# Patient Record
Sex: MALE | Race: BLACK OR AFRICAN AMERICAN | NOT HISPANIC OR LATINO
[De-identification: names, ages, dates, MRNs, and addresses within clinical notes are randomized per-mention and may not be internally consistent; named-entity substitution may affect disease eponyms.]

---

## 2017-01-10 ENCOUNTER — APPOINTMENT (OUTPATIENT)
Dept: WOUND CARE | Facility: CLINIC | Age: 62
End: 2017-01-10

## 2017-01-10 ENCOUNTER — OUTPATIENT (OUTPATIENT)
Dept: OUTPATIENT SERVICES | Facility: HOSPITAL | Age: 62
LOS: 1 days | End: 2017-01-10
Payer: COMMERCIAL

## 2017-01-10 VITALS
RESPIRATION RATE: 16 BRPM | DIASTOLIC BLOOD PRESSURE: 68 MMHG | SYSTOLIC BLOOD PRESSURE: 147 MMHG | OXYGEN SATURATION: 99 % | WEIGHT: 210 LBS | HEART RATE: 106 BPM | BODY MASS INDEX: 31.83 KG/M2 | HEIGHT: 68 IN | TEMPERATURE: 99.4 F

## 2017-01-10 DIAGNOSIS — Z00.00 ENCOUNTER FOR GENERAL ADULT MEDICAL EXAMINATION WITHOUT ABNORMAL FINDINGS: ICD-10-CM

## 2017-01-10 DIAGNOSIS — Z98.89 OTHER SPECIFIED POSTPROCEDURAL STATES: Chronic | ICD-10-CM

## 2017-01-10 DIAGNOSIS — I73.9 PERIPHERAL VASCULAR DISEASE, UNSPECIFIED: Chronic | ICD-10-CM

## 2017-01-10 PROCEDURE — G0463: CPT

## 2017-01-17 DIAGNOSIS — M79.671 PAIN IN RIGHT FOOT: ICD-10-CM

## 2017-01-17 DIAGNOSIS — B35.1 TINEA UNGUIUM: ICD-10-CM

## 2017-01-17 DIAGNOSIS — E11.621 TYPE 2 DIABETES MELLITUS WITH FOOT ULCER: ICD-10-CM

## 2017-01-17 DIAGNOSIS — M79.672 PAIN IN LEFT FOOT: ICD-10-CM

## 2017-04-11 ENCOUNTER — OUTPATIENT (OUTPATIENT)
Dept: OUTPATIENT SERVICES | Facility: HOSPITAL | Age: 62
LOS: 1 days | End: 2017-04-11
Payer: COMMERCIAL

## 2017-04-11 ENCOUNTER — APPOINTMENT (OUTPATIENT)
Dept: WOUND CARE | Facility: CLINIC | Age: 62
End: 2017-04-11

## 2017-04-11 VITALS
DIASTOLIC BLOOD PRESSURE: 64 MMHG | HEART RATE: 85 BPM | SYSTOLIC BLOOD PRESSURE: 112 MMHG | BODY MASS INDEX: 31.83 KG/M2 | HEIGHT: 68 IN | WEIGHT: 210 LBS

## 2017-04-11 DIAGNOSIS — Z98.89 OTHER SPECIFIED POSTPROCEDURAL STATES: Chronic | ICD-10-CM

## 2017-04-11 DIAGNOSIS — I73.9 PERIPHERAL VASCULAR DISEASE, UNSPECIFIED: Chronic | ICD-10-CM

## 2017-04-11 DIAGNOSIS — Z00.00 ENCOUNTER FOR GENERAL ADULT MEDICAL EXAMINATION WITHOUT ABNORMAL FINDINGS: ICD-10-CM

## 2017-04-11 PROCEDURE — G0463: CPT

## 2017-04-12 DIAGNOSIS — E11.621 TYPE 2 DIABETES MELLITUS WITH FOOT ULCER: ICD-10-CM

## 2017-04-12 DIAGNOSIS — E11.610 TYPE 2 DIABETES MELLITUS WITH DIABETIC NEUROPATHIC ARTHROPATHY: ICD-10-CM

## 2017-05-30 ENCOUNTER — OUTPATIENT (OUTPATIENT)
Dept: OUTPATIENT SERVICES | Facility: HOSPITAL | Age: 62
LOS: 1 days | End: 2017-05-30
Payer: COMMERCIAL

## 2017-05-30 ENCOUNTER — APPOINTMENT (OUTPATIENT)
Dept: WOUND CARE | Facility: CLINIC | Age: 62
End: 2017-05-30

## 2017-05-30 VITALS
HEART RATE: 80 BPM | SYSTOLIC BLOOD PRESSURE: 105 MMHG | TEMPERATURE: 98.6 F | BODY MASS INDEX: 33.34 KG/M2 | OXYGEN SATURATION: 99 % | RESPIRATION RATE: 18 BRPM | HEIGHT: 68 IN | DIASTOLIC BLOOD PRESSURE: 70 MMHG | WEIGHT: 220 LBS

## 2017-05-30 DIAGNOSIS — Z98.89 OTHER SPECIFIED POSTPROCEDURAL STATES: Chronic | ICD-10-CM

## 2017-05-30 DIAGNOSIS — I73.9 PERIPHERAL VASCULAR DISEASE, UNSPECIFIED: Chronic | ICD-10-CM

## 2017-05-30 DIAGNOSIS — Z00.00 ENCOUNTER FOR GENERAL ADULT MEDICAL EXAMINATION WITHOUT ABNORMAL FINDINGS: ICD-10-CM

## 2017-05-30 PROCEDURE — G0463: CPT

## 2017-06-05 DIAGNOSIS — L97.411 NON-PRESSURE CHRONIC ULCER OF RIGHT HEEL AND MIDFOOT LIMITED TO BREAKDOWN OF SKIN: ICD-10-CM

## 2017-06-05 DIAGNOSIS — E11.621 TYPE 2 DIABETES MELLITUS WITH FOOT ULCER: ICD-10-CM

## 2017-10-03 ENCOUNTER — OUTPATIENT (OUTPATIENT)
Dept: OUTPATIENT SERVICES | Facility: HOSPITAL | Age: 62
LOS: 1 days | End: 2017-10-03
Payer: SELF-PAY

## 2017-10-03 ENCOUNTER — APPOINTMENT (OUTPATIENT)
Dept: WOUND CARE | Facility: CLINIC | Age: 62
End: 2017-10-03

## 2017-10-03 VITALS
RESPIRATION RATE: 18 BRPM | HEART RATE: 87 BPM | WEIGHT: 220 LBS | BODY MASS INDEX: 33.34 KG/M2 | DIASTOLIC BLOOD PRESSURE: 68 MMHG | TEMPERATURE: 98.7 F | SYSTOLIC BLOOD PRESSURE: 104 MMHG | HEIGHT: 68 IN | OXYGEN SATURATION: 99 %

## 2017-10-03 DIAGNOSIS — Z98.89 OTHER SPECIFIED POSTPROCEDURAL STATES: Chronic | ICD-10-CM

## 2017-10-03 DIAGNOSIS — I73.9 PERIPHERAL VASCULAR DISEASE, UNSPECIFIED: Chronic | ICD-10-CM

## 2017-10-03 DIAGNOSIS — L84 CORNS AND CALLOSITIES: ICD-10-CM

## 2017-10-03 DIAGNOSIS — Z00.00 ENCOUNTER FOR GENERAL ADULT MEDICAL EXAMINATION WITHOUT ABNORMAL FINDINGS: ICD-10-CM

## 2017-10-03 DIAGNOSIS — B35.1 TINEA UNGUIUM: ICD-10-CM

## 2017-10-03 PROCEDURE — 11055 PARING/CUTG B9 HYPRKER LES 1: CPT

## 2017-11-27 ENCOUNTER — APPOINTMENT (OUTPATIENT)
Dept: SURGERY | Facility: CLINIC | Age: 62
End: 2017-11-27
Payer: COMMERCIAL

## 2017-11-27 VITALS
HEIGHT: 68 IN | HEART RATE: 85 BPM | BODY MASS INDEX: 33.34 KG/M2 | DIASTOLIC BLOOD PRESSURE: 83 MMHG | SYSTOLIC BLOOD PRESSURE: 136 MMHG | WEIGHT: 220 LBS | TEMPERATURE: 98 F

## 2017-11-27 PROCEDURE — 99202 OFFICE O/P NEW SF 15 MIN: CPT

## 2018-01-09 ENCOUNTER — APPOINTMENT (OUTPATIENT)
Dept: WOUND CARE | Facility: CLINIC | Age: 63
End: 2018-01-09

## 2018-01-24 ENCOUNTER — OUTPATIENT (OUTPATIENT)
Dept: OUTPATIENT SERVICES | Facility: HOSPITAL | Age: 63
LOS: 1 days | End: 2018-01-24
Payer: COMMERCIAL

## 2018-01-24 ENCOUNTER — APPOINTMENT (OUTPATIENT)
Dept: WOUND CARE | Facility: CLINIC | Age: 63
End: 2018-01-24

## 2018-01-24 DIAGNOSIS — I73.9 PERIPHERAL VASCULAR DISEASE, UNSPECIFIED: Chronic | ICD-10-CM

## 2018-01-24 DIAGNOSIS — Z98.89 OTHER SPECIFIED POSTPROCEDURAL STATES: Chronic | ICD-10-CM

## 2018-01-24 DIAGNOSIS — Z00.00 ENCOUNTER FOR GENERAL ADULT MEDICAL EXAMINATION WITHOUT ABNORMAL FINDINGS: ICD-10-CM

## 2018-01-24 PROCEDURE — G0463: CPT

## 2018-01-24 RX ORDER — AMMONIUM LACTATE 12 %
12 LOTION (GRAM) TOPICAL TWICE DAILY
Qty: 1 | Refills: 5 | Status: ACTIVE | COMMUNITY
Start: 2018-01-24 | End: 1900-01-01

## 2018-01-25 DIAGNOSIS — B35.1 TINEA UNGUIUM: ICD-10-CM

## 2018-01-25 DIAGNOSIS — M79.671 PAIN IN RIGHT FOOT: ICD-10-CM

## 2018-01-25 DIAGNOSIS — E11.620 TYPE 2 DIABETES MELLITUS WITH DIABETIC DERMATITIS: ICD-10-CM

## 2018-02-28 ENCOUNTER — OUTPATIENT (OUTPATIENT)
Dept: OUTPATIENT SERVICES | Facility: HOSPITAL | Age: 63
LOS: 1 days | End: 2018-02-28
Payer: SELF-PAY

## 2018-02-28 ENCOUNTER — APPOINTMENT (OUTPATIENT)
Dept: WOUND CARE | Facility: CLINIC | Age: 63
End: 2018-02-28

## 2018-02-28 VITALS
DIASTOLIC BLOOD PRESSURE: 82 MMHG | HEART RATE: 81 BPM | WEIGHT: 230 LBS | OXYGEN SATURATION: 99 % | SYSTOLIC BLOOD PRESSURE: 156 MMHG | BODY MASS INDEX: 34.86 KG/M2 | TEMPERATURE: 98.3 F | HEIGHT: 68 IN | RESPIRATION RATE: 18 BRPM

## 2018-02-28 DIAGNOSIS — Z98.89 OTHER SPECIFIED POSTPROCEDURAL STATES: Chronic | ICD-10-CM

## 2018-02-28 DIAGNOSIS — Z00.00 ENCOUNTER FOR GENERAL ADULT MEDICAL EXAMINATION WITHOUT ABNORMAL FINDINGS: ICD-10-CM

## 2018-02-28 DIAGNOSIS — I73.9 PERIPHERAL VASCULAR DISEASE, UNSPECIFIED: Chronic | ICD-10-CM

## 2018-02-28 PROCEDURE — 11042 DBRDMT SUBQ TIS 1ST 20SQCM/<: CPT

## 2018-02-28 PROCEDURE — G0463: CPT

## 2018-03-01 DIAGNOSIS — E11.621 TYPE 2 DIABETES MELLITUS WITH FOOT ULCER: ICD-10-CM

## 2018-03-01 DIAGNOSIS — L97.412 NON-PRESSURE CHRONIC ULCER OF RIGHT HEEL AND MIDFOOT WITH FAT LAYER EXPOSED: ICD-10-CM

## 2018-03-13 ENCOUNTER — OUTPATIENT (OUTPATIENT)
Dept: OUTPATIENT SERVICES | Facility: HOSPITAL | Age: 63
LOS: 1 days | End: 2018-03-13
Payer: COMMERCIAL

## 2018-03-13 ENCOUNTER — APPOINTMENT (OUTPATIENT)
Dept: WOUND CARE | Facility: CLINIC | Age: 63
End: 2018-03-13

## 2018-03-13 VITALS
WEIGHT: 255.74 LBS | TEMPERATURE: 99.2 F | HEIGHT: 68 IN | HEART RATE: 93 BPM | RESPIRATION RATE: 18 BRPM | SYSTOLIC BLOOD PRESSURE: 132 MMHG | DIASTOLIC BLOOD PRESSURE: 75 MMHG | BODY MASS INDEX: 38.76 KG/M2 | OXYGEN SATURATION: 99 %

## 2018-03-13 DIAGNOSIS — I73.9 PERIPHERAL VASCULAR DISEASE, UNSPECIFIED: Chronic | ICD-10-CM

## 2018-03-13 DIAGNOSIS — Z00.00 ENCOUNTER FOR GENERAL ADULT MEDICAL EXAMINATION WITHOUT ABNORMAL FINDINGS: ICD-10-CM

## 2018-03-13 DIAGNOSIS — Z98.89 OTHER SPECIFIED POSTPROCEDURAL STATES: Chronic | ICD-10-CM

## 2018-03-13 PROCEDURE — G0463: CPT

## 2018-03-14 DIAGNOSIS — E11.610 TYPE 2 DIABETES MELLITUS WITH DIABETIC NEUROPATHIC ARTHROPATHY: ICD-10-CM

## 2018-03-14 DIAGNOSIS — M14.672 CHARCOT'S JOINT, LEFT ANKLE AND FOOT: ICD-10-CM

## 2018-03-14 DIAGNOSIS — M14.671 CHARCOT'S JOINT, RIGHT ANKLE AND FOOT: ICD-10-CM

## 2018-04-09 ENCOUNTER — OUTPATIENT (OUTPATIENT)
Dept: OUTPATIENT SERVICES | Facility: HOSPITAL | Age: 63
LOS: 1 days | End: 2018-04-09
Payer: COMMERCIAL

## 2018-04-09 ENCOUNTER — APPOINTMENT (OUTPATIENT)
Dept: CT IMAGING | Facility: IMAGING CENTER | Age: 63
End: 2018-04-09
Payer: COMMERCIAL

## 2018-04-09 DIAGNOSIS — N49.2 INFLAMMATORY DISORDERS OF SCROTUM: ICD-10-CM

## 2018-04-09 DIAGNOSIS — Z98.89 OTHER SPECIFIED POSTPROCEDURAL STATES: Chronic | ICD-10-CM

## 2018-04-09 DIAGNOSIS — I73.9 PERIPHERAL VASCULAR DISEASE, UNSPECIFIED: Chronic | ICD-10-CM

## 2018-04-09 PROCEDURE — 74176 CT ABD & PELVIS W/O CONTRAST: CPT | Mod: 26

## 2018-04-09 PROCEDURE — 74176 CT ABD & PELVIS W/O CONTRAST: CPT

## 2018-05-10 ENCOUNTER — APPOINTMENT (OUTPATIENT)
Dept: CT IMAGING | Facility: IMAGING CENTER | Age: 63
End: 2018-05-10
Payer: COMMERCIAL

## 2018-05-10 ENCOUNTER — OUTPATIENT (OUTPATIENT)
Dept: OUTPATIENT SERVICES | Facility: HOSPITAL | Age: 63
LOS: 1 days | End: 2018-05-10
Payer: COMMERCIAL

## 2018-05-10 DIAGNOSIS — Z98.89 OTHER SPECIFIED POSTPROCEDURAL STATES: Chronic | ICD-10-CM

## 2018-05-10 DIAGNOSIS — Z00.8 ENCOUNTER FOR OTHER GENERAL EXAMINATION: ICD-10-CM

## 2018-05-10 DIAGNOSIS — I73.9 PERIPHERAL VASCULAR DISEASE, UNSPECIFIED: Chronic | ICD-10-CM

## 2018-05-10 PROCEDURE — 72192 CT PELVIS W/O DYE: CPT

## 2018-05-10 PROCEDURE — 72192 CT PELVIS W/O DYE: CPT | Mod: 26

## 2018-05-11 ENCOUNTER — OUTPATIENT (OUTPATIENT)
Dept: OUTPATIENT SERVICES | Facility: HOSPITAL | Age: 63
LOS: 1 days | End: 2018-05-11
Payer: COMMERCIAL

## 2018-05-11 VITALS
DIASTOLIC BLOOD PRESSURE: 90 MMHG | HEIGHT: 68 IN | OXYGEN SATURATION: 96 % | SYSTOLIC BLOOD PRESSURE: 132 MMHG | WEIGHT: 238.1 LBS | HEART RATE: 82 BPM | RESPIRATION RATE: 16 BRPM | TEMPERATURE: 99 F

## 2018-05-11 DIAGNOSIS — Z98.89 OTHER SPECIFIED POSTPROCEDURAL STATES: Chronic | ICD-10-CM

## 2018-05-11 DIAGNOSIS — I73.9 PERIPHERAL VASCULAR DISEASE, UNSPECIFIED: Chronic | ICD-10-CM

## 2018-05-11 DIAGNOSIS — Z01.818 ENCOUNTER FOR OTHER PREPROCEDURAL EXAMINATION: ICD-10-CM

## 2018-05-11 DIAGNOSIS — N49.2 INFLAMMATORY DISORDERS OF SCROTUM: ICD-10-CM

## 2018-05-11 DIAGNOSIS — G47.33 OBSTRUCTIVE SLEEP APNEA (ADULT) (PEDIATRIC): ICD-10-CM

## 2018-05-11 DIAGNOSIS — N18.6 END STAGE RENAL DISEASE: ICD-10-CM

## 2018-05-11 DIAGNOSIS — E11.29 TYPE 2 DIABETES MELLITUS WITH OTHER DIABETIC KIDNEY COMPLICATION: ICD-10-CM

## 2018-05-11 PROCEDURE — G0463: CPT

## 2018-05-11 RX ORDER — INSULIN ASPART 100 [IU]/ML
20 INJECTION, SUSPENSION SUBCUTANEOUS
Qty: 0 | Refills: 0 | COMMUNITY

## 2018-05-11 NOTE — H&P PST ADULT - PMH
Charcot foot due to diabetes mellitus    Diabetes    Diabetic retinopathy associated with type 2 diabetes mellitus    DM (diabetes mellitus)    ESRD (end stage renal disease) on dialysis    HTN (hypertension)    Hyperlipidemia    Hypertension    Kidney problem Abscess of scrotum    Charcot foot due to diabetes mellitus  uses CROW boots bilaterally  Diabetic retinopathy associated with type 2 diabetes mellitus    DM (diabetes mellitus)  type  ESRD (end stage renal disease) on dialysis  t-th-sat via right AV graft  Hyperlipidemia Abscess of scrotum    Charcot foot due to diabetes mellitus  uses CROW boots bilaterally  Diabetic retinopathy associated with type 2 diabetes mellitus    DM (diabetes mellitus)  type 2  ESRD (end stage renal disease) on dialysis  t-th-sat via right AV graft, uses midodrine the days of Dialysis for hypotension  Hyperlipidemia

## 2018-05-11 NOTE — H&P PST ADULT - PROBLEM SELECTOR PLAN 1
planned for Incision and drainage of right scrotal abscess.   will obtain recent labs from Dialysis center: called to send to [facility]  Preprocedure instructions discussed planned for Incision and drainage of right scrotal abscess.   will obtain recent labs from Dialysis center: called to send to [facility]  Preprocedure instructions discussed  will obtain recent cardio note/echo

## 2018-05-11 NOTE — H&P PST ADULT - PROBLEM SELECTOR PROBLEM 3
Type 2 diabetes mellitus with other diabetic kidney complication, with long-term current use of insulin

## 2018-05-11 NOTE — H&P PST ADULT - PROBLEM SELECTOR PLAN 3
HGA1c done at HD center   FS the day of procedure  continue full dose Lantus at night   Hold Novolog the day of procedure

## 2018-05-11 NOTE — H&P PST ADULT - ENDOCRINE COMMENTS
BS 130s in AM BS 130s in AM, last A1c 9 or less as per patient ( will obtain labs 5/9 from HD center)

## 2018-05-11 NOTE — H&P PST ADULT - NSANTHOSAYNRD_GEN_A_CORE
No. TEOFILO screening performed.  STOP BANG Legend: 0-2 = LOW Risk; 3-4 = INTERMEDIATE Risk; 5-8 = HIGH Risk

## 2018-05-11 NOTE — H&P PST ADULT - PSH
Charcot's syndrome  Surgeries in feet and ankles   left ankle 2011 and right ankle 11/2014  S/P foot surgery  right foot - 11/2014

## 2018-05-11 NOTE — H&P PST ADULT - OTHER CARE PROVIDERS
Dr. Hudson Cardio  last visit 5/10/18, Dr. Arden Tsang Nephro , Dialysis center [facility] , Dr. Friedman endo , Dr. Navarro ortho

## 2018-05-11 NOTE — H&P PST ADULT - NEGATIVE GENERAL GENITOURINARY SYMPTOMS
no hematuria/no flank pain L/no flank pain R/no bladder infections/no dysuria/no nocturia no bladder infections/no dysuria/no hematuria/no flank pain L/no flank pain R

## 2018-05-11 NOTE — H&P PST ADULT - EXTREMITIES COMMENTS
Right UE with right AV graft + thrill, + bruit   LE unable to assess patient has CROW boots on Bilaterally

## 2018-05-14 ENCOUNTER — TRANSCRIPTION ENCOUNTER (OUTPATIENT)
Age: 63
End: 2018-05-14

## 2018-05-15 ENCOUNTER — INPATIENT (INPATIENT)
Facility: HOSPITAL | Age: 63
LOS: 0 days | Discharge: HOME HEALTH | DRG: 727 | End: 2018-05-16
Attending: UROLOGY | Admitting: UROLOGY
Payer: MEDICARE

## 2018-05-15 VITALS
OXYGEN SATURATION: 96 % | HEART RATE: 91 BPM | WEIGHT: 238.1 LBS | SYSTOLIC BLOOD PRESSURE: 143 MMHG | HEIGHT: 68 IN | TEMPERATURE: 98 F | RESPIRATION RATE: 16 BRPM | DIASTOLIC BLOOD PRESSURE: 78 MMHG

## 2018-05-15 DIAGNOSIS — N49.2 INFLAMMATORY DISORDERS OF SCROTUM: ICD-10-CM

## 2018-05-15 DIAGNOSIS — Z98.89 OTHER SPECIFIED POSTPROCEDURAL STATES: Chronic | ICD-10-CM

## 2018-05-15 DIAGNOSIS — I73.9 PERIPHERAL VASCULAR DISEASE, UNSPECIFIED: Chronic | ICD-10-CM

## 2018-05-15 LAB
GLUCOSE BLDC GLUCOMTR-MCNC: 174 MG/DL — HIGH (ref 70–99)
GLUCOSE BLDC GLUCOMTR-MCNC: 320 MG/DL — HIGH (ref 70–99)
POTASSIUM SERPL-MCNC: 4.3 MMOL/L — SIGNIFICANT CHANGE UP (ref 3.5–5.3)
POTASSIUM SERPL-SCNC: 4.3 MMOL/L — SIGNIFICANT CHANGE UP (ref 3.5–5.3)

## 2018-05-15 RX ORDER — CEFAZOLIN SODIUM 1 G
2000 VIAL (EA) INJECTION ONCE
Qty: 0 | Refills: 0 | Status: DISCONTINUED | OUTPATIENT
Start: 2018-05-15 | End: 2018-05-15

## 2018-05-15 RX ORDER — LIDOCAINE HCL 20 MG/ML
0.2 VIAL (ML) INJECTION ONCE
Qty: 0 | Refills: 0 | Status: DISCONTINUED | OUTPATIENT
Start: 2018-05-15 | End: 2018-05-15

## 2018-05-15 RX ORDER — ONDANSETRON 8 MG/1
4 TABLET, FILM COATED ORAL ONCE
Qty: 0 | Refills: 0 | Status: DISCONTINUED | OUTPATIENT
Start: 2018-05-15 | End: 2018-05-16

## 2018-05-15 RX ORDER — GLUCAGON INJECTION, SOLUTION 0.5 MG/.1ML
1 INJECTION, SOLUTION SUBCUTANEOUS ONCE
Qty: 0 | Refills: 0 | Status: DISCONTINUED | OUTPATIENT
Start: 2018-05-15 | End: 2018-05-16

## 2018-05-15 RX ORDER — OXYCODONE AND ACETAMINOPHEN 5; 325 MG/1; MG/1
1 TABLET ORAL EVERY 4 HOURS
Qty: 0 | Refills: 0 | Status: DISCONTINUED | OUTPATIENT
Start: 2018-05-15 | End: 2018-05-16

## 2018-05-15 RX ORDER — INSULIN LISPRO 100/ML
VIAL (ML) SUBCUTANEOUS
Qty: 0 | Refills: 0 | Status: DISCONTINUED | OUTPATIENT
Start: 2018-05-15 | End: 2018-05-16

## 2018-05-15 RX ORDER — SODIUM CHLORIDE 9 MG/ML
3 INJECTION INTRAMUSCULAR; INTRAVENOUS; SUBCUTANEOUS EVERY 8 HOURS
Qty: 0 | Refills: 0 | Status: DISCONTINUED | OUTPATIENT
Start: 2018-05-15 | End: 2018-05-15

## 2018-05-15 RX ORDER — SODIUM CHLORIDE 9 MG/ML
1000 INJECTION INTRAMUSCULAR; INTRAVENOUS; SUBCUTANEOUS
Qty: 0 | Refills: 0 | Status: DISCONTINUED | OUTPATIENT
Start: 2018-05-15 | End: 2018-05-15

## 2018-05-15 RX ORDER — OXYCODONE AND ACETAMINOPHEN 5; 325 MG/1; MG/1
2 TABLET ORAL EVERY 6 HOURS
Qty: 0 | Refills: 0 | Status: DISCONTINUED | OUTPATIENT
Start: 2018-05-15 | End: 2018-05-16

## 2018-05-15 RX ORDER — DEXTROSE 50 % IN WATER 50 %
25 SYRINGE (ML) INTRAVENOUS ONCE
Qty: 0 | Refills: 0 | Status: DISCONTINUED | OUTPATIENT
Start: 2018-05-15 | End: 2018-05-16

## 2018-05-15 RX ORDER — HEPARIN SODIUM 5000 [USP'U]/ML
5000 INJECTION INTRAVENOUS; SUBCUTANEOUS EVERY 8 HOURS
Qty: 0 | Refills: 0 | Status: DISCONTINUED | OUTPATIENT
Start: 2018-05-15 | End: 2018-05-16

## 2018-05-15 RX ORDER — CEFTRIAXONE 500 MG/1
1 INJECTION, POWDER, FOR SOLUTION INTRAMUSCULAR; INTRAVENOUS EVERY 24 HOURS
Qty: 0 | Refills: 0 | Status: DISCONTINUED | OUTPATIENT
Start: 2018-05-15 | End: 2018-05-16

## 2018-05-15 RX ORDER — SODIUM CHLORIDE 9 MG/ML
1000 INJECTION, SOLUTION INTRAVENOUS
Qty: 0 | Refills: 0 | Status: DISCONTINUED | OUTPATIENT
Start: 2018-05-15 | End: 2018-05-16

## 2018-05-15 RX ORDER — SENNA PLUS 8.6 MG/1
2 TABLET ORAL AT BEDTIME
Qty: 0 | Refills: 0 | Status: DISCONTINUED | OUTPATIENT
Start: 2018-05-15 | End: 2018-05-16

## 2018-05-15 RX ORDER — DOCUSATE SODIUM 100 MG
100 CAPSULE ORAL THREE TIMES A DAY
Qty: 0 | Refills: 0 | Status: DISCONTINUED | OUTPATIENT
Start: 2018-05-15 | End: 2018-05-16

## 2018-05-15 RX ORDER — DEXTROSE 50 % IN WATER 50 %
12.5 SYRINGE (ML) INTRAVENOUS ONCE
Qty: 0 | Refills: 0 | Status: DISCONTINUED | OUTPATIENT
Start: 2018-05-15 | End: 2018-05-16

## 2018-05-15 RX ORDER — INSULIN LISPRO 100/ML
20 VIAL (ML) SUBCUTANEOUS
Qty: 0 | Refills: 0 | Status: DISCONTINUED | OUTPATIENT
Start: 2018-05-15 | End: 2018-05-16

## 2018-05-15 RX ORDER — MORPHINE SULFATE 50 MG/1
4 CAPSULE, EXTENDED RELEASE ORAL EVERY 4 HOURS
Qty: 0 | Refills: 0 | Status: DISCONTINUED | OUTPATIENT
Start: 2018-05-15 | End: 2018-05-15

## 2018-05-15 RX ORDER — INSULIN GLARGINE 100 [IU]/ML
45 INJECTION, SOLUTION SUBCUTANEOUS AT BEDTIME
Qty: 0 | Refills: 0 | Status: DISCONTINUED | OUTPATIENT
Start: 2018-05-15 | End: 2018-05-16

## 2018-05-15 RX ORDER — DEXTROSE 50 % IN WATER 50 %
15 SYRINGE (ML) INTRAVENOUS ONCE
Qty: 0 | Refills: 0 | Status: DISCONTINUED | OUTPATIENT
Start: 2018-05-15 | End: 2018-05-16

## 2018-05-15 RX ORDER — CEFTRIAXONE 500 MG/1
1 INJECTION, POWDER, FOR SOLUTION INTRAMUSCULAR; INTRAVENOUS EVERY 24 HOURS
Qty: 0 | Refills: 0 | Status: DISCONTINUED | OUTPATIENT
Start: 2018-05-15 | End: 2018-05-15

## 2018-05-15 RX ORDER — SOD,AMMONIUM,POTASSIUM LACTATE
1 CREAM (GRAM) TOPICAL
Qty: 0 | Refills: 0 | Status: DISCONTINUED | OUTPATIENT
Start: 2018-05-15 | End: 2018-05-16

## 2018-05-15 RX ORDER — ATORVASTATIN CALCIUM 80 MG/1
20 TABLET, FILM COATED ORAL AT BEDTIME
Qty: 0 | Refills: 0 | Status: DISCONTINUED | OUTPATIENT
Start: 2018-05-15 | End: 2018-05-16

## 2018-05-15 RX ORDER — ONDANSETRON 8 MG/1
4 TABLET, FILM COATED ORAL EVERY 6 HOURS
Qty: 0 | Refills: 0 | Status: DISCONTINUED | OUTPATIENT
Start: 2018-05-15 | End: 2018-05-16

## 2018-05-15 RX ORDER — HYDROMORPHONE HYDROCHLORIDE 2 MG/ML
0.2 INJECTION INTRAMUSCULAR; INTRAVENOUS; SUBCUTANEOUS
Qty: 0 | Refills: 0 | Status: DISCONTINUED | OUTPATIENT
Start: 2018-05-15 | End: 2018-05-16

## 2018-05-15 RX ADMIN — HYDROMORPHONE HYDROCHLORIDE 0.2 MILLIGRAM(S): 2 INJECTION INTRAMUSCULAR; INTRAVENOUS; SUBCUTANEOUS at 16:15

## 2018-05-15 RX ADMIN — HEPARIN SODIUM 5000 UNIT(S): 5000 INJECTION INTRAVENOUS; SUBCUTANEOUS at 23:04

## 2018-05-15 RX ADMIN — Medication 100 MILLIGRAM(S): at 23:03

## 2018-05-15 RX ADMIN — INSULIN GLARGINE 45 UNIT(S): 100 INJECTION, SOLUTION SUBCUTANEOUS at 23:03

## 2018-05-15 RX ADMIN — HYDROMORPHONE HYDROCHLORIDE 0.2 MILLIGRAM(S): 2 INJECTION INTRAMUSCULAR; INTRAVENOUS; SUBCUTANEOUS at 15:40

## 2018-05-15 RX ADMIN — ATORVASTATIN CALCIUM 20 MILLIGRAM(S): 80 TABLET, FILM COATED ORAL at 23:03

## 2018-05-15 RX ADMIN — SENNA PLUS 2 TABLET(S): 8.6 TABLET ORAL at 23:02

## 2018-05-15 RX ADMIN — HYDROMORPHONE HYDROCHLORIDE 0.2 MILLIGRAM(S): 2 INJECTION INTRAMUSCULAR; INTRAVENOUS; SUBCUTANEOUS at 15:55

## 2018-05-15 RX ADMIN — HYDROMORPHONE HYDROCHLORIDE 0.2 MILLIGRAM(S): 2 INJECTION INTRAMUSCULAR; INTRAVENOUS; SUBCUTANEOUS at 15:58

## 2018-05-15 RX ADMIN — Medication 4: at 23:03

## 2018-05-15 NOTE — BRIEF OPERATIVE NOTE - PROCEDURE
<<-----Click on this checkbox to enter Procedure Incision and drainage of scrotum and tunica vaginalis  05/15/2018    Active  PKHANDGE

## 2018-05-15 NOTE — PROGRESS NOTE ADULT - SUBJECTIVE AND OBJECTIVE BOX
Post op Check    Pt seen and examined without complaints. Pain is controlled. Denies SOB/CP/N/V.     Vital Signs Last 24 Hrs  T(C): 36.2 (15 May 2018 17:00), Max: 36.9 (15 May 2018 12:47)  T(F): 97.2 (15 May 2018 17:00), Max: 98.4 (15 May 2018 12:47)  HR: 90 (15 May 2018 17:00) (90 - 103)  BP: 151/70 (15 May 2018 17:00) (131/61 - 178/77)  BP(mean): 101 (15 May 2018 17:00) (98 - 110)  RR: 15 (15 May 2018 17:00) (15 - 18)  SpO2: 95% (15 May 2018 17:00) (95% - 100%)    I&O's Summary  lehman- 15cc    Physical Exam  Gen: NAD, A&Ox3  Pulm: No respiratory distress, no subcostal retractions  CV: RRR, no JVD  Abd: Soft, NT, ND  : +lehman draining clear urine , minimal, right scrotal incision with iodoform packing and penrose x2, scrotal support in place         05-15    x   |  x   |  x   ----------------------------<  x   4.3   |  x   |  x           A/P: 62y Male s/p right scrotal I&D  DVT prophylaxis/OOB  Incentive spirometry  Strict I&O's  renal following- HD tomorrow afternoon  Analgesia and antiemetics as needed  regular Diet  AM labs  daily dressing changes

## 2018-05-16 ENCOUNTER — TRANSCRIPTION ENCOUNTER (OUTPATIENT)
Age: 63
End: 2018-05-16

## 2018-05-16 VITALS
HEART RATE: 84 BPM | DIASTOLIC BLOOD PRESSURE: 75 MMHG | OXYGEN SATURATION: 100 % | SYSTOLIC BLOOD PRESSURE: 148 MMHG | RESPIRATION RATE: 14 BRPM | TEMPERATURE: 98 F

## 2018-05-16 DIAGNOSIS — N25.81 SECONDARY HYPERPARATHYROIDISM OF RENAL ORIGIN: ICD-10-CM

## 2018-05-16 DIAGNOSIS — D63.1 ANEMIA IN CHRONIC KIDNEY DISEASE: ICD-10-CM

## 2018-05-16 DIAGNOSIS — I12.0 HYPERTENSIVE CHRONIC KIDNEY DISEASE WITH STAGE 5 CHRONIC KIDNEY DISEASE OR END STAGE RENAL DISEASE: ICD-10-CM

## 2018-05-16 DIAGNOSIS — N18.6 END STAGE RENAL DISEASE: ICD-10-CM

## 2018-05-16 LAB
ANION GAP SERPL CALC-SCNC: 18 MMOL/L — HIGH (ref 5–17)
BUN SERPL-MCNC: 48 MG/DL — HIGH (ref 7–23)
CALCIUM SERPL-MCNC: 9.1 MG/DL — SIGNIFICANT CHANGE UP (ref 8.4–10.5)
CHLORIDE SERPL-SCNC: 95 MMOL/L — LOW (ref 96–108)
CO2 SERPL-SCNC: 24 MMOL/L — SIGNIFICANT CHANGE UP (ref 22–31)
CREAT SERPL-MCNC: 7.65 MG/DL — HIGH (ref 0.5–1.3)
GLUCOSE BLDC GLUCOMTR-MCNC: 102 MG/DL — HIGH (ref 70–99)
GLUCOSE BLDC GLUCOMTR-MCNC: 306 MG/DL — HIGH (ref 70–99)
GLUCOSE BLDC GLUCOMTR-MCNC: 60 MG/DL — LOW (ref 70–99)
GLUCOSE BLDC GLUCOMTR-MCNC: 85 MG/DL — SIGNIFICANT CHANGE UP (ref 70–99)
GLUCOSE SERPL-MCNC: 183 MG/DL — HIGH (ref 70–99)
HBA1C BLD-MCNC: 10.5 % — HIGH (ref 4–5.6)
HBV SURFACE AG SER-ACNC: SIGNIFICANT CHANGE UP
HCT VFR BLD CALC: 32 % — LOW (ref 39–50)
HGB BLD-MCNC: 10.5 G/DL — LOW (ref 13–17)
MCHC RBC-ENTMCNC: 31.1 PG — SIGNIFICANT CHANGE UP (ref 27–34)
MCHC RBC-ENTMCNC: 32.9 GM/DL — SIGNIFICANT CHANGE UP (ref 32–36)
MCV RBC AUTO: 94.6 FL — SIGNIFICANT CHANGE UP (ref 80–100)
PHOSPHATE SERPL-MCNC: 5.6 MG/DL — HIGH (ref 2.5–4.5)
PLATELET # BLD AUTO: 278 K/UL — SIGNIFICANT CHANGE UP (ref 150–400)
POTASSIUM SERPL-MCNC: 4.2 MMOL/L — SIGNIFICANT CHANGE UP (ref 3.5–5.3)
POTASSIUM SERPL-SCNC: 4.2 MMOL/L — SIGNIFICANT CHANGE UP (ref 3.5–5.3)
RBC # BLD: 3.38 M/UL — LOW (ref 4.2–5.8)
RBC # FLD: 13 % — SIGNIFICANT CHANGE UP (ref 10.3–14.5)
SODIUM SERPL-SCNC: 137 MMOL/L — SIGNIFICANT CHANGE UP (ref 135–145)
WBC # BLD: 11.5 K/UL — HIGH (ref 3.8–10.5)
WBC # FLD AUTO: 11.5 K/UL — HIGH (ref 3.8–10.5)

## 2018-05-16 PROCEDURE — 84100 ASSAY OF PHOSPHORUS: CPT

## 2018-05-16 PROCEDURE — 87186 SC STD MICRODIL/AGAR DIL: CPT

## 2018-05-16 PROCEDURE — 85027 COMPLETE CBC AUTOMATED: CPT

## 2018-05-16 PROCEDURE — 84132 ASSAY OF SERUM POTASSIUM: CPT

## 2018-05-16 PROCEDURE — 83036 HEMOGLOBIN GLYCOSYLATED A1C: CPT

## 2018-05-16 PROCEDURE — 87070 CULTURE OTHR SPECIMN AEROBIC: CPT

## 2018-05-16 PROCEDURE — 99261: CPT

## 2018-05-16 PROCEDURE — 87340 HEPATITIS B SURFACE AG IA: CPT

## 2018-05-16 PROCEDURE — 80048 BASIC METABOLIC PNL TOTAL CA: CPT

## 2018-05-16 PROCEDURE — 82962 GLUCOSE BLOOD TEST: CPT

## 2018-05-16 RX ORDER — CEFPODOXIME PROXETIL 100 MG
5 TABLET ORAL
Qty: 0 | Refills: 0 | COMMUNITY

## 2018-05-16 RX ORDER — DOCUSATE SODIUM 100 MG
1 CAPSULE ORAL
Qty: 0 | Refills: 0 | COMMUNITY
Start: 2018-05-16

## 2018-05-16 RX ORDER — SENNA PLUS 8.6 MG/1
2 TABLET ORAL
Qty: 0 | Refills: 0 | COMMUNITY
Start: 2018-05-16

## 2018-05-16 RX ORDER — AZTREONAM 2 G
1 VIAL (EA) INJECTION
Qty: 14 | Refills: 0 | OUTPATIENT
Start: 2018-05-16 | End: 2018-05-22

## 2018-05-16 RX ADMIN — HYDROMORPHONE HYDROCHLORIDE 0.2 MILLIGRAM(S): 2 INJECTION INTRAMUSCULAR; INTRAVENOUS; SUBCUTANEOUS at 07:35

## 2018-05-16 RX ADMIN — CEFTRIAXONE 100 GRAM(S): 500 INJECTION, POWDER, FOR SOLUTION INTRAMUSCULAR; INTRAVENOUS at 17:18

## 2018-05-16 RX ADMIN — Medication 4: at 10:09

## 2018-05-16 RX ADMIN — HEPARIN SODIUM 5000 UNIT(S): 5000 INJECTION INTRAVENOUS; SUBCUTANEOUS at 05:49

## 2018-05-16 RX ADMIN — Medication 1 APPLICATION(S): at 05:51

## 2018-05-16 RX ADMIN — HYDROMORPHONE HYDROCHLORIDE 0.2 MILLIGRAM(S): 2 INJECTION INTRAMUSCULAR; INTRAVENOUS; SUBCUTANEOUS at 07:02

## 2018-05-16 RX ADMIN — Medication 20 UNIT(S): at 10:09

## 2018-05-16 RX ADMIN — Medication 100 MILLIGRAM(S): at 05:48

## 2018-05-16 RX ADMIN — Medication 1 APPLICATION(S): at 17:18

## 2018-05-16 NOTE — DISCHARGE NOTE ADULT - PLAN OF CARE
you had your abscess drained Call the office if you experience fever, chills, uncontrolled pain, the inability to tolerate liquids, or the urine does not flow   to promote wound healing do not take a bath, continue to walk frequently, return to daily living activities slowly, no heavy lifting greater than 10lbs for 4-6 weeks, follow up Dr Judge in one week   packing should be changed daily by the visiting nurse maintain adequate blood sugar continue home medication continue dialysis maintain adequate cholesterol

## 2018-05-16 NOTE — PROGRESS NOTE ADULT - ASSESSMENT
63yo male s/p right scrotal I&D, POD#1  - daily packing changes  - f/u wound culture  - cont abx  - tov  - pvr  - HD this afternoon, then resume normal schedule tomorrow (tue-thu-sat)  - f/u renal

## 2018-05-16 NOTE — DISCHARGE NOTE ADULT - CARE PLAN
Principal Discharge DX:	Abscess of scrotum  Goal:	you had your abscess drained  Assessment and plan of treatment:	Call the office if you experience fever, chills, uncontrolled pain, the inability to tolerate liquids, or the urine does not flow   to promote wound healing do not take a bath, continue to walk frequently, return to daily living activities slowly, no heavy lifting greater than 10lbs for 4-6 weeks, follow up Dr Judge in one week   packing should be changed daily by the visiting nurse  Secondary Diagnosis:	Type 2 diabetes mellitus with chronic kidney disease, with long-term current use of insulin, unspecified CKD stage  Goal:	maintain adequate blood sugar  Assessment and plan of treatment:	continue home medication  Secondary Diagnosis:	ESRD (end stage renal disease) on dialysis  Assessment and plan of treatment:	continue dialysis  Secondary Diagnosis:	Pure hypercholesterolemia  Goal:	maintain adequate cholesterol  Assessment and plan of treatment:	continue home medication

## 2018-05-16 NOTE — CONSULT NOTE ADULT - PROBLEM SELECTOR RECOMMENDATION 4
No need for hectorol today. Will resume hectorol 5 mcg with HD as outpatient. Monitor serum calcium and phosphorus.    No renal objection to discharge post-HD today.

## 2018-05-16 NOTE — DISCHARGE NOTE ADULT - HOME CARE AGENCY
[facility] - [phone] to start visit the day after discharge, for RN miguel, Wound care and post-op follow-up- PLS MAKE SURE ARE SENT HOME WITH WOUND PACKING SUPPLIES

## 2018-05-16 NOTE — DISCHARGE NOTE ADULT - CONDITIONS AT DISCHARGE
Pt is a&o x4. Pt denies any pain. Pt. verbalized understanding of discharge instructions. Pt is ambulatory, voiding, tolerating diet, and VSS. Pt. verbalized understanding of medications prescribed and to follow up with MD in time ordered. IV lock removed and site WDL. R AV Fistula remains audible & palpable. Safety maintained. Supplies for incision care sent home with pt. Pt being transported to [facility] via wheel chair by transport team.

## 2018-05-16 NOTE — CONSULT NOTE ADULT - ASSESSMENT
62y Male with history of ESRD on HD presents with scrotal abscess s/p I & D. Nephrology consulted for ESRD status.

## 2018-05-16 NOTE — CONSULT NOTE ADULT - SUBJECTIVE AND OBJECTIVE BOX
[facility] NEPHROLOGY- CONSULTATION NOTE    62y Male with history of below presents with scrotal abscess s/p I & D. Nephrology consulted for ESRD status.    Patient well known to nephrology as follows with Dr. Tsang as outpatient and gets HD TTS at [facility]. Last HD on 5/15 as outpatient however terminated after 90 minutes per patient request (patient normally receives 240 minutes of HD).    REVIEW OF SYSTEMS:  Gen: no changes in weight  HEENT: no rhinorrhea  Neck: no sore throat  Cards: no chest pain  Resp: no dyspnea  GI: no nausea or vomiting or diarrhea  : no dysuria or hematuria  Vascular: no LE edema  Derm: no rashes  Neuro: no numbness/tingling    No Known Allergies      Home Medications Reviewed  Hospital Medications:   MEDICATIONS  (STANDING):  ammonium lactate 12% Lotion 1 Application(s) Topical two times a day  atorvastatin 20 milliGRAM(s) Oral at bedtime  cefTRIAXone   IVPB 1 Gram(s) IV Intermittent every 24 hours  dextrose 5%. 1000 milliLiter(s) (50 mL/Hr) IV Continuous <Continuous>  dextrose 50% Injectable 12.5 Gram(s) IV Push once  dextrose 50% Injectable 25 Gram(s) IV Push once  dextrose 50% Injectable 25 Gram(s) IV Push once  docusate sodium 100 milliGRAM(s) Oral three times a day  heparin  Injectable 5000 Unit(s) SubCutaneous every 8 hours  insulin glargine Injectable (LANTUS) 45 Unit(s) SubCutaneous at bedtime  insulin lispro (HumaLOG) corrective regimen sliding scale   SubCutaneous Before meals and at bedtime  insulin lispro Injectable (HumaLOG) 20 Unit(s) SubCutaneous before breakfast  insulin lispro Injectable (HumaLOG) 20 Unit(s) SubCutaneous before dinner  senna 2 Tablet(s) Oral at bedtime      PAST MEDICAL & SURGICAL HISTORY:  Abscess of scrotum  Hyperlipidemia  Diabetic retinopathy associated with type 2 diabetes mellitus  Charcot foot due to diabetes mellitus: uses CROW boots bilaterally  ESRD (end stage renal disease) on dialysis: t-th-sat via right AV graft, uses midodrine the days of Dialysis for hypotension  DM (diabetes mellitus): type 2  Charcot's syndrome: Surgeries in feet and ankles   left ankle 2011 and right ankle 11/2014  S/P foot surgery: right foot - 11/2014      FAMILY HISTORY:  No pertinent family history in first degree relatives  Family history of diabetes mellitus      SOCIAL HISTORY:  Denies toxic substance use     VITALS:  T(F): 98 (05-16-18 @ 09:00), Max: 98.4 (05-15-18 @ 12:47)  HR: 78 (05-16-18 @ 06:15)  BP: 131/72 (05-16-18 @ 06:15)  RR: 17 (05-16-18 @ 09:00)  SpO2: 96% (05-16-18 @ 09:00)  Wt(kg): --    05-15 @ 07:01  -  05-16 @ 07:00  --------------------------------------------------------  IN: 240 mL / OUT: 190 mL / NET: 50 mL    05-16 @ 07:01  -  05-16 @ 11:21  --------------------------------------------------------  IN: 280 mL / OUT: 0 mL / NET: 280 mL      Height (cm): 172.72 (05-15 @ 12:47)  Weight (kg): 108 (05-15 @ 12:47)  BMI (kg/m2): 36.2 (05-15 @ 12:47)  BSA (m2): 2.2 (05-15 @ 12:47)    PHYSICAL EXAM:  Gen: NAD, calm  HEENT: MMM  Neck: no JVD  Cards: RRR, +S1/S2, no M/G/R  Resp: CTA B/L  GI: soft, NT/ND, NABS  : no CVA tenderness  Vascular: no LE edema B/L, RUE AVG + bruit/thrill  Derm: no rashes  Neuro: non-focal    LABS: N/A

## 2018-05-16 NOTE — DISCHARGE NOTE ADULT - MEDICATION SUMMARY - MEDICATIONS TO TAKE
I will START or STAY ON the medications listed below when I get home from the hospital:    oxyCODONE-acetaminophen 5 mg-325 mg oral tablet  -- 1 tab(s) by mouth every 4 hours, As needed, Mild Pain MDD:6  -- Indication: For Pain medication     NovoLOG 100 units/mL subcutaneous solution  -- 20 unit(s) subcutaneous 2 times a day (with meals)  -- Indication: For home medication     Lantus Solostar Pen 100 units/mL subcutaneous solution  -- 45 unit(s) subcutaneous once a day (at bedtime)  -- Indication: For home medication     atorvastatin 20 mg oral tablet  -- 1 tab(s) by mouth once a day (at bedtime)  -- Indication: For home medication     ammonium lactate topical lotion  -- Apply on skin to affected area 2 times a day to feet  -- Indication: For home medication     senna oral tablet  -- 2 tab(s) by mouth once a day (at bedtime)  -- Indication: For Stool softener    docusate sodium 100 mg oral capsule  -- 1 cap(s) by mouth 3 times a day  -- Indication: For Stool softener    midodrine 2.5 mg oral tablet  -- orally 2 times a day, As Needed on HD days  -- Indication: For home medication     ketorolac 0.4% ophthalmic solution  -- to each affected eye 2 times a day  -- Indication: For home medication     Fosrenol 1000 mg oral tablet, chewable  -- orally once a day (in the evening)  -- Indication: For home medication     Bactrim  mg-160 mg oral tablet  -- 1 tab(s) by mouth 2 times a day   -- Avoid prolonged or excessive exposure to direct and/or artificial sunlight while taking this medication.  Finish all this medication unless otherwise directed by prescriber.  Medication should be taken with plenty of water.    -- Indication: For Antibiotic

## 2018-05-16 NOTE — CONSULT NOTE ADULT - PROBLEM SELECTOR RECOMMENDATION 9
Patient only with 90 minutes of 240 minutes of HD on 5/15. Plan for short HD (2 hours) this afternoon prior to discharge. Patient can then resume outpatient dialysis on 5/17. Monitor electrolytes.

## 2018-05-16 NOTE — DISCHARGE NOTE ADULT - MEDICATION SUMMARY - MEDICATIONS TO STOP TAKING
I will STOP taking the medications listed below when I get home from the hospital:    cefpodoxime 100 mg/5 mL oral liquid  -- 5 milliliter(s) by mouth every 12 hours for scotal infection

## 2018-05-16 NOTE — CONSULT NOTE ADULT - SUBJECTIVE AND OBJECTIVE BOX
63 yo M POD #1 s/p right scrotal abscess I&D.  Wound currently packed with iodoform packing, penrose drain.  Plastics asked to evaluate for wound recs.    PMH/PSH: DM, ESRD on HD, BL charcot feet s/p surgery, R scrotal I&D  NKDA  Meds: reviewed  Soc: nonsmoker  Fam: noncontrib    ROS: as per HPI and ow neg    PE:  NAD  Alert, aware  MMM  PERRL  Right scrotum with I&D sites packed with iodoform gauze and penrose drains  No malodor, purulence  Wounds appear clean  no Palpable fluid collections  Serosanguinous drainage on ABD pad    A/p: 63 yo M POD #1 s/p I&D R scrotum.    Recommend continuing with daily iodoform dressing changes, cover with ABD  Continue medical management, abx per primary  No indication for VAC  Can follow up as outpatient PRN (patient given office information)    Radha Reyes MD  Plastic Surgery

## 2018-05-16 NOTE — CONSULT NOTE ADULT - ATTENDING COMMENTS
Patient seen and examined, agree with the above assessment and plan by JAMIE Maldonado.  Pt known to out practice admitted for management of scrotal abcess  CV status is stable  Resume prior medications  DC planning per surgery
[facility] NEPHROLOGY  Arden Tsang M.D.  Freeman Holland D.O.  Janny Chirinos M.D.  Ambar Hale, MSN, ANP-C    Telephone: [phone]  Facsimile: [phone]    [address] [place] [address]

## 2018-05-16 NOTE — DISCHARGE NOTE ADULT - HOSPITAL COURSE
Pt is a 61 yo male with a pmh of esrd on hd t,th, sat, dm2, hld, charcot foot, and developed scrotal abscess which required incision and drainage in the operating room. please see the operative report for further details. postop he tolerated packing change without iv medications. he was dialyzed in house prior to discharge. vns was set up for packing changes

## 2018-05-16 NOTE — CONSULT NOTE ADULT - SUBJECTIVE AND OBJECTIVE BOX
CARDIOLOGY CONSULT - Dr. Hudson     CHIEF COMPLAINT: scrotal abscess, post op mgt     HPI: 62 year old male with PMHx below s/p scrotal abscess I&D. Pt. well known to our practice, f/u post op for cardiac management. Pt. cardiac history includes HTN, DM, ESRD on HD, HLD. Recent echo 12/17/17 revealing Mild diastolic dysfunction, EF 65%, minimal MR. Pt. feels well, denies cp/sob/smith, orthopnea, palpitations, PND, lower extremity edema, syncope or cough.     PAST MEDICAL & SURGICAL HISTORY:  Abscess of scrotum  Hyperlipidemia  Diabetic retinopathy associated with type 2 diabetes mellitus  Charcot foot due to diabetes mellitus: uses CROW boots bilaterally  ESRD (end stage renal disease) on dialysis: t-th-sat via right AV graft, uses midodrine the days of Dialysis for hypotension  DM (diabetes mellitus): type 2  Charcot's syndrome: Surgeries in feet and ankles   left ankle 2011 and right ankle 11/2014  S/P foot surgery: right foot - 11/2014          PREVIOUS DIAGNOSTIC TESTING:    [ ] Echocardiogram:   [ ]  Catheterization:  [ ] Stress Test:  	    MEDICATIONS:  MEDICATIONS  (STANDING):  ammonium lactate 12% Lotion 1 Application(s) Topical two times a day  atorvastatin 20 milliGRAM(s) Oral at bedtime  cefTRIAXone   IVPB 1 Gram(s) IV Intermittent every 24 hours  dextrose 5%. 1000 milliLiter(s) (50 mL/Hr) IV Continuous <Continuous>  dextrose 50% Injectable 12.5 Gram(s) IV Push once  dextrose 50% Injectable 25 Gram(s) IV Push once  dextrose 50% Injectable 25 Gram(s) IV Push once  docusate sodium 100 milliGRAM(s) Oral three times a day  heparin  Injectable 5000 Unit(s) SubCutaneous every 8 hours  insulin glargine Injectable (LANTUS) 45 Unit(s) SubCutaneous at bedtime  insulin lispro (HumaLOG) corrective regimen sliding scale   SubCutaneous Before meals and at bedtime  insulin lispro Injectable (HumaLOG) 20 Unit(s) SubCutaneous before breakfast  insulin lispro Injectable (HumaLOG) 20 Unit(s) SubCutaneous before dinner  senna 2 Tablet(s) Oral at bedtime      FAMILY HISTORY:  No pertinent family history in first degree relatives  Family history of diabetes mellitus      SOCIAL HISTORY:    [x ] Non-smoker  [ ] Smoker  [ ] Alcohol    Allergies    No Known Allergies    Intolerances    	    REVIEW OF SYSTEMS:  CONSTITUTIONAL: No fever, weight loss, or fatigue  EYES: No eye pain, visual disturbances, or discharge  ENMT:  No difficulty hearing, tinnitus, vertigo; No sinus or throat pain  NECK: No pain or stiffness  RESPIRATORY: No cough, wheezing, chills or hemoptysis; No Shortness of Breath  CARDIOVASCULAR: No chest pain, palpitations, passing out, dizziness, or leg swelling  GASTROINTESTINAL: No abdominal or epigastric pain. No nausea, vomiting, or hematemesis; No diarrhea or constipation. No melena or hematochezia.  GENITOURINARY: No dysuria, frequency, hematuria, or incontinence  NEUROLOGICAL: No headaches, memory loss, loss of strength, numbness, or tremors  SKIN: No itching, burning, rashes, or lesions   	    [x] All others negative	  [ ] Unable to obtain    PHYSICAL EXAM:  T(C): 36.7 (05-16-18 @ 09:00), Max: 36.9 (05-15-18 @ 12:47)  HR: 78 (05-16-18 @ 06:15) (78 - 103)  BP: 131/72 (05-16-18 @ 06:15) (129/72 - 178/77)  RR: 17 (05-16-18 @ 09:00) (15 - 18)  SpO2: 96% (05-16-18 @ 09:00) (95% - 100%)  Wt(kg): --  I&O's Summary    15 May 2018 07:01  -  16 May 2018 07:00  --------------------------------------------------------  IN: 240 mL / OUT: 190 mL / NET: 50 mL    16 May 2018 07:01  -  16 May 2018 11:12  --------------------------------------------------------  IN: 280 mL / OUT: 0 mL / NET: 280 mL        Appearance: Normal	  Psychiatry: A & O x 3, Mood & affect appropriate  HEENT:   Normal oral mucosa, PERRL, EOMI	  Lymphatic: No lymphadenopathy  Cardiovascular: Normal S1 S2,RRR, No JVD, +sys murmur   Respiratory: Lungs clear to auscultation	  Gastrointestinal:  Soft, Non-tender, + BS	  Skin: No rashes, No ecchymoses, No cyanosis	  Neurologic: Non-focal  Extremities: Normal range of motion, No clubbing, cyanosis or edema, b/l boots on      TELEMETRY: 	    ECG:  	  RADIOLOGY:  OTHER: 	  	  LABS:	 	    CARDIAC MARKERS:              05-15    x   |  x   |  x   ----------------------------<  x   4.3   |  x   |  x           proBNP:   Lipid Profile:   HgA1c:   TSH:     Suzysa DAVIDE Maldonado FNP-BC

## 2018-05-16 NOTE — DISCHARGE NOTE ADULT - ADDITIONAL INSTRUCTIONS
Your wound measures:  #1: middle of scrotum - 2cm length and 1cm deep  #2 right scrotum upper most incision - 8cm length and 2cm deep  #3 right scrotum middle incision - 4 cm long and 1cm deep  #4 right scrotum bottom incision - not packed - 1cm long and 1cm deep   #1, #2, #3 wounds are to be packed daily with iodoform packing

## 2018-05-16 NOTE — DISCHARGE NOTE ADULT - PATIENT PORTAL LINK FT
You can access the [facility] Portal, offered by [facility], by registering with the following website: [email or web address]

## 2018-05-16 NOTE — DISCHARGE NOTE ADULT - SECONDARY DIAGNOSIS.
Type 2 diabetes mellitus with chronic kidney disease, with long-term current use of insulin, unspecified CKD stage ESRD (end stage renal disease) on dialysis Pure hypercholesterolemia

## 2018-05-16 NOTE — CONSULT NOTE ADULT - ASSESSMENT
63yo male with PMHx  HTN, DM, ESRD on HD, HLD. s/p right scrotal I&D.     1. Scrotal wound  s/p I&D  abx per primary team  urology F/U  Dsg changes     2. HTN  bp controlled off medications  monitor    3. HLD  continue statin     4. ESRD on dialysis  renal f/u   dialysis as scheduled T- TH-Sat     dvt ppx   pt. stable for discharge from cardiac perspective, routine f/u in our office as scheduled. 61yo male with PMHx  HTN, DM, ESRD on HD, HLD. s/p right scrotal I&D.     1. Scrotal wound  s/p I&D  abx per primary team  urology F/U  Dsg changes     2. HTN  bp controlled off medications  monitor  resume outpatient medications at discharge     3. HLD  continue statin     4. ESRD on dialysis  renal f/u   dialysis as scheduled T- TH-Sat       dvt ppx   discharge per primary team , routine f/u in our office as scheduled.

## 2018-05-16 NOTE — PROGRESS NOTE ADULT - SUBJECTIVE AND OBJECTIVE BOX
UROLOGY PA PROGRESS NOTE:     Subjective:  no acute events overnight     Objective:  Vital signs  T(F): , Max: 98.4 (05-15-18 @ 12:47)  HR: 78 (05-16-18 @ 06:15)  BP: 131/72 (05-16-18 @ 06:15)  SpO2: 96% (05-16-18 @ 06:15)  Wt(kg): --    Output     lehman 170cc      Physical Exam:  Gen: NAD  Abd: soft, NT/ND  : +lehman draining clear. right scrotal wound with 4 incisions, penrose x2 looped through lower 3 incisions to keep open, clean, iodoform packing changes, scrotal support.     Labs:      05-15    x   |  x   |  x   ----------------------------<  x   4.3   |  x   |  x             wound cx: pending

## 2018-05-18 LAB
-  AMIKACIN: SIGNIFICANT CHANGE UP
-  AMOXICILLIN/CLAVULANIC ACID: SIGNIFICANT CHANGE UP
-  AMPICILLIN/SULBACTAM: SIGNIFICANT CHANGE UP
-  AMPICILLIN: SIGNIFICANT CHANGE UP
-  AMPICILLIN: SIGNIFICANT CHANGE UP
-  AZTREONAM: SIGNIFICANT CHANGE UP
-  CEFAZOLIN: SIGNIFICANT CHANGE UP
-  CEFEPIME: SIGNIFICANT CHANGE UP
-  CEFOXITIN: SIGNIFICANT CHANGE UP
-  CEFTRIAXONE: SIGNIFICANT CHANGE UP
-  CIPROFLOXACIN: SIGNIFICANT CHANGE UP
-  CIPROFLOXACIN: SIGNIFICANT CHANGE UP
-  ERTAPENEM: SIGNIFICANT CHANGE UP
-  ERYTHROMYCIN: SIGNIFICANT CHANGE UP
-  GENTAMICIN: SIGNIFICANT CHANGE UP
-  IMIPENEM: SIGNIFICANT CHANGE UP
-  LEVOFLOXACIN: SIGNIFICANT CHANGE UP
-  MEROPENEM: SIGNIFICANT CHANGE UP
-  PIPERACILLIN/TAZOBACTAM: SIGNIFICANT CHANGE UP
-  TETRACYCLINE: SIGNIFICANT CHANGE UP
-  TOBRAMYCIN: SIGNIFICANT CHANGE UP
-  TRIMETHOPRIM/SULFAMETHOXAZOLE: SIGNIFICANT CHANGE UP
-  VANCOMYCIN: SIGNIFICANT CHANGE UP
METHOD TYPE: SIGNIFICANT CHANGE UP
METHOD TYPE: SIGNIFICANT CHANGE UP

## 2018-05-20 LAB
CULTURE RESULTS: SIGNIFICANT CHANGE UP
ORGANISM # SPEC MICROSCOPIC CNT: SIGNIFICANT CHANGE UP
SPECIMEN SOURCE: SIGNIFICANT CHANGE UP

## 2018-06-12 ENCOUNTER — APPOINTMENT (OUTPATIENT)
Dept: WOUND CARE | Facility: CLINIC | Age: 63
End: 2018-06-12

## 2018-06-12 ENCOUNTER — OUTPATIENT (OUTPATIENT)
Dept: OUTPATIENT SERVICES | Facility: HOSPITAL | Age: 63
LOS: 1 days | End: 2018-06-12
Payer: SELF-PAY

## 2018-06-12 VITALS
SYSTOLIC BLOOD PRESSURE: 120 MMHG | DIASTOLIC BLOOD PRESSURE: 79 MMHG | RESPIRATION RATE: 18 BRPM | BODY MASS INDEX: 38.65 KG/M2 | OXYGEN SATURATION: 99 % | TEMPERATURE: 98.3 F | HEIGHT: 68 IN | WEIGHT: 255 LBS | HEART RATE: 80 BPM

## 2018-06-12 DIAGNOSIS — I73.9 PERIPHERAL VASCULAR DISEASE, UNSPECIFIED: Chronic | ICD-10-CM

## 2018-06-12 DIAGNOSIS — Z98.89 OTHER SPECIFIED POSTPROCEDURAL STATES: Chronic | ICD-10-CM

## 2018-06-12 DIAGNOSIS — Z00.00 ENCOUNTER FOR GENERAL ADULT MEDICAL EXAMINATION WITHOUT ABNORMAL FINDINGS: ICD-10-CM

## 2018-06-12 PROCEDURE — G0463: CPT

## 2018-06-12 PROCEDURE — 11055 PARING/CUTG B9 HYPRKER LES 1: CPT

## 2018-06-21 DIAGNOSIS — L84 CORNS AND CALLOSITIES: ICD-10-CM

## 2018-06-21 DIAGNOSIS — B35.1 TINEA UNGUIUM: ICD-10-CM

## 2018-06-21 DIAGNOSIS — E11.610 TYPE 2 DIABETES MELLITUS WITH DIABETIC NEUROPATHIC ARTHROPATHY: ICD-10-CM

## 2018-06-21 DIAGNOSIS — E11.9 TYPE 2 DIABETES MELLITUS WITHOUT COMPLICATIONS: ICD-10-CM

## 2018-07-02 ENCOUNTER — APPOINTMENT (OUTPATIENT)
Dept: SURGERY | Facility: CLINIC | Age: 63
End: 2018-07-02
Payer: COMMERCIAL

## 2018-07-02 VITALS
DIASTOLIC BLOOD PRESSURE: 83 MMHG | HEIGHT: 68 IN | SYSTOLIC BLOOD PRESSURE: 146 MMHG | HEART RATE: 79 BPM | BODY MASS INDEX: 38.65 KG/M2 | TEMPERATURE: 98 F | WEIGHT: 255 LBS

## 2018-07-02 DIAGNOSIS — L73.2 HIDRADENITIS SUPPURATIVA: ICD-10-CM

## 2018-07-02 PROCEDURE — 99242 OFF/OP CONSLTJ NEW/EST SF 20: CPT

## 2018-07-23 PROBLEM — N49.2 INFLAMMATORY DISORDERS OF SCROTUM: Chronic | Status: ACTIVE | Noted: 2018-05-11

## 2018-07-25 ENCOUNTER — OUTPATIENT (OUTPATIENT)
Dept: OUTPATIENT SERVICES | Facility: HOSPITAL | Age: 63
LOS: 1 days | End: 2018-07-25
Payer: COMMERCIAL

## 2018-07-25 VITALS
DIASTOLIC BLOOD PRESSURE: 74 MMHG | HEART RATE: 76 BPM | TEMPERATURE: 98 F | WEIGHT: 240.3 LBS | RESPIRATION RATE: 14 BRPM | SYSTOLIC BLOOD PRESSURE: 122 MMHG | HEIGHT: 66 IN

## 2018-07-25 DIAGNOSIS — Z98.89 OTHER SPECIFIED POSTPROCEDURAL STATES: Chronic | ICD-10-CM

## 2018-07-25 DIAGNOSIS — E11.9 TYPE 2 DIABETES MELLITUS WITHOUT COMPLICATIONS: ICD-10-CM

## 2018-07-25 DIAGNOSIS — I73.9 PERIPHERAL VASCULAR DISEASE, UNSPECIFIED: Chronic | ICD-10-CM

## 2018-07-25 DIAGNOSIS — Z98.890 OTHER SPECIFIED POSTPROCEDURAL STATES: Chronic | ICD-10-CM

## 2018-07-25 DIAGNOSIS — L73.2 HIDRADENITIS SUPPURATIVA: ICD-10-CM

## 2018-07-25 LAB
BUN SERPL-MCNC: 33 MG/DL — HIGH (ref 7–23)
CALCIUM SERPL-MCNC: 8.5 MG/DL — SIGNIFICANT CHANGE UP (ref 8.4–10.5)
CHLORIDE SERPL-SCNC: 93 MMOL/L — LOW (ref 98–107)
CO2 SERPL-SCNC: 25 MMOL/L — SIGNIFICANT CHANGE UP (ref 22–31)
CREAT SERPL-MCNC: 6.51 MG/DL — HIGH (ref 0.5–1.3)
GLUCOSE SERPL-MCNC: 219 MG/DL — HIGH (ref 70–99)
HBA1C BLD-MCNC: 7.3 % — HIGH (ref 4–5.6)
HCT VFR BLD CALC: 34.9 % — LOW (ref 39–50)
HGB BLD-MCNC: 11.6 G/DL — LOW (ref 13–17)
MCHC RBC-ENTMCNC: 31.2 PG — SIGNIFICANT CHANGE UP (ref 27–34)
MCHC RBC-ENTMCNC: 33.2 % — SIGNIFICANT CHANGE UP (ref 32–36)
MCV RBC AUTO: 93.8 FL — SIGNIFICANT CHANGE UP (ref 80–100)
NRBC # FLD: 0 — SIGNIFICANT CHANGE UP
PLATELET # BLD AUTO: 228 K/UL — SIGNIFICANT CHANGE UP (ref 150–400)
PMV BLD: 10.8 FL — SIGNIFICANT CHANGE UP (ref 7–13)
POTASSIUM SERPL-MCNC: 4.3 MMOL/L — SIGNIFICANT CHANGE UP (ref 3.5–5.3)
POTASSIUM SERPL-SCNC: 4.3 MMOL/L — SIGNIFICANT CHANGE UP (ref 3.5–5.3)
RBC # BLD: 3.72 M/UL — LOW (ref 4.2–5.8)
RBC # FLD: 14.1 % — SIGNIFICANT CHANGE UP (ref 10.3–14.5)
SODIUM SERPL-SCNC: 135 MMOL/L — SIGNIFICANT CHANGE UP (ref 135–145)
WBC # BLD: 8.35 K/UL — SIGNIFICANT CHANGE UP (ref 3.8–10.5)
WBC # FLD AUTO: 8.35 K/UL — SIGNIFICANT CHANGE UP (ref 3.8–10.5)

## 2018-07-25 PROCEDURE — 93010 ELECTROCARDIOGRAM REPORT: CPT

## 2018-07-25 RX ORDER — INSULIN ASPART 100 [IU]/ML
20 INJECTION, SOLUTION SUBCUTANEOUS
Qty: 0 | Refills: 0 | COMMUNITY

## 2018-07-25 RX ORDER — ATORVASTATIN CALCIUM 80 MG/1
1 TABLET, FILM COATED ORAL
Qty: 0 | Refills: 0 | COMMUNITY

## 2018-07-25 RX ORDER — LANTHANUM CARBONATE 750 MG/1
0 TABLET, CHEWABLE ORAL
Qty: 0 | Refills: 0 | COMMUNITY

## 2018-07-25 RX ORDER — SOD,AMMONIUM,POTASSIUM LACTATE
1 CREAM (GRAM) TOPICAL
Qty: 0 | Refills: 0 | COMMUNITY

## 2018-07-25 RX ORDER — MIDODRINE HYDROCHLORIDE 2.5 MG/1
0 TABLET ORAL
Qty: 0 | Refills: 0 | COMMUNITY

## 2018-07-25 RX ORDER — ENOXAPARIN SODIUM 100 MG/ML
45 INJECTION SUBCUTANEOUS
Qty: 0 | Refills: 0 | COMMUNITY

## 2018-07-25 RX ORDER — KETOROLAC TROMETHAMINE 0.5 %
0 DROPS OPHTHALMIC (EYE)
Qty: 0 | Refills: 0 | COMMUNITY

## 2018-07-25 NOTE — H&P PST ADULT - OTHER CARE PROVIDERS
Dr. Hudson Cardio  last visit 5/10/18, Dr. Arden Tsang Nephro , Dialysis center [facility] , Dr. Friedman endo , Dr. Navarro ortho Dr. Hudson Cardio , Dr. Arden Tsang Nephro , Dialysis center [facility] [phone], Dr. Friedman endo , Dr. Ramon roman

## 2018-07-25 NOTE — H&P PST ADULT - NEGATIVE GENERAL GENITOURINARY SYMPTOMS
no hematuria/no flank pain R/no bladder infections/no flank pain L/no dysuria no hematuria/no dysuria/no urinary hesitancy/no flank pain R/no bladder infections/no flank pain L/normal urinary frequency

## 2018-07-25 NOTE — H&P PST ADULT - HISTORY OF PRESENT ILLNESS
62 year old male with PMH of ESRD on HD T-TH-Sat (Right AV graft), Dm2, Charcot's foot uses CROW bilaterally with complaints of scrotal abscess on antibiotics planned for Incision and drainage of right scrotal abscess. 61y/o male scheduled for excision of left inguinal hidradenitis on 8/3/2018.  Pt states, "hidradenitis in left groin for the past 6 months,  ESRD on HD T-TH-Sat (Right AV graft), Dm2, Charcot's foot uses CROW bilaterally. 63y/o male scheduled for excision of left inguinal hidradenitis on 8/3/2018.  Pt states, "hidradenitis in left groin for the past 6 months,  ESRD on HD T-TH-Sat (Right AV graft), Dm2, Charcot's foot uses patella tendon off loading boots bilaterally.

## 2018-07-25 NOTE — H&P PST ADULT - ENDOCRINE COMMENTS
BS 130s in AM, last A1c 9 or less as per patient ( will obtain labs 5/9 from HD center) dm type 2 bs 90- 130

## 2018-07-25 NOTE — H&P PST ADULT - PMH
Abscess of scrotum    Charcot foot due to diabetes mellitus  uses CROW boots bilaterally  Diabetic retinopathy associated with type 2 diabetes mellitus    DM (diabetes mellitus)  type 2  ESRD (end stage renal disease) on dialysis  t-th-sat via right AV graft, uses midodrine the days of Dialysis for hypotension  Hyperlipidemia Abscess of scrotum    Charcot foot due to diabetes mellitus    Diabetic retinopathy associated with type 2 diabetes mellitus    DM (diabetes mellitus)  type 2  ESRD (end stage renal disease) on dialysis  t-th-sat via right AV graft, uses midodrine the days of Dialysis for hypotension  Hidradenitis    Hyperlipidemia

## 2018-07-25 NOTE — H&P PST ADULT - GASTROINTESTINAL DETAILS
no distention/no masses palpable/soft/bowel sounds normal/nontender no guarding/no rigidity/bowel sounds normal/soft/no rebound tenderness/no organomegaly/nontender/no distention/no masses palpable

## 2018-07-25 NOTE — H&P PST ADULT - NEGATIVE BREAST SYMPTOMS
no nipple discharge R/no breast lump L/no nipple discharge L/no breast lump R/no breast tenderness R/no breast tenderness L

## 2018-07-25 NOTE — H&P PST ADULT - RS GEN PE MLT RESP DETAILS PC
no rhonchi/breath sounds equal/good air movement/clear to auscultation bilaterally/no rales/no chest wall tenderness/no intercostal retractions/respirations non-labored/no wheezes

## 2018-07-25 NOTE — H&P PST ADULT - MUSCULOSKELETAL COMMENTS
hx of Charcot foot use CROW boots bilaterally, uses scooter. hx of Charcot foot use patella tendon off loading boots bilaterally, uses scooter. using electric wheelchair due to charoct's and bilateral boots

## 2018-07-25 NOTE — H&P PST ADULT - EXTREMITIES COMMENTS
Right UE with right AV graft + thrill, + bruit   LE unable to assess patient has CROW boots on Bilaterally Right UE with right AV graft + thrill, + bruit

## 2018-07-25 NOTE — H&P PST ADULT - PROBLEM SELECTOR PLAN 2
dm type 2 instructed to take lantus 20 units night prior to surgery, no insulin on the day of surgery.

## 2018-07-25 NOTE — H&P PST ADULT - NEGATIVE CARDIOVASCULAR SYMPTOMS
no paroxysmal nocturnal dyspnea/no palpitations/no dyspnea on exertion/no orthopnea/no claudication/no chest pain/no peripheral edema

## 2018-07-25 NOTE — H&P PST ADULT - PSH
Charcot's syndrome  Surgeries in feet and ankles   left ankle 2011 and right ankle 11/2014  S/P foot surgery  right foot - 11/2014 Charcot's syndrome  Surgeries in feet and ankles   left ankle 2011 and right ankle 11/2014  History of incision and drainage  scrotal 5/2018  S/P foot surgery  right foot - 11/2014

## 2018-07-25 NOTE — H&P PST ADULT - NEGATIVE GASTROINTESTINAL SYMPTOMS
no vomiting/no nausea/no change in bowel habits/no abdominal pain/no hematochezia/no melena no nausea/no abdominal pain/no vomiting/no diarrhea/no constipation

## 2018-07-25 NOTE — H&P PST ADULT - PROBLEM SELECTOR PLAN 1
Pt scheduled for excision of left inguinal hidradenitis on 8/3/2018.  labs done results pending.  ekg done.  Preop teaching done, pt able to verbalize understanding.

## 2018-07-27 PROBLEM — L73.2 HIDRADENITIS SUPPURATIVA: Chronic | Status: ACTIVE | Noted: 2018-07-25

## 2018-08-02 ENCOUNTER — TRANSCRIPTION ENCOUNTER (OUTPATIENT)
Age: 63
End: 2018-08-02

## 2018-08-02 RX ORDER — SODIUM CHLORIDE 9 MG/ML
1000 INJECTION INTRAMUSCULAR; INTRAVENOUS; SUBCUTANEOUS
Qty: 0 | Refills: 0 | Status: DISCONTINUED | OUTPATIENT
Start: 2018-08-03 | End: 2018-08-18

## 2018-08-02 NOTE — ASU PATIENT PROFILE, ADULT - PSH
Charcot's syndrome  Surgeries in feet and ankles   left ankle 2011 and right ankle 11/2014  History of incision and drainage  scrotal 5/2018  S/P foot surgery  right foot - 11/2014

## 2018-08-02 NOTE — ASU PATIENT PROFILE, ADULT - PMH
Abscess of scrotum    Charcot foot due to diabetes mellitus    Diabetic retinopathy associated with type 2 diabetes mellitus    DM (diabetes mellitus)  type 2  ESRD (end stage renal disease) on dialysis  t-th-sat via right AV graft, uses midodrine the days of Dialysis for hypotension  Hidradenitis    Hyperlipidemia

## 2018-08-03 ENCOUNTER — APPOINTMENT (OUTPATIENT)
Dept: SURGERY | Facility: HOSPITAL | Age: 63
End: 2018-08-03

## 2018-08-03 ENCOUNTER — RESULT REVIEW (OUTPATIENT)
Age: 63
End: 2018-08-03

## 2018-08-03 ENCOUNTER — OUTPATIENT (OUTPATIENT)
Dept: OUTPATIENT SERVICES | Facility: HOSPITAL | Age: 63
LOS: 1 days | Discharge: HOME | End: 2018-08-03
Payer: COMMERCIAL

## 2018-08-03 ENCOUNTER — APPOINTMENT (OUTPATIENT)
Dept: SURGERY | Facility: AMBULATORY SURGERY CENTER | Age: 63
End: 2018-08-03

## 2018-08-03 VITALS
HEART RATE: 94 BPM | RESPIRATION RATE: 18 BRPM | SYSTOLIC BLOOD PRESSURE: 124 MMHG | OXYGEN SATURATION: 100 % | DIASTOLIC BLOOD PRESSURE: 81 MMHG

## 2018-08-03 VITALS
SYSTOLIC BLOOD PRESSURE: 131 MMHG | DIASTOLIC BLOOD PRESSURE: 76 MMHG | HEART RATE: 82 BPM | WEIGHT: 220.02 LBS | RESPIRATION RATE: 16 BRPM | TEMPERATURE: 98 F | HEIGHT: 67 IN | OXYGEN SATURATION: 91 %

## 2018-08-03 DIAGNOSIS — L73.2 HIDRADENITIS SUPPURATIVA: ICD-10-CM

## 2018-08-03 DIAGNOSIS — Z98.890 OTHER SPECIFIED POSTPROCEDURAL STATES: Chronic | ICD-10-CM

## 2018-08-03 DIAGNOSIS — I73.9 PERIPHERAL VASCULAR DISEASE, UNSPECIFIED: Chronic | ICD-10-CM

## 2018-08-03 DIAGNOSIS — N18.6 END STAGE RENAL DISEASE: ICD-10-CM

## 2018-08-03 DIAGNOSIS — E83.39 OTHER DISORDERS OF PHOSPHORUS METABOLISM: ICD-10-CM

## 2018-08-03 DIAGNOSIS — Z98.89 OTHER SPECIFIED POSTPROCEDURAL STATES: Chronic | ICD-10-CM

## 2018-08-03 DIAGNOSIS — D63.1 ANEMIA IN CHRONIC KIDNEY DISEASE: ICD-10-CM

## 2018-08-03 DIAGNOSIS — I95.0 IDIOPATHIC HYPOTENSION: ICD-10-CM

## 2018-08-03 LAB
GLUCOSE BLDV-MCNC: 152 — HIGH (ref 70–99)
POTASSIUM BLDV-SCNC: SIGNIFICANT CHANGE UP MMOL/L (ref 3.4–4.5)

## 2018-08-03 PROCEDURE — 11451 EXC SKN HDRDNT AX COMPLEX: CPT | Mod: GC

## 2018-08-03 PROCEDURE — 88305 TISSUE EXAM BY PATHOLOGIST: CPT | Mod: 26

## 2018-08-03 PROCEDURE — 11462 EXC SKN HDRDNT ING SMPL/NTRM: CPT | Mod: LT

## 2018-08-03 RX ORDER — OXYCODONE HYDROCHLORIDE 5 MG/1
10 TABLET ORAL ONCE
Qty: 0 | Refills: 0 | Status: DISCONTINUED | OUTPATIENT
Start: 2018-08-03 | End: 2018-08-18

## 2018-08-03 RX ORDER — SODIUM CHLORIDE 9 MG/ML
1000 INJECTION, SOLUTION INTRAVENOUS
Qty: 0 | Refills: 0 | Status: DISCONTINUED | OUTPATIENT
Start: 2018-08-03 | End: 2018-08-03

## 2018-08-03 RX ORDER — SODIUM CHLORIDE 9 MG/ML
250 INJECTION INTRAMUSCULAR; INTRAVENOUS; SUBCUTANEOUS ONCE
Qty: 0 | Refills: 0 | Status: COMPLETED | OUTPATIENT
Start: 2018-08-03 | End: 2018-08-03

## 2018-08-03 RX ADMIN — SODIUM CHLORIDE 500 MILLILITER(S): 9 INJECTION INTRAMUSCULAR; INTRAVENOUS; SUBCUTANEOUS at 12:00

## 2018-08-03 NOTE — CONSULT NOTE ADULT - SUBJECTIVE AND OBJECTIVE BOX
[facility] NEPHROLOGY- CONSULTATION NOTE    62y Male with history of below presents with L groin hidradenitis. Nephrology consulted for ESRD status.    Patient well known to nephrology as follows with Dr. Chirinos as an outpatient for HD TTS at [facility] on [address] [facility]. Last HD on 8/2 as an outpatient.    Patient with h/o chronic hypotension for which he takes midodrine pre-HD on HD days only. Post-op, patient noted to be hypotensive for which he briefly required pressor support.    REVIEW OF SYSTEMS:  Gen: no changes in weight  HEENT: no rhinorrhea  Neck: no sore throat  Cards: no chest pain  Resp: no dyspnea  GI: no nausea or vomiting or diarrhea  : no dysuria or hematuria  Vascular: no LE edema  Derm: no rashes  Neuro: no numbness/tingling    No Known Allergies      Home Medications Reviewed  Hospital Medications:   MEDICATIONS  (STANDING):  oxyCODONE    IR 10 milliGRAM(s) Oral once  sodium chloride 0.9%. 1000 milliLiter(s) (30 mL/Hr) IV Continuous <Continuous>      PAST MEDICAL & SURGICAL HISTORY:  Hidradenitis  Abscess of scrotum  Hyperlipidemia  Diabetic retinopathy associated with type 2 diabetes mellitus  Charcot foot due to diabetes mellitus  ESRD (end stage renal disease) on dialysis: t-th-sat via right AV graft, uses midodrine the days of Dialysis for hypotension  DM (diabetes mellitus): type 2  History of incision and drainage: scrotal 5/2018  Charcot's syndrome: Surgeries in feet and ankles   left ankle 2011 and right ankle 11/2014  S/P foot surgery: right foot - 11/2014      FAMILY HISTORY:  No pertinent family history in first degree relatives  Family history of diabetes mellitus      SOCIAL HISTORY:  Denies toxic substance use     VITALS:  T(F): 98.1 (08-03-18 @ 10:55), Max: 98.3 (08-03-18 @ 09:15)  HR: 102 (08-03-18 @ 12:45)  BP: 116/67 (08-03-18 @ 12:45)  RR: 20 (08-03-18 @ 12:45)  SpO2: 97% (08-03-18 @ 12:45)  Wt(kg): --    08-03 @ 07:01  -  08-03 @ 12:54  --------------------------------------------------------  IN: 280 mL / OUT: 0 mL / NET: 280 mL      Height (cm): 170.18 (08-03 @ 09:15)  Weight (kg): 99.8 (08-03 @ 09:15)  BMI (kg/m2): 34.5 (08-03 @ 09:15)  BSA (m2): 2.11 (08-03 @ 09:15)    PHYSICAL EXAM:  Gen: NAD, calm  HEENT: MMM  Neck: no JVD  Cards: RRR, +S1/S2, no M/G/R  Resp: CTA B/L  GI: soft, NT/ND, NABS  : no CVA tenderness  Vascular: no LE edema B/L, RUE AVG without bruit/thrill  Derm: no rashes  Neuro: non-focal    LABS: N/A      RADIOLOGY & ADDITIONAL STUDIES: N/A

## 2018-08-03 NOTE — CONSULT NOTE ADULT - ATTENDING COMMENTS
[facility] NEPHROLOGY  Arden Tsang M.D.  Freeman Holland D.O.  Janny Chirinos M.D.  Ambar Hale, MSN, ANP-C    Telephone: [phone]  Facsimile: [phone]    [address] [place] [address]

## 2018-08-03 NOTE — CONSULT NOTE ADULT - PROBLEM SELECTOR RECOMMENDATION 9
Last HD on 8/2 as an outpatient. Patient now with clotted RUE AVG which likely occurred during post-op hypotension. Discussed with [facility]. Patient to go from PACU directly to [facility] upon discharge for declotting of AVG later today. Patient then will resume maintenance HD on 8/4 as an outpatient. Monitor electrolytes.

## 2018-08-03 NOTE — CONSULT NOTE ADULT - ASSESSMENT
62y Male with history of ESRD on HD presents with L groin hidradenitis. Nephrology consulted for ESRD status.

## 2018-08-03 NOTE — BRIEF OPERATIVE NOTE - OPERATION/FINDINGS
Excision of L groin hidradenitis, 4 sutures placed to close wound, packing in place. Covered with 4x4 gauze and tegaderm.

## 2018-08-03 NOTE — ASU DISCHARGE PLAN (ADULT/PEDIATRIC). - MEDICATION SUMMARY - MEDICATIONS TO TAKE
I will START or STAY ON the medications listed below when I get home from the hospital:  None I will START or STAY ON the medications listed below when I get home from the hospital:    Lantus Solostar Pen 100 units/mL subcutaneous solution  -- Indication: For DM    NovoLOG 100 units/mL injectable solution  -- Indication: For DM    midodrine 2.5 mg oral tablet  -- Indication: For Home med    ketorolac ophthalmic  -- Indication: For Home med    Fosrenol 1000 mg oral tablet, chewable  -- 1 tab(s) by mouth 3 times a day  -- Indication: For Home med

## 2018-08-03 NOTE — BRIEF OPERATIVE NOTE - PROCEDURE
<<-----Click on this checkbox to enter Procedure Excision of hidradenitis of left groin  08/03/2018    Active  [id number]

## 2018-08-03 NOTE — ASU DISCHARGE PLAN (ADULT/PEDIATRIC). - NOTIFY
Fever greater than 101/Bleeding that does not stop red hot irritated skin or pussy drainage from incision/Numbness, color, or temperature change to extremity/Pain not relieved by Medications/Fever greater than 101/Bleeding that does not stop/Unable to Urinate/Inability to Tolerate Liquids or Foods/Swelling that continues/Persistent Nausea and Vomiting

## 2018-08-03 NOTE — ASU DISCHARGE PLAN (ADULT/PEDIATRIC). - DRESSING FT
Keep dressing intact and dry until follow-up in 1-2 wks Can change outer dressing as needed. Cover with 4x4 gauze and either paper tape or tegaderm.

## 2018-08-03 NOTE — ASU DISCHARGE PLAN (ADULT/PEDIATRIC). - BATHING
Avoid getting dressing wet while showering. Pat dry area/shower only shower only/Pat dry area around wound

## 2018-08-03 NOTE — ASU DISCHARGE PLAN (ADULT/PEDIATRIC). - NURSING INSTRUCTIONS
No creams, lotions, powders  or ointments to incision site. DO not scrub incision while showering. Keep groin/incision dry. Replace gauze dressing as needed.

## 2018-08-09 LAB — SURGICAL PATHOLOGY STUDY: SIGNIFICANT CHANGE UP

## 2018-08-15 ENCOUNTER — APPOINTMENT (OUTPATIENT)
Dept: SURGERY | Facility: CLINIC | Age: 63
End: 2018-08-15
Payer: COMMERCIAL

## 2018-08-15 VITALS
DIASTOLIC BLOOD PRESSURE: 68 MMHG | HEIGHT: 68 IN | WEIGHT: 255 LBS | BODY MASS INDEX: 38.65 KG/M2 | TEMPERATURE: 98.5 F | HEART RATE: 73 BPM | SYSTOLIC BLOOD PRESSURE: 146 MMHG

## 2018-08-15 PROCEDURE — 99024 POSTOP FOLLOW-UP VISIT: CPT

## 2018-09-12 ENCOUNTER — APPOINTMENT (OUTPATIENT)
Dept: SURGERY | Facility: CLINIC | Age: 63
End: 2018-09-12
Payer: COMMERCIAL

## 2018-09-12 VITALS
HEART RATE: 80 BPM | SYSTOLIC BLOOD PRESSURE: 111 MMHG | TEMPERATURE: 98 F | HEIGHT: 68 IN | BODY MASS INDEX: 38.65 KG/M2 | WEIGHT: 255 LBS | DIASTOLIC BLOOD PRESSURE: 72 MMHG

## 2018-09-12 PROCEDURE — 99024 POSTOP FOLLOW-UP VISIT: CPT

## 2018-09-25 ENCOUNTER — OUTPATIENT (OUTPATIENT)
Dept: OUTPATIENT SERVICES | Facility: HOSPITAL | Age: 63
LOS: 1 days | End: 2018-09-25
Payer: COMMERCIAL

## 2018-09-25 ENCOUNTER — APPOINTMENT (OUTPATIENT)
Dept: WOUND CARE | Facility: CLINIC | Age: 63
End: 2018-09-25

## 2018-09-25 VITALS
WEIGHT: 230 LBS | RESPIRATION RATE: 18 BRPM | SYSTOLIC BLOOD PRESSURE: 116 MMHG | TEMPERATURE: 98.7 F | BODY MASS INDEX: 34.86 KG/M2 | OXYGEN SATURATION: 99 % | HEART RATE: 81 BPM | HEIGHT: 68 IN | DIASTOLIC BLOOD PRESSURE: 79 MMHG

## 2018-09-25 DIAGNOSIS — Z98.890 OTHER SPECIFIED POSTPROCEDURAL STATES: Chronic | ICD-10-CM

## 2018-09-25 DIAGNOSIS — Z00.00 ENCOUNTER FOR GENERAL ADULT MEDICAL EXAMINATION WITHOUT ABNORMAL FINDINGS: ICD-10-CM

## 2018-09-25 DIAGNOSIS — Z98.89 OTHER SPECIFIED POSTPROCEDURAL STATES: Chronic | ICD-10-CM

## 2018-09-25 DIAGNOSIS — I73.9 PERIPHERAL VASCULAR DISEASE, UNSPECIFIED: Chronic | ICD-10-CM

## 2018-09-25 PROCEDURE — G0463: CPT

## 2018-09-26 DIAGNOSIS — B35.1 TINEA UNGUIUM: ICD-10-CM

## 2018-09-26 DIAGNOSIS — E11.610 TYPE 2 DIABETES MELLITUS WITH DIABETIC NEUROPATHIC ARTHROPATHY: ICD-10-CM

## 2018-11-13 NOTE — H&P PST ADULT - HISTORY OF PRESENT ILLNESS
How Severe Is Your Skin Lesion?: mild Has Your Skin Lesion Been Treated?: not been treated Is This A New Presentation, Or A Follow-Up?: Skin Lesion 62 year old male with PMH of ESRD on HD T-TH-Sat on Right AV graft, Dm2, Charcot's foot uses CROW bilaterally with complaints of scrotal abscess on antibiotics planned for Incision and drainage of right scrotal abscess. 62 year old male with PMH of ESRD on HD T-TH-Sat (Right AV graft), Dm2, Charcot's foot uses CROW bilaterally with complaints of scrotal abscess on antibiotics planned for Incision and drainage of right scrotal abscess.

## 2018-12-04 ENCOUNTER — APPOINTMENT (OUTPATIENT)
Dept: WOUND CARE | Facility: CLINIC | Age: 63
End: 2018-12-04

## 2018-12-04 ENCOUNTER — OUTPATIENT (OUTPATIENT)
Dept: OUTPATIENT SERVICES | Facility: HOSPITAL | Age: 63
LOS: 1 days | End: 2018-12-04
Payer: SELF-PAY

## 2018-12-04 VITALS
BODY MASS INDEX: 34.86 KG/M2 | HEART RATE: 79 BPM | HEIGHT: 68 IN | DIASTOLIC BLOOD PRESSURE: 74 MMHG | TEMPERATURE: 98.5 F | WEIGHT: 230 LBS | SYSTOLIC BLOOD PRESSURE: 114 MMHG

## 2018-12-04 DIAGNOSIS — Z98.89 OTHER SPECIFIED POSTPROCEDURAL STATES: Chronic | ICD-10-CM

## 2018-12-04 DIAGNOSIS — Z98.890 OTHER SPECIFIED POSTPROCEDURAL STATES: Chronic | ICD-10-CM

## 2018-12-04 DIAGNOSIS — Z00.00 ENCOUNTER FOR GENERAL ADULT MEDICAL EXAMINATION WITHOUT ABNORMAL FINDINGS: ICD-10-CM

## 2018-12-04 DIAGNOSIS — I73.9 PERIPHERAL VASCULAR DISEASE, UNSPECIFIED: Chronic | ICD-10-CM

## 2018-12-04 DIAGNOSIS — L85.3 XEROSIS CUTIS: ICD-10-CM

## 2018-12-04 PROCEDURE — G0463: CPT

## 2018-12-04 RX ORDER — FLUOCINONIDE 1 MG/G
0.1 CREAM TOPICAL DAILY
Qty: 1 | Refills: 2 | Status: ACTIVE | COMMUNITY
Start: 2018-12-04 | End: 1900-01-01

## 2018-12-05 DIAGNOSIS — E11.9 TYPE 2 DIABETES MELLITUS WITHOUT COMPLICATIONS: ICD-10-CM

## 2018-12-05 DIAGNOSIS — L85.3 XEROSIS CUTIS: ICD-10-CM

## 2019-03-12 ENCOUNTER — OUTPATIENT (OUTPATIENT)
Dept: OUTPATIENT SERVICES | Facility: HOSPITAL | Age: 64
LOS: 1 days | End: 2019-03-12
Payer: MEDICARE

## 2019-03-12 ENCOUNTER — APPOINTMENT (OUTPATIENT)
Dept: WOUND CARE | Facility: CLINIC | Age: 64
End: 2019-03-12

## 2019-03-12 VITALS
RESPIRATION RATE: 18 BRPM | BODY MASS INDEX: 34.86 KG/M2 | WEIGHT: 230 LBS | HEART RATE: 82 BPM | DIASTOLIC BLOOD PRESSURE: 82 MMHG | OXYGEN SATURATION: 100 % | HEIGHT: 68 IN | TEMPERATURE: 98.6 F | SYSTOLIC BLOOD PRESSURE: 138 MMHG

## 2019-03-12 DIAGNOSIS — Z98.89 OTHER SPECIFIED POSTPROCEDURAL STATES: Chronic | ICD-10-CM

## 2019-03-12 DIAGNOSIS — Z00.00 ENCOUNTER FOR GENERAL ADULT MEDICAL EXAMINATION WITHOUT ABNORMAL FINDINGS: ICD-10-CM

## 2019-03-12 DIAGNOSIS — Z98.890 OTHER SPECIFIED POSTPROCEDURAL STATES: Chronic | ICD-10-CM

## 2019-03-12 DIAGNOSIS — I73.9 PERIPHERAL VASCULAR DISEASE, UNSPECIFIED: Chronic | ICD-10-CM

## 2019-03-12 PROCEDURE — 11056 PARNG/CUTG B9 HYPRKR LES 2-4: CPT

## 2019-03-12 PROCEDURE — G0463: CPT

## 2019-03-12 RX ORDER — FLUOCINONIDE 0.05 MG/G
0.05 OINTMENT TOPICAL DAILY
Qty: 1 | Refills: 3 | Status: ACTIVE | COMMUNITY
Start: 2019-03-12 | End: 1900-01-01

## 2019-03-13 NOTE — HISTORY OF PRESENT ILLNESS
[FreeTextEntry1] : CC:62 M with DM Type II returns to clinic for evaluation of bilateral charcot foot deformity. Pt denies any constitutional symptoms, such as F/V/N/C/SOB.. Pt denies any current burning, numbness, or tingling. Pt states his HgA1c is ~7.2 taken a few months ago. Patient states his FBS was 140 mg/dl yesterday \par pt states feet become very dry.

## 2019-03-13 NOTE — ASSESSMENT
[FreeTextEntry1] : GAYE: \par Derm: elongated mycotic, dry, brittle, thickened toenails x 10; no open lesions, no edema, no erythema, no clinical signs of infection, diffuse dry skin, xerosis b/l Hyperkeratotic lesion medial R foot \par Hyperkeratotic tissue noted medial 1st MPJ right foot\par Vasc: pedal pulses palpable, CFT < 3 secs to all digits\par MSK: Charcot foot B/L\par \par \par \par \par A: \par Diabetic male with bilateral Charcot foot, superficial foot ulceration, xerosis b/l\par \par P:\par Pt evaluated and chart reviewed.\par Applied CROW walker b/l\par Discussed the importance of monitoring feet and diet due to diabetes.\par lidex applied to both feet\par Rx Lidex to be applied every other day\par aseptic debridement of elongated mycotic toenails x 10\par Aseptic debridement of hyperkeratotic lesion on R foot \par Patient RTC 3 months for diabetic foot check and nail debridement\par \par \par \par

## 2019-03-15 DIAGNOSIS — B35.1 TINEA UNGUIUM: ICD-10-CM

## 2019-03-15 DIAGNOSIS — E11.620 TYPE 2 DIABETES MELLITUS WITH DIABETIC DERMATITIS: ICD-10-CM

## 2021-04-12 ENCOUNTER — APPOINTMENT (OUTPATIENT)
Dept: SURGERY | Facility: CLINIC | Age: 66
End: 2021-04-12
Payer: MEDICARE

## 2021-04-12 VITALS
DIASTOLIC BLOOD PRESSURE: 96 MMHG | WEIGHT: 113.05 LBS | TEMPERATURE: 97.2 F | SYSTOLIC BLOOD PRESSURE: 185 MMHG | BODY MASS INDEX: 17.13 KG/M2 | HEIGHT: 68 IN | HEART RATE: 73 BPM

## 2021-04-12 PROCEDURE — 10160 PNXR ASPIR ABSC HMTMA BULLA: CPT

## 2021-04-12 PROCEDURE — 99212 OFFICE O/P EST SF 10 MIN: CPT | Mod: 25

## 2021-11-22 ENCOUNTER — EMERGENCY (EMERGENCY)
Facility: HOSPITAL | Age: 66
LOS: 1 days | Discharge: HOME | End: 2021-11-22
Attending: STUDENT IN AN ORGANIZED HEALTH CARE EDUCATION/TRAINING PROGRAM
Payer: MEDICARE

## 2021-11-22 VITALS
WEIGHT: 220.02 LBS | SYSTOLIC BLOOD PRESSURE: 173 MMHG | RESPIRATION RATE: 16 BRPM | HEART RATE: 90 BPM | TEMPERATURE: 98 F | DIASTOLIC BLOOD PRESSURE: 86 MMHG | OXYGEN SATURATION: 94 % | HEIGHT: 67 IN

## 2021-11-22 VITALS
SYSTOLIC BLOOD PRESSURE: 177 MMHG | DIASTOLIC BLOOD PRESSURE: 96 MMHG | TEMPERATURE: 98 F | HEART RATE: 76 BPM | OXYGEN SATURATION: 99 %

## 2021-11-22 DIAGNOSIS — Z98.89 OTHER SPECIFIED POSTPROCEDURAL STATES: Chronic | ICD-10-CM

## 2021-11-22 DIAGNOSIS — Z98.890 OTHER SPECIFIED POSTPROCEDURAL STATES: Chronic | ICD-10-CM

## 2021-11-22 DIAGNOSIS — I73.9 PERIPHERAL VASCULAR DISEASE, UNSPECIFIED: Chronic | ICD-10-CM

## 2021-11-22 LAB
ALBUMIN SERPL ELPH-MCNC: 4.1 G/DL — SIGNIFICANT CHANGE UP (ref 3.3–5)
ALP SERPL-CCNC: 82 U/L — SIGNIFICANT CHANGE UP (ref 40–120)
ALT FLD-CCNC: 18 U/L — SIGNIFICANT CHANGE UP (ref 10–45)
ANION GAP SERPL CALC-SCNC: 14 MMOL/L — SIGNIFICANT CHANGE UP (ref 5–17)
AST SERPL-CCNC: 43 U/L — HIGH (ref 10–40)
BASOPHILS # BLD AUTO: 0.02 K/UL — SIGNIFICANT CHANGE UP (ref 0–0.2)
BASOPHILS NFR BLD AUTO: 0.2 % — SIGNIFICANT CHANGE UP (ref 0–2)
BILIRUB SERPL-MCNC: 0.5 MG/DL — SIGNIFICANT CHANGE UP (ref 0.2–1.2)
BUN SERPL-MCNC: 33 MG/DL — HIGH (ref 7–23)
CALCIUM SERPL-MCNC: 9.4 MG/DL — SIGNIFICANT CHANGE UP (ref 8.4–10.5)
CHLORIDE SERPL-SCNC: 98 MMOL/L — SIGNIFICANT CHANGE UP (ref 96–108)
CO2 SERPL-SCNC: 20 MMOL/L — LOW (ref 22–31)
CREAT SERPL-MCNC: 5.37 MG/DL — HIGH (ref 0.5–1.3)
EOSINOPHIL # BLD AUTO: 0.24 K/UL — SIGNIFICANT CHANGE UP (ref 0–0.5)
EOSINOPHIL NFR BLD AUTO: 2.4 % — SIGNIFICANT CHANGE UP (ref 0–6)
GLUCOSE SERPL-MCNC: 89 MG/DL — SIGNIFICANT CHANGE UP (ref 70–99)
HCT VFR BLD CALC: 36.1 % — LOW (ref 39–50)
HGB BLD-MCNC: 11.5 G/DL — LOW (ref 13–17)
IMM GRANULOCYTES NFR BLD AUTO: 1.2 % — SIGNIFICANT CHANGE UP (ref 0–1.5)
LYMPHOCYTES # BLD AUTO: 0.86 K/UL — LOW (ref 1–3.3)
LYMPHOCYTES # BLD AUTO: 8.5 % — LOW (ref 13–44)
MAGNESIUM SERPL-MCNC: 2.3 MG/DL — SIGNIFICANT CHANGE UP (ref 1.6–2.6)
MCHC RBC-ENTMCNC: 30.7 PG — SIGNIFICANT CHANGE UP (ref 27–34)
MCHC RBC-ENTMCNC: 31.9 GM/DL — LOW (ref 32–36)
MCV RBC AUTO: 96.3 FL — SIGNIFICANT CHANGE UP (ref 80–100)
MONOCYTES # BLD AUTO: 0.71 K/UL — SIGNIFICANT CHANGE UP (ref 0–0.9)
MONOCYTES NFR BLD AUTO: 7 % — SIGNIFICANT CHANGE UP (ref 2–14)
NEUTROPHILS # BLD AUTO: 8.13 K/UL — HIGH (ref 1.8–7.4)
NEUTROPHILS NFR BLD AUTO: 80.7 % — HIGH (ref 43–77)
NRBC # BLD: 0 /100 WBCS — SIGNIFICANT CHANGE UP (ref 0–0)
PLATELET # BLD AUTO: 166 K/UL — SIGNIFICANT CHANGE UP (ref 150–400)
POTASSIUM SERPL-MCNC: 6.5 MMOL/L — CRITICAL HIGH (ref 3.5–5.3)
POTASSIUM SERPL-SCNC: 6.5 MMOL/L — CRITICAL HIGH (ref 3.5–5.3)
PROT SERPL-MCNC: 8.6 G/DL — HIGH (ref 6–8.3)
RBC # BLD: 3.75 M/UL — LOW (ref 4.2–5.8)
RBC # FLD: 14.6 % — HIGH (ref 10.3–14.5)
SARS-COV-2 RNA SPEC QL NAA+PROBE: SIGNIFICANT CHANGE UP
SODIUM SERPL-SCNC: 132 MMOL/L — LOW (ref 135–145)
TROPONIN T, HIGH SENSITIVITY RESULT: 104 NG/L — HIGH (ref 0–51)
TROPONIN T, HIGH SENSITIVITY RESULT: 107 NG/L — HIGH (ref 0–51)
TROPONIN T, HIGH SENSITIVITY RESULT: 92 NG/L — HIGH (ref 0–51)
WBC # BLD: 10.08 K/UL — SIGNIFICANT CHANGE UP (ref 3.8–10.5)
WBC # FLD AUTO: 10.08 K/UL — SIGNIFICANT CHANGE UP (ref 3.8–10.5)

## 2021-11-22 PROCEDURE — 71045 X-RAY EXAM CHEST 1 VIEW: CPT

## 2021-11-22 PROCEDURE — U0005: CPT

## 2021-11-22 PROCEDURE — 85025 COMPLETE CBC W/AUTO DIFF WBC: CPT

## 2021-11-22 PROCEDURE — 83735 ASSAY OF MAGNESIUM: CPT

## 2021-11-22 PROCEDURE — 93005 ELECTROCARDIOGRAM TRACING: CPT

## 2021-11-22 PROCEDURE — 80053 COMPREHEN METABOLIC PANEL: CPT

## 2021-11-22 PROCEDURE — 71045 X-RAY EXAM CHEST 1 VIEW: CPT | Mod: 26

## 2021-11-22 PROCEDURE — 84100 ASSAY OF PHOSPHORUS: CPT

## 2021-11-22 PROCEDURE — 93010 ELECTROCARDIOGRAM REPORT: CPT

## 2021-11-22 PROCEDURE — 99284 EMERGENCY DEPT VISIT MOD MDM: CPT | Mod: 25

## 2021-11-22 PROCEDURE — 70450 CT HEAD/BRAIN W/O DYE: CPT | Mod: MA

## 2021-11-22 PROCEDURE — 70450 CT HEAD/BRAIN W/O DYE: CPT | Mod: 26,MA

## 2021-11-22 PROCEDURE — 83880 ASSAY OF NATRIURETIC PEPTIDE: CPT

## 2021-11-22 PROCEDURE — 36415 COLL VENOUS BLD VENIPUNCTURE: CPT

## 2021-11-22 PROCEDURE — 99285 EMERGENCY DEPT VISIT HI MDM: CPT

## 2021-11-22 PROCEDURE — 80048 BASIC METABOLIC PNL TOTAL CA: CPT

## 2021-11-22 PROCEDURE — 84484 ASSAY OF TROPONIN QUANT: CPT

## 2021-11-22 PROCEDURE — U0003: CPT

## 2021-11-22 PROCEDURE — 82962 GLUCOSE BLOOD TEST: CPT

## 2021-11-22 NOTE — ED PROVIDER NOTE - NSICDXPASTMEDICALHX_GEN_ALL_CORE_FT
PAST MEDICAL HISTORY:  Abscess of scrotum     Charcot foot due to diabetes mellitus     Diabetic retinopathy associated with type 2 diabetes mellitus     DM (diabetes mellitus) type 2    ESRD (end stage renal disease) on dialysis t-th-sat via right AV graft, uses midodrine the days of Dialysis for hypotension    Hidradenitis     Hyperlipidemia

## 2021-11-22 NOTE — CONSULT NOTE ADULT - TIME BILLING
agree with above NP note.  patient is a 65y Male well known to practice with pmhx of HTN, DM2, ESRD with dialysis on M,W,F using R AV graft, Charcot feet complaining of falling asleep during dialysis. Pt goes to [facility], nephrologist is Dr. Arden Tsang. Finished about 3 hours of dialysis session today, when staff noticed that pt looked altered. They woke pt up and he seemed confused, disoriented, and altered    #AMS post HD  -r/o TIA/CVa  -back to baseline  -CT Head ok  -neuro eval noted    #HTN  -sbp stable   -if admitted - resume home dose norvasc  -recent office echo w nl lv sys fx    #ESRD  -HD per renal    dispo per ED

## 2021-11-22 NOTE — ED PROVIDER NOTE - NSFOLLOWUPINSTRUCTIONS_ED_ALL_ED_FT
You came to the ER today because you had an episode of confusion that was witnessed at dialysis  You were seen by a neurologist in the emergency department. Please follow up for an MRI.     We did an EKG and didn’t see any large abnormality that necessitated that you stay in the hospital for further evaluation however, it is very important that you follow up with your primary care doctor within the next 5 days.     Additionally, if you pass out or feel like you will pass out, if you develop chest pain, abdominal pain, nausea, vomiting, dark stools, bloody stools, shortness of breath, palpitations please make sure to seek IMMEDIATE MEDICAL ATTENTION as this could be a sign of something more serious, like a heart attack, valve disease, intraabdominal bleeding, gastro intestinal bleeding, or stroke. You came to the ER today because you had an episode of confusion that was witnessed at dialysis  You were seen by a neurologist in the emergency department. Please follow up for an MRI.     We did an EKG and didn’t see any large abnormality that necessitated that you stay in the hospital for further evaluation however, it is very important that you follow up with your primary care doctor within the next 5 days.     Additionally, if you pass out or feel like you will pass out, if you develop chest pain, abdominal pain, nausea, vomiting, dark stools, bloody stools, shortness of breath, palpitations please make sure to seek IMMEDIATE MEDICAL ATTENTION as this could be a sign of something more serious, like a heart attack, valve disease, intraabdominal bleeding, gastro intestinal bleeding, or stroke.    Please followup with the neurology team. You can pursue an outpatient MRI with Dr. Avi Pérez ([phone]). No further inpatient workup is required right now from a neurovascular perspective.

## 2021-11-22 NOTE — ED PROVIDER NOTE - CLINICAL SUMMARY MEDICAL DECISION MAKING FREE TEXT BOX
Neal De La O MD (PGY-2): The patient is a 65y Male with pmhx of HTN, DM2, ESRD with dialysis on M,W,F using R AV graft, Charcot feet complaining of falling asleep during dialysis. Concern for possible TIA during dialysis session. Pt has no neurological deficits at this time. Will consult neuro for recs. ekg, cxr, ct head, trop, bnp, labs. VSS. Pt appears well clinically. Dispo tbd.

## 2021-11-22 NOTE — ED ADULT NURSE NOTE - OBJECTIVE STATEMENT
65y.o male A&Ox3, PMH HTN, ESRD, diabetes, charcot legs bilaterally, dialysis pt x5 years goes to dialysis MWF, pt presents to ED after falling asleep at dialysis. Pt states he went to dialysis at 0500 today and fell asleep during treating, pt states the staff woke him up and described him as disoriented. Pt denies syncope, pt states he felt more tired than usual this morning, pt denies headache, fever, chills, N/V/D. safety and comfort provided 65y.o male A&Ox3, PMH HTN, ESRD, diabetes, charcot legs bilaterally, dialysis pt x5 years goes to dialysis MWF, pt presents to ED after falling asleep at dialysis. Pt states he went to dialysis at 0500 today and fell asleep during session. pt states the staff woke him up and described him as disoriented. Pt denies syncope, pt states he felt more tired than usual this morning, Mentating well at this time. Gross neuro intact. pt denies headache, fever, chills, N/V/D. safety and comfort provided.

## 2021-11-22 NOTE — CONSULT NOTE ADULT - SUBJECTIVE AND OBJECTIVE BOX
CARDIOLOGY CONSULT - Dr. Hudson         HPI:    The patient is a 65y Male well known to practice with pmhx of HTN, DM2, ESRD with dialysis on M,W,F using R AV graft, Charcot feet complaining of falling asleep during dialysis. Pt goes to [facility], nephrologist is Dr. Arden Tsang. Finished about 3 hours of dialysis session today, when staff noticed that pt looked altered. They woke pt up and he seemed confused, disoriented, and altered. Per staff, his symptoms lasted for several minutes before he returned his normal baseline. Pt doesn't recall the incident too well, he thinks he was a bit sleepy and confused when he was woken up. Pt transferred to [facility] ED for further eval. Pt denies any cp, sob, palpitations, abd pain, leg swelling. Allergic to PCN.      PAST MEDICAL & SURGICAL HISTORY:  DM (diabetes mellitus)  type 2    ESRD (end stage renal disease) on dialysis  t-th-sat via right AV graft, uses midodrine the days of Dialysis for hypotension    Charcot foot due to diabetes mellitus    Diabetic retinopathy associated with type 2 diabetes mellitus    Hyperlipidemia    Abscess of scrotum    Hidradenitis    S/P foot surgery  right foot - 11/2014    Charcot&#x27;s syndrome  Surgeries in feet and ankles   left ankle 2011 and right ankle 11/2014    History of incision and drainage  scrotal 5/2018            PREVIOUS DIAGNOSTIC TESTING:    [ ] Echocardiogram:  [ ]  Catheterization:  [ ] Stress Test:  	    MEDICATIONS:  Home Medications:  Fosrenol 1000 mg oral tablet, chewable: 1 tab(s) orally 3 times a day (03 Aug 2018 13:30)  ketorolac ophthalmic:  (03 Aug 2018 13:32)  Lantus Solostar Pen 100 units/mL subcutaneous solution:  (03 Aug 2018 13:31)  midodrine 2.5 mg oral tablet:  (03 Aug 2018 13:33)  NovoLOG 100 units/mL injectable solution:  (03 Aug 2018 13:31)      MEDICATIONS  (STANDING):      FAMILY HISTORY:  Family history of diabetes mellitus    No pertinent family history in first degree relatives        SOCIAL HISTORY:    x[ ] Non-smoker  [ ] Smoker  [ ] Alcohol    Allergies    penicillin (Unknown)    Intolerances    	    REVIEW OF SYSTEMS:  CONSTITUTIONAL: No fever, weight loss, or fatigue  EYES: No eye pain, visual disturbances, or discharge  ENMT:  No difficulty hearing, tinnitus, vertigo; No sinus or throat pain  NECK: No pain or stiffness  RESPIRATORY: No cough, wheezing, chills or hemoptysis; No Shortness of Breath  CARDIOVASCULAR: No chest pain, palpitations, passing out, dizziness, or leg swelling  GASTROINTESTINAL: No abdominal or epigastric pain. No nausea, vomiting, or hematemesis; No diarrhea or constipation. No melena or hematochezia.  GENITOURINARY: No dysuria, frequency, hematuria, or incontinence  NEUROLOGICAL: No headaches, memory loss, loss of strength, numbness, or tremors  SKIN: No itching, burning, rashes, or lesions   	    [x ] All others negative	see hpi   [ ] Unable to obtain    PHYSICAL EXAM:  T(C): 36.6 (11-22-21 @ 10:54), Max: 36.7 (11-22-21 @ 09:16)  HR: 80 (11-22-21 @ 14:57) (80 - 90)  BP: 172/90 (11-22-21 @ 14:57) (167/91 - 173/86)  RR: 19 (11-22-21 @ 14:57) (16 - 19)  SpO2: 95% (11-22-21 @ 14:57) (94% - 99%)  Wt(kg): --  I&O's Summary      Appearance: Normal	  Psychiatry: A & O x 3, Mood & affect appropriate  HEENT:   Normal oral mucosa, PERRL, EOMI	  Lymphatic: No lymphadenopathy  Cardiovascular: Normal S1 S2,RRR, No JVD, No murmurs  Respiratory: Lungs clear to auscultation	  Gastrointestinal:  Soft, Non-tender, + BS	  Skin: No rashes, No ecchymoses, No cyanosis	  Neurologic: Non-focal  Extremities: Normal range of motion, No clubbing, cyanosis or edema  Vascular: Peripheral pulses palpable 2+ bilaterally    TELEMETRY: 	    ECG:  	not in chart/surnise   RADIOLOGY:  < from: CT Head No Cont (11.22.21 @ 13:52) >    HEAD CT:    VENTRICLES AND SULCI: Ventricles and sulci are unremarkable for patient age.  INTRA-AXIAL: No intracranial mass, acute hemorrhage, or midline shift is present. microvascular disease.  EXTRA-AXIAL: No extra-axial fluid collection is present.  INTRACRANIAL HEMORRHAGE: None.    VISUALIZED SINUSES: No air-fluid levels are identified.  VISUALIZED MASTOIDS:  Clear.  CALVARIUM:  Intact.  MISCELLANEOUS:  Motion and misregistration artifact.    IMPRESSION:    HEAD CT: Mild volume loss, no acute hemorrhage or midline shift.  There is some motion and misregistration artifact.    --- End of Report ---    < end of copied text >    OTHER: 	  	  LABS:	 	    CARDIAC MARKERS:  Troponin T, High Sensitivity Result: 107 ng/L (11-22 @ 13:06)  Troponin T, High Sensitivity Result: 92 ng/L (11-22 @ 10:55)                                  11.5   10.08 )-----------( 166      ( 22 Nov 2021 10:55 )             36.1     11-22    135  |  96  |  34<H>  ----------------------------<  84  4.9   |  23  |  5.75<H>    Ca    9.6      22 Nov 2021 13:06  Phos  4.0     11-22  Mg     2.3     11-22    TPro  8.6<H>  /  Alb  4.1  /  TBili  0.5  /  DBili  x   /  AST  43<H>  /  ALT  18  /  AlkPhos  82  11-22      proBNP: Serum Pro-Brain Natriuretic Peptide: 45541 pg/mL (11-22 @ 10:55)    Lipid Profile:   HgA1c:   TSH:

## 2021-11-22 NOTE — ED PROVIDER NOTE - PROGRESS NOTE DETAILS
phone call to [facility] dialysis, who states that pt was not acting like his usual self, spoke w/ priscilla nurse who states that the patient midway through dialysis pt wasn't making sense, he was slow to respond to questioning, he also was confused regarding where he was yesterday, he initially stated that he was in [place], while to the NP he stated he was in [place], pt had glucose was 117, gave him a protein bar, while at dialysis, additionally, pt w/ improvement in symptoms, while at dialysis, when medics arrived pt at baseline Neal De La O MD (PGY-2): Consulted neurology to see pt for possible TIA. They said they will come to ED to see pt. phone call to wife Almaz, who states that he was not having the clarity that he usually has, reports that she is ok w/ him going home, pt w known aneurysm of the LUE will be done w/ graft; spoke w/ patient who states he feels well, awaiting repeat troponin given hx of syncope to be dc home at this time Neal De La O MD (PGY-2): Repeat troponin back, not concerning for ACS. Neurology evaluated pt, don't feel pt needs urgent inpatient workup at this time. From their end, he can followup with neuro as outpaitent. Pt was re-evaluated at bedside, VSS, feeling better overall. Results were discussed with patient as well as return precautions and follow up plan with PCP and/or specialist. Time was taken to answer any questions that the patient had before providing them with discharge paperwork.

## 2021-11-22 NOTE — ED PROVIDER NOTE - DATE/TIME 3
[facility] NEPHROLOGY- PROGRESS NOTE    86y Male with history of HTN presents with UTI. Nephrology consulted for elevated Scr.    REVIEW OF SYSTEMS:  Gen: no changes in weight  Cards: no chest pain  Resp: no dyspnea  GI: no nausea or vomiting or diarrhea  Vascular: no LE edema    No Known Allergies      Hospital Medications: Medications reviewed      VITALS:  T(F): 97.4 (05-18-18 @ 09:05), Max: 97.5 (05-17-18 @ 18:45)  HR: 60 (05-18-18 @ 09:05)  BP: 135/82 (05-18-18 @ 09:05)  RR: 18 (05-18-18 @ 09:05)  SpO2: 99% (05-18-18 @ 09:05)  Wt(kg): --    05-17 @ 07:01  -  05-18 @ 07:00  --------------------------------------------------------  IN: 1050 mL / OUT: 0 mL / NET: 1050 mL    05-18 @ 07:01  -  05-18 @ 14:19  --------------------------------------------------------  IN: 360 mL / OUT: 0 mL / NET: 360 mL      PHYSICAL EXAM:    Gen: NAD, calm  Cards: RRR, +S1/S2, no M/G/R  Resp: CTA B/L  GI: soft, NT/ND, NABS  : hansen removed  Vascular: no LE edema B/L      LABS:  05-18    141  |  107  |  24<H>  ----------------------------<  77  4.3   |  22  |  1.37<H>    Ca    9.6      18 May 2018 07:49      Creatinine Trend: 1.37 <--, 1.32 <--, 1.28 <--, 1.36 <--, 1.34 <--, 1.38 <--, 1.50 <--                        10.2   5.26  )-----------( 164      ( 18 May 2018 09:58 )             31.1 22-Nov-2021 15:54

## 2021-11-22 NOTE — CONSULT NOTE ADULT - ASSESSMENT
a/p  The patient is a 65y Male well known to practice with pmhx of HTN, DM2, ESRD with dialysis on M,W,F using R AV graft, Charcot feet complaining of falling asleep during dialysis. Pt goes to [facility], nephrologist is Dr. Arden Tsang. Finished about 3 hours of dialysis session today, when staff noticed that pt looked altered. They woke pt up and he seemed confused, disoriented, and altered    #AMS  -? TIA  -MS back to baseline   -CTH neg  -neuro eval    #HTN  -sbp stable   -if admitted - resume home dose norvasc  -recent office echo w nl lv sys fx    #ESRD  -HD per renal    dispo per ED

## 2021-11-22 NOTE — ED PROVIDER NOTE - NSICDXPASTSURGICALHX_GEN_ALL_CORE_FT
PAST SURGICAL HISTORY:  Charcot's syndrome Surgeries in feet and ankles   left ankle 2011 and right ankle 11/2014    History of incision and drainage scrotal 5/2018    S/P foot surgery right foot - 11/2014

## 2021-11-22 NOTE — ED PROVIDER NOTE - CARE PROVIDER_API CALL
Garo Pérez (DO)  Neurology; Vascular Neurology  [address] [facility] [address], [address]  [place] [address]  Phone: [phone]  Fax: [phone]  Follow Up Time: 1-3 Days

## 2021-11-22 NOTE — ED ADULT NURSE NOTE - MODE OF DISCHARGE
Copy of low fat diet sent to patient's home address.   
Please send pt a copy of low fat diet  
Wheelchair

## 2021-11-22 NOTE — ED PROVIDER NOTE - OBJECTIVE STATEMENT
The patient is a 65y Male with pmhx of HTN, DM2, ESRD with dialysis on M,W,F using R AV graft, Charcot feet complaining of falling asleep during dialysis. Pt goes to [facility], nephrologist is Dr. Arden Tsang. Finished about 3 hours of dialysis session today, when staff noticed that pt looked altered. They woke pt up and he seemed confused, disoriented, and altered. Per staff, his symptoms lasted for several minutes before he returned his normal baseline. Pt doesn't recall the incident too well, he thinks he was a bit sleepy and confused when he was woken up. Pt transferred to [facility] ED for further eval. Pt denies any cp, sob, palpitations, abd pain, leg swelling. Allergic to PCN.

## 2021-11-22 NOTE — ED PROVIDER NOTE - ATTENDING CONTRIBUTION TO CARE
65 M w/ hx of ESRD, DM anemia, presents to the Er w/ "falling asleep at dialysis" pt states that he went to dialysis today and was told he fell asleep and reports that he was told to come to the hospital because he wasn't alert enough.   Pt states currently he feels well, and has   Phone call to [facility] dialysis. 65 M w/ hx of ESRD MWF, DM, anemia, charcot foot bialterally, presents to the Er w/ "falling asleep at dialysis" pt states that he went to dialysis today and was told he fell asleep and reports that he was told to come to the hospital because he wasn't alert enough.   Pt states currently he feels well, and has no complaints.   Phone call to [facility].  On exam, pt is awake and alert in no distress,   Const: appearing stated age, no acute distress  Head: atraumatic, normocephalic  Eyes: no conjunctival injection and no scleral icterus  ENMT: Atraumatic external nose and ears, Moist mucus membranes  Neck: Symmetric, trachea midline, no c spine tenderness  BACK: no bruising, no midline t/l spine tenderness  CVS: +S1/S2,   RESP: Unlabored respiratory effort, Clear to auscultation bilaterally  GI: Nontender/Nondistended, soft abdomen  MSK: Extremities w/o deformity or ttp, R forearm w/ graft,   Neuro: GCS=15, motor in all 4 extremities equal, Sensation intact in all 4 extremities,   Psych: Awake, Alert, & Orientedx3;  Appropriate mood and affect, cooperative   Plan for labs, imaging and reassessment, concern for possible TIA vs sleep disturbance. will eval for metabolic, infectious intracranial etiology   ABCD2 score of 4

## 2021-11-22 NOTE — CONSULT NOTE ADULT - ASSESSMENT
Assessment: 66yo man with PMH HTN, DM2, ESRD (dialysis M/W/F using R AV graft), b/l Charcot feet, transferred to ED from dialysis center after pt fell asleep and appeared altered on awakening by staff. Pt now back to baseline mental status. CTH without evidence of infarct.     Impression: Episode unlikely neurovascular in origin. Suspect due to poor sleep in the setting of lower threshold for metabolic derangements causing transient neurologic dysfunction.    Recommendations:  [] Can pursue outpatient MRI  [] Rest of care per primary team.   Assessment: 64yo man with PMH HTN, DM2, ESRD (dialysis M/W/F using R AV graft), b/l Charcot feet, transferred to ED from dialysis center after pt fell asleep and appeared altered on awakening by staff. Pt now back to baseline mental status. CTH without evidence of infarct.     Impression: Episode unlikely neurovascular in origin. Suspect due to poor sleep in the setting of lower threshold for metabolic derangements causing transient neurologic dysfunction.    Recommendations:  [] Can pursue outpatient MRI with Dr. Avi Pérez ([phone])  [] Rest of care per primary team.    Case to be discussed w/ stroke attending Dr. Avi Pérez and already discussed w/ stroke fellow Dr. Bruno Sun   Assessment: 66yo man with PMH HTN, DM2, ESRD (dialysis M/W/F using R AV graft), b/l Charcot feet, transferred to ED from dialysis center after pt fell asleep and appeared altered on awakening by staff. Pt now back to baseline mental status. CTH without evidence of infarct.     Impression: Episode unlikely neurovascular in origin. Suspect due to poor sleep in the setting of lower threshold for metabolic derangements causing transient neurologic dysfunction.    Recommendations:  [] Can pursue outpatient MRI with Dr. Avi Pérez ([phone])  [] Rest of care per primary team.  [] No further inpatient workup from a neurovascular perspective    Case to be discussed w/ stroke attending Dr. Avi Pérez and already discussed w/ stroke fellow Dr. Bruno Sun

## 2021-11-22 NOTE — ED PROVIDER NOTE - CHIEF COMPLAINT
The patient is a 65y Male complaining of  The patient is a 65y Male complaining of falling asleep during dialysis.

## 2021-11-22 NOTE — CONSULT NOTE ADULT - SUBJECTIVE AND OBJECTIVE BOX
NEUROLOGY CONSULT  65y Male with pmhx of HTN, DM2, ESRD (dialysis M/W/F using R AV graft), b/l Charcot feet, transferred to ED from dialysis center after pt fell asleep and appeared altered on awakening by staff. complaining of falling asleep during dialysis.    On exam, pt reports that he came home from a conference in [place] late last night, and subsequently only got 4-5 hours of sleep due to early dialysis appointment. Pt felt tired upon awakening this morning though otherwise felt like his normal self. Pt dozed off during dialysis this AM, which he states is normal for him, as dialysis is long, so pt usually tries to take a nap. Pt remembers being awakened by staff and feeling groggy, but he is not sure why specifically staff were so concerned about him. Pt reports feeling normal by the time ambulance came to transport him to ED. States he called his wife and informed her of what happened. Denies prior incidents of temporary confusion or disorientation (aside from awakening after surgery). Pt endorses chronic visual changes due to DM2 (pt sees retinal specialist) but denies acute changes at any point today. Denies changes in strength or sensation. Denies changes in speech.      ROS: As above.  PMH & PSH:  PAST MEDICAL & SURGICAL HISTORY:  DM (diabetes mellitus) type 2  ESRD (end stage renal disease) on dialysis  Charcot foot due to diabetes mellitus  Diabetic retinopathy associated with type 2 diabetes mellitus  Hyperlipidemia  Abscess of scrotum  Hidradenitis  S/P foot surgery  right foot - 11/2014  Surgeries in feet and ankles   left ankle 2011 and right ankle 11/2014  History of incision and drainage  scrotal 5/2018      Outpatient Provider: Han Hudson  Outpatient Provider: Arden Tsang      [place] Medications:  Fosrenol 1000 mg oral tablet, chewable: 1 tab(s) orally 3 times a day (03 Aug 2018 13:30)  ketorolac ophthalmic:  (03 Aug 2018 13:32)  Lantus Solostar Pen 100 units/mL subcutaneous solution:  (03 Aug 2018 13:31)  midodrine 2.5 mg oral tablet:  (03 Aug 2018 13:33)  NovoLOG 100 units/mL injectable solution:  (03 Aug 2018 13:31)    MEDICATIONS  (STANDING):    MEDICATIONS  (PRN):    Allergies    penicillin (Unknown)    Intolerances      Social History:     FAMILY HISTORY:  Family history of diabetes mellitus    No pertinent family history in first degree relatives        OBJECTIVE  Vital Signs Last 24 Hrs  T(C): 36.6 (22 Nov 2021 10:54), Max: 36.7 (22 Nov 2021 09:16)  T(F): 97.9 (22 Nov 2021 10:54), Max: 98.1 (22 Nov 2021 09:16)  HR: 85 (22 Nov 2021 10:54) (85 - 90)  BP: 167/91 (22 Nov 2021 10:54) (167/91 - 173/86)  BP(mean): --  RR: 18 (22 Nov 2021 10:54) (16 - 18)  SpO2: 99% (22 Nov 2021 10:54) (94% - 99%)  Orthostatic VS    Height (cm): 170.2 (11-22)  Weight (kg): 99.8 (11-22)  BMI (kg/m2): 34.5 (11-22)    PHYSICAL EXAM: (pending)  Mental Status: AxO to person, place, situation, time.   Language: Fluent w/ preserved naming, repetition & comprehension.    CNs: PERRL (R = 1mm, L = 1mm).  EOMI, no nystagmus. B/L V1-3 intact to light touch.  No facial asymmetry b/l.  Hearing grossly normal to conversation.  No dysarthria.  Palate midline & symmetric elevation.  Tongue midline, normal movements.  Cortical: Visual fields intact.  No extinction on double simultaneous touch, no signs of sensory or visual neglect.   Motor: Normal muscle bulk & tone.   5/5 strength B/L UE & LE.  Sensation: Symmetric B/L preserved sensation to pin prick & two point discrimination throughout all points tested.    Reflexes: 2/4 throughout; unable to assess plantar reflex  Coordination: No dysmetria on B/L FTN.  B/L rapid alternating movements intact.   Gait: deferred      Labs:                        11.5   10.08 )-----------( 166      ( 22 Nov 2021 10:55 )             36.1     11-22    135  |  96  |  34<H>  ----------------------------<  84  4.9   |  23  |  5.75<H>    Ca    9.6      22 Nov 2021 13:06  Phos  4.0     11-22  Mg     2.3     11-22    TPro  8.6<H>  /  Alb  4.1  /  TBili  0.5  /  DBili  x   /  AST  43<H>  /  ALT  18  /  AlkPhos  82  11-22      Imaging:  < from: CT Head No Cont (11.22.21 @ 13:52) >  IMPRESSION:    HEAD CT: Mild volume loss, no acute hemorrhage or midline shift.  There is some motion and misregistration artifact.    < end of copied text >     NEUROLOGY CONSULT  65y Male with pmhx of HTN, DM2, ESRD (dialysis M/W/F using R AV graft), b/l Charcot feet, transferred to ED from dialysis center after pt fell asleep and appeared altered on awakening by staff. complaining of falling asleep during dialysis.    On exam, pt reports that he came home from a conference in [place] late last night, and subsequently only got 4-5 hours of sleep due to early dialysis appointment. Pt felt tired upon awakening this morning though otherwise felt like his normal self. Pt dozed off during dialysis this AM, which he states is normal for him, as dialysis is long, so pt usually tries to take a nap. Pt remembers being awakened by staff and feeling groggy, but he is not sure why specifically staff were so concerned about him. Pt reports feeling normal by the time ambulance came to transport him to ED. States he called his wife and informed her of what happened. Denies prior incidents of temporary confusion or disorientation (aside from awakening after surgery). Pt endorses chronic visual changes due to DM2 (pt sees retinal specialist) but denies acute changes at any point today. Denies changes in strength or sensation. Denies changes in speech.      ROS: As above.  PMH & PSH:  PAST MEDICAL & SURGICAL HISTORY:  DM (diabetes mellitus) type 2  ESRD (end stage renal disease) on dialysis  Charcot foot due to diabetes mellitus  Diabetic retinopathy associated with type 2 diabetes mellitus  Hyperlipidemia  Abscess of scrotum  Hidradenitis  S/P foot surgery  right foot - 11/2014  Surgeries in feet and ankles   left ankle 2011 and right ankle 11/2014  History of incision and drainage  scrotal 5/2018      Outpatient Provider: Han Hudson  Outpatient Provider: Arden Tsang      [place] Medications:  Fosrenol 1000 mg oral tablet, chewable: 1 tab(s) orally 3 times a day (03 Aug 2018 13:30)  ketorolac ophthalmic:  (03 Aug 2018 13:32)  Lantus Solostar Pen 100 units/mL subcutaneous solution:  (03 Aug 2018 13:31)  midodrine 2.5 mg oral tablet:  (03 Aug 2018 13:33)  NovoLOG 100 units/mL injectable solution:  (03 Aug 2018 13:31)    MEDICATIONS  (STANDING):    MEDICATIONS  (PRN):    Allergies    penicillin (Unknown)    Intolerances      Social History:     FAMILY HISTORY:  Family history of diabetes mellitus    No pertinent family history in first degree relatives        OBJECTIVE  Vital Signs Last 24 Hrs  T(C): 36.6 (22 Nov 2021 10:54), Max: 36.7 (22 Nov 2021 09:16)  T(F): 97.9 (22 Nov 2021 10:54), Max: 98.1 (22 Nov 2021 09:16)  HR: 85 (22 Nov 2021 10:54) (85 - 90)  BP: 167/91 (22 Nov 2021 10:54) (167/91 - 173/86)  BP(mean): --  RR: 18 (22 Nov 2021 10:54) (16 - 18)  SpO2: 99% (22 Nov 2021 10:54) (94% - 99%)  Orthostatic VS    Height (cm): 170.2 (11-22)  Weight (kg): 99.8 (11-22)  BMI (kg/m2): 34.5 (11-22)    PHYSICAL EXAM:   Mental Status: AxO to person, place, situation, time.   Language: Fluent w/ preserved naming, repetition & comprehension.    CNs: PERRL (R = 1mm, L = 1mm).  EOMI, no nystagmus. B/L V1-3 intact to light touch.  No facial asymmetry b/l.  Hearing grossly normal to conversation.  No dysarthria.  Palate midline & symmetric elevation.  Tongue midline, normal movements.  Cortical: Visual fields intact.  No extinction on double simultaneous touch, no signs of sensory or visual neglect.   Motor: Normal muscle bulk & tone.   5/5 strength B/L UE & LE.  Sensation: Symmetric B/L preserved sensation to pin prick & two point discrimination throughout all points tested.    Reflexes: 2+ throughout; unable to assess plantar reflex  Coordination: No dysmetria on B/L FTN.  B/L rapid alternating movements intact.   Gait: deferred      Labs:                        11.5   10.08 )-----------( 166      ( 22 Nov 2021 10:55 )             36.1     11-22    135  |  96  |  34<H>  ----------------------------<  84  4.9   |  23  |  5.75<H>    Ca    9.6      22 Nov 2021 13:06  Phos  4.0     11-22  Mg     2.3     11-22    TPro  8.6<H>  /  Alb  4.1  /  TBili  0.5  /  DBili  x   /  AST  43<H>  /  ALT  18  /  AlkPhos  82  11-22      Imaging:  < from: CT Head No Cont (11.22.21 @ 13:52) >  IMPRESSION:    HEAD CT: Mild volume loss, no acute hemorrhage or midline shift.  There is some motion and misregistration artifact.    < end of copied text >     NEUROLOGY CONSULT  66yo man with PMH HTN, DM2, ESRD (dialysis M/W/F using R AV graft), b/l Charcot feet, transferred to ED from dialysis center after pt fell asleep and appeared altered on awakening by staff.    On exam, pt reports that he came home from a conference in [place] late last night, and subsequently only got 4-5 hours of sleep due to early dialysis appointment. Pt felt tired upon awakening this morning though otherwise felt like his normal self. Pt dozed off during dialysis this AM, which he states is normal for him, as dialysis is long, so pt usually tries to take a nap. Pt remembers being awakened by staff and feeling groggy, but he is not sure why specifically staff were so concerned about him. Pt reports feeling normal by the time ambulance came to transport him to ED. States he called his wife and informed her of what happened. Denies prior incidents of temporary confusion or disorientation (aside from awakening after surgery). Pt endorses chronic visual changes due to DM2 (pt sees retinal specialist) but denies acute changes at any point today. Denies changes in strength or sensation. Denies changes in speech.      ROS: As above.  PMH & PSH:  PAST MEDICAL & SURGICAL HISTORY:  DM (diabetes mellitus) type 2  ESRD (end stage renal disease) on dialysis  Charcot foot due to diabetes mellitus  Diabetic retinopathy associated with type 2 diabetes mellitus  Hyperlipidemia  Abscess of scrotum  Hidradenitis  S/P foot surgery  right foot - 11/2014  Surgeries in feet and ankles   left ankle 2011 and right ankle 11/2014  History of incision and drainage  scrotal 5/2018      Outpatient Provider: Han Hudson  Outpatient Provider: Arden Tsang      [place] Medications:  Fosrenol 1000 mg oral tablet, chewable: 1 tab(s) orally 3 times a day (03 Aug 2018 13:30)  ketorolac ophthalmic:  (03 Aug 2018 13:32)  Lantus Solostar Pen 100 units/mL subcutaneous solution:  (03 Aug 2018 13:31)  midodrine 2.5 mg oral tablet:  (03 Aug 2018 13:33)  NovoLOG 100 units/mL injectable solution:  (03 Aug 2018 13:31)    MEDICATIONS  (STANDING):    MEDICATIONS  (PRN):    Allergies    penicillin (Unknown)    Intolerances      Social History:     FAMILY HISTORY:  Family history of diabetes mellitus    No pertinent family history in first degree relatives        OBJECTIVE  Vital Signs Last 24 Hrs  T(C): 36.6 (22 Nov 2021 10:54), Max: 36.7 (22 Nov 2021 09:16)  T(F): 97.9 (22 Nov 2021 10:54), Max: 98.1 (22 Nov 2021 09:16)  HR: 85 (22 Nov 2021 10:54) (85 - 90)  BP: 167/91 (22 Nov 2021 10:54) (167/91 - 173/86)  BP(mean): --  RR: 18 (22 Nov 2021 10:54) (16 - 18)  SpO2: 99% (22 Nov 2021 10:54) (94% - 99%)  Orthostatic VS    Height (cm): 170.2 (11-22)  Weight (kg): 99.8 (11-22)  BMI (kg/m2): 34.5 (11-22)    PHYSICAL EXAM:   Mental Status: AxO to person, place, situation, time.   Language: Fluent w/ preserved naming, repetition & comprehension.    CNs: PERRL (R = 1mm, L = 1mm).  EOMI, no nystagmus. B/L V1-3 intact to light touch.  No facial asymmetry b/l.  Hearing grossly normal to conversation.  No dysarthria.  Palate midline & symmetric elevation.  Tongue midline, normal movements.  Cortical: Visual fields intact.  No extinction on double simultaneous touch, no signs of sensory or visual neglect.   Motor: Normal muscle bulk & tone.   5/5 strength B/L UE & LE.  Sensation: Symmetric B/L preserved sensation to pin prick & two point discrimination throughout all points tested.    Reflexes: 2+ throughout; unable to assess plantar reflex  Coordination: No dysmetria on B/L FTN.  B/L rapid alternating movements intact.   Gait: deferred      Labs:                        11.5   10.08 )-----------( 166      ( 22 Nov 2021 10:55 )             36.1     11-22    135  |  96  |  34<H>  ----------------------------<  84  4.9   |  23  |  5.75<H>    Ca    9.6      22 Nov 2021 13:06  Phos  4.0     11-22  Mg     2.3     11-22    TPro  8.6<H>  /  Alb  4.1  /  TBili  0.5  /  DBili  x   /  AST  43<H>  /  ALT  18  /  AlkPhos  82  11-22      Imaging:  < from: CT Head No Cont (11.22.21 @ 13:52) >  IMPRESSION:    HEAD CT: Mild volume loss, no acute hemorrhage or midline shift.  There is some motion and misregistration artifact.    < end of copied text >

## 2021-11-22 NOTE — ED PROVIDER NOTE - PHYSICAL EXAMINATION
Gen: NAD. A&Ox4. Non-toxic appearing.  HEENT: Normocephalic and atraumatic. PERRL, EOMI, no nasal discharge, mucous membranes moist, no scleral icterus.  CV: Regular rate and rhythm, +S1/S2, no M/R/G. 1+ lower extremity pitting edema extending from feet to mid-shins bilaterally. Radial and DP pulses present and symmetrical. Capillary refill less than 2 seconds.  Resp: Normal effort and rate. CTAB, no rales, rhonchi, or wheezes.  GI: Abdomen soft, non-distended, non tender to palpation. No masses appreciated. Bowel sounds present.  MSK: No CVAT bilaterally. Charcot feet bilaterally with scaly, dry skin that is actively peeling. No bleeding or purulent drainage. Pt is wearing Charcot boots.  Neuro: Following commands, speaking in full sentences, moving extremities spontaneously. CN II-XII intact. Strength and sensation symmetric and intact throughout. Reflexes 2+ throughout. Cerebellar testing normal.  Psych: Appropriate mood, cooperative

## 2021-11-22 NOTE — ED PROVIDER NOTE - PATIENT PORTAL LINK FT
You can access the FollowMyHealth Patient Portal offered by [facility] by registering at the following website: [email or web address]. By joining [facility]’s FollowMyHealth portal, you will also be able to view your health information using other applications (apps) compatible with our system.

## 2021-11-22 NOTE — ED PROVIDER NOTE - NS ED ROS FT
GENERAL: No fever or chills  EYES: No change in vision  HEENT: No trouble swallowing or speaking  CARDIAC: No chest pain  PULMONARY: No cough or SOB  GI: No abdominal pain, no nausea or no vomiting, no diarrhea or constipation  : No changes in urination  SKIN: No rashes  NEURO: +altered mental status  MSK: No joint pain  Otherwise as HPI or negative.

## 2021-11-26 ENCOUNTER — OUTPATIENT (OUTPATIENT)
Dept: OUTPATIENT SERVICES | Facility: HOSPITAL | Age: 66
LOS: 1 days | End: 2021-11-26
Payer: MEDICARE

## 2021-11-26 ENCOUNTER — RESULT REVIEW (OUTPATIENT)
Age: 66
End: 2021-11-26

## 2021-11-26 VITALS
HEART RATE: 77 BPM | TEMPERATURE: 97 F | DIASTOLIC BLOOD PRESSURE: 68 MMHG | RESPIRATION RATE: 16 BRPM | OXYGEN SATURATION: 98 % | WEIGHT: 220.46 LBS | HEIGHT: 68 IN | SYSTOLIC BLOOD PRESSURE: 134 MMHG

## 2021-11-26 DIAGNOSIS — Z99.2 DEPENDENCE ON RENAL DIALYSIS: Chronic | ICD-10-CM

## 2021-11-26 DIAGNOSIS — Z98.89 OTHER SPECIFIED POSTPROCEDURAL STATES: Chronic | ICD-10-CM

## 2021-11-26 DIAGNOSIS — I73.9 PERIPHERAL VASCULAR DISEASE, UNSPECIFIED: Chronic | ICD-10-CM

## 2021-11-26 DIAGNOSIS — G47.33 OBSTRUCTIVE SLEEP APNEA (ADULT) (PEDIATRIC): ICD-10-CM

## 2021-11-26 DIAGNOSIS — N18.6 END STAGE RENAL DISEASE: ICD-10-CM

## 2021-11-26 DIAGNOSIS — Z01.818 ENCOUNTER FOR OTHER PREPROCEDURAL EXAMINATION: ICD-10-CM

## 2021-11-26 DIAGNOSIS — E11.9 TYPE 2 DIABETES MELLITUS WITHOUT COMPLICATIONS: ICD-10-CM

## 2021-11-26 DIAGNOSIS — I10 ESSENTIAL (PRIMARY) HYPERTENSION: ICD-10-CM

## 2021-11-26 DIAGNOSIS — Z29.9 ENCOUNTER FOR PROPHYLACTIC MEASURES, UNSPECIFIED: ICD-10-CM

## 2021-11-26 DIAGNOSIS — Z98.890 OTHER SPECIFIED POSTPROCEDURAL STATES: Chronic | ICD-10-CM

## 2021-11-26 LAB
HCT VFR BLD CALC: 33.8 % — LOW (ref 39–50)
HGB BLD-MCNC: 11 G/DL — LOW (ref 13–17)
MCHC RBC-ENTMCNC: 30.8 PG — SIGNIFICANT CHANGE UP (ref 27–34)
MCHC RBC-ENTMCNC: 32.5 GM/DL — SIGNIFICANT CHANGE UP (ref 32–36)
MCV RBC AUTO: 94.7 FL — SIGNIFICANT CHANGE UP (ref 80–100)
NRBC # BLD: 0 /100 WBCS — SIGNIFICANT CHANGE UP (ref 0–0)
PLATELET # BLD AUTO: 180 K/UL — SIGNIFICANT CHANGE UP (ref 150–400)
RBC # BLD: 3.57 M/UL — LOW (ref 4.2–5.8)
RBC # FLD: 14.5 % — SIGNIFICANT CHANGE UP (ref 10.3–14.5)
WBC # BLD: 9.66 K/UL — SIGNIFICANT CHANGE UP (ref 3.8–10.5)
WBC # FLD AUTO: 9.66 K/UL — SIGNIFICANT CHANGE UP (ref 3.8–10.5)

## 2021-11-26 PROCEDURE — 36415 COLL VENOUS BLD VENIPUNCTURE: CPT

## 2021-11-26 PROCEDURE — 71046 X-RAY EXAM CHEST 2 VIEWS: CPT

## 2021-11-26 PROCEDURE — 71046 X-RAY EXAM CHEST 2 VIEWS: CPT | Mod: 26

## 2021-11-26 PROCEDURE — 87640 STAPH A DNA AMP PROBE: CPT

## 2021-11-26 PROCEDURE — 93005 ELECTROCARDIOGRAM TRACING: CPT

## 2021-11-26 PROCEDURE — G0463: CPT

## 2021-11-26 PROCEDURE — 85027 COMPLETE CBC AUTOMATED: CPT

## 2021-11-26 PROCEDURE — 87641 MR-STAPH DNA AMP PROBE: CPT

## 2021-11-26 RX ORDER — KETOROLAC TROMETHAMINE 0.5 %
0 DROPS OPHTHALMIC (EYE)
Qty: 0 | Refills: 0 | DISCHARGE

## 2021-11-26 RX ORDER — MIDODRINE HYDROCHLORIDE 2.5 MG/1
0 TABLET ORAL
Qty: 0 | Refills: 0 | DISCHARGE

## 2021-11-26 RX ORDER — LANTHANUM CARBONATE 750 MG/1
1 TABLET, CHEWABLE ORAL
Qty: 0 | Refills: 0 | DISCHARGE

## 2021-11-26 RX ORDER — ENOXAPARIN SODIUM 100 MG/ML
0 INJECTION SUBCUTANEOUS
Qty: 0 | Refills: 0 | DISCHARGE

## 2021-11-26 RX ORDER — INSULIN ASPART 100 [IU]/ML
0 INJECTION, SOLUTION SUBCUTANEOUS
Qty: 0 | Refills: 0 | DISCHARGE

## 2021-11-26 NOTE — H&P PST ADULT - HISTORY OF PRESENT ILLNESS
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 Pt is schedule for right forearm revision of av graft on 11/30/2021. Pt attends [facility] for treatment. Pt had echo 1/22/2019 normal stress 3/14/2016 normal perfusion EF 53% and received cardiac clearance from Dr Hudson. Pt instructed on chlorhexidine wash am of surgery. Pt presented in motorized wheelchair with 2 large bilateral boots.

## 2021-11-26 NOTE — H&P PST ADULT - NS NEC GEN PE MLT EXAM PC
Discharge Planner OT   Patient plan for discharge: Home with spouse verses rehab  Current status: Pt completed stand pivot transfer from bedside chair to bed with Petra of one. She tolerated sitting in chair for approximately 25 minutes. Completed sit to supine with assist of one and a second person on standby. Educated regarding spine precautions  Barriers to return to prior living situation: pain, BLE sensation impairments, reduced activity tolerance, spine precaution adherence  Recommendations for discharge: Home with spouse. Recommend assist of one with mobility, dressing, toileting, bathing, grooming. Recommend home OT  Rationale for recommendations: Pt functioning below baseline and will benefit from continued skilled OT to maximize safety and independence. Recommend home OT services as pt requires assistance and considerable and taxing effort to leave the home.          Entered by: Sue Tarango 02/07/2020 4:36 PM          No bruits; no thyromegaly or nodules

## 2021-11-26 NOTE — H&P PST ADULT - NSICDXPASTSURGICALHX_GEN_ALL_CORE_FT
PAST SURGICAL HISTORY:  Charcot's syndrome Surgeries in feet and ankles   left ankle 2011 and right ankle 11/2014    Hemodialysis access, AV graft     History of incision and drainage scrotal 5/2018    S/P foot surgery right foot - 11/2014

## 2021-11-26 NOTE — H&P PST ADULT - NSICDXPASTMEDICALHX_GEN_ALL_CORE_FT
PAST MEDICAL HISTORY:  Abscess of scrotum     Charcot foot due to diabetes mellitus     Diabetic retinopathy associated with type 2 diabetes mellitus     DM (diabetes mellitus) type 2    End stage renal disease     ESRD (end stage renal disease) on dialysis t-th-sat via right AV graft, uses midodrine the days of Dialysis for hypotension    Hidradenitis     Hyperlipidemia

## 2021-11-26 NOTE — H&P PST ADULT - ASSESSMENT
65 yr old Black male with history of HTN, diabetes ( Lantus and Novolog) ESRD on hemodialysis had right forearm av graft placed 5 yrs ago. Pt states that the graft is falling and left Perma catheter placed for dialysis (M W F) done at [facility] [facility]> Pt received cardiac clearance from Dr. Hudson. Pt is schedule for right forearm revision of av graft on 11/30/2021.

## 2021-11-29 ENCOUNTER — TRANSCRIPTION ENCOUNTER (OUTPATIENT)
Age: 66
End: 2021-11-29

## 2021-11-29 LAB
MRSA PCR RESULT.: SIGNIFICANT CHANGE UP
S AUREUS DNA NOSE QL NAA+PROBE: SIGNIFICANT CHANGE UP

## 2021-11-30 ENCOUNTER — OUTPATIENT (OUTPATIENT)
Dept: OUTPATIENT SERVICES | Facility: HOSPITAL | Age: 66
LOS: 1 days | End: 2021-11-30
Payer: MEDICARE

## 2021-11-30 VITALS
SYSTOLIC BLOOD PRESSURE: 134 MMHG | OXYGEN SATURATION: 98 % | WEIGHT: 220.46 LBS | TEMPERATURE: 97 F | HEART RATE: 77 BPM | HEIGHT: 68 IN | RESPIRATION RATE: 16 BRPM | DIASTOLIC BLOOD PRESSURE: 68 MMHG

## 2021-11-30 VITALS
OXYGEN SATURATION: 95 % | DIASTOLIC BLOOD PRESSURE: 71 MMHG | RESPIRATION RATE: 19 BRPM | HEART RATE: 72 BPM | SYSTOLIC BLOOD PRESSURE: 152 MMHG | TEMPERATURE: 98 F

## 2021-11-30 DIAGNOSIS — Z99.2 DEPENDENCE ON RENAL DIALYSIS: Chronic | ICD-10-CM

## 2021-11-30 DIAGNOSIS — Z98.89 OTHER SPECIFIED POSTPROCEDURAL STATES: Chronic | ICD-10-CM

## 2021-11-30 DIAGNOSIS — I73.9 PERIPHERAL VASCULAR DISEASE, UNSPECIFIED: Chronic | ICD-10-CM

## 2021-11-30 DIAGNOSIS — N18.6 END STAGE RENAL DISEASE: ICD-10-CM

## 2021-11-30 DIAGNOSIS — Z98.890 OTHER SPECIFIED POSTPROCEDURAL STATES: Chronic | ICD-10-CM

## 2021-11-30 LAB
ANION GAP SERPL CALC-SCNC: 6 MMOL/L — SIGNIFICANT CHANGE UP (ref 5–17)
BUN SERPL-MCNC: 47 MG/DL — HIGH (ref 7–18)
CALCIUM SERPL-MCNC: 8.8 MG/DL — SIGNIFICANT CHANGE UP (ref 8.4–10.5)
CHLORIDE SERPL-SCNC: 101 MMOL/L — SIGNIFICANT CHANGE UP (ref 96–108)
CO2 SERPL-SCNC: 26 MMOL/L — SIGNIFICANT CHANGE UP (ref 22–31)
CREAT SERPL-MCNC: 7.17 MG/DL — HIGH (ref 0.5–1.3)
GLUCOSE BLDC GLUCOMTR-MCNC: 173 MG/DL — HIGH (ref 70–99)
GLUCOSE BLDC GLUCOMTR-MCNC: 175 MG/DL — HIGH (ref 70–99)
GLUCOSE SERPL-MCNC: 186 MG/DL — HIGH (ref 70–99)
POTASSIUM SERPL-MCNC: 5.6 MMOL/L — HIGH (ref 3.5–5.3)
POTASSIUM SERPL-SCNC: 5.6 MMOL/L — HIGH (ref 3.5–5.3)
SODIUM SERPL-SCNC: 133 MMOL/L — LOW (ref 135–145)

## 2021-11-30 PROCEDURE — C1768: CPT

## 2021-11-30 PROCEDURE — 36415 COLL VENOUS BLD VENIPUNCTURE: CPT

## 2021-11-30 PROCEDURE — 36832 AV FISTULA REVISION OPEN: CPT | Mod: RT

## 2021-11-30 PROCEDURE — 82962 GLUCOSE BLOOD TEST: CPT

## 2021-11-30 PROCEDURE — 80048 BASIC METABOLIC PNL TOTAL CA: CPT

## 2021-11-30 RX ORDER — TRAMADOL HYDROCHLORIDE 50 MG/1
1 TABLET ORAL
Qty: 16 | Refills: 0
Start: 2021-11-30 | End: 2021-12-03

## 2021-11-30 RX ORDER — INSULIN ASPART 100 [IU]/ML
10 INJECTION, SOLUTION SUBCUTANEOUS
Qty: 0 | Refills: 0 | DISCHARGE

## 2021-11-30 RX ORDER — INSULIN GLARGINE 100 [IU]/ML
30 INJECTION, SOLUTION SUBCUTANEOUS
Qty: 0 | Refills: 0 | DISCHARGE

## 2021-11-30 RX ORDER — LATANOPROST 0.05 MG/ML
1 SOLUTION/ DROPS OPHTHALMIC; TOPICAL
Qty: 0 | Refills: 0 | DISCHARGE

## 2021-11-30 RX ORDER — CHLORHEXIDINE GLUCONATE 213 G/1000ML
1 SOLUTION TOPICAL ONCE
Refills: 0 | Status: COMPLETED | OUTPATIENT
Start: 2021-11-30 | End: 2021-11-30

## 2021-11-30 RX ORDER — FENTANYL CITRATE 50 UG/ML
25 INJECTION INTRAVENOUS
Refills: 0 | Status: DISCONTINUED | OUTPATIENT
Start: 2021-11-30 | End: 2021-11-30

## 2021-11-30 RX ORDER — KETOROLAC TROMETHAMINE 0.5 %
0 DROPS OPHTHALMIC (EYE)
Qty: 0 | Refills: 0 | DISCHARGE

## 2021-11-30 RX ORDER — AMLODIPINE BESYLATE 2.5 MG/1
1 TABLET ORAL
Qty: 0 | Refills: 0 | DISCHARGE

## 2021-11-30 RX ORDER — SODIUM CHLORIDE 9 MG/ML
3 INJECTION INTRAMUSCULAR; INTRAVENOUS; SUBCUTANEOUS EVERY 8 HOURS
Refills: 0 | Status: DISCONTINUED | OUTPATIENT
Start: 2021-11-30 | End: 2021-11-30

## 2021-11-30 RX ORDER — ONDANSETRON 8 MG/1
4 TABLET, FILM COATED ORAL ONCE
Refills: 0 | Status: DISCONTINUED | OUTPATIENT
Start: 2021-11-30 | End: 2021-11-30

## 2021-11-30 RX ADMIN — SODIUM CHLORIDE 3 MILLILITER(S): 9 INJECTION INTRAMUSCULAR; INTRAVENOUS; SUBCUTANEOUS at 07:35

## 2021-11-30 RX ADMIN — CHLORHEXIDINE GLUCONATE 1 APPLICATION(S): 213 SOLUTION TOPICAL at 07:36

## 2021-11-30 NOTE — ASU DISCHARGE PLAN (ADULT/PEDIATRIC) - NS MD DC FALL RISK RISK
For information on Fall & Injury Prevention, visit: [email or web address] OR  [email or web address] OR  https://www.cdc.gov/steadi/patient.html

## 2021-11-30 NOTE — ASU DISCHARGE PLAN (ADULT/PEDIATRIC) - ASU DC SPECIAL INSTRUCTIONSFT
For pain take over the counter Tylenol, if not controlled take tramadol as prescribed. You may remove surgical dressing 48 hours after surgery. When showering do not scrub the surgical site but allow soap and water to rinse down the area. May resume your normal diet. Remove arm sling once function and sensation return to right upper extremity.

## 2021-12-25 NOTE — ED ADULT NURSE NOTE - BREATH SOUNDS, MLM
Clear Scribe Attestation (For Scribes USE Only)... Attending Attestation (For Attendings USE Only).../Scribe Attestation (For Scribes USE Only)...

## 2022-01-13 NOTE — H&P PST ADULT - VISION (WITH CORRECTIVE LENSES IF THE PATIENT USUALLY WEARS THEM):
warm reading glasses/Normal vision: sees adequately in most situations; can see medication labels, newsprint

## 2022-08-31 NOTE — H&P PST ADULT - CONSTITUTIONAL DETAILS
abnormal fetal heart rate tracing/nuchal cord/precipitous delivery
well-groomed/no distress/well-developed/well-nourished

## 2022-09-26 NOTE — ASU PREOP CHECKLIST - TEMPERATURE IN CELSIUS (DEGREES C)
36.3 Crescentic Advancement Flap Text: The defect edges were debeveled with a #15 scalpel blade.  Given the location of the defect and the proximity to free margins a crescentic advancement flap was deemed most appropriate.  Using a sterile surgical marker, the appropriate advancement flap was drawn incorporating the defect and placing the expected incisions within the relaxed skin tension lines where possible.    The area thus outlined was incised deep to adipose tissue with a #15 scalpel blade.  The skin margins were undermined to an appropriate distance in all directions utilizing iris scissors.

## 2023-03-01 PROBLEM — N18.6 END STAGE RENAL DISEASE: Chronic | Status: ACTIVE | Noted: 2021-11-26

## 2023-03-06 ENCOUNTER — APPOINTMENT (OUTPATIENT)
Dept: ORTHOPEDIC SURGERY | Facility: CLINIC | Age: 68
End: 2023-03-06
Payer: MEDICARE

## 2023-03-06 DIAGNOSIS — E11.610 TYPE 2 DIABETES MELLITUS WITH DIABETIC NEUROPATHIC ARTHROPATHY: ICD-10-CM

## 2023-03-06 DIAGNOSIS — M47.816 SPONDYLOSIS W/OUT MYELOPATHY OR RADICULOPATHY, LUMBAR REGION: ICD-10-CM

## 2023-03-06 PROCEDURE — 72100 X-RAY EXAM L-S SPINE 2/3 VWS: CPT

## 2023-03-06 PROCEDURE — 72170 X-RAY EXAM OF PELVIS: CPT

## 2023-03-06 PROCEDURE — 73564 X-RAY EXAM KNEE 4 OR MORE: CPT | Mod: RT

## 2023-03-06 PROCEDURE — 99204 OFFICE O/P NEW MOD 45 MIN: CPT

## 2023-04-13 ENCOUNTER — APPOINTMENT (OUTPATIENT)
Dept: ORTHOPEDIC SURGERY | Facility: CLINIC | Age: 68
End: 2023-04-13
Payer: MEDICARE

## 2023-04-13 DIAGNOSIS — M17.0 BILATERAL PRIMARY OSTEOARTHRITIS OF KNEE: ICD-10-CM

## 2023-04-13 DIAGNOSIS — M16.0 BILATERAL PRIMARY OSTEOARTHRITIS OF HIP: ICD-10-CM

## 2023-04-13 PROCEDURE — 99213 OFFICE O/P EST LOW 20 MIN: CPT

## 2024-12-16 ENCOUNTER — INPATIENT (INPATIENT)
Facility: HOSPITAL | Age: 69
LOS: 22 days | Discharge: NURSING, REHAB OR LONG-TERM CARE | DRG: 951 | End: 2025-01-08
Attending: STUDENT IN AN ORGANIZED HEALTH CARE EDUCATION/TRAINING PROGRAM | Admitting: STUDENT IN AN ORGANIZED HEALTH CARE EDUCATION/TRAINING PROGRAM
Payer: MEDICARE

## 2024-12-16 VITALS
HEART RATE: 80 BPM | SYSTOLIC BLOOD PRESSURE: 122 MMHG | DIASTOLIC BLOOD PRESSURE: 66 MMHG | TEMPERATURE: 100 F | RESPIRATION RATE: 16 BRPM | OXYGEN SATURATION: 91 % | WEIGHT: 187.39 LBS

## 2024-12-16 DIAGNOSIS — I87.2 VENOUS INSUFFICIENCY (CHRONIC) (PERIPHERAL): ICD-10-CM

## 2024-12-16 DIAGNOSIS — Z99.2 DEPENDENCE ON RENAL DIALYSIS: Chronic | ICD-10-CM

## 2024-12-16 DIAGNOSIS — Z29.9 ENCOUNTER FOR PROPHYLACTIC MEASURES, UNSPECIFIED: ICD-10-CM

## 2024-12-16 DIAGNOSIS — E78.5 HYPERLIPIDEMIA, UNSPECIFIED: ICD-10-CM

## 2024-12-16 DIAGNOSIS — I73.9 PERIPHERAL VASCULAR DISEASE, UNSPECIFIED: Chronic | ICD-10-CM

## 2024-12-16 DIAGNOSIS — Z99.2 DEPENDENCE ON RENAL DIALYSIS: ICD-10-CM

## 2024-12-16 DIAGNOSIS — Z98.890 OTHER SPECIFIED POSTPROCEDURAL STATES: Chronic | ICD-10-CM

## 2024-12-16 DIAGNOSIS — N18.6 END STAGE RENAL DISEASE: ICD-10-CM

## 2024-12-16 DIAGNOSIS — Z98.89 OTHER SPECIFIED POSTPROCEDURAL STATES: Chronic | ICD-10-CM

## 2024-12-16 DIAGNOSIS — I10 ESSENTIAL (PRIMARY) HYPERTENSION: ICD-10-CM

## 2024-12-16 DIAGNOSIS — E87.5 HYPERKALEMIA: ICD-10-CM

## 2024-12-16 DIAGNOSIS — E11.9 TYPE 2 DIABETES MELLITUS WITHOUT COMPLICATIONS: ICD-10-CM

## 2024-12-16 DIAGNOSIS — E11.621 TYPE 2 DIABETES MELLITUS WITH FOOT ULCER: ICD-10-CM

## 2024-12-16 DIAGNOSIS — R53.1 WEAKNESS: ICD-10-CM

## 2024-12-16 LAB
ALBUMIN SERPL ELPH-MCNC: 3.2 G/DL — LOW (ref 3.5–5)
ALP SERPL-CCNC: 110 U/L — SIGNIFICANT CHANGE UP (ref 40–120)
ALT FLD-CCNC: 29 U/L DA — SIGNIFICANT CHANGE UP (ref 10–60)
ANION GAP SERPL CALC-SCNC: 13 MMOL/L — SIGNIFICANT CHANGE UP (ref 5–17)
APTT BLD: 35.5 SEC — SIGNIFICANT CHANGE UP (ref 24.5–35.6)
AST SERPL-CCNC: 41 U/L — HIGH (ref 10–40)
BASE EXCESS BLDV CALC-SCNC: -4.4 MMOL/L — SIGNIFICANT CHANGE UP
BASOPHILS # BLD AUTO: 0.03 K/UL — SIGNIFICANT CHANGE UP (ref 0–0.2)
BASOPHILS NFR BLD AUTO: 0.3 % — SIGNIFICANT CHANGE UP (ref 0–2)
BILIRUB SERPL-MCNC: 0.6 MG/DL — SIGNIFICANT CHANGE UP (ref 0.2–1.2)
BUN SERPL-MCNC: 73 MG/DL — HIGH (ref 7–18)
CA-I SERPL-SCNC: SIGNIFICANT CHANGE UP MMOL/L (ref 1.15–1.33)
CALCIUM SERPL-MCNC: 9 MG/DL — SIGNIFICANT CHANGE UP (ref 8.4–10.5)
CHLORIDE SERPL-SCNC: 101 MMOL/L — SIGNIFICANT CHANGE UP (ref 96–108)
CO2 SERPL-SCNC: 21 MMOL/L — LOW (ref 22–31)
CREAT SERPL-MCNC: 9 MG/DL — HIGH (ref 0.5–1.3)
CRP SERPL-MCNC: 139 MG/L — HIGH (ref 0–5)
EGFR: 6 ML/MIN/1.73M2 — LOW
EOSINOPHIL # BLD AUTO: 0.01 K/UL — SIGNIFICANT CHANGE UP (ref 0–0.5)
EOSINOPHIL NFR BLD AUTO: 0.1 % — SIGNIFICANT CHANGE UP (ref 0–6)
ERYTHROCYTE [SEDIMENTATION RATE] IN BLOOD: 97 MM/HR — HIGH (ref 0–20)
FLUAV AG NPH QL: SIGNIFICANT CHANGE UP
FLUBV AG NPH QL: SIGNIFICANT CHANGE UP
GAS PNL BLDV: 131 MMOL/L — LOW (ref 136–145)
GAS PNL BLDV: SIGNIFICANT CHANGE UP
GLUCOSE BLDC GLUCOMTR-MCNC: 113 MG/DL — HIGH (ref 70–99)
GLUCOSE SERPL-MCNC: 185 MG/DL — HIGH (ref 70–99)
HCO3 BLDV-SCNC: 21 MMOL/L — LOW (ref 22–29)
HCT VFR BLD CALC: 33.4 % — LOW (ref 39–50)
HGB BLD-MCNC: 11.3 G/DL — LOW (ref 13–17)
IMM GRANULOCYTES NFR BLD AUTO: 0.6 % — SIGNIFICANT CHANGE UP (ref 0–0.9)
INR BLD: 1.19 RATIO — HIGH (ref 0.85–1.16)
LACTATE BLDV-MCNC: 1.4 MMOL/L — SIGNIFICANT CHANGE UP (ref 0.5–2)
LACTATE SERPL-SCNC: 1.2 MMOL/L — SIGNIFICANT CHANGE UP (ref 0.7–2)
LYMPHOCYTES # BLD AUTO: 0.35 K/UL — LOW (ref 1–3.3)
LYMPHOCYTES # BLD AUTO: 3.9 % — LOW (ref 13–44)
MCHC RBC-ENTMCNC: 32.7 PG — SIGNIFICANT CHANGE UP (ref 27–34)
MCHC RBC-ENTMCNC: 33.8 G/DL — SIGNIFICANT CHANGE UP (ref 32–36)
MCV RBC AUTO: 96.5 FL — SIGNIFICANT CHANGE UP (ref 80–100)
MONOCYTES # BLD AUTO: 0.4 K/UL — SIGNIFICANT CHANGE UP (ref 0–0.9)
MONOCYTES NFR BLD AUTO: 4.4 % — SIGNIFICANT CHANGE UP (ref 2–14)
NEUTROPHILS # BLD AUTO: 8.19 K/UL — HIGH (ref 1.8–7.4)
NEUTROPHILS NFR BLD AUTO: 90.7 % — HIGH (ref 43–77)
NRBC # BLD: 0 /100 WBCS — SIGNIFICANT CHANGE UP (ref 0–0)
PCO2 BLDV: 39 MMHG — LOW (ref 42–55)
PH BLDV: 7.34 — SIGNIFICANT CHANGE UP (ref 7.32–7.43)
PHOSPHATE SERPL-MCNC: 6.4 MG/DL — HIGH (ref 2.5–4.5)
PLATELET # BLD AUTO: 202 K/UL — SIGNIFICANT CHANGE UP (ref 150–400)
PO2 BLDV: 35 MMHG — SIGNIFICANT CHANGE UP
POTASSIUM BLDV-SCNC: 6.5 MMOL/L — CRITICAL HIGH (ref 3.5–5.1)
POTASSIUM SERPL-MCNC: 6.2 MMOL/L — CRITICAL HIGH (ref 3.5–5.3)
POTASSIUM SERPL-SCNC: 6.2 MMOL/L — CRITICAL HIGH (ref 3.5–5.3)
PROT SERPL-MCNC: 9.1 G/DL — HIGH (ref 6–8.3)
PROTHROM AB SERPL-ACNC: 13.9 SEC — HIGH (ref 9.9–13.4)
RBC # BLD: 3.46 M/UL — LOW (ref 4.2–5.8)
RBC # FLD: 14.1 % — SIGNIFICANT CHANGE UP (ref 10.3–14.5)
RSV RNA NPH QL NAA+NON-PROBE: SIGNIFICANT CHANGE UP
SAO2 % BLDV: 57.4 % — SIGNIFICANT CHANGE UP
SARS-COV-2 RNA SPEC QL NAA+PROBE: SIGNIFICANT CHANGE UP
SODIUM SERPL-SCNC: 135 MMOL/L — SIGNIFICANT CHANGE UP (ref 135–145)
WBC # BLD: 9.22 K/UL — SIGNIFICANT CHANGE UP (ref 3.8–10.5)
WBC # FLD AUTO: 9.22 K/UL — SIGNIFICANT CHANGE UP (ref 3.8–10.5)

## 2024-12-16 PROCEDURE — 99285 EMERGENCY DEPT VISIT HI MDM: CPT

## 2024-12-16 PROCEDURE — 93923 UPR/LXTR ART STDY 3+ LVLS: CPT | Mod: 26

## 2024-12-16 PROCEDURE — 93010 ELECTROCARDIOGRAM REPORT: CPT

## 2024-12-16 PROCEDURE — 99223 1ST HOSP IP/OBS HIGH 75: CPT | Mod: GC

## 2024-12-16 PROCEDURE — 71045 X-RAY EXAM CHEST 1 VIEW: CPT | Mod: 26

## 2024-12-16 PROCEDURE — 73620 X-RAY EXAM OF FOOT: CPT | Mod: 26,RT

## 2024-12-16 RX ORDER — INSULIN LISPRO 100/ML
VIAL (ML) SUBCUTANEOUS AT BEDTIME
Refills: 0 | Status: DISCONTINUED | OUTPATIENT
Start: 2024-12-16 | End: 2025-01-08

## 2024-12-16 RX ORDER — VANCOMYCIN HYDROCHLORIDE 5 G/100ML
1250 INJECTION, POWDER, LYOPHILIZED, FOR SOLUTION INTRAVENOUS EVERY 24 HOURS
Refills: 0 | Status: DISCONTINUED | OUTPATIENT
Start: 2024-12-17 | End: 2024-12-19

## 2024-12-16 RX ORDER — INSULIN LISPRO 100/ML
VIAL (ML) SUBCUTANEOUS
Refills: 0 | Status: DISCONTINUED | OUTPATIENT
Start: 2024-12-16 | End: 2025-01-08

## 2024-12-16 RX ORDER — ONDANSETRON 4 MG/1
4 TABLET ORAL EVERY 8 HOURS
Refills: 0 | Status: DISCONTINUED | OUTPATIENT
Start: 2024-12-16 | End: 2025-01-08

## 2024-12-16 RX ORDER — ACETAMINOPHEN 80 MG/.8ML
650 SOLUTION/ DROPS ORAL EVERY 6 HOURS
Refills: 0 | Status: DISCONTINUED | OUTPATIENT
Start: 2024-12-16 | End: 2025-01-08

## 2024-12-16 RX ORDER — CALCIUM GLUCONATE 94 MG/ML
1 INJECTION, SOLUTION INTRAVENOUS ONCE
Refills: 0 | Status: COMPLETED | OUTPATIENT
Start: 2024-12-16 | End: 2024-12-16

## 2024-12-16 RX ORDER — SEVELAMER CARBONATE 800 MG/1
1600 TABLET, FILM COATED ORAL
Refills: 0 | Status: DISCONTINUED | OUTPATIENT
Start: 2024-12-16 | End: 2025-01-07

## 2024-12-16 RX ORDER — VANCOMYCIN HYDROCHLORIDE 5 G/100ML
1250 INJECTION, POWDER, LYOPHILIZED, FOR SOLUTION INTRAVENOUS ONCE
Refills: 0 | Status: DISCONTINUED | OUTPATIENT
Start: 2024-12-16 | End: 2024-12-19

## 2024-12-16 RX ORDER — SODIUM ZIRCONIUM CYCLOSILICATE 10 G/10G
10 POWDER, FOR SUSPENSION ORAL ONCE
Refills: 0 | Status: DISCONTINUED | OUTPATIENT
Start: 2024-12-16 | End: 2024-12-16

## 2024-12-16 RX ORDER — VANCOMYCIN HYDROCHLORIDE 5 G/100ML
1300 INJECTION, POWDER, LYOPHILIZED, FOR SOLUTION INTRAVENOUS ONCE
Refills: 0 | Status: DISCONTINUED | OUTPATIENT
Start: 2024-12-16 | End: 2024-12-16

## 2024-12-16 RX ORDER — CALCITRIOL 0.5 UG/1
1 CAPSULE, LIQUID FILLED ORAL
Refills: 0 | Status: DISCONTINUED | OUTPATIENT
Start: 2024-12-16 | End: 2025-01-08

## 2024-12-16 NOTE — CONSULT NOTE ADULT - ASSESSMENT
A: Charcot arthropathy B/L  Diabetic ulceration R midfoot       P:  Patient evaluated and Chart reviewed  Patient febrile, leukocytosis absent   Discussed diagnosis and treatment with patient  Xrays evaluated -   Ordered ANEESH/PVR  Requesting MRI R foot to evaluate for OM and Charcot   Culture pending  Continue antibiotics per ID/IM  Wound was flushed with normal saline  Right foot was dressed with Betadine/DSD  Continue use of CROW walkers B/L when walking   Educated patient on importance of managing fasting blood glucose levels, daily foot examinations, and proper shoe gear.  Podiatry to follow while in house  Discussed with Attending     A: Charcot arthropathy B/L  Diabetic ulceration R midfoot       P:  Patient evaluated and Chart reviewed  Patient febrile, leukocytosis absent   Discussed diagnosis and treatment with patient  Xrays evaluated -   Ordered ANEESH/PVR   Requesting MRI R foot to evaluate for OM and Charcot   Culture pending  Continue antibiotics per ID/IM  Wound was flushed with normal saline  Right foot was dressed with Betadine/DSD  Continue use of CROW walkers B/L when walking   Educated patient on importance of managing fasting blood glucose levels, daily foot examinations, and proper shoe gear.  Podiatry to follow while in house  Discussed with Attending Dr. Sim   A: Charcot arthropathy B/L  Diabetic ulceration R midfoot       P:  Patient evaluated and Chart reviewed  Patient febrile, leukocytosis absent   Discussed diagnosis and treatment with patient  Xrays evaluated -     Requesting MRI R foot to evaluate for OM and Charcot   Ordered ANEESH/PVR, ESR/CRP, wound culture pending   Continue antibiotics per ID/IM  Wound was flushed with normal saline  Right foot was dressed with Betadine/DSD  Continue use of CROW walkers B/L when walking   Educated patient on importance of managing fasting blood glucose levels, daily foot examinations, and proper shoe gear.  Podiatry to follow while in house  Discussed with Attending Dr. Sim   A: Charcot arthropathy B/L  Diabetic ulceration R midfoot       P:  Patient evaluated and Chart reviewed  Patient febrile, leukocytosis absent   Discussed diagnosis and treatment with patient  Xray R foot - pending  Requesting MRI R foot to evaluate for OM and Charcot   Ordered ANEESH/PVR, ESR/CRP, wound culture pending   Continue antibiotics per ID/IM  Wound was flushed with normal saline  Right foot was dressed with Betadine/DSD  Continue use of CROW walkers B/L when walking   Educated patient on importance of managing fasting blood glucose levels, daily foot examinations, and proper shoe gear.  Podiatry to follow while in house  Discussed with Attending Dr. Sim

## 2024-12-16 NOTE — ED PROVIDER NOTE - OBJECTIVE STATEMENT
68 male presents to the ED for episode of generalized weakness that occurred immediately prior to arrival.  Patient reports he was getting ready for dialysis and began to suddenly feel weak and decided to come to the ER.  Reports chronic foot wound on right foot from bilateral Charcot feet.  At the bedside reports that he thinks he was just tired.  Caregiver states that he did not appear himself but now is at baseline. 68 male presents to the ED for episode of generalized weakness that occurred immediately prior to arrival.  Patient reports he was getting ready for dialysis and began to suddenly feel weak and decided to come to the ER.  Reports chronic foot wound on right foot from bilateral Charcot feet.  At the bedside reports that he thinks he was just tired.  Caregiver states that he did not appear himself but now is at baseline.    General: Elderly, frail appearing  HEENT: Loss of orbital fat. Thinning of eyebrows. Dry mucous membranes. Poor dentition.  Heart: RRR. Systolic murmur.   Lungs: Diminished lungs sounds, CTA b/l  Chest/Back: Non-tender chest wall. No CVAT. Kyphosis.  Abdomen: Soft, non-tender, non-distended.  Neuro: No focal deficits. Bilateral Presbycusis.  Extremities: Decreased ROM in hips/knee/shoulders. Pulses intact x 4.  Skin: Thin. Dry. Normal color. No rashes. Capillary refill intact.  Psych: Calm, cooperative.  Bilateral verrucae on feet. 3cm x 3cm foul smelling ulcer on right mid foot.   Diffuse venous stasis skin changes in lower extremities.

## 2024-12-16 NOTE — PATIENT PROFILE ADULT - VISION (WITH CORRECTIVE LENSES IF THE PATIENT USUALLY WEARS THEM):
Rt eye pressure/Partially impaired: cannot see medication labels or newsprint, but can see obstacles in path, and the surrounding layout; can count fingers at arm's length

## 2024-12-16 NOTE — ED PROVIDER NOTE - PROGRESS NOTE DETAILS
Discussed case with Nephrologist who agreed to dialyze patient. Discussed case with wife who stated patient has been difficult to deal with at home due to agitation.  Reports while in the hospital he slapped on melatonin but was not prescribed any to go home with wife is requesting medication for dementia.  Explained to the wife that this is not something done in the ER and if patient is placed into a nursing home and may be a safer option.  Overall she reports increasing difficulty taking care of him but states she is not prepared to make the decision to place him into a nursing home and would like to speak with other family members currently awaiting a return phone call.

## 2024-12-16 NOTE — H&P ADULT - PROBLEM SELECTOR PLAN 1
Present to the ED for generalized weakness, chills and fever  PE: Ulcer noted on the right foot  v/s 122/ 86, 80, 100.2F, 91% RA   Labs no leukocytosis, K 6.2 (hemolyzed), BUN/CR 73/9, lactate 1.2  COVID/FLU/ RSV: Neg   CXR clear  - f/u Blood cultures

## 2024-12-16 NOTE — CONSULT NOTE ADULT - SUBJECTIVE AND OBJECTIVE BOX
Podiatry HPI: Podiatry consulted for 68-year-old male who presents to the ED with his wife with chief complaint of generalized weakness. Patient states he woke up very tired this morning and could not stand up to get dressed for his dialysis appointment. Per wife, patient was folded over when she called an ambulance. Patient reports history of Charcot deformity B/L deformity for 10 years, stating he had a Charcot procedure done on the right foot in 2014. Patient states he wears Charcot boots at all times B/L. Per wife, the patient has not had an ulceration in many years until he developed this right foot ulceration 1 month ago. The wife states that she thinks the shape of her 's right foot is changing and it is affecteing the way he walks. States they regularly follow up in outpatient podiatry clinic with Dr. Sim. Patient complaining of fever and chills but denies n/v.     PMH:Diabetes    Kidney problem    Hypertension    HTN (hypertension)    DM (diabetes mellitus)    HTN (hypertension)    DM (diabetes mellitus)    ESRD (end stage renal disease) on dialysis    Charcot foot due to diabetes mellitus    ESRD on hemodialysis    Charcot foot due to diabetes mellitus    Diabetic retinopathy associated with type 2 diabetes mellitus    Hyperlipidemia    Abscess of scrotum    Hidradenitis    End stage renal disease      Allergies: penicillin (Unknown)    Medications: calcium gluconate IVPB 1 Gram(s) IV Intermittent Once  cefepime   IVPB 1000 milliGRAM(s) IV Intermittent once  sodium zirconium cyclosilicate 10 Gram(s) Oral Once  vancomycin  IVPB 1250 milliGRAM(s) IV Intermittent Once    FH:Family history of diabetes mellitus    No pertinent family history in first degree relatives      PSX: No significant past surgical history    S/P foot surgery    Charcot's syndrome    History of incision and drainage    Hemodialysis access, AV graft      SH: calcium gluconate IVPB 1 Gram(s) IV Intermittent Once  cefepime   IVPB 1000 milliGRAM(s) IV Intermittent once  sodium zirconium cyclosilicate 10 Gram(s) Oral Once  vancomycin  IVPB 1250 milliGRAM(s) IV Intermittent Once      Vital Signs Last 24 Hrs  T(C): 37.8 (16 Dec 2024 08:24), Max: 37.9 (16 Dec 2024 07:46)  T(F): 100 (16 Dec 2024 08:24), Max: 100.2 (16 Dec 2024 07:46)  HR: 80 (16 Dec 2024 07:46) (80 - 80)  BP: 122/66 (16 Dec 2024 07:46) (122/66 - 122/66)  BP(mean): --  RR: 16 (16 Dec 2024 07:46) (16 - 16)  SpO2: 91% (16 Dec 2024 07:46) (91% - 91%)    Parameters below as of 16 Dec 2024 07:46  Patient On (Oxygen Delivery Method): room air        LABS                        11.3   9.22  )-----------( 202      ( 16 Dec 2024 11:50 )             33.4               12-16    x   |  x   |  x   ----------------------------<  x   x    |  x   |  9.00[H]      TPro  9.1[H]  /  Alb  x   /  TBili  0.6  /  DBili  x   /  AST  41[H]  /  ALT  29  /  AlkPhos  110  12-16      ROS  REVIEW OF SYSTEMS:    CONSTITUTIONAL: No fever, weight loss, or fatigue  EYES: No eye pain, visual disturbances, or discharge  ENMT:  No difficulty hearing, tinnitus, vertigo; No sinus or throat pain  NECK: No pain or stiffness  BREASTS: No pain, masses, or nipple discharge  RESPIRATORY: No cough, wheezing, chills or hemoptysis; No shortness of breath  CARDIOVASCULAR: No chest pain, palpitations, dizziness, or leg swelling  GASTROINTESTINAL: No abdominal or epigastric pain. No nausea, vomiting, or hematemesis; No diarrhea or constipation. No melena or hematochezia.  GENITOURINARY: No dysuria, frequency, hematuria, or incontinence  NEUROLOGICAL: No headaches, memory loss, loss of strength, numbness, or tremors  SKIN: No itching, burning, rashes, or lesions   LYMPH NODES: No enlarged glands  ENDOCRINE: No heat or cold intolerance; No hair loss  MUSCULOSKELETAL: No joint pain or swelling; No muscle, back, or extremity pain  PSYCHIATRIC: No depression, anxiety, mood swings, or difficulty sleeping  HEME/LYMPH: No easy bruising, or bleeding gums  ALLERY AND IMMUNOLOGIC: No hives or eczema      Physical Exam:  Vasc: DP and PT pulses non-palpable b/l. TG warm to warm with no increase in temperature to right foot around wound.  Derm: Ulceration to right foot overlying talonavicular bulge, measuring 1.9 x 1.8 x 0.5 with fibrotic wound base and hyperkeratotic wound edges, +epipoble, -probe to bone, +tunneling by 0.5 mm circumferentially. No drainage noted.  No Fluctuance/crepitus/streaking noted. Malodor noted.  Neuro: Gross sensation diminshed   Msk: Deferred     IMAGING:  Xray pending      Podiatry HPI: Podiatry consulted for 68-year-old male who presents to the ED with his wife with chief complaint of generalized weakness. PMHx of DM, HTN, ESRD on dialysis, Charcot, and HLD. Patient states he woke up very tired this morning and could not stand up to get dressed for his dialysis appointment. Per wife, patient was folded over when she called an ambulance. Patient reports history of Charcot deformity B/L deformity for 10 year and states he had a Charcot procedure for right foot in 2014. Patient states he wears Charcot boots at all times B/L. Per wife, the patient has not had an ulceration in many years until he developed this right foot ulceration 1 month ago. The wife states that she thinks the shape of her 's right foot is changing and it is affecting the way he walks. States they regularly follow up in outpatient podiatry clinic with Dr. Sim. Patient complaining of fever and chills but denies n/v.     PMH:Diabetes    Kidney problem    Hypertension    HTN (hypertension)    DM (diabetes mellitus)    HTN (hypertension)    DM (diabetes mellitus)    ESRD (end stage renal disease) on dialysis    Charcot foot due to diabetes mellitus    ESRD on hemodialysis    Charcot foot due to diabetes mellitus    Diabetic retinopathy associated with type 2 diabetes mellitus    Hyperlipidemia    Abscess of scrotum    Hidradenitis    End stage renal disease      Allergies: penicillin (Unknown)    Medications: calcium gluconate IVPB 1 Gram(s) IV Intermittent Once  cefepime   IVPB 1000 milliGRAM(s) IV Intermittent once  sodium zirconium cyclosilicate 10 Gram(s) Oral Once  vancomycin  IVPB 1250 milliGRAM(s) IV Intermittent Once    FH:Family history of diabetes mellitus    No pertinent family history in first degree relatives      PSX: No significant past surgical history    S/P foot surgery    Charcot's syndrome    History of incision and drainage    Hemodialysis access, AV graft      SH: calcium gluconate IVPB 1 Gram(s) IV Intermittent Once  cefepime   IVPB 1000 milliGRAM(s) IV Intermittent once  sodium zirconium cyclosilicate 10 Gram(s) Oral Once  vancomycin  IVPB 1250 milliGRAM(s) IV Intermittent Once      Vital Signs Last 24 Hrs  T(C): 37.8 (16 Dec 2024 08:24), Max: 37.9 (16 Dec 2024 07:46)  T(F): 100 (16 Dec 2024 08:24), Max: 100.2 (16 Dec 2024 07:46)  HR: 80 (16 Dec 2024 07:46) (80 - 80)  BP: 122/66 (16 Dec 2024 07:46) (122/66 - 122/66)  BP(mean): --  RR: 16 (16 Dec 2024 07:46) (16 - 16)  SpO2: 91% (16 Dec 2024 07:46) (91% - 91%)    Parameters below as of 16 Dec 2024 07:46  Patient On (Oxygen Delivery Method): room air        LABS                        11.3   9.22  )-----------( 202      ( 16 Dec 2024 11:50 )             33.4               12-16    x   |  x   |  x   ----------------------------<  x   x    |  x   |  9.00[H]      TPro  9.1[H]  /  Alb  x   /  TBili  0.6  /  DBili  x   /  AST  41[H]  /  ALT  29  /  AlkPhos  110  12-16      ROS  REVIEW OF SYSTEMS:    CONSTITUTIONAL: No fever, weight loss, or fatigue  EYES: No eye pain, visual disturbances, or discharge  ENMT:  No difficulty hearing, tinnitus, vertigo; No sinus or throat pain  NECK: No pain or stiffness  BREASTS: No pain, masses, or nipple discharge  RESPIRATORY: No cough, wheezing, chills or hemoptysis; No shortness of breath  CARDIOVASCULAR: No chest pain, palpitations, dizziness, or leg swelling  GASTROINTESTINAL: No abdominal or epigastric pain. No nausea, vomiting, or hematemesis; No diarrhea or constipation. No melena or hematochezia.  GENITOURINARY: No dysuria, frequency, hematuria, or incontinence  NEUROLOGICAL: No headaches, memory loss, loss of strength, numbness, or tremors  SKIN: No itching, burning, rashes, or lesions   LYMPH NODES: No enlarged glands  ENDOCRINE: No heat or cold intolerance; No hair loss  MUSCULOSKELETAL: No joint pain or swelling; No muscle, back, or extremity pain  PSYCHIATRIC: No depression, anxiety, mood swings, or difficulty sleeping  HEME/LYMPH: No easy bruising, or bleeding gums  ALLERY AND IMMUNOLOGIC: No hives or eczema      Physical Exam:  Vasc: DP and PT pulses non-palpable b/l. TG warm to warm with no increase in temperature to right foot around wound.  Derm: Ulceration to right foot overlying talonavicular bulge, measuring 1.9 x 1.8 x 0.5 with fibrotic wound base and hyperkeratotic wound edges, +epipoble, -probe to bone, +tunneling by 0.5 mm circumferentially. No drainage noted.  No Fluctuance/crepitus/streaking noted. Malodor noted.  Neuro: Gross sensation diminshed   Msk: Deferred     IMAGING:  Xray pending      Podiatry HPI: Podiatry consulted for 68-year-old male who presents to the ED with his wife with chief complaint of generalized weakness. PMHx of DM, HTN, ESRD on dialysis, Charcot, and HLD. Patient states he woke up very tired this morning and could not stand up to get dressed for his dialysis appointment. Per wife, patient was folded over when she called an ambulance. Patient reports history of Charcot deformity B/L deformity for 10 year and states he had a Charcot procedure for right foot in 2014. Patient states he wears Charcot boots at all times B/L. Per wife, the patient has not had an ulceration in many years until he developed this right foot ulceration 1 month ago. The wife states that she thinks the shape of her 's right foot is changing and it is affecting the way he walks. States they regularly follow up in outpatient podiatry clinic with Dr. Sim. Patient complaining of fever and chills but denies n/v.     Medications acetaminophen     Tablet .. 650 milliGRAM(s) Oral every 6 hours PRN  calcitriol   Capsule 1 MICROGram(s) Oral <User Schedule>  cefepime   IVPB 1000 milliGRAM(s) IV Intermittent once  ondansetron Injectable 4 milliGRAM(s) IV Push every 8 hours PRN  sevelamer carbonate 1600 milliGRAM(s) Oral three times a day with meals  sodium zirconium cyclosilicate 10 Gram(s) Oral Once  vancomycin  IVPB 1250 milliGRAM(s) IV Intermittent Once    FHFamily history of diabetes mellitus    No pertinent family history in first degree relatives    ,   PMHDiabetes    Kidney problem    Hypertension    HTN (hypertension)    DM (diabetes mellitus)    HTN (hypertension)    DM (diabetes mellitus)    ESRD (end stage renal disease) on dialysis    Charcot foot due to diabetes mellitus    ESRD on hemodialysis    Charcot foot due to diabetes mellitus    Diabetic retinopathy associated with type 2 diabetes mellitus    Hyperlipidemia    Abscess of scrotum    Hidradenitis    End stage renal disease       PSHNo significant past surgical history    S/P foot surgery    Charcot's syndrome    History of incision and drainage    Hemodialysis access, AV graft        Labs                          11.3   9.22  )-----------( 202      ( 16 Dec 2024 11:50 )             33.4      12-16    135  |  101  |  73[H]  ----------------------------<  185[H]  6.2[HH]   |  21[L]  |  9.00[H]    Ca    9.0      16 Dec 2024 11:50  Phos  6.4     12-16    TPro  9.1[H]  /  Alb  3.2[L]  /  TBili  0.6  /  DBili  x   /  AST  41[H]  /  ALT  29  /  AlkPhos  110  12-16     Vital Signs Last 24 Hrs  T(C): 37.8 (16 Dec 2024 08:24), Max: 37.9 (16 Dec 2024 07:46)  T(F): 100 (16 Dec 2024 08:24), Max: 100.2 (16 Dec 2024 07:46)  HR: 80 (16 Dec 2024 07:46) (80 - 80)  BP: 122/66 (16 Dec 2024 07:46) (122/66 - 122/66)  BP(mean): --  RR: 16 (16 Dec 2024 07:46) (16 - 16)  SpO2: 91% (16 Dec 2024 07:46) (91% - 91%)    Parameters below as of 16 Dec 2024 07:46  Patient On (Oxygen Delivery Method): room air      Sedimentation Rate, Erythrocyte: 97 mm/Hr (12-16-24 @ 11:59)         C-Reactive Protein: 139.0 mg/L (12-16-24 @ 11:50)   WBC Count: 9.22 K/uL (12-16-24 @ 11:50)    Physical Exam:  Vasc: DP and PT pulses non-palpable b/l. TG warm to warm with no increase in temperature to right foot around wound.  Derm: Ulceration to right foot overlying talonavicular bulge, measuring 1.9 x 1.8 x 0.5 with fibrotic wound base and hyperkeratotic wound edges, +epipoble, -probe to bone, +tunneling by 0.5 mm circumferentially. No drainage noted.  No Fluctuance/crepitus/streaking noted. Malodor noted.  Neuro: Gross sensation diminshed   Msk: Deferred     IMAGING:    < from: US Physiol Extremity Lower 3+ Level, BI (12.16.24 @ 14:12) >    ACC: [id number] EXAM:  US PHYSIOL LWR EXT 3+ LEV BI   ORDERED BY: REYES GILBERT     PROCEDURE DATE:  12/16/2024          INTERPRETATION:  Clinical indication: Right foot nonhealing wound    COMPARISON: 5/4/2016    FINDINGS AND  IMPRESSION: There are normal amplitude biphasic pulse volume recordings   bilaterally. There are bilateral calcified, noncompressible vessels.   Therefore, ABIs could not be calculated.    --- End of Report ---    < end of copied text >      X-ray R Foot: Pending

## 2024-12-16 NOTE — H&P ADULT - PROBLEM SELECTOR PLAN 2
PE:  Ulcer noted on the right foot   s/p Cefepime and Vancomycin  Podiatry following  US BLE: There are normal amplitude biphasic pulse volume recordings bilaterally. There are bilateral calcified, noncompressible vessels. Therefore, ABIs could not be calculated.  - c/w Cefepime and Vancomycin  - f/u MRI RT foot

## 2024-12-16 NOTE — ED ADULT NURSE REASSESSMENT NOTE - NS ED NURSE REASSESS COMMENT FT1
Dr. Hathaway notified that ultrasound guided IV stopped working; pt was unable to receive antibiotics and calcium gluconate; report given to Frances for dialysis.

## 2024-12-16 NOTE — CONSULT NOTE ADULT - SUBJECTIVE AND OBJECTIVE BOX
[facility] NEPHROLOGY- CONSULTATION NOTE    Patient is a 67yo Male with ESRD on HD via Rt AVG, HTN, 2HPT, Anemia, LE venous stasis with Rt foot wound presents with weakness. Pt a/w Rt foot ulcer and hyperkalemia. Nephrology consulted for ESRD status.     Pt with ESRD on HD MWF at [facility]; Last HD 12/13.   History taken from pt's wife Almaz at bedside. Pt was getting ready to go to HD but was extremely weak and having difficulty using his walker due to LE weakness. As per pt's wife, he couldn't ambulate with his walker and she told him to lower himself down since she can't help him. She also noticed he was slow in his speech and thinking and not his usual self. She called EMS to r/o infection.   He has a Rt foot wound; unclear if friction from his Rt foot boot since November. He follows with Podiatrist Dr. Terry and has f/u appt tomorrow.     Patient denies any fevers, chills, SOB, chest pain, n/v/ or abd pain. +occasional diarrhea +rt foot ucler.     PAST MEDICAL & SURGICAL HISTORY:  DM (diabetes mellitus)  type 2      ESRD (end stage renal disease) on dialysis  t-th-sat via right AV graft, uses midodrine the days of Dialysis for hypotension      Charcot foot due to diabetes mellitus      Diabetic retinopathy associated with type 2 diabetes mellitus      Hyperlipidemia      Abscess of scrotum      Hidradenitis      End stage renal disease      S/P foot surgery  right foot - 11/2014      Charcot's syndrome  Surgeries in feet and ankles   left ankle 2011 and right ankle 11/2014      History of incision and drainage  scrotal 5/2018      Hemodialysis access, AV graft        penicillin (Unknown)    Home Medications Reviewed  Hospital Medications:   MEDICATIONS  (STANDING):  calcium gluconate IVPB 1 Gram(s) IV Intermittent Once  cefepime   IVPB 1000 milliGRAM(s) IV Intermittent once  sodium zirconium cyclosilicate 10 Gram(s) Oral Once  vancomycin  IVPB 1250 milliGRAM(s) IV Intermittent Once    SOCIAL HISTORY:  Denies any toxic habits  FAMILY HISTORY:  Family history of diabetes mellitus        REVIEW OF SYSTEMS:  Gen: no fevers or chills  HEENT: no rhinorrhea  Neck: no sore throat  Cards: no chest pain  Resp: no dyspnea  GI: no nausea or vomiting +diarrhea  : no dysuria or hematuria  Vascular: no LE edema  Derm: +rt foot wound  Neuro: no numbness/tingling +LE weakness  All other review of systems is negative unless indicated above.    VITALS:  T(F): 100 (12-16-24 @ 08:24), Max: 100.2 (12-16-24 @ 07:46)  HR: 80 (12-16-24 @ 07:46)  BP: 122/66 (12-16-24 @ 07:46)  RR: 16 (12-16-24 @ 07:46)  SpO2: 91% (12-16-24 @ 07:46)  Wt(kg): --      Weight (kg): 85 (12-16 @ 07:46)    PHYSICAL EXAM:  Gen: NAD, calm, Obese  HEENT: anicteric  Neck: no JVD  Cards: RRR, +S1/S2, no M/G/R  Resp: CTA ant   GI: soft, NT/ND, NABS  : no CVA tenderness  Extremities: no LE edema B/L  Derm: b/l dry skin, Rt foot plantar wound       LABS:  12-16    135  |  101  |  73[H]  ----------------------------<  185[H]  6.2[HH]   |  21[L]  |  9.00[H]    Ca    9.0      16 Dec 2024 11:50    TPro  9.1[H]  /  Alb  3.2[L]  /  TBili  0.6  /  DBili      /  AST  41[H]  /  ALT  29  /  AlkPhos  110  12-16    Creatinine Trend: 9.00 <--                        11.3   9.22  )-----------( 202      ( 16 Dec 2024 11:50 )             33.4     Urine Studies:  Urinalysis Basic - ( 16 Dec 2024 11:50 )    Color:  / Appearance:  / SG:  / pH:   Gluc: 185 mg/dL / Ketone:   / Bili:  / Urobili:    Blood:  / Protein:  / Nitrite:    Leuk Esterase:  / RBC:  / WBC    Sq Epi:  / Non Sq Epi:  / Bacteria:         RADIOLOGY & ADDITIONAL STUDIES:        < from: US Physiol Extremity Lower 3+ Level, BI (12.16.24 @ 14:12) >    ACC: [id number] EXAM:  US PHYSIOL LWR EXT 3+ LEV BI   ORDERED BY: REYES GILBERT     PROCEDURE DATE:  12/16/2024          INTERPRETATION:  Clinical indication: Right foot nonhealing wound    COMPARISON: 5/4/2016    FINDINGS AND  IMPRESSION: There are normal amplitude biphasic pulse volume recordings   bilaterally. There are bilateral calcified, noncompressible vessels.   Therefore, ABIs could not be calculated.    --- End of Report ---        < end of copied text >    < from: Xray Chest 1 View-PORTABLE IMMEDIATE (12.16.24 @ 10:30) >    ACC: [id number] EXAM:  XR CHEST PORTABLE IMMED 1V   ORDERED BY:  MAX LAZARUS     PROCEDURE DATE:  12/16/2024          INTERPRETATION:  CLINICAL HISTORY: 68 years old Male with Sepsis.    TECHNIQUE: Single frontal view of the chest    COMPARISON: Chest radiograph 11/26/2021.    FINDINGS:    Lines/Tubes: Right subclavian graft interval removal of left-sided   dual-lumen catheter..    Lungs/Pleura: No focal consolidations.  No pneumothorax. No pleural   effusions.  No pulmonary vascular congestion.    Heart/Mediastinum: Normal cardiac silhouette, unchanged.    Osseous Structures: No acute findings.    IMPRESSION:  Clear lungs.    --- End of Report ---    < end of copied text >

## 2024-12-16 NOTE — H&P ADULT - NSHPPHYSICALEXAM_GEN_ALL_CORE
T(F): 100 (16 Dec 2024 08:24), Max: 100.2 (16 Dec 2024 07:46)  HR: 80 (16 Dec 2024 07:46)  BP: 122/66 (16 Dec 2024 07:46)  RR: 16 (16 Dec 2024 07:46)  SpO2: 91% (16 Dec 2024 07:46) (91% - 91%)  temp max in last 48H T(F): , Max: 100.2 (12-16-24 @ 07:46)        GENERAL: NAD, lying in bed comfortably   HEAD:  Atraumatic, Normocephalic  EYES: clear conjunctiva and  sclera   ENT: Moist mucous membranes  NECK: Supple  LUNG: Clear to auscultation bilaterally. No rales, wheezing,   HEART: Regular rate and rhythm; No murmurs,  CHEST WALL: non tenderness    ABDOMEN: Bowel sounds present; Soft, Nontender, Nondistended,   EXTREMITIES: + deformity,  2+ Peripheral Pulses, . No clubbing, cyanosis, or edema  NERVOUS SYSTEM:  AAOX3, speech clear. No deficits   SKIN: Ulceration to right foot overlying talonavicular bulge,  dry scaly skin noted in bilateral lower extremity

## 2024-12-16 NOTE — H&P ADULT - ASSESSMENT
67 yo M with PMH of  DM, HTN, ESRD on dialysis ( M, W, F), Last Dialysis 12/13/24,   B/L Charcot deformity and HLD, present to the ED for generalized weakness, chills, subjective fever. Admitted to medicine for Fever  69 yo M with PMH of  DM, HTN, ESRD on dialysis ( M, W, F), Last Dialysis 12/13/24,   B/L Charcot deformity and HLD, present to the ED for generalized weakness, chills, subjective fever. Admitted to medicine for generalized weakness, hyperkalemia, right foot ulcer

## 2024-12-16 NOTE — CONSULT NOTE ADULT - ASSESSMENT
Patient is a 69yo Male with ESRD on HD via Rt AVG, HTN, 2HPT, Anemia, LE venous stasis with Rt foot wound presents with weakness. Pt a/w Rt foot ulcer and hyperkalemia. Nephrology consulted for ESRD status.     1) ESRD: Last HD on 12/13 @ outpt dialysis facility. Plan for next maintenance HD today with low potassium bath. Check HepBsAg. Monitor electrolytes.  2) Hyperkalemia: will use low K bath in HD. Recc low potassium diet. Pt ordered for Lokelma 10g PO x1.  Monitor serum potassium  3) HTN with ESRD: BP well controlled. Pt on Coreg 25mg PO bid, Amlodipine 10mg PO qd as an outpt. c/w low sodium diet. Monitor BP.  4) Anemia of renal disease: Hb acceptable. No need for LISSA at this time. Monitor Hb.  5) 2HPT: Serum calcium acceptable. Check Phosphorus and PTHi in am. c/w calcitriol 1mcg PO MWF. Pt on Lanthanum 2000mg PO tid with meals which is unavailable at [facility]. Will give Sevelamer 1600mg PO tid with meals and recc low phos diet. Monitor serum calcium and phosphorus.    [facility] NEPHROLOGY  Arden Tsang M.D.  Freeman Holland D.O.  Janny Chirinos M.D.  MD Ambar Mccarty, MSN, ANP-C    Telephone: [phone]  Facsimile: [phone]    [address] [place] [address], [address]  [place] [address]

## 2024-12-16 NOTE — ED PROVIDER NOTE - CLINICAL SUMMARY MEDICAL DECISION MAKING FREE TEXT BOX
68 male with an episode of generalized weakness.  Patient noted to have severe venous stasis skin changes and foul-smelling ulcer on foot.  Patient reported that episode of weakness occurred prior to going to dialysis.  Patient while in ED comfortable attributes his presentation to being tired.  Examination of foot concerning for possible source of infection and the generalized weakness.  Podiatry consulted and recommended MRI.  Patient admitted to the medical service for hemodialysis and further podiatric evaluation.

## 2024-12-16 NOTE — ED ADULT NURSE NOTE - OBJECTIVE STATEMENT
axox3 ,nad , with wife at bed side , pt with special boots on - as per wife R FOOT OPEN WOUND - x 3  weeks  unable to access

## 2024-12-16 NOTE — H&P ADULT - NSHPREVIEWOFSYSTEMS_GEN_ALL_CORE
REVIEW OF SYSTEMS:  CONSTITUTIONAL: + fevers, chills, weakness   EYES: No conjunctiva redness, No eye discharge  ENT: No nasal congestion, No sore throat, No ear pain,   NECK: No pain or stiffness  RESPIRATORY: No cough, SOB,  wheezing, hemoptysis  CARDIOVASCULAR: No chest pain or  palpitations  GASTROINTESTINAL: No abdominal pain. No nausea, vomiting, diarrhea or constipation.  GENITOURINARY: No dysuria, frequency or hematuria  NEUROLOGICAL: No headache,  SKIN: No itching, rashes,   All other review of systems is negative unless indicated above.

## 2024-12-16 NOTE — H&P ADULT - HISTORY OF PRESENT ILLNESS
67 yo M with PMH of  DM, HTN, ESRD on dialysis ( M, W, F), Last Dialysis 12/13/24,   B/L Charcot deformity and HLD, present to the ED for generalized weakness, chills, subjective fever. Patient reports waking up this morning feeling very tired and unable to stand up to get dressed for his dialysis appointment. Collateral information obtained from wifer at bedside. As per wife, patient didnt appear to be himself this morning so she decided to call EMS. Patient reports since 2014 after having charcot procedure in bilateral foot, he has been wearing a charcot boot to walk. Patient follows up with podiatry Dr. Sim in  outpatient podiatry clinic. Patient denies any chest pain, abdominal pain, nausea, vomiting, headache lightheadedness, vision change, cough SOb

## 2024-12-16 NOTE — H&P ADULT - PROBLEM SELECTOR PLAN 4
ESRD on dialysis ( M, W, F),   Last Dialysis 12/13/24  Nephro Dr. Chirinos  - HD today 12/16/24  - c/w sevelamer

## 2024-12-16 NOTE — H&P ADULT - ATTENDING COMMENTS
67 yo M with PMHx of ESRD on HD MWF, HTN, DM, bilateral Charcot's joint, presenting with generalized weakness for 1 day. Wife at bedside, providing history. He was in normal state of health until 1 day prior to presentation, when he started feeling week and difficulty getting changed. This morning wife found him unable to stand up from ground, so called EMS and was brought in. At baseline, he has chronic foot deformity because of Charcot's and wears boots, but is able to drive on his own. His last A1c was in 6s without medications, and antihypertensives are only home meds.  At the time of evaluation, patient states he feels well, denies fever, chills, nausea, SOB, cough, sputum, abdominal pain, foot pain.    On exam, patient is AOx3, NAD, cardiopulmonary exams with grade 2 systolic murmur, abdomen soft, NT/ND, extremities with edema seen distal to ankles, R foot ulcer covered with dressing.    Labs reviewed, CBC with hgb 11.3, coags unremarkable, BMP with UN 73, Cr 9.00, K 6.2, LFT unremarkable. Lactate normal. VBG with pH 7.34, CO2 39, HCO3 21.    CXR reviewed, no pathologies noted.    in the ED, vanc/cefepime, calcium gluconate and Lokelma were given.    67 yo M with PMHx of ESRD on HD, HTN, DM, bilateral Charcot's joint, presenting with generalized weakness. Found to have hyperkalemia and R foot ulcer, admitted for further workup.    # Generalized weakness  # Hyperkalemia  # R foot ulcer  # ESRD on HD  # HTN  # DM  # Bilateral Charcot's joint    - continue abx with vanc and cefepime, f/u wound cx, consult ID  - podiatry recs appreciated, to obtain ANEESH/PVR and MRI R foot  - consult nephrology for inpatient HD  - monitor BMP  - telemetry for the hyperkalemia  - FS/SSI  - send A1c, formal med rec  - DVT ppx: heparin SQ 69 yo M with PMHx of ESRD on HD MWF, HTN, DM, bilateral Charcot's joint, presenting with generalized weakness for 1 day. Wife at bedside, providing history. He was in normal state of health until 1 day prior to presentation, when he started feeling week and difficulty getting changed. This morning wife found him unable to stand up from ground, so called EMS and was brought in. At baseline, he has chronic foot deformity because of Charcot's and wears boots, but is able to drive on his own. His last A1c was in 6s without medications, and antihypertensives are only home meds.  At the time of evaluation, patient states he feels well, denies fever, chills, nausea, SOB, cough, sputum, abdominal pain, foot pain.    On exam, patient is AOx3, NAD, cardiopulmonary exams with grade 2 systolic murmur, abdomen soft, NT/ND, extremities with edema seen distal to ankles, R foot ulcer covered with dressing.    Labs reviewed, CBC with hgb 11.3, coags unremarkable, BMP with UN 73, Cr 9.00, K 6.2, LFT unremarkable. Lactate normal. VBG with pH 7.34, CO2 39, HCO3 21.    CXR reviewed, no pathologies noted.    in the ED, vanc/cefepime, calcium gluconate and Lokelma were given.    69 yo M with PMHx of ESRD on HD, HTN, DM, bilateral Charcot's joint, presenting with generalized weakness. Found to have hyperkalemia and R foot ulcer, admitted for further workup.    # Generalized weakness  # Hyperkalemia  # R foot ulcer  # ESRD on HD  # HTN  # DM  # Bilateral Charcot's joint    - continue abx with vanc and cefepime, f/u wound cx, consult ID  - podiatry recs appreciated, to obtain ANEESH/PVR and MRI R foot  - consult nephrology for inpatient HD  - monitor BMP  - telemetry for the hyperkalemia  - send A1c, FS/SSI  - formal med rec  - DVT ppx: heparin SQ 69 yo M with PMHx of ESRD on HD MWF, HTN, DM, bilateral Charcot's joint, presenting with generalized weakness for 1 day. Wife at bedside, providing history. He was in normal state of health until 1 day prior to presentation, when he started feeling week and difficulty getting changed. This morning wife found him unable to stand up from ground, so called EMS and was brought in. At baseline, he has chronic foot deformity because of Charcot's and wears boots, but is able to drive on his own. His last A1c was in 6s without medications, and antihypertensives are only home meds.  At the time of evaluation, patient states he feels well, denies fever, chills, nausea, SOB, cough, sputum, abdominal pain, foot pain.    On exam, patient is AOx3, NAD, cardiopulmonary exams with grade 2 systolic murmur, abdomen soft, NT/ND, extremities with edema seen distal to ankles, R foot ulcer covered with dressing.    Labs reviewed, CBC with hgb 11.3, coags unremarkable, BMP with UN 73, Cr 9.00, K 6.2, LFT unremarkable. Lactate normal. VBG with pH 7.34, CO2 39, HCO3 21.    CXR reviewed, no pathologies noted.    in the ED, vanc/cefepime, calcium gluconate and Lokelma were given.     69 yo M with PMHx of ESRD on HD, HTN, DM, bilateral Charcot's joint, presenting with generalized weakness. Found to have hyperkalemia and R foot ulcer, admitted for further workup.    # Generalized weakness  # Hyperkalemia  # R foot ulcer  # ESRD on HD  # HTN  # DM  # Bilateral Charcot's joint    - continue abx with vanc and cefepime, f/u wound cx, consult ID  - podiatry recs appreciated, to obtain ANEESH/PVR and MRI R foot  - consult nephrology for inpatient HD  - monitor BMP  - telemetry for the hyperkalemia  - send A1c, FS/SSI  - formal med rec  - DVT ppx: heparin SQ

## 2024-12-17 LAB
-  COAGULASE NEGATIVE STAPHYLOCOCCUS: SIGNIFICANT CHANGE UP
A1C WITH ESTIMATED AVERAGE GLUCOSE RESULT: 7.4 % — HIGH (ref 4–5.6)
CALCIUM SERPL-MCNC: 7.8 MG/DL — LOW (ref 8.4–10.5)
CRP SERPL-MCNC: 160 MG/L — HIGH (ref 0–5)
ERYTHROCYTE [SEDIMENTATION RATE] IN BLOOD: 105 MM/HR — HIGH (ref 0–20)
ESTIMATED AVERAGE GLUCOSE: 166 MG/DL — HIGH (ref 68–114)
GLUCOSE BLDC GLUCOMTR-MCNC: 130 MG/DL — HIGH (ref 70–99)
GLUCOSE BLDC GLUCOMTR-MCNC: 134 MG/DL — HIGH (ref 70–99)
GLUCOSE BLDC GLUCOMTR-MCNC: 139 MG/DL — HIGH (ref 70–99)
GLUCOSE BLDC GLUCOMTR-MCNC: 198 MG/DL — HIGH (ref 70–99)
GRAM STN FLD: ABNORMAL
HBV SURFACE AG SER-ACNC: REACTIVE
METHOD TYPE: SIGNIFICANT CHANGE UP
PTH-INTACT FLD-MCNC: 441 PG/ML — HIGH (ref 15–65)
SPECIMEN SOURCE: SIGNIFICANT CHANGE UP

## 2024-12-17 PROCEDURE — 73718 MRI LOWER EXTREMITY W/O DYE: CPT | Mod: 26,RT

## 2024-12-17 PROCEDURE — 99233 SBSQ HOSP IP/OBS HIGH 50: CPT | Mod: FS

## 2024-12-17 RX ORDER — LATANOPROST 50 UG/ML
1 SOLUTION OPHTHALMIC AT BEDTIME
Refills: 0 | Status: DISCONTINUED | OUTPATIENT
Start: 2024-12-17 | End: 2025-01-08

## 2024-12-17 RX ADMIN — VANCOMYCIN HYDROCHLORIDE 166.67 MILLIGRAM(S): 5 INJECTION, POWDER, LYOPHILIZED, FOR SOLUTION INTRAVENOUS at 00:07

## 2024-12-17 RX ADMIN — SEVELAMER CARBONATE 1600 MILLIGRAM(S): 800 TABLET, FILM COATED ORAL at 08:29

## 2024-12-17 RX ADMIN — SEVELAMER CARBONATE 1600 MILLIGRAM(S): 800 TABLET, FILM COATED ORAL at 11:54

## 2024-12-17 RX ADMIN — VANCOMYCIN HYDROCHLORIDE 166.67 MILLIGRAM(S): 5 INJECTION, POWDER, LYOPHILIZED, FOR SOLUTION INTRAVENOUS at 23:40

## 2024-12-17 RX ADMIN — SEVELAMER CARBONATE 1600 MILLIGRAM(S): 800 TABLET, FILM COATED ORAL at 17:41

## 2024-12-17 RX ADMIN — Medication 100 MILLIGRAM(S): at 03:49

## 2024-12-17 RX ADMIN — LATANOPROST 1 DROP(S): 50 SOLUTION OPHTHALMIC at 21:35

## 2024-12-17 NOTE — PROGRESS NOTE ADULT - ASSESSMENT
Patient is a 67yo Male with ESRD on HD via Rt AVG, HTN, 2HPT, Anemia, LE venous stasis with Rt foot wound presents with weakness. Pt a/w Rt foot ulcer and hyperkalemia. Nephrology consulted for ESRD status.     1) ESRD: Last HD 12/16 with intradialytic hypotension (karina BP 71/41) with net 500ml removed. Plan for next HD 12/18. Monitor electrolytes.  2) Hyperkalemia: s/p low K bath in HD and s/p Lokelma 10g PO x1 on 12/16. Check serum potassium today. c/w low potassium diet.    Monitor serum potassium  3) HTN with ESRD: BP low normal. Recc to hold both Coreg 25mg PO bid and Amlodipine 10mg PO qd (outpt meds). c/w low sodium diet. Monitor BP.  4) Anemia of renal disease: Hb acceptable. No need for LISSA at this time. Monitor Hb.  5) 2HPT: Serum calcium acceptable. Serum phos elevated with acceptable serum calcium. Check PTHi in am. c/w calcitriol 1mcg PO MWF. Pt on Lanthanum 2000mg PO tid with meals which is unavailable at [facility]. c/w Sevelamer 1600mg PO tid with meals and  low phos diet. Monitor serum calcium and phosphorus.    [facility] NEPHROLOGY  Arden Tsang M.D.  Freeman Holland D.O.  Janny Chirinos M.D.  MD Ambar Mccarty, MSN, ANP-C    Telephone: [phone]  Facsimile: [phone]    [facility] [address] [place] [address], [address]  [place] [address]   Patient is a 67yo Male with ESRD on HD via Rt AVG, HTN, 2HPT, Anemia, LE venous stasis with Rt foot wound presents with weakness. Pt a/w Rt foot ulcer and hyperkalemia. Nephrology consulted for ESRD status.     1) ESRD: Last HD 12/16 with intradialytic hypotension (karina BP 71/41) with net 500ml removed. Plan for next HD 12/18. Monitor electrolytes.  2) Hyperkalemia: s/p low K bath in HD and s/p Lokelma 10g PO x1 on 12/16. Check serum potassium today. c/w low potassium diet.    Monitor serum potassium  3) HTN with ESRD: BP low normal. Recc to hold both Coreg 25mg PO bid and Amlodipine 10mg PO qd (outpt meds). c/w low sodium diet. Monitor BP.  4) Anemia of renal disease: Hb acceptable. No need for LISSA at this time. Monitor Hb.  5) 2HPT: Serum calcium acceptable. Serum phos elevated with acceptable serum calcium. Check PTHi in am. c/w calcitriol 1mcg PO MWF. Pt on Lanthanum 2000mg PO tid with meals which is unavailable at [facility]. c/w Sevelamer 1600mg PO tid with meals and  low phos diet. Monitor serum calcium and phosphorus.    HepBsAg was neg on outpt labs on 12/11/24; please repeat HepBsAg as they were positive on 12/16.     [facility] NEPHROLOGY  Arden Tsang M.D.  Freeman Holland D.O.  Janny Chirinos M.D.  MD Ambar Mccarty, MSN, ANP-C    Telephone: [phone]  Facsimile: [phone] [place] [address], [address]  [place] [address]

## 2024-12-17 NOTE — PROGRESS NOTE ADULT - SUBJECTIVE AND OBJECTIVE BOX
HPI:  69 yo M with PMH of  DM, HTN, ESRD on dialysis ( M, W, F), Last Dialysis 12/13/24,   B/L Charcot deformity and HLD, present to the ED for generalized weakness, chills, subjective fever. Patient reports waking up this morning feeling very tired and unable to stand up to get dressed for his dialysis appointment. Collateral information obtained from wifer at bedside. As per wife, patient didnt appear to be himself this morning so she decided to call EMS. Patient reports since 2014 after having charcot procedure in bilateral foot, he has been wearing a charcot boot to walk. Patient follows up with podiatry Dr. Sim in  outpatient podiatry clinic. Patient denies any chest pain, abdominal pain, nausea, vomiting, headache lightheadedness, vision change, cough SOb   (16 Dec 2024 14:28)      OVERNIGHT EVENTS:    No new overnight events.  Seen and examined at bedside.     REVIEW OF SYSTEMS:      EYES: no acute visual disturbances  NECK: No pain or stiffness  RESPIRATORY: No cough; No shortness of breath  CARDIOVASCULAR: No chest pain, no palpitations  GASTROINTESTINAL: No pain. No nausea, vomiting or diarrhea   NEUROLOGICAL: No headache or numbness, no tremors  MUSCULOSKELETAL: No joint pain, no muscle pain  GENITOURINARY: no dysuria, no frequency, no hesitancy  PSYCHIATRY: no depression, no anxiety  ALL OTHER  ROS negative        Vital Signs Last 24 Hrs  T(C): 37.3 (17 Dec 2024 05:10), Max: 37.3 (16 Dec 2024 23:24)  T(F): 99.2 (17 Dec 2024 05:10), Max: 99.2 (16 Dec 2024 23:24)  HR: 75 (17 Dec 2024 05:10) (74 - 82)  BP: 101/51 (17 Dec 2024 05:10) (98/52 - 112/63)  BP(mean): --  RR: 18 (17 Dec 2024 05:10) (16 - 18)  SpO2: 94% (17 Dec 2024 05:10) (94% - 96%)    Parameters below as of 16 Dec 2024 21:01  Patient On (Oxygen Delivery Method): room air        ________________________________________________  PHYSICAL EXAM:    GENERAL: NAD  HEENT: Normocephalic; conjunctivae and sclerae clear;  NECK : supple, no JVD  CHEST/LUNG: Clear to auscultation; Nonlabored  HEART: S1 S2  regular  ABDOMEN: Soft, Nontender, Nondistended; Bowel sounds present  EXTREMITIES: no cyanosis; no LE edema; no calf tenderness  NERVOUS SYSTEM:  Alert; no new deficits  SKIN: warm and dry; No new rashes or lesions    _________________________________________________  CURRENT MEDICATIONS:    MEDICATIONS  (STANDING):  amLODIPine   Tablet 10 milliGRAM(s) Oral daily  calcitriol   Capsule 1 MICROGram(s) Oral <User Schedule>  cefepime   IVPB 1000 milliGRAM(s) IV Intermittent once  cefepime   IVPB 1000 milliGRAM(s) IV Intermittent every 24 hours  insulin lispro (ADMELOG) corrective regimen sliding scale   SubCutaneous three times a day before meals  insulin lispro (ADMELOG) corrective regimen sliding scale   SubCutaneous at bedtime  latanoprost 0.005% Ophthalmic Solution 1 Drop(s) Both EYES at bedtime  sevelamer carbonate 1600 milliGRAM(s) Oral three times a day with meals  vancomycin  IVPB 1250 milliGRAM(s) IV Intermittent Once  vancomycin  IVPB 1250 milliGRAM(s) IV Intermittent every 24 hours    MEDICATIONS  (PRN):  acetaminophen     Tablet .. 650 milliGRAM(s) Oral every 6 hours PRN Temp greater or equal to 38C (100.4F), Mild Pain (1 - 3)  ondansetron Injectable 4 milliGRAM(s) IV Push every 8 hours PRN Nausea and/or Vomiting      __________________________________________________  LABS:                          11.3   9.22  )-----------( 202      ( 16 Dec 2024 11:50 )             33.4     12-16    135  |  101  |  73[H]  ----------------------------<  185[H]  6.2[HH]   |  21[L]  |  9.00[H]    Ca    9.0      16 Dec 2024 11:50  Phos  6.4     12-16    TPro  9.1[H]  /  Alb  3.2[L]  /  TBili  0.6  /  DBili  x   /  AST  41[H]  /  ALT  29  /  AlkPhos  110  12-16    PT/INR - ( 16 Dec 2024 11:50 )   PT: 13.9 sec;   INR: 1.19 ratio         PTT - ( 16 Dec 2024 11:50 )  PTT:35.5 sec  Urinalysis Basic - ( 16 Dec 2024 11:50 )    Color: x / Appearance: x / SG: x / pH: x  Gluc: 185 mg/dL / Ketone: x  / Bili: x / Urobili: x   Blood: x / Protein: x / Nitrite: x   Leuk Esterase: x / RBC: x / WBC x   Sq Epi: x / Non Sq Epi: x / Bacteria: x      CAPILLARY BLOOD GLUCOSE      POCT Blood Glucose.: 139 mg/dL (17 Dec 2024 11:23)  POCT Blood Glucose.: 134 mg/dL (17 Dec 2024 08:10)  POCT Blood Glucose.: 113 mg/dL (16 Dec 2024 22:55)      __________________________________________________  RADIOLOGY & ADDITIONAL TESTS:    Imaging Personally Reviewed:  YES    < from: Xray Chest 1 View-PORTABLE IMMEDIATE (12.16.24 @ 10:30) >  IMPRESSION:  Clear lungs.    < end of copied text >    Consultant(s) Notes Reviewed:   YES     Plan of care was discussed with patient and /or primary care giver; all questions and concerns were addressed and care was aligned with patient's wishes.    Plan discussed with attending and consulting physicians.

## 2024-12-17 NOTE — PROGRESS NOTE ADULT - SUBJECTIVE AND OBJECTIVE BOX
[facility] NEPHROLOGY- PROGRESS NOTE    Patient is a 67yo Male with ESRD on HD via Rt AVG, HTN, 2HPT, Anemia, LE venous stasis with Rt foot wound presents with weakness. Pt a/w Rt foot ulcer and hyperkalemia. Nephrology consulted for ESRD status.       Hospital Medications: Medications reviewed.  REVIEW OF SYSTEMS: Lethargic/ not answering questions    VITALS:  T(F): 99.2 (12-17-24 @ 05:10), Max: 99.2 (12-16-24 @ 23:24)  HR: 75 (12-17-24 @ 05:10)  BP: 101/51 (12-17-24 @ 05:10)  RR: 18 (12-17-24 @ 05:10)  SpO2: 94% (12-17-24 @ 05:10)  Wt(kg): --    Weight (kg): 85 (12-16 @ 07:46)    12-16 @ 07:01  -  12-17 @ 07:00  --------------------------------------------------------  IN: 800 mL / OUT: 1300 mL / NET: -500 mL      PHYSICAL EXAM:  Gen: NAD, calm, Obese  HEENT: anicteric  Cards: RRR, +S1/S2, no M/G/R  Resp: CTA ant   GI: soft, NT/ND, NABS  Extremities: no LE edema B/L  Derm: b/l dry/ kertatotic skin, Rt foot plantar wound covered      LABS:  12-16    135  |  101  |  73[H]  ----------------------------<  185[H]  6.2[HH]   |  21[L]  |  9.00[H]    Ca    9.0      16 Dec 2024 11:50  Phos  6.4     12-16    TPro  9.1[H]  /  Alb  3.2[L]  /  TBili  0.6  /  DBili      /  AST  41[H]  /  ALT  29  /  AlkPhos  110  12-16    Creatinine Trend: 9.00 <--                        11.3   9.22  )-----------( 202      ( 16 Dec 2024 11:50 )             33.4     Urine Studies:  Urinalysis Basic - ( 16 Dec 2024 11:50 )    Color:  / Appearance:  / SG:  / pH:   Gluc: 185 mg/dL / Ketone:   / Bili:  / Urobili:    Blood:  / Protein:  / Nitrite:    Leuk Esterase:  / RBC:  / WBC    Sq Epi:  / Non Sq Epi:  / Bacteria:         RADIOLOGY & ADDITIONAL STUDIES:

## 2024-12-17 NOTE — PROGRESS NOTE ADULT - PROBLEM SELECTOR PLAN 4
Tolerated HD on 12/16  Next session 12/18  HD MWF in the community  Nando Chirinos, Nephrology, following   following   Recommendations appreciated

## 2024-12-17 NOTE — PROGRESS NOTE ADULT - ASSESSMENT
69 yo M with PMH of  DM, HTN, ESRD on dialysis ( M, W, F), Last Dialysis 12/13/24,   B/L Charcot deformity and HLD, present to the ED for generalized weakness, chills, subjective fever. Admitted to medicine for generalized weakness, hyperkalemia, right foot ulcer

## 2024-12-18 DIAGNOSIS — Z75.8 OTHER PROBLEMS RELATED TO MEDICAL FACILITIES AND OTHER HEALTH CARE: ICD-10-CM

## 2024-12-18 LAB
-  AMOXICILLIN/CLAVULANIC ACID: SIGNIFICANT CHANGE UP
-  AMPICILLIN/SULBACTAM: SIGNIFICANT CHANGE UP
-  AMPICILLIN: SIGNIFICANT CHANGE UP
-  AZTREONAM: SIGNIFICANT CHANGE UP
-  CEFAZOLIN: SIGNIFICANT CHANGE UP
-  CEFEPIME: SIGNIFICANT CHANGE UP
-  CEFOXITIN: SIGNIFICANT CHANGE UP
-  CEFTRIAXONE: SIGNIFICANT CHANGE UP
-  CIPROFLOXACIN: SIGNIFICANT CHANGE UP
-  ERTAPENEM: SIGNIFICANT CHANGE UP
-  LEVOFLOXACIN: SIGNIFICANT CHANGE UP
-  MEROPENEM: SIGNIFICANT CHANGE UP
-  PIPERACILLIN/TAZOBACTAM: SIGNIFICANT CHANGE UP
-  TRIMETHOPRIM/SULFAMETHOXAZOLE: SIGNIFICANT CHANGE UP
ANION GAP SERPL CALC-SCNC: 7 MMOL/L — SIGNIFICANT CHANGE UP (ref 5–17)
BUN SERPL-MCNC: 48 MG/DL — HIGH (ref 7–18)
CALCIUM SERPL-MCNC: 8.1 MG/DL — LOW (ref 8.4–10.5)
CHLORIDE SERPL-SCNC: 101 MMOL/L — SIGNIFICANT CHANGE UP (ref 96–108)
CO2 SERPL-SCNC: 28 MMOL/L — SIGNIFICANT CHANGE UP (ref 22–31)
CREAT SERPL-MCNC: 7.32 MG/DL — HIGH (ref 0.5–1.3)
CULTURE RESULTS: ABNORMAL
EGFR: 8 ML/MIN/1.73M2 — LOW
GLUCOSE BLDC GLUCOMTR-MCNC: 142 MG/DL — HIGH (ref 70–99)
GLUCOSE BLDC GLUCOMTR-MCNC: 144 MG/DL — HIGH (ref 70–99)
GLUCOSE BLDC GLUCOMTR-MCNC: 152 MG/DL — HIGH (ref 70–99)
GLUCOSE BLDC GLUCOMTR-MCNC: 153 MG/DL — HIGH (ref 70–99)
GLUCOSE SERPL-MCNC: 220 MG/DL — HIGH (ref 70–99)
HBV CORE AB SER-ACNC: SIGNIFICANT CHANGE UP
HBV CORE IGM SER-ACNC: SIGNIFICANT CHANGE UP
HBV DNA # SERPL NAA+PROBE: SIGNIFICANT CHANGE UP
HBV DNA SERPL NAA+PROBE-LOG#: SIGNIFICANT CHANGE UP LOGIU/ML
HBV SURFACE AB SER-ACNC: 9.3 MIU/ML — LOW
HBV SURFACE AG SER-ACNC: SIGNIFICANT CHANGE UP
HCT VFR BLD CALC: 28.8 % — LOW (ref 39–50)
HGB BLD-MCNC: 9.4 G/DL — LOW (ref 13–17)
MAGNESIUM SERPL-MCNC: 2.3 MG/DL — SIGNIFICANT CHANGE UP (ref 1.6–2.6)
MCHC RBC-ENTMCNC: 31.5 PG — SIGNIFICANT CHANGE UP (ref 27–34)
MCHC RBC-ENTMCNC: 32.6 G/DL — SIGNIFICANT CHANGE UP (ref 32–36)
MCV RBC AUTO: 96.6 FL — SIGNIFICANT CHANGE UP (ref 80–100)
METHOD TYPE: SIGNIFICANT CHANGE UP
NRBC # BLD: 0 /100 WBCS — SIGNIFICANT CHANGE UP (ref 0–0)
ORGANISM # SPEC MICROSCOPIC CNT: ABNORMAL
ORGANISM # SPEC MICROSCOPIC CNT: ABNORMAL
PHOSPHATE SERPL-MCNC: 4.9 MG/DL — HIGH (ref 2.5–4.5)
PLATELET # BLD AUTO: 166 K/UL — SIGNIFICANT CHANGE UP (ref 150–400)
POTASSIUM SERPL-MCNC: 4.3 MMOL/L — SIGNIFICANT CHANGE UP (ref 3.5–5.3)
POTASSIUM SERPL-SCNC: 4.3 MMOL/L — SIGNIFICANT CHANGE UP (ref 3.5–5.3)
RBC # BLD: 2.98 M/UL — LOW (ref 4.2–5.8)
RBC # FLD: 14.4 % — SIGNIFICANT CHANGE UP (ref 10.3–14.5)
SODIUM SERPL-SCNC: 136 MMOL/L — SIGNIFICANT CHANGE UP (ref 135–145)
SPECIMEN SOURCE: SIGNIFICANT CHANGE UP
WBC # BLD: 7.79 K/UL — SIGNIFICANT CHANGE UP (ref 3.8–10.5)
WBC # FLD AUTO: 7.79 K/UL — SIGNIFICANT CHANGE UP (ref 3.8–10.5)

## 2024-12-18 PROCEDURE — 73721 MRI JNT OF LWR EXTRE W/O DYE: CPT | Mod: 26,RT

## 2024-12-18 PROCEDURE — 99233 SBSQ HOSP IP/OBS HIGH 50: CPT | Mod: FS

## 2024-12-18 RX ORDER — EPOETIN ALFA 2000 [IU]/ML
6000 SOLUTION INTRAVENOUS; SUBCUTANEOUS
Refills: 0 | Status: DISCONTINUED | OUTPATIENT
Start: 2024-12-18 | End: 2025-01-08

## 2024-12-18 RX ORDER — HEPARIN SODIUM 1000 [USP'U]/ML
5000 INJECTION, SOLUTION INTRAVENOUS; SUBCUTANEOUS EVERY 8 HOURS
Refills: 0 | Status: DISCONTINUED | OUTPATIENT
Start: 2024-12-18 | End: 2024-12-31

## 2024-12-18 RX ADMIN — SEVELAMER CARBONATE 1600 MILLIGRAM(S): 800 TABLET, FILM COATED ORAL at 17:10

## 2024-12-18 RX ADMIN — Medication 10 MILLIGRAM(S): at 05:53

## 2024-12-18 RX ADMIN — Medication 100 MILLIGRAM(S): at 04:26

## 2024-12-18 RX ADMIN — Medication 1: at 17:08

## 2024-12-18 RX ADMIN — SEVELAMER CARBONATE 1600 MILLIGRAM(S): 800 TABLET, FILM COATED ORAL at 08:03

## 2024-12-18 RX ADMIN — LATANOPROST 1 DROP(S): 50 SOLUTION OPHTHALMIC at 22:18

## 2024-12-18 RX ADMIN — SEVELAMER CARBONATE 1600 MILLIGRAM(S): 800 TABLET, FILM COATED ORAL at 13:10

## 2024-12-18 RX ADMIN — CALCITRIOL 1 MICROGRAM(S): 0.5 CAPSULE, LIQUID FILLED ORAL at 08:04

## 2024-12-18 RX ADMIN — HEPARIN SODIUM 5000 UNIT(S): 1000 INJECTION, SOLUTION INTRAVENOUS; SUBCUTANEOUS at 22:19

## 2024-12-18 NOTE — DIETITIAN INITIAL EVALUATION ADULT - ADD RECOMMEND
1) Continue current diet as medically feasible.  2) Encourage PO intake and honor food preferences as able.  3) Monitor PO intake, labs, weights, BM's, and skin integrity.   4) Recommend nepro 1 x day

## 2024-12-18 NOTE — DIETITIAN INITIAL EVALUATION ADULT - PERTINENT MEDS FT
MEDICATIONS  (STANDING):  amLODIPine   Tablet 10 milliGRAM(s) Oral daily  calcitriol   Capsule 1 MICROGram(s) Oral <User Schedule>  cefepime   IVPB 1000 milliGRAM(s) IV Intermittent once  cefepime   IVPB 1000 milliGRAM(s) IV Intermittent every 24 hours  insulin lispro (ADMELOG) corrective regimen sliding scale   SubCutaneous three times a day before meals  insulin lispro (ADMELOG) corrective regimen sliding scale   SubCutaneous at bedtime  latanoprost 0.005% Ophthalmic Solution 1 Drop(s) Both EYES at bedtime  sevelamer carbonate 1600 milliGRAM(s) Oral three times a day with meals  vancomycin  IVPB 1250 milliGRAM(s) IV Intermittent Once  vancomycin  IVPB 1250 milliGRAM(s) IV Intermittent every 24 hours    MEDICATIONS  (PRN):  acetaminophen     Tablet .. 650 milliGRAM(s) Oral every 6 hours PRN Temp greater or equal to 38C (100.4F), Mild Pain (1 - 3)  ondansetron Injectable 4 milliGRAM(s) IV Push every 8 hours PRN Nausea and/or Vomiting

## 2024-12-18 NOTE — DIETITIAN INITIAL EVALUATION ADULT - NS FNS DIET ORDER
Diet, Regular:   Consistent Carbohydrate {No Snacks}  DASH/TLC {Sodium & Cholesterol Restricted}  1000mL Fluid Restriction ([id number])  For patients receiving Renal Replacement - No Protein Restr, No Conc K, No Conc Phos, Low Sodium (RENAL) (12-16-24 @ 14:28) [Active]

## 2024-12-18 NOTE — DIETITIAN INITIAL EVALUATION ADULT - ORAL INTAKE PTA/DIET HISTORY
Pt with ESRD on dialysis MWF. Attempted to speak to pt at bedside, pt was gone for a procedure on 9:00am, then again at 12:30pm. Will attain nutrition information through comprehensive chart review and through RN/PCA information. No new food allergies noted per chart. Pt with % PO intake per RN, unable to determine PO intake PTA. Per HIE: 220lbs - 2021. No recent episodes of nausea, vomiting, diarrhea, or constipation noted per chart. No noted chewing/swallowing difficulties. POCT 113 -198 between 12/16 -12/18. Hb1ac - 7.4% - 12/17

## 2024-12-18 NOTE — PROGRESS NOTE ADULT - PROBLEM SELECTOR PLAN 2
MRI noted above sans talus  F/u repeat MRI  Surgical culture grew Providencia stuartii  Sensitvity pending  Continue Cefepime and Vancomycin  Trough prior to next dose  Podiatry following

## 2024-12-18 NOTE — PROGRESS NOTE ADULT - SUBJECTIVE AND OBJECTIVE BOX
CC: weakness  S:  no complaints, denies CP, SOB  O:  Vital Signs Last 24 Hrs  T(C): 36.8 (12-18-24 @ 13:50), Max: 37.6 (12-18-24 @ 09:15)  T(F): 98.2 (12-18-24 @ 13:50), Max: 99.7 (12-18-24 @ 09:15)  HR: 68 (12-18-24 @ 13:50) (66 - 79)  BP: 128/60 (12-18-24 @ 13:50) (93/45 - 149/70)  RR: 17 (12-18-24 @ 13:50) (16 - 18)  SpO2: 93% (12-18-24 @ 13:50) (88% - 100%)    PE:  Gen: Oriented, alert, No acute distress Eyes: conjunctivae and lid normal, sclera clear with no icterus ENT: nose and throat exam normal CVS: S1, S2, RRR; no murmur, no rubs, no gallops Pulm: Good air exchange, Breath sounds equal bilaterally, no rales rhonchi,  no wheezes Chest: nontender, no chest deformity, chest movement symmetrical Gl: abdomen soft, nontender, nondistended, bowel sounds normoactive, no masses palpated Musk: no msk pain, BLE edematous, R foot with c/d/i dressing Skin: no skin lesions, skin turgor normal, warm and well perfused Neuro: Awake, alert,Psych: normal affect, insight

## 2024-12-18 NOTE — DIETITIAN INITIAL EVALUATION ADULT - PERTINENT LABORATORY DATA
12-18    136  |  101  |  48[H]  ----------------------------<  220[H]  4.3   |  28  |  7.32[H]    Ca    8.1[L]      18 Dec 2024 09:15  Phos  4.9     12-18  Mg     2.3     12-18    POCT Blood Glucose.: 142 mg/dL (12-18-24 @ 12:03)  A1C with Estimated Average Glucose Result: 7.4 % (12-17-24 @ 07:28)

## 2024-12-18 NOTE — PROGRESS NOTE ADULT - ASSESSMENT
Patient is a 69yo Male with ESRD on HD via Rt AVG, HTN, 2HPT, Anemia, LE venous stasis with Rt foot wound presents with weakness. Pt a/w Rt foot ulcer and hyperkalemia. Nephrology consulted for ESRD status.     1) ESRD: Pt seen on HD today with UF goal 1L. Plan for next HD 12/20. Monitor electrolytes.  2) Hyperkalemia: resolved. c/w low potassium diet.    Monitor serum potassium  3) HTN with ESRD: BP low normal. Recc to hold both Coreg 25mg PO bid and Amlodipine 10mg PO qd (outpt meds). c/w low sodium diet. Monitor BP.  4) Anemia of renal disease: Hb low; will start Epogen 6000 units IV tiw. Monitor Hb.  5) 2HPT: Corrected serum calcium & serum phos acceptable.  PTHi 441. c/w calcitriol 1mcg PO MWF. Pt on Lanthanum 2000mg PO tid with meals which is unavailable at [facility]. c/w Sevelamer 1600mg PO tid with meals and  low phos diet. Monitor serum calcium and phosphorus.    HepBsAg was neg on outpt labs on 12/11/24; repeat HepBsAg neg; likely positive HepBsAg on 12/16 is an error.     [facility] NEPHROLOGY  Arden Tsang M.D.  Freeman Holland D.O.  Janny Chirinos M.D.  MD Ambar Mccarty, MSN, ANP-C    Telephone: [phone]  Facsimile: [phone]    [address] [place] [address], [address]  [place] [address]

## 2024-12-18 NOTE — PROGRESS NOTE ADULT - SUBJECTIVE AND OBJECTIVE BOX
[facility] NEPHROLOGY- PROGRESS NOTE    Patient is a 69yo Male with ESRD on HD via Rt AVG, HTN, 2HPT, Anemia, LE venous stasis with Rt foot wound presents with weakness. Pt a/w Rt foot ulcer and hyperkalemia. Nephrology consulted for ESRD status.   MRI Rt foot: cellulitis with medial sesamoid OM; diff dx reactive osteitis  BCx +[facility] Medications: Medications reviewed.  REVIEW OF SYSTEMS: Pt denies any SOB, chest pain, n/v/d, or abd pain, +mild Rt foot pain      VITALS:  T(F): 99.2 (12-17-24 @ 05:10), Max: 99.2 (12-16-24 @ 23:24)  HR: 75 (12-17-24 @ 05:10)  BP: 101/51 (12-17-24 @ 05:10)  RR: 18 (12-17-24 @ 05:10)  SpO2: 94% (12-17-24 @ 05:10)  Wt(kg): --    Weight (kg): 85 (12-16 @ 07:46)    12-16 @ 07:01  -  12-17 @ 07:00  --------------------------------------------------------  IN: 800 mL / OUT: 1300 mL / NET: -500 mL      PHYSICAL EXAM:  Gen: NAD, calm, Obese; more awake  HEENT: anicteric  Cards: RRR, +S1/S2, no M/G/R  Resp: CTA ant   GI: soft, NT/ND, NABS  Extremities: no LE edema B/L  Derm: b/l dry skin, Rt foot plantar wound covered    LABS:  12-18    136  |  101  |  48[H]  ----------------------------<  220[H]  4.3   |  28  |  7.32[H]    Ca    8.1[L]      18 Dec 2024 09:15  Phos  4.9     12-18  Mg     2.3     12-18      Creatinine Trend: 7.32 <--, 9.00 <--                        9.4    7.79  )-----------( 166      ( 18 Dec 2024 09:15 )             28.8     Urine Studies:  Urinalysis Basic - ( 18 Dec 2024 09:15 )    Color:  / Appearance:  / SG:  / pH:   Gluc: 220 mg/dL / Ketone:   / Bili:  / Urobili:    Blood:  / Protein:  / Nitrite:    Leuk Esterase:  / RBC:  / WBC    Sq Epi:  / Non Sq Epi:  / Bacteria:         < from: MR Foot No Cont, Right (12.17.24 @ 15:03) >    ACC: [id number] EXAM:  MR FOOT RT   ORDERED BY: SALVADOR PAREKH     PROCEDURE DATE:  12/17/2024        < end of copied text >  < from: MR Foot No Cont, Right (12.17.24 @ 15:03) >  IMPRESSION: Degraded by motion artifact.    1.  Multiple skin wounds are marked soft tissue swelling concerning for   cellulitis in the proper clinical setting. No drainable collection.  2.  Suspect patchy signal abnormality within the medial sesamoid.   Correlate clinically for a skin wound in this region which would increase   the probability of osteomyelitis. Differential includes reactive osteitis.    --- End of Report ---      < end of copied text >

## 2024-12-18 NOTE — PROGRESS NOTE ADULT - PROBLEM SELECTOR PLAN 1
CXR noted above  COVID/FLU/ RSV: Neg   Blood cultures grew GPC in clusters  Speciation pending  PT consulted

## 2024-12-18 NOTE — PROGRESS NOTE ADULT - SUBJECTIVE AND OBJECTIVE BOX
HPI:  69 yo M with PMH of  DM, HTN, ESRD on dialysis ( M, W, F), Last Dialysis 12/13/24,   B/L Charcot deformity and HLD, present to the ED for generalized weakness, chills, subjective fever. Patient reports waking up this morning feeling very tired and unable to stand up to get dressed for his dialysis appointment. Collateral information obtained from wifer at bedside. As per wife, patient didnt appear to be himself this morning so she decided to call EMS. Patient reports since 2014 after having charcot procedure in bilateral foot, he has been wearing a charcot boot to walk. Patient follows up with podiatry Dr. Sim in  outpatient podiatry clinic. Patient denies any chest pain, abdominal pain, nausea, vomiting, headache lightheadedness, vision change, cough SOb   (16 Dec 2024 14:28)      OVERNIGHT EVENTS:    No new overnight events.  Seen and examined at bedside.    REVIEW OF SYSTEMS:      EYES: no acute visual disturbances  NECK: No pain or stiffness  RESPIRATORY: No cough; No shortness of breath  CARDIOVASCULAR: No chest pain, no palpitations  GASTROINTESTINAL: No pain. No nausea, vomiting or diarrhea   NEUROLOGICAL: No headache or numbness, no tremors  MUSCULOSKELETAL: No joint pain, no muscle pain  GENITOURINARY: no dysuria, no frequency, no hesitancy  PSYCHIATRY: no depression, no anxiety  ALL OTHER  ROS negative        Vital Signs Last 24 Hrs  T(C): 36.8 (18 Dec 2024 13:50), Max: 37.6 (18 Dec 2024 09:15)  T(F): 98.2 (18 Dec 2024 13:50), Max: 99.7 (18 Dec 2024 09:15)  HR: 68 (18 Dec 2024 13:50) (66 - 79)  BP: 128/60 (18 Dec 2024 13:50) (93/45 - 149/70)  BP(mean): --  RR: 17 (18 Dec 2024 13:50) (16 - 18)  SpO2: 93% (18 Dec 2024 13:50) (88% - 100%)    Parameters below as of 18 Dec 2024 13:50  Patient On (Oxygen Delivery Method): room air        ________________________________________________  PHYSICAL EXAM:    GENERAL: NAD  HEENT: Normocephalic; conjunctivae and sclerae clear;  NECK : supple, no JVD  CHEST/LUNG: Clear to auscultation; Nonlabored  HEART: S1 S2  regular  ABDOMEN: Soft, Nontender, Nondistended; Bowel sounds present  EXTREMITIES: no cyanosis; no LE edema; no calf tenderness  NERVOUS SYSTEM:  Alert; no new deficits  SKIN: warm and dry; No new rashes or lesions    _________________________________________________  CURRENT MEDICATIONS:    MEDICATIONS  (STANDING):  amLODIPine   Tablet 10 milliGRAM(s) Oral daily  calcitriol   Capsule 1 MICROGram(s) Oral <User Schedule>  cefepime   IVPB 1000 milliGRAM(s) IV Intermittent once  cefepime   IVPB 1000 milliGRAM(s) IV Intermittent every 24 hours  heparin   Injectable 5000 Unit(s) SubCutaneous every 8 hours  insulin lispro (ADMELOG) corrective regimen sliding scale   SubCutaneous three times a day before meals  insulin lispro (ADMELOG) corrective regimen sliding scale   SubCutaneous at bedtime  latanoprost 0.005% Ophthalmic Solution 1 Drop(s) Both EYES at bedtime  sevelamer carbonate 1600 milliGRAM(s) Oral three times a day with meals  vancomycin  IVPB 1250 milliGRAM(s) IV Intermittent Once  vancomycin  IVPB 1250 milliGRAM(s) IV Intermittent every 24 hours    MEDICATIONS  (PRN):  acetaminophen     Tablet .. 650 milliGRAM(s) Oral every 6 hours PRN Temp greater or equal to 38C (100.4F), Mild Pain (1 - 3)  ondansetron Injectable 4 milliGRAM(s) IV Push every 8 hours PRN Nausea and/or Vomiting      __________________________________________________  LABS:                          9.4    7.79  )-----------( 166      ( 18 Dec 2024 09:15 )             28.8     12-18    136  |  101  |  48[H]  ----------------------------<  220[H]  4.3   |  28  |  7.32[H]    Ca    8.1[L]      18 Dec 2024 09:15  Phos  4.9     12-18  Mg     2.3     12-18        Urinalysis Basic - ( 18 Dec 2024 09:15 )    Color: x / Appearance: x / SG: x / pH: x  Gluc: 220 mg/dL / Ketone: x  / Bili: x / Urobili: x   Blood: x / Protein: x / Nitrite: x   Leuk Esterase: x / RBC: x / WBC x   Sq Epi: x / Non Sq Epi: x / Bacteria: x      CAPILLARY BLOOD GLUCOSE      POCT Blood Glucose.: 142 mg/dL (18 Dec 2024 12:03)  POCT Blood Glucose.: 144 mg/dL (18 Dec 2024 07:35)  POCT Blood Glucose.: 198 mg/dL (17 Dec 2024 21:23)  POCT Blood Glucose.: 130 mg/dL (17 Dec 2024 16:24)      __________________________________________________  RADIOLOGY & ADDITIONAL TESTS:    Imaging Personally Reviewed:  YES    < from: MR Foot No Cont, Right (12.17.24 @ 15:03) >  IMPRESSION: Degraded by motion artifact.    1.  Multiple skin wounds are marked soft tissue swelling concerning for   cellulitis in the proper clinical setting. No drainable collection.  2.  Suspect patchy signal abnormality within the medial sesamoid.   Correlate clinically for a skin wound in this region which would increase   the probability of osteomyelitis. Differential includes reactive osteitis.    < end of copied text >    < from: Xray Chest 1 View-PORTABLE IMMEDIATE (12.16.24 @ 10:30) >    IMPRESSION:  Clear lungs.    < end of copied text >    Consultant(s) Notes Reviewed:   YES     Plan of care was discussed with patient and /or primary care giver; all questions and concerns were addressed and care was aligned with patient's wishes.    Plan discussed with attending and consulting physicians.

## 2024-12-18 NOTE — DIETITIAN INITIAL EVALUATION ADULT - ORAL NUTRITION SUPPLEMENTS
Recommend Nepro (provides 425 kcal, 19 gm protein per 8oz serving) 1 x day to aid in increased nutrient needs

## 2024-12-18 NOTE — PROGRESS NOTE ADULT - ASSESSMENT
Mr. Ramos is a 69 yo M with PMHx of ESRD on HD MWF, HTN, DM, bilateral Charcot's joint, presenting with generalized weakness for 1 day.    Found to have hyperkalemia and R foot ulcer, admitted for further workup.    # Generalized weakness  # Hyperkalemia  # R foot ulcer  # ESRD on HD  # HTN  # DM  # Bilateral Charcot's joint    - continue abx with vanc and cefepime, f/u wound cx, consult ID once MRI results are back  -ANEESH: There are normal amplitude biphasic pulse volume recordings   bilaterally. There are bilateral calcified, noncompressible vessels.   Therefore, ABIs could not be calculated.  - podiatry recs appreciated, to obtain MRI R ankle  - consult nephrology for inpatient HD  - monitor BMP  -HD per neph  - send A1c, FS/SSI  - formal med rec  - DVT ppx: heparin SQ.         Total Time Spent 50 minutes This includes chart review, patient assessment, discussion and collaboration with interdisciplinary team members, excluding ACP.

## 2024-12-19 DIAGNOSIS — L02.31 CUTANEOUS ABSCESS OF BUTTOCK: ICD-10-CM

## 2024-12-19 LAB
ANION GAP SERPL CALC-SCNC: 9 MMOL/L — SIGNIFICANT CHANGE UP (ref 5–17)
BUN SERPL-MCNC: 31 MG/DL — HIGH (ref 7–18)
CALCIUM SERPL-MCNC: 8.7 MG/DL — SIGNIFICANT CHANGE UP (ref 8.4–10.5)
CHLORIDE SERPL-SCNC: 100 MMOL/L — SIGNIFICANT CHANGE UP (ref 96–108)
CO2 SERPL-SCNC: 27 MMOL/L — SIGNIFICANT CHANGE UP (ref 22–31)
CREAT SERPL-MCNC: 5.52 MG/DL — HIGH (ref 0.5–1.3)
EGFR: 11 ML/MIN/1.73M2 — LOW
GLUCOSE BLDC GLUCOMTR-MCNC: 126 MG/DL — HIGH (ref 70–99)
GLUCOSE BLDC GLUCOMTR-MCNC: 127 MG/DL — HIGH (ref 70–99)
GLUCOSE BLDC GLUCOMTR-MCNC: 141 MG/DL — HIGH (ref 70–99)
GLUCOSE BLDC GLUCOMTR-MCNC: 180 MG/DL — HIGH (ref 70–99)
GLUCOSE SERPL-MCNC: 132 MG/DL — HIGH (ref 70–99)
HCT VFR BLD CALC: 31.3 % — LOW (ref 39–50)
HGB BLD-MCNC: 10.4 G/DL — LOW (ref 13–17)
MAGNESIUM SERPL-MCNC: 2.3 MG/DL — SIGNIFICANT CHANGE UP (ref 1.6–2.6)
MCHC RBC-ENTMCNC: 31.9 PG — SIGNIFICANT CHANGE UP (ref 27–34)
MCHC RBC-ENTMCNC: 33.2 G/DL — SIGNIFICANT CHANGE UP (ref 32–36)
MCV RBC AUTO: 96 FL — SIGNIFICANT CHANGE UP (ref 80–100)
NRBC # BLD: 0 /100 WBCS — SIGNIFICANT CHANGE UP (ref 0–0)
PHOSPHATE SERPL-MCNC: 3.6 MG/DL — SIGNIFICANT CHANGE UP (ref 2.5–4.5)
PLATELET # BLD AUTO: 175 K/UL — SIGNIFICANT CHANGE UP (ref 150–400)
POTASSIUM SERPL-MCNC: 4.1 MMOL/L — SIGNIFICANT CHANGE UP (ref 3.5–5.3)
POTASSIUM SERPL-SCNC: 4.1 MMOL/L — SIGNIFICANT CHANGE UP (ref 3.5–5.3)
RBC # BLD: 3.26 M/UL — LOW (ref 4.2–5.8)
RBC # FLD: 14.3 % — SIGNIFICANT CHANGE UP (ref 10.3–14.5)
SODIUM SERPL-SCNC: 136 MMOL/L — SIGNIFICANT CHANGE UP (ref 135–145)
VANCOMYCIN TROUGH SERPL-MCNC: 25.4 UG/ML — CRITICAL HIGH (ref 10–20)
WBC # BLD: 10.83 K/UL — HIGH (ref 3.8–10.5)
WBC # FLD AUTO: 10.83 K/UL — HIGH (ref 3.8–10.5)

## 2024-12-19 PROCEDURE — 99223 1ST HOSP IP/OBS HIGH 75: CPT

## 2024-12-19 PROCEDURE — 99233 SBSQ HOSP IP/OBS HIGH 50: CPT | Mod: FS

## 2024-12-19 RX ORDER — VANCOMYCIN HYDROCHLORIDE 5 G/100ML
750 INJECTION, POWDER, LYOPHILIZED, FOR SOLUTION INTRAVENOUS
Refills: 0 | Status: DISCONTINUED | OUTPATIENT
Start: 2024-12-19 | End: 2024-12-20

## 2024-12-19 RX ORDER — LIDOCAINE HYDROCHLORIDE,EPINEPHRINE BITARTRATE 20; .005 MG/ML; MG/ML
20 INJECTION, SOLUTION EPIDURAL; INFILTRATION; INTRACAUDAL; PERINEURAL ONCE
Refills: 0 | Status: COMPLETED | OUTPATIENT
Start: 2024-12-19 | End: 2024-12-19

## 2024-12-19 RX ADMIN — HEPARIN SODIUM 5000 UNIT(S): 1000 INJECTION, SOLUTION INTRAVENOUS; SUBCUTANEOUS at 16:58

## 2024-12-19 RX ADMIN — SEVELAMER CARBONATE 1600 MILLIGRAM(S): 800 TABLET, FILM COATED ORAL at 12:01

## 2024-12-19 RX ADMIN — HEPARIN SODIUM 5000 UNIT(S): 1000 INJECTION, SOLUTION INTRAVENOUS; SUBCUTANEOUS at 22:10

## 2024-12-19 RX ADMIN — LATANOPROST 1 DROP(S): 50 SOLUTION OPHTHALMIC at 22:11

## 2024-12-19 RX ADMIN — ACETAMINOPHEN 650 MILLIGRAM(S): 80 SOLUTION/ DROPS ORAL at 06:52

## 2024-12-19 RX ADMIN — HEPARIN SODIUM 5000 UNIT(S): 1000 INJECTION, SOLUTION INTRAVENOUS; SUBCUTANEOUS at 05:25

## 2024-12-19 RX ADMIN — LIDOCAINE HYDROCHLORIDE,EPINEPHRINE BITARTRATE 20 MILLILITER(S): 20; .005 INJECTION, SOLUTION EPIDURAL; INFILTRATION; INTRACAUDAL; PERINEURAL at 18:13

## 2024-12-19 RX ADMIN — SEVELAMER CARBONATE 1600 MILLIGRAM(S): 800 TABLET, FILM COATED ORAL at 07:44

## 2024-12-19 RX ADMIN — Medication 1: at 16:57

## 2024-12-19 RX ADMIN — SEVELAMER CARBONATE 1600 MILLIGRAM(S): 800 TABLET, FILM COATED ORAL at 16:58

## 2024-12-19 RX ADMIN — Medication 100 MILLIGRAM(S): at 02:49

## 2024-12-19 NOTE — PROGRESS NOTE ADULT - ASSESSMENT
67 yo M with PMH of  DM, HTN, ESRD on dialysis ( M, W, F), Last Dialysis 12/13/24,   B/L Charcot deformity and HLD, present to the ED for generalized weakness, chills, subjective fever. Admitted to medicine for generalized weakness, hyperkalemia, right foot ulcer   MR right Ankle demonstrates ulceration at the plantar surface medially with suspected exposed bone with underlying   osteomyelitis involving the talus and less so often navicular bone. Podiatry follows   Pt noted with episode of fever over 101  Started on Cefepime and vancomycin. Noted with open wound draining purulent discharge to left buttock. ID consulted for further recommendations   Blood culture growing Staphylococcus. Surgery consulted, plan for I&D at bedside.

## 2024-12-19 NOTE — CONSULT NOTE ADULT - SUBJECTIVE AND OBJECTIVE BOX
General Surgery Consult Note     HPI:  67 yo M with PMH of  DM, HTN, ESRD on dialysis ( M, W, F), Last Dialysis 12/13/24,   B/L Charcot deformity and HLD, present to the ED for generalized weakness, chills, subjective fever. Patient reports waking up this morning feeling very tired and unable to stand up to get dressed for his dialysis appointment. Collateral information obtained from wifer at bedside. As per wife, patient didnt appear to be himself this morning so she decided to call EMS. Patient reports since 2014 after having charcot procedure in bilateral foot, he has been wearing a charcot boot to walk. Patient follows up with podiatry Dr. Sim in  outpatient podiatry clinic. Patient denies any chest pain, abdominal pain, nausea, vomiting, headache lightheadedness, vision change, cough SOb   (16 Dec 2024 14:28)    General surgery consulted for left lateral buttock wound. Pt seen and examined at bedside. Pt is bedbound 2/2 Charcot foot, states a few weeks ago his wife noticed a wound on the lateral buttocks. He was seen outpatient by a wound care specialist in [place] who drained the collection. Pt states he gets twinges of pain from time to time but not very severe. Pertinent history of diabetes. No additional complaints.     PAST MEDICAL & SURGICAL HISTORY:  DM (diabetes mellitus)  type 2      ESRD (end stage renal disease) on dialysis  t-th-sat via right AV graft, uses midodrine the days of Dialysis for hypotension      Charcot foot due to diabetes mellitus      Diabetic retinopathy associated with type 2 diabetes mellitus      Hyperlipidemia      Abscess of scrotum      Hidradenitis      End stage renal disease      S/P foot surgery  right foot - 11/2014      Charcot's syndrome  Surgeries in feet and ankles   left ankle 2011 and right ankle 11/2014      History of incision and drainage  scrotal 5/2018      Hemodialysis access, AV graft          MEDICATIONS  (STANDING):  calcitriol   Capsule 1 MICROGram(s) Oral <User Schedule>  cefepime   IVPB 1000 milliGRAM(s) IV Intermittent once  cefepime   IVPB 1000 milliGRAM(s) IV Intermittent every 24 hours  epoetin rudolph-epbx (RETACRIT) Injectable 6000 Unit(s) IV Push <User Schedule>  heparin   Injectable 5000 Unit(s) SubCutaneous every 8 hours  insulin lispro (ADMELOG) corrective regimen sliding scale   SubCutaneous three times a day before meals  insulin lispro (ADMELOG) corrective regimen sliding scale   SubCutaneous at bedtime  latanoprost 0.005% Ophthalmic Solution 1 Drop(s) Both EYES at bedtime  lidocaine 1%/epinephrine 1:100,000 Inj 20 milliLiter(s) Local Injection once  sevelamer carbonate 1600 milliGRAM(s) Oral three times a day with meals  vancomycin  IVPB 1250 milliGRAM(s) IV Intermittent Once  vancomycin  IVPB 1250 milliGRAM(s) IV Intermittent every 24 hours    MEDICATIONS  (PRN):  acetaminophen     Tablet .. 650 milliGRAM(s) Oral every 6 hours PRN Temp greater or equal to 38C (100.4F), Mild Pain (1 - 3)  ondansetron Injectable 4 milliGRAM(s) IV Push every 8 hours PRN Nausea and/or Vomiting      Allergies    penicillin (Unknown)    Intolerances        SOCIAL HISTORY:    FAMILY HISTORY:  Family history of diabetes mellitus        Vital Signs Last 24 Hrs  T(C): 37 (19 Dec 2024 13:35), Max: 38.5 (19 Dec 2024 06:44)  T(F): 98.6 (19 Dec 2024 13:35), Max: 101.3 (19 Dec 2024 06:44)  HR: 67 (19 Dec 2024 13:35) (64 - 78)  BP: 106/51 (19 Dec 2024 13:35) (106/51 - 124/66)  BP(mean): --  RR: 17 (19 Dec 2024 13:35) (16 - 17)  SpO2: 93% (19 Dec 2024 13:35) (93% - 95%)    Parameters below as of 19 Dec 2024 13:35  Patient On (Oxygen Delivery Method): room air        I&O's Summary      T(C): 37 (12-19-24 @ 13:35), Max: 38.5 (12-19-24 @ 06:44)  HR: 67 (12-19-24 @ 13:35) (64 - 78)  BP: 106/51 (12-19-24 @ 13:35) (106/51 - 124/66)  RR: 17 (12-19-24 @ 13:35) (16 - 17)  SpO2: 93% (12-19-24 @ 13:35) (93% - 95%)    CONSTITUTIONAL: Well groomed, no apparent distress  RESP: No respiratory distress, no use of accessory muscles  EXTREMITIES: Left lateral buttock with 3cm draining collection, gross spillage of pus expelled with surrounding area of induration. No erythema noted. Wound edges without evidence of necrosis.     LABS:                        10.4   10.83 )-----------( 175      ( 19 Dec 2024 06:45 )             31.3     12-19    136  |  100  |  31[H]  ----------------------------<  132[H]  4.1   |  27  |  5.52[H]    Ca    8.7      19 Dec 2024 06:45  Phos  3.6     12-19  Mg     2.3     12-19        Urinalysis Basic - ( 19 Dec 2024 06:45 )    Color: x / Appearance: x / SG: x / pH: x  Gluc: 132 mg/dL / Ketone: x  / Bili: x / Urobili: x   Blood: x / Protein: x / Nitrite: x   Leuk Esterase: x / RBC: x / WBC x   Sq Epi: x / Non Sq Epi: x / Bacteria: x      CAPILLARY BLOOD GLUCOSE      POCT Blood Glucose.: 180 mg/dL (19 Dec 2024 16:14)  POCT Blood Glucose.: 127 mg/dL (19 Dec 2024 11:39)  POCT Blood Glucose.: 126 mg/dL (19 Dec 2024 07:40)  POCT Blood Glucose.: 152 mg/dL (18 Dec 2024 21:06)        Cultures:      RADIOLOGY & ADDITIONAL STUDIES:

## 2024-12-19 NOTE — PROGRESS NOTE ADULT - SUBJECTIVE AND OBJECTIVE BOX
[facility] NEPHROLOGY- PROGRESS NOTE    Patient is a 67yo Male with ESRD on HD via Rt AVG, HTN, 2HPT, Anemia, LE venous stasis with Rt foot wound presents with weakness. Pt a/w Rt foot ulcer and hyperkalemia. Nephrology consulted for ESRD status.   MRI Rt foot: + osteomyelitis involving the talus and less so of the navicular bone;    BCx +[facility] Medications: Medications reviewed.  REVIEW OF SYSTEMS: Pt denies any SOB, chest pain, n/v/d, or abd pain, +Rt foot pain-denies at this time      VITALS:  T(F): 99.4 (12-19-24 @ 12:30), Max: 101.3 (12-19-24 @ 06:44)  HR: 64 (12-19-24 @ 12:30)  BP: 116/65 (12-19-24 @ 12:30)  RR: 16 (12-19-24 @ 12:30)  SpO2: 93% (12-19-24 @ 12:30)  Wt(kg): --      PHYSICAL EXAM:  Gen: NAD, calm, Obese; more awake  HEENT: anicteric  Cards: RRR, +S1/S2, no M/G/R  Resp: CTA ant   GI: soft, NT/ND, NABS  Extremities: no LE edema B/L  Derm: b/l dry skin, Rt foot plantar wound covered    LABS:  12-19    136  |  100  |  31[H]  ----------------------------<  132[H]  4.1   |  27  |  5.52[H]    Ca    8.7      19 Dec 2024 06:45  Phos  3.6     12-19  Mg     2.3     12-19      Creatinine Trend: 5.52 <--, 7.32 <--, 9.00 <--                        10.4   10.83 )-----------( 175      ( 19 Dec 2024 06:45 )             31.3     Urine Studies:  Urinalysis Basic - ( 19 Dec 2024 06:45 )    Color:  / Appearance:  / SG:  / pH:   Gluc: 132 mg/dL / Ketone:   / Bili:  / Urobili:    Blood:  / Protein:  / Nitrite:    Leuk Esterase:  / RBC:  / WBC    Sq Epi:  / Non Sq Epi:  / Bacteria:         < from: MR Ankle No Cont, Right (12.18.24 @ 20:26) >    ACC: [id number] EXAM:  MR ANKLE RT   ORDERED BY: REYES GILBERT     PROCEDURE DATE:  12/18/2024        < end of copied text >    < from: MR Ankle No Cont, Right (12.18.24 @ 20:26) >  IMPRESSION: MRI of the right ankle demonstrates ulceration at the plantar   surface medially with suspected exposed bone with underlying   osteomyelitis involving the talus and less so ofthe navicular bone. No   drainable fluid collection.    End-stage arthritis with resultant planovalgus deformity.    --- End of Report ---      < end of copied text >

## 2024-12-19 NOTE — CONSULT NOTE ADULT - NS ATTEND AMEND GEN_ALL_CORE FT
DATE OF SERVICE: 12-19-24 @ 17:38    68M with PMHx as above, surgery called for L buttock abscess. had recent fall and now it is draining purulent material  labs/vitals revewied  + L buttock area of fluctance/induration    Bedside I/D  FU cx  Care per medicine

## 2024-12-19 NOTE — PROGRESS NOTE ADULT - PROBLEM SELECTOR PLAN 4
Hemodialysis today 12/19   [facility]  SW following

## 2024-12-19 NOTE — CONSULT NOTE ADULT - ASSESSMENT
67 yo M with PMH of  DM, HTN, ESRD on dialysis ( M, W, F) via RUE graft (last Dialysis 12/13/24),  B/L Charcot deformity and HLD, presented to the ED for generalized weakness, chills. Patient reports since 2014 after having charcot procedure in bilateral foot, he has been wearing a charcot boot to walk. Patient follows up with podiatry Dr. Sim in the outpatient podiatry clinic. At the time of my consult, pt states both his feet have looked the same for many yrs. and has been following with Podiatry for yrs. Pt further states that he had an "abscess" on the R heel drained about 1 mo. ago by Dr. Sim. BC's with coag negative Staph c/w a contaminant. MRI R foot 12/17: multiple skin wounds, soft tissue swelling, no drainable collection, patchy signal abnormality within the medial sesamoid suggestive of osteomyelitis vs reactive osteitis. Elevated temp and slight elevation of WBC noted today. MRI R ankle 12/17:  ulceration at the plantar surface medially with suspected exposed bone with underlying osteomyelitis involving the talus and less so of the navicular bone, no drainable fluid collection; end-stage arthritis with resultant planovalgus deformity. The presence of osteo below the ulcer in the area of the talus is likely chronic. The signal abnormality within the R medial sesamoid may represent osteomyelitis. Swab Cx results likely reflect colonization. The R heel ulcer has no surrounding erythema or warmth-- no evidence of SSTI.  ?? whether the R foot is salvageable (ABIs from 12/16 exam cd not be calculated). Of concern is vascular insufficiency, which wd have a negative impact on heeling following any surgical intervention.  W/U of chronic osteo wd include bone Bx/debridement OFF Abs for a period of time with Cx sent to determine directed Ab treatment. In this pt, the L buttock abscess is likely the cause of today's temp spike and rising WBC. Pt with L buttock abscess that is likely the cause of fever and rising WBC.    #Diabetic foot ulcer of R foot with osteo (see above)-- no evidence of acute SSTI  --d/c cefepime  --consider bone Bx OFF antibiotics   --vascular input    #L buttock abscess-- most common organisms are G+C. Vanco held today b/o elevated trough  --obtain vanco trough in AM to determine further dosing of vancomycin    #DM-- management as per medical team                 67 yo M with PMH of  DM, HTN, ESRD on dialysis ( M, W, F) via RUE graft (last Dialysis 12/13/24),  B/L Charcot deformity and HLD, presented to the ED for generalized weakness, chills. Patient reports since 2014 after having charcot procedure in bilateral foot, he has been wearing a charcot boot to walk. Patient follows up with podiatry Dr. Sim in the outpatient podiatry clinic. At the time of my consult, pt states both his feet have looked the same for many yrs. and has been following with Podiatry for yrs. Pt further states that he had an "abscess" on the R heel drained about 1 mo. ago by Dr. Sim. BC's with coag negative Staph c/w a contaminant. MRI R foot 12/17: multiple skin wounds, soft tissue swelling, no drainable collection, patchy signal abnormality within the medial sesamoid suggestive of osteomyelitis vs reactive osteitis. Elevated temp and slight elevation of WBC noted today. MRI R ankle 12/17:  ulceration at the plantar surface medially with suspected exposed bone with underlying osteomyelitis involving the talus and less so of the navicular bone, no drainable fluid collection; end-stage arthritis with resultant planovalgus deformity. The presence of osteo below the ulcer in the area of the talus is likely chronic. The signal abnormality within the R medial sesamoid may represent osteomyelitis. Swab Cx results likely reflect colonization. The R heel ulcer has no surrounding erythema or warmth-- no evidence of SSTI.  ?? whether the R foot is salvageable (ABIs from 12/16 exam cd not be calculated). Of concern is vascular insufficiency, which wd have a negative impact on heeling following any surgical intervention.  W/U of chronic osteo wd include bone Bx/debridement OFF Abs for a period of time with Cx sent to determine directed Ab treatment. In this pt, the L buttock abscess is likely the cause of today's temp spike and rising WBC. Pt with L buttock abscess that is likely the cause of fever and rising WBC.    #Diabetic foot ulcer of R foot with osteo (see above)-- no evidence of acute SSTI  --d/c cefepime  --consider bone Bx OFF antibiotics   --vascular input    #L buttock abscess-- most common organisms are G+C. Vanco held today b/o elevated trough  --obtain vanco trough in AM to determine further dosing of vancomycin  --send Cx of purulent drainage  --have surgery evaluate for I+D    #DM-- management as per medical team

## 2024-12-19 NOTE — PROGRESS NOTE ADULT - SUBJECTIVE AND OBJECTIVE BOX
Interval: Patient seen resting comfortably at bedside in no acute distress. No acute overnight events noted. Patient states his weakness is improving. He reports fever yesterday, but denies chills, nausea, or vomiting.     Podiatry HPI: Podiatry consulted for 68-year-old male who presents to the ED with his wife with chief complaint of generalized weakness. PMHx of DM, HTN, ESRD on dialysis, Charcot, and HLD. Patient states he woke up very tired this morning and could not stand up to get dressed for his dialysis appointment. Per wife, patient was folded over when she called an ambulance. Patient reports history of Charcot deformity B/L deformity for 10 year and states he had a Charcot procedure for right foot in 2014. Patient states he wears Charcot boots at all times B/L. Per wife, the patient has not had an ulceration in many years until he developed this right foot ulceration 1 month ago. The wife states that she thinks the shape of her 's right foot is changing and it is affecting the way he walks. States they regularly follow up in outpatient podiatry clinic with Dr. Sim. Patient complaining of fever and chills but denies n/v.     Medications acetaminophen     Tablet .. 650 milliGRAM(s) Oral every 6 hours PRN  calcitriol   Capsule 1 MICROGram(s) Oral <User Schedule>  cefepime   IVPB 1000 milliGRAM(s) IV Intermittent once  cefepime   IVPB 1000 milliGRAM(s) IV Intermittent every 24 hours  epoetin rudolph-epbx (RETACRIT) Injectable 6000 Unit(s) IV Push <User Schedule>  heparin   Injectable 5000 Unit(s) SubCutaneous every 8 hours  insulin lispro (ADMELOG) corrective regimen sliding scale   SubCutaneous three times a day before meals  insulin lispro (ADMELOG) corrective regimen sliding scale   SubCutaneous at bedtime  latanoprost 0.005% Ophthalmic Solution 1 Drop(s) Both EYES at bedtime  ondansetron Injectable 4 milliGRAM(s) IV Push every 8 hours PRN  sevelamer carbonate 1600 milliGRAM(s) Oral three times a day with meals  vancomycin  IVPB 1250 milliGRAM(s) IV Intermittent Once  vancomycin  IVPB 1250 milliGRAM(s) IV Intermittent every 24 hours    FHFamily history of diabetes mellitus    No pertinent family history in first degree relatives    ,   PMHDiabetes    Kidney problem    Hypertension    HTN (hypertension)    DM (diabetes mellitus)    HTN (hypertension)    DM (diabetes mellitus)    ESRD (end stage renal disease) on dialysis    Charcot foot due to diabetes mellitus    ESRD on hemodialysis    Charcot foot due to diabetes mellitus    Diabetic retinopathy associated with type 2 diabetes mellitus    Hyperlipidemia    Abscess of scrotum    Hidradenitis    End stage renal disease       PSHNo significant past surgical history    S/P foot surgery    Charcot's syndrome    History of incision and drainage    Hemodialysis access, AV graft        Labs                          10.4   10.83 )-----------( 175      ( 19 Dec 2024 06:45 )             31.3      12-19    136  |  100  |  31[H]  ----------------------------<  132[H]  4.1   |  27  |  5.52[H]    Ca    8.7      19 Dec 2024 06:45  Phos  3.6     12-19  Mg     2.3     12-19       Vital Signs Last 24 Hrs  T(C): 37 (19 Dec 2024 13:35), Max: 38.5 (19 Dec 2024 06:44)  T(F): 98.6 (19 Dec 2024 13:35), Max: 101.3 (19 Dec 2024 06:44)  HR: 67 (19 Dec 2024 13:35) (64 - 78)  BP: 106/51 (19 Dec 2024 13:35) (106/51 - 124/66)  BP(mean): --  RR: 17 (19 Dec 2024 13:35) (16 - 17)  SpO2: 93% (19 Dec 2024 13:35) (93% - 95%)    Parameters below as of 19 Dec 2024 13:35  Patient On (Oxygen Delivery Method): room air      Sedimentation Rate, Erythrocyte: 105 mm/Hr (12-17-24 @ 12:35)  Sedimentation Rate, Erythrocyte: 97 mm/Hr (12-16-24 @ 11:59)         C-Reactive Protein: 160.0 mg/L (12-17-24 @ 07:28)  C-Reactive Protein: 139.0 mg/L (12-16-24 @ 11:50)   WBC Count: 10.83 K/uL *H* (12-19-24 @ 06:45)      Physical Exam:  Vasc: DP and PT pulses non-palpable b/l. TG warm to warm with no increase in temperature to right foot around wound.  Derm: Ulceration to right foot overlying talonavicular joint, measuring 1.9 x 1.8 x 0.5 with fibrotic wound base and hyperkeratotic wound edges, +epibole, -probe to bone, +tunneling by 0.5 mm circumferentially. No drainage noted.  No Fluctuance/crepitus/streaking noted. Malodor noted.  Neuro: Gross sensation diminshed   Msk: Deferred     IMAGING:    < from: US Physiol Extremity Lower 3+ Level, BI (12.16.24 @ 14:12) >    ACC: [id number] EXAM:  US PHYSIOL LWR EXT 3+ LEV BI   ORDERED BY: REYES GILBERT     PROCEDURE DATE:  12/16/2024          INTERPRETATION:  Clinical indication: Right foot nonhealing wound    COMPARISON: 5/4/2016    FINDINGS AND  IMPRESSION: There are normal amplitude biphasic pulse volume recordings   bilaterally. There are bilateral calcified, noncompressible vessels.   Therefore, ABIs could not be calculated.    --- End of Report ---    < end of copied text >    < from: Xray Foot AP + Lateral, Right (12.16.24 @ 15:24) >    ACC: [id number] EXAM:  XR FOOT 2 VIEWS RT   ORDERED BY:  MAX LAZARUS     PROCEDURE DATE:  12/16/2024          INTERPRETATION:  Right foot    HISTORY: Osteomyelitis    COMPARISON: 6/4/2015     2 views of the right foot show no evidence of acute fracture. Advanced   arthritic changes of the hindfoot are unchanged. Vascular calcifications   are present.    IMPRESSION: No acute bony pathology.    Further information may be obtained from the patient's subsequent MRI of   the right foot.        Thank you for this referral.    --- End of Report ---    < end of copied text >  < from: MR Ankle No Cont, Right (12.18.24 @ 20:26) >    ACC: [id number] EXAM:  MR ANKLE RT   ORDERED BY: REYES GILBERT     PROCEDURE DATE:  12/18/2024          INTERPRETATION:  MRI of the right ankle    INDICATION: Concern for osteomyelitis    COMPARISON: MRI of the foot performed 12/17/2024 and CT of thefoot   performed 12/16/2024.    TECHNIQUE: Multiplanar multisequence MRI of the right ankle    FINDINGS:    There is presence of advanced Charcot arthropathy with severe resultant   valgus and pes planus deformities.    There is soft tissue ulceration at the plantar surface of the foot   medially adjacent to the talus with suspected exposed bone. There is   underlying high STIR weighted signal and low T1 weighted signal within   the majority of the medial talus with imperceptible cortex. There ishigh   T2 and intermediate T1-weighted signal predominantly involving the   navicular bone at the talonavicular joint (series 10, image 21). The   remaining osseous structures are without convincing evidence of   osteomyelitis.    There is no drainable fluid collection. There is prominent soft tissue   edema at the medial and plantar side of the ankle and lesser at the   lateral ankle. There is diffuse muscle atrophy.    The included proximal portions of the Achilles tendon, anterior extensor,   medial flexor, and peroneal tendons without high-grade tear.    IMPRESSION: MRI of the right ankle demonstrates ulceration at the plantar   surface medially with suspected exposed bone with underlying   osteomyelitis involving the talus and less so ofthe navicular bone. No   drainable fluid collection.    End-stage arthritis with resultant planovalgus deformity.    --- End of Report ---    < end of copied text >      Culture:  Culture - Wound Aerobic/Anaerobic (12.16.24 @ 12:36)   - Amoxicillin/Clavulanic Acid: R >16/8  - Ampicillin: R >16 These ampicillin results predict results for amoxicillin  - Ampicillin/Sulbactam: I 16/8  - Aztreonam: S <=4  - Cefazolin: R >16  - Cefepime: S <=2  - Trimethoprim/Sulfamethoxazole: S <=0.5/9.5  - Ceftriaxone: S <=1  - Cefoxitin: S <=8  - Ciprofloxacin: R 2  - Ertapenem: S <=0.5  - Levofloxacin: R 4  - Meropenem: S <=1  - Piperacillin/Tazobactam: S <=8  Specimen Source: .Surgical Swab  Culture Results:   Moderate Providencia stuartii   Moderate Corynebacterium striatum group "Susceptibilities not performed"   Few Trueperella bernardiae "Susceptibilities not performed"  Organism Identification: Providencia stuartii  Organism: Providencia stuartii  Method Type: GWEN

## 2024-12-19 NOTE — PROGRESS NOTE ADULT - ASSESSMENT
A: Charcot arthropathy B/L  Diabetic ulceration R midfoot     P:  Patient evaluated and Chart reviewed  MRI of R ankle shows OM of talus and partially navicular  Discussed diagnosis and treatment options with the patient, including bone biopsy or partial talectomy to address bony prominence causing wound  Plan for talectomy next week to address Charcot deformity   Appreciate ANEESH/PVR - non compressible vessels B/L, normal PVR waveforms B/L   Prelim wound culture grew Providencia stuartii   Continue antibiotics per ID/IM  Continue use of CROW walkers B/L when walking   Educated patient on importance of managing fasting blood glucose levels, daily foot examinations, and proper shoe gear.  Podiatry to follow while in house  Discussed with Attending Dr. Sim   A: Charcot arthropathy B/L  Diabetic ulceration R midfoot     P:  Patient evaluated and Chart reviewed  MRI of R ankle shows OM of talus and partially navicular  Discussed diagnosis and treatment options with the patient, including bone biopsy or partial talectomy to address bony prominence causing wound  Appreciate ANEESH/PVR - non compressible vessels B/L, normal PVR waveforms B/L   Prelim wound culture grew Providencia stuartii   Continue antibiotics per ID/IM  Continue use of CROW walkers B/L when walking   Educated patient on importance of managing fasting blood glucose levels, daily foot examinations, and proper shoe gear.  Podiatry to follow while in house  Discussed with Attending Dr. Sim   A: Charcot arthropathy B/L  Diabetic ulceration R midfoot     P:  Patient evaluated and Chart reviewed  MRI of R ankle shows chronic OM of talus and partially navicular  Discussed diagnosis and treatment options with the patient  Will treat conservatively at this time - Continue antibiotics per ID/IM   Appreciate ANEESH/PVR - non compressible vessels B/L, normal PVR waveforms B/L   Prelim wound culture grew Providencia stuartii   Continue use of CROW walkers B/L when walking   Educated patient on importance of managing fasting blood glucose levels, daily foot examinations, and proper shoe gear.  Podiatry to follow while in house  Discussed with Attending Dr. Sim

## 2024-12-19 NOTE — CONSULT NOTE ADULT - TIME BILLING
--chart review, including notes/labs/Cx/imaging  --bedside evaluation  --discussion with the patient and the medical team re osteomyelitis and buttock abscess Dx/management  --coordination of care re ID issues with Dr. Juarez

## 2024-12-19 NOTE — CHART NOTE - NSCHARTNOTEFT_GEN_A_CORE
EVENT: Vancomycin Level, Trough: 25.4: ug/mL (12.19.24 @ 00:09)    BRIEF HPI: 67 yo M with PMH of  DM, HTN, ESRD on dialysis ( M, W, F), Last Dialysis 12/13/24,   B/L Charcot deformity and HLD, present to the ED for generalized weakness, chills, subjective fever. Admitted to medicine for generalized weakness, hyperkalemia, right foot ulcer. Surgical culture grew Providencia stuartii sensitivity pending. On Cefepime and Vancomycin. Anticipate clearance for discharge when definitive ABX plan established.    Vital Signs Last 24 Hrs  T(C): 36.9 (18 Dec 2024 20:24), Max: 37.6 (18 Dec 2024 09:15)  T(F): 98.4 (18 Dec 2024 20:24), Max: 99.7 (18 Dec 2024 09:15)  HR: 66 (18 Dec 2024 20:24) (66 - 72)  BP: 124/66 (18 Dec 2024 20:24) (93/45 - 149/70)  BP(mean): --  RR: 16 (18 Dec 2024 20:24) (16 - 18)  SpO2: 95% (18 Dec 2024 20:24) (88% - 100%)    Parameters below as of 18 Dec 2024 20:24  Patient On (Oxygen Delivery Method): room air    PLAN  Vanco held 12/19     FOLLOW UP: Vanc trough prior to dose on 12/20.

## 2024-12-19 NOTE — PROGRESS NOTE ADULT - PROBLEM SELECTOR PLAN 1
MRI:  ulceration at the plantar surface medially with suspected exposed bone with underlying   Surgical culture grew Providencia stuartii  Continue Cefepime and Vancomycin  Podiatry following Initial (On Arrival)

## 2024-12-19 NOTE — CONSULT NOTE ADULT - ASSESSMENT
69M with left lateral buttock collection  wct 10.8, tmax 101.3      Plan;  -bedside I&D today  -pain control prn  -surgery to follow  -remainder of care per primary team  -D/w Dr. Linton

## 2024-12-19 NOTE — PROGRESS NOTE ADULT - SUBJECTIVE AND OBJECTIVE BOX
NP Note discussed with  primary attending    Patient is a 69y old  Male who presents with a chief complaint of generalized weakness (19 Dec 2024 16:29)      INTERVAL HPI/OVERNIGHT EVENTS: no new complaints    MEDICATIONS  (STANDING):  calcitriol   Capsule 1 MICROGram(s) Oral <User Schedule>  cefepime   IVPB 1000 milliGRAM(s) IV Intermittent once  cefepime   IVPB 1000 milliGRAM(s) IV Intermittent every 24 hours  epoetin rudolph-epbx (RETACRIT) Injectable 6000 Unit(s) IV Push <User Schedule>  heparin   Injectable 5000 Unit(s) SubCutaneous every 8 hours  insulin lispro (ADMELOG) corrective regimen sliding scale   SubCutaneous three times a day before meals  insulin lispro (ADMELOG) corrective regimen sliding scale   SubCutaneous at bedtime  latanoprost 0.005% Ophthalmic Solution 1 Drop(s) Both EYES at bedtime  lidocaine 1%/epinephrine 1:100,000 Inj 20 milliLiter(s) Local Injection once  sevelamer carbonate 1600 milliGRAM(s) Oral three times a day with meals  vancomycin  IVPB 1250 milliGRAM(s) IV Intermittent Once  vancomycin  IVPB 1250 milliGRAM(s) IV Intermittent every 24 hours    MEDICATIONS  (PRN):  acetaminophen     Tablet .. 650 milliGRAM(s) Oral every 6 hours PRN Temp greater or equal to 38C (100.4F), Mild Pain (1 - 3)  ondansetron Injectable 4 milliGRAM(s) IV Push every 8 hours PRN Nausea and/or Vomiting      __________________________________________________  REVIEW OF SYSTEMS:    CONSTITUTIONAL: No fever,   RESPIRATORY: No cough; No shortness of breath  CARDIOVASCULAR: No chest pain, no palpitations  GASTROINTESTINAL: No pain. No nausea or vomiting;  NEUROLOGICAL: No headache or numbness, no tremors  MUSCULOSKELETAL: LE pain   GENITOURINARY: no dysuria,        Vital Signs Last 24 Hrs  T(C): 37 (19 Dec 2024 13:35), Max: 38.5 (19 Dec 2024 06:44)  T(F): 98.6 (19 Dec 2024 13:35), Max: 101.3 (19 Dec 2024 06:44)  HR: 67 (19 Dec 2024 13:35) (64 - 78)  BP: 106/51 (19 Dec 2024 13:35) (106/51 - 124/66)  BP(mean): --  RR: 17 (19 Dec 2024 13:35) (16 - 17)  SpO2: 93% (19 Dec 2024 13:35) (93% - 95%)    Parameters below as of 19 Dec 2024 13:35  Patient On (Oxygen Delivery Method): room air        ________________________________________________  PHYSICAL EXAM:  GENERAL: NAD  CHEST/LUNG: Clear to ausculitation bilaterally  HEART: S1 S2  regular;   ABDOMEN: Soft, Nontender, Nondistended;   EXTREMITIES: charcot deformity andres   SKIN: warm and dry; no rash  left buttock wound draining purulent discharge   NERVOUS SYSTEM:  Awake and alert; Oriented  to place, person and time ; no new deficits    _________________________________________________  LABS:                        10.4   10.83 )-----------( 175      ( 19 Dec 2024 06:45 )             31.3     12-19    136  |  100  |  31[H]  ----------------------------<  132[H]  4.1   |  27  |  5.52[H]    Ca    8.7      19 Dec 2024 06:45  Phos  3.6     12-19  Mg     2.3     12-19        Urinalysis Basic - ( 19 Dec 2024 06:45 )    Color: x / Appearance: x / SG: x / pH: x  Gluc: 132 mg/dL / Ketone: x  / Bili: x / Urobili: x   Blood: x / Protein: x / Nitrite: x   Leuk Esterase: x / RBC: x / WBC x   Sq Epi: x / Non Sq Epi: x / Bacteria: x      CAPILLARY BLOOD GLUCOSE      POCT Blood Glucose.: 180 mg/dL (19 Dec 2024 16:14)  POCT Blood Glucose.: 127 mg/dL (19 Dec 2024 11:39)  POCT Blood Glucose.: 126 mg/dL (19 Dec 2024 07:40)  POCT Blood Glucose.: 152 mg/dL (18 Dec 2024 21:06)        RADIOLOGY & ADDITIONAL TESTS:    Imaging Personally Reviewed:  YES/NO    Consultant(s) Notes Reviewed:   YES/ No    Care Discussed with Consultants :     Plan of care was discussed with patient and /or primary care giver; all questions and concerns were addressed and care was aligned with patient's wishes.

## 2024-12-19 NOTE — PROGRESS NOTE ADULT - ASSESSMENT
Patient is a 67yo Male with ESRD on HD via Rt AVG, HTN, 2HPT, Anemia, LE venous stasis with Rt foot wound presents with weakness. Pt a/w Rt foot ulcer and hyperkalemia. Nephrology consulted for ESRD status.     1) ESRD: Last HD  12/18, with intradialytic hypotension, with net 1.1L removed. Plan for next maintenance HD on 12/20.  Monitor electrolytes  2) Hyperkalemia: resolved. c/w low potassium diet.    Monitor serum potassium  3) HTN with ESRD: BP low normal for which Amlodipine was reduced to 5mg PO daily. Continue to hold Coreg 25mg PO bid. If BP remains low; consider holding Amlodipine as well. c/w low sodium diet. Monitor BP.  4) Anemia of renal disease: Hb acceptable. c/w Epogen 6000 units IV tiw. Monitor Hb.  5) 2HPT: Corrected serum calcium & serum phos acceptable.  PTHi 441. c/w calcitriol 1mcg PO MWF. Pt on Lanthanum 2000mg PO tid with meals which is unavailable at [facility]. c/w Sevelamer 1600mg PO tid with meals and  low phos diet. Monitor serum calcium and phosphorus.    HepBsAg was neg on outpt labs on 12/11/24; repeat HepBsAg neg; likely positive HepBsAg on 12/16 is an error.     [facility] NEPHROLOGY  Ardne Tsang M.D.  Freeman Holland D.O.  Janny Chirinos M.D.  MD Ambar Mccarty, MSN, ANP-C    Telephone: [phone]  Facsimile: [phone]    [facility] [place] [address], [address]  [place] [id number]

## 2024-12-19 NOTE — CONSULT NOTE ADULT - SUBJECTIVE AND OBJECTIVE BOX
HPI:  67 yo M with PMH of  DM, HTN, ESRD on dialysis ( M, W, F), Last Dialysis 12/13/24,   B/L Charcot deformity and HLD, present to the ED for generalized weakness, chills, subjective fever. Patient reports waking up this morning feeling very tired and unable to stand up to get dressed for his dialysis appointment. Collateral information obtained from wifer at bedside. As per wife, patient didnt appear to be himself this morning so she decided to call EMS. Patient reports since 2014 after having charcot procedure in bilateral foot, he has been wearing a charcot boot to walk. Patient follows up with podiatry Dr. Sim in  outpatient podiatry clinic. Patient denies any chest pain, abdominal pain, nausea, vomiting, headache lightheadedness, vision change, cough SOb   (16 Dec 2024 14:28)      PAST MEDICAL & SURGICAL HISTORY:  DM (diabetes mellitus)  type 2      ESRD (end stage renal disease) on dialysis  t-th-sat via right AV graft, uses midodrine the days of Dialysis for hypotension      Charcot foot due to diabetes mellitus      Diabetic retinopathy associated with type 2 diabetes mellitus      Hyperlipidemia      Abscess of scrotum      Hidradenitis      End stage renal disease      S/P foot surgery  right foot - 11/2014      Charcot's syndrome  Surgeries in feet and ankles   left ankle 2011 and right ankle 11/2014      History of incision and drainage  scrotal 5/2018      Hemodialysis access, AV graft          MEDS:  acetaminophen     Tablet .. 650 milliGRAM(s) Oral every 6 hours PRN  calcitriol   Capsule 1 MICROGram(s) Oral <User Schedule>  cefepime   IVPB 1000 milliGRAM(s) IV Intermittent once  cefepime   IVPB 1000 milliGRAM(s) IV Intermittent every 24 hours (D4)  epoetin rudolph-epbx (RETACRIT) Injectable 6000 Unit(s) IV Push <User Schedule>  heparin   Injectable 5000 Unit(s) SubCutaneous every 8 hours  insulin lispro (ADMELOG) corrective regimen sliding scale   SubCutaneous three times a day before meals  insulin lispro (ADMELOG) corrective regimen sliding scale   SubCutaneous at bedtime  latanoprost 0.005% Ophthalmic Solution 1 Drop(s) Both EYES at bedtime  ondansetron Injectable 4 milliGRAM(s) IV Push every 8 hours PRN  sevelamer carbonate 1600 milliGRAM(s) Oral three times a day with meals  vancomycin  IVPB 1250 milliGRAM(s) IV Intermittent Once  vancomycin  IVPB 1250 milliGRAM(s) IV Intermittent every 24 hours (D4)      ALLERGIES  penicillin (Unknown)      SOCIAL HISTORY:    FAMILY HISTORY:  Family history of diabetes mellitus        ROS:    General:	    Skin:  	  HEENT:    Respiratory and Thorax:  	  Cardiovascular:	    Abdomen:	    Genitourinary:	    Musculoskeletal:	    Neurological:	    Psychiatric:	    Hematology/Lymphatics:	    Endocrine:	    PHYSICAL EXAM:    Vital Signs Last 24 Hrs  T(C): 37.4 (19 Dec 2024 12:30), Max: 38.5 (19 Dec 2024 06:44)  T(F): 99.4 (19 Dec 2024 12:30), Max: 101.3 (19 Dec 2024 06:44)  HR: 64 (19 Dec 2024 12:30) (64 - 78)  BP: 116/65 (19 Dec 2024 12:30) (106/56 - 128/60)  BP(mean): --  RR: 16 (19 Dec 2024 12:30) (16 - 17)  SpO2: 93% (19 Dec 2024 12:30) (93% - 95%)    Parameters below as of 19 Dec 2024 12:30  Patient On (Oxygen Delivery Method): room air          Gen:    HEENT:    Neck:    Lymph Nodes:    Breasts:    Back:    Chest/Thorax:    Cardiovascular:    Gastrointestinal:    Genitourinary:    Rectal:    Extremities:    Vascular:    Neurological:    Skin:    Psychiatric:    LABS/DIAGNOSTIC TESTS:                          10.4   10.83 )-----------( 175      ( 19 Dec 2024 06:45 )             31.3     WBC Count: 10.83 K/uL (12-19 @ 06:45)  WBC Count: 7.79 K/uL (12-18 @ 09:15)      12-19    136  |  100  |  31[H]  ----------------------------<  132[H]  4.1   |  27  |  5.52[H]    Ca    8.7      19 Dec 2024 06:45  Phos  3.6     12-19  Mg     2.3     12-19        CULTURES:     Culture - Blood (collected 12-17-24 @ 12:35)  Source: .Blood BLOOD  Preliminary Report (12-18-24 @ 19:01):    No growth at 24 hours    Culture - Blood (collected 12-16-24 @ 22:30)  Source: .Blood BLOOD  Gram Stain (12-17-24 @ 16:53):    Growth in aerobic bottle: Gram Positive Cocci in Clusters  Final Report (12-18-24 @ 13:20):    Growth in aerobic bottle: Staphylococcus hominis    Isolation of Coagulase negative Staphylococcus from single blood culture    sets may represent    contamination. Contact the Microbiology Department at [phone] if    susceptibility testing is needed.    clinically indicated.    Direct identification is available within approximately 3-5    hours either by Blood Panel Multiplexed PCR or Direct    MALDI-TOF. Details: [email or web address]  Organism: Blood Culture PCR (12-18-24 @ 13:20)  Organism: Blood Culture PCR (12-18-24 @ 13:20)      Method Type: PCR      -  Coagulase negative Staphylococcus: Detec    Culture - Wound Aerobic/Anaerobic (collected 12-16-24 @ 12:36)  Source: .Surgical Swab  Preliminary Report (12-18-24 @ 21:19):    Moderate Providencia stuartii    Moderate Corynebacterium striatum group "Susceptibilities not performed"    Few Trueperella bernardiae "Susceptibilities not performed"  Organism: Providencia stuartii (12-18-24 @ 16:20)  Organism: Providencia stuartii (12-18-24 @ 16:20)      Method Type: GWEN      -  Amoxicillin/Clavulanic Acid: R >16/8      -  Ampicillin: R >16 These ampicillin results predict results for amoxicillin      -  Ampicillin/Sulbactam: I 16/8      -  Aztreonam: S <=4      -  Cefazolin: R >16      -  Cefepime: S <=2      -  Cefoxitin: S <=8      -  Ceftriaxone: S <=1      -  Ciprofloxacin: R 2      -  Ertapenem: S <=0.5      -  Levofloxacin: R 4      -  Meropenem: S <=1      -  Piperacillin/Tazobactam: S <=8      -  Trimethoprim/Sulfamethoxazole: S <=0.5/9.5        RADIOLOGY  < from: MR Foot No Cont, Right (12.17.24 @ 15:03) >  ACC: [id number] EXAM:  MR FOOT RT   ORDERED BY: SALVADOR PAREKH     PROCEDURE DATE:  12/17/2024          INTERPRETATION:  MR FOOT RIGHT dated 12/17/2024 3:03 PM    INDICATION: Pain and swelling. Skin ulcer.    COMPARISON: Foot radiographs dated 12/16/2024    TECHNIQUE: Multi-sequential, multiplanar MRI imaging of the right midfoot   and forefoot was performed per standard protocol.    FINDINGS: Degraded by motion artifact.    BONE MARROW: Flat foot deformity is noted. There is subtle marrow edema   within the medial sesamoid of the great toe.  SYNOVIUM/ JOINT FLUID: No joint effusion  TENDONS: The tendons are intact. No large tenosynovitis.  MUSCLES: Marked atrophy and edema within the intrinsic musculature the   foot.  NEUROVASCULAR STRUCTURES: Preserved  SUBCUTANEOUS SOFT TISSUES: Skin wounds are seen along the plantar surface   of the forefoot. No drainable collection is seen. There is moderate   thickening of the skin noted.    IMPRESSION: Degraded by motion artifact.    1.  Multiple skin wounds are marked soft tissue swelling concerning for   cellulitis in the proper clinical setting. No drainable collection.  2.  Suspect patchy signal abnormality within the medial sesamoid.   Correlate clinically for a skin wound in this region which would increase   the probability of osteomyelitis. Differential includes reactive osteitis.    ____________________________________________________________________    < from: MR Ankle No Cont, Right (12.18.24 @ 20:26) >  ACC: [id number] EXAM:  MR ANKLE RT   ORDERED BY: REYES GILBERT     PROCEDURE DATE:  12/18/2024          INTERPRETATION:  MRI of the right ankle    INDICATION: Concern for osteomyelitis    COMPARISON: MRI of the foot performed 12/17/2024 and CT of thefoot   performed 12/16/2024.    TECHNIQUE: Multiplanar multisequence MRI of the right ankle    FINDINGS:    There is presence of advanced Charcot arthropathy with severe resultant   valgus and pes planus deformities.    There is soft tissue ulceration at the plantar surface of the foot   medially adjacent to the talus with suspected exposed bone. There is   underlying high STIR weighted signal and low T1 weighted signal within   the majority of the medial talus with imperceptible cortex. There ishigh   T2 and intermediate T1-weighted signal predominantly involving the   navicular bone at the talonavicular joint (series 10, image 21). The   remaining osseous structures are without convincing evidence of   osteomyelitis.    There is no drainable fluid collection. There is prominent soft tissue   edema at the medial and plantar side of the ankle and lesser at the   lateral ankle. There is diffuse muscle atrophy.    The included proximal portions of the Achilles tendon, anterior extensor,   medial flexor, and peroneal tendons without high-grade tear.    IMPRESSION: MRI of the right ankle demonstrates ulceration at the plantar   surface medially with suspected exposed bone with underlying   osteomyelitis involving the talus and less so ofthe navicular bone. No   drainable fluid collection.    End-stage arthritis with resultant planovalgus deformity.    --- End of Report ---                       HPI:  69 yo M with PMH of  DM, HTN, ESRD on dialysis ( M, W, F) via RUE graft (last Dialysis 12/13/24),   B/L Charcot deformity and HLD, presented to the ED for generalized weakness, chills, subjective fever. Patient reports waking up on the morning of admission feeling very tired and unable to stand up to get dressed for his dialysis appointment. Collateral information obtained from wife at bedside. As per wife, patient didn't appear to be himself this morning so she decided to call EMS. Patient reports since 2014 after having charcot procedure in bilateral foot, he has been wearing a charcot boot to walk. Patient follows up with podiatry Dr. Sim in the outpatient podiatry clinic. Patient denies any chest pain, abdominal pain, nausea, vomiting, headache lightheadedness, vision change, cough SOB.    ID asked to evaluate pt for management of osteomyelitis. At the time of my consult, pt states both his feet have looked the same for many yrs. Pt has been following with Podiatry for yrs. Pt further states that he had an "abscess" on the R heel drained about 1 mo. ago by Dr. Sim. Pt denies fever, chills, pain in foot, and redness of foot. Remainder of Hx as above. Upon further questioning, pt has had a L buttock abscess x several wks.       PAST MEDICAL & SURGICAL HISTORY:  DM (diabetes mellitus)  type 2      ESRD (end stage renal disease) on dialysis  t-th-sat via right AV graft, uses midodrine the days of Dialysis for hypotension      Charcot foot due to diabetes mellitus      Diabetic retinopathy associated with type 2 diabetes mellitus      Hyperlipidemia      Abscess of scrotum      Hidradenitis      End stage renal disease      S/P foot surgery  right foot - 11/2014      Charcot's syndrome  Surgeries in feet and ankles   left ankle 2011 and right ankle 11/2014      History of incision and drainage  scrotal 5/2018      Hemodialysis access, AV graft          MEDS:  acetaminophen     Tablet .. 650 milliGRAM(s) Oral every 6 hours PRN  calcitriol   Capsule 1 MICROGram(s) Oral <User Schedule>  cefepime   IVPB 1000 milliGRAM(s) IV Intermittent once  cefepime   IVPB 1000 milliGRAM(s) IV Intermittent every 24 hours (D4)  epoetin rudolph-epbx (RETACRIT) Injectable 6000 Unit(s) IV Push <User Schedule>  heparin   Injectable 5000 Unit(s) SubCutaneous every 8 hours  insulin lispro (ADMELOG) corrective regimen sliding scale   SubCutaneous three times a day before meals  insulin lispro (ADMELOG) corrective regimen sliding scale   SubCutaneous at bedtime  latanoprost 0.005% Ophthalmic Solution 1 Drop(s) Both EYES at bedtime  ondansetron Injectable 4 milliGRAM(s) IV Push every 8 hours PRN  sevelamer carbonate 1600 milliGRAM(s) Oral three times a day with meals  vancomycin  IVPB 1250 milliGRAM(s) IV Intermittent Once (D4)  vancomycin  IVPB 1250 milliGRAM(s) IV Intermittent every 24 hours (D4)      ALLERGIES  penicillin (Unknown)      SOCIAL HISTORY: lives with wife; no cigs, no ETOH; retired     FAMILY HISTORY:  Family history of diabetes mellitus        ROS:    General:	no fever or chills (see HPI)    Skin: see HPI  	  HEENT: no complaints    Respiratory and Thorax: no SOB  	  Cardiovascular:	 no CP    GI: no N, V; occ loose stools    Genitourinary:	still urinates and no c/o dysuria, hematuria, urgency or frequency    Musculoskeletal:	 no complaints (see HPI)    Neurological:	no complaints    SKIN: see HPI        PHYSICAL EXAM:    Vital Signs Last 24 Hrs  T(C): 37.4 (19 Dec 2024 12:30), Max: 38.5 (19 Dec 2024 06:44)  T(F): 99.4 (19 Dec 2024 12:30), Max: 101.3 (19 Dec 2024 06:44)  HR: 64 (19 Dec 2024 12:30) (64 - 78)  BP: 116/65 (19 Dec 2024 12:30) (106/56 - 128/60)  BP(mean): --  RR: 16 (19 Dec 2024 12:30) (16 - 17)  SpO2: 93% (19 Dec 2024 12:30) (93% - 95%)    Parameters below as of 19 Dec 2024 12:30  Patient On (Oxygen Delivery Method): room air          Gen: alert, responsive and in NAD    HEENT: NC/AT; conj. clear; mouth--fair oral hygiene    Neck: supple    Lymph Nodes: no enlarged submand, cervical or supraclav LNs    Back: no CVAT    Chest/Thorax: clear to auscultation    Cardiovascular: S1S2 regular; III/VI LINDY heard throughout precordium     ABD: BS active; soft and non-tender to palpation; scar present RLQ    Genitourinary: no catheter    Extremities:  RUE: HD graft with no erythema, drainage or warmth  LLE: foot with areas of excoriation and dry skin  RLE:  ulceration overlying talonavicular bulge, measuring 1.9 x 1.8 x 0.5 with fibrotic wound base and hyperkeratotic wound edge; no surrounding erythema, no drainage, no fluctuance/crepitus/streaking noted. Malodor noted. Multiple areas of excoriation; multiple small, open areas on plantar surface;  non-tender to palpation; + foot swelling noted    Neurological: A+0x3    Skin: L buttock abscess about 2cm in diameter draining purulent material; + surrounding induration    Psychiatric: affect appropriate      LABS/DIAGNOSTIC TESTS:                          10.4   10.83 )-----------( 175      ( 19 Dec 2024 06:45 )             31.3     WBC Count: 10.83 K/uL (12-19 @ 06:45)  WBC Count: 7.79 K/uL (12-18 @ 09:15)      12-19    136  |  100  |  31[H]  ----------------------------<  132[H]  4.1   |  27  |  5.52[H]    Ca    8.7      19 Dec 2024 06:45  Phos  3.6     12-19  Mg     2.3     12-19    Sedimentation Rate, Erythrocyte (12.17.24 @ 12:35)   Sedimentation Rate, Erythrocyte: 105 mm/Hr    Vancomycin Level, Trough (12.19.24 @ 00:09)   Vancomycin Level, Trough: 25.4    CULTURES:     Culture - Blood (collected 12-17-24 @ 12:35)  Source: .Blood BLOOD  Preliminary Report (12-18-24 @ 19:01):    No growth at 24 hours    Culture - Blood (collected 12-16-24 @ 22:30)  Source: .Blood BLOOD  Gram Stain (12-17-24 @ 16:53):    Growth in aerobic bottle: Gram Positive Cocci in Clusters  Final Report (12-18-24 @ 13:20):    Growth in aerobic bottle: Staphylococcus hominis    Isolation of Coagulase negative Staphylococcus from single blood culture    sets may represent    contamination. Contact the Microbiology Department at [phone] if    susceptibility testing is needed.    clinically indicated.    Direct identification is available within approximately 3-5    hours either by Blood Panel Multiplexed PCR or Direct    MALDI-TOF. Details: [email or web address]  Organism: Blood Culture PCR (12-18-24 @ 13:20)  Organism: Blood Culture PCR (12-18-24 @ 13:20)      Method Type: PCR      -  Coagulase negative Staphylococcus: Detec    Culture - Wound Aerobic/Anaerobic (collected 12-16-24 @ 12:36)  Source: .Surgical Swab  Preliminary Report (12-18-24 @ 21:19):    Moderate Providencia stuartii    Moderate Corynebacterium striatum group "Susceptibilities not performed"    Few Trueperella bernardiae "Susceptibilities not performed"  Organism: Providencia stuartii (12-18-24 @ 16:20)  Organism: Providencia stuartii (12-18-24 @ 16:20)      Method Type: GWEN      -  Amoxicillin/Clavulanic Acid: R >16/8      -  Ampicillin: R >16 These ampicillin results predict results for amoxicillin      -  Ampicillin/Sulbactam: I 16/8      -  Aztreonam: S <=4      -  Cefazolin: R >16      -  Cefepime: S <=2      -  Cefoxitin: S <=8      -  Ceftriaxone: S <=1      -  Ciprofloxacin: R 2      -  Ertapenem: S <=0.5      -  Levofloxacin: R 4      -  Meropenem: S <=1      -  Piperacillin/Tazobactam: S <=8      -  Trimethoprim/Sulfamethoxazole: S <=0.5/9.5        RADIOLOGY  < from: MR Foot No Cont, Right (12.17.24 @ 15:03) >  ACC: [id number] EXAM:  MR FOOT RT   ORDERED BY: SALVADOR PAREKH     PROCEDURE DATE:  12/17/2024          INTERPRETATION:  MR FOOT RIGHT dated 12/17/2024 3:03 PM    INDICATION: Pain and swelling. Skin ulcer.    COMPARISON: Foot radiographs dated 12/16/2024    TECHNIQUE: Multi-sequential, multiplanar MRI imaging of the right midfoot   and forefoot was performed per standard protocol.    FINDINGS: Degraded by motion artifact.    BONE MARROW: Flat foot deformity is noted. There is subtle marrow edema   within the medial sesamoid of the great toe.  SYNOVIUM/ JOINT FLUID: No joint effusion  TENDONS: The tendons are intact. No large tenosynovitis.  MUSCLES: Marked atrophy and edema within the intrinsic musculature the   foot.  NEUROVASCULAR STRUCTURES: Preserved  SUBCUTANEOUS SOFT TISSUES: Skin wounds are seen along the plantar surface   of the forefoot. No drainable collection is seen. There is moderate   thickening of the skin noted.    IMPRESSION: Degraded by motion artifact.    1.  Multiple skin wounds are marked soft tissue swelling concerning for   cellulitis in the proper clinical setting. No drainable collection.  2.  Suspect patchy signal abnormality within the medial sesamoid.   Correlate clinically for a skin wound in this region which would increase   the probability of osteomyelitis. Differential includes reactive osteitis.    ____________________________________________________________________    < from: MR Ankle No Cont, Right (12.18.24 @ 20:26) >  ACC: [id number] EXAM:  MR ANKLE RT   ORDERED BY: REYES GILBERT     PROCEDURE DATE:  12/18/2024          INTERPRETATION:  MRI of the right ankle    INDICATION: Concern for osteomyelitis    COMPARISON: MRI of the foot performed 12/17/2024 and CT of thefoot   performed 12/16/2024.    TECHNIQUE: Multiplanar multisequence MRI of the right ankle    FINDINGS:    There is presence of advanced Charcot arthropathy with severe resultant   valgus and pes planus deformities.    There is soft tissue ulceration at the plantar surface of the foot   medially adjacent to the talus with suspected exposed bone. There is   underlying high STIR weighted signal and low T1 weighted signal within   the majority of the medial talus with imperceptible cortex. There ishigh   T2 and intermediate T1-weighted signal predominantly involving the   navicular bone at the talonavicular joint (series 10, image 21). The   remaining osseous structures are without convincing evidence of   osteomyelitis.    There is no drainable fluid collection. There is prominent soft tissue   edema at the medial and plantar side of the ankle and lesser at the   lateral ankle. There is diffuse muscle atrophy.    The included proximal portions of the Achilles tendon, anterior extensor,   medial flexor, and peroneal tendons without high-grade tear.    IMPRESSION: MRI of the right ankle demonstrates ulceration at the plantar   surface medially with suspected exposed bone with underlying   osteomyelitis involving the talus and less so ofthe navicular bone. No   drainable fluid collection.    End-stage arthritis with resultant planovalgus deformity.  _______________________________________________________________________________

## 2024-12-19 NOTE — PROGRESS NOTE ADULT - ASSESSMENT
Mr. Ramos is a 69 yo M with PMHx of ESRD on HD MWF, HTN, DM, bilateral Charcot's joint, presenting with generalized weakness for 1 day.    Found to have hyperkalemia and R foot ulcer, admitted for further workup.    # Generalized weakness  # Hyperkalemia  # R ankle OM  # ESRD on HD  # HTN  # DM  # Bilateral Charcot's joint    - continue abx with vanc and cefepime, f/u wound cx, MRI of the right ankle demonstrates ulceration at the plantar surface medially with suspected exposed bone with underlying osteomyelitis involving the talus and less so of the navicular bone.  -ANEESH: There are normal amplitude biphasic pulse volume recordings   bilaterally. There are bilateral calcified, noncompressible vessels.   Therefore, ABIs could not be calculated.  - podiatry recs appreciated  -consult ID for OM  - consult nephrology for inpatient HD  - monitor BMP  -HD per neph  - A1c 7.4, FS/SSI  - formal med rec  - DVT ppx: heparin SQ.         Total Time Spent 50 minutes This includes chart review, patient assessment, discussion and collaboration with interdisciplinary team members, excluding ACP.

## 2024-12-19 NOTE — PROGRESS NOTE ADULT - PROBLEM SELECTOR PLAN 2
left buttock open wound, draining purulent discharge, likely the source of fever   surgery consuted  plan for I&D at bedside  follow up wound culture   continue vancomycin

## 2024-12-19 NOTE — PROGRESS NOTE ADULT - SUBJECTIVE AND OBJECTIVE BOX
CC: weakness  S:  denies  CP, SOB  O:  Vital Signs Last 24 Hrs  T(C): 37.3 (12-19-24 @ 10:21), Max: 38.5 (12-19-24 @ 06:44)  T(F): 99.2 (12-19-24 @ 10:21), Max: 101.3 (12-19-24 @ 06:44)  HR: 78 (12-19-24 @ 05:23) (66 - 78)  BP: 106/56 (12-19-24 @ 05:23) (106/56 - 134/61)  RR: 16 (12-19-24 @ 05:23) (16 - 17)  SpO2: 95% (12-19-24 @ 05:23) (93% - 98%)        PE:  Gen: Oriented, alert, No acute distress Eyes: conjunctivae and lid normal, sclera clear with no icterus ENT: nose and throat exam normal CVS: S1, S2, RRR; no murmur, no rubs, no gallops Pulm: Good air exchange, Breath sounds equal bilaterally, no rales rhonchi,  no wheezes Chest: nontender, no chest deformity, chest movement symmetrical Gl: abdomen soft, nontender, nondistended, bowel sounds normoactive, no masses palpated Musk: no msk pain, BLE edematous, R foot with c/d/i dressing Skin: no skin lesions, skin turgor normal, warm and well perfused Neuro: Awake, alert,Psych: normal affect, insight

## 2024-12-20 LAB
ANION GAP SERPL CALC-SCNC: 9 MMOL/L — SIGNIFICANT CHANGE UP (ref 5–17)
BUN SERPL-MCNC: 44 MG/DL — HIGH (ref 7–18)
CALCIUM SERPL-MCNC: 8.8 MG/DL — SIGNIFICANT CHANGE UP (ref 8.4–10.5)
CHLORIDE SERPL-SCNC: 100 MMOL/L — SIGNIFICANT CHANGE UP (ref 96–108)
CO2 SERPL-SCNC: 28 MMOL/L — SIGNIFICANT CHANGE UP (ref 22–31)
CREAT SERPL-MCNC: 7.27 MG/DL — HIGH (ref 0.5–1.3)
EGFR: 8 ML/MIN/1.73M2 — LOW
GLUCOSE BLDC GLUCOMTR-MCNC: 104 MG/DL — HIGH (ref 70–99)
GLUCOSE BLDC GLUCOMTR-MCNC: 119 MG/DL — HIGH (ref 70–99)
GLUCOSE BLDC GLUCOMTR-MCNC: 93 MG/DL — SIGNIFICANT CHANGE UP (ref 70–99)
GLUCOSE BLDC GLUCOMTR-MCNC: 99 MG/DL — SIGNIFICANT CHANGE UP (ref 70–99)
GLUCOSE SERPL-MCNC: 105 MG/DL — HIGH (ref 70–99)
HCT VFR BLD CALC: 29.1 % — LOW (ref 39–50)
HGB BLD-MCNC: 9.4 G/DL — LOW (ref 13–17)
MCHC RBC-ENTMCNC: 31.9 PG — SIGNIFICANT CHANGE UP (ref 27–34)
MCHC RBC-ENTMCNC: 32.3 G/DL — SIGNIFICANT CHANGE UP (ref 32–36)
MCV RBC AUTO: 98.6 FL — SIGNIFICANT CHANGE UP (ref 80–100)
NRBC # BLD: 0 /100 WBCS — SIGNIFICANT CHANGE UP (ref 0–0)
PLATELET # BLD AUTO: 179 K/UL — SIGNIFICANT CHANGE UP (ref 150–400)
POTASSIUM SERPL-MCNC: 4.4 MMOL/L — SIGNIFICANT CHANGE UP (ref 3.5–5.3)
POTASSIUM SERPL-SCNC: 4.4 MMOL/L — SIGNIFICANT CHANGE UP (ref 3.5–5.3)
RBC # BLD: 2.95 M/UL — LOW (ref 4.2–5.8)
RBC # FLD: 14.3 % — SIGNIFICANT CHANGE UP (ref 10.3–14.5)
SODIUM SERPL-SCNC: 137 MMOL/L — SIGNIFICANT CHANGE UP (ref 135–145)
VANCOMYCIN TROUGH SERPL-MCNC: 21.7 UG/ML — HIGH (ref 10–20)
WBC # BLD: 10.28 K/UL — SIGNIFICANT CHANGE UP (ref 3.8–10.5)
WBC # FLD AUTO: 10.28 K/UL — SIGNIFICANT CHANGE UP (ref 3.8–10.5)

## 2024-12-20 PROCEDURE — 99232 SBSQ HOSP IP/OBS MODERATE 35: CPT

## 2024-12-20 PROCEDURE — 99232 SBSQ HOSP IP/OBS MODERATE 35: CPT | Mod: FS

## 2024-12-20 RX ORDER — VANCOMYCIN HYDROCHLORIDE 5 G/100ML
750 INJECTION, POWDER, LYOPHILIZED, FOR SOLUTION INTRAVENOUS
Refills: 0 | Status: COMPLETED | OUTPATIENT
Start: 2024-12-20 | End: 2024-12-20

## 2024-12-20 RX ADMIN — SEVELAMER CARBONATE 1600 MILLIGRAM(S): 800 TABLET, FILM COATED ORAL at 13:34

## 2024-12-20 RX ADMIN — SEVELAMER CARBONATE 1600 MILLIGRAM(S): 800 TABLET, FILM COATED ORAL at 08:05

## 2024-12-20 RX ADMIN — Medication 100 MILLIGRAM(S): at 04:01

## 2024-12-20 RX ADMIN — LATANOPROST 1 DROP(S): 50 SOLUTION OPHTHALMIC at 21:41

## 2024-12-20 RX ADMIN — VANCOMYCIN HYDROCHLORIDE 250 MILLIGRAM(S): 5 INJECTION, POWDER, LYOPHILIZED, FOR SOLUTION INTRAVENOUS at 17:03

## 2024-12-20 RX ADMIN — HEPARIN SODIUM 5000 UNIT(S): 1000 INJECTION, SOLUTION INTRAVENOUS; SUBCUTANEOUS at 06:19

## 2024-12-20 RX ADMIN — CALCITRIOL 1 MICROGRAM(S): 0.5 CAPSULE, LIQUID FILLED ORAL at 08:05

## 2024-12-20 RX ADMIN — EPOETIN ALFA 6000 UNIT(S): 2000 SOLUTION INTRAVENOUS; SUBCUTANEOUS at 10:07

## 2024-12-20 RX ADMIN — HEPARIN SODIUM 5000 UNIT(S): 1000 INJECTION, SOLUTION INTRAVENOUS; SUBCUTANEOUS at 13:36

## 2024-12-20 RX ADMIN — HEPARIN SODIUM 5000 UNIT(S): 1000 INJECTION, SOLUTION INTRAVENOUS; SUBCUTANEOUS at 21:40

## 2024-12-20 RX ADMIN — SEVELAMER CARBONATE 1600 MILLIGRAM(S): 800 TABLET, FILM COATED ORAL at 17:02

## 2024-12-20 NOTE — PROGRESS NOTE ADULT - ASSESSMENT
69 yo M with PMH of  DM, HTN, ESRD on dialysis ( M, W, F) via RUE graft (last Dialysis 12/13/24),  B/L Charcot deformity and HLD, presented to the ED for generalized weakness, chills. Patient reports since 2014 after having charcot procedure in bilateral foot, he has been wearing a charcot boot to walk. Patient follows up with podiatry Dr. Sim in the outpatient podiatry clinic. At the time of my consult, pt states both his feet have looked the same for many yrs. and has been following with Podiatry for yrs. Pt further states that he had an "abscess" on the R heel drained about 1 mo. ago by Dr. Sim. BC's with coag negative Staph c/w a contaminant. MRI R foot 12/17: multiple skin wounds, soft tissue swelling, no drainable collection, patchy signal abnormality within the medial sesamoid suggestive of osteomyelitis vs reactive osteitis. Elevated temp and slight elevation of WBC noted today. MRI R ankle 12/17:  ulceration at the plantar surface medially with suspected exposed bone with underlying osteomyelitis involving the talus and less so of the navicular bone, no drainable fluid collection; end-stage arthritis with resultant planovalgus deformity. The presence of osteo below the ulcer in the area of the talus is likely chronic. The signal abnormality within the R medial sesamoid may represent osteomyelitis. The R heel ulcer had no surrounding erythema or warmth-- no evidence of SSTI. Swab Cx results likely reflect colonization, especially in view of yesterday's discussion with Dr. Sim.   ?? whether the R foot is salvageable (ABIs from 12/16 exam cd not be calculated). Of concern is vascular insufficiency, which wd have a negative impact on heeling following any surgical intervention.  W/U of chronic osteo wd include bone Bx/debridement OFF Abs for a period of time with Cx sent to determine directed Ab treatment. In this pt, the L buttock abscess is likely the cause of yesterday's temp spike and mildly elevated WBC. WBC appears to be decreasing and is now normal. No further febrile episodes to date. I+D of gluteal abscess done yesterday with Cx sent.  Pt to receive 1 more dose of vanco 750 mg today after HD, which will be sufficient to Rx the most likely cause of abscess (G+C). No further Abs recommended thereafter.     #Diabetic foot ulcer of R foot with osteo (see above)-- no evidence of acute SSTI  --d/c cefepime  --consider bone Bx OFF antibiotics   --vascular input    #L buttock abscess-- most common organisms are G+C.   --vanco 750mg IV x1    #DM-- management as per medical team    Chart reviewed, including additional notes/labs/Cx; pt re-evaluated at the bedside; discussed the DX/management of SSTI with the patient and medical team; provided coordination of care re ID issues with Dr. Juarez.     Please call again if needed.              67 yo M with PMH of  DM, HTN, ESRD on dialysis ( M, W, F) via RUE graft (last Dialysis 12/13/24),  B/L Charcot deformity and HLD, presented to the ED for generalized weakness, chills. Patient reports since 2014 after having charcot procedure in bilateral foot, he has been wearing a charcot boot to walk. Patient follows up with podiatry Dr. Sim in the outpatient podiatry clinic. At the time of my consult, pt states both his feet have looked the same for many yrs. and has been following with Podiatry for yrs. Pt further states that he had an "abscess" on the R heel drained about 1 mo. ago by Dr. Sim. BC's with coag negative Staph c/w a contaminant. MRI R foot 12/17: multiple skin wounds, soft tissue swelling, no drainable collection, patchy signal abnormality within the medial sesamoid suggestive of osteomyelitis vs reactive osteitis. Elevated temp and slight elevation of WBC noted today. MRI R ankle 12/17:  ulceration at the plantar surface medially with suspected exposed bone with underlying osteomyelitis involving the talus and less so of the navicular bone, no drainable fluid collection; end-stage arthritis with resultant planovalgus deformity. The presence of osteo below the ulcer in the area of the talus is likely chronic. The signal abnormality within the R medial sesamoid may represent osteomyelitis. The R heel ulcer had no surrounding erythema or warmth-- no evidence of SSTI. Swab Cx results likely reflect colonization, especially in view of yesterday's discussion with Dr. Sim.   ?? whether the R foot is salvageable (ABIs from 12/16 exam cd not be calculated). Of concern is vascular insufficiency, which wd have a negative impact on heeling following any surgical intervention.  W/U of chronic osteo wd include bone Bx/debridement OFF Abs for a period of time with Cx sent to determine directed Ab treatment. In this pt, the L buttock abscess is likely the cause of yesterday's temp spike and mildly elevated WBC. WBC appears to be decreasing and is now normal. No further febrile episodes to date. I+D of gluteal abscess done yesterday with Cx sent.  Pt to receive 1 more dose of vanco 750 mg today after HD, which will be sufficient to Rx the most likely cause of abscess (G+C). No further Abs recommended thereafter.     #Diabetic foot ulcer of R foot with osteo (see above)-- no evidence of acute SSTI  --d/c cefepime  --consider bone Bx OFF antibiotics   --vascular input    #L buttock abscess-- most common organisms are G+C.   --vanco 750mg IV x1    #DM-- management as per medical team      Chart reviewed, including additional notes/labs/Cx; pt re-evaluated at the bedside; discussed the DX/management of SSTI with the patient and medical team; provided coordination of care re ID issues with Dr. Shine.

## 2024-12-20 NOTE — PROGRESS NOTE ADULT - SUBJECTIVE AND OBJECTIVE BOX
NP Note discussed with  primary attending    Patient is a 69y old  Male who presents with a chief complaint of generalized weakness (20 Dec 2024 09:40)      INTERVAL HPI/OVERNIGHT EVENTS: no new complaints    MEDICATIONS  (STANDING):  amLODIPine   Tablet 5 milliGRAM(s) Oral daily  calcitriol   Capsule 1 MICROGram(s) Oral <User Schedule>  epoetin rudolph-epbx (RETACRIT) Injectable 6000 Unit(s) IV Push <User Schedule>  heparin   Injectable 5000 Unit(s) SubCutaneous every 8 hours  insulin lispro (ADMELOG) corrective regimen sliding scale   SubCutaneous three times a day before meals  insulin lispro (ADMELOG) corrective regimen sliding scale   SubCutaneous at bedtime  latanoprost 0.005% Ophthalmic Solution 1 Drop(s) Both EYES at bedtime  sevelamer carbonate 1600 milliGRAM(s) Oral three times a day with meals  vancomycin  IVPB 750 milliGRAM(s) IV Intermittent <User Schedule>    MEDICATIONS  (PRN):  acetaminophen     Tablet .. 650 milliGRAM(s) Oral every 6 hours PRN Temp greater or equal to 38C (100.4F), Mild Pain (1 - 3)  ondansetron Injectable 4 milliGRAM(s) IV Push every 8 hours PRN Nausea and/or Vomiting      __________________________________________________  REVIEW OF SYSTEMS:    CONSTITUTIONAL: No fever,   EYES: no acute visual disturbances  NECK: No pain or stiffness  RESPIRATORY: No cough; No shortness of breath  CARDIOVASCULAR: No chest pain, no palpitations  GASTROINTESTINAL: No pain. No nausea or vomiting; No diarrhea   NEUROLOGICAL: No headache or numbness, no tremors  MUSCULOSKELETAL: No joint pain, no muscle pain  GENITOURINARY: no dysuria, no frequency, no hesitancy  PSYCHIATRY: no depression , no anxiety  ALL OTHER  ROS negative        Vital Signs Last 24 Hrs  T(C): 36.3 (20 Dec 2024 09:00), Max: 37.1 (20 Dec 2024 05:33)  T(F): 97.4 (20 Dec 2024 09:00), Max: 98.8 (20 Dec 2024 05:33)  HR: 64 (20 Dec 2024 09:00) (61 - 68)  BP: 110/53 (20 Dec 2024 09:00) (102/61 - 110/53)  BP(mean): 72 (20 Dec 2024 05:33) (72 - 72)  RR: 17 (20 Dec 2024 09:00) (16 - 18)  SpO2: 93% (20 Dec 2024 09:00) (93% - 94%)    Parameters below as of 20 Dec 2024 09:00  Patient On (Oxygen Delivery Method): room air        ________________________________________________  PHYSICAL EXAM:  GENERAL: NAD  CHEST/LUNG: Clear to ausculitation bilaterally  HEART: S1 S2  regular; no murmurs, gallops or rubs  ABDOMEN: Soft, Nontender, Nondistended; Bowel sounds present  EXTREMITIES charcot deformities, right foot wound   SKIN: left buttock wound, +draining small amount of serosanguinous/purulent drainage   NERVOUS SYSTEM:  Awake and alert; Oriented  to place, person and time ; no new deficits    _________________________________________________  LABS:                        9.4    10.28 )-----------( 179      ( 20 Dec 2024 06:45 )             29.1     12-20    137  |  100  |  44[H]  ----------------------------<  105[H]  4.4   |  28  |  7.27[H]    Ca    8.8      20 Dec 2024 06:45  Phos  3.6     12-19  Mg     2.3     12-19        Urinalysis Basic - ( 20 Dec 2024 06:45 )    Color: x / Appearance: x / SG: x / pH: x  Gluc: 105 mg/dL / Ketone: x  / Bili: x / Urobili: x   Blood: x / Protein: x / Nitrite: x   Leuk Esterase: x / RBC: x / WBC x   Sq Epi: x / Non Sq Epi: x / Bacteria: x      CAPILLARY BLOOD GLUCOSE      POCT Blood Glucose.: 99 mg/dL (20 Dec 2024 07:42)  POCT Blood Glucose.: 141 mg/dL (19 Dec 2024 21:20)  POCT Blood Glucose.: 180 mg/dL (19 Dec 2024 16:14)        RADIOLOGY & ADDITIONAL TESTS:    Imaging Personally Reviewed:  YES/NO    Consultant(s) Notes Reviewed:   YES/ No    Care Discussed with Consultants :     Plan of care was discussed with patient and /or primary care giver; all questions and concerns were addressed and care was aligned with patient's wishes.     NP Note discussed with  primary attending    Patient is a 69y old  Male who presents with a chief complaint of generalized weakness (20 Dec 2024 09:40)      INTERVAL HPI/OVERNIGHT EVENTS: no new complaints    MEDICATIONS  (STANDING):  amLODIPine   Tablet 5 milliGRAM(s) Oral daily  calcitriol   Capsule 1 MICROGram(s) Oral <User Schedule>  epoetin rudolph-epbx (RETACRIT) Injectable 6000 Unit(s) IV Push <User Schedule>  heparin   Injectable 5000 Unit(s) SubCutaneous every 8 hours  insulin lispro (ADMELOG) corrective regimen sliding scale   SubCutaneous three times a day before meals  insulin lispro (ADMELOG) corrective regimen sliding scale   SubCutaneous at bedtime  latanoprost 0.005% Ophthalmic Solution 1 Drop(s) Both EYES at bedtime  sevelamer carbonate 1600 milliGRAM(s) Oral three times a day with meals  vancomycin  IVPB 750 milliGRAM(s) IV Intermittent <User Schedule>    MEDICATIONS  (PRN):  acetaminophen     Tablet .. 650 milliGRAM(s) Oral every 6 hours PRN Temp greater or equal to 38C (100.4F), Mild Pain (1 - 3)  ondansetron Injectable 4 milliGRAM(s) IV Push every 8 hours PRN Nausea and/or Vomiting      __________________________________________________  REVIEW OF SYSTEMS:    CONSTITUTIONAL: No fever,   RESPIRATORY: No cough; No shortness of breath  CARDIOVASCULAR: No chest pain, no palpitations  GASTROINTESTINAL: No pain. No nausea or vomiting; No diarrhea   NEUROLOGICAL: No headache or numbness, no tremors  MUSCULOSKELETAL: No joint pain, no muscle pain  GENITOURINARY: no dysuria, no frequency, no hesitancy        Vital Signs Last 24 Hrs  T(C): 36.3 (20 Dec 2024 09:00), Max: 37.1 (20 Dec 2024 05:33)  T(F): 97.4 (20 Dec 2024 09:00), Max: 98.8 (20 Dec 2024 05:33)  HR: 64 (20 Dec 2024 09:00) (61 - 68)  BP: 110/53 (20 Dec 2024 09:00) (102/61 - 110/53)  BP(mean): 72 (20 Dec 2024 05:33) (72 - 72)  RR: 17 (20 Dec 2024 09:00) (16 - 18)  SpO2: 93% (20 Dec 2024 09:00) (93% - 94%)    Parameters below as of 20 Dec 2024 09:00  Patient On (Oxygen Delivery Method): room air        ________________________________________________  PHYSICAL EXAM:  GENERAL: NAD  CHEST/LUNG: Clear to ausculitation bilaterally  HEART: S1 S2  regular; no murmurs, gallops or rubs  ABDOMEN: Soft, Nontender, Nondistended; Bowel sounds present  EXTREMITIES charcot deformities, right foot wound   SKIN: left buttock wound, +draining small amount of serosanguinous/purulent drainage   NERVOUS SYSTEM:  Awake and alert; Oriented  to place, person and time ; no new deficits    _________________________________________________  LABS:                        9.4    10.28 )-----------( 179      ( 20 Dec 2024 06:45 )             29.1     12-20    137  |  100  |  44[H]  ----------------------------<  105[H]  4.4   |  28  |  7.27[H]    Ca    8.8      20 Dec 2024 06:45  Phos  3.6     12-19  Mg     2.3     12-19        Urinalysis Basic - ( 20 Dec 2024 06:45 )    Color: x / Appearance: x / SG: x / pH: x  Gluc: 105 mg/dL / Ketone: x  / Bili: x / Urobili: x   Blood: x / Protein: x / Nitrite: x   Leuk Esterase: x / RBC: x / WBC x   Sq Epi: x / Non Sq Epi: x / Bacteria: x      CAPILLARY BLOOD GLUCOSE      POCT Blood Glucose.: 99 mg/dL (20 Dec 2024 07:42)  POCT Blood Glucose.: 141 mg/dL (19 Dec 2024 21:20)  POCT Blood Glucose.: 180 mg/dL (19 Dec 2024 16:14)        RADIOLOGY & ADDITIONAL TESTS:    Imaging Personally Reviewed:  YES/NO    Consultant(s) Notes Reviewed:   YES/ No    Care Discussed with Consultants :     Plan of care was discussed with patient and /or primary care giver; all questions and concerns were addressed and care was aligned with patient's wishes.

## 2024-12-20 NOTE — DISCHARGE NOTE PROVIDER - NSDCFUADDAPPT_GEN_ALL_CORE_FT
APPTS ARE READY TO BE MADE: [X] YES    Best Family or Patient Contact (if needed):    Additional Information about above appointments (if needed):    1: PCP (Jagruti Quintero)   2:   3:     Other comments or requests:   APPTS ARE READY TO BE MADE: [X] YES    Best Family or Patient Contact (if needed):    Additional Information about above appointments (if needed):    1: PCP (Jagruti Quintero)   2:   3:     Other comments or requests:  Patient is being discharged to [facility]. Patient/caregiver will arrange follow up appointments.

## 2024-12-20 NOTE — DISCHARGE NOTE PROVIDER - NSDCCPCAREPLAN_GEN_ALL_CORE_FT
PRINCIPAL DISCHARGE DIAGNOSIS  Diagnosis: Diabetic ulcer of right foot  Assessment and Plan of Treatment: MR right Ankle demonstrated ulceration at the plantar surface medially with suspected exposed bone with underlying osteomyelitis involving the talus and less so often navicular bone. You were evaluated by Podiatry team. You were treated with antibiotic  OM likely chronic, recommended  bone Biopsy/debridement off antibiotic for a period of time with Culture  to determine directed antibiotic treatment. Plase follow up with Dr Sim for further managment         SECONDARY DISCHARGE DIAGNOSES  Diagnosis: Gluteal abscess  Assessment and Plan of Treatment: You were evaluated by surgical team. Left buttock wound was drained at bedside. You were treated with antibiotic.  No further antibiotic treatment is recommended  Follow up with your doctor for reevaluation       Diagnosis: ESRD on dialysis  Assessment and Plan of Treatment: COntinue Hemodialysis as scheduled   Avoid taking (NSAIDs) - (ex: Ibuprofen, Advil, Celebrex, Naprosyn)  Avoid taking any nephrotoxic agents (can harm kidneys) - Intravenous contrast for diagnostic testing, combination cold medications.  Have all medications adjusted for your renal function by your Health Care Provider.  Blood pressure control is important.  Take all medication as prescribed.    Diagnosis: Diabetes  Assessment and Plan of Treatment: Continue your medications as prescribed by your doctor   Make sure you get your HgA1c checked every three months.  Check your blood glucose before meals and at bedtime.  It's important not to skip any meals.  Keep a log of your blood glucose results and always take it with you to your doctor appointments.  Keep a list of your current medications including injectables and over the counter medications and bring this medication list with you to all your doctor appointments.  If you have not seen your ophthalmologist this year call for appointment.  Check your feet daily for redness, sores, or openings. Do not self treat. If no improvement in two days call your primary care physician for an appointment.  Low blood sugar (hypoglycemia) is a blood sugar below 70mg/dl. Check your blood sugar if you feel signs/symptoms of hypoglycemia. If your blood sugar is below 70 take 15 grams of carbohydrates (ex 4 oz of apple juice, 3-4 glucose tablets, or 4-6 oz of regular soda) wait 15 minutes and repeat blood sugar to make sure it comes up above 70.  If your blood sugar is above 70 and you are due for a meal, have a meal.  If you are not due for a meal have a snack.  This snack helps keeps your blood sugar at a safe range.    Diagnosis: HLD (hyperlipidemia)  Assessment and Plan of Treatment: Continue medications and follow up with martell lala     PRINCIPAL DISCHARGE DIAGNOSIS  Diagnosis: Septic shock  Assessment and Plan of Treatment: You developed septic shock during your hospital stay, which is a very serious condition resulting from the presence of harmful micro-organisms in the blood or other tissues and the body's response to their presence, potentially leading to the malfunctioning of various organs, shock and death. You had sepstic shock secondary to bacteremia, gluteal abscess, scrotal abscess and Influenza A. You were transferred to the intensive care unit (ICU) for peripheral vasopressors, to maintain your blood pressure. You were weaned off of IV vasopressors to oral midodrine 5mg three times daily and downgraded to the medicine floor. You were seen by podiatry, surgery, urology and infectious disease doctor and their recommendations were followed. Multiple blood cultures show clearance of bactermia. You will be discharged on IV CEFEPIME 2G WITH DIALYSIS AND FLAGYL 500MG TWICE DAILY UNTIL 2/27.    While at rehab, please obtain weekly labs CBC w/diff, CMP, ESR and CRP to monitor your response to the antibotics. Please follow up with your primary care provider upon discharge for further management and recommendations.      SECONDARY DISCHARGE DIAGNOSES  Diagnosis: Scrotal abscess  Assessment and Plan of Treatment: You were found to have a scrotal abscess. CT of your pelvis showed a 2x1x0.8cm abscess in right scrotum. You had a tract, from your prior surgery, that allowed the pus to drain from the abscess. You were seen by a urologist and infectious disease doctor and their recommendations were followed. You were started on IV antibiotics (vancomycin, meropenem). Surveillance CT scan showed a smaller scrotal abscess (1.5cm). Urologist recommends daily wound care until you follow up with your outpatient urologist Dr. Judge for possible surgical intervention.  You will be discharged on IV CEFEPIME 2G WITH DIALYSIS AND FLAGYL 500MG TWICE DAILY UNTIL 2/27.    Wound care as follows: Squeeze out purulence from right scrotum until serosanguinous and wipe with 4x4 gauze, place fresh 4x4 gauze overlying the drainage tract  While at rehab, please obtain weekly labs CBC w/diff, CMP, ESR and CRP to monitor your response to the antibotics.    Diagnosis: Hypertension  Assessment and Plan of Treatment: You have history of hypertension. Your home amlodipine was held in the setting of septic shock. You continue to have low blood pressure in the setting of ongoing infection. PLEASE STOP TAKING AMLODIPINE upon discharge. CONTINUE TO TAKE MIDODRINE 5MG THREE TIMES DAILY to maintain appropriate blood pressure and taper the dose down per your primary care doctor's recommendation.    Diagnosis: Gluteal abscess  Assessment and Plan of Treatment: You were found to have an abscess on your left buttock. You were seen the surgery team and the abscess was drained. As per infectious disease recommendations, you were treated with IV antibiotics. No further antibiotic treatment is recommended for the buttock abscess. Your buttock no longer has any pus draining and is underoing healing.   Continue to wound care for the incision&drainage site as follows: clean with chlorohexidine, applay aquacel + foam dressing daily    Diagnosis: Diabetic ulcer of right foot  Assessment and Plan of Treatment: You have history of bilateral charcot's deformity complicated by diabetic ulcer on your right foot. MRI of your right ankle demonstrated ulceration at the plantar surface medially with suspected exposed bone with underlying osteomyelitis involving the talus and less so often navicular bone. You were evaluated by the podiatry team and infectious disease doctor. Osteomyelitis is likely chronic and it is recommended that you get a bone biopsy and debridement outpatient, after you have been off of antibotics for at least 3 weeks. Please follow up with Dr. Sim upon discharge for further managment and recommendations.  Please continue to use CROW walkers on both feet when walking to prevent worsening ulcerations.  Continue to care for your foot as follows: Betadine, 4x4 gauze, abd pad, laura, and light ACE wrapping to be applied to right foot and changed twice a week.    Diagnosis: ESRD on dialysis  Assessment and Plan of Treatment: You have history of end-stage renal diseae on hemodialysis Monday/Wednesday/Friday. You were continued on your dialysis schedule thoughout your hospital stay. You were seen by a nephrologist and their recommendations were followed.  Avoid taking (NSAIDs) - (ex: Ibuprofen, Advil, Celebrex, Naproxen) and other medications that could be toxic to your kidneys (such as intravenous contrast for diagnostic testing, combination cold medications.). Please continue to receive dialysis per your regular schedule and take  1. Sevelamer 800mg three times a day with meals   2. Calcitrol 1mcg on dialysis days   to prevent electrolyte abnormalities.    Diagnosis: Diabetes  Assessment and Plan of Treatment: You have history of insulin dependent diabetes and report using lantus and novolog. Your A1C, a three month average of your sugar, on this admission was 7.4. Given your acute illness, your sugar was managed with as needed insulin. You need to continue monitoring your blood sugar levels closely and maintain healthy lifestyle by eating healthy diabetic regimen and weight loss.  Please continue to take your insulin before meals and at bedtime per the insulin sliding scale:   1 Unit(s) if Glucose 151 - 200  2 Unit(s) if Glucose 201 - 250  3 Unit(s) if Glucose 251 - 300  4 Unit(s) if Glucose 301 - 350  5 Unit(s) if Glucose 351 - 400  6 Unit(s) if Glucose Greater Than 400  Please follow up with your primary care provider/Endocrinologist within a week of discharge.    Diagnosis: Influenza A  Assessment and Plan of Treatment: You were found to have Influenza A during the hospital stay. You were treated with renally-dosed Tamiflu. You no longer have any upper respiratory symptoms. Your chest xray did not show signs of pneumonia.    Diagnosis: HLD (hyperlipidemia)  Assessment and Plan of Treatment: Continue medications and follow up with davi lala     PRINCIPAL DISCHARGE DIAGNOSIS  Diagnosis: Septic shock  Assessment and Plan of Treatment: You developed septic shock during your hospital stay, which is a very serious condition resulting from the presence of harmful micro-organisms in the blood or other tissues and the body's response to their presence, potentially leading to the malfunctioning of various organs, shock and death. You had sepstic shock secondary to bacteremia, gluteal abscess, scrotal abscess and Influenza A. You were transferred to the intensive care unit (ICU) for peripheral vasopressors, to maintain your blood pressure. You were weaned off of IV vasopressors to oral midodrine 5mg three times daily and downgraded to the medicine floor. You were seen by podiatry, surgery, urology and infectious disease doctor and their recommendations were followed. Multiple blood cultures show clearance of bactermia. You will be discharged on IV CEFEPIME 2G WITH DIALYSIS AND FLAGYL 500MG TWICE DAILY UNTIL 2/27.  CONTINUE TO TAKE MIDODRINE 5MG THREE TIMES DAILY and wean off per your doctor.   While at rehab, please obtain weekly labs CBC w/diff, CMP, ESR and CRP to monitor your response to the antibotics. Please follow up with your primary care provider upon discharge for further management and recommendations.      SECONDARY DISCHARGE DIAGNOSES  Diagnosis: Scrotal abscess  Assessment and Plan of Treatment: You were found to have a scrotal abscess. CT of your pelvis showed a 2x1x0.8cm abscess in right scrotum. You had a tract, from your prior surgery, that allowed the pus to drain from the abscess. You were seen by a urologist and infectious disease doctor and their recommendations were followed. You were started on IV antibiotics (vancomycin, meropenem). Surveillance CT scan showed a smaller scrotal abscess (1.5cm). Urologist recommends daily wound care until you follow up with your outpatient urologist Dr. Judge for possible surgical intervention.  You will be discharged on IV CEFEPIME 2G WITH DIALYSIS AND FLAGYL 500MG TWICE DAILY UNTIL 2/27.    Wound care as follows: Squeeze out purulence from right scrotum until serosanguinous and wipe with 4x4 gauze, place fresh 4x4 gauze overlying the drainage tract  While at rehab, please obtain weekly labs CBC w/diff, CMP, ESR and CRP to monitor your response to the antibotics.    Diagnosis: Hypertension  Assessment and Plan of Treatment: You have history of hypertension. Your home amlodipine, hydralazine and carvedilol was held in the setting of septic shock. You continue to have low blood pressure in the setting of ongoing infection. PLEASE STOP TAKING AMLODIPINE, CARVEDILOL AND HYDRALAZINE upon discharge. CONTINUE TO TAKE MIDODRINE 5MG THREE TIMES DAILY to maintain appropriate blood pressure and taper the dose down per your primary care doctor's recommendation.    Diagnosis: Gluteal abscess  Assessment and Plan of Treatment: You were found to have an abscess on your left buttock. You were seen the surgery team and the abscess was drained. As per infectious disease recommendations, you were treated with IV antibiotics. No further antibiotic treatment is recommended for the buttock abscess. Your buttock no longer has any pus draining and is underoing healing.   Continue to wound care for the incision&drainage site as follows: clean with chlorohexidine, applay aquacel + foam dressing daily    Diagnosis: Diabetic ulcer of right foot  Assessment and Plan of Treatment: You have history of bilateral charcot's deformity complicated by diabetic ulcer on your right foot. MRI of your right ankle demonstrated ulceration at the plantar surface medially with suspected exposed bone with underlying osteomyelitis involving the talus and less so often navicular bone. You were evaluated by the podiatry team and infectious disease doctor. Osteomyelitis is likely chronic and it is recommended that you get a bone biopsy and debridement outpatient, after you have been off of antibotics for at least 3 weeks. Please follow up with Dr. Sim upon discharge for further managment and recommendations.  Please continue to use CROW walkers on both feet when walking to prevent worsening ulcerations.  Continue to care for your foot as follows: Betadine, 4x4 gauze, abd pad, laura, and light ACE wrapping to be applied to right foot and changed twice a week.    Diagnosis: ESRD on dialysis  Assessment and Plan of Treatment: You have history of end-stage renal diseae on hemodialysis Monday/Wednesday/Friday. You were continued on your dialysis schedule thoughout your hospital stay. You were seen by a nephrologist and their recommendations were followed.  Avoid taking (NSAIDs) - (ex: Ibuprofen, Advil, Celebrex, Naproxen) and other medications that could be toxic to your kidneys (such as intravenous contrast for diagnostic testing, combination cold medications.). Please continue to receive dialysis per your regular schedule and take  1. Sevelamer 800mg three times a day with meals   2. Calcitrol 1mcg on dialysis days   to prevent electrolyte abnormalities.    Diagnosis: Diabetes  Assessment and Plan of Treatment: You have history of insulin dependent diabetes and report using lantus and novolog. Your A1C, a three month average of your sugar, on this admission was 7.4. Given your acute illness, your sugar was managed with as needed insulin. You need to continue monitoring your blood sugar levels closely and maintain healthy lifestyle by eating healthy diabetic regimen and weight loss.  Please continue to take your insulin before meals and at bedtime per the insulin sliding scale:   1 Unit(s) if Glucose 151 - 200  2 Unit(s) if Glucose 201 - 250  3 Unit(s) if Glucose 251 - 300  4 Unit(s) if Glucose 301 - 350  5 Unit(s) if Glucose 351 - 400  6 Unit(s) if Glucose Greater Than 400  Please follow up with your primary care provider/Endocrinologist within a week of discharge.    Diagnosis: Influenza A  Assessment and Plan of Treatment: You were found to have Influenza A during the hospital stay. You were treated with renally-dosed Tamiflu. You no longer have any upper respiratory symptoms. Your chest xray did not show signs of pneumonia.    Diagnosis: HLD (hyperlipidemia)  Assessment and Plan of Treatment: Continue medications and follow up with [facility]      PRINCIPAL DISCHARGE DIAGNOSIS  Diagnosis: Septic shock  Assessment and Plan of Treatment: You developed septic shock during your hospital stay, which is a very serious condition resulting from the presence of harmful micro-organisms in the blood or other tissues and the body's response to their presence, potentially leading to the malfunctioning of various organs, shock and death. You had sepstic shock secondary to bacteremia, gluteal abscess, scrotal abscess and Influenza A. You were transferred to the intensive care unit (ICU) for peripheral vasopressors, to maintain your blood pressure. You were weaned off of IV vasopressors to oral midodrine 5mg three times daily and downgraded to the medicine floor. You were seen by podiatry, surgery, urology and infectious disease doctor and their recommendations were followed. Multiple blood cultures show clearance of bactermia. You will be discharged on IV CEFEPIME 2G WITH DIALYSIS (M/w/f) AND FLAGYL 500MG TWICE DAILY UNTIL 2/27.  CONTINUE TO TAKE MIDODRINE 5MG THREE TIMES DAILY and wean off per your doctor.   While at rehab, please obtain weekly labs CBC w/diff, CMP, ESR and CRP to monitor your response to the antibotics. Please follow up with your primary care provider upon discharge for further management and recommendations.      SECONDARY DISCHARGE DIAGNOSES  Diagnosis: Gluteal abscess  Assessment and Plan of Treatment: You were found to have an abscess on your left buttock. You were seen the surgery team and the abscess was drained. As per infectious disease recommendations, you were treated with IV antibiotics. No further antibiotic treatment is recommended for the buttock abscess. Your buttock no longer has any pus draining and is underoing healing.   Continue to wound care for the incision&drainage site as follows: clean with chlorohexidine, applay aquacel + foam dressing daily    Diagnosis: ESRD on dialysis  Assessment and Plan of Treatment: You have history of end-stage renal diseae on hemodialysis Monday/Wednesday/Friday. You were continued on your dialysis schedule thoughout your hospital stay. You were seen by a nephrologist and their recommendations were followed.  Avoid taking (NSAIDs) - (ex: Ibuprofen, Advil, Celebrex, Naproxen) and other medications that could be toxic to your kidneys (such as intravenous contrast for diagnostic testing, combination cold medications.). Please continue to receive dialysis per your regular schedule and take  1. Sevelamer 800mg three times a day with meals   2. Calcitrol 1mcg on dialysis days   to prevent electrolyte abnormalities.    Diagnosis: Diabetes  Assessment and Plan of Treatment: You have history of insulin dependent diabetes and report using lantus and novolog. Your A1C, a three month average of your sugar, on this admission was 7.4. Given your acute illness, your sugar was managed with as needed insulin. You need to continue monitoring your blood sugar levels closely and maintain healthy lifestyle by eating healthy diabetic regimen and weight loss.  Please continue to take your insulin before meals and at bedtime per the insulin sliding scale:   1 Unit(s) if Glucose 151 - 200  2 Unit(s) if Glucose 201 - 250  3 Unit(s) if Glucose 251 - 300  4 Unit(s) if Glucose 301 - 350  5 Unit(s) if Glucose 351 - 400  6 Unit(s) if Glucose Greater Than 400  Please follow up with your primary care provider/Endocrinologist within a week of discharge.    Diagnosis: HLD (hyperlipidemia)  Assessment and Plan of Treatment: Continue medications and follow up with [facility] doctor    Diagnosis: Diabetic ulcer of right foot  Assessment and Plan of Treatment: You have history of bilateral charcot's deformity complicated by diabetic ulcer on your right foot. MRI of your right ankle demonstrated ulceration at the plantar surface medially with suspected exposed bone with underlying osteomyelitis involving the talus and less so often navicular bone. You were evaluated by the podiatry team and infectious disease doctor. Osteomyelitis is likely chronic and it is recommended that you get a bone biopsy and debridement outpatient, after you have been off of antibotics for at least 3 weeks. Please follow up with Dr. Sim upon discharge for further managment and recommendations.  Please continue to use CROW walkers on both feet when walking to prevent worsening ulcerations.  Continue to care for your foot as follows: Betadine, 4x4 gauze, abd pad, laura, and light ACE wrapping to be applied to right foot and changed twice a week.    Diagnosis: Scrotal abscess  Assessment and Plan of Treatment: You were found to have a scrotal abscess. CT of your pelvis showed a 2x1x0.8cm abscess in right scrotum. You had a tract, from your prior surgery, that allowed the pus to drain from the abscess. You were seen by a urologist and infectious disease doctor and their recommendations were followed. You were started on IV antibiotics (vancomycin, meropenem). Surveillance CT scan showed a smaller scrotal abscess (1.5cm). Urologist recommends daily wound care until you follow up with your outpatient urologist Dr. Judge for possible surgical intervention.  You will be discharged on IV CEFEPIME 2G WITH DIALYSIS AND FLAGYL 500MG TWICE DAILY UNTIL 2/27.    Wound care as follows: Squeeze out purulence from right scrotum until serosanguinous and wipe with 4x4 gauze, place fresh 4x4 gauze overlying the drainage tract  While at rehab, please obtain weekly labs CBC w/diff, CMP, ESR and CRP to monitor your response to the antibotics.  Please call your urologist Dr Judge to make a follow up appointment with him for further management of the abscess.    Diagnosis: Hypertension  Assessment and Plan of Treatment: You have history of hypertension. Your home amlodipine, hydralazine and carvedilol was held in the setting of septic shock. You continue to have low blood pressure in the setting of ongoing infection. PLEASE STOP TAKING AMLODIPINE, CARVEDILOL AND HYDRALAZINE upon discharge. CONTINUE TO TAKE MIDODRINE 5MG THREE TIMES DAILY to maintain appropriate blood pressure and taper the dose down per your primary care doctor's recommendation.    Diagnosis: Influenza A  Assessment and Plan of Treatment: You were found to have Influenza A during the hospital stay. You were treated with renally-dosed Tamiflu. You no longer have any upper respiratory symptoms. Your chest xray did not show signs of pneumonia.

## 2024-12-20 NOTE — PROGRESS NOTE ADULT - SUBJECTIVE AND OBJECTIVE BOX
[facility] NEPHROLOGY- PROGRESS NOTE    Patient is a 67yo Male with ESRD on HD via Rt AVG, HTN, 2HPT, Anemia, LE venous stasis with Rt foot wound presents with weakness. Pt a/w Rt foot ulcer and hyperkalemia. Nephrology consulted for ESRD status.   MRI Rt foot: + osteomyelitis involving the talus and less so of the navicular bone;    BCx +[facility] Medications: Medications reviewed.  REVIEW OF SYSTEMS: Pt denies any SOB, chest pain, n/v/d, or abd pain, +Rt foot pain-denies at this time      VITALS:  T(F): 98.4 (12-20-24 @ 13:50), Max: 98.8 (12-20-24 @ 05:33)  HR: 63 (12-20-24 @ 13:50)  BP: 116/63 (12-20-24 @ 13:50)  RR: 18 (12-20-24 @ 13:50)  SpO2: 97% (12-20-24 @ 13:50)  Wt(kg): --    12-20 @ 07:01  -  12-20 @ 16:27  --------------------------------------------------------  IN: 600 mL / OUT: 1600 mL / NET: -1000 mL        PHYSICAL EXAM:  Gen: NAD, calm, Obese; more awake  HEENT: anicteric  Cards: RRR, +S1/S2, no M/G/R  Resp: CTA ant   GI: soft, NT/ND, NABS  Extremities: no LE edema B/L  Derm: b/l dry skin, Rt foot plantar wound covered    LABS:  12-20    137  |  100  |  44[H]  ----------------------------<  105[H]  4.4   |  28  |  7.27[H]    Ca    8.8      20 Dec 2024 06:45  Phos  3.6     12-19  Mg     2.3     12-19      Creatinine Trend: 7.27 <--, 5.52 <--, 7.32 <--, 9.00 <--                        9.4    10.28 )-----------( 179      ( 20 Dec 2024 06:45 )             29.1     Urine Studies:  Urinalysis Basic - ( 20 Dec 2024 06:45 )    Color:  / Appearance:  / SG:  / pH:   Gluc: 105 mg/dL / Ketone:   / Bili:  / Urobili:    Blood:  / Protein:  / Nitrite:    Leuk Esterase:  / RBC:  / WBC    Sq Epi:  / Non Sq Epi:  / Bacteria:           [facility] NEPHROLOGY- PROGRESS NOTE    Patient is a 67yo Male with ESRD on HD via Rt AVG, HTN, 2HPT, Anemia, LE venous stasis with Rt foot wound presents with weakness. Pt a/w Rt foot ulcer and hyperkalemia. Nephrology consulted for ESRD status.   MRI Rt foot: + osteomyelitis involving the talus and less so of the navicular bone;    BCx +Staph hominis  12/20: s/p I& D of buttock abscess    Hospital Medications: Medications reviewed.  REVIEW OF SYSTEMS: Pt denies any SOB, chest pain, n/v/d, or abd pain, +Rt foot pain-denies at this time      VITALS:  T(F): 98.4 (12-20-24 @ 13:50), Max: 98.8 (12-20-24 @ 05:33)  HR: 63 (12-20-24 @ 13:50)  BP: 116/63 (12-20-24 @ 13:50)  RR: 18 (12-20-24 @ 13:50)  SpO2: 97% (12-20-24 @ 13:50)  Wt(kg): --    12-20 @ 07:01  -  12-20 @ 16:27  --------------------------------------------------------  IN: 600 mL / OUT: 1600 mL / NET: -1000 mL        PHYSICAL EXAM:  Gen: NAD, calm, Obese; more awake  HEENT: anicteric  Cards: RRR, +S1/S2, no M/G/R  Resp: CTA ant   GI: soft, NT/ND, NABS  Extremities: no LE edema B/L  Derm: b/l dry skin, Rt foot plantar wound covered    LABS:  12-20    137  |  100  |  44[H]  ----------------------------<  105[H]  4.4   |  28  |  7.27[H]    Ca    8.8      20 Dec 2024 06:45  Phos  3.6     12-19  Mg     2.3     12-19      Creatinine Trend: 7.27 <--, 5.52 <--, 7.32 <--, 9.00 <--                        9.4    10.28 )-----------( 179      ( 20 Dec 2024 06:45 )             29.1     Urine Studies:  Urinalysis Basic - ( 20 Dec 2024 06:45 )    Color:  / Appearance:  / SG:  / pH:   Gluc: 105 mg/dL / Ketone:   / Bili:  / Urobili:    Blood:  / Protein:  / Nitrite:    Leuk Esterase:  / RBC:  / WBC    Sq Epi:  / Non Sq Epi:  / Bacteria:

## 2024-12-20 NOTE — DISCHARGE NOTE PROVIDER - HOSPITAL COURSE
69 yo M with PMH of  DM, HTN, ESRD on dialysis ( M, W, F), Last Dialysis 12/13/24,   B/L Charcot deformity and HLD, present to the ED for generalized weakness, chills, subjective fever. Admitted to medicine for generalized weakness, hyperkalemia, right foot ulcer   MR right Ankle demonstrates ulceration at the plantar surface medially with suspected exposed bone with underlying   osteomyelitis involving the talus and less so often navicular bone. Podiatry follows   OM likely chronic, recommended  bone Biopsy/debridement off antibiotic for a period of time with Culture  sent to determine directed antibiotic treatment  Hospital course complicated with fever   Started on Cefepime and vancomycin. Noted with open wound draining purulent discharge to left buttock. ID consulted for further recommendations   Blood culture growing Staphylococcus. Surgery consulted, sp  I&D at bedside. ID recommended one more dose of vancomycin 750 mg and no abx recommended thereafter.    Given patient's improved clinical status and current hemodynamic stability, decision was made to discharge.  Pt was recommended to follow up with podiatry dr Sim for bone biopsy and further management    68M PMH DM, HTN, HLD, ESRD on HD ( M, W, F), B/L Charcot deformity ( follows with Dr Sim). Presents with fatigue, chills, fever. Found to have CTAP showing 1.5 cm rim-enhancing collection R right scrotum abscess and Influenza+. Urology consulted, with recommendation for no surgical intervention. Initially admitted for sepsis 2/2 scrotal abscess, diabetic foot ulcer, gluteal abscess, influenza A. MR right Ankle demonstrates ulceration at the plantar surface medially with suspected exposed bone with underlying osteomyelitis involving the talus and less so often navicular bone. Podiatry consulted. OM likely chronic, recommended bone biopsy and debridement outpatient. Surgery consulted. s/p bedside I&D 12/19, culture growing Trueperella bernardiae. BCx (12/16) grew coagulase neg. Staph. ID recommended vancomycin, which he completed. Hospital course c/b multiple RRTs on the floors for encephalopathy iso worsening sepsis. Transferred to ICU for septic shock requiring pressors. Multiple BCx NGTD (12/17, 19, 29, 31). Patient completed a 7d course of meropenem (1/1-1/7). Patient weaned off of IV pressors and transitioned to midodrine. Patient downgraded to medicine. Tried weaning off midodrine from TID to BID but BP was soft. Patient started on cefepime and flagyl PO. Patient to complete cefepime and flagyl until 1/27.        Given patient's improved clinical status , decision was made to discharge.  Pt was recommended to follow up with podiatry dr Sim for bone biopsy and further management.    68M PMH DM, HTN, HLD, ESRD on HD ( M, W, F), B/L Charcot deformity ( follows with Dr Sim). Presents with fatigue, chills, fever. Found to have CTAP showing 1.5 cm rim-enhancing collection R right scrotum abscess and Influenza+. Urology consulted, with recommendation for no surgical intervention. Initially admitted for sepsis 2/2 scrotal abscess, diabetic foot ulcer, gluteal abscess, influenza A. MR right Ankle demonstrates ulceration at the plantar surface medially with suspected exposed bone with underlying osteomyelitis involving the talus and less so often navicular bone. Podiatry consulted. OM likely chronic, recommended bone biopsy and debridement outpatient. Surgery consulted. s/p bedside I&D 12/19, culture growing Trueperella bernardiae. BCx (12/16) grew coagulase neg. Staph. ID recommended vancomycin, which he completed. Hospital course c/b multiple RRTs on the floors for encephalopathy iso worsening sepsis. Transferred to ICU for septic shock requiring pressors. Multiple BCx NGTD (12/17, 19, 29, 31). Patient completed a 7d course of meropenem (1/1-1/7). Patient weaned off of IV pressors and transitioned to midodrine. Patient downgraded to medicine. Tried weaning off midodrine from TID to BID but BP was soft. Patient started on cefepime and flagyl PO. Patient to complete cefepime and flagyl until 1/27.        Given patient's improved clinical status , decision was made to discharge.  Pt was recommended to follow up with podiatry dr Sim for bone biopsy and further management.     Continue to care for your foot as follows: Betadine, 4x4 gauze, abd pad, laura, and light ACE wrapping to be applied to right foot and changed twice a week.  Wound care for right scrotum as follows: Squeeze out purulence from right scrotum until serosanguinous and wipe with 4x4 gauze, place fresh 4x4 gauze overlying the drainage tract  Continue to wound care for the incision&drainage site (Left buttock) as follows: clean with chlorohexidine, applay aquacel + foam dressing daily   68M PMH DM, HTN, HLD, ESRD on HD ( M, W, F), B/L Charcot deformity ( follows with Dr Sim). Presents with fatigue, chills, fever. Found to have CTAP showing 1.5 cm rim-enhancing collection R right scrotum abscess and Influenza+. Urology consulted, with recommendation for no surgical intervention. Initially admitted for sepsis 2/2 scrotal abscess, diabetic foot ulcer, gluteal abscess, influenza A. MR right Ankle demonstrates ulceration at the plantar surface medially with suspected exposed bone with underlying osteomyelitis involving the talus and less so often navicular bone. Podiatry consulted. OM likely chronic, recommended bone biopsy and debridement outpatient. Surgery consulted. s/p bedside I&D 12/19, culture growing Trueperella bernardiae. BCx (12/16) grew coagulase neg. Staph. ID recommended vancomycin, which he completed. Hospital course c/b multiple RRTs on the floors for encephalopathy iso worsening sepsis. Transferred to ICU for septic shock requiring pressors. Multiple BCx NGTD (12/17, 19, 29, 31). Patient completed a 7d course of meropenem (1/1-1/7). Patient weaned off of IV pressors and transitioned to midodrine. Patient downgraded to medicine. Tried weaning off midodrine from TID to BID but BP was soft. Patient started on cefepime and flagyl PO. Patient to complete cefepime and flagyl until 1/27.        Given patient's improved clinical status , decision was made to discharge.  Pt was recommended to follow up with podiatry dr Sim for bone biopsy and further management.     Continue to care for your foot as follows: Betadine, 4x4 gauze, abd pad, laura, and light ACE wrapping to be applied to right foot and changed twice a week.  Wound care for right scrotum as follows: Squeeze out purulence from right scrotum until serosanguinous and wipe with 4x4 gauze, place fresh 4x4 gauze overlying the drainage tract  Continue to wound care for the incision&drainage site (Left buttock) as follows: clean with chlorohexidine, applay aquacel + foam dressing daily  While at rehab, please obtain weekly labs CBC w/diff, CMP, ESR and CRP to monitor your response to the antibotics.    68M PMH DM, HTN, HLD, ESRD on HD ( M, W, F), B/L Charcot deformity ( follows with Dr Sim). Presents with fatigue, chills, fever. Found to have CTAP showing 1.5 cm rim-enhancing collection R right scrotum abscess and Influenza+. Urology consulted, with recommendation for no surgical intervention. Initially admitted for sepsis 2/2 scrotal abscess, diabetic foot ulcer, gluteal abscess, influenza A. MR right Ankle demonstrates ulceration at the plantar surface medially with suspected exposed bone with underlying osteomyelitis involving the talus and less so often navicular bone. Podiatry consulted. OM likely chronic, recommended bone biopsy and debridement outpatient. Surgery consulted. s/p bedside I&D 12/19, culture growing Trueperella bernardiae. BCx (12/16) grew coagulase neg Staph. ID recommended vancomycin, which he completed. Hospital course c/b multiple RRTs on the floors for encephalopathy iso worsening sepsis. Transferred to ICU for septic shock requiring pressors. Multiple BCx NGTD (12/17, 19, 29, 31). Patient completed a 7d course of meropenem (1/1-1/7). Patient weaned off of IV pressors and transitioned to midodrine po. Patient downgraded to medicine. Tried weaning off midodrine from TID to BID but BP was remained on lower end so was continued on midodrine. Patient started on cefepime and flagyl PO after discussion with ID Dr Chavez. Patient to complete cefepime and flagyl until 1/27.      Given patient's improved clinical status , decision was made to discharge to subacute rehab.  Pt was recommended to follow up with podiatry Dr Sim for bone biopsy and further management.     Continue to care for your foot as follows: Betadine, 4x4 gauze, abd pad, laura, and light ACE wrapping to be applied to right foot and changed twice a week.  Wound care for right scrotum as follows: Squeeze out purulence from right scrotum until serosanguinous and wipe with 4x4 gauze, place fresh 4x4 gauze overlying the drainage tract  Continue to wound care for the incision&drainage site (Left buttock) as follows: clean with chlorohexidine, apply aquacel + foam dressing daily.  While at rehab, please obtain weekly labs CBC w/diff, CMP, ESR and CRP to monitor your response to the antibiotics.   He is to follow up with his outpatient urologist for further surgical intervention as deemed fit by urologist, as the drainage tract is thought to be related to HS.

## 2024-12-20 NOTE — PROGRESS NOTE ADULT - PROBLEM SELECTOR PLAN 4
Hemodialysis 12/19   HD MWF in the community  [facility]  Dr. Chirinos, Nephrology, following  SW following

## 2024-12-20 NOTE — DISCHARGE NOTE PROVIDER - NSDCCONDITION_GEN_ALL_CORE
HPI   Patient is a 24-year-old female presenting with her mom by private car for depression and suicidal ideation.  Per my chart review, the patient has a known history of suicidal ideation, eating disorder, social anxiety, anxiety and depression, schizoaffective disorder.  She continues to use Lamictal, Vistaril, gabapentin, propranolol, Caplyta, fluoxetine, prazosin, and Klonopin.  She also has a history of Foreign-Danlos syndrome.  She does not smoke or vape.  She does not use marijuana.  No alcohol.  No illegal drugs.    Patient tells me that she is extremely depressed and is currently suicidal.  She believes she needs to be in the hospital.  She denies recent ingestions or attempts at harm to self.  She does not feel homicidal.  No fever or obvious infectious illness.  No trauma or injury.      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     Priority: Medium    Severe recurrent major depression without psychotic features (H) 03/02/2019     Priority: Medium    Abnormal hearing screen 10/09/2018     Priority: Medium    Suicidal ideation 02/02/2018     Priority: Medium    Generalized anxiety disorder 09/28/2017     Priority: Medium      Past Medical History:    Past Medical History:   Diagnosis Date    Foreign-Danlos syndrome     Postural orthostatic tachycardia syndrome      Past Surgical History:    Past Surgical History:   Procedure Laterality Date    CHOLECYSTECTOMY    "    Family History:    Family History   Problem Relation Age of Onset    Depression Mother     Allergies Mother         Seasonal    Depression Father     Anxiety Disorder Father     Anxiety Disorder Maternal Grandmother     Depression Maternal Grandmother     Depression Sister     Allergies Sister         Seasonal    Schizophrenia Maternal Uncle      Social History:  Marital Status:  Single [1]  Social History     Tobacco Use    Smoking status: Never    Smokeless tobacco: Never   Substance Use Topics    Alcohol use: No    Drug use: No      Medications:    albuterol (PROAIR HFA/PROVENTIL HFA/VENTOLIN HFA) 108 (90 Base) MCG/ACT inhaler  Ascorbic Acid (VITAMIN C) POWD  clindamycin-benzoyl peroxide (BENZACLIN) 1-5 % external gel  clonazePAM (KLONOPIN) 0.5 MG tablet  doxycycline hyclate (VIBRAMYCIN) 50 MG capsule  etonogestrel (NEXPLANON) 68 MG IMPL  fluocinonide (LIDEX) 0.05 % external solution  FLUoxetine (PROZAC) 40 MG capsule  gabapentin (NEURONTIN) 300 MG capsule  hydrOXYzine (VISTARIL) 25 MG capsule  lamoTRIgine (LAMICTAL) 100 MG tablet  metFORMIN (GLUCOPHAGE) 500 MG tablet  omeprazole (PRILOSEC) 40 MG DR capsule  prazosin (MINIPRESS) 5 MG capsule  propranolol (INDERAL) 10 MG tablet      Review of Systems   All other systems reviewed and are negative.      PE   BP: 117/78  Pulse: 93  Temp: 98.5  F (36.9  C)  Resp: 16  Height: 162.6 cm (5' 4\")  Weight: 52.9 kg (116 lb 8.2 oz)  SpO2: 98 %  Physical Exam  Vitals reviewed.   Constitutional:       Appearance: She is well-developed.      Comments: Flat affect.  Mom at her side, holding her hand.   HENT:      Head: Normocephalic and atraumatic.      Right Ear: External ear normal.      Left Ear: External ear normal.      Nose: Nose normal.      Mouth/Throat:      Mouth: Mucous membranes are moist.      Pharynx: Oropharynx is clear.   Eyes:      Extraocular Movements: Extraocular movements intact.      Conjunctiva/sclera: Conjunctivae normal.      Pupils: Pupils are equal, " round, and reactive to light.   Cardiovascular:      Rate and Rhythm: Normal rate and regular rhythm.   Pulmonary:      Effort: Pulmonary effort is normal.   Musculoskeletal:         General: Normal range of motion.      Cervical back: Normal range of motion.   Skin:     General: Skin is warm and dry.   Neurological:      Mental Status: She is alert and oriented to person, place, and time.   Psychiatric:         Behavior: Behavior normal.         ED COURSE and MDM   2222.  Patient has increased/worsened depression and is suicidal.  Mental health assessment requested.  Mental health workup pending.    Signed out.    Electronic medical chart reviewed, including medical problems, medications, medical allergies, social history.  Recent hospitalizations and surgical procedures reviewed.  Recent clinic visits and consultations reviewed.  Recent labs and test results reviewed.  Nursing notes reviewed.    The patient, their parent if applicable, and/or their medical decision maker(s) and I have reviewed all of the available historical information, applicable PMH, physical exam findings, and objective diagnostic data gathered during this ED visit.  We then discussed all work-up options and then together agreed upon the course taken during this visit.  The ultimate disposition and plan was a cooperative decision made between myself and the patient, their parent if applicable, and/or their legal decision maker(s).  The risks and benefits of all decisions made during this visit were discussed to the best of my abilities given the circumstances, and all parties are understanding of the pertinent ramifications of these decisions.      LABS  Labs Ordered and Resulted from Time of ED Arrival to Time of ED Departure   COMPREHENSIVE METABOLIC PANEL - Abnormal       Result Value    Sodium 141      Potassium 3.4      Carbon Dioxide (CO2) 25      Anion Gap 11      Urea Nitrogen 9.2      Creatinine 0.57      GFR Estimate >90      Calcium  8.7      Chloride 105      Glucose 109 (*)     Alkaline Phosphatase 86      AST 38      ALT 48      Protein Total 6.3 (*)     Albumin 3.9      Bilirubin Total <0.2     ACETAMINOPHEN LEVEL - Abnormal    Acetaminophen <5.0 (*)    CBC WITH PLATELETS AND DIFFERENTIAL - Abnormal    WBC Count 5.8      RBC Count 3.47 (*)     Hemoglobin 10.1 (*)     Hematocrit 31.3 (*)     MCV 90      MCH 29.1      MCHC 32.3      RDW 14.6      Platelet Count 265      % Neutrophils 66      % Lymphocytes 23      % Monocytes 10      % Eosinophils 0      % Basophils 0      % Immature Granulocytes 0      NRBCs per 100 WBC 0      Absolute Neutrophils 3.9      Absolute Lymphocytes 1.4      Absolute Monocytes 0.6      Absolute Eosinophils 0.0      Absolute Basophils 0.0      Absolute Immature Granulocytes 0.0      Absolute NRBCs 0.0     LIPASE - Normal    Lipase 22     URINE DRUG SCREEN PANEL - Normal    Amphetamines Urine Screen Negative      Barbituates Urine Screen Negative      Benzodiazepine Urine Screen Negative      Cannabinoids Urine Screen Negative      Cocaine Urine Screen Negative      Fentanyl Qual Urine Screen Negative      Opiates Urine Screen Negative      PCP Urine Screen Negative     ETHYL ALCOHOL LEVEL - Normal    Alcohol ethyl <0.01     SALICYLATE LEVEL - Normal    Salicylate <0.3     HCG QUALITATIVE PREGNANCY - Normal    hCG Serum Qualitative Negative     TSH WITH FREE T4 REFLEX - Normal    TSH 1.14     COVID-19 VIRUS (CORONAVIRUS) BY PCR - Normal    SARS CoV2 PCR Negative         IMAGING  Images reviewed by me.  Radiology report also reviewed.  No orders to display       Procedures    Medications - No data to display      IMPRESSION       ICD-10-CM    1. Depression with suicidal ideation  F32.A     R45.851                Medication List        ASK your doctor about these medications      FLUoxetine 40 MG capsule  Commonly known as: PROzac  Ask about: Which instructions should I use?                                  Vanessa  Lico ROBISON MD  04/09/24 0142     Stable

## 2024-12-20 NOTE — DISCHARGE NOTE PROVIDER - CARE PROVIDER_API CALL
Kendall Chand [facility]  Internal Medicine  [address], [address]  [place] [address]  Phone: [phone]  Fax: [phone]  Follow Up Time:     Papo Sim  Foot Surgery  [address] [place] [address]  Phone: [phone]  Fax: [phone]  Established Patient  Follow Up Time:     David Judge  Urology  [address] [place] [address], [address], [place] [address]  Phone: [phone]  Fax: [phone]  Established Patient  Follow Up Time:

## 2024-12-20 NOTE — PROGRESS NOTE ADULT - PROBLEM SELECTOR PLAN 2
left buttock open wound, draining purulent discharge, likely the source of fever   surgery consulted  plan for I&D at bedside  follow up wound culture   continue vancomycin

## 2024-12-20 NOTE — PROGRESS NOTE ADULT - SUBJECTIVE AND OBJECTIVE BOX
Subjective/Objective: Pt s/p bedside I+D yesterday.      MEDS  acetaminophen     Tablet .. 650 milliGRAM(s) Oral every 6 hours PRN  amLODIPine   Tablet 5 milliGRAM(s) Oral daily  calcitriol   Capsule 1 MICROGram(s) Oral <User Schedule>  cefepime   IVPB 1000 milliGRAM(s) IV Intermittent once  cefepime   IVPB 1000 milliGRAM(s) IV Intermittent every 24 hours  epoetin rudolph-epbx (RETACRIT) Injectable 6000 Unit(s) IV Push <User Schedule>  heparin   Injectable 5000 Unit(s) SubCutaneous every 8 hours  insulin lispro (ADMELOG) corrective regimen sliding scale   SubCutaneous three times a day before meals  insulin lispro (ADMELOG) corrective regimen sliding scale   SubCutaneous at bedtime  latanoprost 0.005% Ophthalmic Solution 1 Drop(s) Both EYES at bedtime  ondansetron Injectable 4 milliGRAM(s) IV Push every 8 hours PRN  sevelamer carbonate 1600 milliGRAM(s) Oral three times a day with meals  vancomycin  IVPB 750 milliGRAM(s) IV Intermittent <User Schedule> (started 12/16 and held yesterday)      PHYSICAL EXAM:    Vital Signs Last 24 Hrs  T(C): 37.1 (20 Dec 2024 05:33), Max: 37.4 (19 Dec 2024 12:30)  T(F): 98.8 (20 Dec 2024 05:33), Max: 99.4 (19 Dec 2024 12:30)  HR: 68 (20 Dec 2024 05:33) (61 - 68)  BP: 103/56 (20 Dec 2024 05:33) (102/61 - 116/65)  BP(mean): 72 (20 Dec 2024 05:33) (72 - 72)  RR: 18 (20 Dec 2024 05:33) (16 - 18)  SpO2: 93% (20 Dec 2024 05:33) (93% - 94%)    Parameters below as of 20 Dec 2024 05:33  Patient On (Oxygen Delivery Method): room air        GEN:    HEENT:    CHEST/Respiratory:    Cardiovascular:    Abdomen:    Genitourinary:    Extremities:     Neurological:    Skin:      LABS/DIAGNOSTIC TESTS                            9.4    10.28 )-----------( 179      ( 20 Dec 2024 06:45 )             29.1       WBC Count: 10.28 K/uL (12-20 @ 06:45)  WBC Count: 10.83 K/uL (12-19 @ 06:45)  WBC Count: 7.79 K/uL (12-18 @ 09:15)      12-20    137  |  100  |  44[H]  ----------------------------<  105[H]  4.4   |  28  |  7.27[H]    Ca    8.8      20 Dec 2024 06:45  Phos  3.6     12-19  Mg     2.3     12-19      CULTURES      Culture - Blood (collected 12-17-24 @ 12:35)  Source: .Blood BLOOD  Preliminary Report (12-19-24 @ 19:00):    No growth at 48 Hours    Culture - Blood (collected 12-16-24 @ 22:30)  Source: .Blood BLOOD  Gram Stain (12-17-24 @ 16:53):    Growth in aerobic bottle: Gram Positive Cocci in Clusters  Final Report (12-18-24 @ 13:20):    Growth in aerobic bottle: Staphylococcus hominis    Isolation of Coagulase negative Staphylococcus from single blood culture    sets may represent    contamination. Contact the Microbiology Department at [phone] if    susceptibility testing is needed.    clinically indicated.    Direct identification is available within approximately 3-5    hours either by Blood Panel Multiplexed PCR or Direct    MALDI-TOF. Details: [email or web address]  Organism: Blood Culture PCR (12-18-24 @ 13:20)  Organism: Blood Culture PCR (12-18-24 @ 13:20)      Method Type: PCR      -  Coagulase negative Staphylococcus: Detec    Culture - Wound Aerobic/Anaerobic (collected 12-16-24 @ 12:36)  Source: .Surgical Swab  Preliminary Report (12-18-24 @ 21:19):    Moderate Providencia stuartii    Moderate Corynebacterium striatum group "Susceptibilities not performed"    Few Trueperella bernardiae "Susceptibilities not performed"  Organism: Providencia stuartii (12-18-24 @ 16:20)  Organism: Providencia stuartii (12-18-24 @ 16:20)      Method Type: GWEN      -  Amoxicillin/Clavulanic Acid: R >16/8      -  Ampicillin: R >16 These ampicillin results predict results for amoxicillin      -  Ampicillin/Sulbactam: I 16/8      -  Aztreonam: S <=4      -  Cefazolin: R >16      -  Cefepime: S <=2      -  Cefoxitin: S <=8      -  Ceftriaxone: S <=1      -  Ciprofloxacin: R 2      -  Ertapenem: S <=0.5      -  Levofloxacin: R 4      -  Meropenem: S <=1      -  Piperacillin/Tazobactam: S <=8      -  Trimethoprim/Sulfamethoxazole: S <=0.5/9.5          RADIOLOGY               Subjective/Objective: Pt s/p bedside I+D yesterday.      MEDS  acetaminophen     Tablet .. 650 milliGRAM(s) Oral every 6 hours PRN  amLODIPine   Tablet 5 milliGRAM(s) Oral daily  calcitriol   Capsule 1 MICROGram(s) Oral <User Schedule>  cefepime   IVPB 1000 milliGRAM(s) IV Intermittent once  cefepime   IVPB 1000 milliGRAM(s) IV Intermittent every 24 hours  epoetin rudolph-epbx (RETACRIT) Injectable 6000 Unit(s) IV Push <User Schedule>  heparin   Injectable 5000 Unit(s) SubCutaneous every 8 hours  insulin lispro (ADMELOG) corrective regimen sliding scale   SubCutaneous three times a day before meals  insulin lispro (ADMELOG) corrective regimen sliding scale   SubCutaneous at bedtime  latanoprost 0.005% Ophthalmic Solution 1 Drop(s) Both EYES at bedtime  ondansetron Injectable 4 milliGRAM(s) IV Push every 8 hours PRN  sevelamer carbonate 1600 milliGRAM(s) Oral three times a day with meals  vancomycin  IVPB 750 milliGRAM(s) IV Intermittent <User Schedule> (started 12/16 and held yesterday)      PHYSICAL EXAM:    Vital Signs Last 24 Hrs  T(C): 37.1 (20 Dec 2024 05:33), Max: 37.4 (19 Dec 2024 12:30)  T(F): 98.8 (20 Dec 2024 05:33), Max: 99.4 (19 Dec 2024 12:30)  HR: 68 (20 Dec 2024 05:33) (61 - 68)  BP: 103/56 (20 Dec 2024 05:33) (102/61 - 116/65)  BP(mean): 72 (20 Dec 2024 05:33) (72 - 72)  RR: 18 (20 Dec 2024 05:33) (16 - 18)  SpO2: 93% (20 Dec 2024 05:33) (93% - 94%)    Parameters below as of 20 Dec 2024 05:33  Patient On (Oxygen Delivery Method): room air        GEN:    HEENT:    CHEST/Respiratory:    Cardiovascular:    Abdomen:    Genitourinary:    Extremities:     Neurological:    Skin:      LABS/DIAGNOSTIC TESTS                            9.4    10.28 )-----------( 179      ( 20 Dec 2024 06:45 )             29.1       WBC Count: 10.28 K/uL (12-20 @ 06:45)  WBC Count: 10.83 K/uL (12-19 @ 06:45)  WBC Count: 7.79 K/uL (12-18 @ 09:15)      12-20    137  |  100  |  44[H]  ----------------------------<  105[H]  4.4   |  28  |  7.27[H]    Ca    8.8      20 Dec 2024 06:45  Phos  3.6     12-19  Mg     2.3     12-19    Vancomycin Level, Trough (12.20.24 @ 06:45)   Vancomycin Level, Trough: 21.7      CULTURES      Culture - Blood (collected 12-17-24 @ 12:35)  Source: .Blood BLOOD  Preliminary Report (12-19-24 @ 19:00):    No growth at 48 Hours    Culture - Blood (collected 12-16-24 @ 22:30)  Source: .Blood BLOOD  Gram Stain (12-17-24 @ 16:53):    Growth in aerobic bottle: Gram Positive Cocci in Clusters  Final Report (12-18-24 @ 13:20):    Growth in aerobic bottle: Staphylococcus hominis    Isolation of Coagulase negative Staphylococcus from single blood culture    sets may represent    contamination. Contact the Microbiology Department at [phone] if    susceptibility testing is needed.    clinically indicated.    Direct identification is available within approximately 3-5    hours either by Blood Panel Multiplexed PCR or Direct    MALDI-TOF. Details: [email or web address]  Organism: Blood Culture PCR (12-18-24 @ 13:20)  Organism: Blood Culture PCR (12-18-24 @ 13:20)      Method Type: PCR      -  Coagulase negative Staphylococcus: Detec    Culture - Wound Aerobic/Anaerobic (collected 12-16-24 @ 12:36)  Source: .Surgical Swab  Preliminary Report (12-18-24 @ 21:19):    Moderate Providencia stuartii    Moderate Corynebacterium striatum group "Susceptibilities not performed"    Few Trueperella bernardiae "Susceptibilities not performed"  Organism: Providencia stuartii (12-18-24 @ 16:20)  Organism: Providencia stuartii (12-18-24 @ 16:20)      Method Type: GWEN      -  Amoxicillin/Clavulanic Acid: R >16/8      -  Ampicillin: R >16 These ampicillin results predict results for amoxicillin      -  Ampicillin/Sulbactam: I 16/8      -  Aztreonam: S <=4      -  Cefazolin: R >16      -  Cefepime: S <=2      -  Cefoxitin: S <=8      -  Ceftriaxone: S <=1      -  Ciprofloxacin: R 2      -  Ertapenem: S <=0.5      -  Levofloxacin: R 4      -  Meropenem: S <=1      -  Piperacillin/Tazobactam: S <=8      -  Trimethoprim/Sulfamethoxazole: S <=0.5/9.5          RADIOLOGY               Subjective/Objective: Pt s/p bedside I+D yesterday. Pt with no complaints at this time. Pt evaluated in dialysis. Spoke with Dr. Sim yesterday-- appearance of feet is chronic.       MEDS  acetaminophen     Tablet .. 650 milliGRAM(s) Oral every 6 hours PRN  amLODIPine   Tablet 5 milliGRAM(s) Oral daily  calcitriol   Capsule 1 MICROGram(s) Oral <User Schedule>  cefepime   IVPB 1000 milliGRAM(s) IV Intermittent once  cefepime   IVPB 1000 milliGRAM(s) IV Intermittent every 24 hours  epoetin rudolph-epbx (RETACRIT) Injectable 6000 Unit(s) IV Push <User Schedule>  heparin   Injectable 5000 Unit(s) SubCutaneous every 8 hours  insulin lispro (ADMELOG) corrective regimen sliding scale   SubCutaneous three times a day before meals  insulin lispro (ADMELOG) corrective regimen sliding scale   SubCutaneous at bedtime  latanoprost 0.005% Ophthalmic Solution 1 Drop(s) Both EYES at bedtime  ondansetron Injectable 4 milliGRAM(s) IV Push every 8 hours PRN  sevelamer carbonate 1600 milliGRAM(s) Oral three times a day with meals  vancomycin  IVPB 750 milliGRAM(s) IV Intermittent <User Schedule> (started 12/16 and held yesterday)      PHYSICAL EXAM:    Vital Signs Last 24 Hrs  T(C): 37.1 (20 Dec 2024 05:33), Max: 37.4 (19 Dec 2024 12:30)  T(F): 98.8 (20 Dec 2024 05:33), Max: 99.4 (19 Dec 2024 12:30)  HR: 68 (20 Dec 2024 05:33) (61 - 68)  BP: 103/56 (20 Dec 2024 05:33) (102/61 - 116/65)  BP(mean): 72 (20 Dec 2024 05:33) (72 - 72)  RR: 18 (20 Dec 2024 05:33) (16 - 18)  SpO2: 93% (20 Dec 2024 05:33) (93% - 94%)    Parameters below as of 20 Dec 2024 05:33  Patient On (Oxygen Delivery Method): room air      Gen: alert, responsive and in NAD    HEENT: NC/AT; conj. clear    Neck: supple    Chest/Thorax: clear to auscultation    Cardiovascular: regular; holosystolic murmur heard throughout precordium but loudest at apex      ABD: BS active; soft and non-tender to palpation; scar present RLQ    Genitourinary: no catheter    Extremities:  RUE: HD graft being used now for HD  LLE: foot with areas of excoriation and dry skin (unchanged from yesterday)  RLE:  bandage present but with multiple areas of excoriation and "verrucous-like" appearance on visible portions of the foot; + foot swelling noted (unchanged from yesterday)    Neurological: A+0x3    Skin: did not examine as pt in HD    Psychiatric: affect appropriate        LABS/DIAGNOSTIC TESTS                            9.4    10.28 )-----------( 179      ( 20 Dec 2024 06:45 )             29.1       WBC Count: 10.28 K/uL (12-20 @ 06:45)  WBC Count: 10.83 K/uL (12-19 @ 06:45)  WBC Count: 7.79 K/uL (12-18 @ 09:15)      12-20    137  |  100  |  44[H]  ----------------------------<  105[H]  4.4   |  28  |  7.27[H]    Ca    8.8      20 Dec 2024 06:45  Phos  3.6     12-19  Mg     2.3     12-19    Vancomycin Level, Trough (12.20.24 @ 06:45)   Vancomycin Level, Trough: 21.7      CULTURES      Culture - Blood (collected 12-17-24 @ 12:35)  Source: .Blood BLOOD  Preliminary Report (12-19-24 @ 19:00):    No growth at 48 Hours    Culture - Blood (collected 12-16-24 @ 22:30)  Source: .Blood BLOOD  Gram Stain (12-17-24 @ 16:53):    Growth in aerobic bottle: Gram Positive Cocci in Clusters  Final Report (12-18-24 @ 13:20):    Growth in aerobic bottle: Staphylococcus hominis    Isolation of Coagulase negative Staphylococcus from single blood culture    sets may represent    contamination. Contact the Microbiology Department at [phone] if    susceptibility testing is needed.    clinically indicated.    Direct identification is available within approximately 3-5    hours either by Blood Panel Multiplexed PCR or Direct    MALDI-TOF. Details: [email or web address]  Organism: Blood Culture PCR (12-18-24 @ 13:20)  Organism: Blood Culture PCR (12-18-24 @ 13:20)      Method Type: PCR      -  Coagulase negative Staphylococcus: Detec    Culture - Wound Aerobic/Anaerobic (collected 12-16-24 @ 12:36)  Source: .Surgical Swab  Preliminary Report (12-18-24 @ 21:19):    Moderate Providencia stuartii    Moderate Corynebacterium striatum group "Susceptibilities not performed"    Few Trueperella bernardiae "Susceptibilities not performed"  Organism: Providencia stuartii (12-18-24 @ 16:20)  Organism: Providencia stuartii (12-18-24 @ 16:20)      Method Type: GWEN      -  Amoxicillin/Clavulanic Acid: R >16/8      -  Ampicillin: R >16 These ampicillin results predict results for amoxicillin      -  Ampicillin/Sulbactam: I 16/8      -  Aztreonam: S <=4      -  Cefazolin: R >16      -  Cefepime: S <=2      -  Cefoxitin: S <=8      -  Ceftriaxone: S <=1      -  Ciprofloxacin: R 2      -  Ertapenem: S <=0.5      -  Levofloxacin: R 4      -  Meropenem: S <=1      -  Piperacillin/Tazobactam: S <=8      -  Trimethoprim/Sulfamethoxazole: S <=0.5/9.5          RADIOLOGY

## 2024-12-20 NOTE — DISCHARGE NOTE PROVIDER - CARE PROVIDERS DIRECT ADDRESSES
,DirectAddress_Unknown,[email or web address]

## 2024-12-20 NOTE — PROGRESS NOTE ADULT - SUBJECTIVE AND OBJECTIVE BOX
INTERVAL HPI/OVERNIGHT EVENTS: NAEO. AVSS. Patient seen and examined at bedside.  No acute complaints  Denies N/V, fevers, chills.     Vital Signs Last 24 Hrs  T(C): 37.1 (20 Dec 2024 05:33), Max: 37.4 (19 Dec 2024 12:30)  T(F): 98.8 (20 Dec 2024 05:33), Max: 99.4 (19 Dec 2024 12:30)  HR: 68 (20 Dec 2024 05:33) (61 - 68)  BP: 103/56 (20 Dec 2024 05:33) (102/61 - 116/65)  BP(mean): 72 (20 Dec 2024 05:33) (72 - 72)  RR: 18 (20 Dec 2024 05:33) (16 - 18)  SpO2: 93% (20 Dec 2024 05:33) (93% - 94%)    Parameters below as of 20 Dec 2024 05:33  Patient On (Oxygen Delivery Method): room air      I&O's Detail    cefepime   IVPB 1000 milliGRAM(s) IV Intermittent once  cefepime   IVPB 1000 milliGRAM(s) IV Intermittent every 24 hours  sevelamer carbonate 1600 milliGRAM(s) Oral three times a day with meals  vancomycin  IVPB 750 milliGRAM(s) IV Intermittent <User Schedule>      Physical Exam:  General: No acute distress  HEENT: NC/AT, trachea midline  Respiratory: Nonlabored breathing, equal chest rise b/l   Skin: L buttock incision site with SS drainage, packing removed and replaced with 4x4, gauze and petal. No surrounding erythema or induration   Abdomen: soft,  nondistended, nontender  Extremities: non edematous, no calf pain bilaterally  Lines/Drains/Tubes:     Drains/Tubes:       Labs:                        9.4    10.28 )-----------( 179      ( 20 Dec 2024 06:45 )             29.1     12-20    137  |  100  |  44[H]  ----------------------------<  105[H]  4.4   |  28  |  7.27[H]    Ca    8.8      20 Dec 2024 06:45  Phos  3.6     12-19  Mg     2.3     12-19          RADIOLOGY & ADDITIONAL STUDIES:

## 2024-12-20 NOTE — DISCHARGE NOTE PROVIDER - NSDCMRMEDTOKEN_GEN_ALL_CORE_FT
amLODIPine 10 mg oral tablet: 1 tab(s) orally once a day  latanoprost 0.005% ophthalmic solution: 1 drop(s) to each affected eye once a day (in the evening)   cefepime 2 g intravenous injection: 2 gram(s) intravenous 3 times a week 3 times a week WITH DIALYSIS until 1/27  insulin lispro 100 units/mL injectable solution: 1 unit(s) injectable 3 times a day (before meals) 1 Unit(s) if Glucose 151 - 200  2 Unit(s) if Glucose 201 - 250  3 Unit(s) if Glucose 251 - 300  4 Unit(s) if Glucose 301 - 350  5 Unit(s) if Glucose 351 - 400  6 Unit(s) if Glucose Greater Than 400  latanoprost 0.005% ophthalmic solution: 1 drop(s) to each affected eye once a day (at bedtime)  metroNIDAZOLE 500 mg oral tablet: 1 tab(s) orally every 12 hours Until 1/27  midodrine 5 mg oral tablet: 1 tab(s) orally every 8 hours  Rocaltrol 0.5 mcg oral capsule: 2 cap(s) orally 3 times a day On dialysis days  sevelamer carbonate 800 mg oral tablet: 1 tab(s) orally 3 times a day (with meals)  tamsulosin 0.4 mg oral capsule: 1 cap(s) orally once a day (at bedtime)

## 2024-12-20 NOTE — PROGRESS NOTE ADULT - ASSESSMENT
Patient is a 69yo Male with ESRD on HD via Rt AVG, HTN, 2HPT, Anemia, LE venous stasis with Rt foot wound presents with weakness. Pt a/w Rt foot ulcer and hyperkalemia. Nephrology consulted for ESRD status.     1) ESRD: Last HD earlier today, with mild intradialytic hypotension, with net 1.0L removed. Plan for next maintenance HD on 12/23.  Monitor electrolytes  2) Hyperkalemia: resolved. c/w low potassium diet.    Monitor serum potassium  3) HTN with ESRD: BP low normal for which Amlodipine was reduced to 5mg PO daily. Continue to hold Coreg 25mg PO bid. If BP remains low; consider holding Amlodipine as well. c/w low sodium diet. Monitor BP.  4) Anemia of renal disease: Hb low. c/w Epogen 6000 units IV tiw. Monitor Hb.  5) 2HPT: Corrected serum calcium & serum phos acceptable.  PTHi 441. c/w calcitriol 1mcg PO MWF. Pt on Lanthanum 2000mg PO tid with meals which is unavailable at [facility] of [facility]. c/w Sevelamer 1600mg PO tid with meals and  low phos diet. Monitor serum calcium and phosphorus.    HepBsAg was neg on outpt labs on 12/11/24; repeat HepBsAg neg; likely positive HepBsAg on 12/16 is an error.     [facility] NEPHROLOGY  Arden Tsang M.D.  Freeman Holland D.O.  Janny Chirinos M.D.  MD Ambar Mccarty, MSN, ANP-C    Telephone: [phone]  Facsimile: [phone]    [facility] [place] [address], [address]  [place] [address]

## 2024-12-20 NOTE — PROGRESS NOTE ADULT - ASSESSMENT
67 yo M with PMH of  DM, HTN, ESRD on dialysis ( M, W, F), Last Dialysis 12/13/24,   B/L Charcot deformity and HLD, present to the ED for generalized weakness, chills, subjective fever. Admitted to medicine for generalized weakness, hyperkalemia, right foot ulcer   MR right Ankle demonstrates ulceration at the plantar surface medially with suspected exposed bone with underlying   osteomyelitis involving the talus and less so often navicular bone. Podiatry follows   OM likely chronic  Pt noted with episode of fever over 101  Started on Cefepime and vancomycin. Noted with open wound draining purulent discharge to left buttock. ID consulted for further recommendations   Blood culture growing Staphylococcus. Surgery consulted, sp  I&D at bedside. ID recommended one more dose of vancomycin 750 mg and no abx recommended thereafter.       W/U of chronic osteo wd include bone Bx/debridement OFF Abs for a period of time with Cx sent to determine directed Ab treatment 67 yo M with PMH of  DM, HTN, ESRD on dialysis ( M, W, F), Last Dialysis 12/13/24,   B/L Charcot deformity and HLD, present to the ED for generalized weakness, chills, subjective fever. Admitted to medicine for generalized weakness, hyperkalemia, right foot ulcer   MR right Ankle demonstrates ulceration at the plantar surface medially with suspected exposed bone with underlying   osteomyelitis involving the talus and less so often navicular bone. Podiatry follows   OM likely chronic, recommended  bone Bx/debridement OFF Abs for a period of time with Culture  sent to determine directed Ab treatment    Pt noted with episode of fever over 101  Started on Cefepime and vancomycin. Noted with open wound draining purulent discharge to left buttock. ID consulted for further recommendations   Blood culture growing Staphylococcus. Surgery consulted, sp  I&D at bedside. ID recommended one more dose of vancomycin 750 mg and no abx recommended thereafter.

## 2024-12-20 NOTE — DISCHARGE NOTE PROVIDER - NSDCCAREPROVSEEN_GEN_ALL_CORE_FT
Nikita, Shayla Evans, Renetta Hager, Mara White, Spring Downey, Jayesh Holland, Freeman Galicia, Cullen Blue, Nyla Shukla, Mook Chirinos, Janny Lindquist, Janet Roach, Horace Milligan

## 2024-12-20 NOTE — PROGRESS NOTE ADULT - ASSESSMENT
69M s/p bedside I&D of buttock abscess 12/19   Leukocytosis downtrending     PLAN   - Daily dressing changes  - Continue ABX   - F/u wound cultures   - Remainder of care per primary team

## 2024-12-20 NOTE — DISCHARGE NOTE PROVIDER - PROVIDER TOKENS
PROVIDER:[TOKEN:[[id number]]],PROVIDER:[TOKEN:[[id number]],ESTABLISHEDPATIENT:[T]],PROVIDER:[TOKEN:[[id number]],ESTABLISHEDPATIENT:[T]]

## 2024-12-20 NOTE — DISCHARGE NOTE PROVIDER - ATTENDING DISCHARGE PHYSICAL EXAMINATION:
NAD, AOx3, calm and pleasant  NC/AT  RRR, +s1/s2  Lungs grossly clear, nl respiratory effort  Abd soft ntnd, nl bowel sounds  R sided scrotal with drainaged noted  B/l LE with xerotic skin and R ankle with clean dressing in place

## 2024-12-20 NOTE — PROGRESS NOTE ADULT - PROBLEM SELECTOR PLAN 1
MRI:  ulceration at the plantar surface medially with suspected exposed bone with underlying   Surgical culture grew Providencia stuartii  sp Vancomycin  Podiatry following MRI:  ulceration at the plantar surface medially with suspected exposed bone with underlying   Surgical culture grew Providencia stuartii  ID recs: bone Bx/debridement OFF Abs for a period of time with Culture  sent to determine directed Ab treatment  Podiatry following

## 2024-12-21 LAB
ANION GAP SERPL CALC-SCNC: 8 MMOL/L — SIGNIFICANT CHANGE UP (ref 5–17)
BUN SERPL-MCNC: 28 MG/DL — HIGH (ref 7–18)
CALCIUM SERPL-MCNC: 8.7 MG/DL — SIGNIFICANT CHANGE UP (ref 8.4–10.5)
CHLORIDE SERPL-SCNC: 99 MMOL/L — SIGNIFICANT CHANGE UP (ref 96–108)
CO2 SERPL-SCNC: 28 MMOL/L — SIGNIFICANT CHANGE UP (ref 22–31)
CREAT SERPL-MCNC: 5.77 MG/DL — HIGH (ref 0.5–1.3)
CULTURE RESULTS: ABNORMAL
EGFR: 10 ML/MIN/1.73M2 — LOW
GLUCOSE BLDC GLUCOMTR-MCNC: 119 MG/DL — HIGH (ref 70–99)
GLUCOSE BLDC GLUCOMTR-MCNC: 119 MG/DL — HIGH (ref 70–99)
GLUCOSE BLDC GLUCOMTR-MCNC: 152 MG/DL — HIGH (ref 70–99)
GLUCOSE BLDC GLUCOMTR-MCNC: 87 MG/DL — SIGNIFICANT CHANGE UP (ref 70–99)
GLUCOSE SERPL-MCNC: 117 MG/DL — HIGH (ref 70–99)
HCT VFR BLD CALC: 31.9 % — LOW (ref 39–50)
HGB BLD-MCNC: 10.6 G/DL — LOW (ref 13–17)
MCHC RBC-ENTMCNC: 32.4 PG — SIGNIFICANT CHANGE UP (ref 27–34)
MCHC RBC-ENTMCNC: 33.2 G/DL — SIGNIFICANT CHANGE UP (ref 32–36)
MCV RBC AUTO: 97.6 FL — SIGNIFICANT CHANGE UP (ref 80–100)
NRBC # BLD: 0 /100 WBCS — SIGNIFICANT CHANGE UP (ref 0–0)
ORGANISM # SPEC MICROSCOPIC CNT: ABNORMAL
ORGANISM # SPEC MICROSCOPIC CNT: ABNORMAL
PLATELET # BLD AUTO: 205 K/UL — SIGNIFICANT CHANGE UP (ref 150–400)
POTASSIUM SERPL-MCNC: 4.1 MMOL/L — SIGNIFICANT CHANGE UP (ref 3.5–5.3)
POTASSIUM SERPL-SCNC: 4.1 MMOL/L — SIGNIFICANT CHANGE UP (ref 3.5–5.3)
RBC # BLD: 3.27 M/UL — LOW (ref 4.2–5.8)
RBC # FLD: 14 % — SIGNIFICANT CHANGE UP (ref 10.3–14.5)
SODIUM SERPL-SCNC: 135 MMOL/L — SIGNIFICANT CHANGE UP (ref 135–145)
SPECIMEN SOURCE: SIGNIFICANT CHANGE UP
WBC # BLD: 12.73 K/UL — HIGH (ref 3.8–10.5)
WBC # FLD AUTO: 12.73 K/UL — HIGH (ref 3.8–10.5)

## 2024-12-21 PROCEDURE — 99232 SBSQ HOSP IP/OBS MODERATE 35: CPT | Mod: FS

## 2024-12-21 RX ADMIN — SEVELAMER CARBONATE 1600 MILLIGRAM(S): 800 TABLET, FILM COATED ORAL at 08:06

## 2024-12-21 RX ADMIN — Medication 5 MILLIGRAM(S): at 06:02

## 2024-12-21 RX ADMIN — HEPARIN SODIUM 5000 UNIT(S): 1000 INJECTION, SOLUTION INTRAVENOUS; SUBCUTANEOUS at 15:38

## 2024-12-21 RX ADMIN — LATANOPROST 1 DROP(S): 50 SOLUTION OPHTHALMIC at 21:34

## 2024-12-21 RX ADMIN — HEPARIN SODIUM 5000 UNIT(S): 1000 INJECTION, SOLUTION INTRAVENOUS; SUBCUTANEOUS at 06:02

## 2024-12-21 RX ADMIN — SEVELAMER CARBONATE 1600 MILLIGRAM(S): 800 TABLET, FILM COATED ORAL at 17:33

## 2024-12-21 RX ADMIN — SEVELAMER CARBONATE 1600 MILLIGRAM(S): 800 TABLET, FILM COATED ORAL at 12:08

## 2024-12-21 RX ADMIN — HEPARIN SODIUM 5000 UNIT(S): 1000 INJECTION, SOLUTION INTRAVENOUS; SUBCUTANEOUS at 21:34

## 2024-12-21 NOTE — PROGRESS NOTE ADULT - SUBJECTIVE AND OBJECTIVE BOX
INTERVAL HPI/OVERNIGHT EVENTS:  Pt resting comfortably. No acute complaints.       MEDICATIONS  (STANDING):  amLODIPine   Tablet 5 milliGRAM(s) Oral daily  calcitriol   Capsule 1 MICROGram(s) Oral <User Schedule>  epoetin rudolph-epbx (RETACRIT) Injectable 6000 Unit(s) IV Push <User Schedule>  heparin   Injectable 5000 Unit(s) SubCutaneous every 8 hours  insulin lispro (ADMELOG) corrective regimen sliding scale   SubCutaneous three times a day before meals  insulin lispro (ADMELOG) corrective regimen sliding scale   SubCutaneous at bedtime  latanoprost 0.005% Ophthalmic Solution 1 Drop(s) Both EYES at bedtime  sevelamer carbonate 1600 milliGRAM(s) Oral three times a day with meals    MEDICATIONS  (PRN):  acetaminophen     Tablet .. 650 milliGRAM(s) Oral every 6 hours PRN Temp greater or equal to 38C (100.4F), Mild Pain (1 - 3)  ondansetron Injectable 4 milliGRAM(s) IV Push every 8 hours PRN Nausea and/or Vomiting      Vital Signs Last 24 Hrs  T(C): 37.4 (21 Dec 2024 05:15), Max: 37.4 (21 Dec 2024 05:15)  T(F): 99.3 (21 Dec 2024 05:15), Max: 99.3 (21 Dec 2024 05:15)  HR: 61 (21 Dec 2024 05:15) (61 - 66)  BP: 122/56 (21 Dec 2024 05:15) (114/55 - 122/56)  BP(mean): --  RR: 17 (21 Dec 2024 05:15) (17 - 18)  SpO2: 96% (21 Dec 2024 05:15) (93% - 97%)    Parameters below as of 21 Dec 2024 05:15  Patient On (Oxygen Delivery Method): room air    Physical:  General: A&Ox3. NAD.  Abdomen: Soft nondistended, nontender  Skin: L buttock incision site with serosanguinous drainage, packing removed and replaced, with gauze and allevyn petal over. No surrounding erythema or induration.      I&O's Detail  20 Dec 2024 07:01  -  21 Dec 2024 07:00  --------------------------------------------------------  IN:    Other (mL): 600 mL  Total IN: 600 mL    OUT:    Other (mL): 1600 mL  Total OUT: 1600 mL  Total NET: -1000 mL    LABS:                      9.4    10.28 )-----------( 179      ( 20 Dec 2024 06:45 )             29.1             12-20    137  |  100  |  44[H]  ----------------------------<  105[H]  4.4   |  28  |  7.27[H]    Ca    8.8      20 Dec 2024 06:45

## 2024-12-21 NOTE — PROGRESS NOTE ADULT - SUBJECTIVE AND OBJECTIVE BOX
Full note to follow. [facility] NEPHROLOGY- PROGRESS NOTE    Patient is a 69yo Male with ESRD on HD via Rt AVG, HTN, 2HPT, Anemia, LE venous stasis with Rt foot wound presents with weakness. Pt a/w Rt foot ulcer and hyperkalemia. Nephrology consulted for ESRD status.   MRI Rt foot: + osteomyelitis involving the talus and less so of the navicular bone;    BCx +Staph hominis  12/20: s/p I& D of buttock abscess    Hospital Medications: Medications reviewed.  REVIEW OF SYSTEMS: Pt denies any SOB, chest pain, n/v/d, or abd pain, +Rt foot pain-denies at this time      VITALS:  T(F): 97.8 (12-21-24 @ 13:33), Max: 99.3 (12-21-24 @ 05:15)  HR: 71 (12-21-24 @ 13:33)  BP: 148/74 (12-21-24 @ 13:33)  RR: 18 (12-21-24 @ 13:33)  SpO2: 96% (12-21-24 @ 13:33)  Wt(kg): --    12-20 @ 07:01  -  12-21 @ 07:00  --------------------------------------------------------  IN: 600 mL / OUT: 1600 mL / NET: -1000 mL        PHYSICAL EXAM:  Gen: NAD, calm, Obese; more awake  HEENT: anicteric  Cards: RRR, +S1/S2, no M/G/R  Resp: CTA ant   GI: soft, NT/ND, NABS  Extremities: no LE edema B/L  Derm: b/l dry skin, Rt foot plantar wound covered    LABS:  12-21    135  |  99  |  28[H]  ----------------------------<  117[H]  4.1   |  28  |  5.77[H]    Ca    8.7      21 Dec 2024 10:04      Creatinine Trend: 5.77 <--, 7.27 <--, 5.52 <--, 7.32 <--, 9.00 <--                        10.6   12.73 )-----------( 205      ( 21 Dec 2024 10:04 )             31.9     Urine Studies:  Urinalysis Basic - ( 21 Dec 2024 10:04 )    Color:  / Appearance:  / SG:  / pH:   Gluc: 117 mg/dL / Ketone:   / Bili:  / Urobili:    Blood:  / Protein:  / Nitrite:    Leuk Esterase:  / RBC:  / WBC    Sq Epi:  / Non Sq Epi:  / Bacteria:

## 2024-12-21 NOTE — PROGRESS NOTE ADULT - SUBJECTIVE AND OBJECTIVE BOX
Podiatry Interval: Patient was seen bedside resting comfortably with his wife accompanying him. No acute overnight events noted. Patient denies any pain around the wound. Patient denies any malodor or drainage coming from his dressings. Patient denies any numbness, tingling, or burning. Patient denies calf tenderness bilaterally. He has been compliant with keeping his dressings clean, dry, and intact. He has also been remaining NWB to the E at all times. Patient denies any other pedal complaints and no constitutional symptoms such as F/C/N/V/SOB. AAOx3, NAD.    Podiatry HPI: Podiatry consulted for 68-year-old male who presents to the ED with his wife with chief complaint of generalized weakness. PMHx of DM, HTN, ESRD on dialysis, Charcot, and HLD. Patient states he woke up very tired this morning and could not stand up to get dressed for his dialysis appointment. Per wife, patient was folded over when she called an ambulance. Patient reports history of Charcot deformity B/L deformity for 10 year and states he had a Charcot procedure for right foot in 2014. Patient states he wears Charcot boots at all times B/L. Per wife, the patient has not had an ulceration in many years until he developed this right foot ulceration 1 month ago. The wife states that she thinks the shape of her 's right foot is changing and it is affecting the way he walks. States they regularly follow up in outpatient podiatry clinic with Dr. Sim. Patient complaining of fever and chills but denies n/v.       Medications:  acetaminophen     Tablet .. 650 milliGRAM(s) Oral every 6 hours PRN  amLODIPine   Tablet 5 milliGRAM(s) Oral daily  calcitriol   Capsule 1 MICROGram(s) Oral <User Schedule>  epoetin rudolph-epbx (RETACRIT) Injectable 6000 Unit(s) IV Push <User Schedule>  heparin   Injectable 5000 Unit(s) SubCutaneous every 8 hours  insulin lispro (ADMELOG) corrective regimen sliding scale   SubCutaneous three times a day before meals  insulin lispro (ADMELOG) corrective regimen sliding scale   SubCutaneous at bedtime  latanoprost 0.005% Ophthalmic Solution 1 Drop(s) Both EYES at bedtime  ondansetron Injectable 4 milliGRAM(s) IV Push every 8 hours PRN  sevelamer carbonate 1600 milliGRAM(s) Oral three times a day with meals    FHx:  Family history of diabetes mellitus    PMHx:   Diabetes  Kidney problem  Hypertension  HTN (hypertension)  DM (diabetes mellitus)  HTN (hypertension)  DM (diabetes mellitus)  ESRD (end stage renal disease) on dialysis  Charcot foot due to diabetes mellitus  ESRD on hemodialysis  Charcot foot due to diabetes mellitus  Diabetic retinopathy associated with type 2 diabetes mellitus  Hyperlipidemia  Abscess of scrotum  Hidradenitis  End stage renal disease     PSHx:  S/P foot surgery  Charcot's syndrome  History of incision and drainage  Hemodialysis access, AV graft      Labs                          10.6   12.73 )-----------( 205      ( 21 Dec 2024 10:04 )             31.9      12-21    135  |  99  |  28[H]  ----------------------------<  117[H]  4.1   |  28  |  5.77[H]    Ca    8.7      21 Dec 2024 10:04       Vital Signs Last 24 Hrs  T(C): 36.6 (21 Dec 2024 13:33), Max: 37.4 (21 Dec 2024 05:15)  T(F): 97.8 (21 Dec 2024 13:33), Max: 99.3 (21 Dec 2024 05:15)  HR: 71 (21 Dec 2024 13:33) (61 - 71)  BP: 148/74 (21 Dec 2024 13:33) (118/61 - 148/74)  BP(mean): --  RR: 18 (21 Dec 2024 13:33) (17 - 18)  SpO2: 96% (21 Dec 2024 13:33) (95% - 96%)    Parameters below as of 21 Dec 2024 13:33  Patient On (Oxygen Delivery Method): room air    Sedimentation Rate, Erythrocyte: 105 mm/Hr (12-17-24 @ 12:35)  Sedimentation Rate, Erythrocyte: 97 mm/Hr (12-16-24 @ 11:59)         C-Reactive Protein: 160.0 mg/L (12-17-24 @ 07:28)  C-Reactive Protein: 139.0 mg/L (12-16-24 @ 11:50)  WBC Count: 12.73 K/uL *H* (12-21-24 @ 10:04)      LE FOCUSED PHYSICAL EXAM:  Vasc: DP/PT non-palpable bilaterally. CFT <3 seconds x 10. Temp Gradient within normal limits bilaterally with no increase in temperature to the right foot ulcer. No edema, no erythema, no varicosities observed bilaterally.   Derm: Diffuse xerosis observed to bilateral LEs. Right medial foot diabetic pressure ulcer noted overlying the talonavicular joint, measuring 1.9 x 1.8 x 0.5cm with a fibrotic wound base and hyperkeratotic klaudia-wound. + epibole, + tunneling by 0.5mm circumferentially. Wound does not probe to bone. No more malodor. No other open lesions, no other ulcerations, no purulence, no drainage, no soft tissue crepitus, no fluctuance, no IDM, no streaking.   Neuro: Patient is awake, alert and oriented x3. Protective sensation is diminished bilaterally.   MSK: Muscle strength deferred. No pain upon palpation bilaterally. No pain upon ROM of pedal and ankle joints bilaterally. No crepitation upon ROM of joints. Arch height 2/5 on-WB. Negative calf tenderness bilaterally.       IMAGING:    ACC: [id number] EXAM:  US PHYSIOL LWR EXT 3+ LEV BI   ORDERED BY: REYES GILBERT   PROCEDURE DATE:  12/16/2024    INTERPRETATION:  Clinical indication: Right foot nonhealing wound  COMPARISON: 5/4/2016  FINDINGS AND IMPRESSION: There are normal amplitude biphasic pulse volume recordings   bilaterally. There are bilateral calcified, noncompressible vessels.   Therefore, ABIs could not be calculated.  --- End of Report ---    ACC: [id number] EXAM:  XR FOOT 2 VIEWS RT   ORDERED BY:  MAX LAZARUS   PROCEDURE DATE:  12/16/2024    INTERPRETATION:  Right foot  HISTORY: Osteomyelitis  COMPARISON: 6/4/2015  2 views of the right foot show no evidence of acute fracture. Advanced   arthritic changes of the hindfoot are unchanged. Vascular calcifications   are present.    IMPRESSION: No acute bony pathology.  Further information may be obtained from the patient's subsequent MRI of the right foot.  --- End of report ---     ACC: [id number] EXAM:  MR FOOT RT   ORDERED BY: SALVADOR PAREKH   PROCEDURE DATE:  12/17/2024    INTERPRETATION:  MR FOOT RIGHT dated 12/17/2024 3:03 PM  INDICATION: Pain and swelling. Skin ulcer.  COMPARISON: Foot radiographs dated 12/16/2024  TECHNIQUE: Multi-sequential, multiplanar MRI imaging of the right midfoot   and forefoot was performed per standard protocol.    FINDINGS: Degraded by motion artifact.  BONE MARROW: Flat foot deformity is noted. There is subtle marrow edema   within the medial sesamoid of the great toe.  SYNOVIUM/ JOINT FLUID: No joint effusion  TENDONS: The tendons are intact. No large tenosynovitis.  MUSCLES: Marked atrophy and edema within the intrinsic musculature the   foot.  NEUROVASCULAR STRUCTURES: Preserved  SUBCUTANEOUS SOFT TISSUES: Skin wounds are seen along the plantar surface   of the forefoot. No drainable collection is seen. There is moderate   thickening of the skin noted.    IMPRESSION: Degraded by motion artifact.  1.  Multiple skin wounds are marked soft tissue swelling concerning for   cellulitis in the proper clinical setting. No drainable collection.  2.  Suspect patchy signal abnormality within the medial sesamoid.   Correlate clinically for a skin wound in this region which would increase   the probability of osteomyelitis. Differential includes reactive osteitis.  --- End of Report ---    ACC: [id number] EXAM:  MR ANKLE RT   ORDERED BY: REYES GILBERT  PROCEDURE DATE:  12/18/2024    INTERPRETATION:  MRI of the right ankle  INDICATION: Concern for osteomyelitis  COMPARISON: MRI of the foot performed 12/17/2024 and CT of thefoot   performed 12/16/2024.  TECHNIQUE: Multiplanar multisequence MRI of the right ankle    FINDINGS:  There is presence of advanced Charcot arthropathy with severe resultant   valgus and pes planus deformities.  There is soft tissue ulceration at the plantar surface of the foot   medially adjacent to the talus with suspected exposed bone. There is   underlying high STIR weighted signal and low T1 weighted signal within   the majority of the medial talus with imperceptible cortex. There ishigh   T2 and intermediate T1-weighted signal predominantly involving the   navicular bone at the talonavicular joint (series 10, image 21). The   remaining osseous structures are without convincing evidence of   osteomyelitis.    There is no drainable fluid collection. There is prominent soft tissue   edema at the medial and plantar side of the ankle and lesser at the   lateral ankle. There is diffuse muscle atrophy.  The included proximal portions of the Achilles tendon, anterior extensor,   medial flexor, and peroneal tendons without high-grade tear.    IMPRESSION: MRI of the right ankle demonstrates ulceration at the plantar   surface medially with suspected exposed bone with underlying   osteomyelitis involving the talus and less so ofthe navicular bone. No   drainable fluid collection.  End-stage arthritis with resultant planovalgus deformity.  --- End of Report ---    CULTURES:   Specimen Source: .Surgical Swab  Culture Results:   Moderate Providencia stuartii   Moderate Corynebacterium striatum group "Susceptibilities not performed"   Few Trueperella bernardiae "Susceptibilities not performed"  Organism Identification: Providencia stuartii  Organism: Providencia stuartii

## 2024-12-21 NOTE — PROGRESS NOTE ADULT - SUBJECTIVE AND OBJECTIVE BOX
CC: weakness  S:  denies any pain  O:  Vital Signs Last 24 Hrs  T(C): 37.4 (12-21-24 @ 05:15), Max: 37.4 (12-21-24 @ 05:15)  T(F): 99.3 (12-21-24 @ 05:15), Max: 99.3 (12-21-24 @ 05:15)  HR: 61 (12-21-24 @ 05:15) (61 - 66)  BP: 122/56 (12-21-24 @ 05:15) (114/55 - 122/56)  BP(mean): --  RR: 17 (12-21-24 @ 05:15) (17 - 18)  SpO2: 96% (12-21-24 @ 05:15) (93% - 97%)        PE:  Gen: Oriented, alert, No acute distress Eyes: conjunctivae and lid normal, sclera clear with no icterus ENT: nose and throat exam normal CVS: S1, S2, RRR; no murmur, no rubs, no gallops Pulm: Good air exchange, Breath sounds equal bilaterally, no rales rhonchi,  no wheezes Chest: nontender, no chest deformity, chest movement symmetrical Gl: abdomen soft, nontender, nondistended, bowel sounds normoactive, no masses palpated Musk: no msk pain, BLE edematous, R foot with c/d/i dressing Skin: L buttock dressing c/d/i , no skin lesions, skin turgor normal, warm and well perfused Neuro: Awake, alert,Psych: normal affect, insight

## 2024-12-21 NOTE — PROGRESS NOTE ADULT - ASSESSMENT
Mr. Ramos is a 69 yo M with PMHx of ESRD on HD MWF, HTN, DM, bilateral Charcot's joint, presenting with generalized weakness for 1 day.    Found to have hyperkalemia and R foot ulcer, admitted for further workup.    # Generalized weakness  # Hyperkalemia  # R ankle OM  # ESRD on HD  # HTN  # DM  # Bilateral Charcot's joint  #Left buttock abscess    - completed cefepime, one last dose of vanc per ID given 12/20  -Surgery Dr. Linton cleared patient for discharge  -ANEESH: There are normal amplitude biphasic pulse volume recordings   bilaterally. There are bilateral calcified, noncompressible vessels.   Therefore, ABIs could not be calculated.  -f/u with vascular and podiatry outpatient  - podiatry recs appreciated  -consulted ID, follow up with ID outpatient  - consult nephrology, will get HD Sunday (holiday schedule), next HD after DC would be Tuesday  - monitor BMP  -HD per neph  - A1c 7.4, FS/SSI  - formal med rec  - DVT ppx: heparin SQ.   Mr. Ramos is a 69 yo M with PMHx of ESRD on HD MWF, HTN, DM, bilateral Charcot's joint, presenting with generalized weakness for 1 day.    Found to have hyperkalemia and R foot ulcer, admitted for further workup.    # Generalized weakness  # Hyperkalemia  # R foot, ankle ulcer, wound  # ESRD on HD  # HTN  # DM  # Bilateral Charcot's joint  #Left buttock abscess    - completed cefepime, one last dose of vanc per ID given 12/20  -per ID Dr. Brito, no evidence of acute SSTI.  -Surgery Dr. Linton cleared patient for discharge  -ANEESH: There are normal amplitude biphasic pulse volume recordings   bilaterally. There are bilateral calcified, noncompressible vessels.   Therefore, ABIs could not be calculated.  -f/u with vascular and podiatry outpatient  - podiatry recs appreciated  -consulted ID, follow up with ID outpatient  - consult nephrology, will get HD Sunday (holiday schedule), next HD after DC would be Tuesday  - monitor BMP  -HD per neph  - A1c 7.4, FS/SSI  - formal med rec  - DVT ppx: heparin SQ.

## 2024-12-21 NOTE — CHART NOTE - NSCHARTNOTEFT_GEN_A_CORE
Case discussed with pt's wife Almaz at  and updated her regarding pt's current medical condition including plan for dialysis tomorrow 12/22/2024.  Wife states that pt normally drives himself to his dialysis center but has been weak with ambulation.  PT consult is ordered.  Plan of care discussed and all questions answered.

## 2024-12-21 NOTE — PROGRESS NOTE ADULT - ASSESSMENT
Patient is a 67yo Male with ESRD on HD via Rt AVG, HTN, 2HPT, Anemia, LE venous stasis with Rt foot wound presents with weakness. Pt a/w Rt foot ulcer and hyperkalemia. Nephrology consulted for ESRD status.     1) ESRD: Last HD 12/20 with mild intradialytic hypotension, with net 1.0L removed. Plan for next maintenance HD on 12/23.  Monitor electrolytes  2) Hyperkalemia: resolved. c/w low potassium diet.    Monitor serum potassium  3) HTN with ESRD: BP low normal for which Amlodipine was reduced to 5mg PO daily. Continue to hold Coreg 25mg PO bid. If BP remains low; consider holding Amlodipine as well. c/w low sodium diet. Monitor BP.  4) Anemia of renal disease: Hb low. c/w Epogen 6000 units IV tiw. Monitor Hb.  5) 2HPT: Corrected serum calcium & serum phos acceptable.  PTHi 441. c/w calcitriol 1mcg PO MWF. Pt on Lanthanum 2000mg PO tid with meals which is unavailable at [facility]. c/w Sevelamer 1600mg PO tid with meals and  low phos diet. Monitor serum calcium and phosphorus.    HepBsAg was neg on outpt labs on 12/11/24; repeat HepBsAg neg; likely positive HepBsAg on 12/16 is an error.     [facility] NEPHROLOGY  Arden Tsang M.D.  Freeman Holland D.O.  Janny Chirinos M.D.  MD Ambar Mccarty, MSN, ANP-C    Telephone: [phone]  Facsimile: [phone]    [facility] [place] [address], [address]  [place] [address]   Patient is a 69yo Male with ESRD on HD via Rt AVG, HTN, 2HPT, Anemia, LE venous stasis with Rt foot wound presents with weakness. Pt a/w Rt foot ulcer and hyperkalemia. Nephrology consulted for ESRD status.     1) ESRD: Last HD 12/20 with mild intradialytic hypotension, with net 1.0L removed. Plan for next maintenance HD on 12/22.  Monitor electrolytes  2) Hyperkalemia: resolved. c/w low potassium diet.    Monitor serum potassium  3) HTN with ESRD: BP low normal for which Amlodipine was reduced to 5mg PO daily. Continue to hold Coreg 25mg PO bid. If BP remains low; consider holding Amlodipine as well. c/w low sodium diet. Monitor BP.  4) Anemia of renal disease: Hb low. c/w Epogen 6000 units IV tiw. Monitor Hb.  5) 2HPT: Corrected serum calcium & serum phos acceptable.  PTHi 441. c/w calcitriol 1mcg PO MWF. Pt on Lanthanum 2000mg PO tid with meals which is unavailable at [facility]. c/w Sevelamer 1600mg PO tid with meals and  low phos diet. Monitor serum calcium and phosphorus.    HepBsAg was neg on outpt labs on 12/11/24; repeat HepBsAg neg; likely positive HepBsAg on 12/16 is an error.     [facility] NEPHROLOGY  Arden Tsang M.D.  Freeman Holland D.O.  Janny Chirinos M.D.  MD Ambar Mccarty, MSN, ANP-C    Telephone: [phone]  Facsimile: [phone]    [facility] [place] [address], [address]  [place] [address]

## 2024-12-21 NOTE — PROGRESS NOTE ADULT - ASSESSMENT
69M s/p bedside I&D of buttock abscess 12/19   leukocytosis downtrending     - Daily dressing changes per wound care orders  - Continue ABX   - F/u wound cultures   - Remainder of care per primary team   - Discussed with Dr. Linton

## 2024-12-21 NOTE — PROGRESS NOTE ADULT - ASSESSMENT
A:   Charcot arthropathy B/L  Diabetic ulceration R midfoot with chronic OM    P:  Patient evaluated, chart reviewed and updated  Mild leukocytosis (12.73), vitals stable.   Xrays reviewed - Chronic charcot changes of the rearfoot with TN bulging. No gas  MRI of R ankle - chronic OM of talus and partially navicular  ANEESH/PVR - non compressible vessels B/L, normal PVR waveforms B/L   Discussed diagnosis and treatment options with the patient  After lengthy discussion, it was agreed upon that conservative treatment may be the best course of action at this time as the ulceration appears stable, and patient has a very poor vascular status.  ID recc bone biopsy for abx plan - To be further discussed with attending and patient in person during rounds tomorrow morning  Continue antibiotics per ID/IM   Wound culture - Providencia stuartii (prelim)  Betadine, DSD applied  Advised patient to continue use of CROW walkers B/L when walking to prevent worsening ulcerations  Instructed patient to keep dressings clean, dry, and intact at all times  Educated patient on importance of managing fasting blood glucose levels, daily foot examinations, and proper shoe gear.  Podiatry to follow while in house  Discussed with attending Dr. Sim

## 2024-12-22 LAB
ANION GAP SERPL CALC-SCNC: 11 MMOL/L — SIGNIFICANT CHANGE UP (ref 5–17)
BUN SERPL-MCNC: 44 MG/DL — HIGH (ref 7–18)
CALCIUM SERPL-MCNC: 8.9 MG/DL — SIGNIFICANT CHANGE UP (ref 8.4–10.5)
CHLORIDE SERPL-SCNC: 98 MMOL/L — SIGNIFICANT CHANGE UP (ref 96–108)
CO2 SERPL-SCNC: 27 MMOL/L — SIGNIFICANT CHANGE UP (ref 22–31)
CREAT SERPL-MCNC: 7.22 MG/DL — HIGH (ref 0.5–1.3)
CULTURE RESULTS: SIGNIFICANT CHANGE UP
EGFR: 8 ML/MIN/1.73M2 — LOW
GLUCOSE BLDC GLUCOMTR-MCNC: 111 MG/DL — HIGH (ref 70–99)
GLUCOSE BLDC GLUCOMTR-MCNC: 117 MG/DL — HIGH (ref 70–99)
GLUCOSE BLDC GLUCOMTR-MCNC: 94 MG/DL — SIGNIFICANT CHANGE UP (ref 70–99)
GLUCOSE BLDC GLUCOMTR-MCNC: 98 MG/DL — SIGNIFICANT CHANGE UP (ref 70–99)
GLUCOSE SERPL-MCNC: 121 MG/DL — HIGH (ref 70–99)
HCT VFR BLD CALC: 30.2 % — LOW (ref 39–50)
HGB BLD-MCNC: 10.2 G/DL — LOW (ref 13–17)
MAGNESIUM SERPL-MCNC: 2.3 MG/DL — SIGNIFICANT CHANGE UP (ref 1.6–2.6)
MCHC RBC-ENTMCNC: 32.7 PG — SIGNIFICANT CHANGE UP (ref 27–34)
MCHC RBC-ENTMCNC: 33.8 G/DL — SIGNIFICANT CHANGE UP (ref 32–36)
MCV RBC AUTO: 96.8 FL — SIGNIFICANT CHANGE UP (ref 80–100)
NRBC # BLD: 0 /100 WBCS — SIGNIFICANT CHANGE UP (ref 0–0)
PHOSPHATE SERPL-MCNC: 4.5 MG/DL — SIGNIFICANT CHANGE UP (ref 2.5–4.5)
PLATELET # BLD AUTO: 246 K/UL — SIGNIFICANT CHANGE UP (ref 150–400)
POTASSIUM SERPL-MCNC: 4.3 MMOL/L — SIGNIFICANT CHANGE UP (ref 3.5–5.3)
POTASSIUM SERPL-SCNC: 4.3 MMOL/L — SIGNIFICANT CHANGE UP (ref 3.5–5.3)
RBC # BLD: 3.12 M/UL — LOW (ref 4.2–5.8)
RBC # FLD: 13.8 % — SIGNIFICANT CHANGE UP (ref 10.3–14.5)
SODIUM SERPL-SCNC: 136 MMOL/L — SIGNIFICANT CHANGE UP (ref 135–145)
SPECIMEN SOURCE: SIGNIFICANT CHANGE UP
WBC # BLD: 15.84 K/UL — HIGH (ref 3.8–10.5)
WBC # FLD AUTO: 15.84 K/UL — HIGH (ref 3.8–10.5)

## 2024-12-22 PROCEDURE — 99232 SBSQ HOSP IP/OBS MODERATE 35: CPT

## 2024-12-22 PROCEDURE — 99222 1ST HOSP IP/OBS MODERATE 55: CPT

## 2024-12-22 PROCEDURE — 99233 SBSQ HOSP IP/OBS HIGH 50: CPT

## 2024-12-22 RX ORDER — VANCOMYCIN HYDROCHLORIDE 5 G/100ML
750 INJECTION, POWDER, LYOPHILIZED, FOR SOLUTION INTRAVENOUS ONCE
Refills: 0 | Status: COMPLETED | OUTPATIENT
Start: 2024-12-22 | End: 2024-12-22

## 2024-12-22 RX ADMIN — HEPARIN SODIUM 5000 UNIT(S): 1000 INJECTION, SOLUTION INTRAVENOUS; SUBCUTANEOUS at 06:15

## 2024-12-22 RX ADMIN — HEPARIN SODIUM 5000 UNIT(S): 1000 INJECTION, SOLUTION INTRAVENOUS; SUBCUTANEOUS at 13:57

## 2024-12-22 RX ADMIN — SEVELAMER CARBONATE 1600 MILLIGRAM(S): 800 TABLET, FILM COATED ORAL at 08:17

## 2024-12-22 RX ADMIN — Medication 5 MILLIGRAM(S): at 06:15

## 2024-12-22 RX ADMIN — VANCOMYCIN HYDROCHLORIDE 250 MILLIGRAM(S): 5 INJECTION, POWDER, LYOPHILIZED, FOR SOLUTION INTRAVENOUS at 15:10

## 2024-12-22 RX ADMIN — HEPARIN SODIUM 5000 UNIT(S): 1000 INJECTION, SOLUTION INTRAVENOUS; SUBCUTANEOUS at 21:54

## 2024-12-22 RX ADMIN — LATANOPROST 1 DROP(S): 50 SOLUTION OPHTHALMIC at 21:54

## 2024-12-22 RX ADMIN — EPOETIN ALFA 6000 UNIT(S): 2000 SOLUTION INTRAVENOUS; SUBCUTANEOUS at 11:15

## 2024-12-22 RX ADMIN — SEVELAMER CARBONATE 1600 MILLIGRAM(S): 800 TABLET, FILM COATED ORAL at 13:57

## 2024-12-22 NOTE — CHART NOTE - NSCHARTNOTEFT_GEN_A_CORE
Event:  Notified by RN pt had drainage from scrotum. Pt seen and assessed at beside. He denied any discomfort to scrotum and was not aware it was draining.         Objective: Vital signs last  24hrs  ICU Vital Signs Last 24 Hrs  T(C): 36.9 (22 Dec 2024 13:58), Max: 37 (21 Dec 2024 20:19)  T(F): 98.4 (22 Dec 2024 13:58), Max: 98.6 (21 Dec 2024 20:19)  HR: 66 (22 Dec 2024 13:58) (65 - 71)  BP: 133/63 (22 Dec 2024 13:58) (125/50 - 146/69)  BP(mean): 83 (22 Dec 2024 04:58) (83 - 83)  RR: 18 (22 Dec 2024 13:58) (17 - 18)  SpO2: 98% (22 Dec 2024 13:58) (95% - 98%)    O2 Parameters below as of 22 Dec 2024 13:58  Patient On (Oxygen Delivery Method): room air      Focus PE:  Constitutional: in bed resting comfortably w/o signs of distress  Neuro- alert and oriented x 3  CV-RRR, nml S1S2  Resp- BL-CTA w/o increased WOB  Integument: Scrotum with edema, erythema, and purulent drainage anterior scrotum    Labs:                         10.2   15.84 )-----------( 246      ( 22 Dec 2024 09:03 )             30.2       A/P:  Pt is a 69 yo M with PMH of  DM, HTN, ESRD on dialysis ( M, W, F), Last Dialysis 12/13/24,   B/L Charcot deformity and HLD, present to the ED for generalized weakness, chills, subjective fever. Admitted to medicine for generalized weakness, hyperkalemia, right foot ulcer MR right Ankle demonstrates ulceration at the plantar surface medially with suspected exposed bone with underlying osteomyelitis involving the talus and less so often navicular bone. Podiatry follows OM likely chronic, recommended  bone Bx/debridement OFF Abs for a period of time with Culture  sent to determine directed Ab treatment. Pt noted with episode of fever over 101, Started on Cefepime and vancomycin. Noted with open wound draining purulent discharge to left buttock. ID consulted for further recommendations. Blood culture growing Staphylococcus. Surgery consulted, S/p I&D at bedside 12/19. ID recommended one more dose of vancomycin 750 mg and no abx recommended thereafter.  Pt now with purulent drainage from scrotum.     Plan:    1. f/u wound culture              2. Urology consulted, F/u CT pelvis w/o contrast to assess for fluid collection             3. ID following, Vanco 750mg x1 dose given

## 2024-12-22 NOTE — CONSULT NOTE ADULT - ATTENDING COMMENTS
70 y/o male with hx of T2DM, hidradenitis suppurativa a/w generalized weakness now with purulent drainage from scrotum   s/p bedside I&D of buttock abscess 12/19   VSS, afebrile, leukocytosis elevated 15.8    Plan   - obtain CT pelvis w IV contrast to assess for fluid collection - time with HD session  - keep area clean and dry, may include sitz baths   - IV antibiotics, follow ID recommendations   - trend white count, monitor for fevers, etc.  - strict glycemic control   - HD as per neph   - remainder of care as per primary team

## 2024-12-22 NOTE — PROGRESS NOTE ADULT - ASSESSMENT
Scrotal drainage of pus with h/o hydradenitis suppurativa  Left buttock abscess s/p I&D  ESRD on HD  Hyperkalemia  R foot, ankle ulcer, wound  HTN  DM  Bilateral Charcot's joint    plan:   One dose of Vancomycin, followup CT pelvis as advised by urology   Appreciate consult from GORDY Brito  Surgery Dr. Linton cleared patient for discharge  ANEESH: There are normal amplitude biphasic pulse volume recordings bilaterally. There are bilateral calcified, noncompressible vessels. Therefore, ABIs could not be calculated.  F/u with vascular and podiatry outpatient  Local wound care as per surgery and podiatry  Nephrology following, discussed with Dr. Tsang, s/p HD today- Sunday , next available HD as outpatient after Monday will be on Thursday. Recommends a short HD tomorrow if patient is being dc  monitor BMP  A1c 7.4, FS/SSI, cont current insulin regimen  Formal med rec  DVT ppx: heparin SQ  Plan of care was discussed with patient; all questions and concerns were addressed and care was aligned with patient's wishes.

## 2024-12-22 NOTE — PROGRESS NOTE ADULT - SUBJECTIVE AND OBJECTIVE BOX
Podiatry Interval: Patient was seen bedside resting comfortably with his wife accompanying him. No acute overnight events noted. Patient denies any pain around the wound. Patient denies any malodor or drainage coming from his dressings. Patient denies any numbness, tingling, or burning. Patient denies calf tenderness bilaterally. He has been compliant with keeping his dressings clean, dry, and intact. He has also been remaining NWB to the E at all times. Patient denies any other pedal complaints and no constitutional symptoms such as F/C/N/V/SOB. AAOx3, NAD.    Podiatry HPI: Podiatry consulted for 68-year-old male who presents to the ED with his wife with chief complaint of generalized weakness. PMHx of DM, HTN, ESRD on dialysis, Charcot, and HLD. Patient states he woke up very tired this morning and could not stand up to get dressed for his dialysis appointment. Per wife, patient was folded over when she called an ambulance. Patient reports history of Charcot deformity B/L deformity for 10 year and states he had a Charcot procedure for right foot in 2014. Patient states he wears Charcot boots at all times B/L. Per wife, the patient has not had an ulceration in many years until he developed this right foot ulceration 1 month ago. The wife states that she thinks the shape of her 's right foot is changing and it is affecting the way he walks. States they regularly follow up in outpatient podiatry clinic with Dr. Sim. Patient complaining of fever and chills but denies n/v.       Medications acetaminophen     Tablet .. 650 milliGRAM(s) Oral every 6 hours PRN  amLODIPine   Tablet 5 milliGRAM(s) Oral daily  calcitriol   Capsule 1 MICROGram(s) Oral <User Schedule>  epoetin rudolph-epbx (RETACRIT) Injectable 6000 Unit(s) IV Push <User Schedule>  heparin   Injectable 5000 Unit(s) SubCutaneous every 8 hours  insulin lispro (ADMELOG) corrective regimen sliding scale   SubCutaneous three times a day before meals  insulin lispro (ADMELOG) corrective regimen sliding scale   SubCutaneous at bedtime  latanoprost 0.005% Ophthalmic Solution 1 Drop(s) Both EYES at bedtime  ondansetron Injectable 4 milliGRAM(s) IV Push every 8 hours PRN  sevelamer carbonate 1600 milliGRAM(s) Oral three times a day with meals    FHFamily history of diabetes mellitus    No pertinent family history in first degree relatives    ,   PMHDiabetes    Kidney problem    Hypertension    HTN (hypertension)    DM (diabetes mellitus)    HTN (hypertension)    DM (diabetes mellitus)    ESRD (end stage renal disease) on dialysis    Charcot foot due to diabetes mellitus    ESRD on hemodialysis    Charcot foot due to diabetes mellitus    Diabetic retinopathy associated with type 2 diabetes mellitus    Hyperlipidemia    Abscess of scrotum    Hidradenitis    End stage renal disease       PSHNo significant past surgical history    S/P foot surgery    Charcot's syndrome    History of incision and drainage    Hemodialysis access, AV graft        Labs                          10.2   15.84 )-----------( 246      ( 22 Dec 2024 09:03 )             30.2      12-22    136  |  98  |  44[H]  ----------------------------<  121[H]  4.3   |  27  |  7.22[H]    Ca    8.9      22 Dec 2024 09:03  Phos  4.5     12-22  Mg     2.3     12-22       Vital Signs Last 24 Hrs  T(C): 36.9 (22 Dec 2024 13:58), Max: 37 (21 Dec 2024 20:19)  T(F): 98.4 (22 Dec 2024 13:58), Max: 98.6 (21 Dec 2024 20:19)  HR: 66 (22 Dec 2024 13:58) (65 - 71)  BP: 133/63 (22 Dec 2024 13:58) (125/50 - 146/69)  BP(mean): 83 (22 Dec 2024 04:58) (83 - 83)  RR: 18 (22 Dec 2024 13:58) (17 - 18)  SpO2: 98% (22 Dec 2024 13:58) (95% - 98%)    Parameters below as of 22 Dec 2024 13:58  Patient On (Oxygen Delivery Method): room air      Sedimentation Rate, Erythrocyte: 105 mm/Hr (12-17-24 @ 12:35)  Sedimentation Rate, Erythrocyte: 97 mm/Hr (12-16-24 @ 11:59)         C-Reactive Protein: 160.0 mg/L (12-17-24 @ 07:28)  C-Reactive Protein: 139.0 mg/L (12-16-24 @ 11:50)   WBC Count: 15.84 K/uL *H* (12-22-24 @ 09:03)      LE FOCUSED PHYSICAL EXAM:  Vasc: DP/PT non-palpable bilaterally. CFT <3 seconds x 10. Temp Gradient within normal limits bilaterally with no increase in temperature to the right foot ulcer. No edema, no erythema, no varicosities observed bilaterally.   Derm: Diffuse xerosis observed to bilateral LEs. Right medial foot diabetic pressure ulcer noted overlying the talonavicular joint, measuring 1.9 x 1.8 x 0.5cm with a fibrotic wound base and hyperkeratotic klaudia-wound. + epibole, + tunneling by 0.5mm circumferentially. Wound does not probe to bone. No more malodor. No other open lesions, no other ulcerations, no purulence, no drainage, no soft tissue crepitus, no fluctuance, no IDM, no streaking.   Neuro: Patient is awake, alert and oriented x3. Protective sensation is diminished bilaterally.   MSK: Muscle strength deferred. No pain upon palpation bilaterally. No pain upon ROM of pedal and ankle joints bilaterally. No crepitation upon ROM of joints. Arch height 2/5 on-WB. Negative calf tenderness bilaterally.       IMAGING:  < from: MR Ankle No Cont, Right (12.18.24 @ 20:26) >  IMPRESSION: MRI of the right ankle demonstrates ulceration at the plantar   surface medially with suspected exposed bone with underlying   osteomyelitis involving the talus and less so ofthe navicular bone. No   drainable fluid collection.    < end of copied text >      ACC: [id number] EXAM:  US PHYSIOL LWR EXT 3+ LEV BI   ORDERED BY: REYES GILBERT   PROCEDURE DATE:  12/16/2024    INTERPRETATION:  Clinical indication: Right foot nonhealing wound  COMPARISON: 5/4/2016  FINDINGS AND IMPRESSION: There are normal amplitude biphasic pulse volume recordings   bilaterally. There are bilateral calcified, noncompressible vessels.   Therefore, ABIs could not be calculated.  --- End of Report ---      CULTURES:   Specimen Source: .Surgical Swab  Culture Results:   Moderate Providencia stuartii   Moderate Corynebacterium striatum group "Susceptibilities not performed"   Few Trueperella bernardiae "Susceptibilities not performed"  Organism Identification: Providencia stuartii  Organism: Providencia stuartii

## 2024-12-22 NOTE — PROGRESS NOTE ADULT - ASSESSMENT
Patient is a 69yo Male with ESRD on HD via Rt AVG, HTN, 2HPT, Anemia, LE venous stasis with Rt foot wound presents with weakness. Pt a/w Rt foot ulcer and hyperkalemia. Nephrology consulted for ESRD status.     1) ESRD: Last HD 12/20 with mild intradialytic hypotension, with net 1.0L removed. On HD now (12/22) tolerating well.  Next HD 12/24.  Monitor electrolytes  2) Hyperkalemia: resolved. c/w low potassium diet.    Monitor serum potassium  3) HTN with ESRD: BP low normal now stable.  Continue Amlodipine at reduced dose of 5mg PO daily. Continue to hold Coreg 25mg PO bid. If BP remains low; consider holding Amlodipine as well. c/w low sodium diet. Monitor BP.  4) Anemia of renal disease: Hb low. c/w Epogen 6000 units IV tiw. Monitor Hb.  5) 2HPT: Corrected serum calcium & serum phos acceptable.  PTHi 441. c/w calcitriol 1mcg PO MWF. Pt on Lanthanum 2000mg PO tid with meals which is unavailable at [facility]. c/w Sevelamer 1600mg PO tid with meals and  low phos diet. Monitor serum calcium and phosphorus.    HepBsAg was neg on outpt labs on 12/11/24; repeat HepBsAg neg; likely positive HepBsAg on 12/16 is an error.     [facility] NEPHROLOGY  Arden Tsang M.D.  Freeman Holland D.O.  Janny Chirinos M.D.  MD Ambar Mccarty, MSN, ANP-C    Telephone: [phone]  Facsimile: [phone] [place] [address], [address]  [place] [address]

## 2024-12-22 NOTE — CHART NOTE - NSCHARTNOTEFT_GEN_A_CORE
Notified by RN at 6:20 am that pt has swollen scrotum and white discharge coming out of the scrotal area, pt seen at bedside , scrotum examined, swollen, pt denies pain to palpation and there is white discharge with odor freely coming  after applying slight pressure from anterior middle part of scrotum, suggesting potential abscess with tunnel /fissure? Cultures from scrotal abscess obtained and sent , endorsed to day ACP to notify Attending and to call for Urology consult for further tx( I and D ?, testicular US? ) and to make ID aware to start/ give recommendations for abx tx while awaiting for cx results.     Vital Signs Last 24 Hrs  T(C): 37 (22 Dec 2024 04:58), Max: 37 (21 Dec 2024 20:19)  T(F): 98.6 (22 Dec 2024 04:58), Max: 98.6 (21 Dec 2024 20:19)  HR: 71 (22 Dec 2024 04:58) (68 - 71)  BP: 129/60 (22 Dec 2024 04:58) (129/60 - 148/74)  BP(mean): 83 (22 Dec 2024 04:58) (83 - 83)  RR: 17 (22 Dec 2024 04:58) (17 - 18)  SpO2: 96% (22 Dec 2024 04:58) (95% - 96%)    Parameters below as of 22 Dec 2024 04:58  Patient On (Oxygen Delivery Method): room air    Mary Vanegas NP-C  Medicine Department   [facility]

## 2024-12-22 NOTE — PROGRESS NOTE ADULT - ASSESSMENT
A:   Charcot arthropathy B/L  Diabetic ulceration R midfoot with chronic OM    P:  -Patient evaluated, chart reviewed and updated  -Xrays reviewed - Chronic charcot changes of the rearfoot with TN bulging. No gas  -MRI of R ankle - chronic OM of talus and partially navicular  -ANEESH/PVR - non compressible vessels B/L, normal PVR waveforms B/L   -Discussed diagnosis and treatment options with the patient  -No surgical treatement will be done at this time.   -ID recc bone biopsy for abx plan - Dr. Brito suggests outpatient bone biopsy when pt is off abx for 3 weeks. Currently he needs to be on abx due to scrotal abscess   -Continue antibiotics per ID  -Wound culture - Providencia stuartii  -Betadine, DSD applied  -Advised patient to continue use of CROW walkers B/L when walking to prevent worsening ulcerations  -Podiatry to follow while in house  -Discussed with attending Dr. Sim

## 2024-12-22 NOTE — PROGRESS NOTE ADULT - SUBJECTIVE AND OBJECTIVE BOX
[facility] NEPHROLOGY- PROGRESS NOTE    Patient is a 69yo Male with ESRD on HD via Rt AVG, HTN, 2HPT, Anemia, LE venous stasis with Rt foot wound presents with weakness. Pt a/w Rt foot ulcer and hyperkalemia. Nephrology consulted for ESRD status.   MRI Rt foot: + osteomyelitis involving the talus and less so of the navicular bone;    BCx +Staph hominis  12/20: s/p I& D of buttock abscess    Hospital Medications: Medications reviewed.  REVIEW OF SYSTEMS: Pt denies any SOB, chest pain, n/v/d, or abd pain, +Rt foot pain-denies at this time      VITALS:  T(F): 97.9 (12-22-24 @ 09:00), Max: 98.6 (12-21-24 @ 20:19)  HR: 69 (12-22-24 @ 09:00)  BP: 125/50 (12-22-24 @ 09:00)  RR: 18 (12-22-24 @ 09:00)  SpO2: 97% (12-22-24 @ 09:00)    PHYSICAL EXAM:  Gen: NAD, calm, Obese; more awake  HEENT: anicteric  Cards: RRR, +S1/S2, no M/G/R  Resp: CTA ant   GI: soft, NT/ND, NABS  Extremities: no LE edema B/L  Derm: b/l dry skin, Rt foot plantar wound covered        LABS:  12-22    136  |  98  |  44[H]  ----------------------------<  121[H]  4.3   |  27  |  7.22[H]    Ca    8.9      22 Dec 2024 09:03  Phos  4.5     12-22  Mg     2.3     12-22      Creatinine Trend: 7.22 <--, 5.77 <--, 7.27 <--, 5.52 <--, 7.32 <--, 9.00 <--                        10.2   15.84 )-----------( 246      ( 22 Dec 2024 09:03 )             30.2     Urine Studies:  Urinalysis Basic - ( 22 Dec 2024 09:03 )    Color:  / Appearance:  / SG:  / pH:   Gluc: 121 mg/dL / Ketone:   / Bili:  / Urobili:    Blood:  / Protein:  / Nitrite:    Leuk Esterase:  / RBC:  / WBC    Sq Epi:  / Non Sq Epi:  / Bacteria:

## 2024-12-22 NOTE — PROGRESS NOTE ADULT - SUBJECTIVE AND OBJECTIVE BOX
Subjective/Objective: Asked to re-evaluate pt b/o purulent scrotal drainage. Pt states this has been evaluated in the past and that he has been followed by URology. Underwent bedside I+D of L buttock abscess on 12/19-- no growth on Cx.       MEDS  acetaminophen     Tablet .. 650 milliGRAM(s) Oral every 6 hours PRN  amLODIPine   Tablet 5 milliGRAM(s) Oral daily  calcitriol   Capsule 1 MICROGram(s) Oral <User Schedule>  epoetin rudolph-epbx (RETACRIT) Injectable 6000 Unit(s) IV Push <User Schedule>  heparin   Injectable 5000 Unit(s) SubCutaneous every 8 hours  insulin lispro (ADMELOG) corrective regimen sliding scale   SubCutaneous three times a day before meals  insulin lispro (ADMELOG) corrective regimen sliding scale   SubCutaneous at bedtime  latanoprost 0.005% Ophthalmic Solution 1 Drop(s) Both EYES at bedtime  ondansetron Injectable 4 milliGRAM(s) IV Push every 8 hours PRN  sevelamer carbonate 1600 milliGRAM(s) Oral three times a day with meals  vancomycin  IVPB 750 milliGRAM(s) IV Intermittent once      PHYSICAL EXAM:    Vital Signs Last 24 Hrs  T(C): 36.7 (22 Dec 2024 12:01), Max: 37 (21 Dec 2024 20:19)  T(F): 98 (22 Dec 2024 12:01), Max: 98.6 (21 Dec 2024 20:19)  HR: 65 (22 Dec 2024 12:01) (65 - 71)  BP: 127/57 (22 Dec 2024 12:01) (125/50 - 146/69)  BP(mean): 83 (22 Dec 2024 04:58) (83 - 83)  RR: 18 (22 Dec 2024 12:01) (17 - 18)  SpO2: 98% (22 Dec 2024 12:01) (95% - 98%)    Parameters below as of 22 Dec 2024 12:01  Patient On (Oxygen Delivery Method): room air        GEN: alert, responsive, and in NAD    Skin: L upper buttock site of I+D with packing and no surrounding erythema  Scrotum: + purulent drainage noted from the base of the scrotum with no surrounding erythema or tenderness.      LABS/DIAGNOSTIC TESTS                            10.2   15.84 )-----------( 246      ( 22 Dec 2024 09:03 )             30.2       WBC Count: 15.84 K/uL (12-22 @ 09:03)  WBC Count: 12.73 K/uL (12-21 @ 10:04)  WBC Count: 10.28 K/uL (12-20 @ 06:45)      12-22    136  |  98  |  44[H]  ----------------------------<  121[H]  4.3   |  27  |  7.22[H]    Ca    8.9      22 Dec 2024 09:03  Phos  4.5     12-22  Mg     2.3     12-22          CULTURES      Culture - Wound Aerobic/Anaerobic (collected 12-19-24 @ 18:06)  Source: Drainage  Preliminary Report (12-20-24 @ 18:18):    No growth    Culture - Wound Aerobic/Anaerobic (collected 12-19-24 @ 16:15)  Source: Skin/Wound  Preliminary Report (12-20-24 @ 18:17):    No growth    Culture - Blood (collected 12-19-24 @ 06:45)  Source: .Blood BLOOD  Preliminary Report (12-22-24 @ 12:00):    No growth at 72 Hours    Culture - Blood (collected 12-17-24 @ 12:35)  Source: .Blood BLOOD  Preliminary Report (12-21-24 @ 19:00):    No growth at 4 days    Culture - Blood (collected 12-16-24 @ 22:30)  Source: .Blood BLOOD  Gram Stain (12-17-24 @ 16:53):    Growth in aerobic bottle: Gram Positive Cocci in Clusters  Final Report (12-18-24 @ 13:20):    Growth in aerobic bottle: Staphylococcus hominis    Isolation of Coagulase negative Staphylococcus from single blood culture    sets may represent    contamination. Contact the Microbiology Department at [phone] if    susceptibility testing is needed.    clinically indicated.    Direct identification is available within approximately 3-5    hours either by Blood Panel Multiplexed PCR or Direct    MALDI-TOF. Details: [email or web address]  Organism: Blood Culture PCR (12-18-24 @ 13:20)  Organism: Blood Culture PCR (12-18-24 @ 13:20)      Method Type: PCR      -  Coagulase negative Staphylococcus: Detec    Culture - Wound Aerobic/Anaerobic (collected 12-16-24 @ 12:36)  Source: .Surgical Swab  Final Report (12-21-24 @ 17:55):    Moderate Providencia stuartii    Moderate Corynebacterium striatum group "Susceptibilities not performed"    Few Trueperella bernardiae "Susceptibilities not performed"  Organism: Providencia stuartii (12-21-24 @ 17:55)  Organism: Providencia stuartii (12-21-24 @ 17:55)      Method Type: GWEN      -  Amoxicillin/Clavulanic Acid: R >16/8      -  Ampicillin: R >16 These ampicillin results predict results for amoxicillin      -  Ampicillin/Sulbactam: I 16/8      -  Aztreonam: S <=4      -  Cefazolin: R >16      -  Cefepime: S <=2      -  Cefoxitin: S <=8      -  Ceftriaxone: S <=1      -  Ciprofloxacin: R 2      -  Ertapenem: S <=0.5      -  Levofloxacin: R 4      -  Meropenem: S <=1      -  Piperacillin/Tazobactam: S <=8      -  Trimethoprim/Sulfamethoxazole: S <=0.5/9.5          RADIOLOGY

## 2024-12-22 NOTE — CONSULT NOTE ADULT - ASSESSMENT
70 y/o male with hx of T2DM, hidradenitis suppurativa a/w generalized weakness now with purulent drainage from scrotum   s/p bedside I&D of buttock abscess 12/19   VSS, afebrile, leukocytosis elevated 15.8    Plan   - obtain CT pelvis to assess for fluid collection   - keep area clean and dry, may include sitz baths   - IV antibiotics, follow ID recommendations   - trend white count, monitor for fevers, etc.  - strict glycemic control   - HD as per neph   - remainder of care as per primary team   - d/w Dr. Mcguire     68 y/o male with hx of T2DM, hidradenitis suppurativa a/w generalized weakness now with purulent drainage from scrotum   s/p bedside I&D of buttock abscess 12/19   VSS, afebrile, leukocytosis elevated 15.8    Plan   - obtain CT pelvis w IV contrast to assess for fluid collection - time with HD session  - keep area clean and dry, may include sitz baths   - IV antibiotics, follow ID recommendations   - trend white count, monitor for fevers, etc.  - strict glycemic control   - HD as per neph   - remainder of care as per primary team   - d/w Dr. Mcguire

## 2024-12-22 NOTE — PROGRESS NOTE ADULT - ASSESSMENT
69 yo M with PMH of  DM, HTN, ESRD on dialysis ( M, W, F) via RUE graft (last Dialysis 12/13/24),  B/L Charcot deformity and HLD, presented to the ED for generalized weakness, chills. Patient reports since 2014 after having charcot procedure in bilateral foot, he has been wearing a charcot boot to walk. Patient follows up with podiatry Dr. Sim in the outpatient podiatry clinic. At the time of my consult, pt states both his feet have looked the same for many yrs. and has been following with Podiatry for yrs. Pt further states that he had an "abscess" on the R heel drained about 1 mo. ago by Dr. Sim. BC's with coag negative Staph c/w a contaminant. MRI R foot 12/17: multiple skin wounds, soft tissue swelling, no drainable collection, patchy signal abnormality within the medial sesamoid suggestive of osteomyelitis vs reactive osteitis. Elevated temp and slight elevation of WBC noted today. MRI R ankle 12/17:  ulceration at the plantar surface medially with suspected exposed bone with underlying osteomyelitis involving the talus and less so of the navicular bone, no drainable fluid collection; end-stage arthritis with resultant planovalgus deformity. The presence of osteo below the ulcer in the area of the talus is likely chronic. The signal abnormality within the R medial sesamoid may represent osteomyelitis. The R heel ulcer had no surrounding erythema or warmth-- no evidence of SSTI. Swab Cx results likely reflect colonization, especially in view of yesterday's discussion with Dr. Sim.   ?? whether the R foot is salvageable (ABIs from 12/16 exam cd not be calculated). Of concern is vascular insufficiency, which wd have a negative impact on heeling following any surgical intervention.  W/U of chronic osteo wd include bone Bx/debridement OFF Abs for a period of time with Cx sent to determine directed Ab treatment. L buttock abscess drained at the bedside 12/19-- specimens with no growth.     Pt noted with purulent drainage from the base of the scrotum, which may be the cause of the rising WBC. No further febrile episodes to date. As G+C would be the most likely etiology, wd redose with vanco 750 mg. Wd also send drainage for Cx.      #Scrotal SSTI (see above)  --send drainage for Cx  --give 1 dose of IV vanco 750 mg  --Urology evaluation    #Diabetic foot ulcer of R foot with osteo (see above)-- no evidence of acute SSTI  --consider bone Bx OFF antibiotics   --vascular input    #L buttock abscess-- most common organisms are G+C. Underwent I+D and given IV vanco.  --cont. local wound care    #DM-- management as per medical team    Chart reviewed, including additional notes/labs/Cx; pt re-evaluated at the bedside; discussed the DX/management of additional SSTI with the patient and medical team; provided coordination of care re ID issues with Dr. Shine.

## 2024-12-22 NOTE — PROGRESS NOTE ADULT - SUBJECTIVE AND OBJECTIVE BOX
Patient is a 69y old  Male who presents with a chief complaint of generalized weakness (22 Dec 2024 15:40)    Patient was seen and examined at bedside  Denies any complains   Reports he is not aware of any pus drainage    INTERVAL HPI/OVERNIGHT EVENTS:  T(C): 36.9 (12-22-24 @ 13:58), Max: 37 (12-21-24 @ 20:19)  HR: 66 (12-22-24 @ 13:58) (65 - 71)  BP: 133/63 (12-22-24 @ 13:58) (125/50 - 146/69)  RR: 18 (12-22-24 @ 13:58) (17 - 18)  SpO2: 98% (12-22-24 @ 13:58) (95% - 98%)  Wt(kg): --  I&O's Summary    22 Dec 2024 07:01  -  22 Dec 2024 17:20  --------------------------------------------------------  IN: 600 mL / OUT: 1600 mL / NET: -1000 mL        REVIEW OF SYSTEMS: denies fever, chills, SOB, palpitations, chest pain, abdominal pain, nausea, vomiting, diarrhea, constipation, dizziness    MEDICATIONS  (STANDING):  amLODIPine   Tablet 5 milliGRAM(s) Oral daily  calcitriol   Capsule 1 MICROGram(s) Oral <User Schedule>  epoetin rudolph-epbx (RETACRIT) Injectable 6000 Unit(s) IV Push <User Schedule>  heparin   Injectable 5000 Unit(s) SubCutaneous every 8 hours  insulin lispro (ADMELOG) corrective regimen sliding scale   SubCutaneous three times a day before meals  insulin lispro (ADMELOG) corrective regimen sliding scale   SubCutaneous at bedtime  latanoprost 0.005% Ophthalmic Solution 1 Drop(s) Both EYES at bedtime  sevelamer carbonate 1600 milliGRAM(s) Oral three times a day with meals    MEDICATIONS  (PRN):  acetaminophen     Tablet .. 650 milliGRAM(s) Oral every 6 hours PRN Temp greater or equal to 38C (100.4F), Mild Pain (1 - 3)  ondansetron Injectable 4 milliGRAM(s) IV Push every 8 hours PRN Nausea and/or Vomiting      PHYSICAL EXAM:  GENERAL: NAD, well-groomed, well-developed  HEAD:  Atraumatic, Normocephalic  NERVOUS SYSTEM:  Alert & Oriented X3, no focal deficit   CHEST/LUNG: Clear to auscultation bilaterally; No rales, rhonchi, wheezing, or rubs  HEART: Regular rate and rhythm; No murmurs, rubs, or gallops  ABDOMEN: Soft, Nontender, Nondistended; Bowel sounds present  EXTREMITIES: LUE AVF noted,  BLLE chronic venous stasis skin changes with scaling of skin , right foot wrapped due to open lesion  Merritt pus coming out of midline opening in scrotum, no surrounding erythema or signs of cellulitis, non tender  Sacral surgical wound dry with packing inside         LABS:                        10.2   15.84 )-----------( 246      ( 22 Dec 2024 09:03 )             30.2     12-22    136  |  98  |  44[H]  ----------------------------<  121[H]  4.3   |  27  |  7.22[H]    Ca    8.9      22 Dec 2024 09:03  Phos  4.5     12-22  Mg     2.3     12-22        Urinalysis Basic - ( 22 Dec 2024 09:03 )    Color: x / Appearance: x / SG: x / pH: x  Gluc: 121 mg/dL / Ketone: x  / Bili: x / Urobili: x   Blood: x / Protein: x / Nitrite: x   Leuk Esterase: x / RBC: x / WBC x   Sq Epi: x / Non Sq Epi: x / Bacteria: x      CAPILLARY BLOOD GLUCOSE      POCT Blood Glucose.: 117 mg/dL (22 Dec 2024 16:37)  POCT Blood Glucose.: 94 mg/dL (22 Dec 2024 11:39)  POCT Blood Glucose.: 111 mg/dL (22 Dec 2024 08:01)  POCT Blood Glucose.: 152 mg/dL (21 Dec 2024 21:25)    I reviewed CBC, BMP, Mg, Phos, LFTs, Blood sugar, urine studies and culture results.   I ordered repeat CBC, BMP and formulated the plan as below  Discussed with JAMIE Luis, ,. Trinh (nephro) and Dr. Brito (ID)

## 2024-12-22 NOTE — CONSULT NOTE ADULT - SUBJECTIVE AND OBJECTIVE BOX
UROLOGY CONSULT   68 y/o male with PMH of DM, HTN, ESRD on HD (MWF), retinopathy,  B/L Charcot deformity and HLD and past surgical hx of AVG creation, revision of AV graft, multiple I&Ds for abscess presents to the ED for generalized weakness, chills, subjective fever. Patient reports waking up this morning feeling very tired and unable to stand up to get dressed for his dialysis appointment. As per wife, patient didn't appear to be himself this morning so she decided to call EMS. Patient reports since 2014 after having charcot procedure in bilateral foot, he has been wearing a charcot boot to walk. Patient follows up with podiatry Dr. Sim in  outpatient podiatry clinic. Patient denies any chest pain, abdominal pain, nausea, vomiting, headache lightheadedness, vision change, cough, SOB. During hospital admission, pt found to have left buttock abscess. General surgery called to incise and drain abscess after pt spiked fever. Despite source control, pt continued to have elevated leukocytosis. Upon examination, RN discovered purulent drainage coming from scrotal area. Urology consulted for further evaluation. Pt seen and examined at bedside. Collateral information obtained from pt's wife, Almaz, over the phone. States he was diagnosed with hidradenitis suppurativa many years ago and has had multiple I&Ds over the years for recurring abscesses. States he underwent R scrotal I&D with excision of tunnel in 2018 with Dr. David Judge at [facility]. Admits he occasionally has drainage from this wound, but it never caused enough discomfort to seek medical attention. Pt admits he is used to having large bumps form over his body, but they have not been bothersome. Currently, Pt admits to feeling well. Denies fever, chills. Denies scrotal pain, discomfort. Admits to noticing purulent drainage, however, it is not bothering him. Pt is on HD, states he only makes very little urine - no urinary complaints at this time.    PAST MEDICAL & SURGICAL HISTORY:  DM (diabetes mellitus)  type 2  ESRD (end stage renal disease) on dialysis  t-th-sat via right AV graft, uses midodrine the days of Dialysis for hypotension  Charcot foot due to diabetes mellitus  Diabetic retinopathy associated with type 2 diabetes mellitus  Hyperlipidemia  Abscess of scrotum  Hidradenitis  End stage renal disease  S/P foot surgery  right foot - 11/2014  Charcot's syndrome  Surgeries in feet and ankles   left ankle 2011 and right ankle 11/2014  History of incision and drainage  scrotal 5/2018  Hemodialysis access, AV graft          MEDICATIONS  (STANDING):  amLODIPine   Tablet 5 milliGRAM(s) Oral daily  calcitriol   Capsule 1 MICROGram(s) Oral <User Schedule>  epoetin rudolph-epbx (RETACRIT) Injectable 6000 Unit(s) IV Push <User Schedule>  heparin   Injectable 5000 Unit(s) SubCutaneous every 8 hours  insulin lispro (ADMELOG) corrective regimen sliding scale   SubCutaneous three times a day before meals  insulin lispro (ADMELOG) corrective regimen sliding scale   SubCutaneous at bedtime  latanoprost 0.005% Ophthalmic Solution 1 Drop(s) Both EYES at bedtime  sevelamer carbonate 1600 milliGRAM(s) Oral three times a day with meals  vancomycin  IVPB 750 milliGRAM(s) IV Intermittent once    MEDICATIONS  (PRN):  acetaminophen     Tablet .. 650 milliGRAM(s) Oral every 6 hours PRN Temp greater or equal to 38C (100.4F), Mild Pain (1 - 3)  ondansetron Injectable 4 milliGRAM(s) IV Push every 8 hours PRN Nausea and/or Vomiting      Allergies    penicillin (Unknown)    Intolerances        Vital Signs Last 24 Hrs  T(C): 36.7 (22 Dec 2024 12:01), Max: 37 (21 Dec 2024 20:19)  T(F): 98 (22 Dec 2024 12:01), Max: 98.6 (21 Dec 2024 20:19)  HR: 65 (22 Dec 2024 12:01) (65 - 71)  BP: 127/57 (22 Dec 2024 12:01) (125/50 - 146/69)  BP(mean): 83 (22 Dec 2024 04:58) (83 - 83)  RR: 18 (22 Dec 2024 12:01) (17 - 18)  SpO2: 98% (22 Dec 2024 12:01) (95% - 98%)    Parameters below as of 22 Dec 2024 12:01  Patient On (Oxygen Delivery Method): room air        Physical:  Gen: A&Ox3. NAD  Resp: Unlabored breathing. Equal chest rise bilaterally.   Skin: Warm and dry. Non diaphoretic.   Abd: Soft ND, nontender to palpation. No guarding, no rebound.   : Scrotum with obvious purulence from R side. Large vertical scar / skin thickening s/p hidradenitis suppurativa tunnel excision 4-5 years ago. Open tract along scar tissue drainage purulence: 15 - 20cc pus expressed manually until serous. Another more lateral open tract noted with minimal drainage. Scrotum is nontender to palpation. Skin clean, nonerythematous, no necrosis noted. No crepitus palpated. Perineum soft.   Groin: Soft bilaterally. No fluctuance palpation. Nontender to palpation.   Buttock: L buttock I&D dressing clean, dry, intact. No surrounding erythema, edema.         I&O's Detail    22 Dec 2024 07:01  -  22 Dec 2024 14:04  --------------------------------------------------------  IN:    Other (mL): 600 mL  Total IN: 600 mL    OUT:    Other (mL): 1600 mL  Total OUT: 1600 mL    Total NET: -1000 mL          LABS:                        10.2   15.84 )-----------( 246      ( 22 Dec 2024 09:03 )             30.2              12-22    136  |  98  |  44[H]  ----------------------------<  121[H]  4.3   |  27  |  7.22[H]    Ca    8.9      22 Dec 2024 09:03  Phos  4.5     12-22  Mg     2.3     12-22                Urinalysis Basic - ( 22 Dec 2024 09:03 )    Color: x / Appearance: x / SG: x / pH: x  Gluc: 121 mg/dL / Ketone: x  / Bili: x / Urobili: x   Blood: x / Protein: x / Nitrite: x   Leuk Esterase: x / RBC: x / WBC x   Sq Epi: x / Non Sq Epi: x / Bacteria: x        RADIOLOGY & ADDITIONAL STUDIES:

## 2024-12-23 DIAGNOSIS — N49.2 INFLAMMATORY DISORDERS OF SCROTUM: ICD-10-CM

## 2024-12-23 LAB
ANION GAP SERPL CALC-SCNC: 11 MMOL/L — SIGNIFICANT CHANGE UP (ref 5–17)
BUN SERPL-MCNC: 27 MG/DL — HIGH (ref 7–18)
CALCIUM SERPL-MCNC: 9.2 MG/DL — SIGNIFICANT CHANGE UP (ref 8.4–10.5)
CHLORIDE SERPL-SCNC: 101 MMOL/L — SIGNIFICANT CHANGE UP (ref 96–108)
CO2 SERPL-SCNC: 25 MMOL/L — SIGNIFICANT CHANGE UP (ref 22–31)
CREAT SERPL-MCNC: 5.45 MG/DL — HIGH (ref 0.5–1.3)
EGFR: 11 ML/MIN/1.73M2 — LOW
GLUCOSE BLDC GLUCOMTR-MCNC: 102 MG/DL — HIGH (ref 70–99)
GLUCOSE BLDC GLUCOMTR-MCNC: 247 MG/DL — HIGH (ref 70–99)
GLUCOSE BLDC GLUCOMTR-MCNC: 89 MG/DL — SIGNIFICANT CHANGE UP (ref 70–99)
GLUCOSE BLDC GLUCOMTR-MCNC: 92 MG/DL — SIGNIFICANT CHANGE UP (ref 70–99)
GLUCOSE SERPL-MCNC: 102 MG/DL — HIGH (ref 70–99)
HCT VFR BLD CALC: 32.1 % — LOW (ref 39–50)
HGB BLD-MCNC: 10.4 G/DL — LOW (ref 13–17)
MAGNESIUM SERPL-MCNC: 2.4 MG/DL — SIGNIFICANT CHANGE UP (ref 1.6–2.6)
MCHC RBC-ENTMCNC: 31.8 PG — SIGNIFICANT CHANGE UP (ref 27–34)
MCHC RBC-ENTMCNC: 32.4 G/DL — SIGNIFICANT CHANGE UP (ref 32–36)
MCV RBC AUTO: 98.2 FL — SIGNIFICANT CHANGE UP (ref 80–100)
NRBC # BLD: 0 /100 WBCS — SIGNIFICANT CHANGE UP (ref 0–0)
PHOSPHATE SERPL-MCNC: 4.1 MG/DL — SIGNIFICANT CHANGE UP (ref 2.5–4.5)
PLATELET # BLD AUTO: 190 K/UL — SIGNIFICANT CHANGE UP (ref 150–400)
POTASSIUM SERPL-MCNC: 4.1 MMOL/L — SIGNIFICANT CHANGE UP (ref 3.5–5.3)
POTASSIUM SERPL-SCNC: 4.1 MMOL/L — SIGNIFICANT CHANGE UP (ref 3.5–5.3)
RBC # BLD: 3.27 M/UL — LOW (ref 4.2–5.8)
RBC # FLD: 14.1 % — SIGNIFICANT CHANGE UP (ref 10.3–14.5)
SODIUM SERPL-SCNC: 137 MMOL/L — SIGNIFICANT CHANGE UP (ref 135–145)
WBC # BLD: 11.52 K/UL — HIGH (ref 3.8–10.5)
WBC # FLD AUTO: 11.52 K/UL — HIGH (ref 3.8–10.5)

## 2024-12-23 PROCEDURE — 99232 SBSQ HOSP IP/OBS MODERATE 35: CPT

## 2024-12-23 PROCEDURE — 72193 CT PELVIS W/DYE: CPT | Mod: 26

## 2024-12-23 PROCEDURE — 99497 ADVNCD CARE PLAN 30 MIN: CPT | Mod: 25

## 2024-12-23 PROCEDURE — 99233 SBSQ HOSP IP/OBS HIGH 50: CPT | Mod: FS

## 2024-12-23 RX ADMIN — CALCITRIOL 1 MICROGRAM(S): 0.5 CAPSULE, LIQUID FILLED ORAL at 08:16

## 2024-12-23 RX ADMIN — SEVELAMER CARBONATE 1600 MILLIGRAM(S): 800 TABLET, FILM COATED ORAL at 18:55

## 2024-12-23 RX ADMIN — HEPARIN SODIUM 5000 UNIT(S): 1000 INJECTION, SOLUTION INTRAVENOUS; SUBCUTANEOUS at 05:27

## 2024-12-23 RX ADMIN — SEVELAMER CARBONATE 1600 MILLIGRAM(S): 800 TABLET, FILM COATED ORAL at 08:16

## 2024-12-23 RX ADMIN — SEVELAMER CARBONATE 1600 MILLIGRAM(S): 800 TABLET, FILM COATED ORAL at 12:08

## 2024-12-23 RX ADMIN — HEPARIN SODIUM 5000 UNIT(S): 1000 INJECTION, SOLUTION INTRAVENOUS; SUBCUTANEOUS at 13:07

## 2024-12-23 RX ADMIN — LATANOPROST 1 DROP(S): 50 SOLUTION OPHTHALMIC at 22:34

## 2024-12-23 RX ADMIN — HEPARIN SODIUM 5000 UNIT(S): 1000 INJECTION, SOLUTION INTRAVENOUS; SUBCUTANEOUS at 22:33

## 2024-12-23 RX ADMIN — Medication 5 MILLIGRAM(S): at 05:27

## 2024-12-23 NOTE — PROGRESS NOTE ADULT - SUBJECTIVE AND OBJECTIVE BOX
[facility] NEPHROLOGY- PROGRESS NOTE    Patient is a 69yo Male with ESRD on HD via Rt AVG, HTN, 2HPT, Anemia, LE venous stasis with Rt foot wound presents with weakness. Pt a/w Rt foot ulcer and hyperkalemia. Nephrology consulted for ESRD status.   MRI Rt foot: + osteomyelitis involving the talus and less so of the navicular bone;    BCx +Staph hominis  12/20: s/p I& D of buttock abscess    Hospital Medications: Medications reviewed.  REVIEW OF SYSTEMS: Pt denies any SOB, chest pain, n/v/d, or abd pain, +Rt foot pain-denies at this time      VITALS:  T(F): 97.9 (12-22-24 @ 09:00), Max: 98.6 (12-21-24 @ 20:19)  HR: 69 (12-22-24 @ 09:00)  BP: 125/50 (12-22-24 @ 09:00)  RR: 18 (12-22-24 @ 09:00)  SpO2: 97% (12-22-24 @ 09:00)    PHYSICAL EXAM:  Gen: NAD, calm, Obese; more awake  HEENT: anicteric  Cards: RRR, +S1/S2, no M/G/R  Resp: CTA ant   GI: soft, NT/ND, NABS  Extremities: no LE edema B/L  Derm: b/l dry skin, Rt foot plantar wound covered        LABS:  12-22    136  |  98  |  44[H]  ----------------------------<  121[H]  4.3   |  27  |  7.22[H]    Ca    8.9      22 Dec 2024 09:03  Phos  4.5     12-22  Mg     2.3     12-22      Creatinine Trend: 7.22 <--, 5.77 <--, 7.27 <--, 5.52 <--, 7.32 <--, 9.00 <--                        10.2   15.84 )-----------( 246      ( 22 Dec 2024 09:03 )             30.2     Urine Studies:  Urinalysis Basic - ( 22 Dec 2024 09:03 )    Color:  / Appearance:  / SG:  / pH:   Gluc: 121 mg/dL / Ketone:   / Bili:  / Urobili:    Blood:  / Protein:  / Nitrite:    Leuk Esterase:  / RBC:  / WBC    Sq Epi:  / Non Sq Epi:  / Bacteria:                 [facility] NEPHROLOGY- PROGRESS NOTE    Patient is a 69yo Male with ESRD on HD via Rt AVG, HTN, 2HPT, Anemia, LE venous stasis with Rt foot wound presents with weakness. Pt a/w Rt foot ulcer and hyperkalemia. Nephrology consulted for ESRD status.   MRI Rt foot: + osteomyelitis involving the talus and less so of the navicular bone;    BCx +Staph hominis  12/20: s/p I& D of buttock abscess    Hospital Medications: Medications reviewed.  REVIEW OF SYSTEMS: Pt denies any SOB, chest pain, n/v/d, or abd pain, +Rt foot pain-denies at this time      VITALS:  T(F): 98.6 (12-23-24 @ 06:04), Max: 98.6 (12-23-24 @ 06:04)  HR: 67 (12-23-24 @ 06:04)  BP: 127/61 (12-23-24 @ 06:04)  RR: 18 (12-23-24 @ 06:04)  SpO2: 99% (12-23-24 @ 06:04)  Wt(kg): --    12-22 @ 07:01  -  12-23 @ 07:00  --------------------------------------------------------  IN: 600 mL / OUT: 1600 mL / NET: -1000 mL        PHYSICAL EXAM:  Gen: NAD, calm, Obese; more awake  HEENT: anicteric  Cards: RRR, +S1/S2, no M/G/R  Resp: CTA ant   GI: soft, NT/ND, NABS  : +scrotal drainage  Extremities: no LE edema B/L  Derm: b/l dry skin, Rt foot plantar wound covered    LABS:  12-23    137  |  101  |  27[H]  ----------------------------<  102[H]  4.1   |  25  |  5.45[H]    Ca    9.2      23 Dec 2024 06:58  Phos  4.1     12-23  Mg     2.4     12-23      Creatinine Trend: 5.45 <--, 7.22 <--, 5.77 <--, 7.27 <--, 5.52 <--, 7.32 <--                        10.4   11.52 )-----------( 190      ( 23 Dec 2024 06:58 )             32.1     Urine Studies:  Urinalysis Basic - ( 23 Dec 2024 06:58 )    Color:  / Appearance:  / SG:  / pH:   Gluc: 102 mg/dL / Ketone:   / Bili:  / Urobili:    Blood:  / Protein:  / Nitrite:    Leuk Esterase:  / RBC:  / WBC    Sq Epi:  / Non Sq Epi:  / Bacteria:

## 2024-12-23 NOTE — PROGRESS NOTE ADULT - SUBJECTIVE AND OBJECTIVE BOX
Subjective/Objective: Appreciate Urology input.      MEDS  acetaminophen     Tablet .. 650 milliGRAM(s) Oral every 6 hours PRN  amLODIPine   Tablet 5 milliGRAM(s) Oral daily  calcitriol   Capsule 1 MICROGram(s) Oral <User Schedule>  epoetin rudolph-epbx (RETACRIT) Injectable 6000 Unit(s) IV Push <User Schedule>  heparin   Injectable 5000 Unit(s) SubCutaneous every 8 hours  insulin lispro (ADMELOG) corrective regimen sliding scale   SubCutaneous three times a day before meals  insulin lispro (ADMELOG) corrective regimen sliding scale   SubCutaneous at bedtime  latanoprost 0.005% Ophthalmic Solution 1 Drop(s) Both EYES at bedtime  ondansetron Injectable 4 milliGRAM(s) IV Push every 8 hours PRN  sevelamer carbonate 1600 milliGRAM(s) Oral three times a day with meals  vancomycin 750 mg 12/22      PHYSICAL EXAM:    Vital Signs Last 24 Hrs  T(C): 37 (23 Dec 2024 06:04), Max: 37 (23 Dec 2024 06:04)  T(F): 98.6 (23 Dec 2024 06:04), Max: 98.6 (23 Dec 2024 06:04)  HR: 67 (23 Dec 2024 06:04) (65 - 67)  BP: 127/61 (23 Dec 2024 06:04) (103/60 - 133/63)  BP(mean): 87 (23 Dec 2024 06:04) (74 - 87)  RR: 18 (23 Dec 2024 06:04) (18 - 18)  SpO2: 99% (23 Dec 2024 06:04) (96% - 99%)    Parameters below as of 23 Dec 2024 06:04  Patient On (Oxygen Delivery Method): room air        GEN:    HEENT:    CHEST/Respiratory:    Cardiovascular:    Abdomen:    Genitourinary:    Extremities:     Neurological:    Skin:      LABS/DIAGNOSTIC TESTS                            10.4   11.52 )-----------( 190      ( 23 Dec 2024 06:58 )             32.1       WBC Count: 11.52 K/uL (12-23 @ 06:58)  WBC Count: 15.84 K/uL (12-22 @ 09:03)  WBC Count: 12.73 K/uL (12-21 @ 10:04)      12-23    137  |  101  |  27[H]  ----------------------------<  102[H]  4.1   |  25  |  5.45[H]    Ca    9.2      23 Dec 2024 06:58  Phos  4.1     12-23  Mg     2.4     12-23        CULTURES      Culture - Wound Aerobic/Anaerobic (collected 12-19-24 @ 18:06)  Source: Drainage  Preliminary Report (12-20-24 @ 18:18):    No growth    Culture - Wound Aerobic/Anaerobic (collected 12-19-24 @ 16:15)  Source: Skin/Wound  Preliminary Report (12-20-24 @ 18:17):    No growth    Culture - Blood (collected 12-19-24 @ 06:45)  Source: .Blood BLOOD  Preliminary Report (12-22-24 @ 12:00):    No growth at 72 Hours    Culture - Blood (collected 12-17-24 @ 12:35)  Source: .Blood BLOOD  Final Report (12-22-24 @ 19:00):    No growth at 5 days    Culture - Blood (collected 12-16-24 @ 22:30)  Source: .Blood BLOOD  Gram Stain (12-17-24 @ 16:53):    Growth in aerobic bottle: Gram Positive Cocci in Clusters  Final Report (12-18-24 @ 13:20):    Growth in aerobic bottle: Staphylococcus hominis    Isolation of Coagulase negative Staphylococcus from single blood culture    sets may represent    contamination. Contact the Microbiology Department at [phone] if    susceptibility testing is needed.    clinically indicated.    Direct identification is available within approximately 3-5    hours either by Blood Panel Multiplexed PCR or Direct    MALDI-TOF. Details: [email or web address]  Organism: Blood Culture PCR (12-18-24 @ 13:20)  Organism: Blood Culture PCR (12-18-24 @ 13:20)      Method Type: PCR      -  Coagulase negative Staphylococcus: Detec    Culture - Wound Aerobic/Anaerobic (collected 12-16-24 @ 12:36)  Source: .Surgical Swab  Final Report (12-21-24 @ 17:55):    Moderate Providencia stuartii    Moderate Corynebacterium striatum group "Susceptibilities not performed"    Few Trueperella bernardiae "Susceptibilities not performed"  Organism: Providencia stuartii (12-21-24 @ 17:55)  Organism: Providencia stuartii (12-21-24 @ 17:55)      Method Type: GWEN      -  Amoxicillin/Clavulanic Acid: R >16/8      -  Ampicillin: R >16 These ampicillin results predict results for amoxicillin      -  Ampicillin/Sulbactam: I 16/8      -  Aztreonam: S <=4      -  Cefazolin: R >16      -  Cefepime: S <=2      -  Cefoxitin: S <=8      -  Ceftriaxone: S <=1      -  Ciprofloxacin: R 2      -  Ertapenem: S <=0.5      -  Levofloxacin: R 4      -  Meropenem: S <=1      -  Piperacillin/Tazobactam: S <=8      -  Trimethoprim/Sulfamethoxazole: S <=0.5/9.5          RADIOLOGY               Subjective/Objective: Appreciate Urology input. Pt with NO complaints.      MEDS  acetaminophen     Tablet .. 650 milliGRAM(s) Oral every 6 hours PRN  amLODIPine   Tablet 5 milliGRAM(s) Oral daily  calcitriol   Capsule 1 MICROGram(s) Oral <User Schedule>  epoetin rudolph-epbx (RETACRIT) Injectable 6000 Unit(s) IV Push <User Schedule>  heparin   Injectable 5000 Unit(s) SubCutaneous every 8 hours  insulin lispro (ADMELOG) corrective regimen sliding scale   SubCutaneous three times a day before meals  insulin lispro (ADMELOG) corrective regimen sliding scale   SubCutaneous at bedtime  latanoprost 0.005% Ophthalmic Solution 1 Drop(s) Both EYES at bedtime  ondansetron Injectable 4 milliGRAM(s) IV Push every 8 hours PRN  sevelamer carbonate 1600 milliGRAM(s) Oral three times a day with meals  vancomycin 750 mg 12/22      PHYSICAL EXAM:    Vital Signs Last 24 Hrs  T(C): 37 (23 Dec 2024 06:04), Max: 37 (23 Dec 2024 06:04)  T(F): 98.6 (23 Dec 2024 06:04), Max: 98.6 (23 Dec 2024 06:04)  HR: 67 (23 Dec 2024 06:04) (65 - 67)  BP: 127/61 (23 Dec 2024 06:04) (103/60 - 133/63)  BP(mean): 87 (23 Dec 2024 06:04) (74 - 87)  RR: 18 (23 Dec 2024 06:04) (18 - 18)  SpO2: 99% (23 Dec 2024 06:04) (96% - 99%)    Parameters below as of 23 Dec 2024 06:04  Patient On (Oxygen Delivery Method): room air        GEN: alert, responsive and in NAD    HEENT: NC/AT    CHEST/Respiratory: clear to auscultation    Cardiovascular: ??holosystolic m best heard at the apex    Abdomen: Bs active; soft and non-tender to palpation    Genitourinary: + drainage from the base of the scrotum; non-tender to palpation    Extremities: RUE with HD graft in place  LLE: chronic skin changes  RLE: dressing over foot    Neurological: A+O x3        LABS/DIAGNOSTIC TESTS                            10.4   11.52 )-----------( 190      ( 23 Dec 2024 06:58 )             32.1       WBC Count: 11.52 K/uL (12-23 @ 06:58)  WBC Count: 15.84 K/uL (12-22 @ 09:03)  WBC Count: 12.73 K/uL (12-21 @ 10:04)      12-23    137  |  101  |  27[H]  ----------------------------<  102[H]  4.1   |  25  |  5.45[H]    Ca    9.2      23 Dec 2024 06:58  Phos  4.1     12-23  Mg     2.4     12-23        CULTURES      Culture - Wound Aerobic/Anaerobic (collected 12-19-24 @ 18:06)  Source: Drainage  Preliminary Report (12-20-24 @ 18:18):    No growth    Culture - Wound Aerobic/Anaerobic (collected 12-19-24 @ 16:15)  Source: Skin/Wound  Preliminary Report (12-20-24 @ 18:17):    No growth    Culture - Blood (collected 12-19-24 @ 06:45)  Source: .Blood BLOOD  Preliminary Report (12-22-24 @ 12:00):    No growth at 72 Hours    Culture - Blood (collected 12-17-24 @ 12:35)  Source: .Blood BLOOD  Final Report (12-22-24 @ 19:00):    No growth at 5 days    Culture - Blood (collected 12-16-24 @ 22:30)  Source: .Blood BLOOD  Gram Stain (12-17-24 @ 16:53):    Growth in aerobic bottle: Gram Positive Cocci in Clusters  Final Report (12-18-24 @ 13:20):    Growth in aerobic bottle: Staphylococcus hominis    Isolation of Coagulase negative Staphylococcus from single blood culture    sets may represent    contamination. Contact the Microbiology Department at [phone] if    susceptibility testing is needed.    clinically indicated.    Direct identification is available within approximately 3-5    hours either by Blood Panel Multiplexed PCR or Direct    MALDI-TOF. Details: [email or web address]  Organism: Blood Culture PCR (12-18-24 @ 13:20)  Organism: Blood Culture PCR (12-18-24 @ 13:20)      Method Type: PCR      -  Coagulase negative Staphylococcus: Detec    Culture - Wound Aerobic/Anaerobic (collected 12-16-24 @ 12:36)  Source: .Surgical Swab  Final Report (12-21-24 @ 17:55):    Moderate Providencia stuartii    Moderate Corynebacterium striatum group "Susceptibilities not performed"    Few Trueperella bernardiae "Susceptibilities not performed"  Organism: Providencia stuartii (12-21-24 @ 17:55)  Organism: Providencia stuartii (12-21-24 @ 17:55)      Method Type: GWEN      -  Amoxicillin/Clavulanic Acid: R >16/8      -  Ampicillin: R >16 These ampicillin results predict results for amoxicillin      -  Ampicillin/Sulbactam: I 16/8      -  Aztreonam: S <=4      -  Cefazolin: R >16      -  Cefepime: S <=2      -  Cefoxitin: S <=8      -  Ceftriaxone: S <=1      -  Ciprofloxacin: R 2      -  Ertapenem: S <=0.5      -  Levofloxacin: R 4      -  Meropenem: S <=1      -  Piperacillin/Tazobactam: S <=8      -  Trimethoprim/Sulfamethoxazole: S <=0.5/9.5          RADIOLOGY

## 2024-12-23 NOTE — PROGRESS NOTE ADULT - CONVERSATION DETAILS
Explained in length the risks and benefits of cardiopulmonary resuscitative measures including CPR, shock, pressors and invasive ventilation relating to artificial life support. Also explained the difference between artificial life prolonging measures (including artificial nutrition) to extend life and the burden/suffering/complications vs natural death and supportive/conservative interventions to maximize quality and quantity of life without causing significant burden and suffering to the patient and caregivers. This is according to the MOLST form.

## 2024-12-23 NOTE — PROGRESS NOTE ADULT - ASSESSMENT
Patient is a 69yo Male with ESRD on HD via Rt AVG, HTN, 2HPT, Anemia, LE venous stasis with Rt foot wound presents with weakness. Pt a/w Rt foot ulcer and hyperkalemia. Nephrology consulted for ESRD status.     1) ESRD: Last HD 12/20 with mild intradialytic hypotension, with net 1.0L removed. On HD now (12/22) tolerating well.  Next HD 12/24.  Monitor electrolytes  2) Hyperkalemia: resolved. c/w low potassium diet.    Monitor serum potassium  3) HTN with ESRD: BP low normal now stable.  Continue Amlodipine at reduced dose of 5mg PO daily. Continue to hold Coreg 25mg PO bid. If BP remains low; consider holding Amlodipine as well. c/w low sodium diet. Monitor BP.  4) Anemia of renal disease: Hb low. c/w Epogen 6000 units IV tiw. Monitor Hb.  5) 2HPT: Corrected serum calcium & serum phos acceptable.  PTHi 441. c/w calcitriol 1mcg PO MWF. Pt on Lanthanum 2000mg PO tid with meals which is unavailable at [facility]. c/w Sevelamer 1600mg PO tid with meals and  low phos diet. Monitor serum calcium and phosphorus.    HepBsAg was neg on outpt labs on 12/11/24; repeat HepBsAg neg; likely positive HepBsAg on 12/16 is an error.     [facility] NEPHROLOGY  Arden Tsang M.D.  Freeman Holland D.O.  Janny Chirinos M.D.  MD Ambar Mccarty, MSN, ANP-C    Telephone: [phone]  Facsimile: [phone] [address] [place] [address], [address]  [place] [address]   Patient is a 67yo Male with ESRD on HD via Rt AVG, HTN, 2HPT, Anemia, LE venous stasis with Rt foot wound presents with weakness. Pt a/w Rt foot ulcer and hyperkalemia. Nephrology consulted for ESRD status.     1) ESRD: Last HD 12/22, tolerated well with net 1.4L removed.  Plan for next HD 12/24 due to holiday dialysis schedule.  Monitor electrolytes  2) Hyperkalemia: resolved. c/w low potassium diet.    Monitor serum potassium  3) HTN with ESRD: BP improved.  Continue Amlodipine at reduced dose of 5mg PO daily. Continue to hold Coreg 25mg PO bid.  c/w low sodium diet. Monitor BP.  4) Anemia of renal disease: Hb acceptable. c/w Epogen 6000 units IV tiw. Monitor Hb.  5) 2HPT: Corrected serum calcium & serum phos acceptable.  PTHi 441. c/w calcitriol 1mcg PO MWF. Pt on Lanthanum 2000mg PO tid with meals which is unavailable at [facility]. c/w Sevelamer 1600mg PO tid with meals and  low phos diet. Monitor serum calcium and phosphorus.    HepBsAg was neg on outpt labs on 12/11/24; repeat HepBsAg neg; likely positive HepBsAg on 12/16 is an error.     [facility] NEPHROLOGY  Arden Tsang M.D.  Freeman Holland D.O.  Janny Chirinos M.D.  MD Ambar Mccarty, MSN, ANP-C    Telephone: [phone]  Facsimile: [phone] [address] [place] [address], [address]  [place] [address]

## 2024-12-23 NOTE — PROGRESS NOTE ADULT - ASSESSMENT
A:   Charcot arthropathy B/L  Diabetic ulceration R midfoot with chronic OM    P:  -Patient evaluated, chart reviewed and updated  -Xrays reviewed - Chronic charcot changes of the rearfoot with TN bulging. No gas  -MRI of R ankle - chronic OM of talus and partially navicular  -ANEESH/PVR - non compressible vessels B/L, normal PVR waveforms B/L   -Discussed diagnosis and treatment options with the patient  -No surgical treatement will be done at this time. Patient is stable from podiatry  -ID recc bone biopsy for abx plan - Dr. Brito suggests outpatient bone biopsy when pt is off abx for 3 weeks.   -Continue antibiotics per ID  -Wound culture - Providencia stuartii  -Betadine, DSD applied  -Advised patient to continue use of CROW walkers B/L when walking to prevent worsening ulcerations  -Podiatry to follow while in house  -Seen and Discussed with attending Dr. Sim A:   Charcot arthropathy B/L  Diabetic ulceration R midfoot with chronic OM    P:  -Patient evaluated, chart reviewed and updated  -Xrays reviewed - Chronic charcot changes of the rearfoot with TN bulging. No gas  -MRI of R ankle - chronic OM of talus and partially navicular  -ANEESH/PVR - non compressible vessels B/L, normal PVR waveforms B/L   -Discussed diagnosis and treatment options with the patient  -No surgical treatement will be done at this time. Patient is stable from podiatry  -ID recc bone biopsy for abx plan - Dr. Brito suggests outpatient bone biopsy when pt is off abx for 3 weeks which was discussed with patient today and he is agreeable with the plan going forward.  -Continue antibiotics per ID  -Wound culture - Providencia stuartii  -Betadine, DSD applied  -Advised patient to continue use of CROW walkers B/L when walking to prevent worsening ulcerations  -Podiatry to follow while in house  -Seen and Discussed with attending Dr. Sim

## 2024-12-23 NOTE — PHYSICAL THERAPY INITIAL EVALUATION ADULT - IMPAIRED TRANSFERS: BED/CHAIR, REHAB EVAL
After Visit Summary   1/9/2018    Adilson Thompson    MRN: [id number]           Patient Information     Date Of Birth          1987        Visit Information        Provider Department      1/9/2018 3:30 PM Carlos Valentine MD [facility] [place]        Today's Diagnoses     Generalized anxiety disorder    -  1    Major depressive disorder, recurrent episode, moderate (H)        Elevated blood pressure reading without diagnosis of hypertension          Care Instructions    Patient was instructed to check blood pressure 1-2 times per week until I see back in the Clinic. If blood pressure gettng too high or too low as discussed then need to see patient back sooner.   .  Your High Blood Pressure Risk Factors  Risk factors are things that make you more likely to have a disease or condition. Do you know your risk factors for high blood pressure? You can t do anything about some risk factors. But other risk factors are things that can be changed. Know what high blood pressure risk factors you have. Then find out what changes you can make to help control your risk for high blood pressure. Start with the change that you think will be easiest for you.  Risk factors you can t control  Though you can t change any of the things listed below, check off the ones that apply to you. The more boxes you check, the greater your risk for high blood pressure.  Family history  ? One or both of your parents or grandparents has had high blood pressure or heart disease.  Gender and age  ? You re a man over age 55 or a postmenopausal woman.  Risk factors you can control  There are plenty of risk factors for high blood pressure that you can control. Learn what these risk factors are and then find out how to reduce your risk. Check the ones that apply to you.  ? What you eat  Do you eat a lot of salty, fatty, fried, or greasy foods? Do you go to restaurants or eat out frequently?  ? Alcohol consumption  Do you drink more  than 1 drink a day if you are a female or more than 2 drinks a day if you male?   ? If you smoke or someone close to you smokes  Do you smoke cigarettes or cigars, chew tobacco, or dip snuff? Are you exposed to second-hand smoke on a regular basis?  ? How active you are   Are you inactive most of the time at work and at home? Do you go weeks without exercising?  ? Your weight   Has your doctor said that you are 15 or more pounds overweight?  ? Your stress level    Do you often feel anxious, nervous, and stressed? Do you feel that you don't have a supportive environment?  Date Last Reviewed: 4/27/2016 2000-2017 [employer]. [address] [place] [address], [place] [address]. All rights reserved. This information is not intended as a substitute for professional medical care. Always follow your healthcare professional's instructions.        What is High Blood Pressure?  High blood pressure (also called hypertension) is known as the  silent killer.  This is because most of the time it doesn t cause symptoms. In fact, many people don t know they have it until other problems develop. In most cases, high blood pressure can t be cured. It s a disease that often requires lifelong treatment. The good news is that it can be managed.  Understanding blood pressure  The circulatory system is made up of the heart and blood vessels that carry blood through the body. Your heart is the pump for this system. With each heartbeat (contraction), the heart sends blood out through large blood vessels called arteries. Blood pressure is a measure of how hard the moving blood pushes against the walls of the arteries.  High blood pressure can harm your health  In a healthy blood vessel, the blood moves smoothly through the vessel and puts normal pressure on the vessel walls.       High blood pressure occurs when blood pushes too hard against artery walls. This causes damage to the artery walls and then the formation of scar tissue as  "it heals. This makes the arteries stiff and weak. Plaque sticks to the scarred tissue narrowing and hardening the arteries. High blood pressure also causes your heart to work harder to get blood out to the body. High blood pressure raises your risk of heart attack, also known as acute myocardial infarction, or AMI, and stroke. It can also lead to kidney disease, and blindness.  Measuring blood pressure  An example of a blood pressure measurement is 120/70 (120 over 70). The top number is the pressure of blood against the artery walls during a heartbeat (systolic). The bottom number is the pressure of blood against artery walls between heartbeats (diastolic). Talk with your healthcare provider to find out what your blood pressure goals should be.   Controlling blood pressure  If your blood pressure is too high, work with your doctor on a plan for lowering it. Below are steps you can take that will help lower your blood pressure.    Choose heart-healthy foods. Eating healthier meals helps you control your blood pressure. Ask your doctor about the DASH eating plan. This plan helps reduce blood pressure by limiting the amount of sodium (salt) you have in your diet. DASH also encourages eating plenty of fruits and vegetables, low-fat or non-fat dairy, whole-grains, and foods high in fiber, and low in fat.    Reduce sodium. Reducing sodium in your diet reduces fluid retention. Fluid retention caused by too much salt increases blood volume and blood pressure. The American Heart Association s (AHA) \"ideal\" sodium intake recommendation is 1,500 milligrams per day.  However, since American's eat so much salt, the AHA says a positive change can occur by cutting back to even 2,400 milligrams of sodium a day.    Maintain a healthy weight. Being overweight makes you more likely to have high blood pressure. Losing excess weight helps lower blood pressure.    Exercise regularly. Daily exercise helps your heart and blood vessels " work better and stay healthier. It can help lower your blood pressure.    Stop smoking. Smoking increases blood pressure and damages blood vessels.    Limit alcohol. Drinking too much alcohol can raise blood pressure. Men should have no more than 2 drinks a day. Women should have no more than 1. (A drink is equal to 1 beer, or a small glass of wine, or a shot of liquor.)    Control stress. Stress makes your heart work harder and beat faster. Controlling stress helps you control your blood pressure.  Facts about high blood pressure    Feeling OK does not mean that blood pressure is under control. Likewise, feeling bad doesn t mean it s out of control. The only way to know for sure is to check your pressure regularly.    Medicine is only one part of controlling high blood pressure. You also need to manage your weight, get regular exercise, and adjust your eating habits.    High blood pressure is usually a lifelong problem. But it can be controlled with healthy lifestyle changes and medicine.    Hypertension is not the same as stress. Although stress may be a factor in high blood pressure, it s only one part of the story.    Blood pressure medicines need to be taken every day. Stopping suddenly may cause a dangerous increase in pressure.   Date Last Reviewed: 4/27/2016 2000-2017 [employer]. [address] [place] [address], [place] [address]. All rights reserved. This information is not intended as a substitute for professional medical care. Always follow your healthcare professional's instructions.        High Blood Pressure, To Be Confirmed, No Treatment  Your blood pressure today was higher than normal. Sometimes anxiety or pain can cause a temporary rise in blood pressure. It later returns to normal. Blood pressure that is high only one time doesn t mean that you have high blood pressure (hypertension). High blood pressure is a chronic illness. But you should have your blood pressure measured again within  the next few days to find out if it s still high.    A blood pressure reading is made up of two numbers: a higher number over a lower number. The top number is the systolic pressure. The bottom number is the diastolic pressure. A normal blood pressure is a systolic pressure of less than 120 over a diastolic pressure of less than 80. You will see your blood pressure readings written together. For example, a person with a systolic pressure of 118 and a diastolic pressure of 78 will have 118/78 written in the medical record.     High blood pressure is when either the top number is 140 or higher, or the bottom number is 90 or higher. This must be the result when taking your blood pressure a number of times.   The blood pressures between normal and high are called prehypertension. This is systolic pressure of 120 to 140 or diastolic pressure of 80 to 89. Prehypertension means you are at risk of getting high blood pressure. It's a warning sign. The information gives you a chance to  make lifestyle changes such as weight loss, exercise, and quitting smoking, that can keep your blood pressure from going higher. You should have your blood pressure checked regularly to be sure it isn t rising.  Home care  To track your blood pressure, your provider may ask you to come into the office at different times and on different days. If your healthcare provider asks you to check your readings at home, ask him or her what times of the day to test and for how many days. Before you leave the office, ask your provider to show you how to take your blood pressure and be sure to ask questions if you don't understand something.  Consider buying an automatic blood pressure monitor. Ask your provider for a recommendation. You can buy blood pressure monitors at most pharmacies.  The American Heart Association recommends the following guidelines for home blood pressure monitoring:    Don't smoke or drink coffee for 30 minutes before taking your  blood pressure.    Go to the bathroom before the test.    Relax for 5 minutes before taking the measurement.    Sit with your back supported (don't sit on a couch or soft chair); keep your feet on the floor uncrossed. Place your arm on a solid flat surface (like a table) with the upper part of the arm at heart level. Place the middle of the cuff directly above the eye of the elbow. Check the monitor's instruction manual for an illustration.    Take multiple readings. When you measure, take 2 to 3 readings one minute apart and record all of the results.    Take your blood pressure at the same time every day, or as your healthcare provider recommends.    Record the date, time, and blood pressure reading.    Take the record with you to your next medical appointment. If your blood pressure monitor has a built-in memory, simply take the monitor with you to your next appointment.    Call your provider if you have several high readings. Don't be frightened by a single high blood pressure reading, but if you get several high readings, check in with your healthcare provider.    Note: When blood pressure reaches a systolic (top number) of 180 or higher OR diastolic (bottom number) of 110 or higher, seek emergency medical treatment.  Follow-up care  Keep all of your follow up appointments. If your blood pressure is high (more than 120 over 80) on 2 out of 3 days, you will need to follow up with your healthcare provider for more evaluation and treatment.  Don t put this off! High blood pressure can be treated. High blood pressure that s not treated raises your risk for heart attack and stroke.  When to seek medical advice  Call your healthcare provider right away if any of these occur:    Blood pressure reaches a systolic (top number) of 180 or higher, OR diastolic (bottom number) of 110 or higher    Chest pain or shortness of breath    Severe headache    Throbbing or rushing sound in the ears    Nosebleed    Sudden severe pain  "in your belly (abdomen)    Extreme drowsiness, confusion, or fainting    Dizziness or dizziness with spinning sensation (vertigo)    Weakness of an arm or leg or one side of the face    You have problems speaking or seeing   Date Last Reviewed: 2016-2017 [employer]. [address] [place] [address]. All rights reserved. This information is not intended as a substitute for professional medical care. Always follow your healthcare professional's instructions.                Follow-ups after your visit        Who to contact     If you have questions or need follow up information about today's clinic visit or your schedule please contact [facility] directly at [phone].  Normal or non-critical lab and imaging results will be communicated to you by MyChart, letter or phone within 4 business days after the clinic has received the results. If you do not hear from us within 7 days, please contact the clinic through [employer] or phone. If you have a critical or abnormal lab result, we will notify you by phone as soon as possible.  Submit refill requests through [employer] or call your pharmacy and they will forward the refill request to us. Please allow 3 business days for your refill to be completed.          Additional Information About Your Visit        [employer] Information     [employer] lets you send messages to your doctor, view your test results, renew your prescriptions, schedule appointments and more. To sign up, go to [email or web address] . Click on \"Log in\" on the left side of the screen, which will take you to the Welcome page. Then click on \"Sign up Now\" on the right side of the page.     You will be asked to enter the access code listed below, as well as some personal information. Please follow the directions to create your username and password.     Your access code is: [email or web address]  Expires: 2018  3:42 PM     Your access code will  in 90 days. If you " need help or a new code, please call your [facility] or [phone].        Care EveryWhere ID     This is your Care EveryWhere ID. This could be used by other organizations to access your [facility] medical records  [id number]        Your Vitals Were     Pulse Temperature Pulse Oximetry BMI (Body Mass Index)          85 97.2  F (36.2  C) (Tympanic) 97% 31.79 kg/m2         Blood Pressure from Last 3 Encounters:   01/09/18 (!) 136/100   08/30/17 (!) 144/104   07/19/17 124/82    Weight from Last 3 Encounters:   01/09/18 236 lb (107 kg)   07/19/17 241 lb 6.4 oz (109.5 kg)   06/28/17 232 lb 11.2 oz (105.6 kg)              Today, you had the following     No orders found for display         Today's Medication Changes          These changes are accurate as of: 1/9/18  3:42 PM.  If you have any questions, ask your nurse or doctor.               These medicines have changed or have updated prescriptions.        Dose/Directions    buPROPion 150 MG 24 hr tablet   Commonly known as:  WELLBUTRIN XL   This may have changed:    - medication strength  - how much to take  - how to take this  - when to take this  - additional instructions   Used for:  Generalized anxiety disorder, Major depressive disorder, recurrent episode, moderate (H)   Changed by:  Carlos Valentine MD        Dose:  150 mg   Take 1 tablet (150 mg) by mouth every morning   Quantity:  30 tablet   Refills:  1            Where to get your medicines      These medications were sent to [id number] - [place] - [address]  [address] [place] [address]     Phone:  [phone]     buPROPion 150 MG 24 hr tablet                Primary Care Provider Office Phone # Fax #    Carlos Valentine [phone]       [facility] [address] [facility]  [place] [address]        Equal Access to Services     WILLARD VUONG AH: Sharon haileo Soomaali, waaxda luqadaha, qaybta kaalmasteve rodriguez, esther lindquist  labita abreu. So [facility] [phone].    ATENCIÓN: Si habla meera, tiene a diez disposición servicios gratuitos de asistencia lingüística. Teresita al [phone].    We comply with applicable federal civil rights laws and [place] laws. We do not discriminate on the basis of race, color, national origin, age, disability, sex, sexual orientation, or gender identity.            Thank you!     [facility] you [facility]  [facility]. Our goal is always to provide you with excellent care. Hearing back from our patients is one way we can continue to improve our services. Please take a few minutes to complete the written survey that you may receive in the mail after your visit with us. Thank you!             Your Updated Medication List - Protect others around you: Learn how to safely use, store and throw away your medicines at www.disposemymeds.org.          This list is accurate as of: 1/9/18  3:42 PM.  Always use your most recent med list.                   Brand Name Dispense Instructions for use Diagnosis    buPROPion 150 MG 24 hr tablet    WELLBUTRIN XL    30 tablet    Take 1 tablet (150 mg) by mouth every morning    Generalized anxiety disorder, Major depressive disorder, recurrent episode, moderate (H)       ibuprofen 200 MG tablet    ADVIL/MOTRIN     Take 200 mg by mouth every 4 hours as needed for mild pain        ketorolac 10 MG tablet    TORADOL    20 tablet    Take 1 tablet (10 mg) by mouth every 6 hours as needed for moderate pain        METAMUCIL CLEAR & NATURAL Powd     1 Bottle    Take as directed.        traZODone 50 MG tablet    DESYREL    60 tablet    1 tab -3 tabs at night for insomnia as needed    Psychophysiological insomnia          impaired balance/decreased flexibility/decreased strength

## 2024-12-23 NOTE — PROGRESS NOTE ADULT - SUBJECTIVE AND OBJECTIVE BOX
Subjective:  Resting comfortably, no complaints of scrotal pain    Objective:    Vital signs  T(C): , Max: 37 (12-23-24 @ 06:04)  HR: 67 (12-23-24 @ 06:04)  BP: 127/61 (12-23-24 @ 06:04)  SpO2: 99% (12-23-24 @ 06:04)  Wt(kg): --    Output     12-22 @ 07:01  -  12-23 @ 07:00  --------------------------------------------------------  IN: 600 mL / OUT: 1600 mL / NET: -1000 mL        Gen: NAD  Abd: soft, nontender, nondistended  : right scrotal tract is draining purulent fluid spontaneously, expressed ~10-15cc purulence until serosanguinous, small spot of spontaneous draining in right inguinal region also observed, no crepitus, or fluctuance appreciated, no necrotic skin changes    Labs                        10.4   11.52 )-----------( 190      ( 23 Dec 2024 06:58 )             32.1     23 Dec 2024 06:58    137    |  101    |  27     ----------------------------<  102    4.1     |  25     |  5.45     Ca    9.2        23 Dec 2024 06:58  Phos  4.1       23 Dec 2024 06:58  Mg     2.4       23 Dec 2024 06:58        Urine Cx: ?  Blood Cx: ?      Imaging

## 2024-12-23 NOTE — PROGRESS NOTE ADULT - ASSESSMENT
67 yo M with PMH of  DM, HTN, ESRD on dialysis ( M, W, F), Last Dialysis 12/13/24,   B/L Charcot deformity and HLD, present to the ED for generalized weakness, chills, subjective fever. Admitted to medicine for generalized weakness, hyperkalemia, right foot ulcer   MR right Ankle demonstrates ulceration at the plantar surface medially with suspected exposed bone with underlying   osteomyelitis involving the talus and less so often navicular bone. Podiatry follows   OM likely chronic, recommended  bone Bx/debridement OFF Abs for a period of time with Culture  sent to determine directed Ab treatment  Pt noted with episode of fever over 101  Started on Cefepime and vancomycin. Noted with open wound draining purulent discharge to left buttock. ID consulted for further recommendations   Blood culture growing Staphylococcus. Surgery consulted, sp  I&D at bedside. ID recommended one more dose of vancomycin 750 mg and no abx recommended thereafter.  12/22: Scrotum with purulent drainage, urology consulted recs CT pelvis -- ID recs Vanco 750mg x 1 dose   12/23: HD tomm-- F/u CT pelvis

## 2024-12-23 NOTE — PROGRESS NOTE ADULT - PROBLEM SELECTOR PLAN 9
Anticipate clearance for discharge when definitive ABX plan established  CT pelvis results and Urology recs re scrotum drainage

## 2024-12-23 NOTE — PROGRESS NOTE ADULT - ASSESSMENT
69 yo M with PMH of  DM, HTN, ESRD on dialysis ( M, W, F) via RUE graft (last Dialysis 12/13/24),  B/L Charcot deformity and HLD, presented to the ED for generalized weakness, chills. Patient reports since 2014 after having charcot procedure in bilateral foot, he has been wearing a charcot boot to walk. Patient follows up with podiatry Dr. Sim in the outpatient podiatry clinic. At the time of my consult, pt states both his feet have looked the same for many yrs. and has been following with Podiatry for yrs. Pt further states that he had an "abscess" on the R heel drained about 1 mo. ago by Dr. Sim. BC's with coag negative Staph c/w a contaminant. MRI R foot 12/17: multiple skin wounds, soft tissue swelling, no drainable collection, patchy signal abnormality within the medial sesamoid suggestive of osteomyelitis vs reactive osteitis. Elevated temp and slight elevation of WBC noted today. MRI R ankle 12/17:  ulceration at the plantar surface medially with suspected exposed bone with underlying osteomyelitis involving the talus and less so of the navicular bone, no drainable fluid collection; end-stage arthritis with resultant planovalgus deformity. The presence of osteo below the ulcer in the area of the talus is likely chronic. The signal abnormality within the R medial sesamoid may represent osteomyelitis. The R heel ulcer had no surrounding erythema or warmth-- no evidence of SSTI. Swab Cx results likely reflect colonization, especially in view of discussion with Dr. Sim.   ?? whether the R foot is salvageable (ABIs from 12/16 exam cd not be calculated). Of concern is vascular insufficiency, which wd have a negative impact on heeling following any surgical intervention.  W/U of chronic osteo wd include bone Bx/debridement OFF Abs for a period of time with Cx sent to determine directed Ab treatment. L buttock abscess drained at the bedside 12/19-- specimens with no growth. Pt noted with purulent drainage from the base of the scrotum, which likely was the cause of the rising WBC. No further febrile episodes to date. As G+C would be the most likely etiology, pt redosed with vanco 750 mg yesterday. Wd also send drainage for Cx.  As per URology, pt to have CT scrotum to look for collection.    #Scrotal SSTI (see above)  --send drainage for Cx  --CT scrotum  --Urology f/u    #Diabetic foot ulcer of R foot with osteo (see above)-- no evidence of acute SSTI  --consider bone Bx OFF antibiotics   --vascular input    #L buttock abscess-- most common organisms are G+C. Underwent I+D and given IV vanco.  --cont. local wound care    #DM-- management as per medical team    Chart reviewed, including additional notes/labs/Cx; pt re-evaluated at the bedside; discussed the DX/management of additional SSTI with the patient and medical team; provided coordination of care re ID issues with Dr. Juarez.

## 2024-12-23 NOTE — PROGRESS NOTE ADULT - ASSESSMENT
70 y/o male with hx of T2DM, hidradenitis suppurativa a/w generalized weakness now with purulent drainage from scrotum   s/p bedside I&D of buttock abscess 12/19   VSS, afebrile, leukocytosis elevated 15.8    12/22: s/p HD -1L, CT pelvis not obtained  12/23: scrotal exam stable with spontaneous drainage, WBC 11.5 from 15.8, Cr 5.45 from 7.22    Plan   - obtain CT pelvis w IV contrast to assess for fluid collection - time with HD session  - keep area clean and dry, may include sitz baths   - IV antibiotics, follow ID recommendations   - trend white count, monitor for fevers, etc.  - strict glycemic control   - HD as per neph   - remainder of care as per primary team   - d/w Dr. Mcguire

## 2024-12-23 NOTE — PROGRESS NOTE ADULT - SUBJECTIVE AND OBJECTIVE BOX
Patient is a 69y old  Male who presents with a chief complaint of generalized weakness (23 Dec 2024 10:03)      INTERVAL HPI/OVERNIGHT EVENTS: no new complaints    MEDICATIONS  (STANDING):  amLODIPine   Tablet 5 milliGRAM(s) Oral daily  calcitriol   Capsule 1 MICROGram(s) Oral <User Schedule>  epoetin rudolph-epbx (RETACRIT) Injectable 6000 Unit(s) IV Push <User Schedule>  heparin   Injectable 5000 Unit(s) SubCutaneous every 8 hours  insulin lispro (ADMELOG) corrective regimen sliding scale   SubCutaneous three times a day before meals  insulin lispro (ADMELOG) corrective regimen sliding scale   SubCutaneous at bedtime  latanoprost 0.005% Ophthalmic Solution 1 Drop(s) Both EYES at bedtime  sevelamer carbonate 1600 milliGRAM(s) Oral three times a day with meals    MEDICATIONS  (PRN):  acetaminophen     Tablet .. 650 milliGRAM(s) Oral every 6 hours PRN Temp greater or equal to 38C (100.4F), Mild Pain (1 - 3)  ondansetron Injectable 4 milliGRAM(s) IV Push every 8 hours PRN Nausea and/or Vomiting      __________________________________________________  REVIEW OF SYSTEMS:    CONSTITUTIONAL: No fever,   EYES: no acute visual disturbances  NECK: No pain or stiffness  RESPIRATORY: No cough; No shortness of breath  CARDIOVASCULAR: No chest pain, no palpitations  GASTROINTESTINAL: No pain. No nausea or vomiting; No diarrhea   NEUROLOGICAL: No headache or numbness, no tremors  MUSCULOSKELETAL: No joint pain, no muscle pain  GENITOURINARY: no dysuria, no frequency, no hesitancy  PSYCHIATRY: no depression , no anxiety  ALL OTHER  ROS negative      Vital Signs Last 24 Hrs  T(C): 37 (23 Dec 2024 06:04), Max: 37 (23 Dec 2024 06:04)  T(F): 98.6 (23 Dec 2024 06:04), Max: 98.6 (23 Dec 2024 06:04)  HR: 67 (23 Dec 2024 06:04) (65 - 67)  BP: 127/61 (23 Dec 2024 06:04) (103/60 - 133/63)  BP(mean): 87 (23 Dec 2024 06:04) (74 - 87)  RR: 18 (23 Dec 2024 06:04) (18 - 18)  SpO2: 99% (23 Dec 2024 06:04) (96% - 99%)    Parameters below as of 23 Dec 2024 06:04  Patient On (Oxygen Delivery Method): room air        ________________________________________________  PHYSICAL EXAM:  GENERAL: NAD  HEENT: Normocephalic;  conjunctivae and sclerae clear; moist mucous membranes;   NECK : supple  CHEST/LUNG: Clear to auscultation bilaterally with good air entry   HEART: S1 S2  regular; no murmurs, gallops or rubs  ABDOMEN: Soft, Nontender, Nondistended; Bowel sounds present  EXTREMITIES: no cyanosis; no edema; no calf tenderness  SKIN: warm and dry; no rash  NERVOUS SYSTEM:  Awake and alert; Oriented  to place, person and time ; no new deficits    _________________________________________________  LABS:                        10.4   11.52 )-----------( 190      ( 23 Dec 2024 06:58 )             32.1     12-23    137  |  101  |  27[H]  ----------------------------<  102[H]  4.1   |  25  |  5.45[H]    Ca    9.2      23 Dec 2024 06:58  Phos  4.1     12-23  Mg     2.4     12-23        Urinalysis Basic - ( 23 Dec 2024 06:58 )    Color: x / Appearance: x / SG: x / pH: x  Gluc: 102 mg/dL / Ketone: x  / Bili: x / Urobili: x   Blood: x / Protein: x / Nitrite: x   Leuk Esterase: x / RBC: x / WBC x   Sq Epi: x / Non Sq Epi: x / Bacteria: x      CAPILLARY BLOOD GLUCOSE      POCT Blood Glucose.: 89 mg/dL (23 Dec 2024 07:31)  POCT Blood Glucose.: 98 mg/dL (22 Dec 2024 20:51)  POCT Blood Glucose.: 117 mg/dL (22 Dec 2024 16:37)  POCT Blood Glucose.: 94 mg/dL (22 Dec 2024 11:39)      RADIOLOGY & ADDITIONAL TESTS:    Imaging Personally Reviewed:  YES/NO    Consultant(s) Notes Reviewed:   YES/ No    Care Discussed with Consultants :     Plan of care was discussed with patient and /or primary care giver; all questions and concerns were addressed and care was aligned with patient's wishes.       Patient is a 69y old  Male who presents with a chief complaint of generalized weakness (23 Dec 2024 10:03)      INTERVAL HPI/OVERNIGHT EVENTS: Pt seen at bedside with no new complaints    MEDICATIONS  (STANDING):  amLODIPine   Tablet 5 milliGRAM(s) Oral daily  calcitriol   Capsule 1 MICROGram(s) Oral <User Schedule>  epoetin rudolph-epbx (RETACRIT) Injectable 6000 Unit(s) IV Push <User Schedule>  heparin   Injectable 5000 Unit(s) SubCutaneous every 8 hours  insulin lispro (ADMELOG) corrective regimen sliding scale   SubCutaneous three times a day before meals  insulin lispro (ADMELOG) corrective regimen sliding scale   SubCutaneous at bedtime  latanoprost 0.005% Ophthalmic Solution 1 Drop(s) Both EYES at bedtime  sevelamer carbonate 1600 milliGRAM(s) Oral three times a day with meals    MEDICATIONS  (PRN):  acetaminophen     Tablet .. 650 milliGRAM(s) Oral every 6 hours PRN Temp greater or equal to 38C (100.4F), Mild Pain (1 - 3)  ondansetron Injectable 4 milliGRAM(s) IV Push every 8 hours PRN Nausea and/or Vomiting  __________________________________________________  REVIEW OF SYSTEMS:    CONSTITUTIONAL: No fever,   EYES: no acute visual disturbances  NECK: No pain or stiffness  RESPIRATORY: No cough; No shortness of breath  CARDIOVASCULAR: No chest pain, no palpitations  GASTROINTESTINAL: No pain. No nausea or vomiting; No diarrhea   NEUROLOGICAL: No headache or numbness, no tremors  MUSCULOSKELETAL: No joint pain, no muscle pain  GENITOURINARY: no dysuria, no frequency, no hesitancy  PSYCHIATRY: no depression , no anxiety  ALL OTHER  ROS negative      Vital Signs Last 24 Hrs  T(C): 37 (23 Dec 2024 06:04), Max: 37 (23 Dec 2024 06:04)  T(F): 98.6 (23 Dec 2024 06:04), Max: 98.6 (23 Dec 2024 06:04)  HR: 67 (23 Dec 2024 06:04) (65 - 67)  BP: 127/61 (23 Dec 2024 06:04) (103/60 - 133/63)  BP(mean): 87 (23 Dec 2024 06:04) (74 - 87)  RR: 18 (23 Dec 2024 06:04) (18 - 18)  SpO2: 99% (23 Dec 2024 06:04) (96% - 99%)    Parameters below as of 23 Dec 2024 06:04  Patient On (Oxygen Delivery Method): room air  ________________________________________________  PHYSICAL EXAM:  GENERAL: NAD  HEENT: Normocephalic;  conjunctivae and sclerae clear; moist mucous membranes;   NECK : supple  CHEST/LUNG: Clear to auscultation bilaterally with good air entry   HEART: S1 S2  regular; no murmurs, gallops or rubs  ABDOMEN: Soft, Nontender, Nondistended; Bowel sounds present  EXTREMITIES: no cyanosis; no edema; no calf tenderness-- Right AVF +Bruis and Thrill   SKIN: buttock wound, scrotum wound --purulent drainage  NERVOUS SYSTEM:  Awake and alert; Oriented  to place, person and time ; no new deficits  _________________________________________________  LABS:                        10.4   11.52 )-----------( 190      ( 23 Dec 2024 06:58 )             32.1     12-23    137  |  101  |  27[H]  ----------------------------<  102[H]  4.1   |  25  |  5.45[H]    Ca    9.2      23 Dec 2024 06:58  Phos  4.1     12-23  Mg     2.4     12-23    Urinalysis Basic - ( 23 Dec 2024 06:58 )    Color: x / Appearance: x / SG: x / pH: x  Gluc: 102 mg/dL / Ketone: x  / Bili: x / Urobili: x   Blood: x / Protein: x / Nitrite: x   Leuk Esterase: x / RBC: x / WBC x   Sq Epi: x / Non Sq Epi: x / Bacteria: x    CAPILLARY BLOOD GLUCOSE    POCT Blood Glucose.: 89 mg/dL (23 Dec 2024 07:31)  POCT Blood Glucose.: 98 mg/dL (22 Dec 2024 20:51)  POCT Blood Glucose.: 117 mg/dL (22 Dec 2024 16:37)  POCT Blood Glucose.: 94 mg/dL (22 Dec 2024 11:39)      RADIOLOGY & ADDITIONAL TESTS:  < from: MR Ankle No Cont, Right (12.18.24 @ 20:26) >  ACC: [id number] EXAM:  MR ANKLE RT   ORDERED BY: REYES GILBERT     PROCEDURE DATE:  12/18/2024          INTERPRETATION:  MRI of the right ankle    INDICATION: Concern for osteomyelitis    COMPARISON: MRI of the foot performed 12/17/2024 and CT of thefoot   performed 12/16/2024.    TECHNIQUE: Multiplanar multisequence MRI of the right ankle    FINDINGS:    There is presence of advanced Charcot arthropathy with severe resultant   valgus and pes planus deformities.    There is soft tissue ulceration at the plantar surface of the foot   medially adjacent to the talus with suspected exposed bone. There is   underlying high STIR weighted signal and low T1 weighted signal within   the majority of the medial talus with imperceptible cortex. There ishigh   T2 and intermediate T1-weighted signal predominantly involving the   navicular bone at the talonavicular joint (series 10, image 21). The   remaining osseous structures are without convincing evidence of   osteomyelitis.    There is no drainable fluid collection. There is prominent soft tissue   edema at the medial and plantar side of the ankle and lesser at the   lateral ankle. There is diffuse muscle atrophy.    The included proximal portions of the Achilles tendon, anterior extensor,   medial flexor, and peroneal tendons without high-grade tear.    IMPRESSION: MRI of the right ankle demonstrates ulceration at the plantar   surface medially with suspected exposed bone with underlying   osteomyelitis involving the talus and less so ofthe navicular bone. No   drainable fluid collection.    End-stage arthritis with resultant planovalgus deformity.    --- End of Report ---    < end of copied text >  < from: MR Foot No Cont, Right (12.17.24 @ 15:03) >  ACC: [id number] EXAM:  MR FOOT RT   ORDERED BY: SALVADOR PAREKH     PROCEDURE DATE:  12/17/2024          INTERPRETATION:  MR FOOT RIGHT dated 12/17/2024 3:03 PM    INDICATION: Pain and swelling. Skin ulcer.    COMPARISON: Foot radiographs dated 12/16/2024    TECHNIQUE: Multi-sequential, multiplanar MRI imaging of the right midfoot   and forefoot was performed per standard protocol.    FINDINGS: Degraded by motion artifact.    BONE MARROW: Flat foot deformity is noted. There is subtle marrow edema   within the medial sesamoid of the great toe.  SYNOVIUM/ JOINT FLUID: No joint effusion  TENDONS: The tendons are intact. No large tenosynovitis.  MUSCLES: Marked atrophy and edema within the intrinsic musculature the   foot.  NEUROVASCULAR STRUCTURES: Preserved  SUBCUTANEOUS SOFT TISSUES: Skin wounds are seen along the plantar surface   of the forefoot. No drainable collection is seen. There is moderate   thickening of the skin noted.    IMPRESSION: Degraded by motion artifact.    1.  Multiple skin wounds are marked soft tissue swelling concerning for   cellulitis in the proper clinical setting. No drainable collection.  2.  Suspect patchy signal abnormality within the medial sesamoid.   Correlate clinically for a skin wound in this region which would increase   the probability of osteomyelitis. Differential includes reactive osteitis.    --- End of Report ---      < from: Xray Foot AP + Lateral, Right (12.16.24 @ 15:24) >  ACC: [id number] EXAM:  XR FOOT 2 VIEWS RT   ORDERED BY:  MAX LAZARUS     PROCEDURE DATE:  12/16/2024        INTERPRETATION:  Right foot    HISTORY: Osteomyelitis    COMPARISON: 6/4/2015     2 views of the right foot show no evidence of acute fracture. Advanced   arthritic changes of the hindfoot are unchanged. Vascular calcifications   are present.    IMPRESSION: No acute bony pathology.    Further information may be obtained from the patient's subsequent MRI of   the right foot.    Thank you for this referral.    --- End of Report ---      < from: US Physiol Extremity Lower 3+ Level, BI (12.16.24 @ 14:12) >  ACC: [id number] EXAM:  US PHYSIOL LWR EXT 3+ LEV BI   ORDERED BY: REYES GILBERT     PROCEDURE DATE:  12/16/2024      INTERPRETATION:  Clinical indication: Right foot nonhealing wound    COMPARISON: 5/4/2016    FINDINGS AND  IMPRESSION: There are normal amplitude biphasic pulse volume recordings   bilaterally. There are bilateral calcified, noncompressible vessels.   Therefore, ABIs could not be calculated.    --- End of Report ---      < from: Xray Chest 1 View-PORTABLE IMMEDIATE (12.16.24 @ 10:30) >  ACC: [id number] EXAM:  XR CHEST PORTABLE IMMED 1V   ORDERED BY:  MAX LAZARUS     PROCEDURE DATE:  12/16/2024        INTERPRETATION:  CLINICAL HISTORY: 68 years old Male with Sepsis.    TECHNIQUE: Single frontal view of the chest    COMPARISON: Chest radiograph 11/26/2021.    FINDINGS:    Lines/Tubes: Right subclavian graft interval removal of left-sided   dual-lumen catheter..    Lungs/Pleura: No focal consolidations.  No pneumothorax. No pleural   effusions.  No pulmonary vascular congestion.    Heart/Mediastinum: Normal cardiac silhouette, unchanged.    Osseous Structures: No acute findings.    IMPRESSION:  Clear lungs.    --- End of Report ---      Imaging Personally Reviewed:  YES    Consultant(s) Notes Reviewed:   YES    Care Discussed with Consultants : YES     Plan of care was discussed with patient and /or primary care giver; all questions and concerns were addressed and care was aligned with patient's wishes.

## 2024-12-23 NOTE — PROGRESS NOTE ADULT - PROBLEM SELECTOR PLAN 1
MRI: ulceration at the plantar surface medially with suspected exposed bone with underlying   Surgical culture grew Providencia stuartii  ID recs: bone Bx/debridement outpt off Abx for at least 2-3 weeks with Culture sent to determine directed tx  Podiatry following

## 2024-12-23 NOTE — PROGRESS NOTE ADULT - PROBLEM SELECTOR PLAN 2
left buttock open wound, draining purulent discharge, likely the source of fever   -S/p 12/19 I&D at bedside  -Wound culture -No growth  -Sx following -- dressing changes   -S/p vancomycin

## 2024-12-23 NOTE — PROGRESS NOTE ADULT - SUBJECTIVE AND OBJECTIVE BOX
CC: weakness  S:  drainage noted to scrotum, denies any pain  O:  Vital Signs Last 24 Hrs  T(C): 37 (12-23-24 @ 06:04), Max: 37 (12-23-24 @ 06:04)  T(F): 98.6 (12-23-24 @ 06:04), Max: 98.6 (12-23-24 @ 06:04)  HR: 67 (12-23-24 @ 06:04) (65 - 67)  BP: 127/61 (12-23-24 @ 06:04) (103/60 - 133/63)  BP(mean): 87 (12-23-24 @ 06:04) (74 - 87)  RR: 18 (12-23-24 @ 06:04) (18 - 18)  SpO2: 99% (12-23-24 @ 06:04) (96% - 99%)      PE:  Gen: Oriented, alert, No acute distress Eyes: conjunctivae and lid normal, sclera clear with no icterus ENT: nose and throat exam normal CVS: S1, S2, RRR; no murmur, no rubs, no gallops Pulm: Good air exchange, Breath sounds equal bilaterally, no rales rhonchi,  no wheezes Chest: nontender, no chest deformity, chest movement symmetrical Gl: abdomen soft, nontender, nondistended, bowel sounds normoactive, no masses palpated Musk: no msk pain, BLE edematous, R foot with c/d/i dressing Skin: L buttock dressing c/d/i , no skin lesions, skin turgor normal, warm and well perfused Neuro: Awake, alert,Psych: normal affect, insight

## 2024-12-23 NOTE — PROGRESS NOTE ADULT - ASSESSMENT
Mr. Ramos is a 69 yo M with PMHx of ESRD on HD MWF, HTN, DM, bilateral Charcot's joint, presenting with generalized weakness for 1 day.   Found to have hyperkalemia and R foot ulcer, admitted for further workup.    # Generalized weakness  # Hyperkalemia-resolved  # R foot, ankle ulcer, wound  # ESRD on HD  # HTN  # DM  # Bilateral Charcot's joint  #Left buttock abscess  #Scrotal drainage    - completed cefepime, one last dose of vanc for L buttock abscess, however with new scrotal drainage, given another dose of vancomycin 12/23, follow up CT Abd/Pelvic per urology   -per ID Dr. Brito, no evidence of acute SSTI.  -gen surg consulted for L buttock abscess s/p bedside I&D 12/19  -ANEESH: There are normal amplitude biphasic pulse volume recordings   bilaterally. There are bilateral calcified, noncompressible vessels.   Therefore, ABIs could not be calculated.  -f/u with vascular and podiatry outpatient  - podiatry recs appreciated  -consulted ID, follow up with ID outpatient  - consult nephrology, HD per neph  - monitor BMP  - A1c 7.4, FS/SSI  - formal med rec  - DVT ppx: heparin SQ.

## 2024-12-23 NOTE — PROGRESS NOTE ADULT - SUBJECTIVE AND OBJECTIVE BOX
Podiatry Interval: No acute overnight events noted. Patient denies any other pedal complaints and no constitutional symptoms such as F/C/N/V/SOB. AAOx3, NAD.    Podiatry HPI: Podiatry consulted for 68-year-old male who presents to the ED with his wife with chief complaint of generalized weakness. PMHx of DM, HTN, ESRD on dialysis, Charcot, and HLD. Patient states he woke up very tired this morning and could not stand up to get dressed for his dialysis appointment. Per wife, patient was folded over when she called an ambulance. Patient reports history of Charcot deformity B/L deformity for 10 year and states he had a Charcot procedure for right foot in 2014. Patient states he wears Charcot boots at all times B/L. Per wife, the patient has not had an ulceration in many years until he developed this right foot ulceration 1 month ago. The wife states that she thinks the shape of her 's right foot is changing and it is affecting the way he walks. States they regularly follow up in outpatient podiatry clinic with Dr. Sim. Patient complaining of fever and chills but denies n/v.       Medications acetaminophen     Tablet .. 650 milliGRAM(s) Oral every 6 hours PRN  amLODIPine   Tablet 5 milliGRAM(s) Oral daily  calcitriol   Capsule 1 MICROGram(s) Oral <User Schedule>  epoetin rudolph-epbx (RETACRIT) Injectable 6000 Unit(s) IV Push <User Schedule>  heparin   Injectable 5000 Unit(s) SubCutaneous every 8 hours  insulin lispro (ADMELOG) corrective regimen sliding scale   SubCutaneous three times a day before meals  insulin lispro (ADMELOG) corrective regimen sliding scale   SubCutaneous at bedtime  latanoprost 0.005% Ophthalmic Solution 1 Drop(s) Both EYES at bedtime  ondansetron Injectable 4 milliGRAM(s) IV Push every 8 hours PRN  sevelamer carbonate 1600 milliGRAM(s) Oral three times a day with meals    FHFamily history of diabetes mellitus    No pertinent family history in first degree relatives    ,   PMHDiabetes    Kidney problem    Hypertension    HTN (hypertension)    DM (diabetes mellitus)    HTN (hypertension)    DM (diabetes mellitus)    ESRD (end stage renal disease) on dialysis    Charcot foot due to diabetes mellitus    ESRD on hemodialysis    Charcot foot due to diabetes mellitus    Diabetic retinopathy associated with type 2 diabetes mellitus    Hyperlipidemia    Abscess of scrotum    Hidradenitis    End stage renal disease       PSHNo significant past surgical history    S/P foot surgery    Charcot's syndrome    History of incision and drainage    Hemodialysis access, AV graft        Labs                          10.4   11.52 )-----------( 190      ( 23 Dec 2024 06:58 )             32.1      12-23    137  |  101  |  27[H]  ----------------------------<  102[H]  4.1   |  25  |  5.45[H]    Ca    9.2      23 Dec 2024 06:58  Phos  4.1     12-23  Mg     2.4     12-23       Vital Signs Last 24 Hrs  T(C): 37 (23 Dec 2024 06:04), Max: 37 (23 Dec 2024 06:04)  T(F): 98.6 (23 Dec 2024 06:04), Max: 98.6 (23 Dec 2024 06:04)  HR: 67 (23 Dec 2024 06:04) (65 - 67)  BP: 127/61 (23 Dec 2024 06:04) (103/60 - 133/63)  BP(mean): 87 (23 Dec 2024 06:04) (74 - 87)  RR: 18 (23 Dec 2024 06:04) (18 - 18)  SpO2: 99% (23 Dec 2024 06:04) (96% - 99%)    Parameters below as of 23 Dec 2024 06:04  Patient On (Oxygen Delivery Method): room air      Sedimentation Rate, Erythrocyte: 105 mm/Hr (12-17-24 @ 12:35)  Sedimentation Rate, Erythrocyte: 97 mm/Hr (12-16-24 @ 11:59)         C-Reactive Protein: 160.0 mg/L (12-17-24 @ 07:28)  C-Reactive Protein: 139.0 mg/L (12-16-24 @ 11:50)   WBC Count: 11.52 K/uL *H* (12-23-24 @ 06:58)      LE FOCUSED PHYSICAL EXAM:  Vasc: DP/PT non-palpable bilaterally. CFT <3 seconds x 10. Temp Gradient within normal limits bilaterally with no increase in temperature to the right foot ulcer. No edema, no erythema, no varicosities observed bilaterally.   Derm: Diffuse xerosis observed to bilateral LEs. Right medial foot diabetic pressure ulcer noted overlying the talonavicular joint, measuring 1.9 x 1.8 x 0.5cm with a fibrotic wound base and hyperkeratotic klaudia-wound. + epibole, + tunneling by 0.5mm circumferentially. Wound does not probe to bone. No more malodor. No other open lesions, no other ulcerations, no purulence, no drainage, no soft tissue crepitus, no fluctuance, no IDM, no streaking.   Neuro: Patient is awake, alert and oriented x3. Protective sensation is diminished bilaterally.   MSK: Muscle strength deferred. No pain upon palpation bilaterally. No pain upon ROM of pedal and ankle joints bilaterally. No crepitation upon ROM of joints. Arch height 2/5 on-WB. Negative calf tenderness bilaterally.       IMAGING:  < from: MR Ankle No Cont, Right (12.18.24 @ 20:26) >  IMPRESSION: MRI of the right ankle demonstrates ulceration at the plantar   surface medially with suspected exposed bone with underlying   osteomyelitis involving the talus and less so ofthe navicular bone. No   drainable fluid collection.    < end of copied text >      ACC: [id number] EXAM:  US PHYSIOL LWR EXT 3+ LEV BI   ORDERED BY: REYES GILBERT   PROCEDURE DATE:  12/16/2024    INTERPRETATION:  Clinical indication: Right foot nonhealing wound  COMPARISON: 5/4/2016  FINDINGS AND IMPRESSION: There are normal amplitude biphasic pulse volume recordings   bilaterally. There are bilateral calcified, noncompressible vessels.   Therefore, ABIs could not be calculated.  --- End of Report ---      CULTURES:   Specimen Source: .Surgical Swab  Culture Results:   Moderate Providencia stuartii   Moderate Corynebacterium striatum group "Susceptibilities not performed"   Few Trueperella bernardiae "Susceptibilities not performed"  Organism Identification: Providencia stuartii  Organism: Providencia stuartii

## 2024-12-24 LAB
-  AMOXICILLIN/CLAVULANIC ACID: SIGNIFICANT CHANGE UP
-  AMPICILLIN/SULBACTAM: SIGNIFICANT CHANGE UP
-  AMPICILLIN: SIGNIFICANT CHANGE UP
-  AZTREONAM: SIGNIFICANT CHANGE UP
-  CEFAZOLIN: SIGNIFICANT CHANGE UP
-  CEFEPIME: SIGNIFICANT CHANGE UP
-  CEFOXITIN: SIGNIFICANT CHANGE UP
-  CEFTRIAXONE: SIGNIFICANT CHANGE UP
-  CIPROFLOXACIN: SIGNIFICANT CHANGE UP
-  ERTAPENEM: SIGNIFICANT CHANGE UP
-  GENTAMICIN: SIGNIFICANT CHANGE UP
-  LEVOFLOXACIN: SIGNIFICANT CHANGE UP
-  MEROPENEM: SIGNIFICANT CHANGE UP
-  PIPERACILLIN/TAZOBACTAM: SIGNIFICANT CHANGE UP
-  TOBRAMYCIN: SIGNIFICANT CHANGE UP
-  TRIMETHOPRIM/SULFAMETHOXAZOLE: SIGNIFICANT CHANGE UP
ANION GAP SERPL CALC-SCNC: 10 MMOL/L — SIGNIFICANT CHANGE UP (ref 5–17)
BUN SERPL-MCNC: 38 MG/DL — HIGH (ref 7–18)
CALCIUM SERPL-MCNC: 9 MG/DL — SIGNIFICANT CHANGE UP (ref 8.4–10.5)
CHLORIDE SERPL-SCNC: 98 MMOL/L — SIGNIFICANT CHANGE UP (ref 96–108)
CO2 SERPL-SCNC: 27 MMOL/L — SIGNIFICANT CHANGE UP (ref 22–31)
CREAT SERPL-MCNC: 7.23 MG/DL — HIGH (ref 0.5–1.3)
CULTURE RESULTS: ABNORMAL
CULTURE RESULTS: ABNORMAL
CULTURE RESULTS: SIGNIFICANT CHANGE UP
EGFR: 8 ML/MIN/1.73M2 — LOW
GLUCOSE BLDC GLUCOMTR-MCNC: 104 MG/DL — HIGH (ref 70–99)
GLUCOSE BLDC GLUCOMTR-MCNC: 109 MG/DL — HIGH (ref 70–99)
GLUCOSE BLDC GLUCOMTR-MCNC: 143 MG/DL — HIGH (ref 70–99)
GLUCOSE BLDC GLUCOMTR-MCNC: 152 MG/DL — HIGH (ref 70–99)
GLUCOSE SERPL-MCNC: 161 MG/DL — HIGH (ref 70–99)
HCT VFR BLD CALC: 31.3 % — LOW (ref 39–50)
HGB BLD-MCNC: 10.2 G/DL — LOW (ref 13–17)
MAGNESIUM SERPL-MCNC: 2.4 MG/DL — SIGNIFICANT CHANGE UP (ref 1.6–2.6)
MCHC RBC-ENTMCNC: 31.6 PG — SIGNIFICANT CHANGE UP (ref 27–34)
MCHC RBC-ENTMCNC: 32.6 G/DL — SIGNIFICANT CHANGE UP (ref 32–36)
MCV RBC AUTO: 96.9 FL — SIGNIFICANT CHANGE UP (ref 80–100)
METHOD TYPE: SIGNIFICANT CHANGE UP
NRBC # BLD: 0 /100 WBCS — SIGNIFICANT CHANGE UP (ref 0–0)
PHOSPHATE SERPL-MCNC: 4.2 MG/DL — SIGNIFICANT CHANGE UP (ref 2.5–4.5)
PLATELET # BLD AUTO: 275 K/UL — SIGNIFICANT CHANGE UP (ref 150–400)
POTASSIUM SERPL-MCNC: 4.4 MMOL/L — SIGNIFICANT CHANGE UP (ref 3.5–5.3)
POTASSIUM SERPL-SCNC: 4.4 MMOL/L — SIGNIFICANT CHANGE UP (ref 3.5–5.3)
RBC # BLD: 3.23 M/UL — LOW (ref 4.2–5.8)
RBC # FLD: 14 % — SIGNIFICANT CHANGE UP (ref 10.3–14.5)
SODIUM SERPL-SCNC: 135 MMOL/L — SIGNIFICANT CHANGE UP (ref 135–145)
SPECIMEN SOURCE: SIGNIFICANT CHANGE UP
WBC # BLD: 10.73 K/UL — HIGH (ref 3.8–10.5)
WBC # FLD AUTO: 10.73 K/UL — HIGH (ref 3.8–10.5)

## 2024-12-24 PROCEDURE — 99233 SBSQ HOSP IP/OBS HIGH 50: CPT

## 2024-12-24 PROCEDURE — 99232 SBSQ HOSP IP/OBS MODERATE 35: CPT

## 2024-12-24 RX ADMIN — EPOETIN ALFA 6000 UNIT(S): 2000 SOLUTION INTRAVENOUS; SUBCUTANEOUS at 11:12

## 2024-12-24 RX ADMIN — LATANOPROST 1 DROP(S): 50 SOLUTION OPHTHALMIC at 22:21

## 2024-12-24 RX ADMIN — SEVELAMER CARBONATE 1600 MILLIGRAM(S): 800 TABLET, FILM COATED ORAL at 16:42

## 2024-12-24 RX ADMIN — HEPARIN SODIUM 5000 UNIT(S): 1000 INJECTION, SOLUTION INTRAVENOUS; SUBCUTANEOUS at 22:20

## 2024-12-24 RX ADMIN — HEPARIN SODIUM 5000 UNIT(S): 1000 INJECTION, SOLUTION INTRAVENOUS; SUBCUTANEOUS at 05:30

## 2024-12-24 RX ADMIN — Medication 5 MILLIGRAM(S): at 05:30

## 2024-12-24 RX ADMIN — SEVELAMER CARBONATE 1600 MILLIGRAM(S): 800 TABLET, FILM COATED ORAL at 08:19

## 2024-12-24 RX ADMIN — SEVELAMER CARBONATE 1600 MILLIGRAM(S): 800 TABLET, FILM COATED ORAL at 13:36

## 2024-12-24 RX ADMIN — HEPARIN SODIUM 5000 UNIT(S): 1000 INJECTION, SOLUTION INTRAVENOUS; SUBCUTANEOUS at 15:09

## 2024-12-24 NOTE — PROGRESS NOTE ADULT - ASSESSMENT
Patient is a 67yo Male with ESRD on HD via Rt AVG, HTN, 2HPT, Anemia, LE venous stasis with Rt foot wound presents with weakness. Pt a/w Rt foot ulcer and hyperkalemia. Nephrology consulted for ESRD status.     1) ESRD: Last HD today 12/24, tolerated well.  Plan for next HD 12/27 to place back on MWF schedule.  Monitor electrolytes  2) Hyperkalemia: resolved. c/w low potassium diet.    Monitor serum potassium  3) HTN with ESRD: BP improved.  Continue Amlodipine at reduced dose of 5mg PO daily. Continue to hold Coreg 25mg PO bid.  c/w low sodium diet. Monitor BP.  4) Anemia of renal disease: Hb acceptable. c/w Epogen 6000 units IV tiw. Monitor Hb.  5) 2HPT: Corrected serum calcium & serum phos acceptable.  PTHi 441. c/w calcitriol 1mcg PO MWF. Pt on Lanthanum 2000mg PO tid with meals which is unavailable at [facility]. c/w Sevelamer 1600mg PO tid with meals and  low phos diet. Monitor serum calcium and phosphorus.    HepBsAg was neg on outpt labs on 12/11/24; repeat HepBsAg neg; likely positive HepBsAg on 12/16 is an error.     [facility] NEPHROLOGY  Arden Tsang M.D.  Freeman Holland D.O.  Janny Chirinos M.D.  MD Ambar Mccarty, MSN, ANP-C    Telephone: [phone]  Facsimile: [phone]    [address] [place] [address], [address]  [place] [address]

## 2024-12-24 NOTE — PROGRESS NOTE ADULT - SUBJECTIVE AND OBJECTIVE BOX
Subjective/Objective:      MEDS  acetaminophen     Tablet .. 650 milliGRAM(s) Oral every 6 hours PRN  amLODIPine   Tablet 5 milliGRAM(s) Oral daily  calcitriol   Capsule 1 MICROGram(s) Oral <User Schedule>  epoetin rudolph-epbx (RETACRIT) Injectable 6000 Unit(s) IV Push <User Schedule>  heparin   Injectable 5000 Unit(s) SubCutaneous every 8 hours  insulin lispro (ADMELOG) corrective regimen sliding scale   SubCutaneous three times a day before meals  insulin lispro (ADMELOG) corrective regimen sliding scale   SubCutaneous at bedtime  latanoprost 0.005% Ophthalmic Solution 1 Drop(s) Both EYES at bedtime  ondansetron Injectable 4 milliGRAM(s) IV Push every 8 hours PRN  sevelamer carbonate 1600 milliGRAM(s) Oral three times a day with meals  vanco 750mg 12/22    PHYSICAL EXAM:    Vital Signs Last 24 Hrs  T(C): 36.7 (24 Dec 2024 05:37), Max: 36.9 (23 Dec 2024 13:40)  T(F): 98.1 (24 Dec 2024 05:37), Max: 98.4 (23 Dec 2024 13:40)  HR: 60 (24 Dec 2024 05:37) (60 - 69)  BP: 142/53 (24 Dec 2024 05:37) (121/62 - 142/53)  BP(mean): --  RR: 18 (24 Dec 2024 05:37) (16 - 18)  SpO2: 96% (24 Dec 2024 05:37) (96% - 98%)    Parameters below as of 24 Dec 2024 05:37  Patient On (Oxygen Delivery Method): room air        GEN:    HEENT:    CHEST/Respiratory:    Cardiovascular:    Abdomen:    Genitourinary:    Extremities:     Neurological:    Skin:      LABS/DIAGNOSTIC TESTS                            10.2   10.73 )-----------( x        ( 24 Dec 2024 09:40 )             31.3       WBC Count: 10.73 K/uL (12-24 @ 09:40)  WBC Count: 11.52 K/uL (12-23 @ 06:58)  WBC Count: 15.84 K/uL (12-22 @ 09:03)      12-23    137  |  101  |  27[H]  ----------------------------<  102[H]  4.1   |  25  |  5.45[H]    Ca    9.2      23 Dec 2024 06:58  Phos  4.1     12-23  Mg     2.4     12-23        CULTURES      Culture - Genital (collected 12-22-24 @ 08:20)  Source: .Genital  Preliminary Report (12-23-24 @ 14:30):    Rare Proteus mirabilis    Commensal vladimir consistent with body site  Organism: Proteus mirabilis (12-24-24 @ 06:49)  Organism: Proteus mirabilis (12-24-24 @ 06:49)      Method Type: GWEN      -  Amoxicillin/Clavulanic Acid: S <=8/4      -  Ampicillin: S <=8 These ampicillin results predict results for amoxicillin      -  Ampicillin/Sulbactam: S <=4/2      -  Aztreonam: S <=4      -  Cefazolin: S <=2      -  Cefepime: S <=2      -  Cefoxitin: S <=8      -  Ceftriaxone: S <=1      -  Ciprofloxacin: S <=0.25      -  Ertapenem: S <=0.5      -  Gentamicin: S <=2      -  Levofloxacin: S <=0.5      -  Meropenem: S <=1      -  Piperacillin/Tazobactam: S <=8      -  Tobramycin: S <=2      -  Trimethoprim/Sulfamethoxazole: S <=0.5/9.5    Culture - Wound Aerobic/Anaerobic (collected 12-19-24 @ 18:06)  Source: Drainage  Preliminary Report (12-20-24 @ 18:18):    No growth    Culture - Wound Aerobic/Anaerobic (collected 12-19-24 @ 16:15)  Source: Skin/Wound  Preliminary Report (12-20-24 @ 18:17):    No growth    Culture - Blood (collected 12-19-24 @ 06:45)  Source: .Blood BLOOD  Preliminary Report (12-23-24 @ 12:00):    No growth at 4 days    Culture - Blood (collected 12-17-24 @ 12:35)  Source: .Blood BLOOD  Final Report (12-22-24 @ 19:00):    No growth at 5 days    Culture - Blood (collected 12-16-24 @ 22:30)  Source: .Blood BLOOD  Gram Stain (12-17-24 @ 16:53):    Growth in aerobic bottle: Gram Positive Cocci in Clusters  Final Report (12-18-24 @ 13:20):    Growth in aerobic bottle: Staphylococcus hominis    Isolation of Coagulase negative Staphylococcus from single blood culture    sets may represent    contamination. Contact the Microbiology Department at [phone] if    susceptibility testing is needed.    clinically indicated.    Direct identification is available within approximately 3-5    hours either by Blood Panel Multiplexed PCR or Direct    MALDI-TOF. Details: [email or web address]  Organism: Blood Culture PCR (12-18-24 @ 13:20)  Organism: Blood Culture PCR (12-18-24 @ 13:20)      Method Type: PCR      -  Coagulase negative Staphylococcus: Detec    Culture - Wound Aerobic/Anaerobic (collected 12-16-24 @ 12:36)  Source: .Surgical Swab  Final Report (12-21-24 @ 17:55):    Moderate Providencia stuartii    Moderate Corynebacterium striatum group "Susceptibilities not performed"    Few Trueperella bernardiae "Susceptibilities not performed"  Organism: Providencia stuartii (12-21-24 @ 17:55)  Organism: Providencia stuartii (12-21-24 @ 17:55)      Method Type: GWEN      -  Amoxicillin/Clavulanic Acid: R >16/8      -  Ampicillin: R >16 These ampicillin results predict results for amoxicillin      -  Ampicillin/Sulbactam: I 16/8      -  Aztreonam: S <=4      -  Cefazolin: R >16      -  Cefepime: S <=2      -  Cefoxitin: S <=8      -  Ceftriaxone: S <=1      -  Ciprofloxacin: R 2      -  Ertapenem: S <=0.5      -  Levofloxacin: R 4      -  Meropenem: S <=1      -  Piperacillin/Tazobactam: S <=8      -  Trimethoprim/Sulfamethoxazole: S <=0.5/9.5          RADIOLOGY  < from: CT Pelvis w/ IV Cont (12.23.24 @ 17:00) >  ACC: [id number] EXAM:  CT PELVIS ONLY IC   ORDERED BY: FELICITAS ANTUNEZ     PROCEDURE DATE:  12/23/2024          INTERPRETATION:  CLINICAL INFORMATION: Scrotal collection    COMPARISON: CT pelvis May 10, 2018    CONTRAST/COMPLICATIONS:  IV Contrast: Omnipaque 350  90 cc administered   10 cc discarded  Oral Contrast: NONE  .    PROCEDURE:  CT of the Pelvis was performed.  Sagittal and coronal reformats were performed.    FINDINGS:  BLADDER: Several small stones at both ureterovesical junctions measuring   up to 2 mm on the right and 3 mm on the left. No ureteral dilatation.  REPRODUCTIVE ORGANS: Prostate is normal in size. Diffuse scrotal edema.   Rim-enhancing extratesticular right scrotal fluid collection measuring   2.1 x 1.3 x 0.8 cm.  LYMPH NODES: No pelvic lymphadenopathy.    VISUALIZED PORTIONS:  ABDOMINAL ORGANS: Within normal limits.  BOWEL: Colonic diverticulosis. Visualized portion of the appendix is   normal.  PERITONEUM/RETROPERITONEUM: Within normal limits.  VESSELS: Atherosclerotic changes.  ABDOMINAL WALL: Within normal limits.  BONES: Within normal limits for age.    IMPRESSION:  2.1 cm right scrotal fluid collection suspicious for abscess.  Several stones and both ureterovesical junctions without ureteral   dilatation.                       Subjective/Objective: Pt just returned from HD. Has no complaints.       MEDS  acetaminophen     Tablet .. 650 milliGRAM(s) Oral every 6 hours PRN  amLODIPine   Tablet 5 milliGRAM(s) Oral daily  calcitriol   Capsule 1 MICROGram(s) Oral <User Schedule>  epoetin rudolph-epbx (RETACRIT) Injectable 6000 Unit(s) IV Push <User Schedule>  heparin   Injectable 5000 Unit(s) SubCutaneous every 8 hours  insulin lispro (ADMELOG) corrective regimen sliding scale   SubCutaneous three times a day before meals  insulin lispro (ADMELOG) corrective regimen sliding scale   SubCutaneous at bedtime  latanoprost 0.005% Ophthalmic Solution 1 Drop(s) Both EYES at bedtime  ondansetron Injectable 4 milliGRAM(s) IV Push every 8 hours PRN  sevelamer carbonate 1600 milliGRAM(s) Oral three times a day with meals  vanco 750mg 12/22    PHYSICAL EXAM:    Vital Signs Last 24 Hrs  T(C): 36.7 (24 Dec 2024 05:37), Max: 36.9 (23 Dec 2024 13:40)  T(F): 98.1 (24 Dec 2024 05:37), Max: 98.4 (23 Dec 2024 13:40)  HR: 60 (24 Dec 2024 05:37) (60 - 69)  BP: 142/53 (24 Dec 2024 05:37) (121/62 - 142/53)  BP(mean): --  RR: 18 (24 Dec 2024 05:37) (16 - 18)  SpO2: 96% (24 Dec 2024 05:37) (96% - 98%)    Parameters below as of 24 Dec 2024 05:37  Patient On (Oxygen Delivery Method): room air      GEN: alert, responsive and in NAD    HEENT: NC/AT    CHEST/Respiratory: clear to auscultation    Cardiovascular: ??holosystolic m best heard at the apex    Abdomen: Bs active; soft and non-tender to palpation    Genitourinary: + drainage from the base of the scrotum; non-tender to palpation    Extremities: RUE with HD graft in place with no tenderness or erythema  LLE: chronic skin changes  RLE: dressing over foot    Neurological: A+O x3        LABS/DIAGNOSTIC TESTS                        10.2   10.73 )-----------( x        ( 24 Dec 2024 09:40 )             31.3       WBC Count: 10.73 K/uL (12-24 @ 09:40)  WBC Count: 11.52 K/uL (12-23 @ 06:58)  WBC Count: 15.84 K/uL (12-22 @ 09:03)      12-23    137  |  101  |  27[H]  ----------------------------<  102[H]  4.1   |  25  |  5.45[H]    Ca    9.2      23 Dec 2024 06:58  Phos  4.1     12-23  Mg     2.4     12-23        CULTURES      Culture - Genital (collected 12-22-24 @ 08:20)  Source: .Genital  Preliminary Report (12-23-24 @ 14:30):    Rare Proteus mirabilis    Commensal vladimir consistent with body site  Organism: Proteus mirabilis (12-24-24 @ 06:49)  Organism: Proteus mirabilis (12-24-24 @ 06:49)      Method Type: GWEN      -  Amoxicillin/Clavulanic Acid: S <=8/4      -  Ampicillin: S <=8 These ampicillin results predict results for amoxicillin      -  Ampicillin/Sulbactam: S <=4/2      -  Aztreonam: S <=4      -  Cefazolin: S <=2      -  Cefepime: S <=2      -  Cefoxitin: S <=8      -  Ceftriaxone: S <=1      -  Ciprofloxacin: S <=0.25      -  Ertapenem: S <=0.5      -  Gentamicin: S <=2      -  Levofloxacin: S <=0.5      -  Meropenem: S <=1      -  Piperacillin/Tazobactam: S <=8      -  Tobramycin: S <=2      -  Trimethoprim/Sulfamethoxazole: S <=0.5/9.5    Culture - Wound Aerobic/Anaerobic (collected 12-19-24 @ 18:06)  Source: Drainage  Preliminary Report (12-20-24 @ 18:18):    No growth    Culture - Wound Aerobic/Anaerobic (collected 12-19-24 @ 16:15)  Source: Skin/Wound  Preliminary Report (12-20-24 @ 18:17):    No growth    Culture - Blood (collected 12-19-24 @ 06:45)  Source: .Blood BLOOD  Preliminary Report (12-23-24 @ 12:00):    No growth at 4 days    Culture - Blood (collected 12-17-24 @ 12:35)  Source: .Blood BLOOD  Final Report (12-22-24 @ 19:00):    No growth at 5 days    Culture - Blood (collected 12-16-24 @ 22:30)  Source: .Blood BLOOD  Gram Stain (12-17-24 @ 16:53):    Growth in aerobic bottle: Gram Positive Cocci in Clusters  Final Report (12-18-24 @ 13:20):    Growth in aerobic bottle: Staphylococcus hominis    Isolation of Coagulase negative Staphylococcus from single blood culture    sets may represent    contamination. Contact the Microbiology Department at [phone] if    susceptibility testing is needed.    clinically indicated.    Direct identification is available within approximately 3-5    hours either by Blood Panel Multiplexed PCR or Direct    MALDI-TOF. Details: [email or web address]  Organism: Blood Culture PCR (12-18-24 @ 13:20)  Organism: Blood Culture PCR (12-18-24 @ 13:20)      Method Type: PCR      -  Coagulase negative Staphylococcus: Detec    Culture - Wound Aerobic/Anaerobic (collected 12-16-24 @ 12:36)  Source: .Surgical Swab  Final Report (12-21-24 @ 17:55):    Moderate Providencia stuartii    Moderate Corynebacterium striatum group "Susceptibilities not performed"    Few Trueperella bernardiae "Susceptibilities not performed"  Organism: Providencia stuartii (12-21-24 @ 17:55)  Organism: Providencia stuartii (12-21-24 @ 17:55)      Method Type: GWEN      -  Amoxicillin/Clavulanic Acid: R >16/8      -  Ampicillin: R >16 These ampicillin results predict results for amoxicillin      -  Ampicillin/Sulbactam: I 16/8      -  Aztreonam: S <=4      -  Cefazolin: R >16      -  Cefepime: S <=2      -  Cefoxitin: S <=8      -  Ceftriaxone: S <=1      -  Ciprofloxacin: R 2      -  Ertapenem: S <=0.5      -  Levofloxacin: R 4      -  Meropenem: S <=1      -  Piperacillin/Tazobactam: S <=8      -  Trimethoprim/Sulfamethoxazole: S <=0.5/9.5          RADIOLOGY  < from: CT Pelvis w/ IV Cont (12.23.24 @ 17:00) >  ACC: [id number] EXAM:  CT PELVIS ONLY IC   ORDERED BY: FELICITAS ANTUNEZ     PROCEDURE DATE:  12/23/2024          INTERPRETATION:  CLINICAL INFORMATION: Scrotal collection    COMPARISON: CT pelvis May 10, 2018    CONTRAST/COMPLICATIONS:  IV Contrast: Omnipaque 350  90 cc administered   10 cc discarded  Oral Contrast: NONE  .    PROCEDURE:  CT of the Pelvis was performed.  Sagittal and coronal reformats were performed.    FINDINGS:  BLADDER: Several small stones at both ureterovesical junctions measuring   up to 2 mm on the right and 3 mm on the left. No ureteral dilatation.  REPRODUCTIVE ORGANS: Prostate is normal in size. Diffuse scrotal edema.   Rim-enhancing extratesticular right scrotal fluid collection measuring   2.1 x 1.3 x 0.8 cm.  LYMPH NODES: No pelvic lymphadenopathy.    VISUALIZED PORTIONS:  ABDOMINAL ORGANS: Within normal limits.  BOWEL: Colonic diverticulosis. Visualized portion of the appendix is   normal.  PERITONEUM/RETROPERITONEUM: Within normal limits.  VESSELS: Atherosclerotic changes.  ABDOMINAL WALL: Within normal limits.  BONES: Within normal limits for age.    IMPRESSION:  2.1 cm right scrotal fluid collection suspicious for abscess.  Several stones and both ureterovesical junctions without ureteral   dilatation.

## 2024-12-24 NOTE — PROGRESS NOTE ADULT - SUBJECTIVE AND OBJECTIVE BOX
[facility] NEPHROLOGY- PROGRESS NOTE    Patient is a 69yo Male with ESRD on HD via Rt AVG, HTN, 2HPT, Anemia, LE venous stasis with Rt foot wound presents with weakness. Pt a/w Rt foot ulcer and hyperkalemia. Nephrology consulted for ESRD status.   MRI Rt foot: + osteomyelitis involving the talus and less so of the navicular bone;    BCx +Staph hominis  12/20: s/p I& D of buttock abscess    Hospital Medications: Medications reviewed.  REVIEW OF SYSTEMS: Pt denies any SOB, chest pain, n/v/d, or abd pain, +Rt foot pain-denies at this time      VITALS:  T(F): 98 (12-24-24 @ 14:00), Max: 98.4 (12-23-24 @ 20:22)  HR: 64 (12-24-24 @ 14:00)  BP: 117/66 (12-24-24 @ 14:00)  RR: 18 (12-24-24 @ 14:00)  SpO2: 98% (12-24-24 @ 14:00)      PHYSICAL EXAM:  Gen: NAD, calm, Obese; more awake  HEENT: anicteric  Cards: RRR, +S1/S2, no M/G/R  Resp: CTA ant   GI: soft, NT/ND, NABS  : +scrotal drainage  Extremities: no LE edema B/L  Derm: b/l dry skin, Rt foot plantar wound covered        LABS:  12-24    135  |  98  |  38[H]  ----------------------------<  161[H]  4.4   |  27  |  7.23[H]    Ca    9.0      24 Dec 2024 09:40  Phos  4.2     12-24  Mg     2.4     12-24      Creatinine Trend: 7.23 <--, 5.45 <--, 7.22 <--, 5.77 <--, 7.27 <--, 5.52 <--, 7.32 <--                        10.2   10.73 )-----------( 275      ( 24 Dec 2024 09:40 )             31.3     Urine Studies:  Urinalysis Basic - ( 24 Dec 2024 09:40 )    Color:  / Appearance:  / SG:  / pH:   Gluc: 161 mg/dL / Ketone:   / Bili:  / Urobili:    Blood:  / Protein:  / Nitrite:    Leuk Esterase:  / RBC:  / WBC    Sq Epi:  / Non Sq Epi:  / Bacteria:

## 2024-12-24 NOTE — PROGRESS NOTE ADULT - ASSESSMENT
70 y/o male with hx of T2DM, hidradenitis suppurativa a/w generalized weakness now with purulent drainage from scrotum   s/p bedside I&D of buttock abscess 12/19   VSS, afebrile, leukocytosis elevated 15.8    12/22: s/p HD -1L, CT pelvis not obtained  12/23: scrotal exam stable with spontaneous drainage, WBC 11.5 from 15.8, Cr 5.45 from 7.22    Plan   - CT pelvis demonstrates 2.1 cm right scrotal fluid collection suspicious for abscess and bilateral UVJ stones without ureteral dilatation.  - keep area clean and dry, may include sitz baths   - IV antibiotics, follow ID recommendations   - trend white count, monitor for fevers, etc.  - strict glycemic control   - HD as per neph   - remainder of care as per primary team   - d/w Dr. Mcguire    *incomplete   70 y/o male with hx of T2DM, hidradenitis suppurativa a/w generalized weakness now with purulent drainage from scrotum   s/p bedside I&D of buttock abscess 12/19   VSS, afebrile, leukocytosis elevated 15.8    12/22: s/p HD -1L, CT pelvis not obtained  12/23: scrotal exam stable with spontaneous drainage, WBC 11.5 from 15.8, Cr 5.45 from 7.22  12/24: scrotal exam stable, CT pelvis demonstrates 2.1 cm right scrotal fluid collection suspicious for abscess and bilateral UVJ stones without ureteral dilatation, WBC downtrending 10.7 from 11.5    Plan   - CT pelvis demonstrates 2.1 cm right scrotal fluid collection suspicious for abscess and bilateral UVJ stones without ureteral dilatation.  - No indication for scrotal exploration or drainage of abscess as patient continues to improve clinically on antibiotics with downtrending WBC, decreasing amount of purulent drainage  - Begin flomax 0.4 qNightly as medical expulsive therapy for asymptomatic bilateral non-obstructing UVJ stones  - keep area clean and dry, may include sitz baths   - IV antibiotics, follow ID recommendations   - downtrending WBC, afebrile  - strict glycemic control   - HD as per neph   - remainder of care as per primary team     D/w Dr. Mcguire

## 2024-12-24 NOTE — PROGRESS NOTE ADULT - SUBJECTIVE AND OBJECTIVE BOX
Patient is a 69y old  Male who presents with a chief complaint of generalized weakness (24 Dec 2024 10:06)      INTERVAL HPI/OVERNIGHT EVENTS: no new complaints    MEDICATIONS  (STANDING):  amLODIPine   Tablet 5 milliGRAM(s) Oral daily  calcitriol   Capsule 1 MICROGram(s) Oral <User Schedule>  epoetin rudolph-epbx (RETACRIT) Injectable 6000 Unit(s) IV Push <User Schedule>  heparin   Injectable 5000 Unit(s) SubCutaneous every 8 hours  insulin lispro (ADMELOG) corrective regimen sliding scale   SubCutaneous three times a day before meals  insulin lispro (ADMELOG) corrective regimen sliding scale   SubCutaneous at bedtime  latanoprost 0.005% Ophthalmic Solution 1 Drop(s) Both EYES at bedtime  sevelamer carbonate 1600 milliGRAM(s) Oral three times a day with meals    MEDICATIONS  (PRN):  acetaminophen     Tablet .. 650 milliGRAM(s) Oral every 6 hours PRN Temp greater or equal to 38C (100.4F), Mild Pain (1 - 3)  ondansetron Injectable 4 milliGRAM(s) IV Push every 8 hours PRN Nausea and/or Vomiting      __________________________________________________  REVIEW OF SYSTEMS:    CONSTITUTIONAL: No fever,   EYES: no acute visual disturbances  NECK: No pain or stiffness  RESPIRATORY: No cough; No shortness of breath  CARDIOVASCULAR: No chest pain, no palpitations  GASTROINTESTINAL: No pain. No nausea or vomiting; No diarrhea   NEUROLOGICAL: No headache or numbness, no tremors  MUSCULOSKELETAL: No joint pain, no muscle pain  GENITOURINARY: no dysuria, no frequency, no hesitancy  PSYCHIATRY: no depression , no anxiety  ALL OTHER  ROS negative      Vital Signs Last 24 Hrs  T(C): 36.6 (24 Dec 2024 09:30), Max: 36.9 (23 Dec 2024 13:40)  T(F): 97.8 (24 Dec 2024 09:30), Max: 98.4 (23 Dec 2024 13:40)  HR: 67 (24 Dec 2024 09:30) (60 - 69)  BP: 111/53 (24 Dec 2024 09:30) (111/53 - 142/53)  BP(mean): --  RR: 18 (24 Dec 2024 09:30) (16 - 18)  SpO2: 97% (24 Dec 2024 09:30) (96% - 98%)    Parameters below as of 24 Dec 2024 09:30  Patient On (Oxygen Delivery Method): room air        ________________________________________________  PHYSICAL EXAM:  GENERAL: NAD  HEENT: Normocephalic;  conjunctivae and sclerae clear; moist mucous membranes;   NECK : supple  CHEST/LUNG: Clear to auscultation bilaterally with good air entry   HEART: S1 S2  regular; no murmurs, gallops or rubs  ABDOMEN: Soft, Nontender, Nondistended; Bowel sounds present  EXTREMITIES: no cyanosis; no edema; no calf tenderness  SKIN: warm and dry; no rash  NERVOUS SYSTEM:  Awake and alert; Oriented  to place, person and time ; no new deficits    _________________________________________________  LABS:                        10.2   10.73 )-----------( 275      ( 24 Dec 2024 09:40 )             31.3     12-24    135  |  98  |  38[H]  ----------------------------<  161[H]  4.4   |  27  |  7.23[H]    Ca    9.0      24 Dec 2024 09:40  Phos  4.2     12-24  Mg     2.4     12-24        Urinalysis Basic - ( 24 Dec 2024 09:40 )    Color: x / Appearance: x / SG: x / pH: x  Gluc: 161 mg/dL / Ketone: x  / Bili: x / Urobili: x   Blood: x / Protein: x / Nitrite: x   Leuk Esterase: x / RBC: x / WBC x   Sq Epi: x / Non Sq Epi: x / Bacteria: x      CAPILLARY BLOOD GLUCOSE      POCT Blood Glucose.: 143 mg/dL (24 Dec 2024 11:10)  POCT Blood Glucose.: 104 mg/dL (24 Dec 2024 07:54)  POCT Blood Glucose.: 247 mg/dL (23 Dec 2024 21:34)  POCT Blood Glucose.: 102 mg/dL (23 Dec 2024 16:30)  POCT Blood Glucose.: 92 mg/dL (23 Dec 2024 11:32)      RADIOLOGY & ADDITIONAL TESTS:    Imaging Personally Reviewed:  YES/NO    Consultant(s) Notes Reviewed:   YES/ No    Care Discussed with Consultants :     Plan of care was discussed with patient and /or primary care giver; all questions and concerns were addressed and care was aligned with patient's wishes.       Patient is a 69y old  Male who presents with a chief complaint of generalized weakness (24 Dec 2024 10:06)      INTERVAL HPI/OVERNIGHT EVENTS: pt with no new complaints or overnight events    MEDICATIONS  (STANDING):  amLODIPine   Tablet 5 milliGRAM(s) Oral daily  calcitriol   Capsule 1 MICROGram(s) Oral <User Schedule>  epoetin rudolph-epbx (RETACRIT) Injectable 6000 Unit(s) IV Push <User Schedule>  heparin   Injectable 5000 Unit(s) SubCutaneous every 8 hours  insulin lispro (ADMELOG) corrective regimen sliding scale   SubCutaneous three times a day before meals  insulin lispro (ADMELOG) corrective regimen sliding scale   SubCutaneous at bedtime  latanoprost 0.005% Ophthalmic Solution 1 Drop(s) Both EYES at bedtime  sevelamer carbonate 1600 milliGRAM(s) Oral three times a day with meals    MEDICATIONS  (PRN):  acetaminophen     Tablet .. 650 milliGRAM(s) Oral every 6 hours PRN Temp greater or equal to 38C (100.4F), Mild Pain (1 - 3)  ondansetron Injectable 4 milliGRAM(s) IV Push every 8 hours PRN Nausea and/or Vomiting  __________________________________________________  REVIEW OF SYSTEMS:    CONSTITUTIONAL: No fever,   EYES: no acute visual disturbances  NECK: No pain or stiffness  RESPIRATORY: No cough; No shortness of breath  CARDIOVASCULAR: No chest pain, no palpitations  GASTROINTESTINAL: No pain. No nausea or vomiting; No diarrhea   NEUROLOGICAL: No headache or numbness, no tremors  MUSCULOSKELETAL: No joint pain, no muscle pain  GENITOURINARY: no dysuria, no frequency, no hesitancy  PSYCHIATRY: no depression , no anxiety  ALL OTHER  ROS negative      Vital Signs Last 24 Hrs  T(C): 36.6 (24 Dec 2024 09:30), Max: 36.9 (23 Dec 2024 13:40)  T(F): 97.8 (24 Dec 2024 09:30), Max: 98.4 (23 Dec 2024 13:40)  HR: 67 (24 Dec 2024 09:30) (60 - 69)  BP: 111/53 (24 Dec 2024 09:30) (111/53 - 142/53)  BP(mean): --  RR: 18 (24 Dec 2024 09:30) (16 - 18)  SpO2: 97% (24 Dec 2024 09:30) (96% - 98%)    Parameters below as of 24 Dec 2024 09:30  Patient On (Oxygen Delivery Method): room air    ________________________________________________  PHYSICAL EXAM:  GENERAL: NAD  HEENT: Normocephalic;  conjunctivae and sclerae clear; moist mucous membranes;   NECK : supple  CHEST/LUNG: Clear to auscultation bilaterally with good air entry   HEART: S1 S2  regular; no murmurs, gallops or rubs  ABDOMEN: Soft, Nontender, Nondistended; Bowel sounds present  EXTREMITIES: no cyanosis; no edema; no calf tenderness, Right AVF   SKIN: buttock wound, dressing intact, scrotal drainage  NERVOUS SYSTEM:  Awake and alert; Oriented  to place, person and time ; no new deficits    _________________________________________________  LABS:                        10.2   10.73 )-----------( 275      ( 24 Dec 2024 09:40 )             31.3     12-24    135  |  98  |  38[H]  ----------------------------<  161[H]  4.4   |  27  |  7.23[H]    Ca    9.0      24 Dec 2024 09:40  Phos  4.2     12-24  Mg     2.4     12-24    Urinalysis Basic - ( 24 Dec 2024 09:40 )    Color: x / Appearance: x / SG: x / pH: x  Gluc: 161 mg/dL / Ketone: x  / Bili: x / Urobili: x   Blood: x / Protein: x / Nitrite: x   Leuk Esterase: x / RBC: x / WBC x   Sq Epi: x / Non Sq Epi: x / Bacteria: x    CAPILLARY BLOOD GLUCOSE    POCT Blood Glucose.: 143 mg/dL (24 Dec 2024 11:10)  POCT Blood Glucose.: 104 mg/dL (24 Dec 2024 07:54)  POCT Blood Glucose.: 247 mg/dL (23 Dec 2024 21:34)  POCT Blood Glucose.: 102 mg/dL (23 Dec 2024 16:30)  POCT Blood Glucose.: 92 mg/dL (23 Dec 2024 11:32)    RADIOLOGY & ADDITIONAL TESTS:  < from: CT Pelvis w/ IV Cont (12.23.24 @ 17:00) >  ACC: [id number] EXAM:  CT PELVIS ONLY IC   ORDERED BY: FELICITAS ANTUNEZ     PROCEDURE DATE:  12/23/2024        INTERPRETATION:  CLINICAL INFORMATION: Scrotal collection    COMPARISON: CT pelvis May 10, 2018    CONTRAST/COMPLICATIONS:  IV Contrast: Omnipaque 350  90 cc administered   10 cc discarded  Oral Contrast: NONE  .    PROCEDURE:  CT of the Pelvis was performed.  Sagittal and coronal reformats were performed.    FINDINGS:  BLADDER: Several small stones at both ureterovesical junctions measuring   up to 2 mm on the right and 3 mm on the left. No ureteral dilatation.  REPRODUCTIVE ORGANS: Prostate is normal in size. Diffuse scrotal edema.   Rim-enhancing extratesticular right scrotal fluid collection measuring   2.1 x 1.3 x 0.8 cm.  LYMPH NODES: No pelvic lymphadenopathy.    VISUALIZED PORTIONS:  ABDOMINAL ORGANS: Within normal limits.  BOWEL: Colonic diverticulosis. Visualized portion of the appendix is   normal.  PERITONEUM/RETROPERITONEUM: Within normal limits.  VESSELS: Atherosclerotic changes.  ABDOMINAL WALL: Within normal limits.  BONES: Within normal limits for age.    IMPRESSION:  2.1 cm right scrotal fluid collection suspicious for abscess.  Several stones and both ureterovesical junctions without ureteral   dilatation.      --- End of Report ---      < from: MR Ankle No Cont, Right (12.18.24 @ 20:26) >  ACC: [id number] EXAM:  MR ANKLE RT   ORDERED BY: REYES GILBERT     PROCEDURE DATE:  12/18/2024        INTERPRETATION:  MRI of the right ankle    INDICATION: Concern for osteomyelitis    COMPARISON: MRI of the foot performed 12/17/2024 and CT of thefoot   performed 12/16/2024.    TECHNIQUE: Multiplanar multisequence MRI of the right ankle    FINDINGS:    There is presence of advanced Charcot arthropathy with severe resultant   valgus and pes planus deformities.    There is soft tissue ulceration at the plantar surface of the foot   medially adjacent to the talus with suspected exposed bone. There is   underlying high STIR weighted signal and low T1 weighted signal within   the majority of the medial talus with imperceptible cortex. There ishigh   T2 and intermediate T1-weighted signal predominantly involving the   navicular bone at the talonavicular joint (series 10, image 21). The   remaining osseous structures are without convincing evidence of   osteomyelitis.    There is no drainable fluid collection. There is prominent soft tissue   edema at the medial and plantar side of the ankle and lesser at the   lateral ankle. There is diffuse muscle atrophy.    The included proximal portions of the Achilles tendon, anterior extensor,   medial flexor, and peroneal tendons without high-grade tear.    IMPRESSION: MRI of the right ankle demonstrates ulceration at the plantar   surface medially with suspected exposed bone with underlying   osteomyelitis involving the talus and less so ofthe navicular bone. No   drainable fluid collection.    End-stage arthritis with resultant planovalgus deformity.    --- End of Report ---    < from: MR Foot No Cont, Right (12.17.24 @ 15:03) >  ACC: [id number] EXAM:  MR FOOT RT   ORDERED BY: SALVADOR PAREKH     PROCEDURE DATE:  12/17/2024      INTERPRETATION:  MR FOOT RIGHT dated 12/17/2024 3:03 PM    INDICATION: Pain and swelling. Skin ulcer.    COMPARISON: Foot radiographs dated 12/16/2024    TECHNIQUE: Multi-sequential, multiplanar MRI imaging of the right midfoot   and forefoot was performed per standard protocol.    FINDINGS: Degraded by motion artifact.    BONE MARROW: Flat foot deformity is noted. There is subtle marrow edema   within the medial sesamoid of the great toe.  SYNOVIUM/ JOINT FLUID: No joint effusion  TENDONS: The tendons are intact. No large tenosynovitis.  MUSCLES: Marked atrophy and edema within the intrinsic musculature the   foot.  NEUROVASCULAR STRUCTURES: Preserved  SUBCUTANEOUS SOFT TISSUES: Skin wounds are seen along the plantar surface   of the forefoot. No drainable collection is seen. There is moderate   thickening of the skin noted.    IMPRESSION: Degraded by motion artifact.    1.  Multiple skin wounds are marked soft tissue swelling concerning for   cellulitis in the proper clinical setting. No drainable collection.  2.  Suspect patchy signal abnormality within the medial sesamoid.   Correlate clinically for a skin wound in this region which would increase   the probability of osteomyelitis. Differential includes reactive osteitis.    --- End of Report ---          Imaging Personally Reviewed:  YES/NO    Consultant(s) Notes Reviewed:   YES/ No    Care Discussed with Consultants :     Plan of care was discussed with patient and /or primary care giver; all questions and concerns were addressed and care was aligned with patient's wishes.

## 2024-12-24 NOTE — PROGRESS NOTE ADULT - ASSESSMENT
69 yo M with PMH of  DM, HTN, ESRD on dialysis ( M, W, F) via RUE graft (last Dialysis 12/13/24),  B/L Charcot deformity and HLD, presented to the ED for generalized weakness, chills. Patient reports since 2014 after having charcot procedure in bilateral foot, he has been wearing a charcot boot to walk. Patient follows up with podiatry Dr. Sim in the outpatient podiatry clinic. At the time of my consult, pt states both his feet have looked the same for many yrs. and has been following with Podiatry for yrs. Pt further states that he had an "abscess" on the R heel drained about 1 mo. ago by Dr. Sim. BC's with coag negative Staph c/w a contaminant. MRI R foot 12/17: multiple skin wounds, soft tissue swelling, no drainable collection, patchy signal abnormality within the medial sesamoid suggestive of osteomyelitis vs reactive osteitis. Elevated temp and slight elevation of WBC noted today. MRI R ankle 12/17:  ulceration at the plantar surface medially with suspected exposed bone with underlying osteomyelitis involving the talus and less so of the navicular bone, no drainable fluid collection; end-stage arthritis with resultant planovalgus deformity. The presence of osteo below the ulcer in the area of the talus is likely chronic. The signal abnormality within the R medial sesamoid may represent osteomyelitis. The R heel ulcer had no surrounding erythema or warmth-- no evidence of SSTI. Swab Cx results likely reflect colonization, especially in view of discussion with Dr. Sim.   ?? whether the R foot is salvageable (ABIs from 12/16 exam cd not be calculated). Of concern is vascular insufficiency, which wd have a negative impact on heeling following any surgical intervention.  W/U of chronic osteo wd include bone Bx/debridement OFF Abs for a period of time with Cx sent to determine directed Ab treatment. L buttock abscess drained at the bedside 12/19-- specimens with no growth. Also noted purulent drainage from the base of the scrotum. Cx from scrotal drainage growing Proteus mirabilis. CT abd/pelvis 12/23--2.1 cm right scrotal fluid collection suspicious for abscess. I spoke with DR. Mcguire (Urology) who will decide whether or not pt needs OR exploration/drainage, as WBC is decreasing and pt remains afebrile. Would add ampi/clavulanate to Rx the organism from the scrotal drainage.     #Scrotal abscess (see above)  --start ampi/clavulanate 500mg p.o. q24h (adjusted for renal dysfn)  --Urology f/u    #Diabetic foot ulcer of R foot with osteo (see above)-- no evidence of acute SSTI  --consider bone Bx OFF antibiotics   --vascular input    #L buttock abscess-- most common organisms are G+C. Underwent I+D and given IV vanco.  --cont. local wound care    #DM-- management as per medical team    Chart reviewed, including additional notes/labs/Cx; pt re-evaluated at the bedside; discussed the DX/management of additional SSTI with the patient and medical team; provided coordination of care re ID issues with Dr. Paredes.

## 2024-12-24 NOTE — PROGRESS NOTE ADULT - ASSESSMENT
69 yo M with PMH of  DM, HTN, ESRD on dialysis ( M, W, F), Last Dialysis 12/13/24,   B/L Charcot deformity and HLD, present to the ED for generalized weakness, chills, subjective fever. Admitted to medicine for generalized weakness, hyperkalemia, right foot ulcer   MR right Ankle demonstrates ulceration at the plantar surface medially with suspected exposed bone with underlying   osteomyelitis involving the talus and less so often navicular bone. Podiatry follows   OM likely chronic, recommended  bone Bx/debridement OFF Abs for a period of time with Culture  sent to determine directed Ab treatment  Pt noted with episode of fever over 101  Started on Cefepime and vancomycin. Noted with open wound draining purulent discharge to left buttock. ID consulted for further recommendations   Blood culture growing Staphylococcus. Surgery consulted, sp  I&D at bedside. ID recommended one more dose of vancomycin 750 mg and no abx recommended thereafter.  12/22: Scrotum with purulent drainage, urology consulted recs CT pelvis -- ID recs Vanco 750mg x 1 dose   12/23: HD tomm-- F/u CT pelvis   12/24: CT pelvis showing scrotal abscess, Urology and ID following

## 2024-12-24 NOTE — PROGRESS NOTE ADULT - PROBLEM SELECTOR PLAN 9
From home   PT recs Home PT   Anticipate clearance for discharge when definitive ABX plan established  Urology recs re CT findings

## 2024-12-24 NOTE — PROGRESS NOTE ADULT - SUBJECTIVE AND OBJECTIVE BOX
Subjective:  Resting comfortably    Objective:    Vital signs  T(C): , Max: 36.9 (12-23-24 @ 13:40)  HR: 67 (12-24-24 @ 09:30)  BP: 111/53 (12-24-24 @ 09:30)  SpO2: 97% (12-24-24 @ 09:30)  Wt(kg): --    Output       Gen: NAD  Abd: soft, nontender, nondistended  :     Labs                        10.2   10.73 )-----------( 275      ( 24 Dec 2024 09:40 )             31.3     24 Dec 2024 09:40    135    |  98     |  38     ----------------------------<  161    4.4     |  27     |  7.23     Ca    9.0        24 Dec 2024 09:40  Phos  4.2       24 Dec 2024 09:40  Mg     2.4       24 Dec 2024 09:40        Urine Cx: ?  Blood Cx: ?      Imaging  ACC: [id number] EXAM: CT PELVIS ONLY IC ORDERED BY: FELICITAS ANTUNEZ    PROCEDURE DATE: 12/23/2024        INTERPRETATION: CLINICAL INFORMATION: Scrotal collection    COMPARISON: CT pelvis May 10, 2018    CONTRAST/COMPLICATIONS:  IV Contrast: Omnipaque 350 90 cc administered 10 cc discarded  Oral Contrast: NONE  .    PROCEDURE:  CT of the Pelvis was performed.  Sagittal and coronal reformats were performed.    FINDINGS:  BLADDER: Several small stones at both ureterovesical junctions measuring up to 2 mm on the right and 3 mm on the left. No ureteral dilatation.  REPRODUCTIVE ORGANS: Prostate is normal in size. Diffuse scrotal edema. Rim-enhancing extratesticular right scrotal fluid collection measuring 2.1 x 1.3 x 0.8 cm.  LYMPH NODES: No pelvic lymphadenopathy.    VISUALIZED PORTIONS:  ABDOMINAL ORGANS: Within normal limits.  BOWEL: Colonic diverticulosis. Visualized portion of the appendix is normal.  PERITONEUM/RETROPERITONEUM: Within normal limits.  VESSELS: Atherosclerotic changes.  ABDOMINAL WALL: Within normal limits.  BONES: Within normal limits for age.    IMPRESSION:  2.1 cm right scrotal fluid collection suspicious for abscess.  Several stones and both ureterovesical junctions without ureteral dilatation.        --- End of Report --- Subjective:  Resting comfortably    Objective:    Vital signs  T(C): , Max: 36.9 (12-23-24 @ 13:40)  HR: 67 (12-24-24 @ 09:30)  BP: 111/53 (12-24-24 @ 09:30)  SpO2: 97% (12-24-24 @ 09:30)  Wt(kg): --    Output       Gen: NAD  Abd: soft, nontender, nondistended  : right scrotum draining purulence spontaneously, expressed 5-10cc until serosanguinous, decreased quantity compared to yesterday, pinpoint drainage from right inguinal area    Labs                        10.2   10.73 )-----------( 275      ( 24 Dec 2024 09:40 )             31.3     24 Dec 2024 09:40    135    |  98     |  38     ----------------------------<  161    4.4     |  27     |  7.23     Ca    9.0        24 Dec 2024 09:40  Phos  4.2       24 Dec 2024 09:40  Mg     2.4       24 Dec 2024 09:40        Urine Cx: ?  Blood Cx: ?      Imaging  ACC: [id number] EXAM: CT PELVIS ONLY IC ORDERED BY: FELICITAS ANTUNEZ    PROCEDURE DATE: 12/23/2024        INTERPRETATION: CLINICAL INFORMATION: Scrotal collection    COMPARISON: CT pelvis May 10, 2018    CONTRAST/COMPLICATIONS:  IV Contrast: Omnipaque 350 90 cc administered 10 cc discarded  Oral Contrast: NONE  .    PROCEDURE:  CT of the Pelvis was performed.  Sagittal and coronal reformats were performed.    FINDINGS:  BLADDER: Several small stones at both ureterovesical junctions measuring up to 2 mm on the right and 3 mm on the left. No ureteral dilatation.  REPRODUCTIVE ORGANS: Prostate is normal in size. Diffuse scrotal edema. Rim-enhancing extratesticular right scrotal fluid collection measuring 2.1 x 1.3 x 0.8 cm.  LYMPH NODES: No pelvic lymphadenopathy.    VISUALIZED PORTIONS:  ABDOMINAL ORGANS: Within normal limits.  BOWEL: Colonic diverticulosis. Visualized portion of the appendix is normal.  PERITONEUM/RETROPERITONEUM: Within normal limits.  VESSELS: Atherosclerotic changes.  ABDOMINAL WALL: Within normal limits.  BONES: Within normal limits for age.    IMPRESSION:  2.1 cm right scrotal fluid collection suspicious for abscess.  Several stones and both ureterovesical junctions without ureteral dilatation.        --- End of Report ---

## 2024-12-25 LAB
ANION GAP SERPL CALC-SCNC: 7 MMOL/L — SIGNIFICANT CHANGE UP (ref 5–17)
BUN SERPL-MCNC: 23 MG/DL — HIGH (ref 7–18)
CALCIUM SERPL-MCNC: 9.6 MG/DL — SIGNIFICANT CHANGE UP (ref 8.4–10.5)
CHLORIDE SERPL-SCNC: 100 MMOL/L — SIGNIFICANT CHANGE UP (ref 96–108)
CO2 SERPL-SCNC: 30 MMOL/L — SIGNIFICANT CHANGE UP (ref 22–31)
CREAT SERPL-MCNC: 5.57 MG/DL — HIGH (ref 0.5–1.3)
CULTURE RESULTS: ABNORMAL
EGFR: 10 ML/MIN/1.73M2 — LOW
GLUCOSE BLDC GLUCOMTR-MCNC: 109 MG/DL — HIGH (ref 70–99)
GLUCOSE BLDC GLUCOMTR-MCNC: 111 MG/DL — HIGH (ref 70–99)
GLUCOSE BLDC GLUCOMTR-MCNC: 153 MG/DL — HIGH (ref 70–99)
GLUCOSE BLDC GLUCOMTR-MCNC: 181 MG/DL — HIGH (ref 70–99)
GLUCOSE SERPL-MCNC: 118 MG/DL — HIGH (ref 70–99)
HBV SURFACE AG SER-ACNC: SIGNIFICANT CHANGE UP
HCT VFR BLD CALC: 34.2 % — LOW (ref 39–50)
HGB BLD-MCNC: 11 G/DL — LOW (ref 13–17)
MCHC RBC-ENTMCNC: 31.5 PG — SIGNIFICANT CHANGE UP (ref 27–34)
MCHC RBC-ENTMCNC: 32.2 G/DL — SIGNIFICANT CHANGE UP (ref 32–36)
MCV RBC AUTO: 98 FL — SIGNIFICANT CHANGE UP (ref 80–100)
NRBC # BLD: 0 /100 WBCS — SIGNIFICANT CHANGE UP (ref 0–0)
ORGANISM # SPEC MICROSCOPIC CNT: ABNORMAL
ORGANISM # SPEC MICROSCOPIC CNT: ABNORMAL
PLATELET # BLD AUTO: 249 K/UL — SIGNIFICANT CHANGE UP (ref 150–400)
POTASSIUM SERPL-MCNC: 3.9 MMOL/L — SIGNIFICANT CHANGE UP (ref 3.5–5.3)
POTASSIUM SERPL-SCNC: 3.9 MMOL/L — SIGNIFICANT CHANGE UP (ref 3.5–5.3)
RBC # BLD: 3.49 M/UL — LOW (ref 4.2–5.8)
RBC # FLD: 14.2 % — SIGNIFICANT CHANGE UP (ref 10.3–14.5)
SODIUM SERPL-SCNC: 137 MMOL/L — SIGNIFICANT CHANGE UP (ref 135–145)
SPECIMEN SOURCE: SIGNIFICANT CHANGE UP
WBC # BLD: 9.07 K/UL — SIGNIFICANT CHANGE UP (ref 3.8–10.5)
WBC # FLD AUTO: 9.07 K/UL — SIGNIFICANT CHANGE UP (ref 3.8–10.5)

## 2024-12-25 PROCEDURE — 99232 SBSQ HOSP IP/OBS MODERATE 35: CPT

## 2024-12-25 RX ORDER — AMOXICILLIN/POTASSIUM CLAV 875-125 MG
1 TABLET ORAL EVERY 24 HOURS
Refills: 0 | Status: DISCONTINUED | OUTPATIENT
Start: 2024-12-25 | End: 2024-12-25

## 2024-12-25 RX ORDER — AMOXICILLIN/POTASSIUM CLAV 875-125 MG
1 TABLET ORAL EVERY 24 HOURS
Refills: 0 | Status: DISCONTINUED | OUTPATIENT
Start: 2024-12-25 | End: 2024-12-31

## 2024-12-25 RX ADMIN — LATANOPROST 1 DROP(S): 50 SOLUTION OPHTHALMIC at 22:47

## 2024-12-25 RX ADMIN — SEVELAMER CARBONATE 1600 MILLIGRAM(S): 800 TABLET, FILM COATED ORAL at 17:05

## 2024-12-25 RX ADMIN — HEPARIN SODIUM 5000 UNIT(S): 1000 INJECTION, SOLUTION INTRAVENOUS; SUBCUTANEOUS at 05:36

## 2024-12-25 RX ADMIN — HEPARIN SODIUM 5000 UNIT(S): 1000 INJECTION, SOLUTION INTRAVENOUS; SUBCUTANEOUS at 13:25

## 2024-12-25 RX ADMIN — SEVELAMER CARBONATE 1600 MILLIGRAM(S): 800 TABLET, FILM COATED ORAL at 11:36

## 2024-12-25 RX ADMIN — Medication 1 TABLET(S): at 13:24

## 2024-12-25 RX ADMIN — SEVELAMER CARBONATE 1600 MILLIGRAM(S): 800 TABLET, FILM COATED ORAL at 08:26

## 2024-12-25 RX ADMIN — CALCITRIOL 1 MICROGRAM(S): 0.5 CAPSULE, LIQUID FILLED ORAL at 08:26

## 2024-12-25 RX ADMIN — Medication 1: at 17:05

## 2024-12-25 RX ADMIN — HEPARIN SODIUM 5000 UNIT(S): 1000 INJECTION, SOLUTION INTRAVENOUS; SUBCUTANEOUS at 22:46

## 2024-12-25 RX ADMIN — Medication 5 MILLIGRAM(S): at 05:35

## 2024-12-25 NOTE — PROGRESS NOTE ADULT - ASSESSMENT
Patient is a 69yo Male with ESRD on HD via Rt AVG, HTN, 2HPT, Anemia, LE venous stasis with Rt foot wound presents with weakness. Pt a/w Rt foot ulcer and hyperkalemia. Nephrology consulted for ESRD status.     1) ESRD: Last HD 12/24, tolerated well.  Plan for next HD 12/27 to place back on MWF schedule.  Monitor electrolytes  2) Hyperkalemia: resolved. c/w low potassium diet.    Monitor serum potassium  3) HTN with ESRD: BP improved.  Continue Amlodipine at reduced dose of 5mg PO daily. Continue to hold Coreg 25mg PO bid.  c/w low sodium diet. Monitor BP.  4) Anemia of renal disease: Hb acceptable. c/w Epogen 6000 units IV tiw. Monitor Hb.  5) 2HPT: Corrected serum calcium & serum phos acceptable.  PTHi 441. c/w calcitriol 1mcg PO MWF. Pt on Lanthanum 2000mg PO tid with meals which is unavailable at [facility]. c/w Sevelamer 1600mg PO tid with meals and  low phos diet. Monitor serum calcium and phosphorus.    HepBsAg was neg on outpt labs on 12/11/24; repeat HepBsAg neg; likely positive HepBsAg on 12/16 is an error.     [facility] NEPHROLOGY  Arden Tsang M.D.  Freeman Holland D.O.  Janny Chirinos M.D.  MD Ambar Mccarty, MSN, ANP-C    Telephone: [phone]  Facsimile: [phone]    [address] [place] [address], [id number]  [place] [address]

## 2024-12-25 NOTE — PROGRESS NOTE ADULT - SUBJECTIVE AND OBJECTIVE BOX
[facility] Medicine  Patient is a 69y old  Male who presents with a chief complaint of generalized weakness (24 Dec 2024 15:34)      SUBJECTIVE / OVERNIGHT EVENTS:  Patient seen at bedside, states he feels good, but reports persistent drainage from scrotum.    MEDICATIONS  (STANDING):  amLODIPine   Tablet 5 milliGRAM(s) Oral daily  amoxicillin  500 milliGRAM(s)/clavulanate 1 Tablet(s) Oral every 24 hours  calcitriol   Capsule 1 MICROGram(s) Oral <User Schedule>  epoetin rudolph-epbx (RETACRIT) Injectable 6000 Unit(s) IV Push <User Schedule>  heparin   Injectable 5000 Unit(s) SubCutaneous every 8 hours  insulin lispro (ADMELOG) corrective regimen sliding scale   SubCutaneous three times a day before meals  insulin lispro (ADMELOG) corrective regimen sliding scale   SubCutaneous at bedtime  latanoprost 0.005% Ophthalmic Solution 1 Drop(s) Both EYES at bedtime  sevelamer carbonate 1600 milliGRAM(s) Oral three times a day with meals    MEDICATIONS  (PRN):  acetaminophen     Tablet .. 650 milliGRAM(s) Oral every 6 hours PRN Temp greater or equal to 38C (100.4F), Mild Pain (1 - 3)  ondansetron Injectable 4 milliGRAM(s) IV Push every 8 hours PRN Nausea and/or Vomiting          OBJECTIVE:  Vital Signs Last 24 Hrs  T(C): 36.8 (25 Dec 2024 05:25), Max: 37.3 (24 Dec 2024 19:45)  T(F): 98.2 (25 Dec 2024 05:25), Max: 99.1 (24 Dec 2024 19:45)  HR: 65 (25 Dec 2024 05:25) (62 - 69)  BP: 135/65 (25 Dec 2024 05:25) (106/74 - 135/65)  BP(mean): --  RR: 17 (25 Dec 2024 05:25) (17 - 19)  SpO2: 95% (25 Dec 2024 05:25) (94% - 98%)    Parameters below as of 25 Dec 2024 05:25  Patient On (Oxygen Delivery Method): room air        PHYSICAL EXAM:  GENERAL: NAD  HEAD:  Atraumatic, Normocephalic  EYES: conjunctiva and sclera clear  NECK: Supple, No JVD  CHEST/LUNG: Clear to auscultation bilaterally; No wheeze  HEART: Regular rate and rhythm; No murmurs, rubs, or gallops  ABDOMEN: Soft, Nontender, Nondistended; Bowel sounds present  : scrotum with abscess drainage  PSYCH: AAOx3  NEUROLOGY: non-focal  SKIN: No rashes or lesions      CAPILLARY BLOOD GLUCOSE      POCT Blood Glucose.: 109 mg/dL (25 Dec 2024 11:24)  POCT Blood Glucose.: 111 mg/dL (25 Dec 2024 08:22)  POCT Blood Glucose.: 152 mg/dL (24 Dec 2024 21:12)  POCT Blood Glucose.: 109 mg/dL (24 Dec 2024 16:27)    I&O's Summary            LABS:                        11.0   9.07  )-----------( 249      ( 25 Dec 2024 06:20 )             34.2     12-25    137  |  100  |  23[H]  ----------------------------<  118[H]  3.9   |  30  |  5.57[H]    Ca    9.6      25 Dec 2024 06:20  Phos  4.2     12-24  Mg     2.4     12-24            Urinalysis Basic - ( 25 Dec 2024 06:20 )    Color: x / Appearance: x / SG: x / pH: x  Gluc: 118 mg/dL / Ketone: x  / Bili: x / Urobili: x   Blood: x / Protein: x / Nitrite: x   Leuk Esterase: x / RBC: x / WBC x   Sq Epi: x / Non Sq Epi: x / Bacteria: x            RADIOLOGY & ADDITIONAL TESTS:

## 2024-12-25 NOTE — PROGRESS NOTE ADULT - SUBJECTIVE AND OBJECTIVE BOX
[facility] NEPHROLOGY- PROGRESS NOTE    Patient is a 69yo Male with ESRD on HD via Rt AVG, HTN, 2HPT, Anemia, LE venous stasis with Rt foot wound presents with weakness. Pt a/w Rt foot ulcer and hyperkalemia. Nephrology consulted for ESRD status.   MRI Rt foot: + osteomyelitis involving the talus and less so of the navicular bone;    BCx +Staph hominis  12/20: s/p I& D of buttock abscess    Hospital Medications: Medications reviewed.  REVIEW OF SYSTEMS: Pt denies any SOB, chest pain, n/v/d, or abd pain, +Rt foot pain-denies at this time      VITALS:  T(F): 97.8 (12-25-24 @ 13:16), Max: 99.1 (12-24-24 @ 19:45)  HR: 71 (12-25-24 @ 13:16)  BP: 163/71 (12-25-24 @ 13:16)  RR: 16 (12-25-24 @ 13:16)  SpO2: 96% (12-25-24 @ 13:16)  Wt(kg): --          PHYSICAL EXAM:  Gen: NAD, calm, Obese; more awake  HEENT: anicteric  Cards: RRR, +S1/S2, no M/G/R  Resp: CTA ant   GI: soft, NT/ND, NABS  : +scrotal drainage  Extremities: no LE edema B/L  Derm: b/l dry skin, Rt foot plantar wound covered      LABS:  12-25    137  |  100  |  23[H]  ----------------------------<  118[H]  3.9   |  30  |  5.57[H]    Ca    9.6      25 Dec 2024 06:20  Phos  4.2     12-24  Mg     2.4     12-24      Creatinine Trend: 5.57 <--, 7.23 <--, 5.45 <--, 7.22 <--, 5.77 <--, 7.27 <--, 5.52 <--                        11.0   9.07  )-----------( 249      ( 25 Dec 2024 06:20 )             34.2     Urine Studies:  Urinalysis Basic - ( 25 Dec 2024 06:20 )    Color:  / Appearance:  / SG:  / pH:   Gluc: 118 mg/dL / Ketone:   / Bili:  / Urobili:    Blood:  / Protein:  / Nitrite:    Leuk Esterase:  / RBC:  / WBC    Sq Epi:  / Non Sq Epi:  / Bacteria:

## 2024-12-25 NOTE — PROGRESS NOTE ADULT - ASSESSMENT
70 yo M with PMHx of ESRD on HD, HTN, DM, bilateral Charcot's joint, presenting with generalized weakness. Found to have hyperkalemia and R foot ulcer, admitted for further workup. MRI shows R ankle chronic OM. CCB scerotal discharge, later found to have scrotal abscess.    # R foot chronic OM  # Scrotal abscess  # ESRD on HD  # HTN  # DM A1c 7.4% 12/2024  # Bilateral Charcot's joint    - ID Dr. Brito recs appreciated, continue Augmentin PO for the scrotal lesion  -  evaluation on the scrotal lesion appreciated, no surgical intervention planned at this time  - continue inpatient HD  - continue FS/SSI  - continue home amlodipine, sevelamer, calcitriol  - DVT ppx: heparin SQ.

## 2024-12-26 LAB
ANION GAP SERPL CALC-SCNC: 7 MMOL/L — SIGNIFICANT CHANGE UP (ref 5–17)
BUN SERPL-MCNC: 32 MG/DL — HIGH (ref 7–18)
CALCIUM SERPL-MCNC: 9.3 MG/DL — SIGNIFICANT CHANGE UP (ref 8.4–10.5)
CHLORIDE SERPL-SCNC: 100 MMOL/L — SIGNIFICANT CHANGE UP (ref 96–108)
CO2 SERPL-SCNC: 28 MMOL/L — SIGNIFICANT CHANGE UP (ref 22–31)
CREAT SERPL-MCNC: 7.19 MG/DL — HIGH (ref 0.5–1.3)
EGFR: 8 ML/MIN/1.73M2 — LOW
GLUCOSE BLDC GLUCOMTR-MCNC: 108 MG/DL — HIGH (ref 70–99)
GLUCOSE BLDC GLUCOMTR-MCNC: 113 MG/DL — HIGH (ref 70–99)
GLUCOSE BLDC GLUCOMTR-MCNC: 120 MG/DL — HIGH (ref 70–99)
GLUCOSE BLDC GLUCOMTR-MCNC: 215 MG/DL — HIGH (ref 70–99)
GLUCOSE SERPL-MCNC: 117 MG/DL — HIGH (ref 70–99)
HCT VFR BLD CALC: 32.2 % — LOW (ref 39–50)
HGB BLD-MCNC: 10.9 G/DL — LOW (ref 13–17)
MCHC RBC-ENTMCNC: 32.8 PG — SIGNIFICANT CHANGE UP (ref 27–34)
MCHC RBC-ENTMCNC: 33.9 G/DL — SIGNIFICANT CHANGE UP (ref 32–36)
MCV RBC AUTO: 97 FL — SIGNIFICANT CHANGE UP (ref 80–100)
NRBC # BLD: 0 /100 WBCS — SIGNIFICANT CHANGE UP (ref 0–0)
PLATELET # BLD AUTO: 226 K/UL — SIGNIFICANT CHANGE UP (ref 150–400)
POTASSIUM SERPL-MCNC: 4.3 MMOL/L — SIGNIFICANT CHANGE UP (ref 3.5–5.3)
POTASSIUM SERPL-SCNC: 4.3 MMOL/L — SIGNIFICANT CHANGE UP (ref 3.5–5.3)
RBC # BLD: 3.32 M/UL — LOW (ref 4.2–5.8)
RBC # FLD: 14.1 % — SIGNIFICANT CHANGE UP (ref 10.3–14.5)
SODIUM SERPL-SCNC: 135 MMOL/L — SIGNIFICANT CHANGE UP (ref 135–145)
WBC # BLD: 8.84 K/UL — SIGNIFICANT CHANGE UP (ref 3.8–10.5)
WBC # FLD AUTO: 8.84 K/UL — SIGNIFICANT CHANGE UP (ref 3.8–10.5)

## 2024-12-26 PROCEDURE — 99232 SBSQ HOSP IP/OBS MODERATE 35: CPT

## 2024-12-26 PROCEDURE — 99233 SBSQ HOSP IP/OBS HIGH 50: CPT

## 2024-12-26 RX ORDER — TAMSULOSIN HYDROCHLORIDE 0.4 MG/1
0.4 CAPSULE ORAL AT BEDTIME
Refills: 0 | Status: DISCONTINUED | OUTPATIENT
Start: 2024-12-26 | End: 2025-01-08

## 2024-12-26 RX ADMIN — HEPARIN SODIUM 5000 UNIT(S): 1000 INJECTION, SOLUTION INTRAVENOUS; SUBCUTANEOUS at 13:16

## 2024-12-26 RX ADMIN — Medication 5 MILLIGRAM(S): at 05:44

## 2024-12-26 RX ADMIN — HEPARIN SODIUM 5000 UNIT(S): 1000 INJECTION, SOLUTION INTRAVENOUS; SUBCUTANEOUS at 22:13

## 2024-12-26 RX ADMIN — Medication 1 TABLET(S): at 13:14

## 2024-12-26 RX ADMIN — HEPARIN SODIUM 5000 UNIT(S): 1000 INJECTION, SOLUTION INTRAVENOUS; SUBCUTANEOUS at 05:44

## 2024-12-26 RX ADMIN — LATANOPROST 1 DROP(S): 50 SOLUTION OPHTHALMIC at 22:14

## 2024-12-26 RX ADMIN — SEVELAMER CARBONATE 1600 MILLIGRAM(S): 800 TABLET, FILM COATED ORAL at 08:45

## 2024-12-26 RX ADMIN — SEVELAMER CARBONATE 1600 MILLIGRAM(S): 800 TABLET, FILM COATED ORAL at 16:48

## 2024-12-26 RX ADMIN — Medication 2: at 12:07

## 2024-12-26 RX ADMIN — TAMSULOSIN HYDROCHLORIDE 0.4 MILLIGRAM(S): 0.4 CAPSULE ORAL at 22:13

## 2024-12-26 RX ADMIN — SEVELAMER CARBONATE 1600 MILLIGRAM(S): 800 TABLET, FILM COATED ORAL at 12:06

## 2024-12-26 NOTE — CHART NOTE - NSCHARTNOTEFT_GEN_A_CORE
Pt seen for follow up     Spoke to pt at bedside. No recent episodes of nausea, vomiting, diarrhea or constipation per pt. Last BM noted on 12/25 per pt. Denies any chewing/swallowing difficulties. Intake is 50-75% per pt. UBW: 187 lbs, HIE: 220 lbs - 11/1/21, reported a 30 lb intentional weight loss 1.5 years ago. Food preferences explored and forwarded to dietary. POCT 87 - 249 between 12/18 - 12/26. Hba1c - 7.4%.     Diet Prescription: Diet, Regular:   Consistent Carbohydrate {No Snacks}  DASH/TLC {Sodium & Cholesterol Restricted}  1000mL Fluid Restriction ([id number])  For patients receiving Renal Replacement - No Protein Restr, No Conc K, No Conc Phos, Low Sodium (RENAL) (12-16-24 @ 14:28)    Pertinent Medications: MEDICATIONS  (STANDING):  amLODIPine   Tablet 5 milliGRAM(s) Oral daily  amoxicillin  500 milliGRAM(s)/clavulanate 1 Tablet(s) Oral every 24 hours  calcitriol   Capsule 1 MICROGram(s) Oral <User Schedule>  epoetin rudolph-epbx (RETACRIT) Injectable 6000 Unit(s) IV Push <User Schedule>  heparin   Injectable 5000 Unit(s) SubCutaneous every 8 hours  insulin lispro (ADMELOG) corrective regimen sliding scale   SubCutaneous three times a day before meals  insulin lispro (ADMELOG) corrective regimen sliding scale   SubCutaneous at bedtime  latanoprost 0.005% Ophthalmic Solution 1 Drop(s) Both EYES at bedtime  sevelamer carbonate 1600 milliGRAM(s) Oral three times a day with meals    MEDICATIONS  (PRN):  acetaminophen     Tablet .. 650 milliGRAM(s) Oral every 6 hours PRN Temp greater or equal to 38C (100.4F), Mild Pain (1 - 3)  ondansetron Injectable 4 milliGRAM(s) IV Push every 8 hours PRN Nausea and/or Vomiting    Pertinent Labs: 12-26 Na135 mmol/L Glu 117 mg/dL[H] K+ 4.3 mmol/L Cr  7.19 mg/dL[H] BUN 32 mg/dL[H] 12-24 Phos 4.2 mg/dL     CAPILLARY BLOOD GLUCOSE      POCT Blood Glucose.: 120 mg/dL (26 Dec 2024 07:39)  POCT Blood Glucose.: 181 mg/dL (25 Dec 2024 21:19)  POCT Blood Glucose.: 153 mg/dL (25 Dec 2024 16:45)      Weight: 187 lbs  Height: 68 in   IBW: 154 lbs +/-10%  BMI: 28.5 kg/m^2    Physical Assessment, per flowsheets:  Edema: 2+ left foot, right foot  Pressure Injury: Diabetic ulcer - right foot    Estimated Needs:   [X] No change since previous assessment    Previous Nutrition Diagnosis: [x] Increased Nutrient Needs   Nutrition Diagnosis is [x] ongoing   New Nutrition Diagnosis: [x] not applicable     Education:  [x] Provided pt with handout Carbohydrate Counting for People with Diabetes. Discussed carbohydrate sources, carbohydrate portions, protein sources, mixed meals, and nutrition label reading. Stressed importance of balanced meals with adequate protein and fiber. Pt was informed of current A1c, goal A1c, and goal fingerstick range.       Interventions:   1) Continue current diet as medically feasible.  2) Encourage PO intake and honor food preferences as able.  3) Monitor PO intake, labs, weights, BM's, and skin integrity.  4) Continue recommendation of Nepro (provides 425 kcal, 19 gm protein per 8oz serving) 1 x day.     Jim Garcia RD (Available on teams)

## 2024-12-26 NOTE — PROGRESS NOTE ADULT - ASSESSMENT
67 yo M with PMH of  DM, HTN, ESRD on dialysis ( M, W, F), Last Dialysis 12/13/24,   B/L Charcot deformity and HLD, present to the ED for generalized weakness, chills, subjective fever. Admitted to medicine for generalized weakness, hyperkalemia, right foot ulcer   MR right Ankle demonstrates ulceration at the plantar surface medially with suspected exposed bone with underlying   osteomyelitis involving the talus and less so often navicular bone. Podiatry follows   OM likely chronic, recommended  bone Bx/debridement OFF Abs for a period of time with Culture  sent to determine directed Ab treatment  Pt noted with episode of fever over 101  Started on Cefepime and vancomycin. Noted with open wound draining purulent discharge to left buttock. ID consulted for further recommendations   Blood culture growing Staphylococcus. Surgery consulted, sp  I&D at bedside. ID recommended one more dose of vancomycin 750 mg and no abx recommended thereafter.  12/22: Scrotum with purulent drainage, urology consulted recs CT pelvis -- ID recs Vanco 750mg x 1 dose   12/23: HD tomm-- F/u CT pelvis   12/24: CT pelvis showing scrotal abscess, Urology and ID following  12/26: PT reeval recs PIA

## 2024-12-26 NOTE — PROGRESS NOTE ADULT - SUBJECTIVE AND OBJECTIVE BOX
[facility] NEPHROLOGY- PROGRESS NOTE    Patient is a 69yo Male with ESRD on HD via Rt AVG, HTN, 2HPT, Anemia, LE venous stasis with Rt foot wound presents with weakness. Pt a/w Rt foot ulcer and hyperkalemia. Nephrology consulted for ESRD status.   MRI Rt foot: + osteomyelitis involving the talus and less so of the navicular bone;    BCx +Staph hominis  12/20: s/p I& D of buttock abscess  +scrotal abscess    Hospital Medications: Medications reviewed.  REVIEW OF SYSTEMS: Pt denies any SOB, chest pain, n/v/d, or abd pain, +Rt foot pain-denies at this time    VITALS:  T(F): 98.3 (12-26-24 @ 05:20), Max: 98.3 (12-26-24 @ 05:20)  HR: 75 (12-26-24 @ 11:00)  BP: 124/69 (12-26-24 @ 11:00)  RR: 17 (12-26-24 @ 05:20)  SpO2: 94% (12-26-24 @ 11:00)  Wt(kg): --    Height (cm): 172.7 (12-25 @ 20:03)      PHYSICAL EXAM:  Gen: NAD, calm, Obese  HEENT: anicteric  Cards: RRR, +S1/S2, no M/G/R  Resp: CTA ant   GI: soft, NT/ND, NABS  Extremities: b/l LE in boots    LABS:  12-26    135  |  100  |  32[H]  ----------------------------<  117[H]  4.3   |  28  |  7.19[H]    Ca    9.3      26 Dec 2024 06:52      Creatinine Trend: 7.19 <--, 5.57 <--, 7.23 <--, 5.45 <--, 7.22 <--, 5.77 <--, 7.27 <--                        10.9   8.84  )-----------( 226      ( 26 Dec 2024 06:52 )             32.2     Urine Studies:  Urinalysis Basic - ( 26 Dec 2024 06:52 )    Color:  / Appearance:  / SG:  / pH:   Gluc: 117 mg/dL / Ketone:   / Bili:  / Urobili:    Blood:  / Protein:  / Nitrite:    Leuk Esterase:  / RBC:  / WBC    Sq Epi:  / Non Sq Epi:  / Bacteria:

## 2024-12-26 NOTE — PROGRESS NOTE ADULT - SUBJECTIVE AND OBJECTIVE BOX
Subjective/Objective:      MEDS  acetaminophen     Tablet .. 650 milliGRAM(s) Oral every 6 hours PRN  amLODIPine   Tablet 5 milliGRAM(s) Oral daily  amoxicillin  500 milliGRAM(s)/clavulanate 1 Tablet(s) Oral every 24 hours (D3)  calcitriol   Capsule 1 MICROGram(s) Oral <User Schedule>  epoetin rudolph-epbx (RETACRIT) Injectable 6000 Unit(s) IV Push <User Schedule>  heparin   Injectable 5000 Unit(s) SubCutaneous every 8 hours  insulin lispro (ADMELOG) corrective regimen sliding scale   SubCutaneous three times a day before meals  insulin lispro (ADMELOG) corrective regimen sliding scale   SubCutaneous at bedtime  latanoprost 0.005% Ophthalmic Solution 1 Drop(s) Both EYES at bedtime  ondansetron Injectable 4 milliGRAM(s) IV Push every 8 hours PRN  sevelamer carbonate 1600 milliGRAM(s) Oral three times a day with meals  vanco 750mg 12/22    PHYSICAL EXAM:    Vital Signs Last 24 Hrs  T(C): 36.8 (26 Dec 2024 05:20), Max: 36.8 (26 Dec 2024 05:20)  T(F): 98.3 (26 Dec 2024 05:20), Max: 98.3 (26 Dec 2024 05:20)  HR: 67 (26 Dec 2024 05:20) (66 - 71)  BP: 148/74 (26 Dec 2024 05:20) (126/68 - 163/71)  BP(mean): 87 (25 Dec 2024 20:40) (87 - 101)  RR: 17 (26 Dec 2024 05:20) (16 - 18)  SpO2: 95% (26 Dec 2024 05:20) (95% - 96%)    Parameters below as of 26 Dec 2024 05:20  Patient On (Oxygen Delivery Method): room air        GEN:    HEENT:    CHEST/Respiratory:    Cardiovascular:    Abdomen:    Genitourinary:    Extremities:     Neurological:    Skin:      LABS/DIAGNOSTIC TESTS                            10.9   8.84  )-----------( 226      ( 26 Dec 2024 06:52 )             32.2       WBC Count: 8.84 K/uL (12-26 @ 06:52)  WBC Count: 9.07 K/uL (12-25 @ 06:20)  WBC Count: 10.73 K/uL (12-24 @ 09:40)      12-26    135  |  100  |  32[H]  ----------------------------<  117[H]  4.3   |  28  |  7.19[H]    Ca    9.3      26 Dec 2024 06:52        CULTURES      Culture - Genital (collected 12-22-24 @ 08:20)  Source: .Genital  Final Report (12-25-24 @ 06:04):    Rare Proteus mirabilis    Commensal vladimir consistent with body site  Organism: Proteus mirabilis (12-25-24 @ 06:04)  Organism: Proteus mirabilis (12-25-24 @ 06:04)      Method Type: GWEN      -  Amoxicillin/Clavulanic Acid: S <=8/4      -  Ampicillin: S <=8 These ampicillin results predict results for amoxicillin      -  Ampicillin/Sulbactam: S <=4/2      -  Aztreonam: S <=4      -  Cefazolin: S <=2      -  Cefepime: S <=2      -  Cefoxitin: S <=8      -  Ceftriaxone: S <=1      -  Ciprofloxacin: S <=0.25      -  Ertapenem: S <=0.5      -  Gentamicin: S <=2      -  Levofloxacin: S <=0.5      -  Meropenem: S <=1      -  Piperacillin/Tazobactam: S <=8      -  Tobramycin: S <=2      -  Trimethoprim/Sulfamethoxazole: S <=0.5/9.5    Culture - Wound Aerobic/Anaerobic (collected 12-19-24 @ 18:06)  Source: Drainage  Final Report (12-24-24 @ 18:25):    Few Trueperella bernardiae "Susceptibilities not performed"    Culture - Wound Aerobic/Anaerobic (collected 12-19-24 @ 16:15)  Source: Skin/Wound  Final Report (12-24-24 @ 19:27):    Rare Trueperella bernardiae "Susceptibilities not performed"    Culture - Blood (collected 12-19-24 @ 06:45)  Source: .Blood BLOOD  Final Report (12-24-24 @ 12:00):    No growth at 5 days    Culture - Blood (collected 12-17-24 @ 12:35)  Source: .Blood BLOOD  Final Report (12-22-24 @ 19:00):    No growth at 5 days    Culture - Blood (collected 12-16-24 @ 22:30)  Source: .Blood BLOOD  Gram Stain (12-17-24 @ 16:53):    Growth in aerobic bottle: Gram Positive Cocci in Clusters  Final Report (12-18-24 @ 13:20):    Growth in aerobic bottle: Staphylococcus hominis    Isolation of Coagulase negative Staphylococcus from single blood culture    sets may represent    contamination. Contact the Microbiology Department at [phone] if    susceptibility testing is needed.    clinically indicated.    Direct identification is available within approximately 3-5    hours either by Blood Panel Multiplexed PCR or Direct    MALDI-TOF. Details: [email or web address]  Organism: Blood Culture PCR (12-18-24 @ 13:20)  Organism: Blood Culture PCR (12-18-24 @ 13:20)      Method Type: PCR      -  Coagulase negative Staphylococcus: Detec    Culture - Wound Aerobic/Anaerobic (collected 12-16-24 @ 12:36)  Source: .Surgical Swab  Final Report (12-21-24 @ 17:55):    Moderate Providencia stuartii    Moderate Corynebacterium striatum group "Susceptibilities not performed"    Few Trueperella bernardiae "Susceptibilities not performed"  Organism: Providencia stuartii (12-21-24 @ 17:55)  Organism: Providencia stuartii (12-21-24 @ 17:55)      Method Type: GWEN      -  Amoxicillin/Clavulanic Acid: R >16/8      -  Ampicillin: R >16 These ampicillin results predict results for amoxicillin      -  Ampicillin/Sulbactam: I 16/8      -  Aztreonam: S <=4      -  Cefazolin: R >16      -  Cefepime: S <=2      -  Cefoxitin: S <=8      -  Ceftriaxone: S <=1      -  Ciprofloxacin: R 2      -  Ertapenem: S <=0.5      -  Levofloxacin: R 4      -  Meropenem: S <=1      -  Piperacillin/Tazobactam: S <=8      -  Trimethoprim/Sulfamethoxazole: S <=0.5/9.5          RADIOLOGY               Subjective/Objective:      MEDS  acetaminophen     Tablet .. 650 milliGRAM(s) Oral every 6 hours PRN  amLODIPine   Tablet 5 milliGRAM(s) Oral daily  amoxicillin  500 milliGRAM(s)/clavulanate 1 Tablet(s) Oral every 24 hours (D3)  calcitriol   Capsule 1 MICROGram(s) Oral <User Schedule>  epoetin rudolph-epbx (RETACRIT) Injectable 6000 Unit(s) IV Push <User Schedule>  heparin   Injectable 5000 Unit(s) SubCutaneous every 8 hours  insulin lispro (ADMELOG) corrective regimen sliding scale   SubCutaneous three times a day before meals  insulin lispro (ADMELOG) corrective regimen sliding scale   SubCutaneous at bedtime  latanoprost 0.005% Ophthalmic Solution 1 Drop(s) Both EYES at bedtime  ondansetron Injectable 4 milliGRAM(s) IV Push every 8 hours PRN  sevelamer carbonate 1600 milliGRAM(s) Oral three times a day with meals  vanco 750mg 12/22    PHYSICAL EXAM:    Vital Signs Last 24 Hrs  T(C): 36.8 (26 Dec 2024 05:20), Max: 36.8 (26 Dec 2024 05:20)  T(F): 98.3 (26 Dec 2024 05:20), Max: 98.3 (26 Dec 2024 05:20)  HR: 67 (26 Dec 2024 05:20) (66 - 71)  BP: 148/74 (26 Dec 2024 05:20) (126/68 - 163/71)  BP(mean): 87 (25 Dec 2024 20:40) (87 - 101)  RR: 17 (26 Dec 2024 05:20) (16 - 18)  SpO2: 95% (26 Dec 2024 05:20) (95% - 96%)    Parameters below as of 26 Dec 2024 05:20  Patient On (Oxygen Delivery Method): room air      GEN: alert, responsive and in NAD    HEENT: NC/AT    CHEST/Respiratory: clear to auscultation    Cardiovascular: ??holosystolic m best heard at the apex    Abdomen: Bs active; soft and non-tender to palpation    Genitourinary: dressing present over base of the scrotum    Extremities: RUE with HD graft in place with no tenderness or erythema  LLE: "boots" on at the time of my exam  RLE: "boots" on at the time of my exam    Neurological: A+O x3          LABS/DIAGNOSTIC TESTS                        10.9   8.84  )-----------( 226      ( 26 Dec 2024 06:52 )             32.2       WBC Count: 8.84 K/uL (12-26 @ 06:52)  WBC Count: 9.07 K/uL (12-25 @ 06:20)  WBC Count: 10.73 K/uL (12-24 @ 09:40)      12-26    135  |  100  |  32[H]  ----------------------------<  117[H]  4.3   |  28  |  7.19[H]    Ca    9.3      26 Dec 2024 06:52        CULTURES      Culture - Genital (collected 12-22-24 @ 08:20)  Source: .Genital  Final Report (12-25-24 @ 06:04):    Rare Proteus mirabilis    Commensal vladimir consistent with body site  Organism: Proteus mirabilis (12-25-24 @ 06:04)  Organism: Proteus mirabilis (12-25-24 @ 06:04)      Method Type: GWEN      -  Amoxicillin/Clavulanic Acid: S <=8/4      -  Ampicillin: S <=8 These ampicillin results predict results for amoxicillin      -  Ampicillin/Sulbactam: S <=4/2      -  Aztreonam: S <=4      -  Cefazolin: S <=2      -  Cefepime: S <=2      -  Cefoxitin: S <=8      -  Ceftriaxone: S <=1      -  Ciprofloxacin: S <=0.25      -  Ertapenem: S <=0.5      -  Gentamicin: S <=2      -  Levofloxacin: S <=0.5      -  Meropenem: S <=1      -  Piperacillin/Tazobactam: S <=8      -  Tobramycin: S <=2      -  Trimethoprim/Sulfamethoxazole: S <=0.5/9.5    Culture - Wound Aerobic/Anaerobic (collected 12-19-24 @ 18:06)  Source: Drainage  Final Report (12-24-24 @ 18:25):    Few Trueperella bernardiae "Susceptibilities not performed"    Culture - Wound Aerobic/Anaerobic (collected 12-19-24 @ 16:15)  Source: Skin/Wound  Final Report (12-24-24 @ 19:27):    Rare Trueperella bernardiae "Susceptibilities not performed"    Culture - Blood (collected 12-19-24 @ 06:45)  Source: .Blood BLOOD  Final Report (12-24-24 @ 12:00):    No growth at 5 days    Culture - Blood (collected 12-17-24 @ 12:35)  Source: .Blood BLOOD  Final Report (12-22-24 @ 19:00):    No growth at 5 days    Culture - Blood (collected 12-16-24 @ 22:30)  Source: .Blood BLOOD  Gram Stain (12-17-24 @ 16:53):    Growth in aerobic bottle: Gram Positive Cocci in Clusters  Final Report (12-18-24 @ 13:20):    Growth in aerobic bottle: Staphylococcus hominis    Isolation of Coagulase negative Staphylococcus from single blood culture    sets may represent    contamination. Contact the Microbiology Department at [phone] if    susceptibility testing is needed.    clinically indicated.    Direct identification is available within approximately 3-5    hours either by Blood Panel Multiplexed PCR or Direct    MALDI-TOF. Details: [email or web address]  Organism: Blood Culture PCR (12-18-24 @ 13:20)  Organism: Blood Culture PCR (12-18-24 @ 13:20)      Method Type: PCR      -  Coagulase negative Staphylococcus: Detec    Culture - Wound Aerobic/Anaerobic (collected 12-16-24 @ 12:36)  Source: .Surgical Swab  Final Report (12-21-24 @ 17:55):    Moderate Providencia stuartii    Moderate Corynebacterium striatum group "Susceptibilities not performed"    Few Trueperella bernardiae "Susceptibilities not performed"  Organism: Providencia stuartii (12-21-24 @ 17:55)  Organism: Providencia stuartii (12-21-24 @ 17:55)      Method Type: GWEN      -  Amoxicillin/Clavulanic Acid: R >16/8      -  Ampicillin: R >16 These ampicillin results predict results for amoxicillin      -  Ampicillin/Sulbactam: I 16/8      -  Aztreonam: S <=4      -  Cefazolin: R >16      -  Cefepime: S <=2      -  Cefoxitin: S <=8      -  Ceftriaxone: S <=1      -  Ciprofloxacin: R 2      -  Ertapenem: S <=0.5      -  Levofloxacin: R 4      -  Meropenem: S <=1      -  Piperacillin/Tazobactam: S <=8      -  Trimethoprim/Sulfamethoxazole: S <=0.5/9.5          RADIOLOGY

## 2024-12-26 NOTE — PROGRESS NOTE ADULT - ASSESSMENT
67 yo M with PMH of  DM, HTN, ESRD on dialysis ( M, W, F) via RUE graft (last Dialysis 12/13/24),  B/L Charcot deformity and HLD, presented to the ED for generalized weakness, chills. Patient reports since 2014 after having charcot procedure in bilateral foot, he has been wearing a charcot boot to walk. Patient follows up with podiatry Dr. Sim in the outpatient podiatry clinic. At the time of my consult, pt states both his feet have looked the same for many yrs. and has been following with Podiatry for yrs. Pt further states that he had an "abscess" on the R heel drained about 1 mo. ago by Dr. Sim. BC's with coag negative Staph c/w a contaminant. MRI R foot 12/17: multiple skin wounds, soft tissue swelling, no drainable collection, patchy signal abnormality within the medial sesamoid suggestive of osteomyelitis vs reactive osteitis. Elevated temp and slight elevation of WBC noted today. MRI R ankle 12/17:  ulceration at the plantar surface medially with suspected exposed bone with underlying osteomyelitis involving the talus and less so of the navicular bone, no drainable fluid collection; end-stage arthritis with resultant planovalgus deformity. The presence of osteo below the ulcer in the area of the talus is likely chronic. The signal abnormality within the R medial sesamoid may represent osteomyelitis. The R heel ulcer had no surrounding erythema or warmth-- no evidence of SSTI. Swab Cx results likely reflect colonization, especially in view of discussion with Dr. Sim.  ?? whether the R foot is salvageable (ABIs from 12/16 exam cd not be calculated). Of concern is vascular insufficiency, which wd have a negative impact on heeling following any surgical intervention.  W/U of chronic osteo wd include bone Bx/debridement OFF Abs for a period of time with Cx sent to determine directed Ab treatment. L buttock abscess drained at the bedside 12/19-- specimens with no growth. Also noted purulent drainage from the base of the scrotum. Cx 12/22 from scrotal drainage growing Proteus mirabilis. CT abd/pelvis 12/23--2.1 cm right scrotal fluid collection suspicious for abscess. Appreciate Urology input-- no surgery at this time. as WBC is now normal and pt remains afebrile. Ampi/clavulanate started 12/24 to Rx the organism from the scrotal drainage, and will require approx 4wks to Rx the scrotal abscess.     #Scrotal abscess (see above)  --cont. ampi/clavulanate 500mg p.o. q24h for 4 wks (adjusted for renal dysfn)  --Urology f/u    #Diabetic foot ulcer of R foot with osteo (see above)-- no evidence of acute SSTI  --consider bone Bx when pt is OFF antibiotics     #L buttock abscess-- most common organisms are G+C. Underwent I+D and given IV vanco.  --cont. local wound care    #DM-- management as per medical team    Chart reviewed, including additional notes/labs/Cx; pt re-evaluated at the bedside; discussed the DX/management of  scrotal abscess with the patient and medical team; provided coordination of care re ID issues with Dr. Paredse. Please call again if needed.

## 2024-12-26 NOTE — PROGRESS NOTE ADULT - ASSESSMENT
Patient is a 67yo Male with ESRD on HD via Rt AVG, HTN, 2HPT, Anemia, LE venous stasis with Rt foot wound presents with weakness. Pt a/w Rt foot ulcer and hyperkalemia. Nephrology consulted for ESRD status.     1) ESRD: Last HD 12/24, tolerated well.  Plan for next HD 12/27 to place back on MWF schedule.  Monitor electrolytes  2) Hyperkalemia: resolved. c/w low potassium diet.    Monitor serum potassium  3) HTN with ESRD: BP improved.  Continue Amlodipine at reduced dose of 5mg PO daily. Continue to hold Coreg 25mg PO bid.  c/w low sodium diet. Monitor BP.  4) Anemia of renal disease: Hb acceptable. c/w Epogen 6000 units IV tiw. Monitor Hb.  5) 2HPT: Corrected serum calcium & last serum phos acceptable.  PTHi 441. c/w calcitriol 1mcg PO MWF. Pt on Lanthanum 2000mg PO tid with meals which is unavailable at [facility]. c/w Sevelamer 1600mg PO tid with meals and  low phos diet. Monitor serum calcium and phosphorus.    HepBsAg was neg on outpt labs on 12/11/24; repeat HepBsAg neg; likely positive HepBsAg on 12/16 is an error.     [facility] NEPHROLOGY  Arden Tsang M.D.  Freeman Holland D.O.  Janny Chirinos M.D.  MD Ambar Mccarty, MSN, ANP-C    Telephone: [phone]  Facsimile: [phone]    [address] [place] [address], [address]  [place] [address]

## 2024-12-26 NOTE — PROGRESS NOTE ADULT - ASSESSMENT
69M with hx of T2DM, hidradenitis suppurativa a/w generalized weakness now with purulent drainage from scrotum   12/23: scrotal exam stable with spontaneous drainage  12/24: scrotal exam stable, CT pelvis demonstrates 2.1 cm right scrotal fluid collection suspicious for abscess and bilateral UVJ stones without ureteral dilatation  wbc has been downtrending    Plan   - No indication for scrotal exploration or drainage of abscess as patient continues to improve clinically on antibiotics with spontaneous drainage, with downtrending WBC  - Flomax 0.4 qhs as medical expulsive therapy for asymptomatic bilateral non-obstructing UVJ stones  - keep area clean and dry, may include sitz baths   - IV antibiotics, follow ID recommendations   - downtrending WBC, afebrile  - strict glycemic control   - HD as per neph   - remainder of care as per primary team   - case signed out to PA covering [id number]

## 2024-12-26 NOTE — PROGRESS NOTE ADULT - PROBLEM SELECTOR PLAN 1
MRI: ulceration at the plantar surface medially with suspected exposed bone with underlying   Surgical culture grew Providencia stuartii  ID recs: bone Bx/debridement outpt off Abx for at least 2-3 weeks with Culture sent to determine directed tx  Podiatry following  -outpt follow up with podiatry

## 2024-12-26 NOTE — PROGRESS NOTE ADULT - SUBJECTIVE AND OBJECTIVE BOX
Patient is a 69y old  Male who presents with a chief complaint of generalized weakness (26 Dec 2024 10:26)      INTERVAL HPI/OVERNIGHT EVENTS: no new complaints    MEDICATIONS  (STANDING):  amLODIPine   Tablet 5 milliGRAM(s) Oral daily  amoxicillin  500 milliGRAM(s)/clavulanate 1 Tablet(s) Oral every 24 hours  calcitriol   Capsule 1 MICROGram(s) Oral <User Schedule>  epoetin rudolph-epbx (RETACRIT) Injectable 6000 Unit(s) IV Push <User Schedule>  heparin   Injectable 5000 Unit(s) SubCutaneous every 8 hours  insulin lispro (ADMELOG) corrective regimen sliding scale   SubCutaneous three times a day before meals  insulin lispro (ADMELOG) corrective regimen sliding scale   SubCutaneous at bedtime  latanoprost 0.005% Ophthalmic Solution 1 Drop(s) Both EYES at bedtime  sevelamer carbonate 1600 milliGRAM(s) Oral three times a day with meals    MEDICATIONS  (PRN):  acetaminophen     Tablet .. 650 milliGRAM(s) Oral every 6 hours PRN Temp greater or equal to 38C (100.4F), Mild Pain (1 - 3)  ondansetron Injectable 4 milliGRAM(s) IV Push every 8 hours PRN Nausea and/or Vomiting      __________________________________________________  REVIEW OF SYSTEMS:    CONSTITUTIONAL: No fever,   EYES: no acute visual disturbances  NECK: No pain or stiffness  RESPIRATORY: No cough; No shortness of breath  CARDIOVASCULAR: No chest pain, no palpitations  GASTROINTESTINAL: No pain. No nausea or vomiting; No diarrhea   NEUROLOGICAL: No headache or numbness, no tremors  MUSCULOSKELETAL: No joint pain, no muscle pain  GENITOURINARY: no dysuria, no frequency, no hesitancy  PSYCHIATRY: no depression , no anxiety  ALL OTHER  ROS negative      Vital Signs Last 24 Hrs  T(C): 36.8 (26 Dec 2024 05:20), Max: 36.8 (26 Dec 2024 05:20)  T(F): 98.3 (26 Dec 2024 05:20), Max: 98.3 (26 Dec 2024 05:20)  HR: 67 (26 Dec 2024 05:20) (66 - 71)  BP: 148/74 (26 Dec 2024 05:20) (126/68 - 163/71)  BP(mean): 87 (25 Dec 2024 20:40) (87 - 101)  RR: 17 (26 Dec 2024 05:20) (16 - 18)  SpO2: 95% (26 Dec 2024 05:20) (95% - 96%)    Parameters below as of 26 Dec 2024 05:20  Patient On (Oxygen Delivery Method): room air        ________________________________________________  PHYSICAL EXAM:  GENERAL: NAD  HEENT: Normocephalic;  conjunctivae and sclerae clear; moist mucous membranes;   NECK : supple  CHEST/LUNG: Clear to auscultation bilaterally with good air entry   HEART: S1 S2  regular; no murmurs, gallops or rubs  ABDOMEN: Soft, Nontender, Nondistended; Bowel sounds present  EXTREMITIES: no cyanosis; no edema; no calf tenderness  SKIN: warm and dry; no rash  NERVOUS SYSTEM:  Awake and alert; Oriented  to place, person and time ; no new deficits    _________________________________________________  LABS:                        10.9   8.84  )-----------( 226      ( 26 Dec 2024 06:52 )             32.2     12-26    135  |  100  |  32[H]  ----------------------------<  117[H]  4.3   |  28  |  7.19[H]    Ca    9.3      26 Dec 2024 06:52        Urinalysis Basic - ( 26 Dec 2024 06:52 )    Color: x / Appearance: x / SG: x / pH: x  Gluc: 117 mg/dL / Ketone: x  / Bili: x / Urobili: x   Blood: x / Protein: x / Nitrite: x   Leuk Esterase: x / RBC: x / WBC x   Sq Epi: x / Non Sq Epi: x / Bacteria: x      CAPILLARY BLOOD GLUCOSE      POCT Blood Glucose.: 120 mg/dL (26 Dec 2024 07:39)  POCT Blood Glucose.: 181 mg/dL (25 Dec 2024 21:19)  POCT Blood Glucose.: 153 mg/dL (25 Dec 2024 16:45)      RADIOLOGY & ADDITIONAL TESTS:    Imaging Personally Reviewed:  YES/NO    Consultant(s) Notes Reviewed:   YES/ No    Care Discussed with Consultants :     Plan of care was discussed with patient and /or primary care giver; all questions and concerns were addressed and care was aligned with patient's wishes.       Patient is a 69y old  Male who presents with a chief complaint of generalized weakness (26 Dec 2024 10:26)      INTERVAL HPI/OVERNIGHT EVENTS: Pt seen at bedside with no new complaints    MEDICATIONS  (STANDING):  amLODIPine   Tablet 5 milliGRAM(s) Oral daily  amoxicillin  500 milliGRAM(s)/clavulanate 1 Tablet(s) Oral every 24 hours  calcitriol   Capsule 1 MICROGram(s) Oral <User Schedule>  epoetin rudolph-epbx (RETACRIT) Injectable 6000 Unit(s) IV Push <User Schedule>  heparin   Injectable 5000 Unit(s) SubCutaneous every 8 hours  insulin lispro (ADMELOG) corrective regimen sliding scale   SubCutaneous three times a day before meals  insulin lispro (ADMELOG) corrective regimen sliding scale   SubCutaneous at bedtime  latanoprost 0.005% Ophthalmic Solution 1 Drop(s) Both EYES at bedtime  sevelamer carbonate 1600 milliGRAM(s) Oral three times a day with meals    MEDICATIONS  (PRN):  acetaminophen     Tablet .. 650 milliGRAM(s) Oral every 6 hours PRN Temp greater or equal to 38C (100.4F), Mild Pain (1 - 3)  ondansetron Injectable 4 milliGRAM(s) IV Push every 8 hours PRN Nausea and/or Vomiting  __________________________________________________  REVIEW OF SYSTEMS:    CONSTITUTIONAL: No fever,   EYES: no acute visual disturbances  NECK: No pain or stiffness  RESPIRATORY: No cough; No shortness of breath  CARDIOVASCULAR: No chest pain, no palpitations  GASTROINTESTINAL: No pain. No nausea or vomiting; No diarrhea   NEUROLOGICAL: No headache or numbness, no tremors  MUSCULOSKELETAL: No joint pain, no muscle pain  GENITOURINARY: no dysuria, no frequency, no hesitancy  PSYCHIATRY: no depression , no anxiety  ALL OTHER  ROS negative      Vital Signs Last 24 Hrs  T(C): 36.8 (26 Dec 2024 05:20), Max: 36.8 (26 Dec 2024 05:20)  T(F): 98.3 (26 Dec 2024 05:20), Max: 98.3 (26 Dec 2024 05:20)  HR: 67 (26 Dec 2024 05:20) (66 - 71)  BP: 148/74 (26 Dec 2024 05:20) (126/68 - 163/71)  BP(mean): 87 (25 Dec 2024 20:40) (87 - 101)  RR: 17 (26 Dec 2024 05:20) (16 - 18)  SpO2: 95% (26 Dec 2024 05:20) (95% - 96%)    Parameters below as of 26 Dec 2024 05:20  Patient On (Oxygen Delivery Method): room air    ________________________________________________  PHYSICAL EXAM:  GENERAL: NAD  HEENT: Normocephalic;  conjunctivae and sclerae clear; moist mucous membranes;   NECK : supple  CHEST/LUNG: Clear to auscultation bilaterally with good air entry   HEART: S1 S2  regular; no murmurs, gallops or rubs  ABDOMEN: Soft, Nontender, Nondistended; Bowel sounds present  EXTREMITIES: no cyanosis; no edema; no calf tenderness, right AVF   SKIN: Buttock wound, scrotal wound w/drainage  NERVOUS SYSTEM:  Awake and alert; Oriented  to place, person and time ; no new deficits  _________________________________________________  LABS:                        10.9   8.84  )-----------( 226      ( 26 Dec 2024 06:52 )             32.2     12-26    135  |  100  |  32[H]  ----------------------------<  117[H]  4.3   |  28  |  7.19[H]    Ca    9.3      26 Dec 2024 06:52    Urinalysis Basic - ( 26 Dec 2024 06:52 )    Color: x / Appearance: x / SG: x / pH: x  Gluc: 117 mg/dL / Ketone: x  / Bili: x / Urobili: x   Blood: x / Protein: x / Nitrite: x   Leuk Esterase: x / RBC: x / WBC x   Sq Epi: x / Non Sq Epi: x / Bacteria: x    CAPILLARY BLOOD GLUCOSE    POCT Blood Glucose.: 120 mg/dL (26 Dec 2024 07:39)  POCT Blood Glucose.: 181 mg/dL (25 Dec 2024 21:19)  POCT Blood Glucose.: 153 mg/dL (25 Dec 2024 16:45)      RADIOLOGY & ADDITIONAL TESTS:  < from: CT Pelvis w/ IV Cont (12.23.24 @ 17:00) >  ACC: [id number] EXAM:  CT PELVIS ONLY IC   ORDERED BY: FELICITAS ANTUNEZ     PROCEDURE DATE:  12/23/2024        INTERPRETATION:  CLINICAL INFORMATION: Scrotal collection    COMPARISON: CT pelvis May 10, 2018    CONTRAST/COMPLICATIONS:  IV Contrast: Omnipaque 350  90 cc administered   10 cc discarded  Oral Contrast: NONE  .    PROCEDURE:  CT of the Pelvis was performed.  Sagittal and coronal reformats were performed.    FINDINGS:  BLADDER: Several small stones at both ureterovesical junctions measuring   up to 2 mm on the right and 3 mm on the left. No ureteral dilatation.  REPRODUCTIVE ORGANS: Prostate is normal in size. Diffuse scrotal edema.   Rim-enhancing extratesticular right scrotal fluid collection measuring   2.1 x 1.3 x 0.8 cm.  LYMPH NODES: No pelvic lymphadenopathy.    VISUALIZED PORTIONS:  ABDOMINAL ORGANS: Within normal limits.  BOWEL: Colonic diverticulosis. Visualized portion of the appendix is   normal.  PERITONEUM/RETROPERITONEUM: Within normal limits.  VESSELS: Atherosclerotic changes.  ABDOMINAL WALL: Within normal limits.  BONES: Within normal limits for age.    IMPRESSION:  2.1 cm right scrotal fluid collection suspicious for abscess.  Several stones and both ureterovesical junctions without ureteral   dilatation.      --- End of Report ---    < from: MR Ankle No Cont, Right (12.18.24 @ 20:26) >  ACC: [id number] EXAM:  MR ANKLE RT   ORDERED BY: REYES GILBERT     PROCEDURE DATE:  12/18/2024      INTERPRETATION:  MRI of the right ankle    INDICATION: Concern for osteomyelitis    COMPARISON: MRI of the foot performed 12/17/2024 and CT of thefoot   performed 12/16/2024.    TECHNIQUE: Multiplanar multisequence MRI of the right ankle    FINDINGS:    There is presence of advanced Charcot arthropathy with severe resultant   valgus and pes planus deformities.    There is soft tissue ulceration at the plantar surface of the foot   medially adjacent to the talus with suspected exposed bone. There is   underlying high STIR weighted signal and low T1 weighted signal within   the majority of the medial talus with imperceptible cortex. There ishigh   T2 and intermediate T1-weighted signal predominantly involving the   navicular bone at the talonavicular joint (series 10, image 21). The   remaining osseous structures are without convincing evidence of   osteomyelitis.    There is no drainable fluid collection. There is prominent soft tissue   edema at the medial and plantar side of the ankle and lesser at the   lateral ankle. There is diffuse muscle atrophy.    The included proximal portions of the Achilles tendon, anterior extensor,   medial flexor, and peroneal tendons without high-grade tear.    IMPRESSION: MRI of the right ankle demonstrates ulceration at the plantar   surface medially with suspected exposed bone with underlying   osteomyelitis involving the talus and less so ofthe navicular bone. No   drainable fluid collection.    End-stage arthritis with resultant planovalgus deformity.    --- End of Report ---    < from: MR Foot No Cont, Right (12.17.24 @ 15:03) >  ACC: [id number] EXAM:  MR FOOT RT   ORDERED BY: SALVADOR PAREKH     PROCEDURE DATE:  12/17/2024        INTERPRETATION:  MR FOOT RIGHT dated 12/17/2024 3:03 PM    INDICATION: Pain and swelling. Skin ulcer.    COMPARISON: Foot radiographs dated 12/16/2024    TECHNIQUE: Multi-sequential, multiplanar MRI imaging of the right midfoot   and forefoot was performed per standard protocol.    FINDINGS: Degraded by motion artifact.    BONE MARROW: Flat foot deformity is noted. There is subtle marrow edema   within the medial sesamoid of the great toe.  SYNOVIUM/ JOINT FLUID: No joint effusion  TENDONS: The tendons are intact. No large tenosynovitis.  MUSCLES: Marked atrophy and edema within the intrinsic musculature the   foot.  NEUROVASCULAR STRUCTURES: Preserved  SUBCUTANEOUS SOFT TISSUES: Skin wounds are seen along the plantar surface   of the forefoot. No drainable collection is seen. There is moderate   thickening of the skin noted.    IMPRESSION: Degraded by motion artifact.    1.  Multiple skin wounds are marked soft tissue swelling concerning for   cellulitis in the proper clinical setting. No drainable collection.  2.  Suspect patchy signal abnormality within the medial sesamoid.   Correlate clinically for a skin wound in this region which would increase   the probability of osteomyelitis. Differential includes reactive osteitis.    --- End of Report ---    < from: Xray Foot AP + Lateral, Right (12.16.24 @ 15:24) >  ACC: [id number] EXAM:  XR FOOT 2 VIEWS RT   ORDERED BY:  MAX LAZARUS     PROCEDURE DATE:  12/16/2024        INTERPRETATION:  Right foot    HISTORY: Osteomyelitis    COMPARISON: 6/4/2015     2 views of the right foot show no evidence of acute fracture. Advanced   arthritic changes of the hindfoot are unchanged. Vascular calcifications   are present.    IMPRESSION: No acute bony pathology.    Further information may be obtained from the patient's subsequent MRI of   the right foot.      Thank you for this referral.    --- End of Report ---    Imaging Personally Reviewed:  YES    Consultant(s) Notes Reviewed:   YES    Care Discussed with Consultants : YES     Plan of care was discussed with patient and /or primary care giver; all questions and concerns were addressed and care was aligned with patient's wishes.

## 2024-12-26 NOTE — PROGRESS NOTE ADULT - SUBJECTIVE AND OBJECTIVE BOX
INTERVAL HPI/OVERNIGHT EVENTS:  Pt resting comfortably. No acute complaints.     MEDICATIONS  (STANDING):  amLODIPine   Tablet 5 milliGRAM(s) Oral daily  amoxicillin  500 milliGRAM(s)/clavulanate 1 Tablet(s) Oral every 24 hours  calcitriol   Capsule 1 MICROGram(s) Oral <User Schedule>  epoetin rudolph-epbx (RETACRIT) Injectable 6000 Unit(s) IV Push <User Schedule>  heparin   Injectable 5000 Unit(s) SubCutaneous every 8 hours  insulin lispro (ADMELOG) corrective regimen sliding scale   SubCutaneous three times a day before meals  insulin lispro (ADMELOG) corrective regimen sliding scale   SubCutaneous at bedtime  latanoprost 0.005% Ophthalmic Solution 1 Drop(s) Both EYES at bedtime  sevelamer carbonate 1600 milliGRAM(s) Oral three times a day with meals    MEDICATIONS  (PRN):  acetaminophen     Tablet .. 650 milliGRAM(s) Oral every 6 hours PRN Temp greater or equal to 38C (100.4F), Mild Pain (1 - 3)  ondansetron Injectable 4 milliGRAM(s) IV Push every 8 hours PRN Nausea and/or Vomiting      Vital Signs Last 24 Hrs  T(C): 36.8 (26 Dec 2024 05:20), Max: 36.8 (26 Dec 2024 05:20)  T(F): 98.3 (26 Dec 2024 05:20), Max: 98.3 (26 Dec 2024 05:20)  HR: 75 (26 Dec 2024 11:00) (66 - 75)  BP: 124/69 (26 Dec 2024 11:00) (124/69 - 163/71)  BP(mean): 87 (25 Dec 2024 20:40) (87 - 101)  RR: 17 (26 Dec 2024 05:20) (16 - 18)  SpO2: 94% (26 Dec 2024 11:00) (94% - 96%)    Parameters below as of 26 Dec 2024 11:00  Patient On (Oxygen Delivery Method): room air    Physical:  General: A&Ox3. NAD.  Abdomen: Soft nondistended, nontender  Scrotum: spontaneous drainage of abscess with expression of ~2-3 cc of purulence; nontender to palpation      LABS:                      10.9   8.84  )-----------( 226      ( 26 Dec 2024 06:52 )             32.2             12-26    135  |  100  |  32[H]  ----------------------------<  117[H]  4.3   |  28  |  7.19[H]    Ca    9.3      26 Dec 2024 06:52

## 2024-12-27 LAB
ANION GAP SERPL CALC-SCNC: 8 MMOL/L — SIGNIFICANT CHANGE UP (ref 5–17)
BUN SERPL-MCNC: 39 MG/DL — HIGH (ref 7–18)
CALCIUM SERPL-MCNC: 9.4 MG/DL — SIGNIFICANT CHANGE UP (ref 8.4–10.5)
CHLORIDE SERPL-SCNC: 99 MMOL/L — SIGNIFICANT CHANGE UP (ref 96–108)
CO2 SERPL-SCNC: 29 MMOL/L — SIGNIFICANT CHANGE UP (ref 22–31)
CREAT SERPL-MCNC: 8.82 MG/DL — HIGH (ref 0.5–1.3)
EGFR: 6 ML/MIN/1.73M2 — LOW
GLUCOSE BLDC GLUCOMTR-MCNC: 106 MG/DL — HIGH (ref 70–99)
GLUCOSE BLDC GLUCOMTR-MCNC: 117 MG/DL — HIGH (ref 70–99)
GLUCOSE BLDC GLUCOMTR-MCNC: 122 MG/DL — HIGH (ref 70–99)
GLUCOSE BLDC GLUCOMTR-MCNC: 128 MG/DL — HIGH (ref 70–99)
GLUCOSE BLDC GLUCOMTR-MCNC: 141 MG/DL — HIGH (ref 70–99)
GLUCOSE SERPL-MCNC: 195 MG/DL — HIGH (ref 70–99)
HCT VFR BLD CALC: 31.2 % — LOW (ref 39–50)
HCV AB S/CO SERPL IA: 0.16 S/CO — SIGNIFICANT CHANGE UP (ref 0–0.99)
HCV AB SERPL-IMP: SIGNIFICANT CHANGE UP
HGB BLD-MCNC: 10.4 G/DL — LOW (ref 13–17)
MCHC RBC-ENTMCNC: 32.4 PG — SIGNIFICANT CHANGE UP (ref 27–34)
MCHC RBC-ENTMCNC: 33.3 G/DL — SIGNIFICANT CHANGE UP (ref 32–36)
MCV RBC AUTO: 97.2 FL — SIGNIFICANT CHANGE UP (ref 80–100)
NRBC # BLD: 0 /100 WBCS — SIGNIFICANT CHANGE UP (ref 0–0)
PLATELET # BLD AUTO: 219 K/UL — SIGNIFICANT CHANGE UP (ref 150–400)
POTASSIUM SERPL-MCNC: 4.4 MMOL/L — SIGNIFICANT CHANGE UP (ref 3.5–5.3)
POTASSIUM SERPL-SCNC: 4.4 MMOL/L — SIGNIFICANT CHANGE UP (ref 3.5–5.3)
RBC # BLD: 3.21 M/UL — LOW (ref 4.2–5.8)
RBC # FLD: 14.1 % — SIGNIFICANT CHANGE UP (ref 10.3–14.5)
SODIUM SERPL-SCNC: 136 MMOL/L — SIGNIFICANT CHANGE UP (ref 135–145)
WBC # BLD: 8.69 K/UL — SIGNIFICANT CHANGE UP (ref 3.8–10.5)
WBC # FLD AUTO: 8.69 K/UL — SIGNIFICANT CHANGE UP (ref 3.8–10.5)

## 2024-12-27 PROCEDURE — 99232 SBSQ HOSP IP/OBS MODERATE 35: CPT

## 2024-12-27 RX ORDER — CHLORHEXIDINE GLUCONATE 1.2 MG/ML
1 RINSE ORAL DAILY
Refills: 0 | Status: DISCONTINUED | OUTPATIENT
Start: 2024-12-27 | End: 2025-01-01

## 2024-12-27 RX ADMIN — SEVELAMER CARBONATE 1600 MILLIGRAM(S): 800 TABLET, FILM COATED ORAL at 17:37

## 2024-12-27 RX ADMIN — HEPARIN SODIUM 5000 UNIT(S): 1000 INJECTION, SOLUTION INTRAVENOUS; SUBCUTANEOUS at 13:50

## 2024-12-27 RX ADMIN — Medication 1 TABLET(S): at 13:50

## 2024-12-27 RX ADMIN — TAMSULOSIN HYDROCHLORIDE 0.4 MILLIGRAM(S): 0.4 CAPSULE ORAL at 21:34

## 2024-12-27 RX ADMIN — LATANOPROST 1 DROP(S): 50 SOLUTION OPHTHALMIC at 21:34

## 2024-12-27 RX ADMIN — SEVELAMER CARBONATE 1600 MILLIGRAM(S): 800 TABLET, FILM COATED ORAL at 08:18

## 2024-12-27 RX ADMIN — CHLORHEXIDINE GLUCONATE 1 APPLICATION(S): 1.2 RINSE ORAL at 13:49

## 2024-12-27 RX ADMIN — HEPARIN SODIUM 5000 UNIT(S): 1000 INJECTION, SOLUTION INTRAVENOUS; SUBCUTANEOUS at 06:22

## 2024-12-27 RX ADMIN — CALCITRIOL 1 MICROGRAM(S): 0.5 CAPSULE, LIQUID FILLED ORAL at 08:18

## 2024-12-27 RX ADMIN — HEPARIN SODIUM 5000 UNIT(S): 1000 INJECTION, SOLUTION INTRAVENOUS; SUBCUTANEOUS at 21:34

## 2024-12-27 RX ADMIN — EPOETIN ALFA 6000 UNIT(S): 2000 SOLUTION INTRAVENOUS; SUBCUTANEOUS at 12:16

## 2024-12-27 RX ADMIN — SEVELAMER CARBONATE 1600 MILLIGRAM(S): 800 TABLET, FILM COATED ORAL at 13:50

## 2024-12-27 NOTE — PROGRESS NOTE ADULT - ASSESSMENT
67 yo M with PMH of  DM, HTN, ESRD on dialysis ( M, W, F), Last Dialysis 12/13/24,   B/L Charcot deformity and HLD, present to the ED for generalized weakness, chills, subjective fever. Admitted to medicine for generalized weakness, hyperkalemia, right foot ulcer.    MR right Ankle demonstrates ulceration at the plantar surface medially with suspected exposed bone with underlying   osteomyelitis involving the talus and less so often navicular bone. Podiatry follows   OM likely chronic, ID Dr. Brito recommend  bone Bx/debridement when OFF Abx for a period of time with Culture sent to determine directed Ab treatment.     During hospitalization pt developed fever, Started on Cefepime and vancomycin. Noted with left buttock abscess,  Surgery consulted, sp I&D at bedside. ID followed, Blood culture growing Staphylococcus. completed course of vancomycin. Repeat blood cultures neg on 12/17.   Pt was also found to have a scrotal abscess shown on CT pelvis, Urology following, no surgical intervention was needed due to spontaneous drainage. Remains on PO abx Augmentin 500 mg qd for 4 weeks.   Patient is now optimized for discharge, pending Hepatitis C result, awaiting placement for PIA with HD.

## 2024-12-27 NOTE — PROGRESS NOTE ADULT - SUBJECTIVE AND OBJECTIVE BOX
Patient is a 69y old  Male who presents with a chief complaint of generalized weakness (27 Dec 2024 12:27)    OVERNIGHT EVENTS: no acute changes.     Pt is aox3, comfortable appearing, tolerating PO diet, requires assistance out of bed to chair.   Pt also tolerated HD session well this morning.     REVIEW OF SYSTEMS:  CONSTITUTIONAL: No fever, chills  ENMT:  No difficulty hearing, no change in vision  NECK: No pain or stiffness  RESPIRATORY: No cough, SOB  CARDIOVASCULAR: No chest pain, palpitations  GASTROINTESTINAL: No abdominal pain. No nausea, vomiting, or diarrhea  GENITOURINARY: No dysuria  NEUROLOGICAL: No HA  SKIN: No itching, burning, rashes, or lesions   LYMPH NODES: No enlarged glands  ENDOCRINE: No heat or cold intolerance; No hair loss  MUSCULOSKELETAL: No joint pain or swelling; No muscle, back, or extremity pain  PSYCHIATRIC: No depression, anxiety  HEME/LYMPH: No easy bruising, or bleeding gums    T(C): 36.7 (12-27-24 @ 13:45), Max: 36.8 (12-27-24 @ 05:44)  HR: 69 (12-27-24 @ 13:45) (65 - 77)  BP: 93/57 (12-27-24 @ 13:45) (93/57 - 136/74)  RR: 17 (12-27-24 @ 13:45) (17 - 18)  SpO2: 98% (12-27-24 @ 13:45) (94% - 98%)  Wt(kg): --Vital Signs Last 24 Hrs  T(C): 36.7 (27 Dec 2024 13:45), Max: 36.8 (27 Dec 2024 05:44)  T(F): 98 (27 Dec 2024 13:45), Max: 98.3 (27 Dec 2024 05:44)  HR: 69 (27 Dec 2024 13:45) (65 - 77)  BP: 93/57 (27 Dec 2024 13:45) (93/57 - 136/74)  BP(mean): 72 (27 Dec 2024 05:44) (72 - 72)  RR: 17 (27 Dec 2024 13:45) (17 - 18)  SpO2: 98% (27 Dec 2024 13:45) (94% - 98%)    Parameters below as of 27 Dec 2024 13:45  Patient On (Oxygen Delivery Method): room air        MEDICATIONS  (STANDING):  amLODIPine   Tablet 5 milliGRAM(s) Oral daily  amoxicillin  500 milliGRAM(s)/clavulanate 1 Tablet(s) Oral every 24 hours  calcitriol   Capsule 1 MICROGram(s) Oral <User Schedule>  chlorhexidine 2% Cloths 1 Application(s) Topical daily  epoetin rudolph-epbx (RETACRIT) Injectable 6000 Unit(s) IV Push <User Schedule>  heparin   Injectable 5000 Unit(s) SubCutaneous every 8 hours  insulin lispro (ADMELOG) corrective regimen sliding scale   SubCutaneous three times a day before meals  insulin lispro (ADMELOG) corrective regimen sliding scale   SubCutaneous at bedtime  latanoprost 0.005% Ophthalmic Solution 1 Drop(s) Both EYES at bedtime  sevelamer carbonate 1600 milliGRAM(s) Oral three times a day with meals  tamsulosin 0.4 milliGRAM(s) Oral at bedtime    MEDICATIONS  (PRN):  acetaminophen     Tablet .. 650 milliGRAM(s) Oral every 6 hours PRN Temp greater or equal to 38C (100.4F), Mild Pain (1 - 3)  ondansetron Injectable 4 milliGRAM(s) IV Push every 8 hours PRN Nausea and/or Vomiting      PHYSICAL EXAM:  GENERAL: NAD  EYES: clear conjunctiva  ENMT: Moist mucous membranes  NECK: Supple, No JVD, Normal thyroid  CHEST/LUNG: Clear to auscultation bilaterally; No rales, rhonchi, wheezing, or rubs  HEART: S1, S2, Regular rate and rhythm  ABDOMEN: Soft, Nontender, Nondistended; Bowel sounds present  : +right scrotal skin thickening, small open wound present with serosanguinous drainage.   NEURO: Alert & Oriented X3  EXTREMITIES: +right foot diabetic ulcers wrapped with ace bandage +left lower leg/foot with hyperkeratosis.   LYMPH: No lymphadenopathy noted  SKIN: +left buttock abscess s/p I&D with yellow purulent drainage.     Consultant(s) Notes Reviewed:  [x ] YES  [ ] NO  Care Discussed with Consultants/Other Providers [ x] YES  [ ] NO    LABS:                        10.4   8.69  )-----------( 219      ( 27 Dec 2024 09:20 )             31.2     12-27    136  |  99  |  39[H]  ----------------------------<  195[H]  4.4   |  29  |  8.82[H]    Ca    9.4      27 Dec 2024 09:20        CAPILLARY BLOOD GLUCOSE      POCT Blood Glucose.: 117 mg/dL (27 Dec 2024 14:12)  POCT Blood Glucose.: 128 mg/dL (27 Dec 2024 12:13)  POCT Blood Glucose.: 106 mg/dL (27 Dec 2024 07:52)  POCT Blood Glucose.: 108 mg/dL (26 Dec 2024 21:37)        Urinalysis Basic - ( 27 Dec 2024 09:20 )    Color: x / Appearance: x / SG: x / pH: x  Gluc: 195 mg/dL / Ketone: x  / Bili: x / Urobili: x   Blood: x / Protein: x / Nitrite: x   Leuk Esterase: x / RBC: x / WBC x   Sq Epi: x / Non Sq Epi: x / Bacteria: x        RADIOLOGY & ADDITIONAL TESTS:  < from: CT Pelvis w/ IV Cont (12.23.24 @ 17:00) >    ACC: [id number] EXAM:  CT PELVIS ONLY IC   ORDERED BY: FELICITAS ANTUNEZ     PROCEDURE DATE:  12/23/2024          INTERPRETATION:  CLINICAL INFORMATION: Scrotal collection    COMPARISON: CT pelvis May 10, 2018    CONTRAST/COMPLICATIONS:  IV Contrast: Omnipaque 350  90 cc administered   10 cc discarded  Oral Contrast: NONE  .    PROCEDURE:  CT of the Pelvis was performed.  Sagittal and coronal reformats were performed.    FINDINGS:  BLADDER: Several small stones at both ureterovesical junctions measuring   up to 2 mm on the right and 3 mm on the left. No ureteral dilatation.  REPRODUCTIVE ORGANS: Prostate is normal in size. Diffuse scrotal edema.   Rim-enhancing extratesticular right scrotal fluid collection measuring   2.1 x 1.3 x 0.8 cm.  LYMPH NODES: No pelvic lymphadenopathy.    VISUALIZED PORTIONS:  ABDOMINAL ORGANS: Within normal limits.  BOWEL: Colonic diverticulosis. Visualized portion of the appendix is   normal.  PERITONEUM/RETROPERITONEUM: Within normal limits.  VESSELS: Atherosclerotic changes.  ABDOMINAL WALL: Within normal limits.  BONES: Within normal limits for age.    IMPRESSION:  2.1 cm right scrotal fluid collection suspicious for abscess.  Several stones and both ureterovesical junctions without ureteral   dilatation.        --- End of Report ---            SHIELA CONLEY MD; Attending Radiologist  This document has been electronically signed. Dec 24 2024  8:47AM    < end of copied text >  < from: MR Ankle No Cont, Right (12.18.24 @ 20:26) >  ACC: [id number] EXAM:  MR ANKLE RT   ORDERED BY: REYES GILBERT     PROCEDURE DATE:  12/18/2024          INTERPRETATION:  MRI of the right ankle    INDICATION: Concern for osteomyelitis    COMPARISON: MRI of the foot performed 12/17/2024 and CT of thefoot   performed 12/16/2024.    TECHNIQUE: Multiplanar multisequence MRI of the right ankle    FINDINGS:    There is presence of advanced Charcot arthropathy with severe resultant   valgus and pes planus deformities.    There is soft tissue ulceration at the plantar surface of the foot   medially adjacent to the talus with suspected exposed bone. There is   underlying high STIR weighted signal and low T1 weighted signal within   the majority of the medial talus with imperceptible cortex. There ishigh   T2 and intermediate T1-weighted signal predominantly involving the   navicular bone at the talonavicular joint (series 10, image 21). The   remaining osseous structures are without convincing evidence of   osteomyelitis.    There is no drainable fluid collection. There is prominent soft tissue   edema at the medial and plantar side of the ankle and lesser at the   lateral ankle. There is diffuse muscle atrophy.    The included proximal portions of the Achilles tendon, anterior extensor,   medial flexor, and peroneal tendons without high-grade tear.    IMPRESSION: MRI of the right ankle demonstrates ulceration at the plantar   surface medially with suspected exposed bone with underlying   osteomyelitis involving the talus and less so ofthe navicular bone. No   drainable fluid collection.    End-stage arthritis with resultant planovalgus deformity.    --- End of Report ---             BENJI JUNE MD; Attending Radiologist  This document has been electronically signed. Dec 19 2024  8:24AM    < end of copied text >  < from: MR Foot No Cont, Right (12.17.24 @ 15:03) >    ACC: [id number] EXAM:  MR FOOT RT   ORDERED BY: SALVADOR PAREKH     PROCEDURE DATE:  12/17/2024          INTERPRETATION:  MR FOOT RIGHT dated 12/17/2024 3:03 PM    INDICATION: Pain and swelling. Skin ulcer.    COMPARISON: Foot radiographs dated 12/16/2024    TECHNIQUE: Multi-sequential, multiplanar MRI imaging of the right midfoot   and forefoot was performed per standard protocol.    FINDINGS: Degraded by motion artifact.    BONE MARROW: Flat foot deformity is noted. There is subtle marrow edema   within the medial sesamoid of the great toe.  SYNOVIUM/ JOINT FLUID: No joint effusion  TENDONS: The tendons are intact. No large tenosynovitis.  MUSCLES: Marked atrophy and edema within the intrinsic musculature the   foot.  NEUROVASCULAR STRUCTURES: Preserved  SUBCUTANEOUS SOFT TISSUES: Skin wounds are seen along the plantar surface   of the forefoot. No drainable collection is seen. There is moderate   thickening of the skin noted.    IMPRESSION: Degraded by motion artifact.    1.  Multiple skin wounds are marked soft tissue swelling concerning for   cellulitis in the proper clinical setting. No drainable collection.  2.  Suspect patchy signal abnormality within the medial sesamoid.   Correlate clinically for a skin wound in this region which would increase   the probability of osteomyelitis. Differential includes reactive osteitis.    --- End of Report ---            ORTEGA FALCON MD; Attending Radiologist  This document has been electronically signed. Dec 17 2024  3:17PM    < end of copied text >    Imaging Personally Reviewed:  [ ] YES  [ ] NO

## 2024-12-27 NOTE — PROGRESS NOTE ADULT - ASSESSMENT
67 yo M with PMH of  DM, HTN, ESRD on dialysis ( M, W, F), Last Dialysis 12/13/24,   B/L Charcot deformity and HLD, present to the ED for generalized weakness, chills, subjective fever. Admitted to medicine for generalized weakness, hyperkalemia, right foot ulcer.    MR right Ankle demonstrates ulceration at the plantar surface medially with suspected exposed bone with underlying   osteomyelitis involving the talus and less so often navicular bone. Podiatry follows   OM likely chronic, ID Dr. Brito recommended outpatient bone Bx/debridement when OFF Abx for a period of time with Culture sent to determine directed Ab treatment.     During hospitalization pt developed fever, Started on Cefepime and vancomycin. Noted with left buttock abscess,  Surgery consulted, sp I&D at bedside. ID followed, Blood culture growing Staphylococcus. completed course of vancomycin. Repeat blood cultures neg on 12/17.   Pt was also found to have a scrotal abscess shown on CT pelvis, Urology following, no surgical intervention was needed due to spontaneous drainage. Remains on PO abx Augmentin 500 mg qd for 4 weeks 12/25-1/21.   Patient is now optimized for discharge, pending Hepatitis C result, awaiting placement for PIA with HD.      67 yo M with PMH of  DM, HTN, ESRD on dialysis ( M, W, F), Last Dialysis 12/13/24,   B/L Charcot deformity and HLD, present to the ED for generalized weakness, chills, subjective fever. Admitted to medicine for generalized weakness, hyperkalemia, right foot ulcer.    MR right Ankle/ foot with concern for OM likely chronic, ID Dr. Brito recommended outpatient bone Bx/debridement when OFF Abx for a period of time with Culture sent to determine directed Ab treatment.     During hospitalization pt developed fever, Started on Cefepime and vancomycin. Noted with left buttock abscess,  Surgery consulted, sp I&D at bedside. ID followed, Blood culture growing Staphylococcus. completed course of vancomycin. Repeat blood cultures neg on 12/17.   Pt was also found to have a scrotal abscess shown on CT pelvis, Urology following, no surgical intervention was needed due to spontaneous drainage. Remains on PO abx Augmentin 500 mg qd for 4 weeks 12/25-1/21.   Patient is now optimized for discharge, pending Hepatitis C result, awaiting placement for PIA with HD.

## 2024-12-27 NOTE — PROGRESS NOTE ADULT - ASSESSMENT
Patient is a 69yo Male with ESRD on HD via Rt AVG, HTN, 2HPT, Anemia, LE venous stasis with Rt foot wound presents with weakness. Pt a/w Rt foot ulcer and hyperkalemia. Nephrology consulted for ESRD status.     1) ESRD: Pt seen on HD today 12/27, hemodynamically stable, tolerating UF goal ~1.5L. Plan for next HD 12/30. c/w MWF schedule. Pt pending PIA placement; pt may go to rehab and have HD at [facility] via stretcher or wheelchair. Monitor electrolytes  2) Hyperkalemia: resolved. c/w low potassium diet.    Monitor serum potassium  3) HTN with ESRD: BP improved.  Continue Amlodipine at reduced dose of 5mg PO daily. Continue to hold Coreg 25mg PO bid.  c/w low sodium diet. Monitor BP.  4) Anemia of renal disease: Hb acceptable. c/w Epogen 6000 units IV tiw. Monitor Hb.  5) 2HPT: Corrected serum calcium & last serum phos acceptable.  PTHi 441. c/w calcitriol 1mcg PO MWF. Pt on Lanthanum 2000mg PO tid with meals which is unavailable at [facility]. c/w Sevelamer 1600mg PO tid with meals and  low phos diet. Monitor serum calcium and phosphorus.    HepBsAg was neg on outpt labs on 12/11/24; repeat HepBsAg neg; likely positive HepBsAg on 12/16 is an error.     Plan discussed with Kendall AYALA    [facility] NEPHROLOGY  Arden Tsang M.D.  Freeman Holland D.O.  Janny Chirinos M.D.  MD Ambar Mccarty, MSN, ANP-C    Telephone: [phone]  Facsimile: [phone]    [facility] [place] [address], #[facility]  [place] [address]

## 2024-12-27 NOTE — PROGRESS NOTE ADULT - PROBLEM SELECTOR PLAN 1
Purulent drainage noted from scrotum   -Wound cx grew Proteus Mirabilis  -CT Pelvis: 2.1 cm right scrotal fluid collection suspicious for abscess. Several stones and both ureterovesical junctions without ureteral dilatation.  -C/w Augmentin 500mg PO q24h (renally dosed)-- will need 4 weeks 12/25-1/21   -c/w chg cleaning daily to right scrotal wound    -c/w daily dressing changes as needed - gauze   -ID Dr. Brito following

## 2024-12-27 NOTE — PROGRESS NOTE ADULT - PROBLEM SELECTOR PLAN 2
-HD MWF in the community  -C/w Sevelamer  -Last  HD 12/27  -monitor BMP, phos serum   -Nephro Dr. Chirinos following  -SW following outpt reinstatement  -Hep B panel resulted  -f/u Hep C ab

## 2024-12-27 NOTE — PROGRESS NOTE ADULT - ASSESSMENT
69M with hx of T2DM, hidradenitis suppurativa a/w generalized weakness now with purulent drainage from scrotum   12/23: scrotal exam stable with spontaneous drainage  12/24: scrotal exam stable, CT pelvis demonstrates 2.1 cm right scrotal fluid collection suspicious for abscess and bilateral UVJ stones without ureteral dilatation  wbc has been downtrending  12/27: scrotal exam stable, decreasing drainage amount, WBC continues to downtrend    Plan   - No indication for scrotal exploration or drainage of abscess as patient continues to improve clinically on antibiotics with spontaneous drainage, with downtrending WBC  - Flomax 0.4 qhs as medical expulsive therapy for asymptomatic bilateral non-obstructing UVJ stones  - keep area clean and dry, may include sitz baths   - IV antibiotics, follow ID recommendations   - downtrending WBC, afebrile  - strict glycemic control   - HD as per neph   - remainder of care as per primary team   - case signed out to PA covering [id number]

## 2024-12-27 NOTE — PROGRESS NOTE ADULT - SUBJECTIVE AND OBJECTIVE BOX
Patient is a 69y old  Male who presents with a chief complaint of generalized weakness (27 Dec 2024 12:27)    OVERNIGHT EVENTS: no acute changes.     Pt is aox3, comfortable appearing, tolerating PO diet, requires assistance out of bed to chair.   Pt also tolerated HD session well this morning.     REVIEW OF SYSTEMS:  CONSTITUTIONAL: No fever, chills  ENMT:  No difficulty hearing, no change in vision  NECK: No pain or stiffness  RESPIRATORY: No cough, SOB  CARDIOVASCULAR: No chest pain, palpitations  GASTROINTESTINAL: No abdominal pain. No nausea, vomiting, or diarrhea  GENITOURINARY: No dysuria  NEUROLOGICAL: No HA  SKIN: No itching, burning, rashes, or lesions   LYMPH NODES: No enlarged glands  ENDOCRINE: No heat or cold intolerance; No hair loss  MUSCULOSKELETAL: No joint pain or swelling; No muscle, back, or extremity pain  PSYCHIATRIC: No depression, anxiety  HEME/LYMPH: No easy bruising, or bleeding gums    T(C): 36.7 (12-27-24 @ 13:45), Max: 36.8 (12-27-24 @ 05:44)  HR: 69 (12-27-24 @ 13:45) (65 - 77)  BP: 93/57 (12-27-24 @ 13:45) (93/57 - 136/74)  RR: 17 (12-27-24 @ 13:45) (17 - 18)  SpO2: 98% (12-27-24 @ 13:45) (94% - 98%)  Wt(kg): --Vital Signs Last 24 Hrs  T(C): 36.7 (27 Dec 2024 13:45), Max: 36.8 (27 Dec 2024 05:44)  T(F): 98 (27 Dec 2024 13:45), Max: 98.3 (27 Dec 2024 05:44)  HR: 69 (27 Dec 2024 13:45) (65 - 77)  BP: 93/57 (27 Dec 2024 13:45) (93/57 - 136/74)  BP(mean): 72 (27 Dec 2024 05:44) (72 - 72)  RR: 17 (27 Dec 2024 13:45) (17 - 18)  SpO2: 98% (27 Dec 2024 13:45) (94% - 98%)    Parameters below as of 27 Dec 2024 13:45  Patient On (Oxygen Delivery Method): room air        MEDICATIONS  (STANDING):  amLODIPine   Tablet 5 milliGRAM(s) Oral daily  amoxicillin  500 milliGRAM(s)/clavulanate 1 Tablet(s) Oral every 24 hours  calcitriol   Capsule 1 MICROGram(s) Oral <User Schedule>  chlorhexidine 2% Cloths 1 Application(s) Topical daily  epoetin rudolph-epbx (RETACRIT) Injectable 6000 Unit(s) IV Push <User Schedule>  heparin   Injectable 5000 Unit(s) SubCutaneous every 8 hours  insulin lispro (ADMELOG) corrective regimen sliding scale   SubCutaneous three times a day before meals  insulin lispro (ADMELOG) corrective regimen sliding scale   SubCutaneous at bedtime  latanoprost 0.005% Ophthalmic Solution 1 Drop(s) Both EYES at bedtime  sevelamer carbonate 1600 milliGRAM(s) Oral three times a day with meals  tamsulosin 0.4 milliGRAM(s) Oral at bedtime    MEDICATIONS  (PRN):  acetaminophen     Tablet .. 650 milliGRAM(s) Oral every 6 hours PRN Temp greater or equal to 38C (100.4F), Mild Pain (1 - 3)  ondansetron Injectable 4 milliGRAM(s) IV Push every 8 hours PRN Nausea and/or Vomiting      PHYSICAL EXAM:  GENERAL: NAD  EYES: clear conjunctiva  ENMT: Moist mucous membranes  NECK: Supple, No JVD, Normal thyroid  CHEST/LUNG: Clear to auscultation bilaterally; No rales, rhonchi, wheezing, or rubs  HEART: S1, S2, Regular rate and rhythm  ABDOMEN: Soft, Nontender, Nondistended; Bowel sounds present  : +right scrotal skin thickening, small open wound present with serosanguinous drainage.   NEURO: Alert & Oriented X3  EXTREMITIES: +right foot diabetic ulcers wrapped with ace bandage +left lower leg/foot with hyperkeratosis.   LYMPH: No lymphadenopathy noted  SKIN: +left buttock abscess s/p I&D with yellow purulent drainage.     Consultant(s) Notes Reviewed:  [x ] YES  [ ] NO  Care Discussed with Consultants/Other Providers [ x] YES  [ ] NO    LABS:                        10.4   8.69  )-----------( 219      ( 27 Dec 2024 09:20 )             31.2     12-27    136  |  99  |  39[H]  ----------------------------<  195[H]  4.4   |  29  |  8.82[H]    Ca    9.4      27 Dec 2024 09:20        CAPILLARY BLOOD GLUCOSE      POCT Blood Glucose.: 117 mg/dL (27 Dec 2024 14:12)  POCT Blood Glucose.: 128 mg/dL (27 Dec 2024 12:13)  POCT Blood Glucose.: 106 mg/dL (27 Dec 2024 07:52)  POCT Blood Glucose.: 108 mg/dL (26 Dec 2024 21:37)        Urinalysis Basic - ( 27 Dec 2024 09:20 )    Color: x / Appearance: x / SG: x / pH: x  Gluc: 195 mg/dL / Ketone: x  / Bili: x / Urobili: x   Blood: x / Protein: x / Nitrite: x   Leuk Esterase: x / RBC: x / WBC x   Sq Epi: x / Non Sq Epi: x / Bacteria: x        RADIOLOGY & ADDITIONAL TESTS:  < from: CT Pelvis w/ IV Cont (12.23.24 @ 17:00) >    ACC: [id number] EXAM:  CT PELVIS ONLY IC   ORDERED BY: FELICITAS ANTUNEZ     PROCEDURE DATE:  12/23/2024          INTERPRETATION:  CLINICAL INFORMATION: Scrotal collection    COMPARISON: CT pelvis May 10, 2018    CONTRAST/COMPLICATIONS:  IV Contrast: Omnipaque 350  90 cc administered   10 cc discarded  Oral Contrast: NONE  .    PROCEDURE:  CT of the Pelvis was performed.  Sagittal and coronal reformats were performed.    FINDINGS:  BLADDER: Several small stones at both ureterovesical junctions measuring   up to 2 mm on the right and 3 mm on the left. No ureteral dilatation.  REPRODUCTIVE ORGANS: Prostate is normal in size. Diffuse scrotal edema.   Rim-enhancing extratesticular right scrotal fluid collection measuring   2.1 x 1.3 x 0.8 cm.  LYMPH NODES: No pelvic lymphadenopathy.    VISUALIZED PORTIONS:  ABDOMINAL ORGANS: Within normal limits.  BOWEL: Colonic diverticulosis. Visualized portion of the appendix is   normal.  PERITONEUM/RETROPERITONEUM: Within normal limits.  VESSELS: Atherosclerotic changes.  ABDOMINAL WALL: Within normal limits.  BONES: Within normal limits for age.    IMPRESSION:  2.1 cm right scrotal fluid collection suspicious for abscess.  Several stones and both ureterovesical junctions without ureteral   dilatation.        --- End of Report ---            SHIELA CONLEY MD; Attending Radiologist  This document has been electronically signed. Dec 24 2024  8:47AM    < end of copied text >  < from: MR Ankle No Cont, Right (12.18.24 @ 20:26) >  ACC: [id number] EXAM:  MR ANKLE RT   ORDERED BY: REYES GILBERT     PROCEDURE DATE:  12/18/2024          INTERPRETATION:  MRI of the right ankle    INDICATION: Concern for osteomyelitis    COMPARISON: MRI of the foot performed 12/17/2024 and CT of thefoot   performed 12/16/2024.    TECHNIQUE: Multiplanar multisequence MRI of the right ankle    FINDINGS:    There is presence of advanced Charcot arthropathy with severe resultant   valgus and pes planus deformities.    There is soft tissue ulceration at the plantar surface of the foot   medially adjacent to the talus with suspected exposed bone. There is   underlying high STIR weighted signal and low T1 weighted signal within   the majority of the medial talus with imperceptible cortex. There ishigh   T2 and intermediate T1-weighted signal predominantly involving the   navicular bone at the talonavicular joint (series 10, image 21). The   remaining osseous structures are without convincing evidence of   osteomyelitis.    There is no drainable fluid collection. There is prominent soft tissue   edema at the medial and plantar side of the ankle and lesser at the   lateral ankle. There is diffuse muscle atrophy.    The included proximal portions of the Achilles tendon, anterior extensor,   medial flexor, and peroneal tendons without high-grade tear.    IMPRESSION: MRI of the right ankle demonstrates ulceration at the plantar   surface medially with suspected exposed bone with underlying   osteomyelitis involving the talus and less so ofthe navicular bone. No   drainable fluid collection.    End-stage arthritis with resultant planovalgus deformity.    --- End of Report ---             BENJI JUNE MD; Attending Radiologist  This document has been electronically signed. Dec 19 2024  8:24AM    < end of copied text >  < from: MR Foot No Cont, Right (12.17.24 @ 15:03) >    ACC: [id number] EXAM:  MR FOOT RT   ORDERED BY: SALVADOR PAREKH     PROCEDURE DATE:  12/17/2024          INTERPRETATION:  MR FOOT RIGHT dated 12/17/2024 3:03 PM    INDICATION: Pain and swelling. Skin ulcer.    COMPARISON: Foot radiographs dated 12/16/2024    TECHNIQUE: Multi-sequential, multiplanar MRI imaging of the right midfoot   and forefoot was performed per standard protocol.    FINDINGS: Degraded by motion artifact.    BONE MARROW: Flat foot deformity is noted. There is subtle marrow edema   within the medial sesamoid of the great toe.  SYNOVIUM/ JOINT FLUID: No joint effusion  TENDONS: The tendons are intact. No large tenosynovitis.  MUSCLES: Marked atrophy and edema within the intrinsic musculature the   foot.  NEUROVASCULAR STRUCTURES: Preserved  SUBCUTANEOUS SOFT TISSUES: Skin wounds are seen along the plantar surface   of the forefoot. No drainable collection is seen. There is moderate   thickening of the skin noted.    IMPRESSION: Degraded by motion artifact.    1.  Multiple skin wounds are marked soft tissue swelling concerning for   cellulitis in the proper clinical setting. No drainable collection.  2.  Suspect patchy signal abnormality within the medial sesamoid.   Correlate clinically for a skin wound in this region which would increase   the probability of osteomyelitis. Differential includes reactive osteitis.    --- End of Report ---            ORTEGA FALCON MD; Attending Radiologist  This document has been electronically signed. Dec 17 2024  3:17PM    < end of copied text >    Imaging Personally Reviewed:  [ ] YES  [ ] NO     Patient is a 69y old  Male who presents with a chief complaint of generalized weakness (27 Dec 2024 12:27)    OVERNIGHT EVENTS: no acute changes.     Pt is aox3, comfortable appearing, tolerating PO diet, requires assistance out of bed to chair.   Pt also tolerated HD session well this morning.     REVIEW OF SYSTEMS:  CONSTITUTIONAL: No fever, chills  ENMT:  No difficulty hearing, no change in vision  NECK: No pain or stiffness  RESPIRATORY: No cough, SOB  CARDIOVASCULAR: No chest pain, palpitations  GASTROINTESTINAL: No abdominal pain. No nausea, vomiting, or diarrhea  GENITOURINARY: No dysuria  NEUROLOGICAL: No HA  SKIN: No itching, burning, rashes, or lesions   LYMPH NODES: No enlarged glands  ENDOCRINE: No heat or cold intolerance; No hair loss  MUSCULOSKELETAL: No joint pain or swelling; No muscle, back, or extremity pain  PSYCHIATRIC: No depression, anxiety  HEME/LYMPH: No easy bruising, or bleeding gums    T(C): 36.7 (12-27-24 @ 13:45), Max: 36.8 (12-27-24 @ 05:44)  HR: 69 (12-27-24 @ 13:45) (65 - 77)  BP: 93/57 (12-27-24 @ 13:45) (93/57 - 136/74)  RR: 17 (12-27-24 @ 13:45) (17 - 18)  SpO2: 98% (12-27-24 @ 13:45) (94% - 98%)  Wt(kg): --Vital Signs Last 24 Hrs  T(C): 36.7 (27 Dec 2024 13:45), Max: 36.8 (27 Dec 2024 05:44)  T(F): 98 (27 Dec 2024 13:45), Max: 98.3 (27 Dec 2024 05:44)  HR: 69 (27 Dec 2024 13:45) (65 - 77)  BP: 93/57 (27 Dec 2024 13:45) (93/57 - 136/74)  BP(mean): 72 (27 Dec 2024 05:44) (72 - 72)  RR: 17 (27 Dec 2024 13:45) (17 - 18)  SpO2: 98% (27 Dec 2024 13:45) (94% - 98%)    Parameters below as of 27 Dec 2024 13:45  Patient On (Oxygen Delivery Method): room air        MEDICATIONS  (STANDING):  amLODIPine   Tablet 5 milliGRAM(s) Oral daily  amoxicillin  500 milliGRAM(s)/clavulanate 1 Tablet(s) Oral every 24 hours  calcitriol   Capsule 1 MICROGram(s) Oral <User Schedule>  chlorhexidine 2% Cloths 1 Application(s) Topical daily  epoetin rudolph-epbx (RETACRIT) Injectable 6000 Unit(s) IV Push <User Schedule>  heparin   Injectable 5000 Unit(s) SubCutaneous every 8 hours  insulin lispro (ADMELOG) corrective regimen sliding scale   SubCutaneous three times a day before meals  insulin lispro (ADMELOG) corrective regimen sliding scale   SubCutaneous at bedtime  latanoprost 0.005% Ophthalmic Solution 1 Drop(s) Both EYES at bedtime  sevelamer carbonate 1600 milliGRAM(s) Oral three times a day with meals  tamsulosin 0.4 milliGRAM(s) Oral at bedtime    MEDICATIONS  (PRN):  acetaminophen     Tablet .. 650 milliGRAM(s) Oral every 6 hours PRN Temp greater or equal to 38C (100.4F), Mild Pain (1 - 3)  ondansetron Injectable 4 milliGRAM(s) IV Push every 8 hours PRN Nausea and/or Vomiting      PHYSICAL EXAM:  GENERAL: NAD  EYES: clear conjunctiva  ENMT: Moist mucous membranes  NECK: Supple, No JVD, Normal thyroid  CHEST/LUNG: Clear to auscultation bilaterally; No rales, rhonchi, wheezing, or rubs  HEART: S1, S2, Regular rate and rhythm  ABDOMEN: Soft, Nontender, Nondistended; Bowel sounds present  : +right scrotal skin thickening, small open wound present with serosanguinous drainage.   NEURO: Alert & Oriented X3  EXTREMITIES: +right FA fistula w/bruit/thrill +right foot diabetic ulcers wrapped with ace bandage +left lower leg/foot with hyperkeratosis.   LYMPH: No lymphadenopathy noted  SKIN: +left buttock abscess s/p I&D with yellow purulent drainage.     Consultant(s) Notes Reviewed:  [x ] YES  [ ] NO  Care Discussed with Consultants/Other Providers [ x] YES  [ ] NO    LABS:                        10.4   8.69  )-----------( 219      ( 27 Dec 2024 09:20 )             31.2     12-27    136  |  99  |  39[H]  ----------------------------<  195[H]  4.4   |  29  |  8.82[H]    Ca    9.4      27 Dec 2024 09:20        CAPILLARY BLOOD GLUCOSE      POCT Blood Glucose.: 117 mg/dL (27 Dec 2024 14:12)  POCT Blood Glucose.: 128 mg/dL (27 Dec 2024 12:13)  POCT Blood Glucose.: 106 mg/dL (27 Dec 2024 07:52)  POCT Blood Glucose.: 108 mg/dL (26 Dec 2024 21:37)        Urinalysis Basic - ( 27 Dec 2024 09:20 )    Color: x / Appearance: x / SG: x / pH: x  Gluc: 195 mg/dL / Ketone: x  / Bili: x / Urobili: x   Blood: x / Protein: x / Nitrite: x   Leuk Esterase: x / RBC: x / WBC x   Sq Epi: x / Non Sq Epi: x / Bacteria: x        RADIOLOGY & ADDITIONAL TESTS:  < from: CT Pelvis w/ IV Cont (12.23.24 @ 17:00) >    ACC: [id number] EXAM:  CT PELVIS ONLY IC   ORDERED BY: FELICITAS ANTUNEZ     PROCEDURE DATE:  12/23/2024          INTERPRETATION:  CLINICAL INFORMATION: Scrotal collection    COMPARISON: CT pelvis May 10, 2018    CONTRAST/COMPLICATIONS:  IV Contrast: Omnipaque 350  90 cc administered   10 cc discarded  Oral Contrast: NONE  .    PROCEDURE:  CT of the Pelvis was performed.  Sagittal and coronal reformats were performed.    FINDINGS:  BLADDER: Several small stones at both ureterovesical junctions measuring   up to 2 mm on the right and 3 mm on the left. No ureteral dilatation.  REPRODUCTIVE ORGANS: Prostate is normal in size. Diffuse scrotal edema.   Rim-enhancing extratesticular right scrotal fluid collection measuring   2.1 x 1.3 x 0.8 cm.  LYMPH NODES: No pelvic lymphadenopathy.    VISUALIZED PORTIONS:  ABDOMINAL ORGANS: Within normal limits.  BOWEL: Colonic diverticulosis. Visualized portion of the appendix is   normal.  PERITONEUM/RETROPERITONEUM: Within normal limits.  VESSELS: Atherosclerotic changes.  ABDOMINAL WALL: Within normal limits.  BONES: Within normal limits for age.    IMPRESSION:  2.1 cm right scrotal fluid collection suspicious for abscess.  Several stones and both ureterovesical junctions without ureteral   dilatation.        --- End of Report ---            SHIELA CONLEY MD; Attending Radiologist  This document has been electronically signed. Dec 24 2024  8:47AM    < end of copied text >  < from: MR Ankle No Cont, Right (12.18.24 @ 20:26) >  ACC: [id number] EXAM:  MR ANKLE RT   ORDERED BY: REYES GILBERT     PROCEDURE DATE:  12/18/2024          INTERPRETATION:  MRI of the right ankle    INDICATION: Concern for osteomyelitis    COMPARISON: MRI of the foot performed 12/17/2024 and CT of thefoot   performed 12/16/2024.    TECHNIQUE: Multiplanar multisequence MRI of the right ankle    FINDINGS:    There is presence of advanced Charcot arthropathy with severe resultant   valgus and pes planus deformities.    There is soft tissue ulceration at the plantar surface of the foot   medially adjacent to the talus with suspected exposed bone. There is   underlying high STIR weighted signal and low T1 weighted signal within   the majority of the medial talus with imperceptible cortex. There ishigh   T2 and intermediate T1-weighted signal predominantly involving the   navicular bone at the talonavicular joint (series 10, image 21). The   remaining osseous structures are without convincing evidence of   osteomyelitis.    There is no drainable fluid collection. There is prominent soft tissue   edema at the medial and plantar side of the ankle and lesser at the   lateral ankle. There is diffuse muscle atrophy.    The included proximal portions of the Achilles tendon, anterior extensor,   medial flexor, and peroneal tendons without high-grade tear.    IMPRESSION: MRI of the right ankle demonstrates ulceration at the plantar   surface medially with suspected exposed bone with underlying   osteomyelitis involving the talus and less so ofthe navicular bone. No   drainable fluid collection.    End-stage arthritis with resultant planovalgus deformity.    --- End of Report ---             BENJI JUNE MD; Attending Radiologist  This document has been electronically signed. Dec 19 2024  8:24AM    < end of copied text >  < from: MR Foot No Cont, Right (12.17.24 @ 15:03) >    ACC: [id number] EXAM:  MR FOOT RT   ORDERED BY: SALVADOR PAREKH     PROCEDURE DATE:  12/17/2024          INTERPRETATION:  MR FOOT RIGHT dated 12/17/2024 3:03 PM    INDICATION: Pain and swelling. Skin ulcer.    COMPARISON: Foot radiographs dated 12/16/2024    TECHNIQUE: Multi-sequential, multiplanar MRI imaging of the right midfoot   and forefoot was performed per standard protocol.    FINDINGS: Degraded by motion artifact.    BONE MARROW: Flat foot deformity is noted. There is subtle marrow edema   within the medial sesamoid of the great toe.  SYNOVIUM/ JOINT FLUID: No joint effusion  TENDONS: The tendons are intact. No large tenosynovitis.  MUSCLES: Marked atrophy and edema within the intrinsic musculature the   foot.  NEUROVASCULAR STRUCTURES: Preserved  SUBCUTANEOUS SOFT TISSUES: Skin wounds are seen along the plantar surface   of the forefoot. No drainable collection is seen. There is moderate   thickening of the skin noted.    IMPRESSION: Degraded by motion artifact.    1.  Multiple skin wounds are marked soft tissue swelling concerning for   cellulitis in the proper clinical setting. No drainable collection.  2.  Suspect patchy signal abnormality within the medial sesamoid.   Correlate clinically for a skin wound in this region which would increase   the probability of osteomyelitis. Differential includes reactive osteitis.    --- End of Report ---            ORTEGA FALCON MD; Attending Radiologist  This document has been electronically signed. Dec 17 2024  3:17PM    < end of copied text >    Imaging Personally Reviewed:  [ ] YES  [ ] NO

## 2024-12-27 NOTE — PROGRESS NOTE ADULT - PROBLEM SELECTOR PLAN 9
From home   -Updated PT miguel arias PIA    -c/w Augmentin 500 mg qd x 4weeks 12/25-1/21  -Outpt podiatry follow up

## 2024-12-27 NOTE — PROGRESS NOTE ADULT - SUBJECTIVE AND OBJECTIVE BOX
[facility] NEPHROLOGY- PROGRESS NOTE    Patient is a 69yo Male with ESRD on HD via Rt AVG, HTN, 2HPT, Anemia, LE venous stasis with Rt foot wound presents with weakness. Pt a/w Rt foot ulcer and hyperkalemia. Nephrology consulted for ESRD status.   MRI Rt foot: + osteomyelitis involving the talus and less so of the navicular bone;    BCx +Staph hominis  12/20: s/p I& D of buttock abscess  +scrotal abscess    Hospital Medications: Medications reviewed.  REVIEW OF SYSTEMS: Pt denies any SOB, chest pain, n/v/d, or abd pain, +Rt foot pain-denies at this time    VITALS:  T(F): 98.2 (12-27-24 @ 09:22), Max: 98.3 (12-27-24 @ 05:44)  HR: 68 (12-27-24 @ 09:22)  BP: 116/58 (12-27-24 @ 09:22)  RR: 18 (12-27-24 @ 09:22)  SpO2: 94% (12-27-24 @ 09:22)  Wt(kg): --        PHYSICAL EXAM:  Gen: NAD, calm, Obese  HEENT: anicteric  Cards: RRR, +S1/S2, no M/G/R  Resp: CTA ant   GI: soft, NT/ND, NABS  Extremities: no b/l LE edema  Access: Rt AVG +thrill +bruit    LABS:  12-27    136  |  99  |  39[H]  ----------------------------<  195[H]  4.4   |  29  |  8.82[H]    Ca    9.4      27 Dec 2024 09:20      Creatinine Trend: 8.82 <--, 7.19 <--, 5.57 <--, 7.23 <--, 5.45 <--, 7.22 <--, 5.77 <--                        10.4   8.69  )-----------( 219      ( 27 Dec 2024 09:20 )             31.2     Urine Studies:  Urinalysis Basic - ( 27 Dec 2024 09:20 )    Color:  / Appearance:  / SG:  / pH:   Gluc: 195 mg/dL / Ketone:   / Bili:  / Urobili:    Blood:  / Protein:  / Nitrite:    Leuk Esterase:  / RBC:  / WBC    Sq Epi:  / Non Sq Epi:  / Bacteria:

## 2024-12-27 NOTE — PROGRESS NOTE ADULT - SUBJECTIVE AND OBJECTIVE BOX
Subjective:  Resting comfortably    Objective:    Vital signs  T(C): , Max: 36.8 (12-27-24 @ 05:44)  HR: 68 (12-27-24 @ 05:44)  BP: 111/52 (12-27-24 @ 05:44)  SpO2: 95% (12-27-24 @ 05:44)  Wt(kg): --    Output       Gen: NAD  Abd: soft, nontender, nondistended  : right scrotum draining decreasing, only 1x 4x4 gauze used to soak up ~5cc purulence before becomes serosanguinous    Labs                        10.9   8.84  )-----------( 226      ( 26 Dec 2024 06:52 )             32.2     26 Dec 2024 06:52    135    |  100    |  32     ----------------------------<  117    4.3     |  28     |  7.19     Ca    9.3        26 Dec 2024 06:52        Urine Cx: ?  Blood Cx: ?      Imaging

## 2024-12-27 NOTE — PROGRESS NOTE ADULT - PROBLEM SELECTOR PLAN 4
left buttock open wound, draining purulent discharge, likely the source of fever   -S/p 12/19 I&D at bedside  -Wound culture -No growth  -S/p vancomycin  -chg cleaning to left buttocks daily   -wound care - rec to apply aquacel + foam dressing   -surgery followed

## 2024-12-28 LAB
GLUCOSE BLDC GLUCOMTR-MCNC: 120 MG/DL — HIGH (ref 70–99)
GLUCOSE BLDC GLUCOMTR-MCNC: 142 MG/DL — HIGH (ref 70–99)
GLUCOSE BLDC GLUCOMTR-MCNC: 150 MG/DL — HIGH (ref 70–99)
GLUCOSE BLDC GLUCOMTR-MCNC: 192 MG/DL — HIGH (ref 70–99)

## 2024-12-28 PROCEDURE — 71045 X-RAY EXAM CHEST 1 VIEW: CPT | Mod: 26

## 2024-12-28 PROCEDURE — 99232 SBSQ HOSP IP/OBS MODERATE 35: CPT

## 2024-12-28 PROCEDURE — 99231 SBSQ HOSP IP/OBS SF/LOW 25: CPT

## 2024-12-28 RX ADMIN — SEVELAMER CARBONATE 1600 MILLIGRAM(S): 800 TABLET, FILM COATED ORAL at 12:09

## 2024-12-28 RX ADMIN — LATANOPROST 1 DROP(S): 50 SOLUTION OPHTHALMIC at 22:04

## 2024-12-28 RX ADMIN — SEVELAMER CARBONATE 1600 MILLIGRAM(S): 800 TABLET, FILM COATED ORAL at 16:53

## 2024-12-28 RX ADMIN — SEVELAMER CARBONATE 1600 MILLIGRAM(S): 800 TABLET, FILM COATED ORAL at 07:57

## 2024-12-28 RX ADMIN — HEPARIN SODIUM 5000 UNIT(S): 1000 INJECTION, SOLUTION INTRAVENOUS; SUBCUTANEOUS at 13:09

## 2024-12-28 RX ADMIN — Medication 1 TABLET(S): at 13:10

## 2024-12-28 RX ADMIN — HEPARIN SODIUM 5000 UNIT(S): 1000 INJECTION, SOLUTION INTRAVENOUS; SUBCUTANEOUS at 05:47

## 2024-12-28 RX ADMIN — HEPARIN SODIUM 5000 UNIT(S): 1000 INJECTION, SOLUTION INTRAVENOUS; SUBCUTANEOUS at 22:04

## 2024-12-28 RX ADMIN — TAMSULOSIN HYDROCHLORIDE 0.4 MILLIGRAM(S): 0.4 CAPSULE ORAL at 22:04

## 2024-12-28 RX ADMIN — CHLORHEXIDINE GLUCONATE 1 APPLICATION(S): 1.2 RINSE ORAL at 12:09

## 2024-12-28 RX ADMIN — ACETAMINOPHEN 650 MILLIGRAM(S): 80 SOLUTION/ DROPS ORAL at 21:10

## 2024-12-28 RX ADMIN — ACETAMINOPHEN 650 MILLIGRAM(S): 80 SOLUTION/ DROPS ORAL at 20:07

## 2024-12-28 NOTE — PROGRESS NOTE ADULT - ASSESSMENT
Patient is a 69yo Male with ESRD on HD via Rt AVG, HTN, 2HPT, Anemia, LE venous stasis with Rt foot wound presents with weakness. Pt a/w Rt foot ulcer and hyperkalemia. Nephrology consulted for ESRD status.     1) ESRD: Last HD 12/27, with mild intradialytic hypotension with net 1.4L removed. Plan for next HD 12/30. c/w MWF schedule. Pt for d/c to [facility] with onsite HD. Monitor electrolytes  2) Hyperkalemia: resolved. c/w low potassium diet.    Monitor serum potassium  3) HTN with ESRD: BP imrpoving.  Continue Amlodipine at reduced dose of 5mg PO daily. Continue to hold Coreg 25mg PO bid.  c/w low sodium diet. Monitor BP.  4) Anemia of renal disease: Hb acceptable. c/w Epogen 6000 units IV tiw. Monitor Hb.  5) 2HPT: Corrected serum calcium & last serum phos acceptable.  PTHi 441. c/w calcitriol 1mcg PO MWF. Pt on Lanthanum 2000mg PO tid with meals which is unavailable at [facility]. c/w Sevelamer 1600mg PO tid with meals and  low phos diet. Monitor serum calcium and phosphorus.    HepBsAg was neg on outpt labs on 12/11/24; repeat HepBsAg neg; likely positive HepBsAg on 12/16 is an error.       [facility] NEPHROLOGY  Arden Tsang M.D.  Freeman Holland D.O.  Janny Chirinos M.D.  MD Ambar Mccarty, MSN, ANP-C    Telephone: [phone]  Facsimile: [phone]    [facility] [place] [address], [address]  [place] [address]

## 2024-12-28 NOTE — PROGRESS NOTE ADULT - SUBJECTIVE AND OBJECTIVE BOX
Podiatry Interval: No acute overnight events noted. Patient denies any other pedal complaints and no constitutional symptoms such as F/C/N/V/SOB. AAOx3, NAD.    Podiatry HPI: Podiatry consulted for 68-year-old male who presents to the ED with his wife with chief complaint of generalized weakness. PMHx of DM, HTN, ESRD on dialysis, Charcot, and HLD. Patient states he woke up very tired this morning and could not stand up to get dressed for his dialysis appointment. Per wife, patient was folded over when she called an ambulance. Patient reports history of Charcot deformity B/L deformity for 10 year and states he had a Charcot procedure for right foot in 2014. Patient states he wears Charcot boots at all times B/L. Per wife, the patient has not had an ulceration in many years until he developed this right foot ulceration 1 month ago. The wife states that she thinks the shape of her 's right foot is changing and it is affecting the way he walks. States they regularly follow up in outpatient podiatry clinic with Dr. Sim. Patient complaining of fever and chills but denies n/v.       Medications acetaminophen     Tablet .. 650 milliGRAM(s) Oral every 6 hours PRN  amLODIPine   Tablet 5 milliGRAM(s) Oral daily  amoxicillin  500 milliGRAM(s)/clavulanate 1 Tablet(s) Oral every 24 hours  calcitriol   Capsule 1 MICROGram(s) Oral <User Schedule>  chlorhexidine 2% Cloths 1 Application(s) Topical daily  epoetin rudolph-epbx (RETACRIT) Injectable 6000 Unit(s) IV Push <User Schedule>  heparin   Injectable 5000 Unit(s) SubCutaneous every 8 hours  insulin lispro (ADMELOG) corrective regimen sliding scale   SubCutaneous three times a day before meals  insulin lispro (ADMELOG) corrective regimen sliding scale   SubCutaneous at bedtime  latanoprost 0.005% Ophthalmic Solution 1 Drop(s) Both EYES at bedtime  ondansetron Injectable 4 milliGRAM(s) IV Push every 8 hours PRN  sevelamer carbonate 1600 milliGRAM(s) Oral three times a day with meals  tamsulosin 0.4 milliGRAM(s) Oral at bedtime    FHFamily history of diabetes mellitus    No pertinent family history in first degree relatives    ,   PMHDiabetes    Kidney problem    Hypertension    HTN (hypertension)    DM (diabetes mellitus)    HTN (hypertension)    DM (diabetes mellitus)    ESRD (end stage renal disease) on dialysis    Charcot foot due to diabetes mellitus    ESRD on hemodialysis    Charcot foot due to diabetes mellitus    Diabetic retinopathy associated with type 2 diabetes mellitus    Hyperlipidemia    Abscess of scrotum    Hidradenitis    End stage renal disease       PSHNo significant past surgical history    S/P foot surgery    Charcot's syndrome    History of incision and drainage    Hemodialysis access, AV graft        Labs                          10.4   8.69  )-----------( 219      ( 27 Dec 2024 09:20 )             31.2      12-27    136  |  99  |  39[H]  ----------------------------<  195[H]  4.4   |  29  |  8.82[H]    Ca    9.4      27 Dec 2024 09:20       Vital Signs Last 24 Hrs  T(C): 37.7 (28 Dec 2024 14:04), Max: 37.7 (28 Dec 2024 14:04)  T(F): 99.8 (28 Dec 2024 14:04), Max: 99.8 (28 Dec 2024 14:04)  HR: 93 (28 Dec 2024 14:04) (66 - 93)  BP: 154/75 (28 Dec 2024 14:04) (98/56 - 154/75)  BP(mean): 78 (27 Dec 2024 19:30) (78 - 78)  RR: 17 (28 Dec 2024 14:04) (17 - 18)  SpO2: 95% (28 Dec 2024 14:04) (93% - 95%)    Parameters below as of 28 Dec 2024 14:04  Patient On (Oxygen Delivery Method): room air      Sedimentation Rate, Erythrocyte: 105 mm/Hr (12-17-24 @ 12:35)  Sedimentation Rate, Erythrocyte: 97 mm/Hr (12-16-24 @ 11:59)         C-Reactive Protein: 160.0 mg/L (12-17-24 @ 07:28)  C-Reactive Protein: 139.0 mg/L (12-16-24 @ 11:50)       LE FOCUSED PHYSICAL EXAM:  Vasc: DP/PT non-palpable bilaterally. CFT <3 seconds x 10. Temp Gradient within normal limits bilaterally with no increase in temperature to the right foot ulcer. No edema, no erythema, no varicosities observed bilaterally.   Derm: Diffuse xerosis observed to bilateral LEs. Right medial foot diabetic pressure ulcer noted overlying the talonavicular joint, measuring 1.9 x 1.8 x 0.5cm with a fibrotic wound base and hyperkeratotic klaudia-wound. + epibole, + tunneling by 0.5mm circumferentially. Wound does not probe to bone. No more malodor. No other open lesions, no other ulcerations, no purulence, no drainage, no soft tissue crepitus, no fluctuance, no IDM, no streaking.   Neuro: Patient is awake, alert and oriented x3. Protective sensation is diminished bilaterally.   MSK: Muscle strength deferred. No pain upon palpation bilaterally. No pain upon ROM of pedal and ankle joints bilaterally. No crepitation upon ROM of joints. Arch height 2/5 on-WB. Negative calf tenderness bilaterally.       IMAGING:  < from: MR Ankle No Cont, Right (12.18.24 @ 20:26) >  IMPRESSION: MRI of the right ankle demonstrates ulceration at the plantar   surface medially with suspected exposed bone with underlying   osteomyelitis involving the talus and less so ofthe navicular bone. No   drainable fluid collection.    < end of copied text >      ACC: [id number] EXAM:  US PHYSIOL LWR EXT 3+ LEV BI   ORDERED BY: REYES GILBERT   PROCEDURE DATE:  12/16/2024    INTERPRETATION:  Clinical indication: Right foot nonhealing wound  COMPARISON: 5/4/2016  FINDINGS AND IMPRESSION: There are normal amplitude biphasic pulse volume recordings   bilaterally. There are bilateral calcified, noncompressible vessels.   Therefore, ABIs could not be calculated.  --- End of Report ---      CULTURES:   Specimen Source: .Surgical Swab  Culture Results:   Moderate Providencia stuartii   Moderate Corynebacterium striatum group "Susceptibilities not performed"   Few Trueperella bernardiae "Susceptibilities not performed"  Organism Identification: Providencia stuartii  Organism: Providencia stuartii

## 2024-12-28 NOTE — PROGRESS NOTE ADULT - SUBJECTIVE AND OBJECTIVE BOX
[facility] NEPHROLOGY- PROGRESS NOTE    Patient is a 67yo Male with ESRD on HD via Rt AVG, HTN, 2HPT, Anemia, LE venous stasis with Rt foot wound presents with weakness. Pt a/w Rt foot ulcer and hyperkalemia. Nephrology consulted for ESRD status.   MRI Rt foot: + osteomyelitis involving the talus and less so of the navicular bone;    BCx +Staph hominis  12/20: s/p I& D of buttock abscess  +scrotal abscess    Hospital Medications: Medications reviewed.  REVIEW OF SYSTEMS: Pt denies any SOB, chest pain, n/v/d, or abd pain, +Rt foot pain-denies at this time    VITALS:  T(F): 99.8 (12-28-24 @ 14:04), Max: 99.8 (12-28-24 @ 14:04)  HR: 93 (12-28-24 @ 14:04)  BP: 154/75 (12-28-24 @ 14:04)  RR: 17 (12-28-24 @ 14:04)  SpO2: 95% (12-28-24 @ 14:04)  Wt(kg): --    12-27 @ 07:01  -  12-28 @ 07:00  --------------------------------------------------------  IN: 600 mL / OUT: 2000 mL / NET: -1400 mL      PHYSICAL EXAM:  Gen: NAD, calm, Obese  HEENT: anicteric  Cards: RRR, +S1/S2, no M/G/R  Resp: CTA ant   GI: soft, NT/ND, NABS  Extremities: no b/l LE edema  Access: Rt AVG +thrill +bruit    LABS:  12-27    136  |  99  |  39[H]  ----------------------------<  195[H]  4.4   |  29  |  8.82[H]    Ca    9.4      27 Dec 2024 09:20      Creatinine Trend: 8.82 <--, 7.19 <--, 5.57 <--, 7.23 <--, 5.45 <--, 7.22 <--                        10.4   8.69  )-----------( 219      ( 27 Dec 2024 09:20 )             31.2     Urine Studies:  Urinalysis Basic - ( 27 Dec 2024 09:20 )    Color:  / Appearance:  / SG:  / pH:   Gluc: 195 mg/dL / Ketone:   / Bili:  / Urobili:    Blood:  / Protein:  / Nitrite:    Leuk Esterase:  / RBC:  / WBC    Sq Epi:  / Non Sq Epi:  / Bacteria:

## 2024-12-28 NOTE — PROGRESS NOTE ADULT - ASSESSMENT
69M with hx of T2DM, hidradenitis suppurativa a/w generalized weakness, w small amount of purulent drainage from scrotum   no leukocytosis     Plan   - Continue Flomax  - keep scrotal area clean and dry, may include sitz baths   - IV antibiotics, follow ID recommendations   - strict glycemic control   - HD as per neph   - remainder of care as per primary team   - D/w attending

## 2024-12-28 NOTE — PROGRESS NOTE ADULT - SUBJECTIVE AND OBJECTIVE BOX
[facility] Medicine  Patient is a 69y old  Male who presents with a chief complaint of generalized weakness (28 Dec 2024 14:24)      SUBJECTIVE / OVERNIGHT EVENTS:  Patient seen at bedside, states he feels good, no acute symptoms noted.    MEDICATIONS  (STANDING):  amLODIPine   Tablet 5 milliGRAM(s) Oral daily  amoxicillin  500 milliGRAM(s)/clavulanate 1 Tablet(s) Oral every 24 hours  calcitriol   Capsule 1 MICROGram(s) Oral <User Schedule>  chlorhexidine 2% Cloths 1 Application(s) Topical daily  epoetin rudolph-epbx (RETACRIT) Injectable 6000 Unit(s) IV Push <User Schedule>  heparin   Injectable 5000 Unit(s) SubCutaneous every 8 hours  insulin lispro (ADMELOG) corrective regimen sliding scale   SubCutaneous three times a day before meals  insulin lispro (ADMELOG) corrective regimen sliding scale   SubCutaneous at bedtime  latanoprost 0.005% Ophthalmic Solution 1 Drop(s) Both EYES at bedtime  sevelamer carbonate 1600 milliGRAM(s) Oral three times a day with meals  tamsulosin 0.4 milliGRAM(s) Oral at bedtime    MEDICATIONS  (PRN):  acetaminophen     Tablet .. 650 milliGRAM(s) Oral every 6 hours PRN Temp greater or equal to 38C (100.4F), Mild Pain (1 - 3)  ondansetron Injectable 4 milliGRAM(s) IV Push every 8 hours PRN Nausea and/or Vomiting          OBJECTIVE:  Vital Signs Last 24 Hrs  T(C): 37.7 (28 Dec 2024 14:04), Max: 37.7 (28 Dec 2024 14:04)  T(F): 99.8 (28 Dec 2024 14:04), Max: 99.8 (28 Dec 2024 14:04)  HR: 93 (28 Dec 2024 14:04) (66 - 93)  BP: 154/75 (28 Dec 2024 14:04) (98/56 - 154/75)  BP(mean): 78 (27 Dec 2024 19:30) (78 - 78)  RR: 17 (28 Dec 2024 14:04) (17 - 18)  SpO2: 95% (28 Dec 2024 14:04) (93% - 95%)    Parameters below as of 28 Dec 2024 14:04  Patient On (Oxygen Delivery Method): room air        PHYSICAL EXAM:  GENERAL: NAD  HEAD:  Atraumatic, Normocephalic  EYES: conjunctiva and sclera clear  NECK: Supple, No JVD  CHEST/LUNG: Clear to auscultation bilaterally; No wheeze  HEART: Regular rate and rhythm; No murmurs, rubs, or gallops  ABDOMEN: Soft, Nontender, Nondistended; Bowel sounds present  EXTREMITIES:  No clubbing, cyanosis, or edema  PSYCH: AAOx3  NEUROLOGY: non-focal  SKIN: No rashes or lesions      CAPILLARY BLOOD GLUCOSE      POCT Blood Glucose.: 142 mg/dL (28 Dec 2024 11:21)  POCT Blood Glucose.: 120 mg/dL (28 Dec 2024 07:55)  POCT Blood Glucose.: 122 mg/dL (27 Dec 2024 21:09)  POCT Blood Glucose.: 141 mg/dL (27 Dec 2024 17:20)    I&O's Summary    27 Dec 2024 07:01  -  28 Dec 2024 07:00  --------------------------------------------------------  IN: 600 mL / OUT: 2000 mL / NET: -1400 mL              LABS:                        10.4   8.69  )-----------( 219      ( 27 Dec 2024 09:20 )             31.2     12-27    136  |  99  |  39[H]  ----------------------------<  195[H]  4.4   |  29  |  8.82[H]    Ca    9.4      27 Dec 2024 09:20            Urinalysis Basic - ( 27 Dec 2024 09:20 )    Color: x / Appearance: x / SG: x / pH: x  Gluc: 195 mg/dL / Ketone: x  / Bili: x / Urobili: x   Blood: x / Protein: x / Nitrite: x   Leuk Esterase: x / RBC: x / WBC x   Sq Epi: x / Non Sq Epi: x / Bacteria: x            RADIOLOGY & ADDITIONAL TESTS:

## 2024-12-28 NOTE — PROGRESS NOTE ADULT - SUBJECTIVE AND OBJECTIVE BOX
INTERVAL HPI/OVERNIGHT EVENTS:    Pt seen and examined at bedside. no complaints.     Vital Signs Last 24 Hrs  T(C): 37.1 (28 Dec 2024 05:18), Max: 37.1 (28 Dec 2024 05:18)  T(F): 98.8 (28 Dec 2024 05:18), Max: 98.8 (28 Dec 2024 05:18)  HR: 75 (28 Dec 2024 05:18) (65 - 75)  BP: 98/56 (28 Dec 2024 05:18) (93/57 - 118/58)  BP(mean): 78 (27 Dec 2024 19:30) (78 - 78)  RR: 18 (28 Dec 2024 05:18) (17 - 18)  SpO2: 93% (28 Dec 2024 05:18) (93% - 98%)    Parameters below as of 28 Dec 2024 05:18  Patient On (Oxygen Delivery Method): room air      I&O's Detail    27 Dec 2024 07:01  -  28 Dec 2024 07:00  --------------------------------------------------------  IN:    Other (mL): 600 mL  Total IN: 600 mL    OUT:    Other (mL): 2000 mL  Total OUT: 2000 mL    Total NET: -1400 mL        amoxicillin  500 milliGRAM(s)/clavulanate 1 Tablet(s) Oral every 24 hours  sevelamer carbonate 1600 milliGRAM(s) Oral three times a day with meals  tamsulosin 0.4 milliGRAM(s) Oral at bedtime      Physical Exam  General: No acute distress  Skin: No jaundice, no icterus  Abdomen: soft,  nondistended, nontender, no rebound tenderness, no guarding, no palpable masses  : right scrotum draining spontaneously, minimal purulence expressed, clean dry gauze replaced  right scrotum soft and non-tender  Extremities: non edematous, no calf pain bilaterally    Drains/Tubes:     Labs:                        10.4   8.69  )-----------( 219      ( 27 Dec 2024 09:20 )             31.2     12-27    136  |  99  |  39[H]  ----------------------------<  195[H]  4.4   |  29  |  8.82[H]    Ca    9.4      27 Dec 2024 09:20          RADIOLOGY & ADDITIONAL STUDIES:    69yMale

## 2024-12-28 NOTE — PROGRESS NOTE ADULT - ASSESSMENT
A:   Charcot arthropathy B/L  Diabetic ulceration R midfoot with chronic OM    P:  -Patient evaluated, chart reviewed and updated  -Xrays reviewed - Chronic charcot changes of the rearfoot with TN bulging. No gas  -MRI of R ankle - chronic OM of talus and partially navicular  -ANEESH/PVR - non compressible vessels B/L, normal PVR waveforms B/L   -No surgical treatement will be done at this time. Patient is stable from podiatry  -ID recc bone biopsy for abx plan - Dr. Brito suggests outpatient bone biopsy when pt is off abx for 3 weeks which was discussed with patient and he is agreeable with the plan going forward.  -Continue antibiotics per ID  -Betadine, DSD applied  -Advised patient to continue use of CROW walkers B/L when walking to prevent worsening ulcerations  -Podiatry to follow while in house  -Discussed with attending Dr. Sim

## 2024-12-28 NOTE — PROGRESS NOTE ADULT - ASSESSMENT
70 yo M with PMHx of ESRD on HD, HTN, DM, bilateral Charcot's joint, presenting with generalized weakness. Found to have hyperkalemia and R foot ulcer, admitted for further workup. MRI shows R ankle chronic OM. CCB scerotal discharge, later found to have scrotal abscess.    # R foot chronic OM  # Scrotal abscess  # ESRD on HD  # HTN  # DM A1c 7.4% 12/2024  # Bilateral Charcot's joint    - ID Dr. Brito recs appreciated, continue Augmentin PO for 4 weeks for the scrotal lesion  - Urology recs appreciated, no surgical intervention for the scrotal abscess warranted as it is self-draining, to follow at clinic  - continue inpatient HD  - continue FS/SSI  - continue home amlodipine, sevelamer, calcitriol  - DVT ppx: heparin SQ.  - PT recs PIA, f/u CM for placement.

## 2024-12-29 LAB
ANION GAP SERPL CALC-SCNC: 11 MMOL/L — SIGNIFICANT CHANGE UP (ref 5–17)
BASE EXCESS BLDV CALC-SCNC: 4.7 MMOL/L — SIGNIFICANT CHANGE UP
BUN SERPL-MCNC: 32 MG/DL — HIGH (ref 7–18)
CA-I SERPL-SCNC: SIGNIFICANT CHANGE UP MMOL/L (ref 1.15–1.33)
CALCIUM SERPL-MCNC: 8.9 MG/DL — SIGNIFICANT CHANGE UP (ref 8.4–10.5)
CHLORIDE SERPL-SCNC: 97 MMOL/L — SIGNIFICANT CHANGE UP (ref 96–108)
CO2 SERPL-SCNC: 26 MMOL/L — SIGNIFICANT CHANGE UP (ref 22–31)
CREAT SERPL-MCNC: 8.53 MG/DL — HIGH (ref 0.5–1.3)
EGFR: 6 ML/MIN/1.73M2 — LOW
FLUAV AG NPH QL: DETECTED
FLUBV AG NPH QL: SIGNIFICANT CHANGE UP
GAS PNL BLDV: 130 MMOL/L — LOW (ref 136–145)
GAS PNL BLDV: SIGNIFICANT CHANGE UP
GLUCOSE BLDC GLUCOMTR-MCNC: 132 MG/DL — HIGH (ref 70–99)
GLUCOSE BLDC GLUCOMTR-MCNC: 144 MG/DL — HIGH (ref 70–99)
GLUCOSE BLDC GLUCOMTR-MCNC: 152 MG/DL — HIGH (ref 70–99)
GLUCOSE BLDC GLUCOMTR-MCNC: 163 MG/DL — HIGH (ref 70–99)
GLUCOSE SERPL-MCNC: 139 MG/DL — HIGH (ref 70–99)
HCO3 BLDV-SCNC: 31 MMOL/L — HIGH (ref 22–29)
HCT VFR BLD CALC: 34.8 % — LOW (ref 39–50)
HGB BLD-MCNC: 11.4 G/DL — LOW (ref 13–17)
HOROWITZ INDEX BLDV+IHG-RTO: 21 — SIGNIFICANT CHANGE UP
LACTATE BLDV-MCNC: 1.5 MMOL/L — SIGNIFICANT CHANGE UP (ref 0.5–2)
LACTATE SERPL-SCNC: 1.1 MMOL/L — SIGNIFICANT CHANGE UP (ref 0.7–2)
MAGNESIUM SERPL-MCNC: 2.4 MG/DL — SIGNIFICANT CHANGE UP (ref 1.6–2.6)
MCHC RBC-ENTMCNC: 32.2 PG — SIGNIFICANT CHANGE UP (ref 27–34)
MCHC RBC-ENTMCNC: 32.8 G/DL — SIGNIFICANT CHANGE UP (ref 32–36)
MCV RBC AUTO: 98.3 FL — SIGNIFICANT CHANGE UP (ref 80–100)
NRBC # BLD: 0 /100 WBCS — SIGNIFICANT CHANGE UP (ref 0–0)
PCO2 BLDV: 51 MMHG — SIGNIFICANT CHANGE UP (ref 42–55)
PH BLDV: 7.39 — SIGNIFICANT CHANGE UP (ref 7.32–7.43)
PHOSPHATE SERPL-MCNC: 4 MG/DL — SIGNIFICANT CHANGE UP (ref 2.5–4.5)
PLATELET # BLD AUTO: 166 K/UL — SIGNIFICANT CHANGE UP (ref 150–400)
PO2 BLDV: 43 MMHG — SIGNIFICANT CHANGE UP
POTASSIUM BLDV-SCNC: 5.2 MMOL/L — HIGH (ref 3.5–5.1)
POTASSIUM SERPL-MCNC: 5.1 MMOL/L — SIGNIFICANT CHANGE UP (ref 3.5–5.3)
POTASSIUM SERPL-SCNC: 5.1 MMOL/L — SIGNIFICANT CHANGE UP (ref 3.5–5.3)
RBC # BLD: 3.54 M/UL — LOW (ref 4.2–5.8)
RBC # FLD: 14.6 % — HIGH (ref 10.3–14.5)
RSV RNA NPH QL NAA+NON-PROBE: SIGNIFICANT CHANGE UP
SAO2 % BLDV: 72.9 % — SIGNIFICANT CHANGE UP
SARS-COV-2 RNA SPEC QL NAA+PROBE: SIGNIFICANT CHANGE UP
SODIUM SERPL-SCNC: 134 MMOL/L — LOW (ref 135–145)
WBC # BLD: 8.5 K/UL — SIGNIFICANT CHANGE UP (ref 3.8–10.5)
WBC # FLD AUTO: 8.5 K/UL — SIGNIFICANT CHANGE UP (ref 3.8–10.5)

## 2024-12-29 PROCEDURE — 99233 SBSQ HOSP IP/OBS HIGH 50: CPT

## 2024-12-29 RX ORDER — ACETAMINOPHEN 80 MG/.8ML
1000 SOLUTION/ DROPS ORAL ONCE
Refills: 0 | Status: COMPLETED | OUTPATIENT
Start: 2024-12-29 | End: 2024-12-29

## 2024-12-29 RX ORDER — OSELTAMIVIR 75 MG/1
30 CAPSULE ORAL ONCE
Refills: 0 | Status: COMPLETED | OUTPATIENT
Start: 2024-12-29 | End: 2024-12-29

## 2024-12-29 RX ORDER — OSELTAMIVIR 75 MG/1
30 CAPSULE ORAL
Refills: 0 | Status: COMPLETED | OUTPATIENT
Start: 2024-12-29 | End: 2025-01-01

## 2024-12-29 RX ORDER — OSELTAMIVIR 75 MG/1
30 CAPSULE ORAL DAILY
Refills: 0 | Status: DISCONTINUED | OUTPATIENT
Start: 2024-12-29 | End: 2024-12-29

## 2024-12-29 RX ORDER — SODIUM CHLORIDE 9 MG/ML
500 INJECTION, SOLUTION INTRAVENOUS ONCE
Refills: 0 | Status: COMPLETED | OUTPATIENT
Start: 2024-12-29 | End: 2024-12-29

## 2024-12-29 RX ADMIN — OSELTAMIVIR 30 MILLIGRAM(S): 75 CAPSULE ORAL at 01:40

## 2024-12-29 RX ADMIN — ACETAMINOPHEN 400 MILLIGRAM(S): 80 SOLUTION/ DROPS ORAL at 05:59

## 2024-12-29 RX ADMIN — SEVELAMER CARBONATE 1600 MILLIGRAM(S): 800 TABLET, FILM COATED ORAL at 14:23

## 2024-12-29 RX ADMIN — Medication 1 TABLET(S): at 14:23

## 2024-12-29 RX ADMIN — Medication 5 MILLIGRAM(S): at 06:00

## 2024-12-29 RX ADMIN — HEPARIN SODIUM 5000 UNIT(S): 1000 INJECTION, SOLUTION INTRAVENOUS; SUBCUTANEOUS at 05:59

## 2024-12-29 RX ADMIN — HEPARIN SODIUM 5000 UNIT(S): 1000 INJECTION, SOLUTION INTRAVENOUS; SUBCUTANEOUS at 14:24

## 2024-12-29 RX ADMIN — ACETAMINOPHEN 1000 MILLIGRAM(S): 80 SOLUTION/ DROPS ORAL at 14:45

## 2024-12-29 RX ADMIN — CHLORHEXIDINE GLUCONATE 1 APPLICATION(S): 1.2 RINSE ORAL at 14:26

## 2024-12-29 RX ADMIN — LATANOPROST 1 DROP(S): 50 SOLUTION OPHTHALMIC at 22:17

## 2024-12-29 RX ADMIN — ACETAMINOPHEN 1000 MILLIGRAM(S): 80 SOLUTION/ DROPS ORAL at 07:13

## 2024-12-29 RX ADMIN — HEPARIN SODIUM 5000 UNIT(S): 1000 INJECTION, SOLUTION INTRAVENOUS; SUBCUTANEOUS at 22:17

## 2024-12-29 RX ADMIN — TAMSULOSIN HYDROCHLORIDE 0.4 MILLIGRAM(S): 0.4 CAPSULE ORAL at 22:17

## 2024-12-29 RX ADMIN — ACETAMINOPHEN 400 MILLIGRAM(S): 80 SOLUTION/ DROPS ORAL at 14:26

## 2024-12-29 RX ADMIN — SODIUM CHLORIDE 500 MILLILITER(S): 9 INJECTION, SOLUTION INTRAVENOUS at 08:03

## 2024-12-29 NOTE — CHART NOTE - NSCHARTNOTEFT_GEN_A_CORE
EVENT:  RRT called by RN for "pt. being out of it"    HPI:  69 yo M with PMH of  DM, HTN, ESRD on dialysis ( M, W, F), Last Dialysis 12/13/24,   B/L Charcot deformity and HLD, present to the ED for generalized weakness, chills, subjective fever. Patient reports waking up this morning feeling very tired and unable to stand up to get dressed for his dialysis appointment. Collateral information obtained from wifer at bedside. As per wife, patient didnt appear to be himself this morning so she decided to call EMS. Patient reports since 2014 after having charcot procedure in bilateral foot, he has been wearing a charcot boot to walk. Patient follows up with podiatry Dr. Sim in  outpatient podiatry clinic. Patient denies any chest pain, abdominal pain, nausea, vomiting, headache lightheadedness, vision change, cough SOb   (16 Dec 2024 14:28)        OBJECTIVE:  Vital Signs Last 24 Hrs  T(C): 38 (29 Dec 2024 07:38), Max: 39.3 (29 Dec 2024 05:35)  T(F): 100.4 (29 Dec 2024 07:38), Max: 102.8 (29 Dec 2024 05:35)  HR: 75 (29 Dec 2024 07:38) (75 - 93)  BP: 77/40 (29 Dec 2024 07:38) (77/40 - 154/75)  BP(mean): --  RR: 17 (29 Dec 2024 07:38) (16 - 18)  SpO2: 85% (29 Dec 2024 07:38) (85% - 97%)    Parameters below as of 29 Dec 2024 07:38  Patient On (Oxygen Delivery Method): room air        LABS:                        11.4   8.50  )-----------( 166      ( 29 Dec 2024 06:10 )             34.8     12-29    134[L]  |  97  |  32[H]  ----------------------------<  139[H]  5.1   |  26  |  8.53[H]    Ca    8.9      29 Dec 2024 06:10  Phos  4.0     12-29  Mg     2.4     12-29        ASSESSMENT:  1. noted bp to left lower arm, 77/90. temp noted 100.1, hr 75, fingerstick 144  2. noted in chart bp 104/60 and hr 82 at 5:32am  3. noted AM amlodipine given by RN    PLAN:   1. stat 500cc LR bolus   2. reassess BP and mental status  3. pt. likely to remain on floor    FOLLOW UP/RESULTS:    1. f/u RRT reccs will notify attending Dr. Paredes EVENT:  RRT called by RN for "pt. being out of it"    HPI:  67 yo M with PMH of  DM, HTN, ESRD on dialysis ( M, W, F), Last Dialysis 24,   B/L Charcot deformity and HLD, present to the ED for generalized weakness, chills, subjective fever. Patient reports waking up this morning feeling very tired and unable to stand up to get dressed for his dialysis appointment. Collateral information obtained from wifer at bedside. As per wife, patient didnt appear to be himself this morning so she decided to call EMS. Patient reports since  after having charcot procedure in bilateral foot, he has been wearing a charcot boot to walk. Patient follows up with podiatry Dr. Sim in  outpatient podiatry clinic. Patient denies any chest pain, abdominal pain, nausea, vomiting, headache lightheadedness, vision change, cough SOb   (16 Dec 2024 14:28)        OBJECTIVE:  Vital Signs Last 24 Hrs  T(C): 38 (29 Dec 2024 07:38), Max: 39.3 (29 Dec 2024 05:35)  T(F): 100.4 (29 Dec 2024 07:38), Max: 102.8 (29 Dec 2024 05:35)  HR: 75 (29 Dec 2024 07:38) (75 - 93)  BP: 77/40 (29 Dec 2024 07:38) (77/40 - 154/75)  BP(mean): --  RR: 17 (29 Dec 2024 07:38) (16 - 18)  SpO2: 85% (29 Dec 2024 07:38) (85% - 97%)    Parameters below as of 29 Dec 2024 07:38  Patient On (Oxygen Delivery Method): room air        LABS:                        11.4   8.50  )-----------( 166      ( 29 Dec 2024 06:10 )             34.8         134[L]  |  97  |  32[H]  ----------------------------<  139[H]  5.1   |  26  |  8.53[H]    Ca    8.9      29 Dec 2024 06:10  Phos  4.0       Mg     2.4             ASSESSMENT:  1. noted bp to left lower arm, 77/90. temp noted 100.1, hr 75, fingerstick 144. Pt easily arousable able to ans name and .  2. noted in chart bp 104/60 and hr 82 at 5:32am  3. noted AM amlodipine given by RN    PLAN:   1. stat 500cc LR bolus   2. reassess BP and mental status  3. pt. likely to remain on floor    FOLLOW UP/RESULTS:    1. f/u RRT reccs will notify attending Dr. Paredes

## 2024-12-29 NOTE — PROGRESS NOTE ADULT - ASSESSMENT
Patient is a 67yo Male with ESRD on HD via Rt AVG, HTN, 2HPT, Anemia, LE venous stasis with Rt foot wound presents with weakness. Pt a/w Rt foot ulcer and hyperkalemia. Nephrology consulted for ESRD status.     1) ESRD: Last HD 12/27, with mild intradialytic hypotension with net 1.4L removed. Plan for next HD 12/30. c/w MWF schedule. Pt for d/c to [facility] with onsite HD. Monitor electrolytes  2) Hyperkalemia: resolved. c/w low potassium diet.    Monitor serum potassium  3) HTN with ESRD: BP low for which Amlodipine 5mg PO daily was d/c. Continue to hold Coreg 25mg PO bid.  c/w low sodium diet. Monitor BP.  4) Anemia of renal disease: Hb acceptable. c/w Epogen 6000 units IV tiw. Monitor Hb.  5) 2HPT: Corrected serum calcium & last serum phos acceptable.  PTHi 441. c/w calcitriol 1mcg PO MWF. Pt on Lanthanum 2000mg PO tid with meals which is unavailable at [facility]. c/w Sevelamer 1600mg PO tid with meals and  low phos diet. Monitor serum calcium and phosphorus.    HepBsAg was neg on outpt labs on 12/11/24; repeat HepBsAg neg; likely positive HepBsAg on 12/16 is an error.       [facility] NEPHROLOGY  Arden Tsang M.D.  Freeman Holland D.O.  Janny Chirinos M.D.  MD Ambar Mccarty, MSN, ANP-C    Telephone: [phone]  Facsimile: [phone]    [facility] [place] [address], [address]  [place] [address]

## 2024-12-29 NOTE — PROGRESS NOTE ADULT - SUBJECTIVE AND OBJECTIVE BOX
[facility] Medicine  Patient is a 69y old  Male who presents with a chief complaint of generalized weakness (28 Dec 2024 16:17)      SUBJECTIVE / OVERNIGHT EVENTS:  Patient was noted to have fever and tachycardia in AM, RRT activated, workup showed positive flu A.  Patient seen at bedside, states he is okay for now, denies SOB.    MEDICATIONS  (STANDING):  amoxicillin  500 milliGRAM(s)/clavulanate 1 Tablet(s) Oral every 24 hours  calcitriol   Capsule 1 MICROGram(s) Oral <User Schedule>  chlorhexidine 2% Cloths 1 Application(s) Topical daily  epoetin rudolph-epbx (RETACRIT) Injectable 6000 Unit(s) IV Push <User Schedule>  heparin   Injectable 5000 Unit(s) SubCutaneous every 8 hours  insulin lispro (ADMELOG) corrective regimen sliding scale   SubCutaneous three times a day before meals  insulin lispro (ADMELOG) corrective regimen sliding scale   SubCutaneous at bedtime  latanoprost 0.005% Ophthalmic Solution 1 Drop(s) Both EYES at bedtime  oseltamivir 30 milliGRAM(s) Oral <User Schedule>  sevelamer carbonate 1600 milliGRAM(s) Oral three times a day with meals  tamsulosin 0.4 milliGRAM(s) Oral at bedtime    MEDICATIONS  (PRN):  acetaminophen     Tablet .. 650 milliGRAM(s) Oral every 6 hours PRN Temp greater or equal to 38C (100.4F), Mild Pain (1 - 3)  ondansetron Injectable 4 milliGRAM(s) IV Push every 8 hours PRN Nausea and/or Vomiting          OBJECTIVE:  Vital Signs Last 24 Hrs  T(C): 37 (29 Dec 2024 10:35), Max: 39.3 (29 Dec 2024 05:35)  T(F): 98.6 (29 Dec 2024 10:35), Max: 102.8 (29 Dec 2024 05:35)  HR: 70 (29 Dec 2024 10:35) (69 - 93)  BP: 94/44 (29 Dec 2024 10:35) (60/39 - 154/75)  BP(mean): --  RR: 16 (29 Dec 2024 10:35) (16 - 18)  SpO2: 97% (29 Dec 2024 10:35) (85% - 98%)    Parameters below as of 29 Dec 2024 10:35  Patient On (Oxygen Delivery Method): nasal cannula  O2 Flow (L/min): 2      PHYSICAL EXAM:  GENERAL: mildly in distress  HEAD:  Atraumatic, Normocephalic  EYES: conjunctiva and sclera clear  NECK: Supple, No JVD  CHEST/LUNG: Clear to auscultation bilaterally; No wheeze  HEART: Regular rate and rhythm; No murmurs, rubs, or gallops  ABDOMEN: Soft, Nontender, Nondistended; Bowel sounds present  EXTREMITIES:  No clubbing, cyanosis, or edema  PSYCH: AAOx3  NEUROLOGY: non-focal  SKIN: No rashes or lesions      CAPILLARY BLOOD GLUCOSE      POCT Blood Glucose.: 152 mg/dL (29 Dec 2024 11:28)  POCT Blood Glucose.: 144 mg/dL (29 Dec 2024 07:29)  POCT Blood Glucose.: 192 mg/dL (28 Dec 2024 21:16)  POCT Blood Glucose.: 150 mg/dL (28 Dec 2024 16:19)    I&O's Summary            LABS:                        11.4   8.50  )-----------( 166      ( 29 Dec 2024 06:10 )             34.8     12-29    134[L]  |  97  |  32[H]  ----------------------------<  139[H]  5.1   |  26  |  8.53[H]    Ca    8.9      29 Dec 2024 06:10  Phos  4.0     12-29  Mg     2.4     12-29            Urinalysis Basic - ( 29 Dec 2024 06:10 )    Color: x / Appearance: x / SG: x / pH: x  Gluc: 139 mg/dL / Ketone: x  / Bili: x / Urobili: x   Blood: x / Protein: x / Nitrite: x   Leuk Esterase: x / RBC: x / WBC x   Sq Epi: x / Non Sq Epi: x / Bacteria: x            RADIOLOGY & ADDITIONAL TESTS:

## 2024-12-29 NOTE — RAPID RESPONSE TEAM SUMMARY - NSADDTLFINDINGSRRT_GEN_ALL_CORE
At bedside patient is awake, lethargic, visible psychomotor slowing, requiring tactile stimuli prior to responding to verbal cues. Patient not in acute distress, protecting airway, spO2 98% on 2L NC. Finger stick . Able to correctly state date of birth. BP measurements were being recorded from L forearm due to R arm precautions and L upper arm IV in place. Repeat BP with cuff on upper arm was 60/39, HR  75. Patient placed in trendelenburg position and repeat BP was 81/43. Lungs CTA b/l although poor inspiratory effort. Started on 500cc bolus fluid challenge in s/o PMH of ESRD. All anti-hypertensive meds held. Plan to place additional US guided IV L lower forearm with subsequent VBG and re-assess BP s/p IVF bolus. c/w current antimicrobial treatment with plan to possibly broaden coverage pending BCx results and re-evaluation of clinical status.

## 2024-12-29 NOTE — RAPID RESPONSE TEAM SUMMARY - NSSITUATIONBACKGROUNDRRT_GEN_ALL_CORE
67 yo M with PMH of  DM, HTN, ESRD on dialysis ( M, W, F), Last Dialysis 12/13/24,   B/L Charcot deformity and HLD, present to the ED for generalized weakness, chills, subjective fever. Admitted to medicine for generalized weakness, hyperkalemia, right foot ulcer. MRI showed R ankle chronic OM. Hospital course complicated by scrotal discharge, later found to have scrotal abscess. Course further complicated by sepsis on night of 12/28 with fever 102.6F, HR 91, and Flu A+ PCR started on renally dosed tamiflu. RRT called morning of 12/29 for hypotension 77/40, fever 100.4F, hypoxia (spO2 85%) and altered mental status. Per floor team patient got morning dose of amlodipine 5mg (/60 at 5:35AM, order without holding parameters). Patient is on augmentin 500mg q24h for scrotal abscess. WBC 8.5K at 6:10am. Patient had just completed IVPB of Ofirmev. Last HD session 12/27.

## 2024-12-29 NOTE — CHART NOTE - NSCHARTNOTEFT_GEN_A_CORE
EVENT:   12/28/24, 8pm, patient's T - 102.6F, HR - 91, BP - 136/67, RR -16, O2sat - 97%RA. WBC - 8.69. Tylenol was given, and cooling measures - applied. Flu panel, U/A, Serum lactate, blood culture x 2 sets - ordered.  Chest x ray (fever) urgent - performed. Patient is Influenza A (+) positive. On droplet Isolation. Tamiflu 30 mg po renal dose (pt.'s ESRD - HD) - ordered. Consulted with the pharmacy.    HPI:  67 yo M with PMH of  DM, HTN, ESRD on dialysis ( M, W, F), Last Dialysis 12/13/24,   B/L Charcot deformity and HLD, present to the ED for generalized weakness, chills, subjective fever. Patient reports waking up this morning feeling very tired and unable to stand up to get dressed for his dialysis appointment. Collateral information obtained from wifer at bedside. As per wife, patient didn't appear to be himself this morning so she decided to call EMS. Patient reports since 2014 after having charcot procedure in bilateral foot, he has been wearing a charcot boot to walk. Patient follows up with podiatry Dr. Sim in  outpatient podiatry clinic. Patient denies any chest pain, abdominal pain, nausea, vomiting, headache lightheadedness, vision change, cough SOb   (16 Dec 2024 14:28)  OBJECTIVE:  Vital Signs Last 24 Hrs  T(C): 37.6 (28 Dec 2024 22:02), Max: 39.2 (28 Dec 2024 20:03)  T(F): 99.6 (28 Dec 2024 22:02), Max: 102.6 (28 Dec 2024 20:03)  HR: 90 (28 Dec 2024 22:02) (75 - 93)  BP: 90/55 (28 Dec 2024 22:02) (90/55 - 154/75)  BP(mean): --  RR: 18 (28 Dec 2024 22:02) (16 - 18)  SpO2: 94% (28 Dec 2024 22:02) (93% - 97%)    PLAN:   - Follow-up morning labs. and x ray results,   - Monitor patient VS,   - Tamiflu 30 mg po x 1 stat dose, and Tamiflu 30 mg po (every 1 week: Mon/12:00, Wed/12:00, Fri/12:00) post dialysis. Stop after 5 days. Indication: Flu A (+), verified by pharmacy.   - Continue supportive care and treatment.

## 2024-12-29 NOTE — PROGRESS NOTE ADULT - SUBJECTIVE AND OBJECTIVE BOX
[facility] NEPHROLOGY- PROGRESS NOTE    Patient is a 69yo Male with ESRD on HD via Rt AVG, HTN, 2HPT, Anemia, LE venous stasis with Rt foot wound presents with weakness. Pt a/w Rt foot ulcer and hyperkalemia. Nephrology consulted for ESRD status.   MRI Rt foot: + osteomyelitis involving the talus and less so of the navicular bone;    BCx +Staph hominis  12/20: s/p I& D of buttock abscess  +scrotal abscess  12/29: s/p RRT for lehtargy; febrile +FLU A     Hospital Medications: Medications reviewed.  REVIEW OF SYSTEMS: Limited due to lethargy; denies any SOB, "im fine"    VITALS:  T(F): 102.8 (12-29-24 @ 13:44), Max: 102.8 (12-29-24 @ 05:35)  HR: 101 (12-29-24 @ 13:44)  BP: 166/76 (12-29-24 @ 13:44)  RR: 17 (12-29-24 @ 13:44)  SpO2: 96% (12-29-24 @ 13:44)  Wt(kg): --    PHYSICAL EXAM:  Gen: NAD, calm, Obese  HEENT: anicteric  Cards: RRR, +S1/S2, no M/G/R  Resp: CTA ant   GI: soft, NT/ND, NABS  Extremities: no b/l LE edema  Access: Rt AVG +thrill +bruit    LABS:  12-29    134[L]  |  97  |  32[H]  ----------------------------<  139[H]  5.1   |  26  |  8.53[H]    Ca    8.9      29 Dec 2024 06:10  Phos  4.0     12-29  Mg     2.4     12-29      Creatinine Trend: 8.53 <--, 8.82 <--, 7.19 <--, 5.57 <--, 7.23 <--, 5.45 <--                        11.4   8.50  )-----------( 166      ( 29 Dec 2024 06:10 )             34.8     Urine Studies:  Urinalysis Basic - ( 29 Dec 2024 06:10 )    Color:  / Appearance:  / SG:  / pH:   Gluc: 139 mg/dL / Ketone:   / Bili:  / Urobili:    Blood:  / Protein:  / Nitrite:    Leuk Esterase:  / RBC:  / WBC    Sq Epi:  / Non Sq Epi:  / Bacteria:

## 2024-12-29 NOTE — PROGRESS NOTE ADULT - ASSESSMENT
70 yo M with PMHx of ESRD on HD, HTN, DM, bilateral Charcot's joint, presenting with generalized weakness. Found to have hyperkalemia and R foot ulcer, admitted for further workup. MRI shows R ankle chronic OM. CCB scrotal discharge, later found to have scrotal abscess, now on PO abx. Course further complicated by nosocomial flu A infection.    # R foot chronic OM  # Scrotal abscess  # Flu A infection  # ESRD on HD  # HTN  # DM A1c 7.4% 12/2024  # Bilateral Charcot's joint    - newly found to have flu A, CXR without overt new opacities on my read. First dose of Tamiflu given, monitor fever curve/BP/HR, wean off O2 as tolerated  - ID Dr. Brito recs appreciated, continue Augmentin PO for 4 weeks for the scrotal lesion  - Urology recs appreciated, no surgical intervention for the scrotal abscess warranted as it is self-draining, to follow at clinic  - continue inpatient HD  - continue FS/SSI  - continue home amlodipine, sevelamer, calcitriol  - DVT ppx: heparin SQ.  - PT recs PIA, f/u CM for placement.

## 2024-12-30 LAB
ALBUMIN SERPL ELPH-MCNC: 2.5 G/DL — LOW (ref 3.5–5)
ALP SERPL-CCNC: 74 U/L — SIGNIFICANT CHANGE UP (ref 40–120)
ALT FLD-CCNC: 9 U/L DA — LOW (ref 10–60)
ANION GAP SERPL CALC-SCNC: 10 MMOL/L — SIGNIFICANT CHANGE UP (ref 5–17)
AST SERPL-CCNC: 13 U/L — SIGNIFICANT CHANGE UP (ref 10–40)
BASOPHILS # BLD AUTO: 0.04 K/UL — SIGNIFICANT CHANGE UP (ref 0–0.2)
BASOPHILS NFR BLD AUTO: 0.3 % — SIGNIFICANT CHANGE UP (ref 0–2)
BILIRUB SERPL-MCNC: 0.4 MG/DL — SIGNIFICANT CHANGE UP (ref 0.2–1.2)
BUN SERPL-MCNC: 47 MG/DL — HIGH (ref 7–18)
CALCIUM SERPL-MCNC: 8.7 MG/DL — SIGNIFICANT CHANGE UP (ref 8.4–10.5)
CHLORIDE SERPL-SCNC: 96 MMOL/L — SIGNIFICANT CHANGE UP (ref 96–108)
CO2 SERPL-SCNC: 26 MMOL/L — SIGNIFICANT CHANGE UP (ref 22–31)
CREAT SERPL-MCNC: 10.6 MG/DL — HIGH (ref 0.5–1.3)
EGFR: 5 ML/MIN/1.73M2 — LOW
EOSINOPHIL # BLD AUTO: 0.05 K/UL — SIGNIFICANT CHANGE UP (ref 0–0.5)
EOSINOPHIL NFR BLD AUTO: 0.3 % — SIGNIFICANT CHANGE UP (ref 0–6)
GAS PNL BLDA: SIGNIFICANT CHANGE UP
GLUCOSE BLDC GLUCOMTR-MCNC: 112 MG/DL — HIGH (ref 70–99)
GLUCOSE BLDC GLUCOMTR-MCNC: 136 MG/DL — HIGH (ref 70–99)
GLUCOSE BLDC GLUCOMTR-MCNC: 137 MG/DL — HIGH (ref 70–99)
GLUCOSE BLDC GLUCOMTR-MCNC: 144 MG/DL — HIGH (ref 70–99)
GLUCOSE BLDC GLUCOMTR-MCNC: 157 MG/DL — HIGH (ref 70–99)
GLUCOSE SERPL-MCNC: 121 MG/DL — HIGH (ref 70–99)
HCT VFR BLD CALC: 31.6 % — LOW (ref 39–50)
HGB BLD-MCNC: 10.5 G/DL — LOW (ref 13–17)
IMM GRANULOCYTES NFR BLD AUTO: 0.7 % — SIGNIFICANT CHANGE UP (ref 0–0.9)
LYMPHOCYTES # BLD AUTO: 1.04 K/UL — SIGNIFICANT CHANGE UP (ref 1–3.3)
LYMPHOCYTES # BLD AUTO: 7.2 % — LOW (ref 13–44)
MAGNESIUM SERPL-MCNC: 2.5 MG/DL — SIGNIFICANT CHANGE UP (ref 1.6–2.6)
MCHC RBC-ENTMCNC: 32.6 PG — SIGNIFICANT CHANGE UP (ref 27–34)
MCHC RBC-ENTMCNC: 33.2 G/DL — SIGNIFICANT CHANGE UP (ref 32–36)
MCV RBC AUTO: 98.1 FL — SIGNIFICANT CHANGE UP (ref 80–100)
MONOCYTES # BLD AUTO: 1.14 K/UL — HIGH (ref 0–0.9)
MONOCYTES NFR BLD AUTO: 7.9 % — SIGNIFICANT CHANGE UP (ref 2–14)
NEUTROPHILS # BLD AUTO: 12.1 K/UL — HIGH (ref 1.8–7.4)
NEUTROPHILS NFR BLD AUTO: 83.6 % — HIGH (ref 43–77)
NRBC # BLD: 0 /100 WBCS — SIGNIFICANT CHANGE UP (ref 0–0)
PHOSPHATE SERPL-MCNC: 6.1 MG/DL — HIGH (ref 2.5–4.5)
PLATELET # BLD AUTO: 136 K/UL — LOW (ref 150–400)
POTASSIUM SERPL-MCNC: 5.2 MMOL/L — SIGNIFICANT CHANGE UP (ref 3.5–5.3)
POTASSIUM SERPL-SCNC: 5.2 MMOL/L — SIGNIFICANT CHANGE UP (ref 3.5–5.3)
PROT SERPL-MCNC: 7.5 G/DL — SIGNIFICANT CHANGE UP (ref 6–8.3)
RBC # BLD: 3.22 M/UL — LOW (ref 4.2–5.8)
RBC # FLD: 14.4 % — SIGNIFICANT CHANGE UP (ref 10.3–14.5)
SODIUM SERPL-SCNC: 132 MMOL/L — LOW (ref 135–145)
WBC # BLD: 14.47 K/UL — HIGH (ref 3.8–10.5)
WBC # FLD AUTO: 14.47 K/UL — HIGH (ref 3.8–10.5)

## 2024-12-30 PROCEDURE — 99233 SBSQ HOSP IP/OBS HIGH 50: CPT

## 2024-12-30 RX ORDER — ACETAMINOPHEN 80 MG/.8ML
1000 SOLUTION/ DROPS ORAL ONCE
Refills: 0 | Status: COMPLETED | OUTPATIENT
Start: 2024-12-30 | End: 2024-12-30

## 2024-12-30 RX ORDER — SODIUM CHLORIDE 9 MG/ML
250 INJECTION, SOLUTION INTRAMUSCULAR; INTRAVENOUS; SUBCUTANEOUS ONCE
Refills: 0 | Status: COMPLETED | OUTPATIENT
Start: 2024-12-30 | End: 2024-12-30

## 2024-12-30 RX ADMIN — Medication 1: at 08:25

## 2024-12-30 RX ADMIN — ACETAMINOPHEN 650 MILLIGRAM(S): 80 SOLUTION/ DROPS ORAL at 06:39

## 2024-12-30 RX ADMIN — SODIUM CHLORIDE 250 MILLILITER(S): 9 INJECTION, SOLUTION INTRAMUSCULAR; INTRAVENOUS; SUBCUTANEOUS at 22:29

## 2024-12-30 RX ADMIN — HEPARIN SODIUM 5000 UNIT(S): 1000 INJECTION, SOLUTION INTRAVENOUS; SUBCUTANEOUS at 06:39

## 2024-12-30 RX ADMIN — EPOETIN ALFA 6000 UNIT(S): 2000 SOLUTION INTRAVENOUS; SUBCUTANEOUS at 11:56

## 2024-12-30 RX ADMIN — SEVELAMER CARBONATE 1600 MILLIGRAM(S): 800 TABLET, FILM COATED ORAL at 08:26

## 2024-12-30 RX ADMIN — ACETAMINOPHEN 400 MILLIGRAM(S): 80 SOLUTION/ DROPS ORAL at 22:29

## 2024-12-30 RX ADMIN — CHLORHEXIDINE GLUCONATE 1 APPLICATION(S): 1.2 RINSE ORAL at 15:33

## 2024-12-30 RX ADMIN — ACETAMINOPHEN 1000 MILLIGRAM(S): 80 SOLUTION/ DROPS ORAL at 22:59

## 2024-12-30 RX ADMIN — HEPARIN SODIUM 5000 UNIT(S): 1000 INJECTION, SOLUTION INTRAVENOUS; SUBCUTANEOUS at 15:32

## 2024-12-30 RX ADMIN — CALCITRIOL 1 MICROGRAM(S): 0.5 CAPSULE, LIQUID FILLED ORAL at 08:26

## 2024-12-30 RX ADMIN — Medication 1 TABLET(S): at 15:32

## 2024-12-30 RX ADMIN — OSELTAMIVIR 30 MILLIGRAM(S): 75 CAPSULE ORAL at 15:31

## 2024-12-30 RX ADMIN — ACETAMINOPHEN 650 MILLIGRAM(S): 80 SOLUTION/ DROPS ORAL at 07:07

## 2024-12-30 RX ADMIN — HEPARIN SODIUM 5000 UNIT(S): 1000 INJECTION, SOLUTION INTRAVENOUS; SUBCUTANEOUS at 22:26

## 2024-12-30 RX ADMIN — LATANOPROST 1 DROP(S): 50 SOLUTION OPHTHALMIC at 22:35

## 2024-12-30 RX ADMIN — SEVELAMER CARBONATE 1600 MILLIGRAM(S): 800 TABLET, FILM COATED ORAL at 16:56

## 2024-12-30 NOTE — PROGRESS NOTE ADULT - SUBJECTIVE AND OBJECTIVE BOX
Subjective:  Resting comfortably    Objective:    Vital signs  T(C): , Max: 39.3 (12-29-24 @ 13:44)  HR: 83 (12-30-24 @ 06:35)  BP: 132/62 (12-30-24 @ 06:35)  SpO2: 99% (12-30-24 @ 06:35)  Wt(kg): --    Output       Gen: NAD  Abd: soft, nontender, nondistended  : small amount of purulent drainage from right scrotum, dressing placed, no tenderness, no fluctuance, some induration, no crepitus, no necrotic skin changes    Labs                        11.4   8.50  )-----------( 166      ( 29 Dec 2024 06:10 )             34.8     29 Dec 2024 06:10    134    |  97     |  32     ----------------------------<  139    5.1     |  26     |  8.53     Ca    8.9        29 Dec 2024 06:10  Phos  4.0       29 Dec 2024 06:10  Mg     2.4       29 Dec 2024 06:10        Urine Cx: ?  Blood Cx: ?      Imaging

## 2024-12-30 NOTE — RAPID RESPONSE TEAM SUMMARY - NSSITUATIONBACKGROUNDRRT_GEN_ALL_CORE
67 yo M with PMH of  DM, HTN, ESRD on dialysis ( M, W, F), Last Dialysis 24,   B/L Charcot deformity and HLD, admitted to medicine floor for treatment of sepsis 2/2 scrotal abscess, diabetic foot ulcer, gluteal abscess, influenza A positive.     RRT was called as RN noticed that the patient was excessively somnolent after HD, not answering to questioning. Upon arrival, pt initially arousable, protecting area, muttering a few words here and there but now answering questions.   Vitals on arrival Temp 102.9, BP 94/59, HR 89, RR 32  Pt did not have excessive secretions on suctioning  Pt received Ofirmev x1 , 250 cc IVF bolus given   After about 20 minutes, pt fever improved to 100.2 F, and BP increased to systolic 102   Pt started to wake up and answer to questioning, follow commands.   ABG w/ lytes collected showin.44/CO2 37/HCO3 25/pO2 69  K 4.8, Lac 1.3   67 yo M with PMH of  DM, HTN, ESRD on dialysis ( M, W, F), Last Dialysis 24,   B/L Charcot deformity and HLD, admitted to medicine floor for treatment of sepsis 2/2 scrotal abscess, diabetic foot ulcer, gluteal abscess, influenza A positive.     RRT was called as RN noticed that the patient was excessively somnolent after HD, not answering to questioning. Upon arrival, pt initially arousable, protecting airway, muttering a few words here and there but not answering questions.   Vitals on arrival: Temp 102.9, BP 94/59, HR 89, RR 32  Pt did not have excessive secretions on suctioning  Pt received Ofirmev x1 , 250 cc IVF bolus given   After about 20 minutes, pt fever improved to 100.2 F, and BP increased to systolic 102   Pt started to wake up and answer to questioning, follow commands.   ABG w/ lytes collected showin.44/CO2 37/HCO3 25/pO2 69  K 4.8, Lac 1.3   70 yo M with PMH of  DM, HTN, ESRD on dialysis ( M, W, F), Last Dialysis 24,   B/L Charcot deformity and HLD, admitted to medicine floor for treatment of sepsis 2/2 scrotal abscess, diabetic foot ulcer, gluteal abscess, influenza A positive.     RRT was called as RN noticed that the patient was excessively somnolent after HD, not answering to questioning. Upon arrival, pt initially arousable, protecting airway, muttering a few words here and there but not answering questions.   Vitals on arrival: Temp 102.9, BP 94/59, HR 89, RR 32  Pt did not have excessive secretions on suctioning  Pt received Ofirmev x1 , 250 cc IVF bolus given   After about 20 minutes, pt fever improved to 100.2 F, and BP increased to systolic 102   Pt started to wake up and answer to questioning, follow commands.   ABG w/ lytes collected showin.44/CO2 37/HCO3 25/pO2 69  K 4.8, Lac 1.3

## 2024-12-30 NOTE — PROGRESS NOTE ADULT - ASSESSMENT
69M with hx of T2DM, hidradenitis suppurativa a/w generalized weakness, w small amount of purulent drainage from scrotum   no leukocytosis     12/23: scrotal exam stable with spontaneous drainage  12/24: scrotal exam stable, CT pelvis demonstrates 2.1 cm right scrotal fluid collection suspicious for abscess and bilateral UVJ stones without ureteral dilatation  wbc has been downtrending  12/27: scrotal exam stable, decreasing drainage amount, WBC continues to downtrend  12/30: stable scrotal exam, less than 1 gauze worth of purulence expressed, patient likely febrile from flu (still without leukocytosis, drainage from scrotum still decreasing)    Plan   - Continue Flomax  - keep scrotal area clean and dry, may include sitz baths   - IV antibiotics, follow ID recommendations   - strict glycemic control   - HD as per neph   - remainder of care as per primary team   - D/w attending

## 2024-12-30 NOTE — PROGRESS NOTE ADULT - PROBLEM SELECTOR PLAN 9
-  Patient admitted from home   -  Evaluated by PT  -   recommending PIA  -  Continue with  Augmentin 500 mg qd x 4weeks 12/25-1/21  -  Out-patient podiatry follow up

## 2024-12-30 NOTE — PROGRESS NOTE ADULT - ASSESSMENT
Patient is a 69yo Male with ESRD on HD via Rt AVG, HTN, 2HPT, Anemia, LE venous stasis with Rt foot wound presents with weakness. Pt a/w Rt foot ulcer and hyperkalemia. Nephrology consulted for ESRD status.     1) ESRD: Last HD 12/30 (earlier today), tolerated well with net 1L removed. Plan for next HD 1/1. c/w MWF schedule. Pt for d/c to [facility] with onsite HD. Monitor electrolytes  2) Hyperkalemia: resolved. c/w low potassium diet.    Monitor serum potassium  3) HTN with ESRD: BP low, for which Amlodipine 5mg PO daily was d/c on 12/29. Continue to hold Coreg 25mg PO bid.  c/w low sodium diet. Monitor BP.  4) Anemia of renal disease: Hb acceptable. c/w Epogen 6000 units IV tiw. Monitor Hb.  5) 2HPT: Corrected serum calcium acceptable with elevated serum phos.  PTHi 441. c/w calcitriol 1mcg PO MWF. Pt on Lanthanum 2000mg PO tid with meals which is unavailable at [facility]. c/w Sevelamer 1600mg PO tid with meals and  low phos diet. Monitor serum calcium and phosphorus.    HepBsAg was neg on outpt labs on 12/11/24; repeat HepBsAg neg; likely positive HepBsAg on 12/16 is an error.       [facility] NEPHROLOGY  Arden Tsang M.D.  Freeman Holland D.O.  Janny Chirinos M.D.  MD Ambar Mccarty, MSN, ANP-C    Telephone: [phone]  Facsimile: [phone]    [facility] [place] [address], [address]  [place] [address]

## 2024-12-30 NOTE — PROGRESS NOTE ADULT - SUBJECTIVE AND OBJECTIVE BOX
[facility] NEPHROLOGY- PROGRESS NOTE    Patient is a 67yo Male with ESRD on HD via Rt AVG, HTN, 2HPT, Anemia, LE venous stasis with Rt foot wound presents with weakness. Pt a/w Rt foot ulcer and hyperkalemia. Nephrology consulted for ESRD status.   MRI Rt foot: + osteomyelitis involving the talus and less so of the navicular bone;    BCx +Staph hominis  12/20: s/p I& D of buttock abscess  +scrotal abscess  12/29: s/p RRT for lethargy;  febrile +FLU A     Hospital Medications: Medications reviewed.  REVIEW OF SYSTEMS: +cough. Denies any SOB, chest pain or n/v/d    VITALS:  T(F): 98.6 (12-30-24 @ 14:27), Max: 100.9 (12-30-24 @ 06:35)  HR: 73 (12-30-24 @ 14:27)  BP: 131/82 (12-30-24 @ 14:27)  RR: 16 (12-30-24 @ 14:27)  SpO2: 100% (12-30-24 @ 14:27)  Wt(kg): --    12-30 @ 07:01  -  12-30 @ 16:07  --------------------------------------------------------  IN: 600 mL / OUT: 1600 mL / NET: -1000 mL        PHYSICAL EXAM:  Gen: Mild lethargy, Obese  HEENT: anicteric  Cards: RRR, +S1/S2, no M/G/R  Resp: CTA ant   GI: soft, NT/ND, NABS  Extremities: no b/l LE edema  Access: Rt AVG +thrill +bruit    LABS:  12-30    132[L]  |  96  |  47[H]  ----------------------------<  121[H]  5.2   |  26  |  10.60[H]    Ca    8.7      30 Dec 2024 11:18  Phos  6.1     12-30  Mg     2.5     12-30    TPro  7.5  /  Alb  2.5[L]  /  TBili  0.4  /  DBili      /  AST  13  /  ALT  9[L]  /  AlkPhos  74  12-30    Creatinine Trend: 10.60 <--, 8.53 <--, 8.82 <--, 7.19 <--, 5.57 <--, 7.23 <--                        10.5   14.47 )-----------( 136      ( 30 Dec 2024 11:18 )             31.6     Urine Studies:  Urinalysis Basic - ( 30 Dec 2024 11:18 )    Color:  / Appearance:  / SG:  / pH:   Gluc: 121 mg/dL / Ketone:   / Bili:  / Urobili:    Blood:  / Protein:  / Nitrite:    Leuk Esterase:  / RBC:  / WBC    Sq Epi:  / Non Sq Epi:  / Bacteria:

## 2024-12-30 NOTE — RAPID RESPONSE TEAM SUMMARY - NSOTHERINTERVENTIONSRRT_GEN_ALL_CORE
ABG collected   Upon chart review, BCx drawn 12/29, pt has been on augmentin PO since 12/25, pt was previously on vancomycin intraHD   Will consider broadening abx to vancomycin (as pt was on this perviously)  ABG collected   Upon chart review, BCx drawn 12/29, pt has been on augmentin PO since 12/25, pt was previously on vancomycin intraHD   WC 14 today   Will give one dose of vancomycin and zosyn , repeat blood cultures , U/A  Will reassess respiratory status

## 2024-12-30 NOTE — PHARMACOTHERAPY INTERVENTION NOTE - NSPHARMCOMMASP
ASP - Renal dose adjustment
ASP - Renal dose adjustment
ASP - Dose optimization/Non-Renal dose adjustment
ASP - Dose optimization/Non-Renal dose adjustment

## 2024-12-30 NOTE — PROGRESS NOTE ADULT - PROBLEM SELECTOR PLAN 4
-   Patient has  a left buttock open wound, draining purulent discharge, likely the source of fever   -   s/p 12/19 I & D at bedside  -   Wound culture -No growth  -   S/p vancomycin  -   CHG  cleaning to left buttocks daily   -   Wound care - recommend to apply aquacel + foam dressing   -   Surgery following

## 2024-12-30 NOTE — PROGRESS NOTE ADULT - SUBJECTIVE AND OBJECTIVE BOX
NP Note :      Patient is a 69y old  Male who presents with a chief complaint of generalized weakness (30 Dec 2024 07:29)      INTERVAL HPI/OVERNIGHT EVENTS: no new complaints    MEDICATIONS  (STANDING):  amoxicillin  500 milliGRAM(s)/clavulanate 1 Tablet(s) Oral every 24 hours  calcitriol   Capsule 1 MICROGram(s) Oral <User Schedule>  chlorhexidine 2% Cloths 1 Application(s) Topical daily  epoetin rudolph-epbx (RETACRIT) Injectable 6000 Unit(s) IV Push <User Schedule>  heparin   Injectable 5000 Unit(s) SubCutaneous every 8 hours  insulin lispro (ADMELOG) corrective regimen sliding scale   SubCutaneous three times a day before meals  insulin lispro (ADMELOG) corrective regimen sliding scale   SubCutaneous at bedtime  latanoprost 0.005% Ophthalmic Solution 1 Drop(s) Both EYES at bedtime  oseltamivir 30 milliGRAM(s) Oral <User Schedule>  sevelamer carbonate 1600 milliGRAM(s) Oral three times a day with meals  tamsulosin 0.4 milliGRAM(s) Oral at bedtime    MEDICATIONS  (PRN):  acetaminophen     Tablet .. 650 milliGRAM(s) Oral every 6 hours PRN Temp greater or equal to 38C (100.4F), Mild Pain (1 - 3)  ondansetron Injectable 4 milliGRAM(s) IV Push every 8 hours PRN Nausea and/or Vomiting      __________________________________________________  REVIEW OF SYSTEMS:    CONSTITUTIONAL: Febrile overnigh.t.  c/o headache  EYES: no acute visual disturbances  NECK: No pain or stiffness  RESPIRATORY: No cough; No shortness of breath  CARDIOVASCULAR: No chest pain, no palpitations  GASTROINTESTINAL: No pain. No nausea or vomiting; No diarrhea   NEUROLOGICAL: No headache or numbness, no tremors  MUSCULOSKELETAL: No joint pain, no muscle pain  GENITOURINARY: no dysuria, no frequency, no hesitancy  PSYCHIATRY: no depression , no anxiety  ALL OTHER  ROS negative        Vital Signs Last 24 Hrs  T(C): 37.3 (30 Dec 2024 08:45), Max: 39.3 (29 Dec 2024 13:44)  T(F): 99.2 (30 Dec 2024 08:45), Max: 102.8 (29 Dec 2024 13:44)  HR: 83 (30 Dec 2024 06:35) (75 - 101)  BP: 132/62 (30 Dec 2024 06:35) (94/56 - 166/76)  BP(mean): --  RR: 18 (30 Dec 2024 06:35) (17 - 18)  SpO2: 99% (30 Dec 2024 06:35) (96% - 99%)    Parameters below as of 30 Dec 2024 06:35  Patient On (Oxygen Delivery Method): nasal cannula  O2 Flow (L/min): 2      ________________________________________________  PHYSICAL EXAM:  GENERAL: NAD  HEENT: Normocephalic;  conjunctivae and sclerae clear; moist mucous membranes;   NECK : supple  CHEST/LUNG: Clear to auscultation bilaterally with good air entry   HEART: S1 S2  regular; no murmurs, gallops or rubs  ABDOMEN: Soft, Nontender, Nondistended; Bowel sounds present  :  right scrotal with small ulcer.  EXTREMITIES:  Right forearm AV fistula  + bruit and thrill.   right foot diabetic foot ulcer wrapped up.  Left leg and foot,  extremely dry, flaky.    SKIN:   left buttock abscess  s/p I & D  NERVOUS SYSTEM:  Awake and alert; Oriented  to place, person and time ; no new deficits    _________________________________________________  LABS:                        10.5   14.47 )-----------( 136      ( 30 Dec 2024 11:18 )             31.6     12-30    132[L]  |  96  |  47[H]  ----------------------------<  121[H]  5.2   |  26  |  x     Ca    8.7      30 Dec 2024 11:18  Phos  4.0     12-29  Mg     2.5     12-30    TPro  x   /  Alb  2.5[L]  /  TBili  x   /  DBili  x   /  AST  x   /  ALT  x   /  AlkPhos  x   12-30      Urinalysis Basic - ( 30 Dec 2024 11:18 )    Color: x / Appearance: x / SG: x / pH: x  Gluc: 121 mg/dL / Ketone: x  / Bili: x / Urobili: x   Blood: x / Protein: x / Nitrite: x   Leuk Esterase: x / RBC: x / WBC x   Sq Epi: x / Non Sq Epi: x / Bacteria: x      CAPILLARY BLOOD GLUCOSE  POCT Blood Glucose.: 112 mg/dL (30 Dec 2024 11:44)  POCT Blood Glucose.: 157 mg/dL (30 Dec 2024 07:43)  POCT Blood Glucose.: 132 mg/dL (29 Dec 2024 21:34)  POCT Blood Glucose.: 163 mg/dL (29 Dec 2024 16:16)        RADIOLOGY & ADDITIONAL TESTS:    Imaging  Reviewed:  YES/NO    Consultant(s) Notes Reviewed:   YES/ No      Plan of care was discussed with patient and /or primary care giver; all questions and concerns were addressed

## 2024-12-30 NOTE — PHARMACOTHERAPY INTERVENTION NOTE - COMMENTS
As per Micromedex, it is recommended to administer additional Augmentin dose both during and at the end of dialysis. 
Provided patient counseling on pt's new medications and side effects of those medications.    Also provided pt with printed information. Briefly went over inpatient medications as well. Answered pt's questions.  
Currently, on vancomycin for complicated skin and soft tissue infection.     Random vancomycin level = 25.4: ug/mL (12.19.24 @ 00:09).    Suggest to dose with vancomycin 750 mg post-HD on Monday Wednesday and Friday

## 2024-12-30 NOTE — PROGRESS NOTE ADULT - NUTRITIONAL ASSESSMENT
Diet, Regular:   Consistent Carbohydrate {No Snacks}  DASH/TLC {Sodium & Cholesterol Restricted}  1000mL Fluid Restriction ([id number])  For patients receiving Renal Replacement - No Protein Restr, No Conc K, No Conc Phos, Low Sodium (RENAL) (12-16-24 @ 14:28) [Active]

## 2024-12-30 NOTE — PROGRESS NOTE ADULT - PROBLEM SELECTOR PLAN 2
-   HD M-W-F in the community  -   Continue with Sevelamer Carbonate with meals  -   Last  HD 12/27  -   Monitor BMP, phos serum   -   Nephro Dr. Chirinos following  -   SW following outpatient reinstatement  -   Hep B panel noted.

## 2024-12-30 NOTE — PROGRESS NOTE ADULT - ASSESSMENT
67 yo M with PMH of  DM, HTN, ESRD on dialysis ( M, W, F), Last Dialysis 12/13/24,   B/L Charcot deformity and HLD, present to the ED for generalized weakness, chills, subjective fever. Patient reports waking up this morning feeling very tired and unable to stand up to get dressed for his dialysis appointment. Collateral information obtained from wifer at bedside. As per wife, patient didnt appear to be himself this morning so she decided to call EMS. Patient reports since 2014 after having charcot procedure in bilateral foot, he has been wearing a charcot boot to walk. Patient follows up with podiatry Dr. Sim in  outpatient podiatry clinic. Patient denies any chest pain, abdominal pain, nausea, vomiting, headache lightheadedness, vision change, cough SOb   (16 Dec 2024 14:28)

## 2024-12-30 NOTE — PROGRESS NOTE ADULT - PROBLEM SELECTOR PLAN 1
-   Purulent drainage noted from scrotum   -   Wound culture positive for  Proteus Mirabilis  -   CT Pelvis: 2.1 cm right scrotal fluid collection suspicious for abscess. Several stones and both ureterovesical junctions without ureteral dilatation.  -   Continue with  Augmentin 500mg PO q24h (renally dosed)-- will need 4 weeks 12/25-1/21   -   Continue with  CHG  cleaning daily to right scrotal wound    -   Continue with  daily dressing changes as needed - gauze   -   ID Dr. Brito following

## 2024-12-31 DIAGNOSIS — A41.9 SEPSIS, UNSPECIFIED ORGANISM: ICD-10-CM

## 2024-12-31 LAB
ALBUMIN SERPL ELPH-MCNC: 2.4 G/DL — LOW (ref 3.5–5)
ALP SERPL-CCNC: 79 U/L — SIGNIFICANT CHANGE UP (ref 40–120)
ALT FLD-CCNC: 9 U/L DA — LOW (ref 10–60)
ANION GAP SERPL CALC-SCNC: 15 MMOL/L — SIGNIFICANT CHANGE UP (ref 5–17)
AST SERPL-CCNC: 23 U/L — SIGNIFICANT CHANGE UP (ref 10–40)
BASOPHILS # BLD AUTO: 0.05 K/UL — SIGNIFICANT CHANGE UP (ref 0–0.2)
BASOPHILS NFR BLD AUTO: 0.2 % — SIGNIFICANT CHANGE UP (ref 0–2)
BILIRUB SERPL-MCNC: 0.5 MG/DL — SIGNIFICANT CHANGE UP (ref 0.2–1.2)
BUN SERPL-MCNC: 26 MG/DL — HIGH (ref 7–18)
CALCIUM SERPL-MCNC: 8.8 MG/DL — SIGNIFICANT CHANGE UP (ref 8.4–10.5)
CHLORIDE SERPL-SCNC: 97 MMOL/L — SIGNIFICANT CHANGE UP (ref 96–108)
CO2 SERPL-SCNC: 21 MMOL/L — LOW (ref 22–31)
CREAT SERPL-MCNC: 6.65 MG/DL — HIGH (ref 0.5–1.3)
EGFR: 8 ML/MIN/1.73M2 — LOW
EOSINOPHIL # BLD AUTO: 0.01 K/UL — SIGNIFICANT CHANGE UP (ref 0–0.5)
EOSINOPHIL NFR BLD AUTO: 0 % — SIGNIFICANT CHANGE UP (ref 0–6)
GLUCOSE BLDC GLUCOMTR-MCNC: 151 MG/DL — HIGH (ref 70–99)
GLUCOSE BLDC GLUCOMTR-MCNC: 179 MG/DL — HIGH (ref 70–99)
GLUCOSE BLDC GLUCOMTR-MCNC: 189 MG/DL — HIGH (ref 70–99)
GLUCOSE BLDC GLUCOMTR-MCNC: 203 MG/DL — HIGH (ref 70–99)
GLUCOSE SERPL-MCNC: 121 MG/DL — HIGH (ref 70–99)
HCT VFR BLD CALC: 33.1 % — LOW (ref 39–50)
HGB BLD-MCNC: 10.6 G/DL — LOW (ref 13–17)
IMM GRANULOCYTES NFR BLD AUTO: 1.1 % — HIGH (ref 0–0.9)
LACTATE SERPL-SCNC: 1.1 MMOL/L — SIGNIFICANT CHANGE UP (ref 0.7–2)
LYMPHOCYTES # BLD AUTO: 1.29 K/UL — SIGNIFICANT CHANGE UP (ref 1–3.3)
LYMPHOCYTES # BLD AUTO: 6.4 % — LOW (ref 13–44)
MAGNESIUM SERPL-MCNC: 2.1 MG/DL — SIGNIFICANT CHANGE UP (ref 1.6–2.6)
MCHC RBC-ENTMCNC: 32 G/DL — SIGNIFICANT CHANGE UP (ref 32–36)
MCHC RBC-ENTMCNC: 32 PG — SIGNIFICANT CHANGE UP (ref 27–34)
MCV RBC AUTO: 100 FL — SIGNIFICANT CHANGE UP (ref 80–100)
MONOCYTES # BLD AUTO: 1.71 K/UL — HIGH (ref 0–0.9)
MONOCYTES NFR BLD AUTO: 8.5 % — SIGNIFICANT CHANGE UP (ref 2–14)
NEUTROPHILS # BLD AUTO: 16.84 K/UL — HIGH (ref 1.8–7.4)
NEUTROPHILS NFR BLD AUTO: 83.8 % — HIGH (ref 43–77)
NRBC # BLD: 0 /100 WBCS — SIGNIFICANT CHANGE UP (ref 0–0)
PHOSPHATE SERPL-MCNC: 4.8 MG/DL — HIGH (ref 2.5–4.5)
PLATELET # BLD AUTO: 151 K/UL — SIGNIFICANT CHANGE UP (ref 150–400)
POTASSIUM SERPL-MCNC: 4.6 MMOL/L — SIGNIFICANT CHANGE UP (ref 3.5–5.3)
POTASSIUM SERPL-SCNC: 4.6 MMOL/L — SIGNIFICANT CHANGE UP (ref 3.5–5.3)
PROT SERPL-MCNC: 8 G/DL — SIGNIFICANT CHANGE UP (ref 6–8.3)
RBC # BLD: 3.31 M/UL — LOW (ref 4.2–5.8)
RBC # FLD: 14.5 % — SIGNIFICANT CHANGE UP (ref 10.3–14.5)
SODIUM SERPL-SCNC: 133 MMOL/L — LOW (ref 135–145)
WBC # BLD: 20.13 K/UL — HIGH (ref 3.8–10.5)
WBC # FLD AUTO: 20.13 K/UL — HIGH (ref 3.8–10.5)

## 2024-12-31 PROCEDURE — 71045 X-RAY EXAM CHEST 1 VIEW: CPT | Mod: 26

## 2024-12-31 PROCEDURE — 99291 CRITICAL CARE FIRST HOUR: CPT

## 2024-12-31 PROCEDURE — 99233 SBSQ HOSP IP/OBS HIGH 50: CPT

## 2024-12-31 RX ORDER — NOREPINEPHRINE BITARTRATE 1 MG/ML
0.05 INJECTION INTRAVENOUS
Qty: 8 | Refills: 0 | Status: DISCONTINUED | OUTPATIENT
Start: 2024-12-31 | End: 2025-01-02

## 2024-12-31 RX ORDER — MEROPENEM 1 G/20ML
1000 INJECTION, POWDER, FOR SOLUTION INTRAVENOUS EVERY 24 HOURS
Refills: 0 | Status: DISCONTINUED | OUTPATIENT
Start: 2025-01-01 | End: 2025-01-07

## 2024-12-31 RX ORDER — VANCOMYCIN HYDROCHLORIDE 5 G/100ML
1250 INJECTION, POWDER, LYOPHILIZED, FOR SOLUTION INTRAVENOUS
Refills: 0 | Status: DISCONTINUED | OUTPATIENT
Start: 2024-12-31 | End: 2025-01-02

## 2024-12-31 RX ORDER — VANCOMYCIN HYDROCHLORIDE 5 G/100ML
750 INJECTION, POWDER, LYOPHILIZED, FOR SOLUTION INTRAVENOUS
Refills: 0 | Status: DISCONTINUED | OUTPATIENT
Start: 2024-12-31 | End: 2024-12-31

## 2024-12-31 RX ORDER — VANCOMYCIN HYDROCHLORIDE 5 G/100ML
750 INJECTION, POWDER, LYOPHILIZED, FOR SOLUTION INTRAVENOUS ONCE
Refills: 0 | Status: COMPLETED | OUTPATIENT
Start: 2024-12-31 | End: 2024-12-31

## 2024-12-31 RX ORDER — PIPERACILLIN AND TAZOBACTAM 3; .375 G/15ML; G/15ML
3.38 INJECTION, POWDER, LYOPHILIZED, FOR SOLUTION INTRAVENOUS ONCE
Refills: 0 | Status: COMPLETED | OUTPATIENT
Start: 2024-12-31 | End: 2024-12-31

## 2024-12-31 RX ORDER — MEROPENEM 1 G/20ML
500 INJECTION, POWDER, FOR SOLUTION INTRAVENOUS ONCE
Refills: 0 | Status: COMPLETED | OUTPATIENT
Start: 2024-12-31 | End: 2024-12-31

## 2024-12-31 RX ORDER — MEROPENEM 1 G/20ML
INJECTION, POWDER, FOR SOLUTION INTRAVENOUS
Refills: 0 | Status: DISCONTINUED | OUTPATIENT
Start: 2024-12-31 | End: 2024-12-31

## 2024-12-31 RX ORDER — CHLORHEXIDINE GLUCONATE 1.2 MG/ML
1 RINSE ORAL
Refills: 0 | Status: DISCONTINUED | OUTPATIENT
Start: 2024-12-31 | End: 2025-01-08

## 2024-12-31 RX ORDER — HEPARIN SODIUM 1000 [USP'U]/ML
5000 INJECTION, SOLUTION INTRAVENOUS; SUBCUTANEOUS EVERY 12 HOURS
Refills: 0 | Status: DISCONTINUED | OUTPATIENT
Start: 2024-12-31 | End: 2025-01-08

## 2024-12-31 RX ADMIN — HEPARIN SODIUM 5000 UNIT(S): 1000 INJECTION, SOLUTION INTRAVENOUS; SUBCUTANEOUS at 05:36

## 2024-12-31 RX ADMIN — SEVELAMER CARBONATE 1600 MILLIGRAM(S): 800 TABLET, FILM COATED ORAL at 17:16

## 2024-12-31 RX ADMIN — NOREPINEPHRINE BITARTRATE 7.97 MICROGRAM(S)/KG/MIN: 1 INJECTION INTRAVENOUS at 03:57

## 2024-12-31 RX ADMIN — VANCOMYCIN HYDROCHLORIDE 250 MILLIGRAM(S): 5 INJECTION, POWDER, LYOPHILIZED, FOR SOLUTION INTRAVENOUS at 06:42

## 2024-12-31 RX ADMIN — Medication 1: at 17:17

## 2024-12-31 RX ADMIN — HEPARIN SODIUM 5000 UNIT(S): 1000 INJECTION, SOLUTION INTRAVENOUS; SUBCUTANEOUS at 17:16

## 2024-12-31 RX ADMIN — NOREPINEPHRINE BITARTRATE 7.97 MICROGRAM(S)/KG/MIN: 1 INJECTION INTRAVENOUS at 23:40

## 2024-12-31 RX ADMIN — PIPERACILLIN AND TAZOBACTAM 25 GRAM(S): 3; .375 INJECTION, POWDER, LYOPHILIZED, FOR SOLUTION INTRAVENOUS at 06:42

## 2024-12-31 RX ADMIN — ACETAMINOPHEN 650 MILLIGRAM(S): 80 SOLUTION/ DROPS ORAL at 19:02

## 2024-12-31 RX ADMIN — CHLORHEXIDINE GLUCONATE 1 APPLICATION(S): 1.2 RINSE ORAL at 05:36

## 2024-12-31 RX ADMIN — CHLORHEXIDINE GLUCONATE 1 APPLICATION(S): 1.2 RINSE ORAL at 12:12

## 2024-12-31 RX ADMIN — Medication 1: at 12:14

## 2024-12-31 RX ADMIN — MEROPENEM 100 MILLIGRAM(S): 1 INJECTION, POWDER, FOR SOLUTION INTRAVENOUS at 13:53

## 2024-12-31 RX ADMIN — MEROPENEM 100 MILLIGRAM(S): 1 INJECTION, POWDER, FOR SOLUTION INTRAVENOUS at 12:12

## 2024-12-31 RX ADMIN — ACETAMINOPHEN 650 MILLIGRAM(S): 80 SOLUTION/ DROPS ORAL at 18:08

## 2024-12-31 NOTE — PROGRESS NOTE ADULT - NS ATTEND AMEND GEN_ALL_CORE FT
Patient seen at bedside, states he feels good, no acute symptoms noted. Expresses concerns about rehab facility choice, as he had "bad experience" at nursing home before.  On exam, patient is AOx3, NAD, cardiopulmonary exams unremarkable, abdomen soft, NT/ND, extremities without edema. Scrotum covered with dressing.  Labs reviewed, CBC, BMP stable.    Assessment and plan:   68 yo M with PMHx of ESRD on HD, HTN, DM, bilateral Charcot's joint, presenting with generalized weakness. Found to have hyperkalemia and R foot ulcer, admitted for further workup. MRI shows R ankle chronic OM. CCB scerotal discharge, later found to have scrotal abscess.    # R foot chronic OM  # Scrotal abscess  # ESRD on HD  # HTN  # DM A1c 7.4% 12/2024  # Bilateral Charcot's joint    - ID Dr. Brito recs appreciated, continue Augmentin PO for 4 weeks for the scrotal lesion  - I talked to urology PA Tete Gongora, no surgical intervention for the scrotal abscess warranted as it is self-draining  - continue inpatient HD  - continue FS/SSI  - continue home amlodipine, sevelamer, calcitriol  - DVT ppx: heparin SQ.  - PT recs PIA, f/u CM for placement
DATE OF SERVICE: 12-20-24 @ 14:56    Cont daily packing changes with 4x4/gauze  Fu cx  Continue medicine mgt
Patient seen at bedside, states he feels good, no acute symptoms noted.  On exam, patient is AOx3, NAD, cardiopulmonary exams unremarkable, abdomen soft, NT/ND, extremities without edema. Scrotum covered with dressing.  Labs reviewed, CBC unchanged, BMP with high Cr.    Assessment and plan:   70 yo M with PMHx of ESRD on HD, HTN, DM, bilateral Charcot's joint, presenting with generalized weakness. Found to have hyperkalemia and R foot ulcer, admitted for further workup. MRI shows R ankle chronic OM. CCB scerotal discharge, later found to have scrotal abscess.    # R foot chronic OM  # Scrotal abscess  # ESRD on HD  # HTN  # DM A1c 7.4% 12/2024  # Bilateral Charcot's joint    - ID Dr. Daryl arias appreciated, continue Augmentin PO for 4 weeks for the scrotal lesion  - I talked to urology PA Tete Gongora, no surgical intervention for the scrotal abscess warranted as it is self-draining  - continue inpatient HD  - continue FS/SSI  - continue home amlodipine, sevelamer, calcitriol  - DVT ppx: heparin SQ.  - Now there is concern for his ambulatory function and PT changes recommendation to PIA
VNS for daily packing changes - pack with dry 4x4 gauze, cover with gauze/tape    Surgery to sign off  recall prn
69 yo M with PMH of  DM, HTN, ESRD on dialysis ( [facility], [facility], [facility]), Last Dialysis 12/30/24,  B/L Charcot deformity and HLD, present to the ED for generalized weakness, chills, subjective fever. Pt admitted on 12/16 to medicine floor for treatment of sepsis 2/2 scrotal abscess, diabetic foot ulcer, gluteal abscess, influenza A positive. s/p multiple RRTs for somnolence, hypotension, fever. Admitted to the ICU for peripheral pressor req. and closer monitoring.     #Septic shock 2/2 multiple abscesses, diabetic foot ulcer  #acute encephalopathy   #ESRD on dialysis   #Charcot arthropathy B/L  #Diabetic ulceration R midfoot with chronic OM  #DM  #HTN  #HLD  #BPH    _________CNS___________  #Acute encephalopathy   - Encephalopathy likely 2/2 acute metabolic encephalopathy in the setting of septic shock  - ABG 7.44/CO2 37/HCO3 25/pO2 69  - c/w broad spectrum iv abx - vanc and radha  - f/u repeat bcx     _________CVS___________  #septic shock 2/2 scrotal and gluteal abscesses, Diabetic ulceration R midfoot with chronic OM  #pressors  - peripheral levo, currently 0.06  - central and a-line consent obtained if needed   - Given 250 cc boluses x2 throughout the night   - continue to monitor volume status , give fluids judiciously given ESRD    #HTN  hold BP meds in setting of shock    #HLD   c/w home meds     _________ RESP__________  #AHRF in the setting of influenza A   - CXR 12/31 wetread wnl  - c/w NC as needed     __________GI____________  no acute issues     ________ RENAL__________  #ESRD on dialysis (MWF)  - last session 12/30, next 1/1/2024  - recent electrolytes within normal limits   - C/W epo, Renvela, calcitriol   - Given 250 cc boluses x2 throughout the night   - continue to monitor volume status   - Nephrology Dr. Chirinos following    #BPH  - c/w home meds     _________MSK___________  no active issues     __________ID____________  #septic shock 2/2 multiple abscesses, diabetic foot ulcer  #diabetic foot ulcer  #influenza A     #Scrotal abscess   - Scrotal Wound culture positive for  Proteus Mirabilis  - CT Pelvis: 2.1 cm right scrotal fluid collection suspicious for abscess. Several stones and both ureterovesical junctions without ureteral dilatation.  - Broadened to Radha and Vanc  - ID Dr. Chaevz following  - f/u repeat bcx    #Diabetic foot ulcers   - According to podiatry note 12/28- No surgical treatement will be done at this time. Patient is stable from podiatry. Plan for outpatient bone biopsy when pt is off abx for 3 weeks which was discussed with patient and he is agreeable with the plan going forward.  - Podiatry made aware, f/u recs    #influenza A  - req 2 L NC   - c/w tamiflu     _________ENDO__________  #DM   - c/w insulin sliding scale     _______HEME/ONC_______  # Anemia of renal disease  - c/w Epogen 6000 units IV tiw.   - Monitor Hb    _________SKIN____________  #diabetic foot ulcer   plan as above     ________Prophylaxis_______  #DVT - heparin      __________GOC/ DISPO___________  FULL CODE   central line and arterial line consent in the chart
Patient seen at bedside, states he feels good, although he still has R scrotum pain.  On exam, patient is AOx3, NAD, scrotum covered with pad partially soaked with fluid.  Labs reviewed, CBC with WBC 10, BMP with high Cr on HD.    Assessment and plan:   68 yo M with PMHx of ESRD on HD, HTN, DM, bilateral Charcot's joint, presenting with generalized weakness. Found to have hyperkalemia and R foot ulcer, admitted for further workup. MRI shows R ankle chronic OM. CCB scerotal discharge, later found to have scrotal abscess.    # R foot chronic OM  # Scrotal abscess  # ESRD on HD  # HTN  # DM A1c 7.4% 12/2024  # Bilateral Charcot's joint    - ID Dr. Brito recs appreciated, started on Augmentin PO for the scrotal lesion  -  evaluation appreciated, awaiting finalized recs on the abscess  - continue inpatient HD  - continue FS/SSI  - continue home amlodipine, sevelamer, calcitriol  - DVT ppx: heparin SQ.
CC: weakness  S:  deneis any pain, fever or chills  O:  Vital Signs Last 24 Hrs  T(C): 37.6 (12-17-24 @ 13:46), Max: 37.6 (12-17-24 @ 13:46)  T(F): 99.6 (12-17-24 @ 13:46), Max: 99.6 (12-17-24 @ 13:46)  HR: 71 (12-17-24 @ 13:46) (71 - 82)  BP: 102/55 (12-17-24 @ 13:46) (98/52 - 112/63)  RR: 17 (12-17-24 @ 13:46) (16 - 18)  SpO2: 95% (12-17-24 @ 13:46) (94% - 96%)      PE:  Gen: Oriented, alert, No acute distress Eyes: conjunctivae and lid normal, sclera clear with no icterus ENT: nose and throat exam normal CVS: S1, S2, RRR; no murmur, no rubs, no gallops Pulm: Good air exchange, Breath sounds equal bilaterally, no rales rhonchi,  no wheezes Chest: nontender, no chest deformity, chest movement symmetrical Gl: abdomen soft, nontender, nondistended, bowel sounds normoactive, no masses palpated Musk: no msk pain, BLE edematous, R foot with c/d/i dressing Skin: no skin lesions, skin turgor normal, warm and well perfused Neuro: Awake, alert,Psych: normal affect, insight        A/P:  Mr. Ramos is a 69 yo M with PMHx of ESRD on HD MWF, HTN, DM, bilateral Charcot's joint, presenting with generalized weakness for 1 day.    Found to have hyperkalemia and R foot ulcer, admitted for further workup.    # Generalized weakness  # Hyperkalemia  # R foot ulcer  # ESRD on HD  # HTN  # DM  # Bilateral Charcot's joint    - continue abx with vanc and cefepime, f/u wound cx, consult ID  -ANEESH: There are normal amplitude biphasic pulse volume recordings   bilaterally. There are bilateral calcified, noncompressible vessels.   Therefore, ABIs could not be calculated.  - podiatry recs appreciated, to obtain MRI R foot  - consult nephrology for inpatient HD  - monitor BMP  -had HD on admission given hyperK  - send A1c, FS/SSI  - formal med rec  - DVT ppx: heparin SQ.         Total Time Spent 50 minutes This includes chart review, patient assessment, discussion and collaboration with interdisciplinary team members, excluding ACP.
CC: weakness  S:  no complaints this am  O:  Vital Signs Last 24 Hrs  T(C): 36.9 (12-20-24 @ 13:50), Max: 37.1 (12-20-24 @ 05:33)  T(F): 98.4 (12-20-24 @ 13:50), Max: 98.8 (12-20-24 @ 05:33)  HR: 63 (12-20-24 @ 13:50) (61 - 68)  BP: 116/63 (12-20-24 @ 13:50) (102/61 - 116/63)  BP(mean): 72 (12-20-24 @ 05:33) (72 - 72)  RR: 18 (12-20-24 @ 13:50) (16 - 18)  SpO2: 97% (12-20-24 @ 13:50) (93% - 97%)          PE:  Gen: Oriented, alert, No acute distress Eyes: conjunctivae and lid normal, sclera clear with no icterus ENT: nose and throat exam normal CVS: S1, S2, RRR; no murmur, no rubs, no gallops Pulm: Good air exchange, Breath sounds equal bilaterally, no rales rhonchi,  no wheezes Chest: nontender, no chest deformity, chest movement symmetrical Gl: abdomen soft, nontender, nondistended, bowel sounds normoactive, no masses palpated Musk: no msk pain, BLE edematous, R foot with c/d/i dressing Skin: L buttock dressing c/d/i , no skin lesions, skin turgor normal, warm and well perfused Neuro: Awake, alert,Psych: normal affect, insight                Assessment and Plan:   · Assessment	  Mr. Ramos is a 69 yo M with PMHx of ESRD on HD MWF, HTN, DM, bilateral Charcot's joint, presenting with generalized weakness for 1 day.    Found to have hyperkalemia and R foot ulcer, admitted for further workup.    # Generalized weakness  # Hyperkalemia  # R ankle OM  # ESRD on HD  # HTN  # DM  # Bilateral Charcot's joint  #Left buttock abscess    - completed cefepime, one last dose of vanc today per ID, f/u wound cx outpatient  -ANEESH: There are normal amplitude biphasic pulse volume recordings   bilaterally. There are bilateral calcified, noncompressible vessels.   Therefore, ABIs could not be calculated.  -f/u with vascular and podiatry outpatient  - podiatry recs appreciated  -consulted ID, follow up with ID outpatient  - consult nephrology for inpatient HD  - monitor BMP  -HD per neph  - A1c 7.4, FS/SSI  - formal med rec  - DVT ppx: heparin SQ.
Patient seen at bedside, states he feels better than yesterday.  On exam, patient is AOx3, NAD, cardiopulmonary exams unremarkable, abdomen soft, NT/ND, extremities without edema.  Labs reviewed, CBC with WBC 14, hgb 10.5, BMP Na 132, LFT unremarkable.    Assessment and plan:   70 yo M with PMHx of ESRD on HD, HTN, DM, bilateral Charcot's joint, presenting with generalized weakness. Found to have hyperkalemia and R foot ulcer, admitted for further workup. MRI shows R ankle chronic OM. CCB scrotal discharge, later found to have scrotal abscess, now on PO abx. Course further complicated by nosocomial flu A infection.    # R foot chronic OM  # Scrotal abscess  # Flu A infection  # ESRD on HD  # HTN  # DM A1c 7.4% 12/2024  # Bilateral Charcot's joint    - CXR without evidence of PNA, continue Tamiflu, monitor fever curve and SpO2, wean off O2 as tolerated  - ID Dr. Brito recs appreciated, continue Augmentin PO for 4 weeks for the scrotal lesion (-1/21/2025)  - Urology recs appreciated, no surgical intervention for the scrotal abscess warranted as it is self-draining, to follow at clinic  - continue inpatient HD  - continue FS/SSI  - continue home amlodipine, sevelamer, calcitriol  - DVT ppx: heparin SQ.  - PT recs PIA, f/u CM for placement.

## 2024-12-31 NOTE — CONSULT NOTE ADULT - NSCONSULTADDITIONALINFOA_GEN_ALL_CORE
Patient seen discussed with residents  62 old male  #Septic shock 2/2 multiple abscesses, diabetic foot ulcer  #acute encephalopathy   #ESRD on dialysis   #Charcot arthropathy B/L  #Diabetic ulceration R midfoot with chronic OM  #DM  #HTN  #HLD  #BPH

## 2024-12-31 NOTE — CONSULT NOTE ADULT - SUBJECTIVE AND OBJECTIVE BOX
Patient is a 69y old  Male who presents with a chief complaint of generalized weakness (30 Dec 2024 16:07)      HPI:  69 yo M with PMH of  DM, HTN, ESRD on dialysis ( M, W, F), Last Dialysis 24,   B/L Charcot deformity and HLD, present to the ED for generalized weakness, chills, subjective fever. Patient reports waking up this morning feeling very tired and unable to stand up to get dressed for his dialysis appointment. Collateral information obtained from wife at bedside. As per wife, patient didnt appear to be himself this morning so she decided to call EMS. Patient reports since  after having charcot procedure in bilateral foot, he has been wearing a charcot boot to walk. Patient follows up with podiatry Dr. Sim in  outpatient podiatry clinic. Patient denies any chest pain, abdominal pain, nausea, vomiting, headache lightheadedness, vision change, cough SOB.    (16 Dec 2024 14:28)    Pt admitted on  to medicine floor for treatment of sepsis 2/2 scrotal abscess, diabetic foot ulcer, gluteal abscess, influenza A positive.   Pt's last note of HD this afternoon .  RRT was called around 22:00 as RN noticed that the patient was excessively somnolent after HD, not answering to questioning. Upon arrival, pt initially arousable, protecting airway, muttering a few words here and there but not answering questions.   Vitals on arrival: Temp 102.9, BP 94/59, HR 89, RR 32  Pt did not have excessive secretions on suctioning  Pt received Ofirmev x1 , 250 cc IVF bolus given   After about 20 minutes, pt fever improved to 100.2 F, and BP increased to systolic 102   Pt started to wake up and answer to questioning, follow commands.   ABG w/ lytes collected showin.44/CO2 37/HCO3 25/pO2 69  K 4.8, Lac 1.3    Interval HPI:       PAST MEDICAL & SURGICAL HISTORY:  DM (diabetes mellitus)  type 2      ESRD (end stage renal disease) on dialysis  t-th-sat via right AV graft, uses midodrine the days of Dialysis for hypotension      Charcot foot due to diabetes mellitus      Diabetic retinopathy associated with type 2 diabetes mellitus      Hyperlipidemia      Abscess of scrotum      Hidradenitis      End stage renal disease      S/P foot surgery  right foot - 2014      Charcot's syndrome  Surgeries in feet and ankles   left ankle  and right ankle 2014      History of incision and drainage  scrotal 2018      Hemodialysis access, AV graft          SOCIAL HX:   Smoking                         ETOH                            Other    FAMILY HISTORY:  Family history of diabetes mellitus    :  No known cardiovacular family hisotry     ROS:  See HPI     Allergies    penicillin (Unknown)    Intolerances          PHYSICAL EXAM    ICU Vital Signs Last 24 Hrs  T(C): 37.2 (31 Dec 2024 01:02), Max: 39.4 (30 Dec 2024 22:20)  T(F): 99 (31 Dec 2024 01:02), Max: 103 (30 Dec 2024 22:20)  HR: 86 (31 Dec 2024 01:) (72 - 95)  BP: 101/56 (31 Dec 2024 01:) (83/54 - 132/62)  BP(mean): 71 (31 Dec 2024 01:) (71 - 71)  ABP: --  ABP(mean): --  RR: 17 (31 Dec 2024 01:02) (16 - 18)  SpO2: 99% (31 Dec 2024 01:02) (95% - 100%)    O2 Parameters below as of 31 Dec 2024 01:02  Patient On (Oxygen Delivery Method): nasal cannula  O2 Flow (L/min): 2          General: Not in distress  HEENT:  ALINA              Lymphatic system: No LN  Lungs: Bilateral BS  Cardiovascular: Regular  Gastrointestinal: Soft, Positive BS  Musculoskeletal: No clubbing.  Moves all extremities.    Skin: Warm.  Intact  Neurological: No motor or sensory deficit       24 @ 07:01  -  24 @ 03:20  --------------------------------------------------------  IN:    Other (mL): 600 mL  Total IN: 600 mL    OUT:    Other (mL): 1600 mL  Total OUT: 1600 mL    Total NET: -1000 mL          LABS:                          10.5   14.47 )-----------( 136      ( 30 Dec 2024 11:18 )             31.6                                               12    132[L]  |  96  |  47[H]  ----------------------------<  121[H]  5.2   |  26  |  10.60[H]    Ca    8.7      30 Dec 2024 11:18  Phos  6.1     12  Mg     2.5     12    TPro  7.5  /  Alb  2.5[L]  /  TBili  0.4  /  DBili  x   /  AST  13  /  ALT  9[L]  /  AlkPhos  74  12-30                                             Urinalysis Basic - ( 30 Dec 2024 11:18 )    Color: x / Appearance: x / SG: x / pH: x  Gluc: 121 mg/dL / Ketone: x  / Bili: x / Urobili: x   Blood: x / Protein: x / Nitrite: x   Leuk Esterase: x / RBC: x / WBC x   Sq Epi: x / Non Sq Epi: x / Bacteria: x                                                  LIVER FUNCTIONS - ( 30 Dec 2024 11:18 )  Alb: 2.5 g/dL / Pro: 7.5 g/dL / ALK PHOS: 74 U/L / ALT: 9 U/L DA / AST: 13 U/L / GGT: x                                                  Culture - Blood (collected 29 Dec 2024 06:20)  Source: .Blood BLOOD  Preliminary Report (30 Dec 2024 11:12):    No growth at 24 hours    Culture - Blood (collected 29 Dec 2024 06:10)  Source: .Blood BLOOD  Preliminary Report (30 Dec 2024 11:12):    No growth at 24 hours                                                                                       ABG - ( 30 Dec 2024 23:16 )  pH, Arterial: 7.44  pH, Blood: x     /  pCO2: 37    /  pO2: 69    / HCO3: 25    / Base Excess: 1.0   /  SaO2: 94                  CXR:    ECHO:    MEDICATIONS  (STANDING):  amoxicillin  500 milliGRAM(s)/clavulanate 1 Tablet(s) Oral every 24 hours  calcitriol   Capsule 1 MICROGram(s) Oral <User Schedule>  chlorhexidine 2% Cloths 1 Application(s) Topical daily  epoetin rudolph-epbx (RETACRIT) Injectable 6000 Unit(s) IV Push <User Schedule>  heparin   Injectable 5000 Unit(s) SubCutaneous every 8 hours  insulin lispro (ADMELOG) corrective regimen sliding scale   SubCutaneous three times a day before meals  insulin lispro (ADMELOG) corrective regimen sliding scale   SubCutaneous at bedtime  latanoprost 0.005% Ophthalmic Solution 1 Drop(s) Both EYES at bedtime  oseltamivir 30 milliGRAM(s) Oral <User Schedule>  piperacillin/tazobactam IVPB.. 3.375 Gram(s) IV Intermittent once  sevelamer carbonate 1600 milliGRAM(s) Oral three times a day with meals  tamsulosin 0.4 milliGRAM(s) Oral at bedtime  vancomycin  IVPB 750 milliGRAM(s) IV Intermittent once    MEDICATIONS  (PRN):  acetaminophen     Tablet .. 650 milliGRAM(s) Oral every 6 hours PRN Temp greater or equal to 38C (100.4F), Mild Pain (1 - 3)  ondansetron Injectable 4 milliGRAM(s) IV Push every 8 hours PRN Nausea and/or Vomiting         Patient is a 69y old  Male who presents with a chief complaint of generalized weakness (30 Dec 2024 16:07)      HPI:  67 yo M with PMH of  DM, HTN, ESRD on dialysis ( M, W, F), Last Dialysis 24,   B/L Charcot deformity and HLD, present to the ED for generalized weakness, chills, subjective fever. Patient reports waking up this morning feeling very tired and unable to stand up to get dressed for his dialysis appointment. Collateral information obtained from wife at bedside. As per wife, patient didnt appear to be himself this morning so she decided to call EMS. Patient reports since  after having charcot procedure in bilateral foot, he has been wearing a charcot boot to walk. Patient follows up with podiatry Dr. Sim in  outpatient podiatry clinic. Patient denies any chest pain, abdominal pain, nausea, vomiting, headache lightheadedness, vision change, cough SOB.    (16 Dec 2024 14:28)    Pt admitted on  to medicine floor for treatment of sepsis 2/2 scrotal abscess, diabetic foot ulcer, gluteal abscess, influenza A positive.   Pt's last note of HD this afternoon .  RRT was called around 22:00 as RN noticed that the patient was excessively somnolent after HD, not answering to questioning. Upon arrival, pt initially arousable, protecting airway, muttering a few words here and there but not answering questions.   Vitals on arrival: Temp 102.9, BP 94/59, HR 89, RR 32  Pt did not have excessive secretions on suctioning  Pt received Ofirmev x1 , 250 cc IVF bolus given   After about 20 minutes, pt fever improved to 100.2 F, and BP increased to systolic 102   Pt started to wake up and answer to questioning, follow commands.   ABG w/ lytes collected showin.44/CO2 37/HCO3 25/pO2 69  K 4.8, Lac 1.3    Interval HPI:   Pt was reassessed after RRT and noted to still be somnolent with waxing and waning mental status.   Repeat BP noted to be in the 60s systolic  Pt brought to the ICU for peripheral pressor req.     PAST MEDICAL & SURGICAL HISTORY:  DM (diabetes mellitus)  type 2      ESRD (end stage renal disease) on dialysis  t-th-sat via right AV graft, uses midodrine the days of Dialysis for hypotension      Charcot foot due to diabetes mellitus      Diabetic retinopathy associated with type 2 diabetes mellitus      Hyperlipidemia      Abscess of scrotum      Hidradenitis      End stage renal disease      S/P foot surgery  right foot - 2014      Charcot's syndrome  Surgeries in feet and ankles   left ankle  and right ankle 2014      History of incision and drainage  scrotal 2018      Hemodialysis access, AV graft          SOCIAL HX:   Smoking                         ETOH                            Other    FAMILY HISTORY:  Family history of diabetes mellitus    :  No known cardiovacular family hisotry     ROS:  See HPI     Allergies    penicillin (Unknown)    Intolerances          PHYSICAL EXAM    ICU Vital Signs Last 24 Hrs  T(C): 37.2 (31 Dec 2024 01:02), Max: 39.4 (30 Dec 2024 22:20)  T(F): 99 (31 Dec 2024 01:02), Max: 103 (30 Dec 2024 22:20)  HR: 86 (31 Dec 2024 01:02) (72 - 95)  BP: 101/56 (31 Dec 2024 01:02) (83/54 - 132/62)  BP(mean): 71 (31 Dec 2024 01:02) (71 - 71)  ABP: --  ABP(mean): --  RR: 17 (31 Dec 2024 01:02) (16 - 18)  SpO2: 99% (31 Dec 2024 01:02) (95% - 100%)    O2 Parameters below as of 31 Dec 2024 01:02  Patient On (Oxygen Delivery Method): nasal cannula  O2 Flow (L/min): 2          General: Not in distress, somnolent   HEENT:  ALINA              Lymphatic system: No LN  Lungs: Bilateral BS, pt noted to be protecting airway  Cardiovascular: Regular  Gastrointestinal: Soft, Positive BS  Musculoskeletal: No clubbing.  Moves all extremities.    Skin: Warm.  Intact  Neurological: No motor or sensory deficit       24 @ 07:01  -  24 @ 03:20  --------------------------------------------------------  IN:    Other (mL): 600 mL  Total IN: 600 mL    OUT:    Other (mL): 1600 mL  Total OUT: 1600 mL    Total NET: -1000 mL          LABS:                          10.5   14.47 )-----------( 136      ( 30 Dec 2024 11:18 )             31.6                                               12    132[L]  |  96  |  47[H]  ----------------------------<  121[H]  5.2   |  26  |  10.60[H]    Ca    8.7      30 Dec 2024 11:18  Phos  6.1       Mg     2.5         TPro  7.5  /  Alb  2.5[L]  /  TBili  0.4  /  DBili  x   /  AST  13  /  ALT  9[L]  /  AlkPhos  74  12                                             Urinalysis Basic - ( 30 Dec 2024 11:18 )    Color: x / Appearance: x / SG: x / pH: x  Gluc: 121 mg/dL / Ketone: x  / Bili: x / Urobili: x   Blood: x / Protein: x / Nitrite: x   Leuk Esterase: x / RBC: x / WBC x   Sq Epi: x / Non Sq Epi: x / Bacteria: x                                                  LIVER FUNCTIONS - ( 30 Dec 2024 11:18 )  Alb: 2.5 g/dL / Pro: 7.5 g/dL / ALK PHOS: 74 U/L / ALT: 9 U/L DA / AST: 13 U/L / GGT: x                                                  Culture - Blood (collected 29 Dec 2024 06:20)  Source: .Blood BLOOD  Preliminary Report (30 Dec 2024 11:12):    No growth at 24 hours    Culture - Blood (collected 29 Dec 2024 06:10)  Source: .Blood BLOOD  Preliminary Report (30 Dec 2024 11:12):    No growth at 24 hours                                                                                       ABG - ( 30 Dec 2024 23:16 )  pH, Arterial: 7.44  pH, Blood: x     /  pCO2: 37    /  pO2: 69    / HCO3: 25    / Base Excess: 1.0   /  SaO2: 94                  CXR:    ECHO:    MEDICATIONS  (STANDING):  amoxicillin  500 milliGRAM(s)/clavulanate 1 Tablet(s) Oral every 24 hours  calcitriol   Capsule 1 MICROGram(s) Oral <User Schedule>  chlorhexidine 2% Cloths 1 Application(s) Topical daily  epoetin rudolph-epbx (RETACRIT) Injectable 6000 Unit(s) IV Push <User Schedule>  heparin   Injectable 5000 Unit(s) SubCutaneous every 8 hours  insulin lispro (ADMELOG) corrective regimen sliding scale   SubCutaneous three times a day before meals  insulin lispro (ADMELOG) corrective regimen sliding scale   SubCutaneous at bedtime  latanoprost 0.005% Ophthalmic Solution 1 Drop(s) Both EYES at bedtime  oseltamivir 30 milliGRAM(s) Oral <User Schedule>  piperacillin/tazobactam IVPB.. 3.375 Gram(s) IV Intermittent once  sevelamer carbonate 1600 milliGRAM(s) Oral three times a day with meals  tamsulosin 0.4 milliGRAM(s) Oral at bedtime  vancomycin  IVPB 750 milliGRAM(s) IV Intermittent once    MEDICATIONS  (PRN):  acetaminophen     Tablet .. 650 milliGRAM(s) Oral every 6 hours PRN Temp greater or equal to 38C (100.4F), Mild Pain (1 - 3)  ondansetron Injectable 4 milliGRAM(s) IV Push every 8 hours PRN Nausea and/or Vomiting         Patient is a 69y old  Male who presents with a chief complaint of generalized weakness (30 Dec 2024 16:07)      HPI:  68 yo M with PMH of  DM, HTN, ESRD on dialysis ( M, W, F), Last Dialysis 24,   B/L Charcot deformity and HLD, present to the ED for generalized weakness, chills, subjective fever. Patient reports waking up this morning feeling very tired and unable to stand up to get dressed for his dialysis appointment. Collateral information obtained from wife at bedside. As per wife, patient didnt appear to be himself this morning so she decided to call EMS. Patient reports since  after having charcot procedure in bilateral foot, he has been wearing a charcot boot to walk. Patient follows up with podiatry Dr. Sim in  outpatient podiatry clinic. Patient denies any chest pain, abdominal pain, nausea, vomiting, headache lightheadedness, vision change, cough SOB.    (16 Dec 2024 14:28)    Pt admitted on  to medicine floor for treatment of sepsis 2/2 scrotal abscess, diabetic foot ulcer, gluteal abscess, influenza A positive.   Pt's last note of HD this afternoon .  RRT was called around 22:00 as RN noticed that the patient was excessively somnolent after HD, not answering to questioning. Upon arrival, pt initially arousable, protecting airway, muttering a few words here and there but not answering questions.   Vitals on arrival: Temp 102.9, BP 94/59, HR 89, RR 32  Pt did not have excessive secretions on suctioning  Pt received Ofirmev x1 , 250 cc IVF bolus given   After about 20 minutes, pt fever improved to 100.2 F, and BP increased to systolic 102   Pt started to wake up and answer to questioning, follow commands.   ABG w/ lytes collected showin.44/CO2 37/HCO3 25/pO2 69  K 4.8, Lac 1.3    Interval HPI:   Pt was reassessed after RRT and noted to still be somnolent with waxing and waning mental status.   Repeat BP noted to be in the 60s systolic  Pt brought to the ICU for peripheral pressor req.     PAST MEDICAL & SURGICAL HISTORY:  DM (diabetes mellitus)  type 2      ESRD (end stage renal disease) on dialysis  t-th-sat via right AV graft, uses midodrine the days of Dialysis for hypotension      Charcot foot due to diabetes mellitus      Diabetic retinopathy associated with type 2 diabetes mellitus      Hyperlipidemia      Abscess of scrotum      Hidradenitis      End stage renal disease      S/P foot surgery  right foot - 2014      Charcot's syndrome  Surgeries in feet and ankles   left ankle  and right ankle 2014      History of incision and drainage  scrotal 2018      Hemodialysis access, AV graft          SOCIAL HX:   Smoking                         ETOH                            Other    FAMILY HISTORY:  Family history of diabetes mellitus    :  No known cardiovacular family hisotry     ROS:  See HPI     Allergies    penicillin (Unknown)    Intolerances          PHYSICAL EXAM    ICU Vital Signs Last 24 Hrs  T(C): 37.2 (31 Dec 2024 01:02), Max: 39.4 (30 Dec 2024 22:20)  T(F): 99 (31 Dec 2024 01:02), Max: 103 (30 Dec 2024 22:20)  HR: 86 (31 Dec 2024 01:02) (72 - 95)  BP: 101/56 (31 Dec 2024 01:02) (83/54 - 132/62)  BP(mean): 71 (31 Dec 2024 01:02) (71 - 71)  ABP: --  ABP(mean): --  RR: 17 (31 Dec 2024 01:02) (16 - 18)  SpO2: 99% (31 Dec 2024 01:02) (95% - 100%)    O2 Parameters below as of 31 Dec 2024 01:02  Patient On (Oxygen Delivery Method): nasal cannula  O2 Flow (L/min): 2          General: Not in distress, somnolent   HEENT:  ALINA              Lymphatic system: No LN  Lungs: Bilateral BS, pt noted to be protecting airway  Cardiovascular: Regular  Gastrointestinal: Soft, Positive BS  Musculoskeletal: No clubbing.  Moves all extremities.    Skin: Warm.  Intact  Neurological: No motor or sensory deficit       24 @ 07:01  -  24 @ 03:20  --------------------------------------------------------  IN:    Other (mL): 600 mL  Total IN: 600 mL    OUT:    Other (mL): 1600 mL  Total OUT: 1600 mL    Total NET: -1000 mL          LABS:                          10.5   14.47 )-----------( 136      ( 30 Dec 2024 11:18 )             31.6                                               12    132[L]  |  96  |  47[H]  ----------------------------<  121[H]  5.2   |  26  |  10.60[H]    Ca    8.7      30 Dec 2024 11:18  Phos  6.1       Mg     2.5         TPro  7.5  /  Alb  2.5[L]  /  TBili  0.4  /  DBili  x   /  AST  13  /  ALT  9[L]  /  AlkPhos  74  12                                             Urinalysis Basic - ( 30 Dec 2024 11:18 )    Color: x / Appearance: x / SG: x / pH: x  Gluc: 121 mg/dL / Ketone: x  / Bili: x / Urobili: x   Blood: x / Protein: x / Nitrite: x   Leuk Esterase: x / RBC: x / WBC x   Sq Epi: x / Non Sq Epi: x / Bacteria: x                                                  LIVER FUNCTIONS - ( 30 Dec 2024 11:18 )  Alb: 2.5 g/dL / Pro: 7.5 g/dL / ALK PHOS: 74 U/L / ALT: 9 U/L DA / AST: 13 U/L / GGT: x                                                  Culture - Blood (collected 29 Dec 2024 06:20)  Source: .Blood BLOOD  Preliminary Report (30 Dec 2024 11:12):    No growth at 24 hours    Culture - Blood (collected 29 Dec 2024 06:10)  Source: .Blood BLOOD  Preliminary Report (30 Dec 2024 11:12):    No growth at 24 hours                                                                                       ABG - ( 30 Dec 2024 23:16 )  pH, Arterial: 7.44  pH, Blood: x     /  pCO2: 37    /  pO2: 69    / HCO3: 25    / Base Excess: 1.0   /  SaO2: 94                  CXR:    ECHO:    MEDICATIONS  (STANDING):  amoxicillin  500 milliGRAM(s)/clavulanate 1 Tablet(s) Oral every 24 hours  calcitriol   Capsule 1 MICROGram(s) Oral <User Schedule>  chlorhexidine 2% Cloths 1 Application(s) Topical daily  epoetin rudolph-epbx (RETACRIT) Injectable 6000 Unit(s) IV Push <User Schedule>  heparin   Injectable 5000 Unit(s) SubCutaneous every 8 hours  insulin lispro (ADMELOG) corrective regimen sliding scale   SubCutaneous three times a day before meals  insulin lispro (ADMELOG) corrective regimen sliding scale   SubCutaneous at bedtime  latanoprost 0.005% Ophthalmic Solution 1 Drop(s) Both EYES at bedtime  oseltamivir 30 milliGRAM(s) Oral <User Schedule>  piperacillin/tazobactam IVPB.. 3.375 Gram(s) IV Intermittent once  sevelamer carbonate 1600 milliGRAM(s) Oral three times a day with meals  tamsulosin 0.4 milliGRAM(s) Oral at bedtime  vancomycin  IVPB 750 milliGRAM(s) IV Intermittent once    MEDICATIONS  (PRN):  acetaminophen     Tablet .. 650 milliGRAM(s) Oral every 6 hours PRN Temp greater or equal to 38C (100.4F), Mild Pain (1 - 3)  ondansetron Injectable 4 milliGRAM(s) IV Push every 8 hours PRN Nausea and/or Vomiting

## 2024-12-31 NOTE — PROGRESS NOTE ADULT - SUBJECTIVE AND OBJECTIVE BOX
INTERVAL HPI/OVERNIGHT EVENTS: ***    PRESSORS: [ ] YES [ ] NO  WHICH:    Antimicrobial:  oseltamivir 30 milliGRAM(s) Oral <User Schedule>    Cardiovascular:  norepinephrine Infusion 0.05 MICROgram(s)/kG/Min IV Continuous <Continuous>    Pulmonary:    Hematalogic:  heparin   Injectable 5000 Unit(s) SubCutaneous every 8 hours    Other:  acetaminophen     Tablet .. 650 milliGRAM(s) Oral every 6 hours PRN  calcitriol   Capsule 1 MICROGram(s) Oral <User Schedule>  chlorhexidine 2% Cloths 1 Application(s) Topical daily  chlorhexidine 2% Cloths 1 Application(s) Topical <User Schedule>  epoetin rudolph-epbx (RETACRIT) Injectable 6000 Unit(s) IV Push <User Schedule>  insulin lispro (ADMELOG) corrective regimen sliding scale   SubCutaneous three times a day before meals  insulin lispro (ADMELOG) corrective regimen sliding scale   SubCutaneous at bedtime  latanoprost 0.005% Ophthalmic Solution 1 Drop(s) Both EYES at bedtime  ondansetron Injectable 4 milliGRAM(s) IV Push every 8 hours PRN  sevelamer carbonate 1600 milliGRAM(s) Oral three times a day with meals  tamsulosin 0.4 milliGRAM(s) Oral at bedtime    acetaminophen     Tablet .. 650 milliGRAM(s) Oral every 6 hours PRN  calcitriol   Capsule 1 MICROGram(s) Oral <User Schedule>  chlorhexidine 2% Cloths 1 Application(s) Topical daily  chlorhexidine 2% Cloths 1 Application(s) Topical <User Schedule>  epoetin rudolph-epbx (RETACRIT) Injectable 6000 Unit(s) IV Push <User Schedule>  heparin   Injectable 5000 Unit(s) SubCutaneous every 8 hours  insulin lispro (ADMELOG) corrective regimen sliding scale   SubCutaneous three times a day before meals  insulin lispro (ADMELOG) corrective regimen sliding scale   SubCutaneous at bedtime  latanoprost 0.005% Ophthalmic Solution 1 Drop(s) Both EYES at bedtime  norepinephrine Infusion 0.05 MICROgram(s)/kG/Min IV Continuous <Continuous>  ondansetron Injectable 4 milliGRAM(s) IV Push every 8 hours PRN  oseltamivir 30 milliGRAM(s) Oral <User Schedule>  sevelamer carbonate 1600 milliGRAM(s) Oral three times a day with meals  tamsulosin 0.4 milliGRAM(s) Oral at bedtime    Drug Dosing Weight  Height (cm): 172.7 (25 Dec 2024 20:03)  Weight (kg): 85 (16 Dec 2024 07:46)  BMI (kg/m2): 28.5 (25 Dec 2024 20:03)  BSA (m2): 1.99 (25 Dec 2024 20:03)    CENTRAL LINE: [ ] YES [ ] NO  LOCATION:   DATE INSERTED:  REMOVE: [ ] YES [ ] NO  EXPLAIN:    STOUT: [ ] YES [ ] NO    DATE INSERTED:  REMOVE:  [ ] YES [ ] NO  EXPLAIN:    A-LINE:  [ ] YES [ ] NO  LOCATION:   DATE INSERTED:  REMOVE:  [ ] YES [ ] NO  EXPLAIN:    PMH -reviewed admission note, no change since admission  PAST MEDICAL & SURGICAL HISTORY:  DM (diabetes mellitus)  type 2      ESRD (end stage renal disease) on dialysis  t-th-sat via right AV graft, uses midodrine the days of Dialysis for hypotension      Charcot foot due to diabetes mellitus      Diabetic retinopathy associated with type 2 diabetes mellitus      Hyperlipidemia      Abscess of scrotum      Hidradenitis      End stage renal disease      S/P foot surgery  right foot - 11/2014      Charcot's syndrome  Surgeries in feet and ankles   left ankle 2011 and right ankle 11/2014      History of incision and drainage  scrotal 5/2018      Hemodialysis access, AV graft          ICU Vital Signs Last 24 Hrs  T(C): 37.8 (31 Dec 2024 07:30), Max: 39.4 (30 Dec 2024 22:20)  T(F): 100 (31 Dec 2024 07:30), Max: 103 (30 Dec 2024 22:20)  HR: 84 (31 Dec 2024 08:15) (72 - 100)  BP: 96/52 (31 Dec 2024 08:15) (71/40 - 156/61)  BP(mean): 65 (31 Dec 2024 08:15) (43 - 86)  ABP: --  ABP(mean): --  RR: 23 (31 Dec 2024 08:15) (10 - 26)  SpO2: 94% (31 Dec 2024 08:15) (93% - 100%)    O2 Parameters below as of 31 Dec 2024 07:15  Patient On (Oxygen Delivery Method): nasal cannula  O2 Flow (L/min): 3          ABG - ( 30 Dec 2024 23:16 )  pH, Arterial: 7.44  pH, Blood: x     /  pCO2: 37    /  pO2: 69    / HCO3: 25    / Base Excess: 1.0   /  SaO2: 94                    12-30 @ 07:01  -  12-31 @ 07:00  --------------------------------------------------------  IN: 646.4 mL / OUT: 1600 mL / NET: -953.6 mL            PHYSICAL EXAM:    GENERAL: NAD, well-groomed, well-developed  HEAD:  Atraumatic, Normocephalic  EYES: EOMI, PERRLA, conjunctiva and sclera clear  ENMT: No tonsillar erythema, exudates, or enlargement; Moist mucous membranes, Good dentition, No lesions  NECK: Supple, normal appearance, No JVD; Normal thyroid; Trachea midline  NERVOUS SYSTEM:  Alert & Oriented X3,  Motor Strength 5/5 B/L upper and lower extremities; DTRs 2+ intact and symmetric  CHEST/LUNG: No chest deformity; Normal percussion bilaterally; No rales, rhonchi, wheezing   HEART: Regular rate and rhythm; No murmurs, rubs, or gallops  ABDOMEN: Soft, Nontender, Nondistended; Bowel sounds present  EXTREMITIES:  2+ Peripheral Pulses, No clubbing, cyanosis, or edema  LYMPH: No lymphadenopathy noted  SKIN: No rashes or lesions;  Good capillary refill      LABS:  CBC Full  -  ( 31 Dec 2024 06:43 )  WBC Count : 20.13 K/uL  RBC Count : 3.31 M/uL  Hemoglobin : 10.6 g/dL  Hematocrit : 33.1 %  Platelet Count - Automated : 151 K/uL  Mean Cell Volume : 100.0 fl  Mean Cell Hemoglobin : 32.0 pg  Mean Cell Hemoglobin Concentration : 32.0 g/dL  Auto Neutrophil # : 16.84 K/uL  Auto Lymphocyte # : 1.29 K/uL  Auto Monocyte # : 1.71 K/uL  Auto Eosinophil # : 0.01 K/uL  Auto Basophil # : 0.05 K/uL  Auto Neutrophil % : 83.8 %  Auto Lymphocyte % : 6.4 %  Auto Monocyte % : 8.5 %  Auto Eosinophil % : 0.0 %  Auto Basophil % : 0.2 %    12-31    133[L]  |  97  |  26[H]  ----------------------------<  121[H]  4.6   |  21[L]  |  6.65[H]    Ca    8.8      31 Dec 2024 06:43  Phos  4.8     12-31  Mg     2.1     12-31    TPro  8.0  /  Alb  2.4[L]  /  TBili  0.5  /  DBili  x   /  AST  23  /  ALT  9[L]  /  AlkPhos  79  12-31      Urinalysis Basic - ( 31 Dec 2024 06:43 )    Color: x / Appearance: x / SG: x / pH: x  Gluc: 121 mg/dL / Ketone: x  / Bili: x / Urobili: x   Blood: x / Protein: x / Nitrite: x   Leuk Esterase: x / RBC: x / WBC x   Sq Epi: x / Non Sq Epi: x / Bacteria: x      Culture Results:   No growth at 24 hours (12-29 @ 06:20)  Culture Results:   No growth at 24 hours (12-29 @ 06:10)      RADIOLOGY & ADDITIONAL STUDIES REVIEWED:  ***    [ ]GOALS OF CARE DISCUSSION WITH PATIENT/FAMILY/PROXY:    CRITICAL CARE TIME SPENT: 35 minutes INTERVAL HPI/OVERNIGHT EVENTS: Patient admitted to ICU overnight. Patient examined at bedside in the AM, reports no new complaints. Plan discussed with patient, states understanding.    PRESSORS: [X] YES [ ] NO  WHICH: 0.06 Norepi    Antimicrobial:  Oseltamivir 30 milliGRAM(s) Oral <User Schedule>    Cardiovascular:  norepinephrine Infusion 0.05 MICROgram(s)/kG/Min IV Continuous <Continuous>    Pulmonary:    Hematalogic:  heparin   Injectable 5000 Unit(s) SubCutaneous every 8 hours    Other:  acetaminophen     Tablet .. 650 milliGRAM(s) Oral every 6 hours PRN  calcitriol   Capsule 1 MICROGram(s) Oral <User Schedule>  chlorhexidine 2% Cloths 1 Application(s) Topical daily  chlorhexidine 2% Cloths 1 Application(s) Topical <User Schedule>  epoetin rudolph-epbx (RETACRIT) Injectable 6000 Unit(s) IV Push <User Schedule>  insulin lispro (ADMELOG) corrective regimen sliding scale   SubCutaneous three times a day before meals  insulin lispro (ADMELOG) corrective regimen sliding scale   SubCutaneous at bedtime  latanoprost 0.005% Ophthalmic Solution 1 Drop(s) Both EYES at bedtime  ondansetron Injectable 4 milliGRAM(s) IV Push every 8 hours PRN  sevelamer carbonate 1600 milliGRAM(s) Oral three times a day with meals  tamsulosin 0.4 milliGRAM(s) Oral at bedtime    acetaminophen     Tablet .. 650 milliGRAM(s) Oral every 6 hours PRN  calcitriol   Capsule 1 MICROGram(s) Oral <User Schedule>  chlorhexidine 2% Cloths 1 Application(s) Topical daily  chlorhexidine 2% Cloths 1 Application(s) Topical <User Schedule>  epoetin rudolph-epbx (RETACRIT) Injectable 6000 Unit(s) IV Push <User Schedule>  heparin   Injectable 5000 Unit(s) SubCutaneous every 8 hours  insulin lispro (ADMELOG) corrective regimen sliding scale   SubCutaneous three times a day before meals  insulin lispro (ADMELOG) corrective regimen sliding scale   SubCutaneous at bedtime  latanoprost 0.005% Ophthalmic Solution 1 Drop(s) Both EYES at bedtime  norepinephrine Infusion 0.05 MICROgram(s)/kG/Min IV Continuous <Continuous>  ondansetron Injectable 4 milliGRAM(s) IV Push every 8 hours PRN  oseltamivir 30 milliGRAM(s) Oral <User Schedule>  sevelamer carbonate 1600 milliGRAM(s) Oral three times a day with meals  tamsulosin 0.4 milliGRAM(s) Oral at bedtime    Drug Dosing Weight  Height (cm): 172.7 (25 Dec 2024 20:03)  Weight (kg): 85 (16 Dec 2024 07:46)  BMI (kg/m2): 28.5 (25 Dec 2024 20:03)  BSA (m2): 1.99 (25 Dec 2024 20:03)    CENTRAL LINE: [ ] YES [X] NO    STOUT: [ ] YES [X] NO    A-LINE:  [ ] YES [X] NO    PMH -reviewed admission note, no change since admission  PAST MEDICAL & SURGICAL HISTORY:  DM (diabetes mellitus)  type 2      ESRD (end stage renal disease) on dialysis  t-th-sat via right AV graft, uses midodrine the days of Dialysis for hypotension      Charcot foot due to diabetes mellitus      Diabetic retinopathy associated with type 2 diabetes mellitus      Hyperlipidemia      Abscess of scrotum      Hidradenitis      End stage renal disease      S/P foot surgery  right foot - 11/2014      Charcot's syndrome  Surgeries in feet and ankles   left ankle 2011 and right ankle 11/2014      History of incision and drainage  scrotal 5/2018      Hemodialysis access, AV graft          ICU Vital Signs Last 24 Hrs  T(C): 37.8 (31 Dec 2024 07:30), Max: 39.4 (30 Dec 2024 22:20)  T(F): 100 (31 Dec 2024 07:30), Max: 103 (30 Dec 2024 22:20)  HR: 84 (31 Dec 2024 08:15) (72 - 100)  BP: 96/52 (31 Dec 2024 08:15) (71/40 - 156/61)  BP(mean): 65 (31 Dec 2024 08:15) (43 - 86)  ABP: --  ABP(mean): --  RR: 23 (31 Dec 2024 08:15) (10 - 26)  SpO2: 94% (31 Dec 2024 08:15) (93% - 100%)    O2 Parameters below as of 31 Dec 2024 07:15  Patient On (Oxygen Delivery Method): nasal cannula  O2 Flow (L/min): 3          ABG - ( 30 Dec 2024 23:16 )  pH, Arterial: 7.44  pH, Blood: x     /  pCO2: 37    /  pO2: 69    / HCO3: 25    / Base Excess: 1.0   /  SaO2: 94                    12-30 @ 07:01  -  12-31 @ 07:00  --------------------------------------------------------  IN: 646.4 mL / OUT: 1600 mL / NET: -953.6 mL            PHYSICAL EXAM:    GENERAL: NAD, well-groomed, well-developed  HEAD:  Atraumatic, Normocephalic  EYES: EOMI, PERRLA, conjunctiva and sclera clear  ENMT: No tonsillar erythema, exudates, or enlargement; Moist mucous membranes, Good dentition, No lesions  NECK: Supple, normal appearance, No JVD; Normal thyroid; Trachea midline  NERVOUS SYSTEM:  Alert & Oriented X3,  Motor Strength 5/5 B/L upper and lower extremities; DTRs 2+ intact and symmetric  CHEST/LUNG: No chest deformity; Normal percussion bilaterally; No rales, rhonchi, wheezing   HEART: Regular rate and rhythm; No murmurs, rubs, or gallops  ABDOMEN: Scrotal swelling, Soft, Nontender, Nondistended; Bowel sounds present  EXTREMITIES:  Wounds left lower extremity and right food in dressing  LYMPH: No lymphadenopathy noted  SKIN: Site of ID without signs of infection, granulation tissue upper back without signs of infection      LABS:  CBC Full  -  ( 31 Dec 2024 06:43 )  WBC Count : 20.13 K/uL  RBC Count : 3.31 M/uL  Hemoglobin : 10.6 g/dL  Hematocrit : 33.1 %  Platelet Count - Automated : 151 K/uL  Mean Cell Volume : 100.0 fl  Mean Cell Hemoglobin : 32.0 pg  Mean Cell Hemoglobin Concentration : 32.0 g/dL  Auto Neutrophil # : 16.84 K/uL  Auto Lymphocyte # : 1.29 K/uL  Auto Monocyte # : 1.71 K/uL  Auto Eosinophil # : 0.01 K/uL  Auto Basophil # : 0.05 K/uL  Auto Neutrophil % : 83.8 %  Auto Lymphocyte % : 6.4 %  Auto Monocyte % : 8.5 %  Auto Eosinophil % : 0.0 %  Auto Basophil % : 0.2 %    12-31    133[L]  |  97  |  26[H]  ----------------------------<  121[H]  4.6   |  21[L]  |  6.65[H]    Ca    8.8      31 Dec 2024 06:43  Phos  4.8     12-31  Mg     2.1     12-31    TPro  8.0  /  Alb  2.4[L]  /  TBili  0.5  /  DBili  x   /  AST  23  /  ALT  9[L]  /  AlkPhos  79  12-31      Urinalysis Basic - ( 31 Dec 2024 06:43 )    Color: x / Appearance: x / SG: x / pH: x  Gluc: 121 mg/dL / Ketone: x  / Bili: x / Urobili: x   Blood: x / Protein: x / Nitrite: x   Leuk Esterase: x / RBC: x / WBC x   Sq Epi: x / Non Sq Epi: x / Bacteria: x      Culture Results:   No growth at 24 hours (12-29 @ 06:20)  Culture Results:   No growth at 24 hours (12-29 @ 06:10)      RADIOLOGY & ADDITIONAL STUDIES REVIEWED:  CXR, read pending  [ ]GOALS OF CARE DISCUSSION WITH PATIENT/FAMILY/PROXY:    CRITICAL CARE TIME SPENT: 35 minutes

## 2024-12-31 NOTE — CONSULT NOTE ADULT - ASSESSMENT
67 yo M with PMH of  DM, HTN, ESRD on dialysis ( M, W, F), Last Dialysis 12/30/24,  B/L Charcot deformity and HLD, present to the ED for generalized weakness, chills, subjective fever. Pt admitted on 12/16 to medicine floor for treatment of sepsis 2/2 scrotal abscess, diabetic foot ulcer, gluteal abscess, influenza A positive. s/p multiple RRTs for somnolence, hypotension, fever. Admitted to the ICU for peripheral pressor req. and closer monitoring.     #Septic shock 2/2 multiple abscesses, diabetic foot ulcer  #acute encephalopathy   #ESRD on dialysis   #B/L Charcot deformity, Osteomyelitis   #DM  #HTN  #HLD  #BPH    _________CNS___________  #Acute encephalopathy   - Encephalopathy likely 2/2 acute metabolic encephalopathy in the setting of septic shock  - c/w broad spectrum iv abx - vanc and zosyn   - f/u repeat bcx     _________CVS___________  #septic shock 2/2 multiple abscesses, diabetic foot ulcer  #pressors  - pt   - peripheral levo  - central and a-line consent obtained if needed      #HTN  hold BP meds in setting of shock    #HLD   c/w home meds     _________ RESP__________  #AHRF in the setting of influenza A   - put currently protecting airway   - c/w NC as needed     __________GI____________  no acute issues     ________ RENAL__________  #ESRD on dialysis (MWF)  - last session 12/30  - recent electrolytes within normal limits     #BPH  - c/w home meds     _________MSK___________  no active issues     __________ID____________  #septic shock 2/2 multiple abscesses, diabetic foot ulcer  #diabetic foot ulcer  #influenza A     #Scrotal abscess   - Scrotal Wound culture positive for  Proteus Mirabilis  - CT Pelvis: 2.1 cm right scrotal fluid collection suspicious for abscess. Several stones and both ureterovesical junctions without ureteral dilatation.  - will broaden abx to vanc and zosyn      #Diabetic foot ulcers   MRI: ulceration at the plantar surface medially with suspected exposed bone with underlying   Surgical culture grew Providencia stuartii  - ID recommends: bone biopsy and debridement outpatient when off antibiotics for at least 3 weeks with Culture sent to determine directed treatment.  - unsure of current podiatry plan, primary team to follow up with podiatry in the AM   - c/w iv abx      #influenza A  - req 2 L NC   - c/w tamiflu     _________ENDO__________  #DM   - c/w insulin sliding scale     _______HEME/ONC_______  # Anemia of renal disease  - c/w Epogen 6000 units IV tiw.   - Monitor Hb    _________SKIN____________  #diabetic foot ulcer   plan as above     ________Prophylaxis_______  #DVT - heparin      __________GOC/ DISPO___________  FULL CODE   central line and arterial line consent in the chart    67 yo M with PMH of  DM, HTN, ESRD on dialysis ( M, W, F), Last Dialysis 12/30/24,  B/L Charcot deformity and HLD, present to the ED for generalized weakness, chills, subjective fever. Pt admitted on 12/16 to medicine floor for treatment of sepsis 2/2 scrotal abscess, diabetic foot ulcer, gluteal abscess, influenza A positive. s/p multiple RRTs for somnolence, hypotension, fever. Admitted to the ICU for peripheral pressor req. and closer monitoring.     #Septic shock 2/2 multiple abscesses, diabetic foot ulcer  #acute encephalopathy   #ESRD on dialysis   #Charcot arthropathy B/L  #Diabetic ulceration R midfoot with chronic OM  #DM  #HTN  #HLD  #BPH    _________CNS___________  #Acute encephalopathy   - Encephalopathy likely 2/2 acute metabolic encephalopathy in the setting of septic shock  - ABG 7.44/CO2 37/HCO3 25/pO2 69  - c/w broad spectrum iv abx - vanc and zosyn   - f/u repeat bcx   - continue to monitor BMP, ABG as needed if there is concern for hypercapnia     _________CVS___________  #septic shock 2/2 scrotal and gluteal abscesses, Diabetic ulceration R midfoot with chronic OM  #pressors  - peripheral levo  - central and a-line consent obtained if needed   - pt appears to be hypovolemic, given 250 cc boluses x2 throughout the night   - continue to monitor volume status , give fluids judiciously     #HTN  hold BP meds in setting of shock    #HLD   c/w home meds     _________ RESP__________  #AHRF in the setting of influenza A   - put currently protecting airway   - CXR 12/28 with no acute findings   - c/w NC as needed     __________GI____________  no acute issues     ________ RENAL__________  #ESRD on dialysis (MWF)  - last session 12/30  - recent electrolytes within normal limits   - C/W epo, Renvela, calcitriol   - pt appears to be hypovolemic, given 250 cc boluses x2 throughout the night   - continue to monitor volume status     #BPH  - c/w home meds     _________MSK___________  no active issues     __________ID____________  #septic shock 2/2 multiple abscesses, diabetic foot ulcer  #diabetic foot ulcer  #influenza A     #Scrotal abscess   - Scrotal Wound culture positive for  Proteus Mirabilis  - CT Pelvis: 2.1 cm right scrotal fluid collection suspicious for abscess. Several stones and both ureterovesical junctions without ureteral dilatation.  - will broaden abx to vanc and zosyn    - reconsult ID in the AM   - f/u repeat bcx and lactate     #Diabetic foot ulcers   - According to podiatry note 12/28- No surgical treatement will be done at this time. Patient is stable from podiatry. Plan for outpatient bone biopsy when pt is off abx for 3 weeks which was discussed with patient and he is agreeable with the plan going forward.  - c/w broad spectrum abx   - f/u repeat bcx and lactate     #influenza A  - req 2 L NC   - c/w tamiflu     _________ENDO__________  #DM   - c/w insulin sliding scale     _______HEME/ONC_______  # Anemia of renal disease  - c/w Epogen 6000 units IV tiw.   - Monitor Hb    _________SKIN____________  #diabetic foot ulcer   plan as above     ________Prophylaxis_______  #DVT - heparin      __________GOC/ DISPO___________  FULL CODE   central line and arterial line consent in the chart

## 2024-12-31 NOTE — PROGRESS NOTE ADULT - ASSESSMENT
Patient is a 67yo Male with ESRD on HD via Rt AVG, HTN, 2HPT, Anemia, LE venous stasis with Rt foot wound presents with weakness. Pt a/w Rt foot ulcer and hyperkalemia. Nephrology consulted for ESRD status.     1) ESRD: Last HD 12/30,  tolerated well with net 1L removed. Plan for next HD 1/1. c/w MWF schedule. Monitor electrolytes  2) Hyperkalemia: resolved. c/w low potassium diet.    Monitor serum potassium  3) HTN with ESRD: BP low normal; now on pressors. Amlodipine 5mg PO daily on hold.  c/w low sodium diet. Monitor BP.  4) Anemia of renal disease: Hb acceptable. c/w Epogen 6000 units IV tiw. Monitor Hb.  5) 2HPT: Serum calcium acceptable with elevated serum phos.  PTHi 441. c/w calcitriol 1mcg PO MWF. Pt on Lanthanum 2000mg PO tid with meals which is unavailable at [facility]. Pt now NPO; recc to hold Sevelamer 1600mg PO tid with meals  Monitor serum calcium and phosphorus.    HepBsAg was neg on outpt labs on 12/11/24; repeat HepBsAg neg; likely positive HepBsAg on 12/16 is an error.       [facility] NEPHROLOGY  Arden Tsang M.D.  Freeman Holland D.O.  Janny Chirinos M.D.  MD Ambar Mccarty, MSN, ANP-C    Telephone: [phone]  Facsimile: [phone]    [address] [place] [address], [address]  [place] [address]

## 2024-12-31 NOTE — PROGRESS NOTE ADULT - SUBJECTIVE AND OBJECTIVE BOX
[facility] NEPHROLOGY- PROGRESS NOTE    Patient is a 67yo Male with ESRD on HD via Rt AVG, HTN, 2HPT, Anemia, LE venous stasis with Rt foot wound presents with weakness. Pt a/w Rt foot ulcer and hyperkalemia. Nephrology consulted for ESRD status.   MRI Rt foot: + osteomyelitis involving the talus and less so of the navicular bone;    BCx +Staph hominis  12/20: s/p I& D of buttock abscess  +scrotal abscess  12/29: s/p RRT for lethargy;  febrile +FLU A   12/30: s/p RRT for somnolent  +fever; transferred to ICU for pressors/ septic shock    Hospital Medications: Medications reviewed.  REVIEW OF SYSTEMS:  Denies any SOB, cough, chest pain or n/v/d, abd pain or foot pain    VITALS:  T(F): 99.8 (12-31-24 @ 12:45), Max: 103 (12-30-24 @ 22:20)  HR: 90 (12-31-24 @ 13:35)  BP: 128/46 (12-31-24 @ 13:35)  RR: 18 (12-31-24 @ 13:35)  SpO2: 96% (12-31-24 @ 13:35)  Wt(kg): --    12-30 @ 07:01  -  12-31 @ 07:00  --------------------------------------------------------  IN: 896.4 mL / OUT: 1600 mL / NET: -703.6 mL    12-31 @ 07:01  -  12-31 @ 14:26  --------------------------------------------------------  IN: 207.6 mL / OUT: 0 mL / NET: 207.6 mL      PHYSICAL EXAM:  Gen: NAD, more awake,  Obese  HEENT: anicteric  Cards: RRR, +S1/S2, no M/G/R  Resp: +mild rales at rt base, clear left side  GI: soft, NT/ND, NABS  Extremities: no b/l LE edema, extremely dry LE; rt foot/ left shin covered  Access: Rt AVG +thrill +bruit    LABS:  12-31    133[L]  |  97  |  26[H]  ----------------------------<  121[H]  4.6   |  21[L]  |  6.65[H]    Ca    8.8      31 Dec 2024 06:43  Phos  4.8     12-31  Mg     2.1     12-31    TPro  8.0  /  Alb  2.4[L]  /  TBili  0.5  /  DBili      /  AST  23  /  ALT  9[L]  /  AlkPhos  79  12-31    Creatinine Trend: 6.65 <--, 10.60 <--, 8.53 <--, 8.82 <--, 7.19 <--, 5.57 <--                        10.6   20.13 )-----------( 151      ( 31 Dec 2024 06:43 )             33.1     Urine Studies:  Urinalysis Basic - ( 31 Dec 2024 06:43 )    Color:  / Appearance:  / SG:  / pH:   Gluc: 121 mg/dL / Ketone:   / Bili:  / Urobili:    Blood:  / Protein:  / Nitrite:    Leuk Esterase:  / RBC:  / WBC    Sq Epi:  / Non Sq Epi:  / Bacteria:

## 2024-12-31 NOTE — PROGRESS NOTE ADULT - ASSESSMENT
69M with hx of T2DM, hidradenitis suppurativa a/w generalized weakness, w small amount of purulent drainage from scrotum     12/23: scrotal exam stable with spontaneous drainage  12/24: scrotal exam stable, CT pelvis demonstrates 2.1 cm right scrotal fluid collection suspicious for abscess and bilateral UVJ stones without ureteral dilatation, wbc has been downtrending  12/27: scrotal exam stable, decreasing drainage amount, WBC continues to downtrend  12/30: stable scrotal exam, less than 1 gauze worth of purulence expressed, patient likely febrile from flu (still without leukocytosis, drainage from scrotum still decreasing), s/p RRT in PM for somnolence transferred to ICU started on levo  12/31: scrotal exam continues to improve, minimal purulence expressed from right scrotal tract, WBC 20 from 14, levo 0.07    Plan   - F/u repeat bcx   - Continue Flomax  - keep scrotal area clean and dry, may include sitz baths   - IV antibiotics, follow ID recommendations   - strict glycemic control   - HD as per neph   - remainder of care as per primary team    69M with hx of T2DM, hidradenitis suppurativa a/w generalized weakness, w small amount of purulent drainage from scrotum     12/23: scrotal exam stable with spontaneous drainage  12/24: scrotal exam stable, CT pelvis demonstrates 2.1 cm right scrotal fluid collection suspicious for abscess and bilateral UVJ stones without ureteral dilatation, wbc has been downtrending  12/27: scrotal exam stable, decreasing drainage amount, WBC continues to downtrend  12/30: stable scrotal exam, less than 1 gauze worth of purulence expressed, patient likely febrile from flu (still without leukocytosis, drainage from scrotum still decreasing), s/p RRT in PM for somnolence transferred to ICU started on levo  12/31: scrotal exam continues to improve, minimal purulence expressed from right scrotal tract, WBC 20 from 14, levo 0.07    Scrotal exam continues to improve so clinical decline unlikely to be related to scrotum, however patient did have bilateral non-obstructing UVJ stones on 12/23 CTAP (to level of knees to also re-assess scrotal collection) so would be worthwhile to rescan patient to determine whether any stones have become obstructive.     Plan   - Obtain repeat CTAP w/wo contrast to level of knees to reassess bl non-obstructing UVJ stones and scrotal collection  - F/u repeat bcx   - Continue Flomax  - keep scrotal area clean and dry, may include sitz baths   - IV antibiotics, follow ID recommendations   - strict glycemic control   - HD as per neph   - remainder of care as per primary team     D/w Dr. Mcguire

## 2024-12-31 NOTE — PROGRESS NOTE ADULT - NS ATTEND OPT1 GEN_ALL_CORE
I independently performed the documented:
I attest my time as attending is greater than 50% of the total combined time spent on qualifying patient care activities by the PA/NP and attending.
I independently performed the documented:

## 2024-12-31 NOTE — PROCEDURE NOTE - ADDITIONAL PROCEDURE DETAILS
18g 6cm extended dwell IV (Arrow) placed with sterile technique under ultrasound guidance. Confirmed via ultrasound. Can be used for up to 30 days. Above details and extended dwell policy reviewed in full with primary team NP/Resident and RN.

## 2024-12-31 NOTE — PROGRESS NOTE ADULT - SUBJECTIVE AND OBJECTIVE BOX
Subjective:  Arousable but somnolent, on levo 0.07    Objective:    Vital signs  T(C): , Max: 39.4 (12-30-24 @ 22:20)  HR: 90 (12-31-24 @ 08:35)  BP: 91/57 (12-31-24 @ 08:35)  SpO2: 95% (12-31-24 @ 08:35)  Wt(kg): --    Output     12-30 @ 07:01  -  12-31 @ 07:00  --------------------------------------------------------  IN: 646.4 mL / OUT: 1600 mL / NET: -953.6 mL        Gen: NAD  Abd: soft, nontender, nondistended  : right scrotal drainage decreasing, placed clean dressing    Labs                        10.6   20.13 )-----------( 151      ( 31 Dec 2024 06:43 )             33.1     31 Dec 2024 06:43    133    |  97     |  26     ----------------------------<  121    4.6     |  21     |  6.65     Ca    8.8        31 Dec 2024 06:43  Phos  4.8       31 Dec 2024 06:43  Mg     2.1       31 Dec 2024 06:43        Urine Cx: ?  Blood Cx: ?      Imaging

## 2024-12-31 NOTE — PROGRESS NOTE ADULT - ASSESSMENT
67 yo M with PMH of  DM, HTN, ESRD on dialysis ( M, W, F), Last Dialysis 12/30/24,  B/L Charcot deformity and HLD, present to the ED for generalized weakness, chills, subjective fever. Pt admitted on 12/16 to medicine floor for treatment of sepsis 2/2 scrotal abscess, diabetic foot ulcer, gluteal abscess, influenza A positive. s/p multiple RRTs for somnolence, hypotension, fever. Admitted to the ICU for peripheral pressor req. and closer monitoring.     #Septic shock 2/2 multiple abscesses, diabetic foot ulcer  #acute encephalopathy   #ESRD on dialysis   #Charcot arthropathy B/L  #Diabetic ulceration R midfoot with chronic OM  #DM  #HTN  #HLD  #BPH    _________CNS___________  #Acute encephalopathy   - Encephalopathy likely 2/2 acute metabolic encephalopathy in the setting of septic shock  - ABG 7.44/CO2 37/HCO3 25/pO2 69  - c/w broad spectrum iv abx - vanc and zosyn   - f/u repeat bcx   - continue to monitor BMP, ABG as needed if there is concern for hypercapnia     _________CVS___________  #septic shock 2/2 scrotal and gluteal abscesses, Diabetic ulceration R midfoot with chronic OM  #pressors  - peripheral levo  - central and a-line consent obtained if needed   - pt appears to be hypovolemic, given 250 cc boluses x2 throughout the night   - continue to monitor volume status , give fluids judiciously     #HTN  hold BP meds in setting of shock    #HLD   c/w home meds     _________ RESP__________  #AHRF in the setting of influenza A   - put currently protecting airway   - CXR 12/28 with no acute findings   - c/w NC as needed     __________GI____________  no acute issues     ________ RENAL__________  #ESRD on dialysis (MWF)  - last session 12/30  - recent electrolytes within normal limits   - C/W epo, Renvela, calcitriol   - pt appears to be hypovolemic, given 250 cc boluses x2 throughout the night   - continue to monitor volume status     #BPH  - c/w home meds     _________MSK___________  no active issues     __________ID____________  #septic shock 2/2 multiple abscesses, diabetic foot ulcer  #diabetic foot ulcer  #influenza A     #Scrotal abscess   - Scrotal Wound culture positive for  Proteus Mirabilis  - CT Pelvis: 2.1 cm right scrotal fluid collection suspicious for abscess. Several stones and both ureterovesical junctions without ureteral dilatation.  - will broaden abx to vanc and zosyn    - reconsult ID in the AM   - f/u repeat bcx and lactate     #Diabetic foot ulcers   - According to podiatry note 12/28- No surgical treatement will be done at this time. Patient is stable from podiatry. Plan for outpatient bone biopsy when pt is off abx for 3 weeks which was discussed with patient and he is agreeable with the plan going forward.  - c/w broad spectrum abx   - f/u repeat bcx and lactate     #influenza A  - req 2 L NC   - c/w tamiflu     _________ENDO__________  #DM   - c/w insulin sliding scale     _______HEME/ONC_______  # Anemia of renal disease  - c/w Epogen 6000 units IV tiw.   - Monitor Hb    _________SKIN____________  #diabetic foot ulcer   plan as above     ________Prophylaxis_______  #DVT - heparin      __________GOC/ DISPO___________  FULL CODE   central line and arterial line consent in the chart    69 yo M with PMH of  DM, HTN, ESRD on dialysis ( M, W, F), Last Dialysis 12/30/24,  B/L Charcot deformity and HLD, present to the ED for generalized weakness, chills, subjective fever. Pt admitted on 12/16 to medicine floor for treatment of sepsis 2/2 scrotal abscess, diabetic foot ulcer, gluteal abscess, influenza A positive. s/p multiple RRTs for somnolence, hypotension, fever. Admitted to the ICU for peripheral pressor req. and closer monitoring.     #Septic shock 2/2 multiple abscesses, diabetic foot ulcer  #acute encephalopathy   #ESRD on dialysis   #Charcot arthropathy B/L  #Diabetic ulceration R midfoot with chronic OM  #DM  #HTN  #HLD  #BPH    _________CNS___________  #Acute encephalopathy   - Encephalopathy likely 2/2 acute metabolic encephalopathy in the setting of septic shock  - ABG 7.44/CO2 37/HCO3 25/pO2 69  - c/w broad spectrum iv abx - vanc and radha  - f/u repeat bcx     _________CVS___________  #septic shock 2/2 scrotal and gluteal abscesses, Diabetic ulceration R midfoot with chronic OM  #pressors  - peripheral levo, currently 0.06  - central and a-line consent obtained if needed   - Given 250 cc boluses x2 throughout the night   - continue to monitor volume status , give fluids judiciously given ESRD    #HTN  hold BP meds in setting of shock    #HLD   c/w home meds     _________ RESP__________  #AHRF in the setting of influenza A   - CXR 12/31 wetread wnl  - c/w NC as needed     __________GI____________  no acute issues     ________ RENAL__________  #ESRD on dialysis (MWF)  - last session 12/30, next 1/1/2024  - recent electrolytes within normal limits   - C/W epo, Renvela, calcitriol   - Given 250 cc boluses x2 throughout the night   - continue to monitor volume status   - Nephrology Dr. Chirinos following    #BPH  - c/w home meds     _________MSK___________  no active issues     __________ID____________  #septic shock 2/2 multiple abscesses, diabetic foot ulcer  #diabetic foot ulcer  #influenza A     #Scrotal abscess   - Scrotal Wound culture positive for  Proteus Mirabilis  - CT Pelvis: 2.1 cm right scrotal fluid collection suspicious for abscess. Several stones and both ureterovesical junctions without ureteral dilatation.  - Broadened to Radha and Vanc  - ID Dr. Chavez following  - f/u repeat bcx    #Diabetic foot ulcers   - According to podiatry note 12/28- No surgical treatement will be done at this time. Patient is stable from podiatry. Plan for outpatient bone biopsy when pt is off abx for 3 weeks which was discussed with patient and he is agreeable with the plan going forward.  - Podiatry made aware, f/u recs    #influenza A  - req 2 L NC   - c/w tamiflu     _________ENDO__________  #DM   - c/w insulin sliding scale     _______HEME/ONC_______  # Anemia of renal disease  - c/w Epogen 6000 units IV tiw.   - Monitor Hb    _________SKIN____________  #diabetic foot ulcer   plan as above     ________Prophylaxis_______  #DVT - heparin      __________GOC/ DISPO___________  FULL CODE   central line and arterial line consent in the chart

## 2024-12-31 NOTE — CHART NOTE - NSCHARTNOTEFT_GEN_A_CORE
Comprehensive update provided to wife Almaz via phone. Explained broadened Abx coverage, ID consult and pressure requirements. Support provided. All questions answered.

## 2024-12-31 NOTE — PROGRESS NOTE ADULT - ASSESSMENT
Subjective:  Pt previously seen by ID during this admission for sepsis 2/2 L buttock abscess, scrotal abscess and DFU/ +/- chronic OM and was on IV Unasyn recommended for 4 weeks. YEsterday RRT was called for sepsis/septic shock and pt was transferred to MICU.   F/up requested after MICU upgrade for septic shock and further atb management.     MEDS:  acetaminophen     Tablet .. 650 milliGRAM(s) Oral every 6 hours PRN  calcitriol   Capsule 1 MICROGram(s) Oral <User Schedule>  chlorhexidine 2% Cloths 1 Application(s) Topical daily  chlorhexidine 2% Cloths 1 Application(s) Topical <User Schedule>  epoetin rudolph-epbx (RETACRIT) Injectable 6000 Unit(s) IV Push <User Schedule>  heparin   Injectable 5000 Unit(s) SubCutaneous every 12 hours  insulin lispro (ADMELOG) corrective regimen sliding scale   SubCutaneous three times a day before meals  insulin lispro (ADMELOG) corrective regimen sliding scale   SubCutaneous at bedtime  latanoprost 0.005% Ophthalmic Solution 1 Drop(s) Both EYES at bedtime  norepinephrine Infusion 0.05 MICROgram(s)/kG/Min IV Continuous <Continuous>  ondansetron Injectable 4 milliGRAM(s) IV Push every 8 hours PRN  sevelamer carbonate 1600 milliGRAM(s) Oral three times a day with meals  tamsulosin 0.4 milliGRAM(s) Oral at bedtime    REVIEW OF SYSTEMS:  CONSTITUTIONAL:  +fever  EYES/ENT:  No visual changes; no throat pain   NECK:  No neck pain or stiffness  RESPIRATORY:  No cough, no wheezing. No shortness of breath  CARDIOVASCULAR:  No chest pain or palpitations  GASTROINTESTINAL:  No abdominal pain. No N/V or diarrhea  GENITOURINARY:  No dysuria, frequency or hematuria  NEUROLOGICAL:  No numbness or weakness  MSK: no back pain, no joint pain  SKIN:  No itching, no skin rash    PE:  Vital Signs Last 24 Hrs  T(C): 37.8 (31 Dec 2024 07:30), Max: 39.4 (30 Dec 2024 22:20)  T(F): 100 (31 Dec 2024 07:30), Max: 103 (30 Dec 2024 22:20)  HR: 84 (31 Dec 2024 12:15) (73 - 105)  BP: 104/49 (31 Dec 2024 12:15) (71/40 - 156/61)  BP(mean): 65 (31 Dec 2024 12:15) (43 - 86)  RR: 21 (31 Dec 2024 12:15) (10 - 32)  SpO2: 97% (31 Dec 2024 12:15) (93% - 100%)    Parameters below as of 31 Dec 2024 07:15  Patient On (Oxygen Delivery Method): nasal cannula  O2 Flow (L/min): 3    Gen: AOx3, NAD, non-toxic, pleasant  HEAD:  Atraumatic  EYES: PERRLA, conjunctiva and sclera clear  ENT: Moist mucous membranes  NECK: Supple, No JVD  CV: S1+S2 normal, no murmurs  Resp: Clear bilat, no resp distress, no crackles/wheezes  Abd: Soft, nontender, +BS  Ext: No LE edema, no cyanosis, pulses +  : No Juan  IV/Skin: No thrombophlebitis  Msk: No low back pain, no arthralgias, no joint swelling  Neuro: AAOx3. No sensory deficits, no motor deficits  Psych: no anxiety, no delusional ideas, no suicidal ideation    LABS/DIAGNOSTIC TESTS:                        10.6   20.13 )-----------( 151      ( 31 Dec 2024 06:43 )             33.1     WBC Count: 20.13 K/uL (12-31 @ 06:43)  WBC Count: 14.47 K/uL (12-30 @ 11:18)  WBC Count: 8.50 K/uL (12-29 @ 06:10)    12-31    133[L]  |  97  |  26[H]  ----------------------------<  121[H]  4.6   |  21[L]  |  6.65[H]    Ca    8.8      31 Dec 2024 06:43  Phos  4.8     12-31  Mg     2.1     12-31    TPro  8.0  /  Alb  2.4[L]  /  TBili  0.5  /  DBili  x   /  AST  23  /  ALT  9[L]  /  AlkPhos  79  12-31      LACTATE:Lactate, Blood: 1.1 mmol/L (12-31 @ 06:42)      CULTURES:     Culture - Blood (collected 12-29-24 @ 06:20)  Source: .Blood BLOOD  Preliminary Report (12-31-24 @ 11:01):    No growth at 48 Hours    Culture - Blood (collected 12-29-24 @ 06:10)  Source: .Blood BLOOD  Preliminary Report (12-31-24 @ 11:01):    No growth at 48 Hours    Culture - Genital (collected 12-22-24 @ 08:20)  Source: .Genital  Final Report (12-25-24 @ 06:04):    Rare Proteus mirabilis    Commensal vladimir consistent with body site  Organism: Proteus mirabilis (12-25-24 @ 06:04)  Organism: Proteus mirabilis (12-25-24 @ 06:04)      Method Type: GWEN      -  Amoxicillin/Clavulanic Acid: S <=8/4      -  Ampicillin: S <=8 These ampicillin results predict results for amoxicillin      -  Ampicillin/Sulbactam: S <=4/2      -  Aztreonam: S <=4      -  Cefazolin: S <=2      -  Cefepime: S <=2      -  Cefoxitin: S <=8      -  Ceftriaxone: S <=1      -  Ciprofloxacin: S <=0.25      -  Ertapenem: S <=0.5      -  Gentamicin: S <=2      -  Levofloxacin: S <=0.5      -  Meropenem: S <=1      -  Piperacillin/Tazobactam: S <=8      -  Tobramycin: S <=2      -  Trimethoprim/Sulfamethoxazole: S <=0.5/9.5    Culture - Wound Aerobic/Anaerobic (collected 12-19-24 @ 18:06)  Source: Drainage  Final Report (12-24-24 @ 18:25):    Few Trueperella bernardiae "Susceptibilities not performed"    Culture - Wound Aerobic/Anaerobic (collected 12-19-24 @ 16:15)  Source: Skin/Wound  Final Report (12-24-24 @ 19:27):    Rare Trueperella bernardiae "Susceptibilities not performed"    Culture - Blood (collected 12-19-24 @ 06:45)  Source: .Blood BLOOD  Final Report (12-24-24 @ 12:00):    No growth at 5 days    Culture - Blood (collected 12-17-24 @ 12:35)  Source: .Blood BLOOD  Final Report (12-22-24 @ 19:00):    No growth at 5 days    Culture - Blood (collected 12-16-24 @ 22:30)  Source: .Blood BLOOD  Gram Stain (12-17-24 @ 16:53):    Growth in aerobic bottle: Gram Positive Cocci in Clusters  Final Report (12-18-24 @ 13:20):    Growth in aerobic bottle: Staphylococcus hominis    Isolation of Coagulase negative Staphylococcus from single blood culture    sets may represent    contamination. Contact the Microbiology Department at [phone] if    susceptibility testing is needed.    clinically indicated.    Direct identification is available within approximately 3-5    hours either by Blood Panel Multiplexed PCR or Direct    MALDI-TOF. Details: [email or web address]  Organism: Blood Culture PCR (12-18-24 @ 13:20)  Organism: Blood Culture PCR (12-18-24 @ 13:20)      Method Type: PCR      -  Coagulase negative Staphylococcus: Detec    Culture - Wound Aerobic/Anaerobic (collected 12-16-24 @ 12:36)  Source: .Surgical Swab  Final Report (12-21-24 @ 17:55):    Moderate Providencia stuartii    Moderate Corynebacterium striatum group "Susceptibilities not performed"    Few Trueperella bernardiae "Susceptibilities not performed"  Organism: Providencia stuartii (12-21-24 @ 17:55)  Organism: Providencia stuartii (12-21-24 @ 17:55)      Method Type: GWEN      -  Amoxicillin/Clavulanic Acid: R >16/8      -  Ampicillin: R >16 These ampicillin results predict results for amoxicillin      -  Ampicillin/Sulbactam: I 16/8      -  Aztreonam: S <=4      -  Cefazolin: R >16      -  Cefepime: S <=2      -  Cefoxitin: S <=8      -  Ceftriaxone: S <=1      -  Ciprofloxacin: R 2      -  Ertapenem: S <=0.5      -  Levofloxacin: R 4      -  Meropenem: S <=1      -  Piperacillin/Tazobactam: S <=8      -  Trimethoprim/Sulfamethoxazole: S <=0.5/9.5    RADIOLOGY:   Available pertinent Imaging reviewed    ANTIBIOTICS:  meropenem  IVPB    oseltamivir 30 <User Schedule>  vancomycin  IVPB 750 <User Schedule>  < from: CT Pelvis w/ IV Cont (12.23.24 @ 17:00) >  FINDINGS:  BLADDER: Several small stones at both ureterovesical junctions measuring   up to 2 mm on the right and 3 mm on the left. No ureteral dilatation.  REPRODUCTIVE ORGANS: Prostate is normal in size. Diffuse scrotal edema.   Rim-enhancing extratesticular right scrotal fluid collection measuring   2.1 x 1.3 x 0.8 cm.  LYMPH NODES: No pelvic lymphadenopathy.    VISUALIZED PORTIONS:  ABDOMINAL ORGANS: Within normal limits.  BOWEL: Colonic diverticulosis. Visualized portion of the appendix is   normal.  PERITONEUM/RETROPERITONEUM: Within normal limits.  VESSELS: Atherosclerotic changes.  ABDOMINAL WALL: Within normal limits.  BONES: Within normal limits for age.    IMPRESSION:  2.1 cm right scrotal fluid collection suspicious for abscess.  Several stones and both ureterovesical junctions without ureteral   dilatation.    IMPRESSION:  67 yo M with PMH of  DM, HTN, ESRD on dialysis ( M, W, F) via RUE graft (last Dialysis 12/13/24), HLD, B/L Charcot deformity for which follows w/ podiatry Dr. Sim in the outpatient podiatry clinic for Charcot foot admitted for sepsis 2/2 L buttock abscess, scrotal abscess and DFU/ +/- chronic OM. L buttock abscess drained at the bedside 12/19--  Truejillian camarena  Also noted purulent drainage from the base of the scrotum. Cx 12/22 from scrotal drainage growing Proteus mirabilis. CT abd/pelvis 12/23--2.1 cm right scrotal fluid collection suspicious for abscess. Appreciate Urology input-- no surgery recommended at the time. Was on Ampi/clavulanate since 12/24 to Rx the organism from the scrotal drainage planned for approx 4wks to Rx the scrotal abscess last time he was seen by ID.   RRT 12/30 for LEthargia after HD and 104 F fever, currently admitted to MICU and septic shock.     CT A/P 12/23 w/ Several small stones at both ureterovesical junctions measuring up to 2 mm on the right and 3 mm on the left. No ureteral dilatation. Prostate is normal in size. Diffuse scrotal edema and rim-enhancing extratesticular right scrotal fluid collection measuring 2.1 x 1.3 x 0.8 cm.    +fever  WBC Count: 20.13 K/uL (12.31.24 @ 06:43)  C-Reactive Protein: 160.0 mg/L *H* (12-17-24 @ 07:28)  Sedimentation Rate, Erythrocyte: 105 mm/Hr *H* (12-17-24 @ 12:35)  Influenza A Result: Detected (12.28.24 @ 23:17)    sx swab 12/16 (R foot) +Providencia stuartii, corynebacterium striatum  Wound cx 12/19 x 2 (L buttock) +Trueperella bernardiae  Wound cx 12/22 + Proteus mirabilis     -Septic shock  -Scrotal abscess  -L buttock abscess  -Ureterovesical junctions stones  -ESRD on HD  -DM  -Influenza A URI  -Charcot arthropathy BL  -DFU (chronic) R foot, R ankle - chronic OM        PLAN:    *****    Please reach ID with any questions or concerns  Dr. Spring Cervantes  Available in Teams               Subjective:  Pt previously seen by ID during this admission for sepsis 2/2 L buttock abscess, scrotal abscess and DFU/ +/- chronic OM and was on IV Unasyn recommended for 4 weeks. Yesterday RRT was called for sepsis/septic shock and pt was transferred to MICU.   F/up requested after MICU upgrade for septic shock and further atb management.     MEDS:  acetaminophen     Tablet .. 650 milliGRAM(s) Oral every 6 hours PRN  calcitriol   Capsule 1 MICROGram(s) Oral <User Schedule>  chlorhexidine 2% Cloths 1 Application(s) Topical daily  chlorhexidine 2% Cloths 1 Application(s) Topical <User Schedule>  epoetin rudolph-epbx (RETACRIT) Injectable 6000 Unit(s) IV Push <User Schedule>  heparin   Injectable 5000 Unit(s) SubCutaneous every 12 hours  insulin lispro (ADMELOG) corrective regimen sliding scale   SubCutaneous three times a day before meals  insulin lispro (ADMELOG) corrective regimen sliding scale   SubCutaneous at bedtime  latanoprost 0.005% Ophthalmic Solution 1 Drop(s) Both EYES at bedtime  norepinephrine Infusion 0.05 MICROgram(s)/kG/Min IV Continuous <Continuous>  ondansetron Injectable 4 milliGRAM(s) IV Push every 8 hours PRN  sevelamer carbonate 1600 milliGRAM(s) Oral three times a day with meals  tamsulosin 0.4 milliGRAM(s) Oral at bedtime    REVIEW OF SYSTEMS:  CONSTITUTIONAL:  +fever, somnolence improved  EYES/ENT:  No visual changes; no throat pain   NECK:  No neck pain or stiffness  RESPIRATORY:  No cough, no wheezing. No shortness of breath  CARDIOVASCULAR:  No chest pain or palpitations  GASTROINTESTINAL:  No abdominal pain. No N/V or diarrhea  GENITOURINARY:  No dysuria, frequency or hematuria  NEUROLOGICAL:  No numbness or weakness  MSK: no back pain, no joint pain  SKIN:  No itching, no skin rash, L buttock wound healing    PE:  Vital Signs Last 24 Hrs  T(C): 37.8 (31 Dec 2024 07:30), Max: 39.4 (30 Dec 2024 22:20)  T(F): 100 (31 Dec 2024 07:30), Max: 103 (30 Dec 2024 22:20)  HR: 84 (31 Dec 2024 12:15) (73 - 105)  BP: 104/49 (31 Dec 2024 12:15) (71/40 - 156/61)  BP(mean): 65 (31 Dec 2024 12:15) (43 - 86)  RR: 21 (31 Dec 2024 12:15) (10 - 32)  SpO2: 97% (31 Dec 2024 12:15) (93% - 100%)    Parameters below as of 31 Dec 2024 07:15  Patient On (Oxygen Delivery Method): nasal cannula  O2 Flow (L/min): 3    Gen: AOx3, ill appearing , on pressors  HEAD:  Atraumatic  EYES: PERRLA, conjunctiva and sclera clear  ENT: Moist mucous membranes  NECK: Supple, No JVD  CV: S1+S2 normal, no murmurs  L arm AVG  Resp: Clear bilat, no resp distress, no crackles/wheezes  Abd: Soft, nontender, +BS  Ext: No LE edema, no cyanosis, pulses difficult to palpate  BL LE chronic hyperkeratosis, dry/peeling skin  R foot inner malleolus ulcer chronic appearing non infected  : No dysuria, R scrotal scarring tissue no significant swelling or tenderness on palpation  IV/Skin: No thrombophlebitis  Msk: No low back pain, no arthralgias, no joint swelling  Neuro: AAOx3. No focal signs    LABS/DIAGNOSTIC TESTS:                        10.6   20.13 )-----------( 151      ( 31 Dec 2024 06:43 )             33.1     WBC Count: 20.13 K/uL (12-31 @ 06:43)  WBC Count: 14.47 K/uL (12-30 @ 11:18)  WBC Count: 8.50 K/uL (12-29 @ 06:10)    12-31    133[L]  |  97  |  26[H]  ----------------------------<  121[H]  4.6   |  21[L]  |  6.65[H]    Ca    8.8      31 Dec 2024 06:43  Phos  4.8     12-31  Mg     2.1     12-31    TPro  8.0  /  Alb  2.4[L]  /  TBili  0.5  /  DBili  x   /  AST  23  /  ALT  9[L]  /  AlkPhos  79  12-31    C-Reactive Protein: 160.0 mg/L *H* (12-17-24 @ 07:28)  Sedimentation Rate, Erythrocyte: 105 mm/Hr *H* (12-17-24 @ 12:35)    LACTATE:Lactate, Blood: 1.1 mmol/L (12-31 @ 06:42)    CULTURES:   Culture - Blood (collected 12-29-24 @ 06:20)  Source: .Blood BLOOD  Preliminary Report (12-31-24 @ 11:01):    No growth at 48 Hours    Culture - Blood (collected 12-29-24 @ 06:10)  Source: .Blood BLOOD  Preliminary Report (12-31-24 @ 11:01):    No growth at 48 Hours    Culture - Genital (collected 12-22-24 @ 08:20)  Source: .Genital  Final Report (12-25-24 @ 06:04):    Rare Proteus mirabilis    Commensal vladimir consistent with body site  Organism: Proteus mirabilis (12-25-24 @ 06:04)  Organism: Proteus mirabilis (12-25-24 @ 06:04)      Method Type: GWEN      -  Amoxicillin/Clavulanic Acid: S <=8/4      -  Ampicillin: S <=8 These ampicillin results predict results for amoxicillin      -  Ampicillin/Sulbactam: S <=4/2      -  Aztreonam: S <=4      -  Cefazolin: S <=2      -  Cefepime: S <=2      -  Cefoxitin: S <=8      -  Ceftriaxone: S <=1      -  Ciprofloxacin: S <=0.25      -  Ertapenem: S <=0.5      -  Gentamicin: S <=2      -  Levofloxacin: S <=0.5      -  Meropenem: S <=1      -  Piperacillin/Tazobactam: S <=8      -  Tobramycin: S <=2      -  Trimethoprim/Sulfamethoxazole: S <=0.5/9.5    Culture - Wound Aerobic/Anaerobic (collected 12-19-24 @ 18:06)  Source: Drainage  Final Report (12-24-24 @ 18:25):    Few Trueperella bernardiae "Susceptibilities not performed"    Culture - Wound Aerobic/Anaerobic (collected 12-19-24 @ 16:15)  Source: Skin/Wound  Final Report (12-24-24 @ 19:27):    Rare Trueperella bernardiae "Susceptibilities not performed"    Culture - Blood (collected 12-19-24 @ 06:45)  Source: .Blood BLOOD  Final Report (12-24-24 @ 12:00):    No growth at 5 days    Culture - Blood (collected 12-17-24 @ 12:35)  Source: .Blood BLOOD  Final Report (12-22-24 @ 19:00):    No growth at 5 days    Culture - Blood (collected 12-16-24 @ 22:30)  Source: .Blood BLOOD  Gram Stain (12-17-24 @ 16:53):    Growth in aerobic bottle: Gram Positive Cocci in Clusters  Final Report (12-18-24 @ 13:20):    Growth in aerobic bottle: Staphylococcus hominis    Isolation of Coagulase negative Staphylococcus from single blood culture    sets may represent    contamination. Contact the Microbiology Department at [phone] if    susceptibility testing is needed.    clinically indicated.    Direct identification is available within approximately 3-5    hours either by Blood Panel Multiplexed PCR or Direct    MALDI-TOF. Details: [email or web address]  Organism: Blood Culture PCR (12-18-24 @ 13:20)  Organism: Blood Culture PCR (12-18-24 @ 13:20)      Method Type: PCR      -  Coagulase negative Staphylococcus: Detec    Culture - Wound Aerobic/Anaerobic (collected 12-16-24 @ 12:36)  Source: .Surgical Swab  Final Report (12-21-24 @ 17:55):    Moderate Providencia stuartii    Moderate Corynebacterium striatum group "Susceptibilities not performed"    Few Trueperella bernardiae "Susceptibilities not performed"  Organism: Providencia stuartii (12-21-24 @ 17:55)  Organism: Providencia stuartii (12-21-24 @ 17:55)      Method Type: GWEN      -  Amoxicillin/Clavulanic Acid: R >16/8      -  Ampicillin: R >16 These ampicillin results predict results for amoxicillin      -  Ampicillin/Sulbactam: I 16/8      -  Aztreonam: S <=4      -  Cefazolin: R >16      -  Cefepime: S <=2      -  Cefoxitin: S <=8      -  Ceftriaxone: S <=1      -  Ciprofloxacin: R 2      -  Ertapenem: S <=0.5      -  Levofloxacin: R 4      -  Meropenem: S <=1      -  Piperacillin/Tazobactam: S <=8      -  Trimethoprim/Sulfamethoxazole: S <=0.5/9.5    RADIOLOGY:   Available pertinent Imaging reviewed    ANTIBIOTICS:  meropenem  IVPB    oseltamivir 30 <User Schedule>  vancomycin  IVPB 750 <User Schedule>  < from: CT Pelvis w/ IV Cont (12.23.24 @ 17:00) >  FINDINGS:  BLADDER: Several small stones at both ureterovesical junctions measuring   up to 2 mm on the right and 3 mm on the left. No ureteral dilatation.  REPRODUCTIVE ORGANS: Prostate is normal in size. Diffuse scrotal edema.   Rim-enhancing extratesticular right scrotal fluid collection measuring   2.1 x 1.3 x 0.8 cm.  LYMPH NODES: No pelvic lymphadenopathy.    VISUALIZED PORTIONS:  ABDOMINAL ORGANS: Within normal limits.  BOWEL: Colonic diverticulosis. Visualized portion of the appendix is   normal.  PERITONEUM/RETROPERITONEUM: Within normal limits.  VESSELS: Atherosclerotic changes.  ABDOMINAL WALL: Within normal limits.  BONES: Within normal limits for age.    IMPRESSION:  2.1 cm right scrotal fluid collection suspicious for abscess.  Several stones and both ureterovesical junctions without ureteral   dilatation.    IMPRESSION:  67 yo M with PMH of  DM, HTN, ESRD on dialysis ( M, W, F) via RUE graft (last Dialysis 12/13/24), HLD, B/L Charcot deformity for which follows w/ podiatry Dr. Sim in the outpatient podiatry clinic for Charcot foot admitted for sepsis 2/2 L buttock abscess, scrotal abscess and DFU/ +/- chronic OM. L buttock abscess drained at the bedside 12/19--  Trueperella nicol  Also noted purulent drainage from the base of the scrotum. Cx 12/22 from scrotal drainage growing Proteus mirabilis. CT abd/pelvis 12/23--2.1 cm right scrotal fluid collection suspicious for abscess. Appreciate Urology input-- no surgery recommended at the time. Was on Ampi/clavulanate since 12/24 to Rx the organism from the scrotal drainage planned for approx 4wks to Rx the scrotal abscess last time he was seen by ID.   RRT 12/30 for LEthargia after HD and 104 F fever, currently admitted to MICU and septic shock.     CT A/P 12/23 w/ Several small stones at both ureterovesical junctions measuring up to 2 mm on the right and 3 mm on the left. No ureteral dilatation. Prostate is normal in size. Diffuse scrotal edema and rim-enhancing extratesticular right scrotal fluid collection measuring 2.1 x 1.3 x 0.8 cm.    +fever  WBC Count: 20.13 K/uL (12.31.24 @ 06:43)  C-Reactive Protein: 160.0 mg/L *H* (12-17-24 @ 07:28)  Sedimentation Rate, Erythrocyte: 105 mm/Hr *H* (12-17-24 @ 12:35)  Influenza A Result: Detected (12.28.24 @ 23:17)    sx swab 12/16 (R foot) +Providencia stuartii, corynebacterium striatum  Wound cx 12/19 x 2 (L buttock) +Trueperella bernardiae  Wound cx 12/22 + Proteus mirabilis     -Septic shock  -Scrotal abscess  -L buttock abscess- s/p I&D 12/19 healing well  -Ureterovesical junctions stones  -ESRD on HD  -DM  -Influenza A URI  -Charcot arthropathy BL  -DFU (chronic) R foot, R ankle - chronic OM      PLAN:  Continue vancomycin IV w/ dialysis empirically   Meropenem 1g q24 hrs  F/up cultures  Agree with CT pelvis requested by Urology I suspect the source of the infection is   HD per renal  Discussed with MICU    Please reach ID with any questions or concerns  Dr. Spring Cervantes  Available in Teams

## 2024-12-31 NOTE — CONSULT NOTE ADULT - CONSULT REASON
Left lateral buttock wound
ESRD
Right foot ulceration  B/L Charcot
hypotension, somnolence
osteomyelitis
scrotal wound / purulent drainage

## 2024-12-31 NOTE — CONSULT NOTE ADULT - CONSULT REQUESTED DATE/TIME
19-Dec-2024 13:05
16-Dec-2024 12:55
31-Dec-2024 03:18
22-Dec-2024 14:03
16-Dec-2024 14:10
19-Dec-2024 16:30

## 2025-01-01 LAB
ALBUMIN SERPL ELPH-MCNC: 2.4 G/DL — LOW (ref 3.5–5)
ALP SERPL-CCNC: 76 U/L — SIGNIFICANT CHANGE UP (ref 40–120)
ALT FLD-CCNC: 9 U/L DA — LOW (ref 10–60)
ANION GAP SERPL CALC-SCNC: 13 MMOL/L — SIGNIFICANT CHANGE UP (ref 5–17)
AST SERPL-CCNC: 20 U/L — SIGNIFICANT CHANGE UP (ref 10–40)
BILIRUB SERPL-MCNC: 0.4 MG/DL — SIGNIFICANT CHANGE UP (ref 0.2–1.2)
BUN SERPL-MCNC: 42 MG/DL — HIGH (ref 7–18)
CALCIUM SERPL-MCNC: 9.4 MG/DL — SIGNIFICANT CHANGE UP (ref 8.4–10.5)
CHLORIDE SERPL-SCNC: 98 MMOL/L — SIGNIFICANT CHANGE UP (ref 96–108)
CO2 SERPL-SCNC: 26 MMOL/L — SIGNIFICANT CHANGE UP (ref 22–31)
CREAT SERPL-MCNC: 8.55 MG/DL — HIGH (ref 0.5–1.3)
EGFR: 6 ML/MIN/1.73M2 — LOW
GLUCOSE BLDC GLUCOMTR-MCNC: 126 MG/DL — HIGH (ref 70–99)
GLUCOSE BLDC GLUCOMTR-MCNC: 137 MG/DL — HIGH (ref 70–99)
GLUCOSE BLDC GLUCOMTR-MCNC: 167 MG/DL — HIGH (ref 70–99)
GLUCOSE BLDC GLUCOMTR-MCNC: 181 MG/DL — HIGH (ref 70–99)
GLUCOSE SERPL-MCNC: 166 MG/DL — HIGH (ref 70–99)
HCT VFR BLD CALC: 32.8 % — LOW (ref 39–50)
HGB BLD-MCNC: 10.7 G/DL — LOW (ref 13–17)
MAGNESIUM SERPL-MCNC: 2.5 MG/DL — SIGNIFICANT CHANGE UP (ref 1.6–2.6)
MCHC RBC-ENTMCNC: 32 PG — SIGNIFICANT CHANGE UP (ref 27–34)
MCHC RBC-ENTMCNC: 32.6 G/DL — SIGNIFICANT CHANGE UP (ref 32–36)
MCV RBC AUTO: 98.2 FL — SIGNIFICANT CHANGE UP (ref 80–100)
MRSA PCR RESULT.: SIGNIFICANT CHANGE UP
NRBC # BLD: 0 /100 WBCS — SIGNIFICANT CHANGE UP (ref 0–0)
PHOSPHATE SERPL-MCNC: 4.5 MG/DL — SIGNIFICANT CHANGE UP (ref 2.5–4.5)
PLATELET # BLD AUTO: 174 K/UL — SIGNIFICANT CHANGE UP (ref 150–400)
POTASSIUM SERPL-MCNC: 4.4 MMOL/L — SIGNIFICANT CHANGE UP (ref 3.5–5.3)
POTASSIUM SERPL-SCNC: 4.4 MMOL/L — SIGNIFICANT CHANGE UP (ref 3.5–5.3)
PROT SERPL-MCNC: 8.1 G/DL — SIGNIFICANT CHANGE UP (ref 6–8.3)
RBC # BLD: 3.34 M/UL — LOW (ref 4.2–5.8)
RBC # FLD: 14.4 % — SIGNIFICANT CHANGE UP (ref 10.3–14.5)
S AUREUS DNA NOSE QL NAA+PROBE: SIGNIFICANT CHANGE UP
SODIUM SERPL-SCNC: 137 MMOL/L — SIGNIFICANT CHANGE UP (ref 135–145)
WBC # BLD: 17.47 K/UL — HIGH (ref 3.8–10.5)
WBC # FLD AUTO: 17.47 K/UL — HIGH (ref 3.8–10.5)

## 2025-01-01 PROCEDURE — 74177 CT ABD & PELVIS W/CONTRAST: CPT | Mod: 26

## 2025-01-01 RX ORDER — ACETAMINOPHEN 80 MG/.8ML
1000 SOLUTION/ DROPS ORAL ONCE
Refills: 0 | Status: COMPLETED | OUTPATIENT
Start: 2025-01-01 | End: 2025-01-01

## 2025-01-01 RX ADMIN — HEPARIN SODIUM 5000 UNIT(S): 1000 INJECTION, SOLUTION INTRAVENOUS; SUBCUTANEOUS at 05:49

## 2025-01-01 RX ADMIN — TAMSULOSIN HYDROCHLORIDE 0.4 MILLIGRAM(S): 0.4 CAPSULE ORAL at 21:15

## 2025-01-01 RX ADMIN — VANCOMYCIN HYDROCHLORIDE 166.67 MILLIGRAM(S): 5 INJECTION, POWDER, LYOPHILIZED, FOR SOLUTION INTRAVENOUS at 18:01

## 2025-01-01 RX ADMIN — SEVELAMER CARBONATE 1600 MILLIGRAM(S): 800 TABLET, FILM COATED ORAL at 21:14

## 2025-01-01 RX ADMIN — SEVELAMER CARBONATE 1600 MILLIGRAM(S): 800 TABLET, FILM COATED ORAL at 10:30

## 2025-01-01 RX ADMIN — ACETAMINOPHEN 400 MILLIGRAM(S): 80 SOLUTION/ DROPS ORAL at 02:03

## 2025-01-01 RX ADMIN — ACETAMINOPHEN 1000 MILLIGRAM(S): 80 SOLUTION/ DROPS ORAL at 04:00

## 2025-01-01 RX ADMIN — SEVELAMER CARBONATE 1600 MILLIGRAM(S): 800 TABLET, FILM COATED ORAL at 14:30

## 2025-01-01 RX ADMIN — Medication 1: at 12:22

## 2025-01-01 RX ADMIN — HEPARIN SODIUM 5000 UNIT(S): 1000 INJECTION, SOLUTION INTRAVENOUS; SUBCUTANEOUS at 21:15

## 2025-01-01 RX ADMIN — CALCITRIOL 1 MICROGRAM(S): 0.5 CAPSULE, LIQUID FILLED ORAL at 10:32

## 2025-01-01 RX ADMIN — MEROPENEM 100 MILLIGRAM(S): 1 INJECTION, POWDER, FOR SOLUTION INTRAVENOUS at 21:14

## 2025-01-01 RX ADMIN — OSELTAMIVIR 30 MILLIGRAM(S): 75 CAPSULE ORAL at 21:15

## 2025-01-01 RX ADMIN — LATANOPROST 1 DROP(S): 50 SOLUTION OPHTHALMIC at 21:24

## 2025-01-01 RX ADMIN — LATANOPROST 1 DROP(S): 50 SOLUTION OPHTHALMIC at 00:20

## 2025-01-01 RX ADMIN — CHLORHEXIDINE GLUCONATE 1 APPLICATION(S): 1.2 RINSE ORAL at 05:49

## 2025-01-01 NOTE — PROGRESS NOTE ADULT - SUBJECTIVE AND OBJECTIVE BOX
INTERVAL HPI/OVERNIGHT EVENTS:  Pt resting comfortably. No acute complaints.    MEDICATIONS  (STANDING):  calcitriol   Capsule 1 MICROGram(s) Oral <User Schedule>  chlorhexidine 2% Cloths 1 Application(s) Topical <User Schedule>  epoetin rudolph-epbx (RETACRIT) Injectable 6000 Unit(s) IV Push <User Schedule>  heparin   Injectable 5000 Unit(s) SubCutaneous every 12 hours  insulin lispro (ADMELOG) corrective regimen sliding scale   SubCutaneous three times a day before meals  insulin lispro (ADMELOG) corrective regimen sliding scale   SubCutaneous at bedtime  latanoprost 0.005% Ophthalmic Solution 1 Drop(s) Both EYES at bedtime  meropenem  IVPB 1000 milliGRAM(s) IV Intermittent every 24 hours  norepinephrine Infusion 0.05 MICROgram(s)/kG/Min (7.97 mL/Hr) IV Continuous <Continuous>  oseltamivir 30 milliGRAM(s) Oral <User Schedule>  sevelamer carbonate 1600 milliGRAM(s) Oral three times a day with meals  tamsulosin 0.4 milliGRAM(s) Oral at bedtime  vancomycin  IVPB 1250 milliGRAM(s) IV Intermittent <User Schedule>    MEDICATIONS  (PRN):  acetaminophen     Tablet .. 650 milliGRAM(s) Oral every 6 hours PRN Temp greater or equal to 38C (100.4F), Mild Pain (1 - 3)  ondansetron Injectable 4 milliGRAM(s) IV Push every 8 hours PRN Nausea and/or Vomiting    Vital Signs Last 24 Hrs  T(C): 37.1 (01 Jan 2025 07:15), Max: 39.2 (01 Jan 2025 01:15)  T(F): 98.8 (01 Jan 2025 07:15), Max: 102.6 (01 Jan 2025 01:15)  HR: 71 (01 Jan 2025 12:00) (71 - 106)  BP: 105/50 (01 Jan 2025 12:00) (66/41 - 160/61)  BP(mean): 66 (01 Jan 2025 12:00) (49 - 126)  RR: 24 (01 Jan 2025 12:00) (12 - 31)  SpO2: 97% (01 Jan 2025 12:00) (88% - 100%)    Parameters below as of 31 Dec 2024 19:00  Patient On (Oxygen Delivery Method): nasal cannula  O2 Flow (L/min): 3    Physical:  General: A&Ox3. NAD.  Abdomen: Soft nondistended, no suprapubic tenderness.  Pelvis: Defecated. Scrotal wound with purulence.    I&O's Detail    31 Dec 2024 07:01  -  01 Jan 2025 07:00  --------------------------------------------------------  IN:    IV PiggyBack: 100 mL    IV PiggyBack: 100 mL    Norepinephrine: 159.4 mL    Oral Fluid: 100 mL  Total IN: 459.4 mL    OUT:    Voided (mL): 0 mL  Total OUT: 0 mL    Total NET: 459.4 mL      01 Jan 2025 07:01  -  01 Jan 2025 12:35  --------------------------------------------------------  IN:    Norepinephrine: 25.5 mL    Oral Fluid: 100 mL  Total IN: 125.5 mL    OUT:    Blood Loss (mL): 1 mL  Total OUT: 1 mL    Total NET: 124.5 mL    LABS:                        10.7   17.47 )-----------( 174      ( 01 Jan 2025 03:34 )             32.8             01-01    137  |  98  |  42[H]  ----------------------------<  166[H]  4.4   |  26  |  8.55[H]    Ca    9.4      01 Jan 2025 03:34  Phos  4.5     01-01  Mg     2.5     01-01    TPro  8.1  /  Alb  2.4[L]  /  TBili  0.4  /  DBili  x   /  AST  20  /  ALT  9[L]  /  AlkPhos  76  01-01    Culture - Blood (12.31.24 @ 06:43)    Specimen Source: .Blood BLOOD   Culture Results:   No growth at 24 hours

## 2025-01-01 NOTE — PROGRESS NOTE ADULT - SUBJECTIVE AND OBJECTIVE BOX
[facility] NEPHROLOGY- PROGRESS NOTE    Patient is a 67yo Male with ESRD on HD via Rt AVG, HTN, 2HPT, Anemia, LE venous stasis with Rt foot wound presents with weakness. Pt a/w Rt foot ulcer and hyperkalemia. Nephrology consulted for ESRD status.   MRI Rt foot: + osteomyelitis involving the talus and less so of the navicular bone;    BCx +Staph hominis  12/20: s/p I& D of buttock abscess  +scrotal abscess  12/29: s/p RRT for lethargy;  febrile +FLU A   12/30: s/p RRT for somnolent  +fever; transferred to ICU for pressors/ septic shock    Hospital Medications: Medications reviewed.  REVIEW OF SYSTEMS:  Denies any SOB, cough, chest pain or n/v/d, abd pain or foot pain    VITALS:  T(F): 98.8 (01-01-25 @ 07:15), Max: 102.6 (01-01-25 @ 01:15)  HR: 73 (01-01-25 @ 11:45)  BP: 97/54 (01-01-25 @ 11:45)  RR: 23 (01-01-25 @ 11:45)  SpO2: 96% (01-01-25 @ 11:45)  Wt(kg): --    12-31 @ 07:01 - 01-01 @ 07:00  --------------------------------------------------------  IN: 459.4 mL / OUT: 0 mL / NET: 459.4 mL    01-01 @ 07:01 - 01-01 @ 11:50  --------------------------------------------------------  IN: 125.5 mL / OUT: 1 mL / NET: 124.5 mL        PHYSICAL EXAM:  Gen: NAD, more awake,  Obese  HEENT: anicteric  Cards: RRR, +S1/S2, no M/G/R  Resp: +mild rales at rt base, clear left side  GI: soft, NT/ND, NABS  Extremities: no b/l LE edema, extremely dry LE; rt foot covered  Access: Rt AVG +thrill +bruit    LABS:  01-01    137  |  98  |  42[H]  ----------------------------<  166[H]  4.4   |  26  |  8.55[H]    Ca    9.4      01 Jan 2025 03:34  Phos  4.5     01-01  Mg     2.5     01-01    TPro  8.1  /  Alb  2.4[L]  /  TBili  0.4  /  DBili      /  AST  20  /  ALT  9[L]  /  AlkPhos  76  01-01    Creatinine Trend: 8.55 <--, 6.65 <--, 10.60 <--, 8.53 <--, 8.82 <--, 7.19 <--                        10.7   17.47 )-----------( 174      ( 01 Jan 2025 03:34 )             32.8     Urine Studies:  Urinalysis Basic - ( 01 Jan 2025 03:34 )    Color:  / Appearance:  / SG:  / pH:   Gluc: 166 mg/dL / Ketone:   / Bili:  / Urobili:    Blood:  / Protein:  / Nitrite:    Leuk Esterase:  / RBC:  / WBC    Sq Epi:  / Non Sq Epi:  / Bacteria:

## 2025-01-01 NOTE — PROGRESS NOTE ADULT - ASSESSMENT
Patient is a 67yo Male with ESRD on HD via Rt AVG, HTN, 2HPT, Anemia, LE venous stasis with Rt foot wound presents with weakness. Pt a/w Rt foot ulcer and hyperkalemia. Nephrology consulted for ESRD status.     1) ESRD: Last HD 12/30,  tolerated well with net 1L removed. Plan for next HD today 1/1, post CT with IV contrast. c/w MWF schedule. Monitor electrolytes  2) Hyperkalemia: resolved. Recc renal/ low potassium diet.    Monitor serum potassium  3) HTN with ESRD: BP low normal;  on pressors. Continue to hold antihypertensive medication. Pressors as per ICU.  c/w low sodium diet. Monitor BP.  4) Anemia of renal disease: Hb acceptable. c/w Epogen 6000 units IV tiw. Monitor Hb.  5) 2HPT: Serum calcium acceptable with elevated serum phos.  PTHi 441. c/w calcitriol 1mcg PO MWF. Pt on Lanthanum 2000mg PO tid with meals which is unavailable at [facility]. c/w Sevelamer 1600mg PO tid with meals  Recc Renal diet. Monitor serum calcium and phosphorus.    HepBsAg was neg on outpt labs on 12/11/24; repeat HepBsAg neg; likely positive HepBsAg on 12/16 is an error.       [facility] NEPHROLOGY  Arden Tsang M.D.  Freeman Holland D.O.  Janny Chirinos M.D.  MD Ambar Mccarty, MSN, ANP-C    Telephone: [phone]  Facsimile: [phone]    [facility] [address] [place] [address], [address]  [place] [address]

## 2025-01-01 NOTE — PROGRESS NOTE ADULT - SUBJECTIVE AND OBJECTIVE BOX
INTERVAL HPI/OVERNIGHT EVENTS: ***    PRESSORS: [ ] YES [ ] NO  WHICH:    Antimicrobial:  meropenem  IVPB 1000 milliGRAM(s) IV Intermittent every 24 hours  oseltamivir 30 milliGRAM(s) Oral <User Schedule>  vancomycin  IVPB 1250 milliGRAM(s) IV Intermittent <User Schedule>    Cardiovascular:  norepinephrine Infusion 0.05 MICROgram(s)/kG/Min IV Continuous <Continuous>    Pulmonary:    Hematalogic:  heparin   Injectable 5000 Unit(s) SubCutaneous every 12 hours    Other:  acetaminophen     Tablet .. 650 milliGRAM(s) Oral every 6 hours PRN  calcitriol   Capsule 1 MICROGram(s) Oral <User Schedule>  chlorhexidine 2% Cloths 1 Application(s) Topical daily  chlorhexidine 2% Cloths 1 Application(s) Topical <User Schedule>  epoetin rudolph-epbx (RETACRIT) Injectable 6000 Unit(s) IV Push <User Schedule>  insulin lispro (ADMELOG) corrective regimen sliding scale   SubCutaneous three times a day before meals  insulin lispro (ADMELOG) corrective regimen sliding scale   SubCutaneous at bedtime  latanoprost 0.005% Ophthalmic Solution 1 Drop(s) Both EYES at bedtime  ondansetron Injectable 4 milliGRAM(s) IV Push every 8 hours PRN  sevelamer carbonate 1600 milliGRAM(s) Oral three times a day with meals  tamsulosin 0.4 milliGRAM(s) Oral at bedtime    acetaminophen     Tablet .. 650 milliGRAM(s) Oral every 6 hours PRN  calcitriol   Capsule 1 MICROGram(s) Oral <User Schedule>  chlorhexidine 2% Cloths 1 Application(s) Topical daily  chlorhexidine 2% Cloths 1 Application(s) Topical <User Schedule>  epoetin rudolph-epbx (RETACRIT) Injectable 6000 Unit(s) IV Push <User Schedule>  heparin   Injectable 5000 Unit(s) SubCutaneous every 12 hours  insulin lispro (ADMELOG) corrective regimen sliding scale   SubCutaneous three times a day before meals  insulin lispro (ADMELOG) corrective regimen sliding scale   SubCutaneous at bedtime  latanoprost 0.005% Ophthalmic Solution 1 Drop(s) Both EYES at bedtime  meropenem  IVPB 1000 milliGRAM(s) IV Intermittent every 24 hours  norepinephrine Infusion 0.05 MICROgram(s)/kG/Min IV Continuous <Continuous>  ondansetron Injectable 4 milliGRAM(s) IV Push every 8 hours PRN  oseltamivir 30 milliGRAM(s) Oral <User Schedule>  sevelamer carbonate 1600 milliGRAM(s) Oral three times a day with meals  tamsulosin 0.4 milliGRAM(s) Oral at bedtime  vancomycin  IVPB 1250 milliGRAM(s) IV Intermittent <User Schedule>    Drug Dosing Weight  Height (cm): 172.7 (25 Dec 2024 20:03)  Weight (kg): 85 (16 Dec 2024 07:46)  BMI (kg/m2): 28.5 (25 Dec 2024 20:03)  BSA (m2): 1.99 (25 Dec 2024 20:03)    CENTRAL LINE: [ ] YES [ ] NO  LOCATION:   DATE INSERTED:  REMOVE: [ ] YES [ ] NO  EXPLAIN:    STOUT: [ ] YES [ ] NO    DATE INSERTED:  REMOVE:  [ ] YES [ ] NO  EXPLAIN:    A-LINE:  [ ] YES [ ] NO  LOCATION:   DATE INSERTED:  REMOVE:  [ ] YES [ ] NO  EXPLAIN:    PMH -reviewed admission note, no change since admission  PAST MEDICAL & SURGICAL HISTORY:  DM (diabetes mellitus)  type 2      ESRD (end stage renal disease) on dialysis  t-th-sat via right AV graft, uses midodrine the days of Dialysis for hypotension      Charcot foot due to diabetes mellitus      Diabetic retinopathy associated with type 2 diabetes mellitus      Hyperlipidemia      Abscess of scrotum      Hidradenitis      End stage renal disease      S/P foot surgery  right foot - 11/2014      Charcot's syndrome  Surgeries in feet and ankles   left ankle 2011 and right ankle 11/2014      History of incision and drainage  scrotal 5/2018      Hemodialysis access, AV graft          ICU Vital Signs Last 24 Hrs  T(C): 39.2 (01 Jan 2025 01:15), Max: 39.2 (01 Jan 2025 01:15)  T(F): 102.6 (01 Jan 2025 01:15), Max: 102.6 (01 Jan 2025 01:15)  HR: 86 (01 Jan 2025 07:00) (83 - 106)  BP: 134/61 (01 Jan 2025 07:00) (66/41 - 160/61)  BP(mean): 82 (01 Jan 2025 07:00) (49 - 126)  ABP: --  ABP(mean): --  RR: 19 (01 Jan 2025 07:00) (12 - 31)  SpO2: 100% (01 Jan 2025 07:00) (88% - 100%)    O2 Parameters below as of 31 Dec 2024 19:00  Patient On (Oxygen Delivery Method): nasal cannula  O2 Flow (L/min): 3          ABG - ( 30 Dec 2024 23:16 )  pH, Arterial: 7.44  pH, Blood: x     /  pCO2: 37    /  pO2: 69    / HCO3: 25    / Base Excess: 1.0   /  SaO2: 94                    12-31 @ 07:01  -  01-01 @ 07:00  --------------------------------------------------------  IN: 459.4 mL / OUT: 0 mL / NET: 459.4 mL            PHYSICAL EXAM:    GENERAL: NAD, well-groomed, well-developed  HEAD:  Atraumatic, Normocephalic  EYES: EOMI, PERRLA, conjunctiva and sclera clear  ENMT: No tonsillar erythema, exudates, or enlargement; Moist mucous membranes, Good dentition, No lesions  NECK: Supple, normal appearance, No JVD; Normal thyroid; Trachea midline  NERVOUS SYSTEM:  Alert & Oriented X3,  Motor Strength 5/5 B/L upper and lower extremities; DTRs 2+ intact and symmetric  CHEST/LUNG: No chest deformity; Normal percussion bilaterally; No rales, rhonchi, wheezing   HEART: Regular rate and rhythm; No murmurs, rubs, or gallops  ABDOMEN: Soft, Nontender, Nondistended; Bowel sounds present  EXTREMITIES:  2+ Peripheral Pulses, No clubbing, cyanosis, or edema  LYMPH: No lymphadenopathy noted  SKIN: No rashes or lesions;  Good capillary refill      LABS:  CBC Full  -  ( 01 Jan 2025 03:34 )  WBC Count : 17.47 K/uL  RBC Count : 3.34 M/uL  Hemoglobin : 10.7 g/dL  Hematocrit : 32.8 %  Platelet Count - Automated : 174 K/uL  Mean Cell Volume : 98.2 fl  Mean Cell Hemoglobin : 32.0 pg  Mean Cell Hemoglobin Concentration : 32.6 g/dL  Auto Neutrophil # : x  Auto Lymphocyte # : x  Auto Monocyte # : x  Auto Eosinophil # : x  Auto Basophil # : x  Auto Neutrophil % : x  Auto Lymphocyte % : x  Auto Monocyte % : x  Auto Eosinophil % : x  Auto Basophil % : x    01-01    137  |  98  |  42[H]  ----------------------------<  166[H]  4.4   |  26  |  8.55[H]    Ca    9.4      01 Jan 2025 03:34  Phos  4.5     01-01  Mg     2.5     01-01    TPro  8.1  /  Alb  2.4[L]  /  TBili  0.4  /  DBili  x   /  AST  20  /  ALT  9[L]  /  AlkPhos  76  01-01      Urinalysis Basic - ( 01 Jan 2025 03:34 )    Color: x / Appearance: x / SG: x / pH: x  Gluc: 166 mg/dL / Ketone: x  / Bili: x / Urobili: x   Blood: x / Protein: x / Nitrite: x   Leuk Esterase: x / RBC: x / WBC x   Sq Epi: x / Non Sq Epi: x / Bacteria: x          RADIOLOGY & ADDITIONAL STUDIES REVIEWED:  ***    [ ]GOALS OF CARE DISCUSSION WITH PATIENT/FAMILY/PROXY:    CRITICAL CARE TIME SPENT: 35 minutes INTERVAL HPI/OVERNIGHT EVENTS: Patient off pressors yesterday in the evening, restarted due to hypotension at night. Patient examined at bed side in the AM. Plan discussed with patient, states understanding.    PRESSORS: [X] YES [ ] NO  WHICH: Levo 0.05    Antimicrobial:  meropenem  IVPB 1000 milliGRAM(s) IV Intermittent every 24 hours  oseltamivir 30 milliGRAM(s) Oral <User Schedule>  vancomycin  IVPB 1250 milliGRAM(s) IV Intermittent <User Schedule>    Cardiovascular:  norepinephrine Infusion 0.05 MICROgram(s)/kG/Min IV Continuous <Continuous>    Pulmonary:    Hematalogic:  heparin   Injectable 5000 Unit(s) SubCutaneous every 12 hours    Other:  acetaminophen     Tablet .. 650 milliGRAM(s) Oral every 6 hours PRN  calcitriol   Capsule 1 MICROGram(s) Oral <User Schedule>  chlorhexidine 2% Cloths 1 Application(s) Topical daily  chlorhexidine 2% Cloths 1 Application(s) Topical <User Schedule>  epoetin rudolph-epbx (RETACRIT) Injectable 6000 Unit(s) IV Push <User Schedule>  insulin lispro (ADMELOG) corrective regimen sliding scale   SubCutaneous three times a day before meals  insulin lispro (ADMELOG) corrective regimen sliding scale   SubCutaneous at bedtime  latanoprost 0.005% Ophthalmic Solution 1 Drop(s) Both EYES at bedtime  ondansetron Injectable 4 milliGRAM(s) IV Push every 8 hours PRN  sevelamer carbonate 1600 milliGRAM(s) Oral three times a day with meals  tamsulosin 0.4 milliGRAM(s) Oral at bedtime    acetaminophen     Tablet .. 650 milliGRAM(s) Oral every 6 hours PRN  calcitriol   Capsule 1 MICROGram(s) Oral <User Schedule>  chlorhexidine 2% Cloths 1 Application(s) Topical daily  chlorhexidine 2% Cloths 1 Application(s) Topical <User Schedule>  epoetin rudolph-epbx (RETACRIT) Injectable 6000 Unit(s) IV Push <User Schedule>  heparin   Injectable 5000 Unit(s) SubCutaneous every 12 hours  insulin lispro (ADMELOG) corrective regimen sliding scale   SubCutaneous three times a day before meals  insulin lispro (ADMELOG) corrective regimen sliding scale   SubCutaneous at bedtime  latanoprost 0.005% Ophthalmic Solution 1 Drop(s) Both EYES at bedtime  meropenem  IVPB 1000 milliGRAM(s) IV Intermittent every 24 hours  norepinephrine Infusion 0.05 MICROgram(s)/kG/Min IV Continuous <Continuous>  ondansetron Injectable 4 milliGRAM(s) IV Push every 8 hours PRN  oseltamivir 30 milliGRAM(s) Oral <User Schedule>  sevelamer carbonate 1600 milliGRAM(s) Oral three times a day with meals  tamsulosin 0.4 milliGRAM(s) Oral at bedtime  vancomycin  IVPB 1250 milliGRAM(s) IV Intermittent <User Schedule>    Drug Dosing Weight  Height (cm): 172.7 (25 Dec 2024 20:03)  Weight (kg): 85 (16 Dec 2024 07:46)  BMI (kg/m2): 28.5 (25 Dec 2024 20:03)  BSA (m2): 1.99 (25 Dec 2024 20:03)    CENTRAL LINE: [ ] YES [X] NO    STOUT: [ ] YES [X] NO    A-LINE:  [ ] YES [X] NO     PMH -reviewed admission note, no change since admission  PAST MEDICAL & SURGICAL HISTORY:  DM (diabetes mellitus)  type 2      ESRD (end stage renal disease) on dialysis  t-th-sat via right AV graft, uses midodrine the days of Dialysis for hypotension      Charcot foot due to diabetes mellitus      Diabetic retinopathy associated with type 2 diabetes mellitus      Hyperlipidemia      Abscess of scrotum      Hidradenitis      End stage renal disease      S/P foot surgery  right foot - 11/2014      Charcot's syndrome  Surgeries in feet and ankles   left ankle 2011 and right ankle 11/2014      History of incision and drainage  scrotal 5/2018      Hemodialysis access, AV graft          ICU Vital Signs Last 24 Hrs  T(C): 39.2 (01 Jan 2025 01:15), Max: 39.2 (01 Jan 2025 01:15)  T(F): 102.6 (01 Jan 2025 01:15), Max: 102.6 (01 Jan 2025 01:15)  HR: 86 (01 Jan 2025 07:00) (83 - 106)  BP: 134/61 (01 Jan 2025 07:00) (66/41 - 160/61)  BP(mean): 82 (01 Jan 2025 07:00) (49 - 126)  ABP: --  ABP(mean): --  RR: 19 (01 Jan 2025 07:00) (12 - 31)  SpO2: 100% (01 Jan 2025 07:00) (88% - 100%)    O2 Parameters below as of 31 Dec 2024 19:00  Patient On (Oxygen Delivery Method): nasal cannula  O2 Flow (L/min): 3          ABG - ( 30 Dec 2024 23:16 )  pH, Arterial: 7.44  pH, Blood: x     /  pCO2: 37    /  pO2: 69    / HCO3: 25    / Base Excess: 1.0   /  SaO2: 94                    12-31 @ 07:01  -  01-01 @ 07:00  --------------------------------------------------------  IN: 459.4 mL / OUT: 0 mL / NET: 459.4 mL            PHYSICAL EXAM:    GENERAL: NAD, well-groomed, well-developed  HEAD:  Atraumatic, Normocephalic  EYES: EOMI, PERRLA, conjunctiva and sclera clear  ENMT: No tonsillar erythema, exudates, or enlargement; Moist mucous membranes  NECK: Supple, normal appearance, No JVD; Normal thyroid; Trachea midline  NERVOUS SYSTEM:  Alert & Oriented X3, No focal deficits  CHEST/LUNG: No chest deformity; No rales, rhonchi, wheezing   HEART: Regular rate and rhythm; No murmurs, rubs, or gallops  ABDOMEN: Soft, Nontender, Nondistended; Bowel sounds present, Scrotal swelling  EXTREMITIES:  2+ Peripheral Pulses, No clubbing, cyanosis, or edema  LYMPH: No lymphadenopathy noted  SKIN: Foot wound right side in dressing, left sided dressing removed    LABS:  CBC Full  -  ( 01 Jan 2025 03:34 )  WBC Count : 17.47 K/uL  RBC Count : 3.34 M/uL  Hemoglobin : 10.7 g/dL  Hematocrit : 32.8 %  Platelet Count - Automated : 174 K/uL  Mean Cell Volume : 98.2 fl  Mean Cell Hemoglobin : 32.0 pg  Mean Cell Hemoglobin Concentration : 32.6 g/dL  Auto Neutrophil # : x  Auto Lymphocyte # : x  Auto Monocyte # : x  Auto Eosinophil # : x  Auto Basophil # : x  Auto Neutrophil % : x  Auto Lymphocyte % : x  Auto Monocyte % : x  Auto Eosinophil % : x  Auto Basophil % : x    01-01    137  |  98  |  42[H]  ----------------------------<  166[H]  4.4   |  26  |  8.55[H]    Ca    9.4      01 Jan 2025 03:34  Phos  4.5     01-01  Mg     2.5     01-01    TPro  8.1  /  Alb  2.4[L]  /  TBili  0.4  /  DBili  x   /  AST  20  /  ALT  9[L]  /  AlkPhos  76  01-01      Urinalysis Basic - ( 01 Jan 2025 03:34 )    Color: x / Appearance: x / SG: x / pH: x  Gluc: 166 mg/dL / Ketone: x  / Bili: x / Urobili: x   Blood: x / Protein: x / Nitrite: x   Leuk Esterase: x / RBC: x / WBC x   Sq Epi: x / Non Sq Epi: x / Bacteria: x          RADIOLOGY & ADDITIONAL STUDIES REVIEWED:  None    [ ]GOALS OF CARE DISCUSSION WITH PATIENT/FAMILY/PROXY:    CRITICAL CARE TIME SPENT: 35 minutes

## 2025-01-01 NOTE — PROGRESS NOTE ADULT - ASSESSMENT
69 yo M with PMH of  DM, HTN, ESRD on dialysis ( M, W, F), Last Dialysis 12/30/24,  B/L Charcot deformity and HLD, present to the ED for generalized weakness, chills, subjective fever. Pt admitted on 12/16 to medicine floor for treatment of sepsis 2/2 scrotal abscess, diabetic foot ulcer, gluteal abscess, influenza A positive. s/p multiple RRTs for somnolence, hypotension, fever. Admitted to the ICU for peripheral pressor req. and closer monitoring.     #Septic shock 2/2 multiple abscesses, diabetic foot ulcer  #acute encephalopathy   #ESRD on dialysis   #Charcot arthropathy B/L  #Diabetic ulceration R midfoot with chronic OM  #DM  #HTN  #HLD  #BPH    _________CNS___________  #Acute encephalopathy   - Encephalopathy likely 2/2 acute metabolic encephalopathy in the setting of septic shock  - ABG 7.44/CO2 37/HCO3 25/pO2 69  - c/w broad spectrum iv abx - vanc and radha  - f/u repeat bcx     _________CVS___________  #septic shock 2/2 scrotal and gluteal abscesses, Diabetic ulceration R midfoot with chronic OM  #pressors  - peripheral levo, currently 0.06  - central and a-line consent obtained if needed   - Given 250 cc boluses x2 throughout the night   - continue to monitor volume status , give fluids judiciously given ESRD    #HTN  hold BP meds in setting of shock    #HLD   c/w home meds     _________ RESP__________  #AHRF in the setting of influenza A   - CXR 12/31 wetread wnl  - c/w NC as needed     __________GI____________  no acute issues     ________ RENAL__________  #ESRD on dialysis (MWF)  - last session 12/30, next 1/1/2024  - recent electrolytes within normal limits   - C/W epo, Renvela, calcitriol   - Given 250 cc boluses x2 throughout the night   - continue to monitor volume status   - Nephrology Dr. Chirinos following    #BPH  - c/w home meds     _________MSK___________  no active issues     __________ID____________  #septic shock 2/2 multiple abscesses, diabetic foot ulcer  #diabetic foot ulcer  #influenza A     #Scrotal abscess   - Scrotal Wound culture positive for  Proteus Mirabilis  - CT Pelvis: 2.1 cm right scrotal fluid collection suspicious for abscess. Several stones and both ureterovesical junctions without ureteral dilatation.  - Broadened to Radha and Vanc  - ID Dr. Chavez following  - f/u repeat bcx    #Diabetic foot ulcers   - According to podiatry note 12/28- No surgical treatement will be done at this time. Patient is stable from podiatry. Plan for outpatient bone biopsy when pt is off abx for 3 weeks which was discussed with patient and he is agreeable with the plan going forward.  - Podiatry made aware, f/u recs    #influenza A  - req 2 L NC   - c/w tamiflu     _________ENDO__________  #DM   - c/w insulin sliding scale     _______HEME/ONC_______  # Anemia of renal disease  - c/w Epogen 6000 units IV tiw.   - Monitor Hb    _________SKIN____________  #diabetic foot ulcer   plan as above     ________Prophylaxis_______  #DVT - heparin      __________GOC/ DISPO___________  FULL CODE   central line and arterial line consent in the chart    69 yo M with PMH of  DM, HTN, ESRD on dialysis ( M, W, F), Last Dialysis 12/30/24,  B/L Charcot deformity and HLD, present to the ED for generalized weakness, chills, subjective fever. Pt admitted on 12/16 to medicine floor for treatment of sepsis 2/2 scrotal abscess, diabetic foot ulcer, gluteal abscess, influenza A positive. s/p multiple RRTs for somnolence, hypotension, fever. Admitted to the ICU for peripheral pressor req. and closer monitoring.     #Septic shock 2/2 multiple abscesses, diabetic foot ulcer  #acute encephalopathy   #ESRD on dialysis   #Charcot arthropathy B/L  #Diabetic ulceration R midfoot with chronic OM  #DM  #HTN  #HLD  #BPH    _________CNS___________  #Acute encephalopathy   - Encephalopathy likely 2/2 acute metabolic encephalopathy in the setting of septic shock  - ABG 7.44/CO2 37/HCO3 25/pO2 69  - c/w broad spectrum iv abx - vanc and radha  - Improving    _________CVS___________  #septic shock 2/2 scrotal and gluteal abscesses, Diabetic ulceration R midfoot with chronic OM  #pressors  - peripheral levo, currently 0.05  - central and a-line consent obtained if needed   - Given 250 cc boluses x2 throughout the night   - continue to monitor volume status , give fluids judiciously given ESRD    #HTN  hold BP meds in setting of shock    #HLD   c/w home meds     _________ RESP__________  #AHRF in the setting of influenza A   - CXR 12/31 official read still pending  - c/w NC as needed     __________GI____________  no acute issues     ________ RENAL__________  #ESRD on dialysis (MWF)  - last session 12/30, scheduled for today  - recent electrolytes within normal limits   - C/W epo, Renvela, calcitriol   - Given 250 cc boluses x2 throughout the night   - continue to monitor volume status   - Nephrology Dr. Chirinos following    #BPH  - c/w home meds     _________MSK___________  no active issues     __________ID____________  #septic shock 2/2 multiple abscesses, diabetic foot ulcer  #diabetic foot ulcer  #influenza A     #Scrotal abscess   - Scrotal Wound culture positive for  Proteus Mirabilis  - CT Pelvis: 2.1 cm right scrotal fluid collection suspicious for abscess. Several stones and both ureterovesical junctions without ureteral dilatation.  - Broadened to Radha and Vanc  - ID Dr. Chavez following  - Will obtain repeat CT AP as per Urology today    #Diabetic foot ulcers   - According to podiatry note 12/28- No surgical treatement will be done at this time. Patient is stable from podiatry. Plan for outpatient bone biopsy when pt is off abx for 3 weeks which was discussed with patient and he is agreeable with the plan going forward.  - Dressing changed, however no note from Podiatry    #influenza A  - req 2 L NC   - c/w tamiflu     _________ENDO__________  #DM   - c/w insulin sliding scale     _______HEME/ONC_______  # Anemia of renal disease  - c/w Epogen 6000 units IV tiw.   - Monitor Hb    _________SKIN____________  #diabetic foot ulcer   plan as above     ________Prophylaxis_______  #DVT - heparin      __________GOC/ DISPO___________  FULL CODE   central line and arterial line consent in the chart

## 2025-01-01 NOTE — CHART NOTE - NSCHARTNOTEFT_GEN_A_CORE
Comprehensive update provided to wife present at nursing station. Explained recent imaging, updates on Levophed. Support provided. All questions answered.

## 2025-01-01 NOTE — PROGRESS NOTE ADULT - ASSESSMENT
69M with hx of T2DM, hidradenitis suppurativa a/w generalized weakness, w small amount of purulent drainage from scrotum     - Obtain repeat CTAP w/wo contrast to level of knees to reassess bl non-obstructing UVJ stones and scrotal collection  - F/u repeat bcx   - Continue Flomax  - keep scrotal area clean and dry, may include sitz baths   - IV antibiotics, follow ID recommendations   - strict glycemic control   - HD as per neph   - remainder of care as per primary team

## 2025-01-02 LAB
ALBUMIN SERPL ELPH-MCNC: 2.3 G/DL — LOW (ref 3.5–5)
ALP SERPL-CCNC: 75 U/L — SIGNIFICANT CHANGE UP (ref 40–120)
ALT FLD-CCNC: 8 U/L DA — LOW (ref 10–60)
ANION GAP SERPL CALC-SCNC: 13 MMOL/L — SIGNIFICANT CHANGE UP (ref 5–17)
AST SERPL-CCNC: 14 U/L — SIGNIFICANT CHANGE UP (ref 10–40)
BASOPHILS # BLD AUTO: 0.03 K/UL — SIGNIFICANT CHANGE UP (ref 0–0.2)
BASOPHILS NFR BLD AUTO: 0.2 % — SIGNIFICANT CHANGE UP (ref 0–2)
BILIRUB SERPL-MCNC: 0.5 MG/DL — SIGNIFICANT CHANGE UP (ref 0.2–1.2)
BUN SERPL-MCNC: 33 MG/DL — HIGH (ref 7–18)
CALCIUM SERPL-MCNC: 9.4 MG/DL — SIGNIFICANT CHANGE UP (ref 8.4–10.5)
CHLORIDE SERPL-SCNC: 102 MMOL/L — SIGNIFICANT CHANGE UP (ref 96–108)
CO2 SERPL-SCNC: 24 MMOL/L — SIGNIFICANT CHANGE UP (ref 22–31)
CREAT SERPL-MCNC: 6.27 MG/DL — HIGH (ref 0.5–1.3)
EGFR: 9 ML/MIN/1.73M2 — LOW
EOSINOPHIL # BLD AUTO: 0.14 K/UL — SIGNIFICANT CHANGE UP (ref 0–0.5)
EOSINOPHIL NFR BLD AUTO: 1.1 % — SIGNIFICANT CHANGE UP (ref 0–6)
GLUCOSE BLDC GLUCOMTR-MCNC: 118 MG/DL — HIGH (ref 70–99)
GLUCOSE BLDC GLUCOMTR-MCNC: 124 MG/DL — HIGH (ref 70–99)
GLUCOSE BLDC GLUCOMTR-MCNC: 126 MG/DL — HIGH (ref 70–99)
GLUCOSE BLDC GLUCOMTR-MCNC: 129 MG/DL — HIGH (ref 70–99)
GLUCOSE SERPL-MCNC: 157 MG/DL — HIGH (ref 70–99)
HCT VFR BLD CALC: 30.2 % — LOW (ref 39–50)
HGB BLD-MCNC: 9.9 G/DL — LOW (ref 13–17)
IMM GRANULOCYTES NFR BLD AUTO: 0.5 % — SIGNIFICANT CHANGE UP (ref 0–0.9)
LYMPHOCYTES # BLD AUTO: 1.12 K/UL — SIGNIFICANT CHANGE UP (ref 1–3.3)
LYMPHOCYTES # BLD AUTO: 9 % — LOW (ref 13–44)
MAGNESIUM SERPL-MCNC: 2.4 MG/DL — SIGNIFICANT CHANGE UP (ref 1.6–2.6)
MCHC RBC-ENTMCNC: 32.4 PG — SIGNIFICANT CHANGE UP (ref 27–34)
MCHC RBC-ENTMCNC: 32.8 G/DL — SIGNIFICANT CHANGE UP (ref 32–36)
MCV RBC AUTO: 98.7 FL — SIGNIFICANT CHANGE UP (ref 80–100)
MONOCYTES # BLD AUTO: 0.74 K/UL — SIGNIFICANT CHANGE UP (ref 0–0.9)
MONOCYTES NFR BLD AUTO: 5.9 % — SIGNIFICANT CHANGE UP (ref 2–14)
NEUTROPHILS # BLD AUTO: 10.38 K/UL — HIGH (ref 1.8–7.4)
NEUTROPHILS NFR BLD AUTO: 83.3 % — HIGH (ref 43–77)
NRBC # BLD: 0 /100 WBCS — SIGNIFICANT CHANGE UP (ref 0–0)
PHOSPHATE SERPL-MCNC: 4.3 MG/DL — SIGNIFICANT CHANGE UP (ref 2.5–4.5)
PLATELET # BLD AUTO: 176 K/UL — SIGNIFICANT CHANGE UP (ref 150–400)
POTASSIUM SERPL-MCNC: 3.8 MMOL/L — SIGNIFICANT CHANGE UP (ref 3.5–5.3)
POTASSIUM SERPL-SCNC: 3.8 MMOL/L — SIGNIFICANT CHANGE UP (ref 3.5–5.3)
PROT SERPL-MCNC: 7.6 G/DL — SIGNIFICANT CHANGE UP (ref 6–8.3)
RBC # BLD: 3.06 M/UL — LOW (ref 4.2–5.8)
RBC # FLD: 14.7 % — HIGH (ref 10.3–14.5)
SODIUM SERPL-SCNC: 139 MMOL/L — SIGNIFICANT CHANGE UP (ref 135–145)
WBC # BLD: 12.47 K/UL — HIGH (ref 3.8–10.5)
WBC # FLD AUTO: 12.47 K/UL — HIGH (ref 3.8–10.5)

## 2025-01-02 PROCEDURE — G0545: CPT

## 2025-01-02 PROCEDURE — 99233 SBSQ HOSP IP/OBS HIGH 50: CPT

## 2025-01-02 RX ORDER — MIDODRINE HYDROCHLORIDE 5 MG/1
5 TABLET ORAL EVERY 8 HOURS
Refills: 0 | Status: DISCONTINUED | OUTPATIENT
Start: 2025-01-02 | End: 2025-01-02

## 2025-01-02 RX ORDER — MIDODRINE HYDROCHLORIDE 5 MG/1
5 TABLET ORAL EVERY 8 HOURS
Refills: 0 | Status: DISCONTINUED | OUTPATIENT
Start: 2025-01-02 | End: 2025-01-06

## 2025-01-02 RX ORDER — MIDODRINE HYDROCHLORIDE 5 MG/1
10 TABLET ORAL EVERY 8 HOURS
Refills: 0 | Status: DISCONTINUED | OUTPATIENT
Start: 2025-01-02 | End: 2025-01-02

## 2025-01-02 RX ADMIN — HEPARIN SODIUM 5000 UNIT(S): 1000 INJECTION, SOLUTION INTRAVENOUS; SUBCUTANEOUS at 05:23

## 2025-01-02 RX ADMIN — MIDODRINE HYDROCHLORIDE 5 MILLIGRAM(S): 5 TABLET ORAL at 22:05

## 2025-01-02 RX ADMIN — MIDODRINE HYDROCHLORIDE 5 MILLIGRAM(S): 5 TABLET ORAL at 10:55

## 2025-01-02 RX ADMIN — HEPARIN SODIUM 5000 UNIT(S): 1000 INJECTION, SOLUTION INTRAVENOUS; SUBCUTANEOUS at 17:41

## 2025-01-02 RX ADMIN — LATANOPROST 1 DROP(S): 50 SOLUTION OPHTHALMIC at 22:05

## 2025-01-02 RX ADMIN — SEVELAMER CARBONATE 1600 MILLIGRAM(S): 800 TABLET, FILM COATED ORAL at 09:40

## 2025-01-02 RX ADMIN — MEROPENEM 100 MILLIGRAM(S): 1 INJECTION, POWDER, FOR SOLUTION INTRAVENOUS at 13:26

## 2025-01-02 RX ADMIN — TAMSULOSIN HYDROCHLORIDE 0.4 MILLIGRAM(S): 0.4 CAPSULE ORAL at 22:05

## 2025-01-02 RX ADMIN — CHLORHEXIDINE GLUCONATE 1 APPLICATION(S): 1.2 RINSE ORAL at 05:22

## 2025-01-02 NOTE — PROGRESS NOTE ADULT - SUBJECTIVE AND OBJECTIVE BOX
INTERVAL HPI/OVERNIGHT EVENTS: ***    PRESSORS: [ ] YES [ ] NO  WHICH:    Antimicrobial:  meropenem  IVPB 1000 milliGRAM(s) IV Intermittent every 24 hours  vancomycin  IVPB 1250 milliGRAM(s) IV Intermittent <User Schedule>    Cardiovascular:  norepinephrine Infusion 0.05 MICROgram(s)/kG/Min IV Continuous <Continuous>    Pulmonary:    Hematalogic:  heparin   Injectable 5000 Unit(s) SubCutaneous every 12 hours    Other:  acetaminophen     Tablet .. 650 milliGRAM(s) Oral every 6 hours PRN  calcitriol   Capsule 1 MICROGram(s) Oral <User Schedule>  chlorhexidine 2% Cloths 1 Application(s) Topical <User Schedule>  epoetin rudolph-epbx (RETACRIT) Injectable 6000 Unit(s) IV Push <User Schedule>  insulin lispro (ADMELOG) corrective regimen sliding scale   SubCutaneous three times a day before meals  insulin lispro (ADMELOG) corrective regimen sliding scale   SubCutaneous at bedtime  latanoprost 0.005% Ophthalmic Solution 1 Drop(s) Both EYES at bedtime  ondansetron Injectable 4 milliGRAM(s) IV Push every 8 hours PRN  sevelamer carbonate 1600 milliGRAM(s) Oral three times a day with meals  tamsulosin 0.4 milliGRAM(s) Oral at bedtime    acetaminophen     Tablet .. 650 milliGRAM(s) Oral every 6 hours PRN  calcitriol   Capsule 1 MICROGram(s) Oral <User Schedule>  chlorhexidine 2% Cloths 1 Application(s) Topical <User Schedule>  epoetin rudolph-epbx (RETACRIT) Injectable 6000 Unit(s) IV Push <User Schedule>  heparin   Injectable 5000 Unit(s) SubCutaneous every 12 hours  insulin lispro (ADMELOG) corrective regimen sliding scale   SubCutaneous three times a day before meals  insulin lispro (ADMELOG) corrective regimen sliding scale   SubCutaneous at bedtime  latanoprost 0.005% Ophthalmic Solution 1 Drop(s) Both EYES at bedtime  meropenem  IVPB 1000 milliGRAM(s) IV Intermittent every 24 hours  norepinephrine Infusion 0.05 MICROgram(s)/kG/Min IV Continuous <Continuous>  ondansetron Injectable 4 milliGRAM(s) IV Push every 8 hours PRN  sevelamer carbonate 1600 milliGRAM(s) Oral three times a day with meals  tamsulosin 0.4 milliGRAM(s) Oral at bedtime  vancomycin  IVPB 1250 milliGRAM(s) IV Intermittent <User Schedule>    Drug Dosing Weight  Height (cm): 172.7 (25 Dec 2024 20:03)  Weight (kg): 85 (16 Dec 2024 07:46)  BMI (kg/m2): 28.5 (25 Dec 2024 20:03)  BSA (m2): 1.99 (25 Dec 2024 20:03)    CENTRAL LINE: [ ] YES [ ] NO  LOCATION:   DATE INSERTED:  REMOVE: [ ] YES [ ] NO  EXPLAIN:    STOUT: [ ] YES [ ] NO    DATE INSERTED:  REMOVE:  [ ] YES [ ] NO  EXPLAIN:    A-LINE:  [ ] YES [ ] NO  LOCATION:   DATE INSERTED:  REMOVE:  [ ] YES [ ] NO  EXPLAIN:    PMH -reviewed admission note, no change since admission  PAST MEDICAL & SURGICAL HISTORY:  DM (diabetes mellitus)  type 2      ESRD (end stage renal disease) on dialysis  t-th-sat via right AV graft, uses midodrine the days of Dialysis for hypotension      Charcot foot due to diabetes mellitus      Diabetic retinopathy associated with type 2 diabetes mellitus      Hyperlipidemia      Abscess of scrotum      Hidradenitis      End stage renal disease      S/P foot surgery  right foot - 11/2014      Charcot's syndrome  Surgeries in feet and ankles   left ankle 2011 and right ankle 11/2014      History of incision and drainage  scrotal 5/2018      Hemodialysis access, AV graft          ICU Vital Signs Last 24 Hrs  T(C): 35.9 (02 Jan 2025 04:00), Max: 37.3 (02 Jan 2025 00:00)  T(F): 96.7 (02 Jan 2025 04:00), Max: 99.2 (02 Jan 2025 00:00)  HR: 73 (02 Jan 2025 06:45) (67 - 84)  BP: 103/52 (02 Jan 2025 06:45) (78/43 - 152/47)  BP(mean): 65 (02 Jan 2025 06:45) (54 - 106)  ABP: --  ABP(mean): --  RR: 14 (02 Jan 2025 06:45) (0 - 31)  SpO2: 95% (02 Jan 2025 06:45) (89% - 100%)    O2 Parameters below as of 01 Jan 2025 20:00  Patient On (Oxygen Delivery Method): nasal cannula  O2 Flow (L/min): 2                01-01 @ 07:01  -  01-02 @ 07:00  --------------------------------------------------------  IN: 900.7 mL / OUT: 1600 mL / NET: -699.3 mL            PHYSICAL EXAM:    GENERAL: NAD, well-groomed, well-developed  HEAD:  Atraumatic, Normocephalic  EYES: EOMI, PERRLA, conjunctiva and sclera clear  ENMT: No tonsillar erythema, exudates, or enlargement; Moist mucous membranes, Good dentition, No lesions  NECK: Supple, normal appearance, No JVD; Normal thyroid; Trachea midline  NERVOUS SYSTEM:  Alert & Oriented X3,  Motor Strength 5/5 B/L upper and lower extremities; DTRs 2+ intact and symmetric  CHEST/LUNG: No chest deformity; Normal percussion bilaterally; No rales, rhonchi, wheezing   HEART: Regular rate and rhythm; No murmurs, rubs, or gallops  ABDOMEN: Soft, Nontender, Nondistended; Bowel sounds present  EXTREMITIES:  2+ Peripheral Pulses, No clubbing, cyanosis, or edema  LYMPH: No lymphadenopathy noted  SKIN: No rashes or lesions;  Good capillary refill      LABS:  CBC Full  -  ( 02 Jan 2025 04:07 )  WBC Count : 12.47 K/uL  RBC Count : 3.06 M/uL  Hemoglobin : 9.9 g/dL  Hematocrit : 30.2 %  Platelet Count - Automated : 176 K/uL  Mean Cell Volume : 98.7 fl  Mean Cell Hemoglobin : 32.4 pg  Mean Cell Hemoglobin Concentration : 32.8 g/dL  Auto Neutrophil # : 10.38 K/uL  Auto Lymphocyte # : 1.12 K/uL  Auto Monocyte # : 0.74 K/uL  Auto Eosinophil # : 0.14 K/uL  Auto Basophil # : 0.03 K/uL  Auto Neutrophil % : 83.3 %  Auto Lymphocyte % : 9.0 %  Auto Monocyte % : 5.9 %  Auto Eosinophil % : 1.1 %  Auto Basophil % : 0.2 %    01-02    139  |  102  |  33[H]  ----------------------------<  157[H]  3.8   |  24  |  6.27[H]    Ca    9.4      02 Jan 2025 04:07  Phos  4.3     01-02  Mg     2.4     01-02    TPro  7.6  /  Alb  2.3[L]  /  TBili  0.5  /  DBili  x   /  AST  14  /  ALT  8[L]  /  AlkPhos  75  01-02      Urinalysis Basic - ( 02 Jan 2025 04:07 )    Color: x / Appearance: x / SG: x / pH: x  Gluc: 157 mg/dL / Ketone: x  / Bili: x / Urobili: x   Blood: x / Protein: x / Nitrite: x   Leuk Esterase: x / RBC: x / WBC x   Sq Epi: x / Non Sq Epi: x / Bacteria: x      Culture Results:   No growth at 24 hours (12-31 @ 06:43)  Culture Results:   No growth at 24 hours (12-31 @ 06:43)      RADIOLOGY & ADDITIONAL STUDIES REVIEWED:  ***    [ ]GOALS OF CARE DISCUSSION WITH PATIENT/FAMILY/PROXY:    CRITICAL CARE TIME SPENT: 35 minutes INTERVAL HPI/OVERNIGHT EVENTS: No acute overnight events reported. Patient examined at bedside in the AM.     PRESSORS: [ ] YES [X] NO    Antimicrobial:  meropenem  IVPB 1000 milliGRAM(s) IV Intermittent every 24 hours  vancomycin  IVPB 1250 milliGRAM(s) IV Intermittent <User Schedule>    Cardiovascular:  norepinephrine Infusion 0.05 MICROgram(s)/kG/Min IV Continuous <Continuous>    Pulmonary:    Hematalogic:  heparin   Injectable 5000 Unit(s) SubCutaneous every 12 hours    Other:  acetaminophen     Tablet .. 650 milliGRAM(s) Oral every 6 hours PRN  calcitriol   Capsule 1 MICROGram(s) Oral <User Schedule>  chlorhexidine 2% Cloths 1 Application(s) Topical <User Schedule>  epoetin rudolph-epbx (RETACRIT) Injectable 6000 Unit(s) IV Push <User Schedule>  insulin lispro (ADMELOG) corrective regimen sliding scale   SubCutaneous three times a day before meals  insulin lispro (ADMELOG) corrective regimen sliding scale   SubCutaneous at bedtime  latanoprost 0.005% Ophthalmic Solution 1 Drop(s) Both EYES at bedtime  ondansetron Injectable 4 milliGRAM(s) IV Push every 8 hours PRN  sevelamer carbonate 1600 milliGRAM(s) Oral three times a day with meals  tamsulosin 0.4 milliGRAM(s) Oral at bedtime    acetaminophen     Tablet .. 650 milliGRAM(s) Oral every 6 hours PRN  calcitriol   Capsule 1 MICROGram(s) Oral <User Schedule>  chlorhexidine 2% Cloths 1 Application(s) Topical <User Schedule>  epoetin rudolph-epbx (RETACRIT) Injectable 6000 Unit(s) IV Push <User Schedule>  heparin   Injectable 5000 Unit(s) SubCutaneous every 12 hours  insulin lispro (ADMELOG) corrective regimen sliding scale   SubCutaneous three times a day before meals  insulin lispro (ADMELOG) corrective regimen sliding scale   SubCutaneous at bedtime  latanoprost 0.005% Ophthalmic Solution 1 Drop(s) Both EYES at bedtime  meropenem  IVPB 1000 milliGRAM(s) IV Intermittent every 24 hours  norepinephrine Infusion 0.05 MICROgram(s)/kG/Min IV Continuous <Continuous>  ondansetron Injectable 4 milliGRAM(s) IV Push every 8 hours PRN  sevelamer carbonate 1600 milliGRAM(s) Oral three times a day with meals  tamsulosin 0.4 milliGRAM(s) Oral at bedtime  vancomycin  IVPB 1250 milliGRAM(s) IV Intermittent <User Schedule>    Drug Dosing Weight  Height (cm): 172.7 (25 Dec 2024 20:03)  Weight (kg): 85 (16 Dec 2024 07:46)  BMI (kg/m2): 28.5 (25 Dec 2024 20:03)  BSA (m2): 1.99 (25 Dec 2024 20:03)    CENTRAL LINE: [ ] YES [X] NO    STOUT: [ ] YES [X] NO     A-LINE:  [ ] YES [X] NO    PMH -reviewed admission note, no change since admission  PAST MEDICAL & SURGICAL HISTORY:  DM (diabetes mellitus)  type 2      ESRD (end stage renal disease) on dialysis  t-th-sat via right AV graft, uses midodrine the days of Dialysis for hypotension      Charcot foot due to diabetes mellitus      Diabetic retinopathy associated with type 2 diabetes mellitus      Hyperlipidemia      Abscess of scrotum      Hidradenitis      End stage renal disease      S/P foot surgery  right foot - 11/2014      Charcot's syndrome  Surgeries in feet and ankles   left ankle 2011 and right ankle 11/2014      History of incision and drainage  scrotal 5/2018      Hemodialysis access, AV graft          ICU Vital Signs Last 24 Hrs  T(C): 35.9 (02 Jan 2025 04:00), Max: 37.3 (02 Jan 2025 00:00)  T(F): 96.7 (02 Jan 2025 04:00), Max: 99.2 (02 Jan 2025 00:00)  HR: 73 (02 Jan 2025 06:45) (67 - 84)  BP: 103/52 (02 Jan 2025 06:45) (78/43 - 152/47)  BP(mean): 65 (02 Jan 2025 06:45) (54 - 106)  ABP: --  ABP(mean): --  RR: 14 (02 Jan 2025 06:45) (0 - 31)  SpO2: 95% (02 Jan 2025 06:45) (89% - 100%)    O2 Parameters below as of 01 Jan 2025 20:00  Patient On (Oxygen Delivery Method): nasal cannula  O2 Flow (L/min): 2                01-01 @ 07:01  -  01-02 @ 07:00  --------------------------------------------------------  IN: 900.7 mL / OUT: 1600 mL / NET: -699.3 mL            PHYSICAL EXAM:    GENERAL: NAD, appears fatigued  HEAD:  Atraumatic, Normocephalic  EYES: EOMI, PERRLA, conjunctiva and sclera clear  ENMT: Moist mucous membranes  NECK: Supple, normal appearance, No JVD; Normal thyroid; Trachea midline  NERVOUS SYSTEM:  Alert & Oriented X3,  Motor Strength 5/5 B/L upper and lower extremities; DTRs 2+ intact and symmetric  CHEST/LUNG: No chest deformity; Normal percussion bilaterally; No rales, rhonchi, wheezing   HEART: Systolic murmur, Regular rate and rhythm;  ABDOMEN: Soft, Nontender, Nondistended; Bowel sounds present, Scrotal swelling  EXTREMITIES:  2+ Peripheral Pulses, Foot wound in dressing right side No clubbing, cyanosis, or edema  LYMPH: No lymphadenopathy noted  SKIN: Site of I&D gluteal without signs of infection, No rashes or lesions;  Good capillary refill      LABS:  CBC Full  -  ( 02 Jan 2025 04:07 )  WBC Count : 12.47 K/uL  RBC Count : 3.06 M/uL  Hemoglobin : 9.9 g/dL  Hematocrit : 30.2 %  Platelet Count - Automated : 176 K/uL  Mean Cell Volume : 98.7 fl  Mean Cell Hemoglobin : 32.4 pg  Mean Cell Hemoglobin Concentration : 32.8 g/dL  Auto Neutrophil # : 10.38 K/uL  Auto Lymphocyte # : 1.12 K/uL  Auto Monocyte # : 0.74 K/uL  Auto Eosinophil # : 0.14 K/uL  Auto Basophil # : 0.03 K/uL  Auto Neutrophil % : 83.3 %  Auto Lymphocyte % : 9.0 %  Auto Monocyte % : 5.9 %  Auto Eosinophil % : 1.1 %  Auto Basophil % : 0.2 %    01-02    139  |  102  |  33[H]  ----------------------------<  157[H]  3.8   |  24  |  6.27[H]    Ca    9.4      02 Jan 2025 04:07  Phos  4.3     01-02  Mg     2.4     01-02    TPro  7.6  /  Alb  2.3[L]  /  TBili  0.5  /  DBili  x   /  AST  14  /  ALT  8[L]  /  AlkPhos  75  01-02      Urinalysis Basic - ( 02 Jan 2025 04:07 )    Color: x / Appearance: x / SG: x / pH: x  Gluc: 157 mg/dL / Ketone: x  / Bili: x / Urobili: x   Blood: x / Protein: x / Nitrite: x   Leuk Esterase: x / RBC: x / WBC x   Sq Epi: x / Non Sq Epi: x / Bacteria: x      Culture Results:   No growth at 24 hours (12-31 @ 06:43)  Culture Results:   No growth at 24 hours (12-31 @ 06:43)      RADIOLOGY & ADDITIONAL STUDIES REVIEWED: No scrotal wall thickening/edema with 1.5 cm rim-enhancing collection noted within the right scrotal wall, could be an abscess. Infiltration of the bilateral ischio rectal fossa fat without underlying bony erosion.    [ ]GOALS OF CARE DISCUSSION WITH PATIENT/FAMILY/PROXY:    CRITICAL CARE TIME SPENT: 35 minutes

## 2025-01-02 NOTE — PROGRESS NOTE ADULT - ASSESSMENT
69M with hx of T2DM, hidradenitis suppurativa a/w generalized weakness, w small amount of purulent drainage from scrotum     12/23: scrotal exam stable with spontaneous drainage  12/24: scrotal exam stable, CT pelvis demonstrates 2.1 cm right scrotal fluid collection suspicious for abscess and bilateral UVJ stones without ureteral dilatation, wbc has been downtrending  12/27: scrotal exam stable, decreasing drainage amount, WBC continues to downtrend  12/30: stable scrotal exam, less than 1 gauze worth of purulence expressed, patient likely febrile from flu (still without leukocytosis, drainage from scrotum still decreasing), s/p RRT in PM for somnolence transferred to ICU started on levo  12/31: scrotal exam continues to improve, minimal purulence expressed from right scrotal tract, WBC 20 from 14, levo 0.07    Scrotal exam continues to improve so clinical decline unlikely to be related to scrotum, however patient did have bilateral non-obstructing UVJ stones on 12/23 CTAP (to level of knees to also re-assess scrotal collection) so would be worthwhile to rescan patient to determine whether any stones have become obstructive.     1/1: repeat CTAP demonstrates 1.5x0.4cm right scrotal collection compared to 4x0.7cm collection on 12/23/24, still no hydronephrosis bilaterally to suggest obstructive uropathy  1/2: scrotal exam continues to improve, WBC downtrending, downtrending levo requirement 0.03    Recommendations:  - Repeat CTAP confirms decreasing size of scrotal collection > continue conservative management  - F/u repeat bcx > NGTD  - Continue Flomax  - keep scrotal area clean and dry, may include sitz baths   - IV antibiotics, follow ID recommendations   - strict glycemic control   - HD as per neph   - remainder of care as per primary team

## 2025-01-02 NOTE — PROGRESS NOTE ADULT - SUBJECTIVE AND OBJECTIVE BOX
[facility] NEPHROLOGY- PROGRESS NOTE    Patient is a 69yo Male with ESRD on HD via Rt AVG, HTN, 2HPT, Anemia, LE venous stasis with Rt foot wound presents with weakness. Pt a/w Rt foot ulcer and hyperkalemia. Nephrology consulted for ESRD status.   MRI Rt foot: + osteomyelitis involving the talus and less so of the navicular bone;    BCx +Staph hominis  12/20: s/p I& D of buttock abscess  +scrotal abscess  12/29: s/p RRT for lethargy;  febrile +FLU A   12/30: s/p RRT for somnolent  +fever; transferred to ICU for pressors/ septic shock    Hospital Medications: Medications reviewed.  REVIEW OF SYSTEMS:  Denies any SOB, cough, chest pain or n/v, abd pain or foot pain +diarrhea 1 episode    VITALS:  T(F): 97.6 (01-02-25 @ 11:00), Max: 99.2 (01-02-25 @ 00:00)  HR: 67 (01-02-25 @ 13:00)  BP: 91/56 (01-02-25 @ 13:00)  RR: 6 (01-02-25 @ 13:00)  SpO2: 100% (01-02-25 @ 13:00)  Wt(kg): --    01-01 @ 07:01 - 01-02 @ 07:00  --------------------------------------------------------  IN: 907.1 mL / OUT: 1600 mL / NET: -692.9 mL    01-02 @ 07:01 - 01-02 @ 13:30  --------------------------------------------------------  IN: 131.2 mL / OUT: 0 mL / NET: 131.2 mL      PHYSICAL EXAM:  Gen: NAD, more awake,  Obese  HEENT: anicteric  Cards: RRR, +S1/S2, no M/G/R  Resp: +mild rales at rt base, clear left side  GI: soft, NT/ND, NABS  Extremities: no b/l LE edema, extremely dry LE; rt foot covered  Access: Rt AVG +thrill +bruit    LABS:  01-02    139  |  102  |  33[H]  ----------------------------<  157[H]  3.8   |  24  |  6.27[H]    Ca    9.4      02 Jan 2025 04:07  Phos  4.3     01-02  Mg     2.4     01-02    TPro  7.6  /  Alb  2.3[L]  /  TBili  0.5  /  DBili      /  AST  14  /  ALT  8[L]  /  AlkPhos  75  01-02    Creatinine Trend: 6.27 <--, 8.55 <--, 6.65 <--, 10.60 <--, 8.53 <--, 8.82 <--                        9.9    12.47 )-----------( 176      ( 02 Jan 2025 04:07 )             30.2     Urine Studies:  Urinalysis Basic - ( 02 Jan 2025 04:07 )    Color:  / Appearance:  / SG:  / pH:   Gluc: 157 mg/dL / Ketone:   / Bili:  / Urobili:    Blood:  / Protein:  / Nitrite:    Leuk Esterase:  / RBC:  / WBC    Sq Epi:  / Non Sq Epi:  / Bacteria:

## 2025-01-02 NOTE — PROGRESS NOTE ADULT - ASSESSMENT
Patient is a 67yo Male with ESRD on HD via Rt AVG, HTN, 2HPT, Anemia, LE venous stasis with Rt foot wound presents with weakness. Pt a/w Rt foot ulcer and hyperkalemia. Nephrology consulted for ESRD status.     1) ESRD: Last HD 1/1, tolerated well with net 1L removed. Plan for next HD 1/3. c/w MWF schedule. Monitor electrolytes  2) Hyperkalemia: resolved. Pt not on renal or low potassium diet; will monitor.    Monitor serum potassium  3) HTN with ESRD: BP low normal;  off IVF pressors. Now on midodrine 5mg PO tid. Pressors as per ICU.  c/w low sodium diet. Monitor BP.  4) Anemia of renal disease: Hb borderline. c/w Epogen 6000 units IV tiw. Monitor Hb.  5) 2HPT: Serum calcium acceptable with acceptable serum phos.  PTHi 441. c/w calcitriol 1mcg PO MWF. Pt on Lanthanum 2000mg PO tid with meals which is unavailable at [facility]. c/w Sevelamer 1600mg PO tid with meals  Recc Renal diet./low phos diet Monitor serum calcium and phosphorus.    HepBsAg was neg on outpt labs on 12/11/24; repeat HepBsAg neg; likely positive HepBsAg on 12/16 is an error.       [facility] NEPHROLOGY  Arden Tsang M.D.  Freeman Holland D.O.  Janny Chirinos M.D.  MD Ambar Mccarty, MSN, ANP-C    Telephone: [phone]  Facsimile: [phone] [place] [address], [address]  [place] [address]

## 2025-01-02 NOTE — PROGRESS NOTE ADULT - SUBJECTIVE AND OBJECTIVE BOX
Subjective:  Resting comfortably    Objective:    Vital signs  T(C): , Max: 37.3 (01-02-25 @ 00:00)  HR: 73 (01-02-25 @ 08:45)  BP: 96/64 (01-02-25 @ 08:45)  SpO2: 98% (01-02-25 @ 08:45)  Wt(kg): --    Output     01-01 @ 07:01  -  01-02 @ 07:00  --------------------------------------------------------  IN: 907.1 mL / OUT: 1600 mL / NET: -692.9 mL    01-02 @ 07:01  -  01-02 @ 09:19  --------------------------------------------------------  IN: 6.4 mL / OUT: 0 mL / NET: 6.4 mL        Gen: NAD  Abd: soft, nontender, nondistended  : minimal purulence expressed from right scrotum, dressing changed    Labs                        9.9    12.47 )-----------( 176      ( 02 Jan 2025 04:07 )             30.2     02 Jan 2025 04:07    139    |  102    |  33     ----------------------------<  157    3.8     |  24     |  6.27     Ca    9.4        02 Jan 2025 04:07  Phos  4.3       02 Jan 2025 04:07  Mg     2.4       02 Jan 2025 04:07        Urine Cx: ?  Blood Cx: ?      Imaging

## 2025-01-02 NOTE — PROGRESS NOTE ADULT - SUBJECTIVE AND OBJECTIVE BOX
Podiatry Interval: No acute overnight events noted. Patient denies any other pedal complaints and no constitutional symptoms such as F/C/N/V/SOB. AAOx3, NAD.    Podiatry HPI: Podiatry consulted for 68-year-old male who presents to the ED with his wife with chief complaint of generalized weakness. PMHx of DM, HTN, ESRD on dialysis, Charcot, and HLD. Patient states he woke up very tired this morning and could not stand up to get dressed for his dialysis appointment. Per wife, patient was folded over when she called an ambulance. Patient reports history of Charcot deformity B/L deformity for 10 year and states he had a Charcot procedure for right foot in 2014. Patient states he wears Charcot boots at all times B/L. Per wife, the patient has not had an ulceration in many years until he developed this right foot ulceration 1 month ago. The wife states that she thinks the shape of her 's right foot is changing and it is affecting the way he walks. States they regularly follow up in outpatient podiatry clinic with Dr. Sim. Patient complaining of fever and chills but denies n/v.       Medications acetaminophen     Tablet .. 650 milliGRAM(s) Oral every 6 hours PRN  amLODIPine   Tablet 5 milliGRAM(s) Oral daily  amoxicillin  500 milliGRAM(s)/clavulanate 1 Tablet(s) Oral every 24 hours  calcitriol   Capsule 1 MICROGram(s) Oral <User Schedule>  chlorhexidine 2% Cloths 1 Application(s) Topical daily  epoetin rudolph-epbx (RETACRIT) Injectable 6000 Unit(s) IV Push <User Schedule>  heparin   Injectable 5000 Unit(s) SubCutaneous every 8 hours  insulin lispro (ADMELOG) corrective regimen sliding scale   SubCutaneous three times a day before meals  insulin lispro (ADMELOG) corrective regimen sliding scale   SubCutaneous at bedtime  latanoprost 0.005% Ophthalmic Solution 1 Drop(s) Both EYES at bedtime  ondansetron Injectable 4 milliGRAM(s) IV Push every 8 hours PRN  sevelamer carbonate 1600 milliGRAM(s) Oral three times a day with meals  tamsulosin 0.4 milliGRAM(s) Oral at bedtime    FHFamily history of diabetes mellitus    No pertinent family history in first degree relatives    ,   PMHDiabetes    Kidney problem    Hypertension    HTN (hypertension)    DM (diabetes mellitus)    HTN (hypertension)    DM (diabetes mellitus)    ESRD (end stage renal disease) on dialysis    Charcot foot due to diabetes mellitus    ESRD on hemodialysis    Charcot foot due to diabetes mellitus    Diabetic retinopathy associated with type 2 diabetes mellitus    Hyperlipidemia    Abscess of scrotum    Hidradenitis    End stage renal disease       PSHNo significant past surgical history    S/P foot surgery    Charcot's syndrome    History of incision and drainage    Hemodialysis access, AV graft        Labs                          10.4   8.69  )-----------( 219      ( 27 Dec 2024 09:20 )             31.2      12-27    136  |  99  |  39[H]  ----------------------------<  195[H]  4.4   |  29  |  8.82[H]    Ca    9.4      27 Dec 2024 09:20       Vital Signs Last 24 Hrs  T(C): 37.7 (28 Dec 2024 14:04), Max: 37.7 (28 Dec 2024 14:04)  T(F): 99.8 (28 Dec 2024 14:04), Max: 99.8 (28 Dec 2024 14:04)  HR: 93 (28 Dec 2024 14:04) (66 - 93)  BP: 154/75 (28 Dec 2024 14:04) (98/56 - 154/75)  BP(mean): 78 (27 Dec 2024 19:30) (78 - 78)  RR: 17 (28 Dec 2024 14:04) (17 - 18)  SpO2: 95% (28 Dec 2024 14:04) (93% - 95%)    Parameters below as of 28 Dec 2024 14:04  Patient On (Oxygen Delivery Method): room air      Sedimentation Rate, Erythrocyte: 105 mm/Hr (12-17-24 @ 12:35)  Sedimentation Rate, Erythrocyte: 97 mm/Hr (12-16-24 @ 11:59)         C-Reactive Protein: 160.0 mg/L (12-17-24 @ 07:28)  C-Reactive Protein: 139.0 mg/L (12-16-24 @ 11:50)       LE FOCUSED PHYSICAL EXAM:  Vasc: DP/PT non-palpable bilaterally. CFT <3 seconds x 10. Temp Gradient within normal limits bilaterally with no increase in temperature to the right foot ulcer. No edema, no erythema, no varicosities observed bilaterally.   Derm: Diffuse xerosis observed to bilateral LEs. Right medial foot diabetic pressure ulcer noted overlying the talonavicular joint, measuring 1.9 x 1.8 x 0.5cm with a fibrotic wound base and hyperkeratotic klaudia-wound. + epibole, + tunneling by 0.5mm circumferentially. Wound does not probe to bone. No more malodor. No other open lesions, no other ulcerations, no purulence, no drainage, no soft tissue crepitus, no fluctuance, no IDM, no streaking.   Neuro: Patient is awake, alert and oriented x3. Protective sensation is diminished bilaterally.   MSK: Muscle strength deferred. No pain upon palpation bilaterally. No pain upon ROM of pedal and ankle joints bilaterally. No crepitation upon ROM of joints. Arch height 2/5 on-WB. Negative calf tenderness bilaterally.       IMAGING:  < from: MR Ankle No Cont, Right (12.18.24 @ 20:26) >  IMPRESSION: MRI of the right ankle demonstrates ulceration at the plantar   surface medially with suspected exposed bone with underlying   osteomyelitis involving the talus and less so ofthe navicular bone. No   drainable fluid collection.    < end of copied text >      ACC: [id number] EXAM:  US PHYSIOL LWR EXT 3+ LEV BI   ORDERED BY: REYES GILBERT   PROCEDURE DATE:  12/16/2024    INTERPRETATION:  Clinical indication: Right foot nonhealing wound  COMPARISON: 5/4/2016  FINDINGS AND IMPRESSION: There are normal amplitude biphasic pulse volume recordings   bilaterally. There are bilateral calcified, noncompressible vessels.   Therefore, ABIs could not be calculated.  --- End of Report ---      CULTURES:   Specimen Source: .Surgical Swab  Culture Results:   Moderate Providencia stuartii   Moderate Corynebacterium striatum group "Susceptibilities not performed"   Few Trueperella bernardiae "Susceptibilities not performed"  Organism Identification: Providencia stuartii  Organism: Providencia stuartii Podiatry Interval: No acute overnight events noted. Patient denies any other pedal complaints and no constitutional symptoms such as F/C/N/V/SOB. AAOx3, NAD. Patient upgraded to ICU on 12/31.    Podiatry HPI: Podiatry consulted for 68-year-old male who presents to the ED with his wife with chief complaint of generalized weakness. PMHx of DM, HTN, ESRD on dialysis, Charcot, and HLD. Patient states he woke up very tired this morning and could not stand up to get dressed for his dialysis appointment. Per wife, patient was folded over when she called an ambulance. Patient reports history of Charcot deformity B/L deformity for 10 year and states he had a Charcot procedure for right foot in 2014. Patient states he wears Charcot boots at all times B/L. Per wife, the patient has not had an ulceration in many years until he developed this right foot ulceration 1 month ago. The wife states that she thinks the shape of her 's right foot is changing and it is affecting the way he walks. States they regularly follow up in outpatient podiatry clinic with Dr. Sim. Patient complaining of fever and chills but denies n/v.       Medications acetaminophen     Tablet .. 650 milliGRAM(s) Oral every 6 hours PRN  calcitriol   Capsule 1 MICROGram(s) Oral <User Schedule>  chlorhexidine 2% Cloths 1 Application(s) Topical <User Schedule>  epoetin rudolph-epbx (RETACRIT) Injectable 6000 Unit(s) IV Push <User Schedule>  heparin   Injectable 5000 Unit(s) SubCutaneous every 12 hours  insulin lispro (ADMELOG) corrective regimen sliding scale   SubCutaneous three times a day before meals  insulin lispro (ADMELOG) corrective regimen sliding scale   SubCutaneous at bedtime  latanoprost 0.005% Ophthalmic Solution 1 Drop(s) Both EYES at bedtime  meropenem  IVPB 1000 milliGRAM(s) IV Intermittent every 24 hours  midodrine 5 milliGRAM(s) Oral every 8 hours  ondansetron Injectable 4 milliGRAM(s) IV Push every 8 hours PRN  sevelamer carbonate 1600 milliGRAM(s) Oral three times a day with meals  tamsulosin 0.4 milliGRAM(s) Oral at bedtime    FHFamily history of diabetes mellitus    No pertinent family history in first degree relatives    ,   PMHDiabetes    Kidney problem    Hypertension    HTN (hypertension)    DM (diabetes mellitus)    HTN (hypertension)    DM (diabetes mellitus)    ESRD (end stage renal disease) on dialysis    Charcot foot due to diabetes mellitus    ESRD on hemodialysis    Charcot foot due to diabetes mellitus    Diabetic retinopathy associated with type 2 diabetes mellitus    Hyperlipidemia    Abscess of scrotum    Hidradenitis    End stage renal disease       PSHNo significant past surgical history    S/P foot surgery    Charcot's syndrome    History of incision and drainage    Hemodialysis access, AV graft        Labs                          10.1   6.83  )-----------( 168      ( 03 Jan 2025 03:35 )             31.8      01-03    137  |  101  |  46[H]  ----------------------------<  96  3.6   |  26  |  7.52[H]    Ca    9.0      03 Jan 2025 03:35  Phos  4.1     01-03  Mg     2.5     01-03    TPro  7.6  /  Alb  2.3[L]  /  TBili  0.5  /  DBili  x   /  AST  12  /  ALT  8[L]  /  AlkPhos  72  01-03     Vital Signs Last 24 Hrs  T(C): 36.2 (03 Jan 2025 13:40), Max: 37.6 (03 Jan 2025 02:00)  T(F): 97.1 (03 Jan 2025 13:40), Max: 99.7 (03 Jan 2025 02:00)  HR: 64 (03 Jan 2025 13:40) (58 - 70)  BP: 120/58 (03 Jan 2025 13:40) (80/51 - 125/66)  BP(mean): 72 (03 Jan 2025 10:00) (60 - 83)  RR: 15 (03 Jan 2025 13:40) (12 - 27)  SpO2: 100% (03 Jan 2025 13:40) (87% - 100%)    Parameters below as of 03 Jan 2025 13:40  Patient On (Oxygen Delivery Method): nasal cannula  O2 Flow (L/min): 2    Sedimentation Rate, Erythrocyte: 105 mm/Hr (12-17-24 @ 12:35)  Sedimentation Rate, Erythrocyte: 97 mm/Hr (12-16-24 @ 11:59)         C-Reactive Protein: 160.0 mg/L (12-17-24 @ 07:28)  C-Reactive Protein: 139.0 mg/L (12-16-24 @ 11:50)   WBC Count: 6.83 K/uL (01-03-25 @ 03:35)       LE FOCUSED PHYSICAL EXAM:  Vasc: DP/PT non-palpable bilaterally. CFT <3 seconds x 10. Temp Gradient within normal limits bilaterally with no increase in temperature to the right foot ulcer. No edema, no erythema, no varicosities observed bilaterally.   Derm: Diffuse xerosis observed to bilateral LEs. Right medial foot diabetic pressure ulcer noted overlying the talonavicular joint, measuring 1.9 x 1.8 x 0.5cm with a fibrotic wound base and hyperkeratotic klaudia-wound. + epibole, + tunneling by 0.5mm circumferentially. Wound does not probe to bone. No more malodor. No other open lesions, no other ulcerations, no purulence, no drainage, no soft tissue crepitus, no fluctuance, no IDM, no streaking.   Neuro: Patient is awake, alert and oriented x3. Protective sensation is diminished bilaterally.   MSK: Muscle strength deferred. No pain upon palpation bilaterally. No pain upon ROM of pedal and ankle joints bilaterally. No crepitation upon ROM of joints. Arch height 2/5 on-WB. Negative calf tenderness bilaterally.       IMAGING:  < from: MR Ankle No Cont, Right (12.18.24 @ 20:26) >  IMPRESSION: MRI of the right ankle demonstrates ulceration at the plantar   surface medially with suspected exposed bone with underlying   osteomyelitis involving the talus and less so ofthe navicular bone. No   drainable fluid collection.    < end of copied text >      ACC: [id number] EXAM:  US PHYSIOL LWR EXT 3+ LEV BI   ORDERED BY: REYES GILBERT   PROCEDURE DATE:  12/16/2024    INTERPRETATION:  Clinical indication: Right foot nonhealing wound  COMPARISON: 5/4/2016  FINDINGS AND IMPRESSION: There are normal amplitude biphasic pulse volume recordings   bilaterally. There are bilateral calcified, noncompressible vessels.   Therefore, ABIs could not be calculated.  --- End of Report ---      CULTURES:   Specimen Source: .Surgical Swab  Culture Results:   Moderate Providencia stuartii   Moderate Corynebacterium striatum group "Susceptibilities not performed"   Few Trueperella bernardiae "Susceptibilities not performed"  Organism Identification: Providencia stuartii  Organism: Providencia stuartii

## 2025-01-02 NOTE — PROGRESS NOTE ADULT - ASSESSMENT
A:   Charcot arthropathy B/L  Diabetic ulceration R midfoot with chronic OM    P:  -Patient evaluated, chart reviewed and updated  -Xrays reviewed - Chronic charcot changes of the rearfoot with TN bulging. No gas  -MRI of R ankle - chronic OM of talus and partially navicular  -ANEESH/PVR - non compressible vessels B/L, normal PVR waveforms B/L   -No surgical treatement will be done at this time. Patient is stable from podiatry  -ID recc bone biopsy for abx plan - Dr. Brito suggests outpatient bone biopsy when pt is off abx for 3 weeks which was discussed with patient and he is agreeable with the plan going forward.  -Continue antibiotics per ID  -Betadine, DSD applied  -Advised patient to continue use of CROW walkers B/L when walking to prevent worsening ulcerations  -Podiatry to follow while in house  -Discussed with attending Dr. Sim A:   Charcot arthropathy B/L  Diabetic ulceration R midfoot with chronic OM    P:  -Patient evaluated, chart reviewed and updated  -Xrays reviewed - Chronic charcot changes of the rearfoot with TN bulging. No gas  -MRI of R ankle - chronic OM of talus and partially navicular  -ANEESH/PVR - non compressible vessels B/L, normal PVR waveforms B/L   -No surgical treatement will be done at this time. Patient is stable from podiatry  -ID recc bone biopsy for abx plan - Dr. Brito suggests outpatient bone biopsy when pt is off abx for 3 weeks which was discussed with patient and he is agreeable with the plan going forward.  -Continue antibiotics per ID  -Betadine, DSD applied  -Advised patient to continue use of CROW walkers B/L when walking to prevent worsening ulcerations  -Podiatry to follow while in house  -Upon discharge, patient is to follow up with Dr. Sim within one week at [facility].  -Discussed with attending Dr. Sim A:   Charcot arthropathy B/L  Diabetic ulceration R midfoot with chronic OM    P:  -Patient evaluated, chart reviewed and updated  -Xrays reviewed - Chronic charcot changes of the rearfoot with TN bulging. No gas  -MRI of R ankle - chronic OM of talus and partially navicular  -ANEESH/PVR - non compressible vessels B/L, normal PVR waveforms B/L   -No surgical treatement will be done at this time. Patient is stable from podiatry  -ID recc bone biopsy for abx plan - Dr. Brito suggests outpatient bone biopsy when pt is off abx for 3 weeks which was discussed with patient and he is agreeable with the plan going forward.  -Continue antibiotics per ID  -Betadine, DSD applied  -Advised patient to continue use of CROW walkers B/L when walking to prevent worsening ulcerations  -Podiatry to follow while in house  -Upon discharge, patient is to follow up with Dr. Sim within one week at [facility].  -Discussed with attending Dr. Sim    WCO: Betadine, 4x4 gauze, abd pad, laura, and light ACE wrapping to be applied to right foot and changed twice a week.

## 2025-01-02 NOTE — CHART NOTE - NSCHARTNOTEFT_GEN_A_CORE
Comprehensive update provided to HCP Almaz Dacosta (spouse) via phone. Explained that patient was weaned off Levo and started on Midodrine. Support provided. All questions answered.

## 2025-01-02 NOTE — PROGRESS NOTE ADULT - ASSESSMENT
67 yo M with PMH of  DM, HTN, ESRD on dialysis ( M, W, F), Last Dialysis 12/30/24,  B/L Charcot deformity and HLD, present to the ED for generalized weakness, chills, subjective fever. Pt admitted on 12/16 to medicine floor for treatment of sepsis 2/2 scrotal abscess, diabetic foot ulcer, gluteal abscess, influenza A positive. s/p multiple RRTs for somnolence, hypotension, fever. Admitted to the ICU for peripheral pressor req. and closer monitoring.     #Septic shock 2/2 multiple abscesses, diabetic foot ulcer  #acute encephalopathy   #ESRD on dialysis   #Charcot arthropathy B/L  #Diabetic ulceration R midfoot with chronic OM  #DM  #HTN  #HLD  #BPH    _________CNS___________  #Acute encephalopathy   - Encephalopathy likely 2/2 acute metabolic encephalopathy in the setting of septic shock  - ABG 7.44/CO2 37/HCO3 25/pO2 69  - c/w broad spectrum iv abx - vanc and radha  - Improving    _________CVS___________  #septic shock 2/2 scrotal and gluteal abscesses, Diabetic ulceration R midfoot with chronic OM  #pressors  - peripheral levo, currently 0.05  - central and a-line consent obtained if needed   - Given 250 cc boluses x2 throughout the night   - continue to monitor volume status , give fluids judiciously given ESRD    #HTN  hold BP meds in setting of shock    #HLD   c/w home meds     _________ RESP__________  #AHRF in the setting of influenza A   - CXR 12/31 official read still pending  - c/w NC as needed     __________GI____________  no acute issues     ________ RENAL__________  #ESRD on dialysis (MWF)  - last session 12/30, scheduled for today  - recent electrolytes within normal limits   - C/W epo, Renvela, calcitriol   - Given 250 cc boluses x2 throughout the night   - continue to monitor volume status   - Nephrology Dr. Chirinos following    #BPH  - c/w home meds     _________MSK___________  no active issues     __________ID____________  #septic shock 2/2 multiple abscesses, diabetic foot ulcer  #diabetic foot ulcer  #influenza A     #Scrotal abscess   - Scrotal Wound culture positive for  Proteus Mirabilis  - CT Pelvis: 2.1 cm right scrotal fluid collection suspicious for abscess. Several stones and both ureterovesical junctions without ureteral dilatation.  - Broadened to Radha and Vanc  - ID Dr. Chavez following  - Will obtain repeat CT AP as per Urology today    #Diabetic foot ulcers   - According to podiatry note 12/28- No surgical treatement will be done at this time. Patient is stable from podiatry. Plan for outpatient bone biopsy when pt is off abx for 3 weeks which was discussed with patient and he is agreeable with the plan going forward.  - Dressing changed, however no note from Podiatry    #influenza A  - req 2 L NC   - c/w tamiflu     _________ENDO__________  #DM   - c/w insulin sliding scale     _______HEME/ONC_______  # Anemia of renal disease  - c/w Epogen 6000 units IV tiw.   - Monitor Hb    _________SKIN____________  #diabetic foot ulcer   plan as above     ________Prophylaxis_______  #DVT - heparin      __________GOC/ DISPO___________  FULL CODE   central line and arterial line consent in the chart    67 yo M with PMH of  DM, HTN, ESRD on dialysis ( M, W, F), Last Dialysis 12/30/24,  B/L Charcot deformity and HLD, present to the ED for generalized weakness, chills, subjective fever. Pt admitted on 12/16 to medicine floor for treatment of sepsis 2/2 scrotal abscess, diabetic foot ulcer, gluteal abscess, influenza A positive. s/p multiple RRTs for somnolence, hypotension, fever. Admitted to the ICU for peripheral pressor req. and closer monitoring.     #Septic shock 2/2 multiple abscesses, diabetic foot ulcer  #acute encephalopathy   #ESRD on dialysis   #Charcot arthropathy B/L  #Diabetic ulceration R midfoot with chronic OM  #DM  #HTN  #HLD  #BPH    _________CNS___________  #Acute encephalopathy   - Encephalopathy likely 2/2 acute metabolic encephalopathy in the setting of septic shock  - ABG 7.44/CO2 37/HCO3 25/pO2 69  - c/w radha  - resolved    _________CVS___________  #septic shock 2/2 scrotal and gluteal abscesses, Diabetic ulceration R midfoot with chronic OM  #pressors  - peripheral levo, currently 0.05  - central and a-line consent obtained if needed   - Given 250 cc boluses x2 throughout the night   - continue to monitor volume status , give fluids judiciously given ESRD    #HTN  hold BP meds in setting of shock    #HLD   c/w home meds     _________ RESP__________  #AHRF in the setting of influenza A   - CXR 12/31: Patchy medial right basilar opacities that could be due to atelectasis or infection.  - c/w NC as needed     __________GI____________  no acute issues     ________ RENAL__________  #ESRD on dialysis (MWF)  - last session 12/30, scheduled for today  - recent electrolytes within normal limits   - C/W epo, Renvela, calcitriol   - Given 250 cc boluses x2 throughout the night   - continue to monitor volume status   - Nephrology Dr. Chirinos following    #BPH  - c/w home meds     _________MSK___________  no active issues     __________ID____________  #septic shock 2/2 multiple abscesses, diabetic foot ulcer  #diabetic foot ulcer  #influenza A     #Scrotal abscess   - Scrotal Wound culture positive for  Proteus Mirabilis  - CT Pelvis: 2.1 cm right scrotal fluid collection suspicious for abscess. Several stones and both ureterovesical junctions without ureteral dilatation.  - ID Dr. Chavez following  - D/c Vanc, c/w Radha  - CT with 1.5 cm ring enhancing lesion in scrotum     #Diabetic foot ulcers   - According to podiatry note 12/28- No surgical treatement will be done at this time. Patient is stable from podiatry. Plan for outpatient bone biopsy when pt is off abx for 3 weeks which was discussed with patient and he is agreeable with the plan going forward.  - F/U Podiatry recs    #influenza A  - req 2 L NC   - c/w tamiflu     _________ENDO__________  #DM   - c/w insulin sliding scale     _______HEME/ONC_______  # Anemia of renal disease  - c/w Epogen 6000 units IV tiw.   - Monitor Hb    _________SKIN____________  #diabetic foot ulcer   plan as above     ________Prophylaxis_______  #DVT - heparin      __________GOC/ DISPO___________  FULL CODE   central line and arterial line consent in the chart    67 yo M with PMH of  DM, HTN, ESRD on dialysis ( M, W, F), Last Dialysis 12/30/24,  B/L Charcot deformity and HLD, present to the ED for generalized weakness, chills, subjective fever. Pt admitted on 12/16 to medicine floor for treatment of sepsis 2/2 scrotal abscess, diabetic foot ulcer, gluteal abscess, influenza A positive. s/p multiple RRTs for somnolence, hypotension, fever. Admitted to the ICU for peripheral pressor req. and closer monitoring.     #Septic shock 2/2 multiple abscesses, diabetic foot ulcer  #acute encephalopathy   #ESRD on dialysis   #Charcot arthropathy B/L  #Diabetic ulceration R midfoot with chronic OM  #DM  #HTN  #HLD  #BPH    _________CNS___________  #Acute encephalopathy   - Encephalopathy likely 2/2 acute metabolic encephalopathy in the setting of septic shock  - ABG 7.44/CO2 37/HCO3 25/pO2 69  - c/w radha  - resolved    _________CVS___________  #septic shock 2/2 scrotal and gluteal abscesses, Diabetic ulceration R midfoot with chronic OM  #pressors  - Stopped pressors, started Midodrine  - central and a-line consent obtained if needed   - Given 250 cc boluses x2 throughout the night   - continue to monitor volume status , give fluids judiciously given ESRD    #HTN  hold BP meds in setting of shock    #HLD   c/w home meds     _________ RESP__________  #AHRF in the setting of influenza A   - CXR 12/31: Patchy medial right basilar opacities that could be due to atelectasis or infection.  - c/w NC as needed     __________GI____________  no acute issues     ________ RENAL__________  #ESRD on dialysis (MWF)  - last session 12/30, scheduled for today  - recent electrolytes within normal limits   - C/W epo, Renvela, calcitriol   - Given 250 cc boluses x2 throughout the night   - continue to monitor volume status   - Nephrology Dr. Chirinos following    #BPH  - c/w home meds     _________MSK___________  no active issues     __________ID____________  #septic shock 2/2 multiple abscesses, diabetic foot ulcer  #diabetic foot ulcer  #influenza A     #Scrotal abscess   - Scrotal Wound culture positive for  Proteus Mirabilis  - CT Pelvis: 2.1 cm right scrotal fluid collection suspicious for abscess. Several stones and both ureterovesical junctions without ureteral dilatation.  - ID Dr. Chavez following  - D/c Vanc, c/w Radha  - CT with 1.5 cm ring enhancing lesion in scrotum     #Diabetic foot ulcers   - According to podiatry note 12/28- No surgical treatement will be done at this time. Patient is stable from podiatry. Plan for outpatient bone biopsy when pt is off abx for 3 weeks which was discussed with patient and he is agreeable with the plan going forward.  - F/U Podiatry recs    #influenza A  - req 2 L NC   - c/w tamiflu     _________ENDO__________  #DM   - c/w insulin sliding scale     _______HEME/ONC_______  # Anemia of renal disease  - c/w Epogen 6000 units IV tiw.   - Monitor Hb    _________SKIN____________  #diabetic foot ulcer   plan as above     ________Prophylaxis_______  #DVT - heparin      __________GOC/ DISPO___________  FULL CODE   central line and arterial line consent in the chart

## 2025-01-02 NOTE — PROGRESS NOTE ADULT - ASSESSMENT
Subjective: Fever improving, off pressors, no cough, no sob, no N/V or diarrhea, poor appetite and reports generalized weakness, no new complains.    REVIEW OF SYSTEMS:  CONSTITUTIONAL:  No fevers or chills  EYES/ENT:  No visual changes; no throat pain   NECK:  No neck pain or stiffness  RESPIRATORY:  No cough, no wheezing. No shortness of breath  CARDIOVASCULAR:  No chest pain or palpitations  GASTROINTESTINAL:  No abdominal pain. No N/V or diarrhea  GENITOURINARY:  No dysuria, frequency or hematuria  NEUROLOGICAL:  No numbness or weakness  MSK: no back pain, no joint pain  SKIN:  No itching, no skin rash    PE:  Vital Signs Last 24 Hrs  T(C): 36.4 (02 Jan 2025 11:00), Max: 37.3 (02 Jan 2025 00:00)  T(F): 97.6 (02 Jan 2025 11:00), Max: 99.2 (02 Jan 2025 00:00)  HR: 65 (02 Jan 2025 14:00) (64 - 84)  BP: 106/57 (02 Jan 2025 14:00) (79/51 - 129/53)  BP(mean): 71 (02 Jan 2025 14:00) (55 - 79)  RR: 15 (02 Jan 2025 14:00) (0 - 33)  SpO2: 98% (02 Jan 2025 14:00) (89% - 100%)    Parameters below as of 01 Jan 2025 20:00  Patient On (Oxygen Delivery Method): nasal cannula  O2 Flow (L/min): 2    Gen: AOx3   HEAD:  Atraumatic  EYES: PERRLA, conjunctiva and sclera clear  ENT: Moist mucous membranes  NECK: Supple, No JVD  CV: S1+S2+ RRR  Resp: CTA  Abd: Soft, nontender, +BS  Ext: No LE edema, no cyanosis, pulses +  Charcot foot BL, dry hyperkeratotic skin LE BL  : anuric (ESRD)  IV/Skin: No thrombophlebitis  Msk: No low back pain, no arthralgias, no joint swelling  Neuro: AAOx3. No focal signs     LABS/DIAGNOSTIC TESTS:                        9.9    12.47 )-----------( 176      ( 02 Jan 2025 04:07 )             30.2     WBC Count: 12.47 K/uL (01-02 @ 04:07)  WBC Count: 17.47 K/uL (01-01 @ 03:34)  WBC Count: 20.13 K/uL (12-31 @ 06:43)    01-02    139  |  102  |  33[H]  ----------------------------<  157[H]  3.8   |  24  |  6.27[H]    Ca    9.4      02 Jan 2025 04:07  Phos  4.3     01-02  Mg     2.4     01-02    TPro  7.6  /  Alb  2.3[L]  /  TBili  0.5  /  DBili  x   /  AST  14  /  ALT  8[L]  /  AlkPhos  75  01-02    CULTURES:   Culture - Blood (collected 12-31-24 @ 06:43)  Source: .Blood BLOOD  Preliminary Report (01-02-25 @ 12:01):    No growth at 48 Hours    Culture - Blood (collected 12-31-24 @ 06:43)  Source: .Blood BLOOD  Preliminary Report (01-02-25 @ 12:01):    No growth at 48 Hours    Culture - Blood (collected 12-29-24 @ 06:20)  Source: .Blood BLOOD  Preliminary Report (01-02-25 @ 11:00):    No growth at 4 days    Culture - Blood (collected 12-29-24 @ 06:10)  Source: .Blood BLOOD  Preliminary Report (01-02-25 @ 11:00):    No growth at 4 days    Culture - Genital (collected 12-22-24 @ 08:20)  Source: .Genital  Final Report (12-25-24 @ 06:04):    Rare Proteus mirabilis    Commensal vladimir consistent with body site  Organism: Proteus mirabilis (12-25-24 @ 06:04)  Organism: Proteus mirabilis (12-25-24 @ 06:04)      Method Type: GWEN      -  Amoxicillin/Clavulanic Acid: S <=8/4      -  Ampicillin: S <=8 These ampicillin results predict results for amoxicillin      -  Ampicillin/Sulbactam: S <=4/2      -  Aztreonam: S <=4      -  Cefazolin: S <=2      -  Cefepime: S <=2      -  Cefoxitin: S <=8      -  Ceftriaxone: S <=1      -  Ciprofloxacin: S <=0.25      -  Ertapenem: S <=0.5      -  Gentamicin: S <=2      -  Levofloxacin: S <=0.5      -  Meropenem: S <=1      -  Piperacillin/Tazobactam: S <=8      -  Tobramycin: S <=2      -  Trimethoprim/Sulfamethoxazole: S <=0.5/9.5    Culture - Wound Aerobic/Anaerobic (collected 12-19-24 @ 18:06)  Source: Drainage  Final Report (12-24-24 @ 18:25):    Few Trueperella bernardiae "Susceptibilities not performed"    Culture - Wound Aerobic/Anaerobic (collected 12-19-24 @ 16:15)  Source: Skin/Wound  Final Report (12-24-24 @ 19:27):    Rare Trueperella bernardiae "Susceptibilities not performed"    Culture - Blood (collected 12-19-24 @ 06:45)  Source: .Blood BLOOD  Final Report (12-24-24 @ 12:00):    No growth at 5 days    Culture - Blood (collected 12-17-24 @ 12:35)  Source: .Blood BLOOD  Final Report (12-22-24 @ 19:00):    No growth at 5 days    Culture - Blood (collected 12-16-24 @ 22:30)  Source: .Blood BLOOD  Gram Stain (12-17-24 @ 16:53):    Growth in aerobic bottle: Gram Positive Cocci in Clusters  Final Report (12-18-24 @ 13:20):    Growth in aerobic bottle: Staphylococcus hominis    Isolation of Coagulase negative Staphylococcus from single blood culture    sets may represent    contamination. Contact the Microbiology Department at [phone] if    susceptibility testing is needed.    clinically indicated.    Direct identification is available within approximately 3-5    hours either by Blood Panel Multiplexed PCR or Direct    MALDI-TOF. Details: [email or web address]  Organism: Blood Culture PCR (12-18-24 @ 13:20)  Organism: Blood Culture PCR (12-18-24 @ 13:20)      Method Type: PCR      -  Coagulase negative Staphylococcus: Detec    Culture - Wound Aerobic/Anaerobic (collected 12-16-24 @ 12:36)  Source: .Surgical Swab  Final Report (12-21-24 @ 17:55):    Moderate Providencia stuartii    Moderate Corynebacterium striatum group "Susceptibilities not performed"    Few Trueperella robertoardiae "Susceptibilities not performed"  Organism: Providencia stuartii (12-21-24 @ 17:55)  Organism: Providencia stuartii (12-21-24 @ 17:55)      Method Type: GWEN      -  Amoxicillin/Clavulanic Acid: R >16/8      -  Ampicillin: R >16 These ampicillin results predict results for amoxicillin      -  Ampicillin/Sulbactam: I 16/8      -  Aztreonam: S <=4      -  Cefazolin: R >16      -  Cefepime: S <=2      -  Cefoxitin: S <=8      -  Ceftriaxone: S <=1      -  Ciprofloxacin: R 2      -  Ertapenem: S <=0.5      -  Levofloxacin: R 4      -  Meropenem: S <=1      -  Piperacillin/Tazobactam: S <=8      -  Trimethoprim/Sulfamethoxazole: S <=0.5/9.5    RADIOLOGY:   Available pertinent Imaging reviewed  < from: CT Abdomen and Pelvis w/ IV Cont (01.01.25 @ 13:05) >  IMPRESSION:  No scrotal wall thickening/edema with 1.5 cm rim-enhancing collection noted within the right scrotal wall, could be an abscess. Infiltration of the bilateral ischio rectal fossa fat without underlying bony erosion. Bilateral lower lung field infiltrates, right greater than left.    ANTIBIOTICS:  meropenem  IVPB 1000 every 24 hours    IMPRESSION:  67 yo M with PMH of  DM, HTN, ESRD on dialysis ( M, W, F) via RUE graft (last Dialysis 12/13/24), HLD, B/L Charcot deformity for which follows w/ podiatry Dr. Sim in the outpatient podiatry clinic for Charcot foot admitted for sepsis 2/2 L buttock abscess, scrotal abscess and DFU/ +/- chronic OM. L buttock abscess drained at the bedside 12/19--  Trueperella nicol  Also noted purulent drainage from the base of the scrotum. Cx 12/22 from scrotal drainage growing Proteus mirabilis. CT abd/pelvis 12/23--2.1 cm right scrotal fluid collection suspicious for abscess. Appreciate Urology input-- no surgery recommended at the time. Was on Ampi/clavulanate since 12/24 to Rx the organism from the scrotal drainage planned for approx 4wks to Rx the scrotal abscess last time he was seen by ID. RRT 12/30 for Lethargic after HD and 104 F fever, currently admitted to MICU and septic shock antibiotics broadened to meropenem/vanco.  New septic mtz NGTD.     CT A/P 12/23 w/ Several small stones at both ureterovesical junctions measuring up to 2 mm on the right and 3 mm on the left. No ureteral dilatation. Prostate is normal in size. Diffuse scrotal edema and rim-enhancing extratesticular right scrotal fluid collection measuring 2.1 x 1.3 x 0.8 cm.  CT A/P 1/1 w/ No scrotal wall thickening/edema with 1.5 cm rim-enhancing collection noted within the right scrotal wall. BL lung infiltrates     sx swab 12/16 (R foot) +Providencia stuartii, corynebacterium striatum  Wound cx 12/19 x 2 (L buttock) +Trueperella bernardiae  Wound cx 12/22 + Proteus mirabilis   Blood cx NGTD    -Septic shock  -Scrotal abscess (1.5 cm rim-enhancing collection noted within the right scrotal wall)  -L buttock abscess- s/p I&D 12/19 healing well  -Ureterovesical junctions stones  -ESRD on HD  -DM  -Influenza A URI  -Charcot arthropathy BL  -DFU (chronic) R foot, R ankle - chronic OM   -BL Lung infiltrates- imaging      PLAN:  d/c vancomycin  Continue Meropenem 1g q24 hrs (after HD)  F/up cultures  HD per renal   F/up urology  DM control  Discussed with Dr. Downey    Please reach ID with any questions or concerns  Dr. Spring Cervantes  Available in Teams

## 2025-01-03 LAB
ALBUMIN SERPL ELPH-MCNC: 2.3 G/DL — LOW (ref 3.5–5)
ALP SERPL-CCNC: 72 U/L — SIGNIFICANT CHANGE UP (ref 40–120)
ALT FLD-CCNC: 8 U/L DA — LOW (ref 10–60)
ANION GAP SERPL CALC-SCNC: 10 MMOL/L — SIGNIFICANT CHANGE UP (ref 5–17)
AST SERPL-CCNC: 12 U/L — SIGNIFICANT CHANGE UP (ref 10–40)
BILIRUB SERPL-MCNC: 0.5 MG/DL — SIGNIFICANT CHANGE UP (ref 0.2–1.2)
BUN SERPL-MCNC: 46 MG/DL — HIGH (ref 7–18)
CALCIUM SERPL-MCNC: 9 MG/DL — SIGNIFICANT CHANGE UP (ref 8.4–10.5)
CHLORIDE SERPL-SCNC: 101 MMOL/L — SIGNIFICANT CHANGE UP (ref 96–108)
CO2 SERPL-SCNC: 26 MMOL/L — SIGNIFICANT CHANGE UP (ref 22–31)
CREAT SERPL-MCNC: 7.52 MG/DL — HIGH (ref 0.5–1.3)
CULTURE RESULTS: SIGNIFICANT CHANGE UP
CULTURE RESULTS: SIGNIFICANT CHANGE UP
EGFR: 7 ML/MIN/1.73M2 — LOW
GLUCOSE BLDC GLUCOMTR-MCNC: 101 MG/DL — HIGH (ref 70–99)
GLUCOSE BLDC GLUCOMTR-MCNC: 127 MG/DL — HIGH (ref 70–99)
GLUCOSE BLDC GLUCOMTR-MCNC: 87 MG/DL — SIGNIFICANT CHANGE UP (ref 70–99)
GLUCOSE BLDC GLUCOMTR-MCNC: 87 MG/DL — SIGNIFICANT CHANGE UP (ref 70–99)
GLUCOSE SERPL-MCNC: 96 MG/DL — SIGNIFICANT CHANGE UP (ref 70–99)
HCT VFR BLD CALC: 31.8 % — LOW (ref 39–50)
HGB BLD-MCNC: 10.1 G/DL — LOW (ref 13–17)
MAGNESIUM SERPL-MCNC: 2.5 MG/DL — SIGNIFICANT CHANGE UP (ref 1.6–2.6)
MCHC RBC-ENTMCNC: 31.1 PG — SIGNIFICANT CHANGE UP (ref 27–34)
MCHC RBC-ENTMCNC: 31.8 G/DL — LOW (ref 32–36)
MCV RBC AUTO: 97.8 FL — SIGNIFICANT CHANGE UP (ref 80–100)
NRBC # BLD: 0 /100 WBCS — SIGNIFICANT CHANGE UP (ref 0–0)
PHOSPHATE SERPL-MCNC: 4.1 MG/DL — SIGNIFICANT CHANGE UP (ref 2.5–4.5)
PLATELET # BLD AUTO: 168 K/UL — SIGNIFICANT CHANGE UP (ref 150–400)
POTASSIUM SERPL-MCNC: 3.6 MMOL/L — SIGNIFICANT CHANGE UP (ref 3.5–5.3)
POTASSIUM SERPL-SCNC: 3.6 MMOL/L — SIGNIFICANT CHANGE UP (ref 3.5–5.3)
PROT SERPL-MCNC: 7.6 G/DL — SIGNIFICANT CHANGE UP (ref 6–8.3)
RBC # BLD: 3.25 M/UL — LOW (ref 4.2–5.8)
RBC # FLD: 14.9 % — HIGH (ref 10.3–14.5)
SODIUM SERPL-SCNC: 137 MMOL/L — SIGNIFICANT CHANGE UP (ref 135–145)
SPECIMEN SOURCE: SIGNIFICANT CHANGE UP
SPECIMEN SOURCE: SIGNIFICANT CHANGE UP
WBC # BLD: 6.83 K/UL — SIGNIFICANT CHANGE UP (ref 3.8–10.5)
WBC # FLD AUTO: 6.83 K/UL — SIGNIFICANT CHANGE UP (ref 3.8–10.5)

## 2025-01-03 PROCEDURE — 99233 SBSQ HOSP IP/OBS HIGH 50: CPT

## 2025-01-03 PROCEDURE — G0545: CPT

## 2025-01-03 RX ADMIN — MIDODRINE HYDROCHLORIDE 5 MILLIGRAM(S): 5 TABLET ORAL at 14:21

## 2025-01-03 RX ADMIN — MEROPENEM 100 MILLIGRAM(S): 1 INJECTION, POWDER, FOR SOLUTION INTRAVENOUS at 14:21

## 2025-01-03 RX ADMIN — SEVELAMER CARBONATE 1600 MILLIGRAM(S): 800 TABLET, FILM COATED ORAL at 07:58

## 2025-01-03 RX ADMIN — LATANOPROST 1 DROP(S): 50 SOLUTION OPHTHALMIC at 23:24

## 2025-01-03 RX ADMIN — MIDODRINE HYDROCHLORIDE 5 MILLIGRAM(S): 5 TABLET ORAL at 05:11

## 2025-01-03 RX ADMIN — HEPARIN SODIUM 5000 UNIT(S): 1000 INJECTION, SOLUTION INTRAVENOUS; SUBCUTANEOUS at 18:47

## 2025-01-03 RX ADMIN — MIDODRINE HYDROCHLORIDE 5 MILLIGRAM(S): 5 TABLET ORAL at 23:23

## 2025-01-03 RX ADMIN — HEPARIN SODIUM 5000 UNIT(S): 1000 INJECTION, SOLUTION INTRAVENOUS; SUBCUTANEOUS at 05:11

## 2025-01-03 RX ADMIN — CHLORHEXIDINE GLUCONATE 1 APPLICATION(S): 1.2 RINSE ORAL at 05:11

## 2025-01-03 RX ADMIN — TAMSULOSIN HYDROCHLORIDE 0.4 MILLIGRAM(S): 0.4 CAPSULE ORAL at 23:23

## 2025-01-03 RX ADMIN — SEVELAMER CARBONATE 1600 MILLIGRAM(S): 800 TABLET, FILM COATED ORAL at 14:21

## 2025-01-03 RX ADMIN — CALCITRIOL 1 MICROGRAM(S): 0.5 CAPSULE, LIQUID FILLED ORAL at 07:58

## 2025-01-03 NOTE — PROGRESS NOTE ADULT - ASSESSMENT
Subjective: NAD, afebrile, clinically stable, tolerating HD with no issues, no new complains.    REVIEW OF SYSTEMS:  CONSTITUTIONAL:  No fevers or chills  EYES/ENT:  No visual changes; no throat pain   NECK:  No neck pain or stiffness  RESPIRATORY:  No cough, no wheezing. No shortness of breath  CARDIOVASCULAR:  No chest pain or palpitations  GASTROINTESTINAL:  No abdominal pain. No N/V or diarrhea  GENITOURINARY:  No dysuria, frequency or hematuria  NEUROLOGICAL:  No numbness or weakness  MSK: no back pain, no joint pain  SKIN:  No itching, no skin rash    PE:  Vital Signs Last 24 Hrs  T(C): 36 (03 Jan 2025 08:00), Max: 37.6 (03 Jan 2025 02:00)  T(F): 96.8 (03 Jan 2025 08:00), Max: 99.7 (03 Jan 2025 02:00)  HR: 63 (03 Jan 2025 10:00) (58 - 70)  BP: 108/62 (03 Jan 2025 10:00) (80/51 - 125/66)  BP(mean): 72 (03 Jan 2025 10:00) (60 - 83)  RR: 14 (03 Jan 2025 10:00) (0 - 29)  SpO2: 94% (03 Jan 2025 10:00) (87% - 100%)    Parameters below as of 02 Jan 2025 19:15  Patient On (Oxygen Delivery Method): nasal cannula  O2 Flow (L/min): 2    Gen: AOx3, NAD, non-toxic, pleasant  HEAD:  Atraumatic  EYES: PERRLA, conjunctiva and sclera clear  ENT: Moist mucous membranes  NECK: Supple, No JVD  CV: S1+S2+ RRR  Resp: coarse breath sounds decreased on bases  Abd: Soft, nontender, +BS  Ext: No LE edema, no cyanosis  : ESRD on HD, anuric  IV/Skin: No thrombophlebitis, dry skin, R forearm AVG  Msk: No low back pain  Neuro: AAOx3. No focal signs    LABS/DIAGNOSTIC TESTS:                        10.1   6.83  )-----------( 168      ( 03 Jan 2025 03:35 )             31.8     WBC Count: 6.83 K/uL (01-03 @ 03:35)  WBC Count: 12.47 K/uL (01-02 @ 04:07)  WBC Count: 17.47 K/uL (01-01 @ 03:34)    01-03    137  |  101  |  46[H]  ----------------------------<  96  3.6   |  26  |  7.52[H]    Ca    9.0      03 Jan 2025 03:35  Phos  4.1     01-03  Mg     2.5     01-03    TPro  7.6  /  Alb  2.3[L]  /  TBili  0.5  /  DBili  x   /  AST  12  /  ALT  8[L]  /  AlkPhos  72  01-03    CULTURES:   Culture - Blood (collected 12-31-24 @ 06:43)  Source: .Blood BLOOD  Preliminary Report (01-03-25 @ 12:01):    No growth at 72 Hours    Culture - Blood (collected 12-31-24 @ 06:43)  Source: .Blood BLOOD  Preliminary Report (01-03-25 @ 12:01):    No growth at 72 Hours    Culture - Blood (collected 12-29-24 @ 06:20)  Source: .Blood BLOOD  Final Report (01-03-25 @ 11:01):    No growth at 5 days    Culture - Blood (collected 12-29-24 @ 06:10)  Source: .Blood BLOOD  Final Report (01-03-25 @ 11:01):    No growth at 5 days    Culture - Genital (collected 12-22-24 @ 08:20)  Source: .Genital  Final Report (12-25-24 @ 06:04):    Rare Proteus mirabilis    Commensal vladimir consistent with body site  Organism: Proteus mirabilis (12-25-24 @ 06:04)  Organism: Proteus mirabilis (12-25-24 @ 06:04)      Method Type: GWEN      -  Amoxicillin/Clavulanic Acid: S <=8/4      -  Ampicillin: S <=8 These ampicillin results predict results for amoxicillin      -  Ampicillin/Sulbactam: S <=4/2      -  Aztreonam: S <=4      -  Cefazolin: S <=2      -  Cefepime: S <=2      -  Cefoxitin: S <=8      -  Ceftriaxone: S <=1      -  Ciprofloxacin: S <=0.25      -  Ertapenem: S <=0.5      -  Gentamicin: S <=2      -  Levofloxacin: S <=0.5      -  Meropenem: S <=1      -  Piperacillin/Tazobactam: S <=8      -  Tobramycin: S <=2      -  Trimethoprim/Sulfamethoxazole: S <=0.5/9.5    Culture - Wound Aerobic/Anaerobic (collected 12-19-24 @ 18:06)  Source: Drainage  Final Report (12-24-24 @ 18:25):    Few Trueperella bernardiae "Susceptibilities not performed"    Culture - Wound Aerobic/Anaerobic (collected 12-19-24 @ 16:15)  Source: Skin/Wound  Final Report (12-24-24 @ 19:27):    Rare Trueperella bernardiae "Susceptibilities not performed"    Culture - Blood (collected 12-19-24 @ 06:45)  Source: .Blood BLOOD  Final Report (12-24-24 @ 12:00):    No growth at 5 days    Culture - Blood (collected 12-17-24 @ 12:35)  Source: .Blood BLOOD  Final Report (12-22-24 @ 19:00):    No growth at 5 days    Culture - Blood (collected 12-16-24 @ 22:30)  Source: .Blood BLOOD  Gram Stain (12-17-24 @ 16:53):    Growth in aerobic bottle: Gram Positive Cocci in Clusters  Final Report (12-18-24 @ 13:20):    Growth in aerobic bottle: Staphylococcus hominis    Isolation of Coagulase negative Staphylococcus from single blood culture    sets may represent    contamination. Contact the Microbiology Department at [phone] if    susceptibility testing is needed.    clinically indicated.    Direct identification is available within approximately 3-5    hours either by Blood Panel Multiplexed PCR or Direct    MALDI-TOF. Details: [email or web address]  Organism: Blood Culture PCR (12-18-24 @ 13:20)  Organism: Blood Culture PCR (12-18-24 @ 13:20)      Method Type: PCR      -  Coagulase negative Staphylococcus: Detec    Culture - Wound Aerobic/Anaerobic (collected 12-16-24 @ 12:36)  Source: .Surgical Swab  Final Report (12-21-24 @ 17:55):    Moderate Providencia stuartii    Moderate Corynebacterium striatum group "Susceptibilities not performed"    Few Trueperella bernardiae "Susceptibilities not performed"  Organism: Providencia stuartii (12-21-24 @ 17:55)  Organism: Abdelrahmanncia sttoddrtii (12-21-24 @ 17:55)      Method Type: GWEN      -  Amoxicillin/Clavulanic Acid: R >16/8      -  Ampicillin: R >16 These ampicillin results predict results for amoxicillin      -  Ampicillin/Sulbactam: I 16/8      -  Aztreonam: S <=4      -  Cefazolin: R >16      -  Cefepime: S <=2      -  Cefoxitin: S <=8      -  Ceftriaxone: S <=1      -  Ciprofloxacin: R 2      -  Ertapenem: S <=0.5      -  Levofloxacin: R 4      -  Meropenem: S <=1      -  Piperacillin/Tazobactam: S <=8      -  Trimethoprim/Sulfamethoxazole: S <=0.5/9.5    RADIOLOGY:   Available pertinent Imaging reviewed    ANTIBIOTICS:  meropenem  IVPB 1000 every 24 hours    IMPRESSION:  67 yo M with PMH of  DM, HTN, ESRD on dialysis ( M, W, F) via RUE graft (last Dialysis 12/13/24), HLD, B/L Charcot deformity for which follows w/ podiatry Dr. Sim in the outpatient podiatry clinic for Charcot foot admitted for sepsis 2/2 L buttock abscess, scrotal abscess and DFU/ +/- chronic OM. L buttock abscess drained at the bedside 12/19--  Truejillian camarena  Also noted purulent drainage from the base of the scrotum. Cx 12/22 from scrotal drainage growing Proteus mirabilis. CT abd/pelvis 12/23--2.1 cm right scrotal fluid collection suspicious for abscess. Appreciate Urology input-- no surgery recommended at the time. Was on Ampi/clavulanate since 12/24 to Rx the organism from the scrotal drainage planned for approx 4wks to Rx the scrotal abscess last time he was seen by ID. RRT 12/30 for Lethargic after HD and 104 F fever, currently admitted to MICU for septic shock(resolved), currently on meropenem.  New septic mtz NGTD.     CT A/P 12/23 w/ Several small stones at both ureterovesical junctions measuring up to 2 mm on the right and 3 mm on the left. No ureteral dilatation. Prostate is normal in size. Diffuse scrotal edema and rim-enhancing extratesticular right scrotal fluid collection measuring 2.1 x 1.3 x 0.8 cm.  CT A/P 1/1 w/ No scrotal wall thickening/edema with 1.5 cm rim-enhancing collection noted within the right scrotal wall. BL lung infiltrates     sx swab 12/16 (R foot) +Providencia stuartii, corynebacterium striatum  Wound cx 12/19 x 2 (L buttock) +Trueperella bernardiae  Wound cx 12/22 + Proteus mirabilis   Blood cx NGTD    -Septic shock  -Scrotal abscess (1.5 cm rim-enhancing collection noted within the right scrotal wall)  -L buttock abscess- s/p I&D 12/19 healing well  -Ureterovesical junctions stones  -ESRD on HD  -DM  -Influenza A URI (Day 7)  -Charcot arthropathy BL  -DFU (chronic) R foot, R ankle - chronic OM   -BL Lung infiltrates- imaging      PLAN:  Continue Meropenem 1g q24 hrs IV (post HD on session days)  F/up cultures- NGTD  F/up urology  HD per renal  DM control  wound care  Rest per MICU    Please reach ID with any questions or concerns  Dr. Spring Cervantes  Available in Teams

## 2025-01-03 NOTE — PROGRESS NOTE ADULT - SUBJECTIVE AND OBJECTIVE BOX
[facility] NEPHROLOGY- PROGRESS NOTE    Patient is a 69yo Male with ESRD on HD via Rt AVG, HTN, 2HPT, Anemia, LE venous stasis with Rt foot wound presents with weakness. Pt a/w Rt foot ulcer and hyperkalemia. Nephrology consulted for ESRD status.   MRI Rt foot: + osteomyelitis involving the talus and less so of the navicular bone;    BCx +Staph hominis  12/20: s/p I& D of buttock abscess  +scrotal abscess  12/29: s/p RRT for lethargy;  febrile +FLU A   12/30: s/p RRT for somnolent  +fever; transferred to ICU for pressors/ septic shock    Hospital Medications: Medications reviewed.  REVIEW OF SYSTEMS:  Denies any SOB, cough, chest pain or n/v/d, abd pain or foot pain      VITALS:  T(F): 97.1 (01-03-25 @ 13:40), Max: 99.7 (01-03-25 @ 02:00)  HR: 64 (01-03-25 @ 13:40)  BP: 120/58 (01-03-25 @ 13:40)  RR: 15 (01-03-25 @ 13:40)  SpO2: 100% (01-03-25 @ 13:40)  Wt(kg): --    01-02 @ 07:01  -  01-03 @ 07:00  --------------------------------------------------------  IN: 549.2 mL / OUT: 0 mL / NET: 549.2 mL        Weight (kg): 77.9 (01-02 @ 17:00)    PHYSICAL EXAM:  Gen: NAD, more awake,  Obese  HEENT: anicteric  Cards: RRR, +S1/S2, no M/G/R  Resp: +mild rales at rt base, clear left side  GI: soft, NT/ND, NABS  Extremities: no b/l LE edema, extremely dry LE; rt foot covered  Access: Rt AVG +thrill +bruit    LABS:  01-03    137  |  101  |  46[H]  ----------------------------<  96  3.6   |  26  |  7.52[H]    Ca    9.0      03 Jan 2025 03:35  Phos  4.1     01-03  Mg     2.5     01-03    TPro  7.6  /  Alb  2.3[L]  /  TBili  0.5  /  DBili      /  AST  12  /  ALT  8[L]  /  AlkPhos  72  01-03    Creatinine Trend: 7.52 <--, 6.27 <--, 8.55 <--, 6.65 <--, 10.60 <--, 8.53 <--                        10.1   6.83  )-----------( 168      ( 03 Jan 2025 03:35 )             31.8     Urine Studies:  Urinalysis Basic - ( 03 Jan 2025 03:35 )    Color:  / Appearance:  / SG:  / pH:   Gluc: 96 mg/dL / Ketone:   / Bili:  / Urobili:    Blood:  / Protein:  / Nitrite:    Leuk Esterase:  / RBC:  / WBC    Sq Epi:  / Non Sq Epi:  / Bacteria:

## 2025-01-03 NOTE — PROGRESS NOTE ADULT - ASSESSMENT
Patient is a 67yo Male with ESRD on HD via Rt AVG, HTN, 2HPT, Anemia, LE venous stasis with Rt foot wound presents with weakness. Pt a/w Rt foot ulcer and hyperkalemia. Nephrology consulted for ESRD status.     1) ESRD: Pt seen on HD today 1/3, with intradialytic hypotension for which UF goal was decreased from 1L to 0.5L. Plan for next HD 1/6. c/w MWF schedule. Monitor electrolytes  2) Hyperkalemia: resolved. c/w  low potassium diet   Monitor serum potassium  3) HTN with ESRD: BP low normal;  off IVF pressors. Now on midodrine 5mg PO tid. Titrate as per ICU.  c/w low sodium diet. Monitor BP.  4) Anemia of renal disease: Hb acceptable. c/w Epogen 6000 units IV tiw. Monitor Hb.  5) 2HPT: Serum calcium acceptable with acceptable serum phos.  PTHi 441. c/w calcitriol 1mcg PO MWF. Pt on Lanthanum 2000mg PO tid with meals which is unavailable at [facility]. c/w Sevelamer 1600mg PO tid with meals  c/w low phos diet Monitor serum calcium and phosphorus.    HepBsAg was neg on outpt labs on 12/11/24; repeat HepBsAg neg; likely positive HepBsAg on 12/16 is an error.       [facility] NEPHROLOGY  Arden Tsang M.D.  Freeman Holland D.O.  Janny Chiirnos M.D.  MD Ambar Mccarty, MSN, ANP-C    Telephone: [phone]  Facsimile: [phone] [place] [address], [address]  [place] [address]

## 2025-01-03 NOTE — PROGRESS NOTE ADULT - SUBJECTIVE AND OBJECTIVE BOX
Subjective:  Resting comfortably    Objective:    Vital signs  T(C): , Max: 37.6 (01-03-25 @ 02:00)  HR: 60 (01-03-25 @ 09:00)  BP: 97/57 (01-03-25 @ 09:00)  SpO2: 96% (01-03-25 @ 09:00)  Wt(kg): --    Output     01-02 @ 07:01  -  01-03 @ 07:00  --------------------------------------------------------  IN: 549.2 mL / OUT: 0 mL / NET: 549.2 mL        Gen: NAD  Abd: soft, nontender, nondistended  : small amount of purulence expressed from right scrotal collection, dressing changed    Labs                        10.1   6.83  )-----------( 168      ( 03 Jan 2025 03:35 )             31.8     03 Jan 2025 03:35    137    |  101    |  46     ----------------------------<  96     3.6     |  26     |  7.52     Ca    9.0        03 Jan 2025 03:35  Phos  4.1       03 Jan 2025 03:35  Mg     2.5       03 Jan 2025 03:35        Urine Cx: ?  Blood Cx: ?      Imaging Subjective:  Resting comfortably    Objective:    Vital signs  T(C): , Max: 37.6 (01-03-25 @ 02:00)  HR: 60 (01-03-25 @ 09:00)  BP: 97/57 (01-03-25 @ 09:00)  SpO2: 96% (01-03-25 @ 09:00)  Wt(kg): --    Output     01-02 @ 07:01  -  01-03 @ 07:00  --------------------------------------------------------  IN: 549.2 mL / OUT: 0 mL / NET: 549.2 mL        Gen: NAD  Abd: soft, nontender, nondistended  : minimal amount of purulence expressed from right scrotal collection, dressing changed    Labs                        10.1   6.83  )-----------( 168      ( 03 Jan 2025 03:35 )             31.8     03 Jan 2025 03:35    137    |  101    |  46     ----------------------------<  96     3.6     |  26     |  7.52     Ca    9.0        03 Jan 2025 03:35  Phos  4.1       03 Jan 2025 03:35  Mg     2.5       03 Jan 2025 03:35        Urine Cx: ?  Blood Cx: ?      Imaging

## 2025-01-03 NOTE — PROGRESS NOTE ADULT - ASSESSMENT
69 yo M with PMH of  DM, HTN, ESRD on dialysis ( M, W, F), Last Dialysis 12/30/24,  B/L Charcot deformity and HLD, present to the ED for generalized weakness, chills, subjective fever. Pt admitted on 12/16 to medicine floor for treatment of sepsis 2/2 scrotal abscess, diabetic foot ulcer, gluteal abscess, influenza A positive. s/p multiple RRTs for somnolence, hypotension, fever. Admitted to the ICU for peripheral pressor req. and closer monitoring.     #Septic shock 2/2 multiple abscesses, diabetic foot ulcer  #acute encephalopathy   #ESRD on dialysis   #Charcot arthropathy B/L  #Diabetic ulceration R midfoot with chronic OM  #DM  #HTN  #HLD  #BPH    _________CNS___________  #Acute encephalopathy   - Encephalopathy likely 2/2 acute metabolic encephalopathy in the setting of septic shock  - ABG 7.44/CO2 37/HCO3 25/pO2 69  - c/w radha  - resolved    _________CVS___________  #septic shock 2/2 scrotal and gluteal abscesses, Diabetic ulceration R midfoot with chronic OM  #pressors  - Stopped pressors, started Midodrine  - central and a-line consent obtained if needed   - Given 250 cc boluses x2 throughout the night   - continue to monitor volume status , give fluids judiciously given ESRD    #HTN  hold BP meds in setting of shock    #HLD   c/w home meds     _________ RESP__________  #AHRF in the setting of influenza A   - CXR 12/31: Patchy medial right basilar opacities that could be due to atelectasis or infection.  - c/w NC as needed     __________GI____________  no acute issues     ________ RENAL__________  #ESRD on dialysis (MWF)  - last session 12/30, scheduled for today  - recent electrolytes within normal limits   - C/W epo, Renvela, calcitriol   - Given 250 cc boluses x2 throughout the night   - continue to monitor volume status   - Nephrology Dr. Chirinos following    #BPH  - c/w home meds     _________MSK___________  no active issues     __________ID____________  #septic shock 2/2 multiple abscesses, diabetic foot ulcer  #diabetic foot ulcer  #influenza A     #Scrotal abscess   - Scrotal Wound culture positive for  Proteus Mirabilis  - CT Pelvis: 2.1 cm right scrotal fluid collection suspicious for abscess. Several stones and both ureterovesical junctions without ureteral dilatation.  - ID Dr. Chavez following  - D/c Vanc, c/w Radha  - CT with 1.5 cm ring enhancing lesion in scrotum     #Diabetic foot ulcers   - According to podiatry note 12/28- No surgical treatement will be done at this time. Patient is stable from podiatry. Plan for outpatient bone biopsy when pt is off abx for 3 weeks which was discussed with patient and he is agreeable with the plan going forward.  - F/U Podiatry recs    #influenza A  - req 2 L NC   - c/w tamiflu     _________ENDO__________  #DM   - c/w insulin sliding scale     _______HEME/ONC_______  # Anemia of renal disease  - c/w Epogen 6000 units IV tiw.   - Monitor Hb    _________SKIN____________  #diabetic foot ulcer   plan as above     ________Prophylaxis_______  #DVT - heparin      __________GOC/ DISPO___________  FULL CODE   central line and arterial line consent in the chart    67 yo M with PMH of  DM, HTN, ESRD on dialysis ( M, W, F), Last Dialysis 12/30/24,  B/L Charcot deformity and HLD, present to the ED for generalized weakness, chills, subjective fever. Pt admitted on 12/16 to medicine floor for treatment of sepsis 2/2 scrotal abscess, diabetic foot ulcer, gluteal abscess, influenza A positive. s/p multiple RRTs for somnolence, hypotension, fever. Admitted to the ICU for peripheral pressor req. and closer monitoring.     #Septic shock 2/2 multiple abscesses, diabetic foot ulcer  #acute encephalopathy   #ESRD on dialysis   #Charcot arthropathy B/L  #Diabetic ulceration R midfoot with chronic OM  #DM  #HTN  #HLD  #BPH    _________CNS___________  #Acute encephalopathy   - Encephalopathy likely 2/2 acute metabolic encephalopathy in the setting of septic shock  - ABG 7.44/CO2 37/HCO3 25/pO2 69  - c/w radha  - resolved    _________CVS___________  #septic shock 2/2 scrotal and gluteal abscesses, Diabetic ulceration R midfoot with chronic OM  #pressors  - Stopped pressors, on Midodrine  - central and a-line consent obtained if needed   - Given 250 cc boluses x2 throughout the night   - continue to monitor volume status , give fluids judiciously given ESRD    #HTN  hold BP meds in setting of shock    #HLD   c/w home meds     _________ RESP__________  #AHRF in the setting of influenza A   - CXR 12/31: Patchy medial right basilar opacities that could be due to atelectasis or infection.  - c/w NC as needed     __________GI____________  no acute issues     ________ RENAL__________  #ESRD on dialysis (MWF)  - Dialysis today  - recent electrolytes within normal limits   - C/W epo, Renvela, calcitriol   - Given 250 cc boluses x2 throughout the night   - continue to monitor volume status   - Nephrology Dr. Chirinos following    #BPH  - c/w home meds     _________MSK___________  no active issues     __________ID____________  #septic shock 2/2 multiple abscesses, diabetic foot ulcer  #diabetic foot ulcer  #influenza A     #Scrotal abscess   - Scrotal Wound culture positive for  Proteus Mirabilis  - CT Pelvis: 2.1 cm right scrotal fluid collection suspicious for abscess. Several stones and both ureterovesical junctions without ureteral dilatation.  - ID Dr. Chavez following  - D/c Vanc, c/w Radha  - CT with 1.5 cm ring enhancing lesion in scrotum     #Diabetic foot ulcers   - According to podiatry note 12/28- No surgical treatement will be done at this time. Patient is stable from podiatry. Plan for outpatient bone biopsy when pt is off abx for 3 weeks which was discussed with patient and he is agreeable with the plan going forward.  - F/U Podiatry recs    #influenza A  - req 2 L NC   - c/w tamiflu     _________ENDO__________  #DM   - c/w insulin sliding scale     _______HEME/ONC_______  # Anemia of renal disease  - c/w Epogen 6000 units IV tiw.   - Monitor Hb    _________SKIN____________  #diabetic foot ulcer  Podiatry note pending   plan as above     ________Prophylaxis_______  #DVT - heparin      __________GOC/ DISPO___________  FULL CODE   central line and arterial line consent in the chart

## 2025-01-03 NOTE — PROGRESS NOTE ADULT - CRITICAL CARE SERVICES PROVIDED
Patient is critically ill, requiring critical care services.
19-Jan-2021 12:33
Patient is critically ill, requiring critical care services.
Patient is critically ill, requiring critical care services.

## 2025-01-03 NOTE — PROGRESS NOTE ADULT - ASSESSMENT
69M with hx of T2DM, hidradenitis suppurativa a/w generalized weakness, w small amount of purulent drainage from scrotum     12/23: scrotal exam stable with spontaneous drainage  12/24: scrotal exam stable, CT pelvis demonstrates 2.1 cm right scrotal fluid collection suspicious for abscess and bilateral UVJ stones without ureteral dilatation, wbc has been downtrending  12/27: scrotal exam stable, decreasing drainage amount, WBC continues to downtrend  12/30: stable scrotal exam, less than 1 gauze worth of purulence expressed, patient likely febrile from flu (still without leukocytosis, drainage from scrotum still decreasing), s/p RRT in PM for somnolence transferred to ICU started on levo  12/31: scrotal exam continues to improve, minimal purulence expressed from right scrotal tract, WBC 20 from 14, levo 0.07    Scrotal exam continues to improve so clinical decline unlikely to be related to scrotum, however patient did have bilateral non-obstructing UVJ stones on 12/23 CTAP (to level of knees to also re-assess scrotal collection) so would be worthwhile to rescan patient to determine whether any stones have become obstructive.     1/1: repeat CTAP demonstrates 1.5x0.4cm right scrotal collection compared to 4x0.7cm collection on 12/23/24, still no hydronephrosis bilaterally to suggest obstructive uropathy  1/2: scrotal exam continues to improve, WBC downtrending, downtrending levo requirement 0.03  1/3: scrotal exam stable, WBC normalized 6.8, off pressors, afebrile    Recommendations:  - Repeat CTAP confirms decreasing size of scrotal collection > continue conservative management  - F/u repeat bcx > NGTD  - Continue Flomax  - keep scrotal area clean and dry, may include sitz baths   - IV antibiotics, follow ID recommendations   - strict glycemic control   - HD as per neph   - remainder of care as per primary team  69M with hx of T2DM, hidradenitis suppurativa a/w generalized weakness, w small amount of purulent drainage from scrotum     12/23: scrotal exam stable with spontaneous drainage  12/24: scrotal exam stable, CT pelvis demonstrates 2.1 cm right scrotal fluid collection suspicious for abscess and bilateral UVJ stones without ureteral dilatation, wbc has been downtrending  12/27: scrotal exam stable, decreasing drainage amount, WBC continues to downtrend  12/30: stable scrotal exam, less than 1 gauze worth of purulence expressed, patient likely febrile from flu (still without leukocytosis, drainage from scrotum still decreasing), s/p RRT in PM for somnolence transferred to ICU started on levo  12/31: scrotal exam continues to improve, minimal purulence expressed from right scrotal tract, WBC 20 from 14, levo 0.07    Scrotal exam continues to improve so clinical decline unlikely to be related to scrotum, however patient did have bilateral non-obstructing UVJ stones on 12/23 CTAP (to level of knees to also re-assess scrotal collection) so would be worthwhile to rescan patient to determine whether any stones have become obstructive.     1/1: repeat CTAP demonstrates 1.5x0.4cm right scrotal collection compared to 4x0.7cm collection on 12/23/24, still no hydronephrosis bilaterally to suggest obstructive uropathy  1/2: scrotal exam continues to improve, WBC downtrending, downtrending levo requirement 0.03  1/3: scrotal exam stable, WBC normalized 6.8, off pressors, afebrile, listed to the floor    Recommendations:  - Repeat CTAP confirms decreasing size of scrotal collection > continue conservative management  - F/u repeat bcx > NGTD  - Continue Flomax  - keep scrotal area clean and dry, may include sitz baths   - IV antibiotics, follow ID recommendations   - strict glycemic control   - HD as per neph   - remainder of care as per primary team     D/w Dr. Mcguire 69M with hx of T2DM, hidradenitis suppurativa a/w generalized weakness, w small amount of purulent drainage from scrotum     12/23: scrotal exam stable with spontaneous drainage  12/24: scrotal exam stable, CT pelvis demonstrates 2.1 cm right scrotal fluid collection suspicious for abscess and bilateral UVJ stones without ureteral dilatation, wbc has been downtrending  12/27: scrotal exam stable, decreasing drainage amount, WBC continues to downtrend  12/30: stable scrotal exam, less than 1 gauze worth of purulence expressed, patient likely febrile from flu (still without leukocytosis, drainage from scrotum still decreasing), s/p RRT in PM for somnolence transferred to ICU started on levo  12/31: scrotal exam continues to improve, minimal purulence expressed from right scrotal tract, WBC 20 from 14, levo 0.07    Scrotal exam continues to improve so clinical decline unlikely to be related to scrotum, however patient did have bilateral non-obstructing UVJ stones on 12/23 CTAP (to level of knees to also re-assess scrotal collection) so would be worthwhile to rescan patient to determine whether any stones have become obstructive.     1/1: repeat CTAP demonstrates 1.5x0.4cm right scrotal collection compared to 4x0.7cm collection on 12/23/24, still no hydronephrosis bilaterally to suggest obstructive uropathy  1/2: scrotal exam continues to improve, WBC downtrending, downtrending levo requirement 0.03  1/3: scrotal exam stable, WBC normalized 6.8, off pressors, afebrile, listed to the floor    Recommendations:  - Repeat CTAP confirms decreasing size of scrotal collection > continue conservative management  - F/u repeat bcx > NGTD  - Continue Flomax  - keep scrotal area clean and dry, may include sitz baths   - continue scrotal dressing changes qD (can be done by nursing: squeeze out purulence from right scrotum until serosanguinous and wipe with 4x4 gauze, place fresh 4x4 gauze overlying the drainage tract)  - IV antibiotics, follow ID recommendations   - strict glycemic control   - HD as per neph   - remainder of care as per primary team     D/w Dr. Mcguire

## 2025-01-03 NOTE — PROGRESS NOTE ADULT - SUBJECTIVE AND OBJECTIVE BOX
INTERVAL HPI/OVERNIGHT EVENTS: ***    PRESSORS: [ ] YES [ ] NO  WHICH:    Antimicrobial:  meropenem  IVPB 1000 milliGRAM(s) IV Intermittent every 24 hours    Cardiovascular:  midodrine 5 milliGRAM(s) Oral every 8 hours    Pulmonary:    Hematalogic:  heparin   Injectable 5000 Unit(s) SubCutaneous every 12 hours    Other:  acetaminophen     Tablet .. 650 milliGRAM(s) Oral every 6 hours PRN  calcitriol   Capsule 1 MICROGram(s) Oral <User Schedule>  chlorhexidine 2% Cloths 1 Application(s) Topical <User Schedule>  epoetin rudolph-epbx (RETACRIT) Injectable 6000 Unit(s) IV Push <User Schedule>  insulin lispro (ADMELOG) corrective regimen sliding scale   SubCutaneous three times a day before meals  insulin lispro (ADMELOG) corrective regimen sliding scale   SubCutaneous at bedtime  latanoprost 0.005% Ophthalmic Solution 1 Drop(s) Both EYES at bedtime  ondansetron Injectable 4 milliGRAM(s) IV Push every 8 hours PRN  sevelamer carbonate 1600 milliGRAM(s) Oral three times a day with meals  tamsulosin 0.4 milliGRAM(s) Oral at bedtime    acetaminophen     Tablet .. 650 milliGRAM(s) Oral every 6 hours PRN  calcitriol   Capsule 1 MICROGram(s) Oral <User Schedule>  chlorhexidine 2% Cloths 1 Application(s) Topical <User Schedule>  epoetin rudolph-epbx (RETACRIT) Injectable 6000 Unit(s) IV Push <User Schedule>  heparin   Injectable 5000 Unit(s) SubCutaneous every 12 hours  insulin lispro (ADMELOG) corrective regimen sliding scale   SubCutaneous three times a day before meals  insulin lispro (ADMELOG) corrective regimen sliding scale   SubCutaneous at bedtime  latanoprost 0.005% Ophthalmic Solution 1 Drop(s) Both EYES at bedtime  meropenem  IVPB 1000 milliGRAM(s) IV Intermittent every 24 hours  midodrine 5 milliGRAM(s) Oral every 8 hours  ondansetron Injectable 4 milliGRAM(s) IV Push every 8 hours PRN  sevelamer carbonate 1600 milliGRAM(s) Oral three times a day with meals  tamsulosin 0.4 milliGRAM(s) Oral at bedtime    Drug Dosing Weight  Height (cm): 172.7 (25 Dec 2024 20:03)  Weight (kg): 77.9 (02 Jan 2025 17:00)  BMI (kg/m2): 26.1 (02 Jan 2025 17:00)  BSA (m2): 1.92 (02 Jan 2025 17:00)    CENTRAL LINE: [ ] YES [ ] NO  LOCATION:   DATE INSERTED:  REMOVE: [ ] YES [ ] NO  EXPLAIN:    STOUT: [ ] YES [ ] NO    DATE INSERTED:  REMOVE:  [ ] YES [ ] NO  EXPLAIN:    A-LINE:  [ ] YES [ ] NO  LOCATION:   DATE INSERTED:  REMOVE:  [ ] YES [ ] NO  EXPLAIN:    PMH -reviewed admission note, no change since admission  PAST MEDICAL & SURGICAL HISTORY:  DM (diabetes mellitus)  type 2      ESRD (end stage renal disease) on dialysis  t-th-sat via right AV graft, uses midodrine the days of Dialysis for hypotension      Charcot foot due to diabetes mellitus      Diabetic retinopathy associated with type 2 diabetes mellitus      Hyperlipidemia      Abscess of scrotum      Hidradenitis      End stage renal disease      S/P foot surgery  right foot - 11/2014      Charcot's syndrome  Surgeries in feet and ankles   left ankle 2011 and right ankle 11/2014      History of incision and drainage  scrotal 5/2018      Hemodialysis access, AV graft          ICU Vital Signs Last 24 Hrs  T(C): 36.4 (03 Jan 2025 04:30), Max: 37.6 (03 Jan 2025 02:00)  T(F): 97.5 (03 Jan 2025 04:30), Max: 99.7 (03 Jan 2025 02:00)  HR: 59 (03 Jan 2025 06:30) (58 - 73)  BP: 100/59 (03 Jan 2025 06:30) (79/51 - 121/69)  BP(mean): 72 (03 Jan 2025 06:30) (60 - 83)  ABP: --  ABP(mean): --  RR: 17 (03 Jan 2025 06:30) (0 - 29)  SpO2: 99% (03 Jan 2025 06:30) (87% - 100%)    O2 Parameters below as of 02 Jan 2025 19:15  Patient On (Oxygen Delivery Method): nasal cannula  O2 Flow (L/min): 2                01-02 @ 07:01  -  01-03 @ 07:00  --------------------------------------------------------  IN: 549.2 mL / OUT: 0 mL / NET: 549.2 mL            PHYSICAL EXAM:    GENERAL: NAD, well-groomed, well-developed  HEAD:  Atraumatic, Normocephalic  EYES: EOMI, PERRLA, conjunctiva and sclera clear  ENMT: No tonsillar erythema, exudates, or enlargement; Moist mucous membranes, Good dentition, No lesions  NECK: Supple, normal appearance, No JVD; Normal thyroid; Trachea midline  NERVOUS SYSTEM:  Alert & Oriented X3,  Motor Strength 5/5 B/L upper and lower extremities; DTRs 2+ intact and symmetric  CHEST/LUNG: No chest deformity; Normal percussion bilaterally; No rales, rhonchi, wheezing   HEART: Regular rate and rhythm; No murmurs, rubs, or gallops  ABDOMEN: Soft, Nontender, Nondistended; Bowel sounds present  EXTREMITIES:  2+ Peripheral Pulses, No clubbing, cyanosis, or edema  LYMPH: No lymphadenopathy noted  SKIN: No rashes or lesions;  Good capillary refill      LABS:  CBC Full  -  ( 03 Jan 2025 03:35 )  WBC Count : 6.83 K/uL  RBC Count : 3.25 M/uL  Hemoglobin : 10.1 g/dL  Hematocrit : 31.8 %  Platelet Count - Automated : 168 K/uL  Mean Cell Volume : 97.8 fl  Mean Cell Hemoglobin : 31.1 pg  Mean Cell Hemoglobin Concentration : 31.8 g/dL  Auto Neutrophil # : x  Auto Lymphocyte # : x  Auto Monocyte # : x  Auto Eosinophil # : x  Auto Basophil # : x  Auto Neutrophil % : x  Auto Lymphocyte % : x  Auto Monocyte % : x  Auto Eosinophil % : x  Auto Basophil % : x    01-03    137  |  101  |  46[H]  ----------------------------<  96  3.6   |  26  |  7.52[H]    Ca    9.0      03 Jan 2025 03:35  Phos  4.1     01-03  Mg     2.5     01-03    TPro  7.6  /  Alb  2.3[L]  /  TBili  0.5  /  DBili  x   /  AST  12  /  ALT  8[L]  /  AlkPhos  72  01-03      Urinalysis Basic - ( 03 Jan 2025 03:35 )    Color: x / Appearance: x / SG: x / pH: x  Gluc: 96 mg/dL / Ketone: x  / Bili: x / Urobili: x   Blood: x / Protein: x / Nitrite: x   Leuk Esterase: x / RBC: x / WBC x   Sq Epi: x / Non Sq Epi: x / Bacteria: x          RADIOLOGY & ADDITIONAL STUDIES REVIEWED:  ***    [ ]GOALS OF CARE DISCUSSION WITH PATIENT/FAMILY/PROXY:    CRITICAL CARE TIME SPENT: 35 minutes INTERVAL HPI/OVERNIGHT EVENTS: No acute overnight events reported. Patient examined at bedside in the AM. No new complaints. Patient reading for transfer to medicine floor.    PRESSORS: [ ] YES [X] NO    Antimicrobial:  meropenem  IVPB 1000 milliGRAM(s) IV Intermittent every 24 hours    Cardiovascular:  midodrine 5 milliGRAM(s) Oral every 8 hours    Pulmonary:    Hematalogic:  heparin   Injectable 5000 Unit(s) SubCutaneous every 12 hours    Other:  acetaminophen     Tablet .. 650 milliGRAM(s) Oral every 6 hours PRN  calcitriol   Capsule 1 MICROGram(s) Oral <User Schedule>  chlorhexidine 2% Cloths 1 Application(s) Topical <User Schedule>  epoetin rudolph-epbx (RETACRIT) Injectable 6000 Unit(s) IV Push <User Schedule>  insulin lispro (ADMELOG) corrective regimen sliding scale   SubCutaneous three times a day before meals  insulin lispro (ADMELOG) corrective regimen sliding scale   SubCutaneous at bedtime  latanoprost 0.005% Ophthalmic Solution 1 Drop(s) Both EYES at bedtime  ondansetron Injectable 4 milliGRAM(s) IV Push every 8 hours PRN  sevelamer carbonate 1600 milliGRAM(s) Oral three times a day with meals  tamsulosin 0.4 milliGRAM(s) Oral at bedtime    acetaminophen     Tablet .. 650 milliGRAM(s) Oral every 6 hours PRN  calcitriol   Capsule 1 MICROGram(s) Oral <User Schedule>  chlorhexidine 2% Cloths 1 Application(s) Topical <User Schedule>  epoetin rudolph-epbx (RETACRIT) Injectable 6000 Unit(s) IV Push <User Schedule>  heparin   Injectable 5000 Unit(s) SubCutaneous every 12 hours  insulin lispro (ADMELOG) corrective regimen sliding scale   SubCutaneous three times a day before meals  insulin lispro (ADMELOG) corrective regimen sliding scale   SubCutaneous at bedtime  latanoprost 0.005% Ophthalmic Solution 1 Drop(s) Both EYES at bedtime  meropenem  IVPB 1000 milliGRAM(s) IV Intermittent every 24 hours  midodrine 5 milliGRAM(s) Oral every 8 hours  ondansetron Injectable 4 milliGRAM(s) IV Push every 8 hours PRN  sevelamer carbonate 1600 milliGRAM(s) Oral three times a day with meals  tamsulosin 0.4 milliGRAM(s) Oral at bedtime    Drug Dosing Weight  Height (cm): 172.7 (25 Dec 2024 20:03)  Weight (kg): 77.9 (02 Jan 2025 17:00)  BMI (kg/m2): 26.1 (02 Jan 2025 17:00)  BSA (m2): 1.92 (02 Jan 2025 17:00)    CENTRAL LINE: [ ] YES [X] NO    STOUT: [ ] YES [x] NO    A-LINE:  [ ] YES [X] NO    PMH -reviewed admission note, no change since admission  PAST MEDICAL & SURGICAL HISTORY:  DM (diabetes mellitus)  type 2      ESRD (end stage renal disease) on dialysis  t-th-sat via right AV graft, uses midodrine the days of Dialysis for hypotension      Charcot foot due to diabetes mellitus      Diabetic retinopathy associated with type 2 diabetes mellitus      Hyperlipidemia      Abscess of scrotum      Hidradenitis      End stage renal disease      S/P foot surgery  right foot - 11/2014      Charcot's syndrome  Surgeries in feet and ankles   left ankle 2011 and right ankle 11/2014      History of incision and drainage  scrotal 5/2018      Hemodialysis access, AV graft          ICU Vital Signs Last 24 Hrs  T(C): 36.4 (03 Jan 2025 04:30), Max: 37.6 (03 Jan 2025 02:00)  T(F): 97.5 (03 Jan 2025 04:30), Max: 99.7 (03 Jan 2025 02:00)  HR: 59 (03 Jan 2025 06:30) (58 - 73)  BP: 100/59 (03 Jan 2025 06:30) (79/51 - 121/69)  BP(mean): 72 (03 Jan 2025 06:30) (60 - 83)  ABP: --  ABP(mean): --  RR: 17 (03 Jan 2025 06:30) (0 - 29)  SpO2: 99% (03 Jan 2025 06:30) (87% - 100%)    O2 Parameters below as of 02 Jan 2025 19:15  Patient On (Oxygen Delivery Method): nasal cannula  O2 Flow (L/min): 2                01-02 @ 07:01  -  01-03 @ 07:00  --------------------------------------------------------  IN: 549.2 mL / OUT: 0 mL / NET: 549.2 mL            PHYSICAL EXAM:    GENERAL: NAD, well-groomed, well-developed  HEAD:  Atraumatic, Normocephalic  EYES: EOMI, PERRLA, conjunctiva and sclera clear  ENMT: Moist mucous membranes, Good dentition, No lesions  NECK: Supple, normal appearance, No JVD; Normal thyroid; Trachea midline  NERVOUS SYSTEM:  Alert & Oriented X3,  Motor Strength 5/5 B/L upper and lower extremities; DTRs 2+ intact and symmetric  CHEST/LUNG: No chest deformity; Normal percussion bilaterally; No rales, rhonchi, wheezing   HEART: Regular rate and rhythm; No murmurs, rubs, or gallops  ABDOMEN: Soft, Nontender, Nondistended; Bowel sounds present, Scrotal swelling (improving)  EXTREMITIES:  2+ Peripheral Pulses, No clubbing, cyanosis, or edema  LYMPH: No lymphadenopathy noted  SKIN: Foot wound right side, Good capillary refill      LABS:  CBC Full  -  ( 03 Jan 2025 03:35 )  WBC Count : 6.83 K/uL  RBC Count : 3.25 M/uL  Hemoglobin : 10.1 g/dL  Hematocrit : 31.8 %  Platelet Count - Automated : 168 K/uL  Mean Cell Volume : 97.8 fl  Mean Cell Hemoglobin : 31.1 pg  Mean Cell Hemoglobin Concentration : 31.8 g/dL  Auto Neutrophil # : x  Auto Lymphocyte # : x  Auto Monocyte # : x  Auto Eosinophil # : x  Auto Basophil # : x  Auto Neutrophil % : x  Auto Lymphocyte % : x  Auto Monocyte % : x  Auto Eosinophil % : x  Auto Basophil % : x    01-03    137  |  101  |  46[H]  ----------------------------<  96  3.6   |  26  |  7.52[H]    Ca    9.0      03 Jan 2025 03:35  Phos  4.1     01-03  Mg     2.5     01-03    TPro  7.6  /  Alb  2.3[L]  /  TBili  0.5  /  DBili  x   /  AST  12  /  ALT  8[L]  /  AlkPhos  72  01-03      Urinalysis Basic - ( 03 Jan 2025 03:35 )    Color: x / Appearance: x / SG: x / pH: x  Gluc: 96 mg/dL / Ketone: x  / Bili: x / Urobili: x   Blood: x / Protein: x / Nitrite: x   Leuk Esterase: x / RBC: x / WBC x   Sq Epi: x / Non Sq Epi: x / Bacteria: x          RADIOLOGY & ADDITIONAL STUDIES REVIEWED:  None    [ ]GOALS OF CARE DISCUSSION WITH PATIENT/FAMILY/PROXY:    CRITICAL CARE TIME SPENT: 35 minutes

## 2025-01-03 NOTE — CHART NOTE - NSCHARTNOTEFT_GEN_A_CORE
Assessment:     Factors impacting intake: [ ] none [ ] nausea  [ ] vomiting [ ] diarrhea [ ] constipation  [ ]chewing problems [ ] swallowing issues  [ ] other:     Diet Presciption: Diet, DASH/TLC:   Sodium & Cholesterol Restricted  1000mL Fluid Restriction ([id number])  For patients receiving Renal Replacement - No Protein Restr, No Conc K, No Conc Phos, Low Sodium (RENAL)  Supplement Feeding Modality:  Oral  Glucerna Shake Cans or Servings Per Day:  3       Frequency:  Daily (25 @ 17:46)    Intake:     Daily Weight in k.2 (2025 19:03)  Weight in k.2 (2025 16:03)  Weight in k.1 (31 Dec 2024 07:15)  Weight in k.1 (30 Dec 2024 14:27)  Weight in k.3 (30 Dec 2024 11:19)  Weight in k.4 (27 Dec 2024 12:35)  Weight in k.9 (27 Dec 2024 09:22)    % Weight Change    Pertinent Medications: MEDICATIONS  (STANDING):  calcitriol   Capsule 1 MICROGram(s) Oral <User Schedule>  chlorhexidine 2% Cloths 1 Application(s) Topical <User Schedule>  epoetin rudolph-epbx (RETACRIT) Injectable 6000 Unit(s) IV Push <User Schedule>  heparin   Injectable 5000 Unit(s) SubCutaneous every 12 hours  insulin lispro (ADMELOG) corrective regimen sliding scale   SubCutaneous three times a day before meals  insulin lispro (ADMELOG) corrective regimen sliding scale   SubCutaneous at bedtime  latanoprost 0.005% Ophthalmic Solution 1 Drop(s) Both EYES at bedtime  meropenem  IVPB 1000 milliGRAM(s) IV Intermittent every 24 hours  midodrine 5 milliGRAM(s) Oral every 8 hours  sevelamer carbonate 1600 milliGRAM(s) Oral three times a day with meals  tamsulosin 0.4 milliGRAM(s) Oral at bedtime    MEDICATIONS  (PRN):  acetaminophen     Tablet .. 650 milliGRAM(s) Oral every 6 hours PRN Temp greater or equal to 38C (100.4F), Mild Pain (1 - 3)  ondansetron Injectable 4 milliGRAM(s) IV Push every 8 hours PRN Nausea and/or Vomiting    Pertinent Labs:  Na137 mmol/L Glu 96 mg/dL K+ 3.6 mmol/L Cr  7.52 mg/dL[H] BUN 46 mg/dL[H]  Phos 4.1 mg/dL  Alb 2.3 g/dL[L]     CAPILLARY BLOOD GLUCOSE      POCT Blood Glucose.: 87 mg/dL (2025 06:44)  POCT Blood Glucose.: 126 mg/dL (2025 22:01)  POCT Blood Glucose.: 118 mg/dL (2025 15:26)  POCT Blood Glucose.: 124 mg/dL (2025 11:01)      Skin:     Estimated Needs:   [ ] no change since previous assessment  [ ] recalculated:     Previous Nutrition Diagnosis:   [ ] Inadequate Energy Intake [ ]Inadequate Oral Intake [ ] Excessive Energy Intake   [ ] Underweight [ ] Increased Nutrient Needs [ ] Overweight/Obesity  [ ] Swallowing Difficult   [ ] Altered GI Function [ ] Unintended Weight Loss [ ] Food & Nutrition Related Knowledge Deficit [ ] Malnutrition   [ ] Not Ready for Diet/Life Style Changes     Nutrition Diagnosis is [ ] ongoing  [ ] Improving   [ ] resolved [ ] not applicable     New Nutrition Diagnosis: [ ] not applicable       Interventions:   Recommend  [ ] Change Diet To:  [ ] Nutrition Supplement  [ ] Nutrition Support  [ ] Other:     Monitoring and Evaluation:   [ ] PO intake [ x ] Tolerance to diet prescription [ x ] weights [ x ] labs[ x ] follow up per protocol  [ ] other: Assessment:       Nutrition reassessment for follow-up. Chart reviewed, s/p RRT 24, transferred to ICU, pt visited, alert, verbally responsive, able to communicate but limited, confused at times per chart; ESRD on HD      Factors impacting intake: [ x ] other: acute on chronic comorbidities; cultural/ethnical/individual food preferences     Diet Prescription:   Diet, DASH/TLC:   Sodium & Cholesterol Restricted  1000mL Fluid Restriction ([id number])  For patients receiving Renal Replacement - No Protein Restr, No Conc K, No Conc Phos, Low Sodium (RENAL)  Supplement Feeding Modality:  Oral  Glucerna Shake Cans or Servings Per Day:  3       Frequency:  Daily (25 @ 17:46)    Daily Weight in k.2 (2025 19:03)  Weight in k.2 (2025 16:03)  Weight in k.1 (31 Dec 2024 07:15)  Weight in k.1 (30 Dec 2024 14:27)  Weight in k.3 (30 Dec 2024 11:19)  Weight in k.4 (27 Dec 2024 12:35)  Weight in k.9 (27 Dec 2024 09:22)    % Weight Change: a bit fluctuated, may due to fluid shift from HD and/or scale variance     Pertinent Medications: MEDICATIONS  (STANDING):  calcitriol   Capsule 1 MICROGram(s) Oral <User Schedule>  chlorhexidine 2% Cloths 1 Application(s) Topical <User Schedule>  epoetin rudolph-epbx (RETACRIT) Injectable 6000 Unit(s) IV Push <User Schedule>  heparin   Injectable 5000 Unit(s) SubCutaneous every 12 hours  insulin lispro (ADMELOG) corrective regimen sliding scale   SubCutaneous three times a day before meals  insulin lispro (ADMELOG) corrective regimen sliding scale   SubCutaneous at bedtime  latanoprost 0.005% Ophthalmic Solution 1 Drop(s) Both EYES at bedtime  meropenem  IVPB 1000 milliGRAM(s) IV Intermittent every 24 hours  midodrine 5 milliGRAM(s) Oral every 8 hours  sevelamer carbonate 1600 milliGRAM(s) Oral three times a day with meals  tamsulosin 0.4 milliGRAM(s) Oral at bedtime    MEDICATIONS  (PRN):  acetaminophen     Tablet .. 650 milliGRAM(s) Oral every 6 hours PRN Temp greater or equal to 38C (100.4F), Mild Pain (1 - 3)  ondansetron Injectable 4 milliGRAM(s) IV Push every 8 hours PRN Nausea and/or Vomiting    Pertinent Labs:  Na137 mmol/L Glu 96 mg/dL K+ 3.6 mmol/L Cr  7.52 mg/dL[H] BUN 46 mg/dL[H]  Phos 4.1 mg/dL  Alb 2.3 g/dL[L]     CAPILLARY BLOOD GLUCOSE    POCT Blood Glucose.: 87 mg/dL (2025 06:44)  POCT Blood Glucose.: 126 mg/dL (2025 22:01)  POCT Blood Glucose.: 118 mg/dL (2025 15:26)  POCT Blood Glucose.: 124 mg/dL (2025 11:01)    Skin: wounds     Estimated Needs:   [ x ] no change since previous assessment  [ ] recalculated:     Previous Nutrition Diagnosis:   [ x ] Increased Nutrient Needs     Nutrition Diagnosis is [ x ] ongoing  [ ] Improving   [ ] resolved [ ] not applicable     New Nutrition Diagnosis: [ x ] not applicable     Interventions/Recommend  [ x ] Change oral nutritional supplement to Nepro 1can daily as medically feasible ( 425 kcal, 19 g protein)   [ ] Nutrition Supplement  [ ] Nutrition Support  [ x ] Other: Discussed with team                   Provide food choices within diet Rx as available/updated                    Nursing to continue feeding assistance and encouragement as needed    Monitoring and Evaluation:   [ x ] PO intake [ x ] Tolerance to diet prescription [ x ] weights [ x ] labs[ x ] follow up per protocol  [ ] other:

## 2025-01-03 NOTE — CHART NOTE - NSCHARTNOTEFT_GEN_A_CORE
Comprehensive update provided to wife Almaz Dacosta present at bedside. Informed of uncomplicated dialysis today, weaned off IV pressors, on Midodrine. Waiting for bed on medicine floor. Support provided. All questions answered.

## 2025-01-04 LAB
ALBUMIN SERPL ELPH-MCNC: 2.2 G/DL — LOW (ref 3.5–5)
ALP SERPL-CCNC: 65 U/L — SIGNIFICANT CHANGE UP (ref 40–120)
ALT FLD-CCNC: 8 U/L DA — LOW (ref 10–60)
ANION GAP SERPL CALC-SCNC: 8 MMOL/L — SIGNIFICANT CHANGE UP (ref 5–17)
AST SERPL-CCNC: 13 U/L — SIGNIFICANT CHANGE UP (ref 10–40)
BILIRUB SERPL-MCNC: 0.5 MG/DL — SIGNIFICANT CHANGE UP (ref 0.2–1.2)
BUN SERPL-MCNC: 24 MG/DL — HIGH (ref 7–18)
CALCIUM SERPL-MCNC: 8.7 MG/DL — SIGNIFICANT CHANGE UP (ref 8.4–10.5)
CHLORIDE SERPL-SCNC: 100 MMOL/L — SIGNIFICANT CHANGE UP (ref 96–108)
CO2 SERPL-SCNC: 27 MMOL/L — SIGNIFICANT CHANGE UP (ref 22–31)
CREAT SERPL-MCNC: 4.87 MG/DL — HIGH (ref 0.5–1.3)
EGFR: 12 ML/MIN/1.73M2 — LOW
GLUCOSE BLDC GLUCOMTR-MCNC: 109 MG/DL — HIGH (ref 70–99)
GLUCOSE BLDC GLUCOMTR-MCNC: 113 MG/DL — HIGH (ref 70–99)
GLUCOSE BLDC GLUCOMTR-MCNC: 114 MG/DL — HIGH (ref 70–99)
GLUCOSE BLDC GLUCOMTR-MCNC: 88 MG/DL — SIGNIFICANT CHANGE UP (ref 70–99)
GLUCOSE SERPL-MCNC: 99 MG/DL — SIGNIFICANT CHANGE UP (ref 70–99)
HCT VFR BLD CALC: 30.1 % — LOW (ref 39–50)
HGB BLD-MCNC: 9.7 G/DL — LOW (ref 13–17)
MAGNESIUM SERPL-MCNC: 2.2 MG/DL — SIGNIFICANT CHANGE UP (ref 1.6–2.6)
MCHC RBC-ENTMCNC: 31.8 PG — SIGNIFICANT CHANGE UP (ref 27–34)
MCHC RBC-ENTMCNC: 32.2 G/DL — SIGNIFICANT CHANGE UP (ref 32–36)
MCV RBC AUTO: 98.7 FL — SIGNIFICANT CHANGE UP (ref 80–100)
NRBC # BLD: 0 /100 WBCS — SIGNIFICANT CHANGE UP (ref 0–0)
PHOSPHATE SERPL-MCNC: 2.7 MG/DL — SIGNIFICANT CHANGE UP (ref 2.5–4.5)
PLATELET # BLD AUTO: 178 K/UL — SIGNIFICANT CHANGE UP (ref 150–400)
POTASSIUM SERPL-MCNC: 3.3 MMOL/L — LOW (ref 3.5–5.3)
POTASSIUM SERPL-SCNC: 3.3 MMOL/L — LOW (ref 3.5–5.3)
PROT SERPL-MCNC: 7.5 G/DL — SIGNIFICANT CHANGE UP (ref 6–8.3)
RBC # BLD: 3.05 M/UL — LOW (ref 4.2–5.8)
RBC # FLD: 14.4 % — SIGNIFICANT CHANGE UP (ref 10.3–14.5)
SODIUM SERPL-SCNC: 135 MMOL/L — SIGNIFICANT CHANGE UP (ref 135–145)
WBC # BLD: 5.59 K/UL — SIGNIFICANT CHANGE UP (ref 3.8–10.5)
WBC # FLD AUTO: 5.59 K/UL — SIGNIFICANT CHANGE UP (ref 3.8–10.5)

## 2025-01-04 RX ADMIN — SEVELAMER CARBONATE 1600 MILLIGRAM(S): 800 TABLET, FILM COATED ORAL at 07:53

## 2025-01-04 RX ADMIN — LATANOPROST 1 DROP(S): 50 SOLUTION OPHTHALMIC at 21:50

## 2025-01-04 RX ADMIN — HEPARIN SODIUM 5000 UNIT(S): 1000 INJECTION, SOLUTION INTRAVENOUS; SUBCUTANEOUS at 05:51

## 2025-01-04 RX ADMIN — MIDODRINE HYDROCHLORIDE 5 MILLIGRAM(S): 5 TABLET ORAL at 13:25

## 2025-01-04 RX ADMIN — SEVELAMER CARBONATE 1600 MILLIGRAM(S): 800 TABLET, FILM COATED ORAL at 17:13

## 2025-01-04 RX ADMIN — HEPARIN SODIUM 5000 UNIT(S): 1000 INJECTION, SOLUTION INTRAVENOUS; SUBCUTANEOUS at 17:15

## 2025-01-04 RX ADMIN — MIDODRINE HYDROCHLORIDE 5 MILLIGRAM(S): 5 TABLET ORAL at 05:50

## 2025-01-04 RX ADMIN — MIDODRINE HYDROCHLORIDE 5 MILLIGRAM(S): 5 TABLET ORAL at 21:51

## 2025-01-04 RX ADMIN — TAMSULOSIN HYDROCHLORIDE 0.4 MILLIGRAM(S): 0.4 CAPSULE ORAL at 21:51

## 2025-01-04 RX ADMIN — SEVELAMER CARBONATE 1600 MILLIGRAM(S): 800 TABLET, FILM COATED ORAL at 12:21

## 2025-01-04 RX ADMIN — MEROPENEM 100 MILLIGRAM(S): 1 INJECTION, POWDER, FOR SOLUTION INTRAVENOUS at 13:25

## 2025-01-04 RX ADMIN — CHLORHEXIDINE GLUCONATE 1 APPLICATION(S): 1.2 RINSE ORAL at 05:51

## 2025-01-04 NOTE — PROGRESS NOTE ADULT - ASSESSMENT
67 yo M with PMH of  DM, HTN, ESRD on dialysis ( M, W, F), Last Dialysis 12/30/24,  B/L Charcot deformity and HLD, present to the ED for generalized weakness, chills, subjective fever. Pt admitted on 12/16 to medicine floor for treatment of sepsis 2/2 scrotal abscess, diabetic foot ulcer, gluteal abscess, influenza A positive. s/p multiple RRTs for somnolence, hypotension, fever. Admitted to the ICU for peripheral pressor req. and closer monitoring.     #Septic shock 2/2 multiple abscesses, diabetic foot ulcer  #acute encephalopathy   #ESRD on dialysis   #Charcot arthropathy B/L  #Diabetic ulceration R midfoot with chronic OM  #DM  #HTN  #HLD  #BPH    _________CNS___________  #Acute encephalopathy   - Encephalopathy likely 2/2 acute metabolic encephalopathy in the setting of septic shock  - ABG 7.44/CO2 37/HCO3 25/pO2 69  - c/w radha  - resolved    _________CVS___________  #septic shock 2/2 scrotal and gluteal abscesses, Diabetic ulceration R midfoot with chronic OM  #pressors  - Stopped pressors, started Midodrine  - central and a-line consent obtained if needed   - Given 250 cc boluses x2 throughout the night   - continue to monitor volume status , give fluids judiciously given ESRD    #HTN  hold BP meds in setting of shock    #HLD   c/w home meds     _________ RESP__________  #AHRF in the setting of influenza A   - CXR 12/31: Patchy medial right basilar opacities that could be due to atelectasis or infection.  - c/w NC as needed     __________GI____________  no acute issues     ________ RENAL__________  #ESRD on dialysis (MWF)  - last session 12/30, scheduled for today  - recent electrolytes within normal limits   - C/W epo, Renvela, calcitriol   - Given 250 cc boluses x2 throughout the night   - continue to monitor volume status   - Nephrology Dr. Chirinos following    #BPH  - c/w home meds     _________MSK___________  no active issues     __________ID____________  #septic shock 2/2 multiple abscesses, diabetic foot ulcer  #diabetic foot ulcer  #influenza A     #Scrotal abscess   - Scrotal Wound culture positive for  Proteus Mirabilis  - CT Pelvis: 2.1 cm right scrotal fluid collection suspicious for abscess. Several stones and both ureterovesical junctions without ureteral dilatation.  - ID Dr. Chavez following  - D/c Vanc, c/w Radha  - CT with 1.5 cm ring enhancing lesion in scrotum     #Diabetic foot ulcers   - According to podiatry note 12/28- No surgical treatement will be done at this time. Patient is stable from podiatry. Plan for outpatient bone biopsy when pt is off abx for 3 weeks which was discussed with patient and he is agreeable with the plan going forward.  - F/U Podiatry recs    #influenza A  - req 2 L NC   - c/w tamiflu     _________ENDO__________  #DM   - c/w insulin sliding scale     _______HEME/ONC_______  # Anemia of renal disease  - c/w Epogen 6000 units IV tiw.   - Monitor Hb    _________SKIN____________  #diabetic foot ulcer   plan as above     ________Prophylaxis_______  #DVT - heparin      __________GOC/ DISPO___________  FULL CODE   central line and arterial line consent in the chart    67 yo M with PMH of  DM, HTN, ESRD on dialysis ( M, W, F), Last Dialysis 12/30/24,  B/L Charcot deformity and HLD, present to the ED for generalized weakness, chills, subjective fever. Pt admitted on 12/16 to medicine floor for treatment of sepsis 2/2 scrotal abscess, diabetic foot ulcer, gluteal abscess, influenza A positive. s/p multiple RRTs for somnolence, hypotension, fever. Admitted to the ICU for peripheral pressor req. and closer monitoring.     #Septic shock 2/2 multiple abscesses, diabetic foot ulcer  #acute encephalopathy   #ESRD on dialysis   #Charcot arthropathy B/L  #Diabetic ulceration R midfoot with chronic OM  #DM  #HTN  #HLD  #BPH    _________CNS___________  #Acute encephalopathy   - Encephalopathy likely 2/2 acute metabolic encephalopathy in the setting of septic shock  - ABG 7.44/CO2 37/HCO3 25/pO2 69  - c/w radha  - resolved    _________CVS___________  #septic shock 2/2 scrotal and gluteal abscesses, Diabetic ulceration R midfoot with chronic OM  #pressors  - On Midodrine  - central and a-line consent obtained if needed   - Given 250 cc boluses x2 throughout the night   - continue to monitor volume status , give fluids judiciously given ESRD  RESOLVED     #HTN  Holding antihypertensives, soft pressures on Midodrine    #HLD   c/w home meds     _________ RESP__________  #AHRF in the setting of influenza A   - CXR 12/31: Patchy medial right basilar opacities that could be due to atelectasis or infection.  - c/w NC as needed     __________GI____________  no acute issues     ________ RENAL__________  #ESRD on dialysis (MWF)  - HD yesterday without complications  - recent electrolytes within normal limits   - C/W epo, Renvela, calcitriol   - Given 250 cc boluses x2 throughout the night   - continue to monitor volume status   - Nephrology Dr. Chirinos following    #BPH  - c/w home meds     _________MSK___________  no active issues     __________ID____________  #septic shock 2/2 multiple abscesses, diabetic foot ulcer  #diabetic foot ulcer  #influenza A     #Scrotal abscess   - Scrotal Wound culture positive for  Proteus Mirabilis  - CT Pelvis: 2.1 cm right scrotal fluid collection suspicious for abscess. Several stones and both ureterovesical junctions without ureteral dilatation.  - ID Dr. Chavez following  - D/c Vanc, c/w Radha  - CT with 1.5 cm ring enhancing lesion in scrotum   - Wound care as per urology     #Diabetic foot ulcers   - According to podiatry note 12/28- No surgical treatement will be done at this time. Patient is stable from podiatry. Plan for outpatient bone biopsy when pt is off abx for 3 weeks which was discussed with patient and he is agreeable with the plan going forward.  - F/U Podiatry recs    #influenza A  - req 2 L NC   - c/w tamiflu     _________ENDO__________  #DM   - c/w insulin sliding scale     _______HEME/ONC_______  # Anemia of renal disease  - c/w Epogen 6000 units IV tiw.   - Monitor Hb    _________SKIN____________  #diabetic foot ulcer   plan as above     ________Prophylaxis_______  #DVT - heparin      __________GOC/ DISPO___________  FULL CODE   central line and arterial line consent in the chart

## 2025-01-04 NOTE — PROGRESS NOTE ADULT - SUBJECTIVE AND OBJECTIVE BOX
INTERVAL HPI/OVERNIGHT EVENTS:       PRESSORS: [ ] YES [ ] NO  WHICH:    ANTIBIOTICS:                  DATE STARTED:  ANTIBIOTICS:                  DATE STARTED:    Antimicrobial:  meropenem  IVPB 1000 milliGRAM(s) IV Intermittent every 24 hours    Cardiovascular:  midodrine 5 milliGRAM(s) Oral every 8 hours    Pulmonary:    Hematalogic:  heparin   Injectable 5000 Unit(s) SubCutaneous every 12 hours    Other:  acetaminophen     Tablet .. 650 milliGRAM(s) Oral every 6 hours PRN  calcitriol   Capsule 1 MICROGram(s) Oral <User Schedule>  chlorhexidine 2% Cloths 1 Application(s) Topical <User Schedule>  epoetin rudolph-epbx (RETACRIT) Injectable 6000 Unit(s) IV Push <User Schedule>  insulin lispro (ADMELOG) corrective regimen sliding scale   SubCutaneous three times a day before meals  insulin lispro (ADMELOG) corrective regimen sliding scale   SubCutaneous at bedtime  latanoprost 0.005% Ophthalmic Solution 1 Drop(s) Both EYES at bedtime  ondansetron Injectable 4 milliGRAM(s) IV Push every 8 hours PRN  sevelamer carbonate 1600 milliGRAM(s) Oral three times a day with meals  tamsulosin 0.4 milliGRAM(s) Oral at bedtime    acetaminophen     Tablet .. 650 milliGRAM(s) Oral every 6 hours PRN  calcitriol   Capsule 1 MICROGram(s) Oral <User Schedule>  chlorhexidine 2% Cloths 1 Application(s) Topical <User Schedule>  epoetin rudolph-epbx (RETACRIT) Injectable 6000 Unit(s) IV Push <User Schedule>  heparin   Injectable 5000 Unit(s) SubCutaneous every 12 hours  insulin lispro (ADMELOG) corrective regimen sliding scale   SubCutaneous three times a day before meals  insulin lispro (ADMELOG) corrective regimen sliding scale   SubCutaneous at bedtime  latanoprost 0.005% Ophthalmic Solution 1 Drop(s) Both EYES at bedtime  meropenem  IVPB 1000 milliGRAM(s) IV Intermittent every 24 hours  midodrine 5 milliGRAM(s) Oral every 8 hours  ondansetron Injectable 4 milliGRAM(s) IV Push every 8 hours PRN  sevelamer carbonate 1600 milliGRAM(s) Oral three times a day with meals  tamsulosin 0.4 milliGRAM(s) Oral at bedtime    Drug Dosing Weight  Height (cm): 172.7 (25 Dec 2024 20:03)  Weight (kg): 77.9 (02 Jan 2025 17:00)  BMI (kg/m2): 26.1 (02 Jan 2025 17:00)  BSA (m2): 1.92 (02 Jan 2025 17:00)    PHYSICAL EXAM:  GENERAL: NAD  EYES: EOMI, PERRLA  NECK: Supple, No JVD; Normal thyroid; Trachea midline: No LAD   NERVOUS SYSTEM:  Alert & Oriented X3,  Motor Strength 5/5 B/L upper and lower extremities; DTRs 2+ intact and symmetric  CHEST/LUNG: No rales, rhonchi, wheezing, breath sounds present bilaterally  HEART: Regular rate and rhythm; No murmurs, no gallops  ABDOMEN: Soft, Nontender, Nondistended; Bowel sounds present, no pain or masses on palpation  : voiding well, Stout in place  EXTREMITIES:  2+ Peripheral Pulses, No clubbing, cyanosis, or edema  SKIN: warm, intact, no lesions     LINES/DRAINS/DEVICES  CENTRAL LINE: [ ] YES [ ] NO  LOCATION:     STOUT: [ ] YES [ ] NO     A-LINE:  [ ] YES [ ] NO  LOCATION:       ICU Vital Signs Last 24 Hrs  T(C): 36.7 (04 Jan 2025 01:00), Max: 36.7 (04 Jan 2025 01:00)  T(F): 98 (04 Jan 2025 01:00), Max: 98 (04 Jan 2025 01:00)  HR: 61 (04 Jan 2025 02:00) (58 - 74)  BP: 102/52 (04 Jan 2025 02:00) (80/51 - 127/85)  BP(mean): 66 (04 Jan 2025 02:00) (60 - 876)  ABP: --  ABP(mean): --  RR: 15 (04 Jan 2025 02:00) (11 - 24)  SpO2: 96% (04 Jan 2025 02:00) (93% - 100%)    O2 Parameters below as of 03 Jan 2025 20:00  Patient On (Oxygen Delivery Method): nasal cannula  O2 Flow (L/min): 3                01-02 @ 07:01  -  01-03 @ 07:00  --------------------------------------------------------  IN: 549.2 mL / OUT: 0 mL / NET: 549.2 mL              LABS:  CBC Full  -  ( 03 Jan 2025 03:35 )  WBC Count : 6.83 K/uL  RBC Count : 3.25 M/uL  Hemoglobin : 10.1 g/dL  Hematocrit : 31.8 %  Platelet Count - Automated : 168 K/uL  Mean Cell Volume : 97.8 fl  Mean Cell Hemoglobin : 31.1 pg  Mean Cell Hemoglobin Concentration : 31.8 g/dL  Auto Neutrophil # : x  Auto Lymphocyte # : x  Auto Monocyte # : x  Auto Eosinophil # : x  Auto Basophil # : x  Auto Neutrophil % : x  Auto Lymphocyte % : x  Auto Monocyte % : x  Auto Eosinophil % : x  Auto Basophil % : x    01-03    137  |  101  |  46[H]  ----------------------------<  96  3.6   |  26  |  7.52[H]    Ca    9.0      03 Jan 2025 03:35  Phos  4.1     01-03  Mg     2.5     01-03    TPro  7.6  /  Alb  2.3[L]  /  TBili  0.5  /  DBili  x   /  AST  12  /  ALT  8[L]  /  AlkPhos  72  01-03      Urinalysis Basic - ( 03 Jan 2025 03:35 )    Color: x / Appearance: x / SG: x / pH: x  Gluc: 96 mg/dL / Ketone: x  / Bili: x / Urobili: x   Blood: x / Protein: x / Nitrite: x   Leuk Esterase: x / RBC: x / WBC x   Sq Epi: x / Non Sq Epi: x / Bacteria: x          RADIOLOGY & ADDITIONAL STUDIES REVIEWED DURING TEAM ROUNDS    [ ]GOALS OF CARE DISCUSSION WITH PATIENT/FAMILY/PROXY:    CRITICAL CARE TIME SPENT: 35 minutes

## 2025-01-04 NOTE — PROGRESS NOTE ADULT - ASSESSMENT
Patient is a 67yo Male with ESRD on HD via Rt AVG, HTN, 2HPT, Anemia, LE venous stasis with Rt foot wound presents with weakness. Pt a/w Rt foot ulcer and hyperkalemia. Nephrology consulted for ESRD status.     1) ESRD: Last HD 1/3, with intradialytic hypotension for which UF goal was decreased from 1L to 0.5L. Plan for next HD 1/6. c/w MWF schedule. Monitor electrolytes  2) Hyperkalemia: resolved. c/w  low potassium diet   Monitor serum potassium  3) HTN with ESRD: BP low normal;  off IVF pressors. Now on midodrine 5mg PO tid. Titrate as per ICU.  c/w low sodium diet. Monitor BP.  4) Anemia of renal disease: Hb acceptable. c/w Epogen 6000 units IV tiw. Monitor Hb.  5) 2HPT: Serum calcium acceptable with acceptable serum phos.  PTHi 441. c/w calcitriol 1mcg PO MWF. Pt on Lanthanum 2000mg PO tid with meals which is unavailable at [facility]. c/w Sevelamer 1600mg PO tid with meals  c/w low phos diet Monitor serum calcium and phosphorus.    HepBsAg was neg on outpt labs on 12/11/24; repeat HepBsAg neg; likely positive HepBsAg on 12/16 is an error.       [facility]  Arden Tsang M.D.  Freeman Holland D.O.  Janny Chirinos M.D.  MD Ambar Mccarty, MSN, ANP-C    Telephone: [phone]  Facsimile: [phone]    [address] [place] [address], [address]  [place] [address]

## 2025-01-04 NOTE — CHART NOTE - NSCHARTNOTEFT_GEN_A_CORE
69M PMH DM, HTN, HLD, ESRD on HD ( M, W, F), B/L Charcot deformity ( follows with Dr Sim). Presents with fatigue, chills, fever. Found to have CTAP showing 1.5 cm rim-enhancing collection R right scrotum abscess and Influenza+. L buttock abscess drained +Trueperella bernardiae Cx 12/22 from scrotal drainage +Proteus mirabilis. Urology consulted, with recommendation for no surgical intervention. Initially admitted for sepsis 2/2 scrotal abscess, diabetic foot ulcer, gluteal abscess, influenza A. Hospital course c/b multiple RRTs on the floors for encephalopathy  iso worsening sepsis. Transferred to ICU for septic shock requiring pressors. Treated with meropenem (1/1- ) and vanco (12/31-1/2).  BCX NGTD. Last HD 1/3. Weaned off levo, and now on midodrine 5mg q8.     Patient is stable for downgrade. Signed out to Attending Dr Grey and PGY-1 Tamiko.     Things to follow up:  - Patient on midodrine 5mg q8. Wean off midodrine as tolerated.   - ABX duration as per ID.   - HD as per Nephro.  - Urology following for scrotal wound care.  - Podiatry following.

## 2025-01-04 NOTE — PROGRESS NOTE ADULT - ASSESSMENT
69 yo M with PMH of  DM, HTN, ESRD on dialysis ( M, W, F), Last Dialysis 12/30/24,  B/L Charcot deformity and HLD, present to the ED for generalized weakness, chills, subjective fever. Pt admitted on 12/16 to medicine floor for treatment of sepsis 2/2 scrotal abscess, diabetic foot ulcer, gluteal abscess, influenza A positive. s/p multiple RRTs for somnolence, hypotension, fever. Admitted to the ICU for peripheral pressor req. and closer monitoring.     #Septic shock 2/2 multiple abscesses, diabetic foot ulcer  #acute encephalopathy   #ESRD on dialysis   #Charcot arthropathy B/L  #Diabetic ulceration R midfoot with chronic OM  #DM  #HTN  #HLD  #BPH    _________CNS___________  #Acute encephalopathy   - Encephalopathy likely 2/2 acute metabolic encephalopathy in the setting of septic shock  - ABG 7.44/CO2 37/HCO3 25/pO2 69  - c/w radha  - resolved    _________CVS___________  #septic shock 2/2 scrotal and gluteal abscesses, Diabetic ulceration R midfoot with chronic OM  #pressors  - Stopped pressors, on Midodrine  - central and a-line consent obtained if needed   - Given 250 cc boluses x2 throughout the night   - continue to monitor volume status , give fluids judiciously given ESRD    #HTN  hold BP meds in setting of shock    #HLD   c/w home meds     _________ RESP__________  #AHRF in the setting of influenza A   - CXR 12/31: Patchy medial right basilar opacities that could be due to atelectasis or infection.  - c/w NC as needed     __________GI____________  no acute issues     ________ RENAL__________  #ESRD on dialysis (MWF)  - Dialysis today  - recent electrolytes within normal limits   - C/W epo, Renvela, calcitriol   - Given 250 cc boluses x2 throughout the night   - continue to monitor volume status   - Nephrology Dr. Chirinos following    #BPH  - c/w home meds     _________MSK___________  no active issues     __________ID____________  #septic shock 2/2 multiple abscesses, diabetic foot ulcer  #diabetic foot ulcer  #influenza A     #Scrotal abscess   - Scrotal Wound culture positive for  Proteus Mirabilis  - CT Pelvis: 2.1 cm right scrotal fluid collection suspicious for abscess. Several stones and both ureterovesical junctions without ureteral dilatation.  - ID Dr. Chavez following  - D/c Vanc, c/w Radha  - CT with 1.5 cm ring enhancing lesion in scrotum     #Diabetic foot ulcers   - According to podiatry note 12/28- No surgical treatement will be done at this time. Patient is stable from podiatry. Plan for outpatient bone biopsy when pt is off abx for 3 weeks which was discussed with patient and he is agreeable with the plan going forward.  - F/U Podiatry recs    #influenza A  - req 2 L NC   - c/w tamiflu     _________ENDO__________  #DM   - c/w insulin sliding scale     _______HEME/ONC_______  # Anemia of renal disease  - c/w Epogen 6000 units IV tiw.   - Monitor Hb    _________SKIN____________  #diabetic foot ulcer  Podiatry note pending   plan as above     ________Prophylaxis_______  #DVT - heparin      __________GOC/ DISPO___________  FULL CODE   central line and arterial line consent in the chart

## 2025-01-04 NOTE — CHART NOTE - NSCHARTNOTEFT_GEN_A_CORE
68/ M w PMHx of DM, HTN, ESRD on HD ( M, W, F)  B/L Charcot deformity ( follows w Dr Sim) and HLD, p/w fatigue, chills, fever, admitted to medicine for sepsis 2/2 scrotal abscess, diabetic foot ulcer, gluteal abscess, influenza A. S/p multiple RRTs for AMS 2/2 SIRS - sepsis, admitted to the ICU for septic shock requiring pressors. Treated with IV ABx ( Vanc, Kevin), CTAP- 1.5 cm rim-enhancing collection R right scrotum? abscess. Urology consulted- no surgical intervention.  BCx ( 12/31) neg. Last HD 1/1. Vanco d/c as per ID recs.   Patient was continued on home medications.   Patient successful weaned off pressors with midodrine and has been deemed stable for DG to medicine floor as per ICU attending     Signed out to medicine attending-  Dr. Larson   Signed out to floor provider -   Room- 417 C     Things to follow -     - wean off midodrine as tolerated   - ABx as per IDx Dr. Chavez rec   - HD as per nephro Dr. Chirinos   - Urology following   - Podiatry following 68/ M w PMHx of DM, HTN, ESRD on HD ( M, W, F)  B/L Charcot deformity ( follows w Dr Sim) and HLD, p/w fatigue, chills, fever, admitted to medicine for sepsis 2/2 scrotal abscess, diabetic foot ulcer, gluteal abscess, influenza A. S/p multiple RRTs for AMS 2/2 SIRS - sepsis, admitted to the ICU for septic shock requiring pressors. Treated with IV ABx ( Vanc, Kevin), CTAP- 1.5 cm rim-enhancing collection R right scrotum? abscess. Urology consulted- no surgical intervention.  BCx ( 12/31) neg. Last HD 1/1. Vanco d/c as per ID recs.   Patient was continued on home medications.   Patient successful weaned off pressors with midodrine and has been deemed stable for DG to medicine floor as per ICU attending     Signed out to medicine attending-  Dr. Larson   Signed out to floor provider - Dr. Angulo   Room- 417 C     Things to follow -     - wean off midodrine as tolerated   - ABx as per IDx Dr. Chavez rec   - HD as per nephro Dr. Chirinos   - Urology following   - Podiatry following

## 2025-01-04 NOTE — PROGRESS NOTE ADULT - SUBJECTIVE AND OBJECTIVE BOX
INTERVAL HPI/OVERNIGHT EVENTS: ***    PRESSORS: [ ] YES [ ] NO  WHICH:    Antimicrobial:  meropenem  IVPB 1000 milliGRAM(s) IV Intermittent every 24 hours    Cardiovascular:  midodrine 5 milliGRAM(s) Oral every 8 hours    Pulmonary:    Hematalogic:  heparin   Injectable 5000 Unit(s) SubCutaneous every 12 hours    Other:  acetaminophen     Tablet .. 650 milliGRAM(s) Oral every 6 hours PRN  calcitriol   Capsule 1 MICROGram(s) Oral <User Schedule>  chlorhexidine 2% Cloths 1 Application(s) Topical <User Schedule>  epoetin rudolph-epbx (RETACRIT) Injectable 6000 Unit(s) IV Push <User Schedule>  insulin lispro (ADMELOG) corrective regimen sliding scale   SubCutaneous three times a day before meals  insulin lispro (ADMELOG) corrective regimen sliding scale   SubCutaneous at bedtime  latanoprost 0.005% Ophthalmic Solution 1 Drop(s) Both EYES at bedtime  ondansetron Injectable 4 milliGRAM(s) IV Push every 8 hours PRN  sevelamer carbonate 1600 milliGRAM(s) Oral three times a day with meals  tamsulosin 0.4 milliGRAM(s) Oral at bedtime    acetaminophen     Tablet .. 650 milliGRAM(s) Oral every 6 hours PRN  calcitriol   Capsule 1 MICROGram(s) Oral <User Schedule>  chlorhexidine 2% Cloths 1 Application(s) Topical <User Schedule>  epoetin rudolph-epbx (RETACRIT) Injectable 6000 Unit(s) IV Push <User Schedule>  heparin   Injectable 5000 Unit(s) SubCutaneous every 12 hours  insulin lispro (ADMELOG) corrective regimen sliding scale   SubCutaneous three times a day before meals  insulin lispro (ADMELOG) corrective regimen sliding scale   SubCutaneous at bedtime  latanoprost 0.005% Ophthalmic Solution 1 Drop(s) Both EYES at bedtime  meropenem  IVPB 1000 milliGRAM(s) IV Intermittent every 24 hours  midodrine 5 milliGRAM(s) Oral every 8 hours  ondansetron Injectable 4 milliGRAM(s) IV Push every 8 hours PRN  sevelamer carbonate 1600 milliGRAM(s) Oral three times a day with meals  tamsulosin 0.4 milliGRAM(s) Oral at bedtime    Drug Dosing Weight  Height (cm): 172.7 (25 Dec 2024 20:03)  Weight (kg): 77.9 (02 Jan 2025 17:00)  BMI (kg/m2): 26.1 (02 Jan 2025 17:00)  BSA (m2): 1.92 (02 Jan 2025 17:00)    CENTRAL LINE: [ ] YES [ ] NO  LOCATION:   DATE INSERTED:  REMOVE: [ ] YES [ ] NO  EXPLAIN:    STOUT: [ ] YES [ ] NO    DATE INSERTED:  REMOVE:  [ ] YES [ ] NO  EXPLAIN:    A-LINE:  [ ] YES [ ] NO  LOCATION:   DATE INSERTED:  REMOVE:  [ ] YES [ ] NO  EXPLAIN:    PMH -reviewed admission note, no change since admission  PAST MEDICAL & SURGICAL HISTORY:  DM (diabetes mellitus)  type 2      ESRD (end stage renal disease) on dialysis  t-th-sat via right AV graft, uses midodrine the days of Dialysis for hypotension      Charcot foot due to diabetes mellitus      Diabetic retinopathy associated with type 2 diabetes mellitus      Hyperlipidemia      Abscess of scrotum      Hidradenitis      End stage renal disease      S/P foot surgery  right foot - 11/2014      Charcot's syndrome  Surgeries in feet and ankles   left ankle 2011 and right ankle 11/2014      History of incision and drainage  scrotal 5/2018      Hemodialysis access, AV graft          ICU Vital Signs Last 24 Hrs  T(C): 37 (04 Jan 2025 04:00), Max: 37 (04 Jan 2025 04:00)  T(F): 98.6 (04 Jan 2025 04:00), Max: 98.6 (04 Jan 2025 04:00)  HR: 64 (04 Jan 2025 09:00) (61 - 74)  BP: 125/63 (04 Jan 2025 09:00) (96/69 - 128/61)  BP(mean): 81 (04 Jan 2025 09:00) (66 - 876)  ABP: --  ABP(mean): --  RR: 17 (04 Jan 2025 09:00) (11 - 20)  SpO2: 99% (04 Jan 2025 09:00) (94% - 100%)    O2 Parameters below as of 04 Jan 2025 04:00  Patient On (Oxygen Delivery Method): nasal cannula  O2 Flow (L/min): 3                01-03 @ 07:01  -  01-04 @ 07:00  --------------------------------------------------------  IN: 50 mL / OUT: 0 mL / NET: 50 mL            PHYSICAL EXAM:    GENERAL: NAD, well-groomed, well-developed  HEAD:  Atraumatic, Normocephalic  EYES: EOMI, PERRLA, conjunctiva and sclera clear  ENMT: No tonsillar erythema, exudates, or enlargement; Moist mucous membranes, Good dentition, No lesions  NECK: Supple, normal appearance, No JVD; Normal thyroid; Trachea midline  NERVOUS SYSTEM:  Alert & Oriented X3,  Motor Strength 5/5 B/L upper and lower extremities; DTRs 2+ intact and symmetric  CHEST/LUNG: No chest deformity; Normal percussion bilaterally; No rales, rhonchi, wheezing   HEART: Regular rate and rhythm; No murmurs, rubs, or gallops  ABDOMEN: Soft, Nontender, Nondistended; Bowel sounds present  EXTREMITIES:  2+ Peripheral Pulses, No clubbing, cyanosis, or edema  LYMPH: No lymphadenopathy noted  SKIN: No rashes or lesions;  Good capillary refill      LABS:  CBC Full  -  ( 04 Jan 2025 03:25 )  WBC Count : 5.59 K/uL  RBC Count : 3.05 M/uL  Hemoglobin : 9.7 g/dL  Hematocrit : 30.1 %  Platelet Count - Automated : 178 K/uL  Mean Cell Volume : 98.7 fl  Mean Cell Hemoglobin : 31.8 pg  Mean Cell Hemoglobin Concentration : 32.2 g/dL  Auto Neutrophil # : x  Auto Lymphocyte # : x  Auto Monocyte # : x  Auto Eosinophil # : x  Auto Basophil # : x  Auto Neutrophil % : x  Auto Lymphocyte % : x  Auto Monocyte % : x  Auto Eosinophil % : x  Auto Basophil % : x    01-04    135  |  100  |  24[H]  ----------------------------<  99  3.3[L]   |  27  |  4.87[H]    Ca    8.7      04 Jan 2025 03:25  Phos  2.7     01-04  Mg     2.2     01-04    TPro  7.5  /  Alb  2.2[L]  /  TBili  0.5  /  DBili  x   /  AST  13  /  ALT  8[L]  /  AlkPhos  65  01-04      Urinalysis Basic - ( 04 Jan 2025 03:25 )    Color: x / Appearance: x / SG: x / pH: x  Gluc: 99 mg/dL / Ketone: x  / Bili: x / Urobili: x   Blood: x / Protein: x / Nitrite: x   Leuk Esterase: x / RBC: x / WBC x   Sq Epi: x / Non Sq Epi: x / Bacteria: x          RADIOLOGY & ADDITIONAL STUDIES REVIEWED:  ***    [ ]GOALS OF CARE DISCUSSION WITH PATIENT/FAMILY/PROXY:    CRITICAL CARE TIME SPENT: 35 minutes INTERVAL HPI/OVERNIGHT EVENTS: No acute overnight events reported. Patient ready for transfer to medicine floor, however planned bed was taken back.    PRESSORS: [ ] YES [X] NO      Antimicrobial:  meropenem  IVPB 1000 milliGRAM(s) IV Intermittent every 24 hours    Cardiovascular:  midodrine 5 milliGRAM(s) Oral every 8 hours    Pulmonary:    Hematalogic:  heparin   Injectable 5000 Unit(s) SubCutaneous every 12 hours    Other:  acetaminophen     Tablet .. 650 milliGRAM(s) Oral every 6 hours PRN  calcitriol   Capsule 1 MICROGram(s) Oral <User Schedule>  chlorhexidine 2% Cloths 1 Application(s) Topical <User Schedule>  epoetin rudolph-epbx (RETACRIT) Injectable 6000 Unit(s) IV Push <User Schedule>  insulin lispro (ADMELOG) corrective regimen sliding scale   SubCutaneous three times a day before meals  insulin lispro (ADMELOG) corrective regimen sliding scale   SubCutaneous at bedtime  latanoprost 0.005% Ophthalmic Solution 1 Drop(s) Both EYES at bedtime  ondansetron Injectable 4 milliGRAM(s) IV Push every 8 hours PRN  sevelamer carbonate 1600 milliGRAM(s) Oral three times a day with meals  tamsulosin 0.4 milliGRAM(s) Oral at bedtime    acetaminophen     Tablet .. 650 milliGRAM(s) Oral every 6 hours PRN  calcitriol   Capsule 1 MICROGram(s) Oral <User Schedule>  chlorhexidine 2% Cloths 1 Application(s) Topical <User Schedule>  epoetin rudolph-epbx (RETACRIT) Injectable 6000 Unit(s) IV Push <User Schedule>  heparin   Injectable 5000 Unit(s) SubCutaneous every 12 hours  insulin lispro (ADMELOG) corrective regimen sliding scale   SubCutaneous three times a day before meals  insulin lispro (ADMELOG) corrective regimen sliding scale   SubCutaneous at bedtime  latanoprost 0.005% Ophthalmic Solution 1 Drop(s) Both EYES at bedtime  meropenem  IVPB 1000 milliGRAM(s) IV Intermittent every 24 hours  midodrine 5 milliGRAM(s) Oral every 8 hours  ondansetron Injectable 4 milliGRAM(s) IV Push every 8 hours PRN  sevelamer carbonate 1600 milliGRAM(s) Oral three times a day with meals  tamsulosin 0.4 milliGRAM(s) Oral at bedtime    Drug Dosing Weight  Height (cm): 172.7 (25 Dec 2024 20:03)  Weight (kg): 77.9 (02 Jan 2025 17:00)  BMI (kg/m2): 26.1 (02 Jan 2025 17:00)  BSA (m2): 1.92 (02 Jan 2025 17:00)    CENTRAL LINE: [ ] YES [X] NO    STOUT: [ ] YES [X] NO    A-LINE:  [ ] YES [X] NO    PMH -reviewed admission note, no change since admission  PAST MEDICAL & SURGICAL HISTORY:  DM (diabetes mellitus)  type 2      ESRD (end stage renal disease) on dialysis  t-th-sat via right AV graft, uses midodrine the days of Dialysis for hypotension      Charcot foot due to diabetes mellitus      Diabetic retinopathy associated with type 2 diabetes mellitus      Hyperlipidemia      Abscess of scrotum      Hidradenitis      End stage renal disease      S/P foot surgery  right foot - 11/2014      Charcot's syndrome  Surgeries in feet and ankles   left ankle 2011 and right ankle 11/2014      History of incision and drainage  scrotal 5/2018      Hemodialysis access, AV graft          ICU Vital Signs Last 24 Hrs  T(C): 37 (04 Jan 2025 04:00), Max: 37 (04 Jan 2025 04:00)  T(F): 98.6 (04 Jan 2025 04:00), Max: 98.6 (04 Jan 2025 04:00)  HR: 64 (04 Jan 2025 09:00) (61 - 74)  BP: 125/63 (04 Jan 2025 09:00) (96/69 - 128/61)  BP(mean): 81 (04 Jan 2025 09:00) (66 - 876)  ABP: --  ABP(mean): --  RR: 17 (04 Jan 2025 09:00) (11 - 20)  SpO2: 99% (04 Jan 2025 09:00) (94% - 100%)    O2 Parameters below as of 04 Jan 2025 04:00  Patient On (Oxygen Delivery Method): nasal cannula  O2 Flow (L/min): 3                01-03 @ 07:01  -  01-04 @ 07:00  --------------------------------------------------------  IN: 50 mL / OUT: 0 mL / NET: 50 mL            PHYSICAL EXAM:    GENERAL: NAD, well-groomed, well-developed  HEAD:  Atraumatic, Normocephalic  EYES: EOMI, PERRLA, conjunctiva and sclera clear  ENMT: No tonsillar erythema, exudates, or enlargement; Moist mucous membranes, Poor dentition  NECK: Supple, normal appearance, No JVD  NERVOUS SYSTEM:  Alert & Oriented X3,  Motor Strength 5/5 B/L upper and lower extremities; DTRs 2+ intact and symmetric  CHEST/LUNG: No chest deformity; Normal percussion bilaterally; No rales, rhonchi, wheezing   HEART: Systolic murmur Regular rate and rhythm; No murmurs, rubs, or gallops  ABDOMEN: Soft, Nontender, Nondistended; Bowel sounds present, scrotal swelling improved  EXTREMITIES:  2+ Peripheral Pulses, No clubbing, cyanosis, or edema, Right sided chronic foot ulcer  LYMPH: No lymphadenopathy noted  SKIN: Good capillary refill      LABS:  CBC Full  -  ( 04 Jan 2025 03:25 )  WBC Count : 5.59 K/uL  RBC Count : 3.05 M/uL  Hemoglobin : 9.7 g/dL  Hematocrit : 30.1 %  Platelet Count - Automated : 178 K/uL  Mean Cell Volume : 98.7 fl  Mean Cell Hemoglobin : 31.8 pg  Mean Cell Hemoglobin Concentration : 32.2 g/dL  Auto Neutrophil # : x  Auto Lymphocyte # : x  Auto Monocyte # : x  Auto Eosinophil # : x  Auto Basophil # : x  Auto Neutrophil % : x  Auto Lymphocyte % : x  Auto Monocyte % : x  Auto Eosinophil % : x  Auto Basophil % : x    01-04    135  |  100  |  24[H]  ----------------------------<  99  3.3[L]   |  27  |  4.87[H]    Ca    8.7      04 Jan 2025 03:25  Phos  2.7     01-04  Mg     2.2     01-04    TPro  7.5  /  Alb  2.2[L]  /  TBili  0.5  /  DBili  x   /  AST  13  /  ALT  8[L]  /  AlkPhos  65  01-04      Urinalysis Basic - ( 04 Jan 2025 03:25 )    Color: x / Appearance: x / SG: x / pH: x  Gluc: 99 mg/dL / Ketone: x  / Bili: x / Urobili: x   Blood: x / Protein: x / Nitrite: x   Leuk Esterase: x / RBC: x / WBC x   Sq Epi: x / Non Sq Epi: x / Bacteria: x          RADIOLOGY & ADDITIONAL STUDIES REVIEWED:  None    [ ]GOALS OF CARE DISCUSSION WITH PATIENT/FAMILY/PROXY:    CRITICAL CARE TIME SPENT: 35 minutes

## 2025-01-04 NOTE — PROGRESS NOTE ADULT - ASSESSMENT
69M with hx of T2DM, hidradenitis suppurativa a/w generalized weakness, w small amount of purulent drainage from scrotum     12/23: scrotal exam stable with spontaneous drainage  12/24: scrotal exam stable, CT pelvis demonstrates 2.1 cm right scrotal fluid collection suspicious for abscess and bilateral UVJ stones without ureteral dilatation, wbc has been downtrending  12/27: scrotal exam stable, decreasing drainage amount, WBC continues to downtrend  12/30: stable scrotal exam, less than 1 gauze worth of purulence expressed, patient likely febrile from flu (still without leukocytosis, drainage from scrotum still decreasing), s/p RRT in PM for somnolence transferred to ICU started on levo  12/31: scrotal exam continues to improve, minimal purulence expressed from right scrotal tract, WBC 20 from 14, levo 0.07    Scrotal exam continues to improve so clinical decline unlikely to be related to scrotum, however patient did have bilateral non-obstructing UVJ stones on 12/23 CTAP (to level of knees to also re-assess scrotal collection) so would be worthwhile to rescan patient to determine whether any stones have become obstructive.     1/1: repeat CTAP demonstrates 1.5x0.4cm right scrotal collection compared to 4x0.7cm collection on 12/23/24, still no hydronephrosis bilaterally to suggest obstructive uropathy  1/2: scrotal exam continues to improve, WBC downtrending, downtrending levo requirement 0.03  1/3: scrotal exam stable, WBC normalized 6.8, off pressors, afebrile, listed to the floor    - Repeat CTAP confirms decreasing size of scrotal collection > continue conservative management  - F/u repeat bcx > NGTD  - Continue Flomax  - keep scrotal area clean and dry, may include sitz baths   - continue scrotal dressing changes qD (can be done by nursing: squeeze out purulence from right scrotum until serosanguinous and wipe with 4x4 gauze, place fresh 4x4 gauze overlying the drainage tract)  - IV antibiotics, follow ID recommendations   - strict glycemic control   - HD as per neph   - remainder of care as per primary team

## 2025-01-04 NOTE — PROGRESS NOTE ADULT - SUBJECTIVE AND OBJECTIVE BOX
Full note to follow. [facility] NEPHROLOGY- PROGRESS NOTE    Patient is a 69yo Male with ESRD on HD via Rt AVG, HTN, 2HPT, Anemia, LE venous stasis with Rt foot wound presents with weakness. Pt a/w Rt foot ulcer and hyperkalemia. Nephrology consulted for ESRD status.   MRI Rt foot: + osteomyelitis involving the talus and less so of the navicular bone;    BCx +Staph hominis  12/20: s/p I& D of buttock abscess  +scrotal abscess  12/29: s/p RRT for lethargy;  febrile +FLU A   12/30: s/p RRT for somnolent  +fever; transferred to ICU for pressors/ septic shock    Hospital Medications: Medications reviewed.  REVIEW OF SYSTEMS:  Denies any SOB, cough, chest pain or n/v/d, abd pain or foot pain      VITALS:  T(F): 96.8 (01-04-25 @ 15:34), Max: 98.6 (01-04-25 @ 04:00)  HR: 70 (01-04-25 @ 21:00)  BP: 131/65 (01-04-25 @ 21:00)  RR: 10 (01-04-25 @ 21:00)  SpO2: 100% (01-04-25 @ 21:00)  Wt(kg): --    01-03 @ 07:01  -  01-04 @ 07:00  --------------------------------------------------------  IN: 50 mL / OUT: 0 mL / NET: 50 mL      Weight (kg): 77.9 (01-02 @ 17:00)    PHYSICAL EXAM:  Gen: NAD, more awake,  Obese  HEENT: anicteric  Cards: RRR, +S1/S2, no M/G/R  Resp: +mild rales at rt base, clear left side  GI: soft, NT/ND, NABS  Extremities: no b/l LE edema, extremely dry LE; rt foot covered  Access: Rt AVG +thrill +bruit      LABS:  01-04    135  |  100  |  24[H]  ----------------------------<  99  3.3[L]   |  27  |  4.87[H]    Ca    8.7      04 Jan 2025 03:25  Phos  2.7     01-04  Mg     2.2     01-04    TPro  7.5  /  Alb  2.2[L]  /  TBili  0.5  /  DBili      /  AST  13  /  ALT  8[L]  /  AlkPhos  65  01-04    Creatinine Trend: 4.87 <--, 7.52 <--, 6.27 <--, 8.55 <--, 6.65 <--, 10.60 <--, 8.53 <--                        9.7    5.59  )-----------( 178      ( 04 Jan 2025 03:25 )             30.1     Urine Studies:  Urinalysis Basic - ( 04 Jan 2025 03:25 )    Color:  / Appearance:  / SG:  / pH:   Gluc: 99 mg/dL / Ketone:   / Bili:  / Urobili:    Blood:  / Protein:  / Nitrite:    Leuk Esterase:  / RBC:  / WBC    Sq Epi:  / Non Sq Epi:  / Bacteria:

## 2025-01-04 NOTE — PROGRESS NOTE ADULT - SUBJECTIVE AND OBJECTIVE BOX
INTERVAL HPI/OVERNIGHT EVENTS:  seen and examined   Pt resting comfortably. No acute complaints.     MEDICATIONS  (STANDING):  calcitriol   Capsule 1 MICROGram(s) Oral <User Schedule>  chlorhexidine 2% Cloths 1 Application(s) Topical <User Schedule>  epoetin rudolph-epbx (RETACRIT) Injectable 6000 Unit(s) IV Push <User Schedule>  heparin   Injectable 5000 Unit(s) SubCutaneous every 12 hours  insulin lispro (ADMELOG) corrective regimen sliding scale   SubCutaneous three times a day before meals  insulin lispro (ADMELOG) corrective regimen sliding scale   SubCutaneous at bedtime  latanoprost 0.005% Ophthalmic Solution 1 Drop(s) Both EYES at bedtime  meropenem  IVPB 1000 milliGRAM(s) IV Intermittent every 24 hours  midodrine 5 milliGRAM(s) Oral every 8 hours  sevelamer carbonate 1600 milliGRAM(s) Oral three times a day with meals  tamsulosin 0.4 milliGRAM(s) Oral at bedtime    MEDICATIONS  (PRN):  acetaminophen     Tablet .. 650 milliGRAM(s) Oral every 6 hours PRN Temp greater or equal to 38C (100.4F), Mild Pain (1 - 3)  ondansetron Injectable 4 milliGRAM(s) IV Push every 8 hours PRN Nausea and/or Vomiting      Vital Signs Last 24 Hrs  T(C): 37 (04 Jan 2025 04:00), Max: 37 (04 Jan 2025 04:00)  T(F): 98.6 (04 Jan 2025 04:00), Max: 98.6 (04 Jan 2025 04:00)  HR: 64 (04 Jan 2025 09:00) (61 - 74)  BP: 125/63 (04 Jan 2025 09:00) (96/69 - 128/61)  BP(mean): 81 (04 Jan 2025 09:00) (66 - 876)  RR: 17 (04 Jan 2025 09:00) (11 - 20)  SpO2: 99% (04 Jan 2025 09:00) (94% - 100%)    Parameters below as of 04 Jan 2025 04:00  Patient On (Oxygen Delivery Method): nasal cannula  O2 Flow (L/min): 3      Physical:  General: NAD.  Respirations: Unlabored  : no purulence expressed from right scrotal collection, dressing changed      I&O's Detail    03 Jan 2025 07:01  -  04 Jan 2025 07:00  --------------------------------------------------------  IN:    IV PiggyBack: 50 mL  Total IN: 50 mL    OUT:    Voided (mL): 0 mL  Total OUT: 0 mL    Total NET: 50 mL          LABS:                        9.7    5.59  )-----------( 178      ( 04 Jan 2025 03:25 )             30.1             01-04    135  |  100  |  24[H]  ----------------------------<  99  3.3[L]   |  27  |  4.87[H]    Ca    8.7      04 Jan 2025 03:25  Phos  2.7     01-04  Mg     2.2     01-04    TPro  7.5  /  Alb  2.2[L]  /  TBili  0.5  /  DBili  x   /  AST  13  /  ALT  8[L]  /  AlkPhos  65  01-04      69y.o. Male

## 2025-01-05 LAB
ALBUMIN SERPL ELPH-MCNC: 2.4 G/DL — LOW (ref 3.5–5)
ALP SERPL-CCNC: 63 U/L — SIGNIFICANT CHANGE UP (ref 40–120)
ALT FLD-CCNC: 7 U/L DA — LOW (ref 10–60)
ANION GAP SERPL CALC-SCNC: 10 MMOL/L — SIGNIFICANT CHANGE UP (ref 5–17)
AST SERPL-CCNC: 11 U/L — SIGNIFICANT CHANGE UP (ref 10–40)
BILIRUB SERPL-MCNC: 0.5 MG/DL — SIGNIFICANT CHANGE UP (ref 0.2–1.2)
BUN SERPL-MCNC: 33 MG/DL — HIGH (ref 7–18)
CALCIUM SERPL-MCNC: 8.9 MG/DL — SIGNIFICANT CHANGE UP (ref 8.4–10.5)
CHLORIDE SERPL-SCNC: 102 MMOL/L — SIGNIFICANT CHANGE UP (ref 96–108)
CO2 SERPL-SCNC: 25 MMOL/L — SIGNIFICANT CHANGE UP (ref 22–31)
CREAT SERPL-MCNC: 6.52 MG/DL — HIGH (ref 0.5–1.3)
CULTURE RESULTS: SIGNIFICANT CHANGE UP
CULTURE RESULTS: SIGNIFICANT CHANGE UP
EGFR: 9 ML/MIN/1.73M2 — LOW
GLUCOSE BLDC GLUCOMTR-MCNC: 104 MG/DL — HIGH (ref 70–99)
GLUCOSE BLDC GLUCOMTR-MCNC: 142 MG/DL — HIGH (ref 70–99)
GLUCOSE BLDC GLUCOMTR-MCNC: 143 MG/DL — HIGH (ref 70–99)
GLUCOSE BLDC GLUCOMTR-MCNC: 98 MG/DL — SIGNIFICANT CHANGE UP (ref 70–99)
GLUCOSE SERPL-MCNC: 98 MG/DL — SIGNIFICANT CHANGE UP (ref 70–99)
HCT VFR BLD CALC: 31.9 % — LOW (ref 39–50)
HGB BLD-MCNC: 10.5 G/DL — LOW (ref 13–17)
MAGNESIUM SERPL-MCNC: 2.4 MG/DL — SIGNIFICANT CHANGE UP (ref 1.6–2.6)
MCHC RBC-ENTMCNC: 32.1 PG — SIGNIFICANT CHANGE UP (ref 27–34)
MCHC RBC-ENTMCNC: 32.9 G/DL — SIGNIFICANT CHANGE UP (ref 32–36)
MCV RBC AUTO: 97.6 FL — SIGNIFICANT CHANGE UP (ref 80–100)
NRBC # BLD: 0 /100 WBCS — SIGNIFICANT CHANGE UP (ref 0–0)
PHOSPHATE SERPL-MCNC: 3.4 MG/DL — SIGNIFICANT CHANGE UP (ref 2.5–4.5)
PLATELET # BLD AUTO: 227 K/UL — SIGNIFICANT CHANGE UP (ref 150–400)
POTASSIUM SERPL-MCNC: 3.3 MMOL/L — LOW (ref 3.5–5.3)
POTASSIUM SERPL-SCNC: 3.3 MMOL/L — LOW (ref 3.5–5.3)
PROT SERPL-MCNC: 7.5 G/DL — SIGNIFICANT CHANGE UP (ref 6–8.3)
RBC # BLD: 3.27 M/UL — LOW (ref 4.2–5.8)
RBC # FLD: 14.2 % — SIGNIFICANT CHANGE UP (ref 10.3–14.5)
SODIUM SERPL-SCNC: 137 MMOL/L — SIGNIFICANT CHANGE UP (ref 135–145)
SPECIMEN SOURCE: SIGNIFICANT CHANGE UP
SPECIMEN SOURCE: SIGNIFICANT CHANGE UP
WBC # BLD: 5.55 K/UL — SIGNIFICANT CHANGE UP (ref 3.8–10.5)
WBC # FLD AUTO: 5.55 K/UL — SIGNIFICANT CHANGE UP (ref 3.8–10.5)

## 2025-01-05 PROCEDURE — 99233 SBSQ HOSP IP/OBS HIGH 50: CPT

## 2025-01-05 PROCEDURE — 99232 SBSQ HOSP IP/OBS MODERATE 35: CPT

## 2025-01-05 PROCEDURE — G0545: CPT

## 2025-01-05 RX ADMIN — SEVELAMER CARBONATE 1600 MILLIGRAM(S): 800 TABLET, FILM COATED ORAL at 13:02

## 2025-01-05 RX ADMIN — SEVELAMER CARBONATE 1600 MILLIGRAM(S): 800 TABLET, FILM COATED ORAL at 17:24

## 2025-01-05 RX ADMIN — HEPARIN SODIUM 5000 UNIT(S): 1000 INJECTION, SOLUTION INTRAVENOUS; SUBCUTANEOUS at 17:24

## 2025-01-05 RX ADMIN — LATANOPROST 1 DROP(S): 50 SOLUTION OPHTHALMIC at 22:29

## 2025-01-05 RX ADMIN — MIDODRINE HYDROCHLORIDE 5 MILLIGRAM(S): 5 TABLET ORAL at 21:35

## 2025-01-05 RX ADMIN — TAMSULOSIN HYDROCHLORIDE 0.4 MILLIGRAM(S): 0.4 CAPSULE ORAL at 21:35

## 2025-01-05 RX ADMIN — SEVELAMER CARBONATE 1600 MILLIGRAM(S): 800 TABLET, FILM COATED ORAL at 08:40

## 2025-01-05 RX ADMIN — MIDODRINE HYDROCHLORIDE 5 MILLIGRAM(S): 5 TABLET ORAL at 13:23

## 2025-01-05 RX ADMIN — MEROPENEM 100 MILLIGRAM(S): 1 INJECTION, POWDER, FOR SOLUTION INTRAVENOUS at 13:22

## 2025-01-05 RX ADMIN — MIDODRINE HYDROCHLORIDE 5 MILLIGRAM(S): 5 TABLET ORAL at 05:43

## 2025-01-05 RX ADMIN — HEPARIN SODIUM 5000 UNIT(S): 1000 INJECTION, SOLUTION INTRAVENOUS; SUBCUTANEOUS at 05:43

## 2025-01-05 RX ADMIN — CHLORHEXIDINE GLUCONATE 1 APPLICATION(S): 1.2 RINSE ORAL at 05:43

## 2025-01-05 NOTE — PROGRESS NOTE ADULT - SUBJECTIVE AND OBJECTIVE BOX
[facility] NEPHROLOGY- PROGRESS NOTE    Patient is a 69yo Male with ESRD on HD via Rt AVG, HTN, 2HPT, Anemia, LE venous stasis with Rt foot wound presents with weakness. Pt a/w Rt foot ulcer and hyperkalemia. Nephrology consulted for ESRD status.   MRI Rt foot: + osteomyelitis involving the talus and less so of the navicular bone;    BCx +Staph hominis  12/20: s/p I& D of buttock abscess  +scrotal abscess  12/29: s/p RRT for lethargy;  febrile +FLU A   12/30: s/p RRT for somnolent  +fever; transferred to ICU for pressors/ septic shock    Hospital Medications: Medications reviewed.  REVIEW OF SYSTEMS:  Denies any SOB, cough, chest pain or n/v/d, abd pain or foot pain        VITALS:  T(F): 97.1 (01-05-25 @ 13:33), Max: 97.3 (01-05-25 @ 01:05)  HR: 72 (01-05-25 @ 13:33)  BP: 101/55 (01-05-25 @ 13:33)  RR: 16 (01-05-25 @ 13:33)  SpO2: 94% (01-05-25 @ 13:33)  Wt(kg): --      PHYSICAL EXAM:  Gen: NAD, more awake,  Obese  HEENT: anicteric  Cards: RRR, +S1/S2, no M/G/R  Resp: +mild rales at rt base, clear left side  GI: soft, NT/ND, NABS  Extremities: no b/l LE edema, extremely dry LE; rt foot covered  Access: Rt AVG +thrill +bruit      LABS:  01-05    137  |  102  |  33[H]  ----------------------------<  98  3.3[L]   |  25  |  6.52[H]    Ca    8.9      05 Jan 2025 08:02  Phos  3.4     01-05  Mg     2.4     01-05    TPro  7.5  /  Alb  2.4[L]  /  TBili  0.5  /  DBili      /  AST  11  /  ALT  7[L]  /  AlkPhos  63  01-05    Creatinine Trend: 6.52 <--, 4.87 <--, 7.52 <--, 6.27 <--, 8.55 <--, 6.65 <--, 10.60 <--                        10.5   5.55  )-----------( 227      ( 05 Jan 2025 08:02 )             31.9     Urine Studies:  Urinalysis Basic - ( 05 Jan 2025 08:02 )    Color:  / Appearance:  / SG:  / pH:   Gluc: 98 mg/dL / Ketone:   / Bili:  / Urobili:    Blood:  / Protein:  / Nitrite:    Leuk Esterase:  / RBC:  / WBC    Sq Epi:  / Non Sq Epi:  / Bacteria:

## 2025-01-05 NOTE — PROGRESS NOTE ADULT - ASSESSMENT
I have reviewed his CBC, BMP, glucose data. WBC is 5.55 (down from a peak of 20). K 3.3. Cr 6.52, consistent with ESRD. Glucose .     I have personally reviewed CT abdomen from 1/1 and do note infiltrate at right base with air bronchograms, reasonable to treat for aspiration PNA    I have discussed case with Dr. Scott Cervantes of ID.     Assessment and Plan:    69 yo M with PMH of  DM, HTN, ESRD on dialysis ( M, W, F), Last Dialysis 12/30/24,  B/L Charcot deformity and HLD, present to the ED for generalized weakness, chills, subjective fever. Pt admitted on 12/16 to medicine floor for treatment of sepsis 2/2 scrotal abscess, diabetic foot ulcer, gluteal abscess, influenza A positive. s/p multiple RRTs for somnolence, hypotension, fever. Admitted to the ICU for peripheral pressor req. and closer monitoring.   Patient downgraded to Medicine floor in PM on 1/4/15.     #Septic shock 2/2 multiple abscesses, diabetic foot ulcer  #Aspiration PNA  #acute encephalopathy, resolved  #ESRD on dialysis   #Charcot arthropathy B/L  #Diabetic ulceration R midfoot with chronic OM  #DM  #HTN  #HLD  #BPH    Plan:  - Antibioitcs per ID; continuing meropenem for time being, Dr. Chavez will calculate but she is leaning 7 days more meropenem  - Urology follow up noted, conservative management  - Repeat CT scan with small scrotal collection  - Off vasopressors   - Cont low dose midodrine   - Oral diet  - Strict glucose control  - HD as per schedule  - Podiatry for wound care  - PT evaluation  - Transfer out of ICU.

## 2025-01-05 NOTE — PROGRESS NOTE ADULT - SUBJECTIVE AND OBJECTIVE BOX
S: patient is drowsy but does not report any new symptoms   I evaluated him at bedside along with Dr. Chavez of Infectious Diseases    O:  Vital Signs Last 24 Hrs  T(C): 36.2 (05 Jan 2025 05:33), Max: 36.3 (05 Jan 2025 01:05)  T(F): 97.2 (05 Jan 2025 05:33), Max: 97.3 (05 Jan 2025 01:05)  HR: 66 (05 Jan 2025 05:33) (61 - 71)  BP: 109/69 (05 Jan 2025 05:33) (89/53 - 140/64)  BP(mean): 79 (04 Jan 2025 23:00) (65 - 83)  RR: 16 (05 Jan 2025 05:33) (10 - 19)  SpO2: 95% (05 Jan 2025 05:33) (92% - 100%)    Parameters below as of 05 Jan 2025 05:33  Patient On (Oxygen Delivery Method): room air        GENERAL: laying in bed in no distress  HEAD:  Atraumatic, Normocephalic  EYES:  conjunctiva and sclera clear  NECK: Supple, No JVD  CHEST/LUNG: Clear to auscultation bilaterally; decreased breath sounds at the bilateral bases of the lungs  HEART: Regular rate and rhythm; systolic murmur appreciated  ABDOMEN: Soft, Nontender, Nondistended  : able to express drainage from an old surgical scar on the right anterior aspect of the scrotum   EXTREMITIES:  np edema  PSYCH: AAOx3  NEUROLOGY: non-focal      acetaminophen     Tablet .. 650 milliGRAM(s) Oral every 6 hours PRN  calcitriol   Capsule 1 MICROGram(s) Oral <User Schedule>  chlorhexidine 2% Cloths 1 Application(s) Topical <User Schedule>  epoetin rudolph-epbx (RETACRIT) Injectable 6000 Unit(s) IV Push <User Schedule>  heparin   Injectable 5000 Unit(s) SubCutaneous every 12 hours  insulin lispro (ADMELOG) corrective regimen sliding scale   SubCutaneous three times a day before meals  insulin lispro (ADMELOG) corrective regimen sliding scale   SubCutaneous at bedtime  latanoprost 0.005% Ophthalmic Solution 1 Drop(s) Both EYES at bedtime  meropenem  IVPB 1000 milliGRAM(s) IV Intermittent every 24 hours  midodrine 5 milliGRAM(s) Oral every 8 hours  ondansetron Injectable 4 milliGRAM(s) IV Push every 8 hours PRN  sevelamer carbonate 1600 milliGRAM(s) Oral three times a day with meals  tamsulosin 0.4 milliGRAM(s) Oral at bedtime                            10.5   5.55  )-----------( 227      ( 05 Jan 2025 08:02 )             31.9       01-05    137  |  102  |  33[H]  ----------------------------<  98  3.3[L]   |  25  |  6.52[H]    Ca    8.9      05 Jan 2025 08:02  Phos  3.4     01-05  Mg     2.4     01-05    TPro  7.5  /  Alb  2.4[L]  /  TBili  0.5  /  DBili  x   /  AST  11  /  ALT  7[L]  /  AlkPhos  63  01-05

## 2025-01-05 NOTE — PROGRESS NOTE ADULT - ASSESSMENT
Patient is a 67yo Male with ESRD on HD via Rt AVG, HTN, 2HPT, Anemia, LE venous stasis with Rt foot wound presents with weakness. Pt a/w Rt foot ulcer and hyperkalemia. Nephrology consulted for ESRD status.     1) ESRD: Last HD 1/3, with intradialytic hypotension for which UF goal was decreased from 1L to 0.5L. Plan for next HD 1/6. c/w MWF schedule. Monitor electrolytes  2) Hyperkalemia: resolved. c/w  low potassium diet   Monitor serum potassium  3) HTN with ESRD: BP low normal;  off IVF pressors. Now on midodrine 5mg PO tid. Titrate as per ICU.  c/w low sodium diet. Monitor BP.  4) Anemia of renal disease: Hb acceptable. c/w Epogen 6000 units IV tiw. Monitor Hb.  5) 2HPT: Serum calcium acceptable with acceptable serum phos.  PTHi 441. c/w calcitriol 1mcg PO MWF. Pt on Lanthanum 2000mg PO tid with meals which is unavailable at [facility]. c/w Sevelamer 1600mg PO tid with meals  c/w low phos diet Monitor serum calcium and phosphorus.    HepBsAg was neg on outpt labs on 12/11/24; repeat HepBsAg neg; likely positive HepBsAg on 12/16 is an error.       [facility] NEPHROLOGY  Arden Tsang M.D.  Freeman Holland D.O.  Janny Chirinos M.D.  MD Ambar Mccarty, MSN, ANP-C    Telephone: [phone]  Facsimile: [phone]    [address] [place] [address], [address]  [place] [address]

## 2025-01-05 NOTE — PROGRESS NOTE ADULT - ASSESSMENT
Subjective: NAD, afebrile, no new complains.     MEDS:  acetaminophen     Tablet .. 650 milliGRAM(s) Oral every 6 hours PRN  calcitriol   Capsule 1 MICROGram(s) Oral <User Schedule>  chlorhexidine 2% Cloths 1 Application(s) Topical <User Schedule>  epoetin rudolph-epbx (RETACRIT) Injectable 6000 Unit(s) IV Push <User Schedule>  heparin   Injectable 5000 Unit(s) SubCutaneous every 12 hours  insulin lispro (ADMELOG) corrective regimen sliding scale   SubCutaneous three times a day before meals  insulin lispro (ADMELOG) corrective regimen sliding scale   SubCutaneous at bedtime  latanoprost 0.005% Ophthalmic Solution 1 Drop(s) Both EYES at bedtime  midodrine 5 milliGRAM(s) Oral every 8 hours  ondansetron Injectable 4 milliGRAM(s) IV Push every 8 hours PRN  sevelamer carbonate 1600 milliGRAM(s) Oral three times a day with meals  tamsulosin 0.4 milliGRAM(s) Oral at bedtime    REVIEW OF SYSTEMS:  CONSTITUTIONAL:  No fevers or chills  EYES/ENT:  No visual changes;  No vertigo or throat pain   NECK:  No pain or stiffness  RESPIRATORY:  No cough, wheezing, hemoptysis; No shortness of breath  CARDIOVASCULAR:  No chest pain or palpitations  GASTROINTESTINAL:  No abdominal or epigastric pain. No nausea vomiting, or diarrhea  GENITOURINARY:  No dysuria, frequency or hematuria  NEUROLOGICAL:  No numbness or weakness  MSK: no back pain, no joint pain  SKIN:  No itching, rashes    PE:  Vital Signs Last 24 Hrs  T(C): 36.2 (05 Jan 2025 05:33), Max: 36.3 (05 Jan 2025 01:05)  T(F): 97.2 (05 Jan 2025 05:33), Max: 97.3 (05 Jan 2025 01:05)  HR: 66 (05 Jan 2025 05:33) (61 - 71)  BP: 109/69 (05 Jan 2025 05:33) (89/53 - 140/64)  BP(mean): 79 (04 Jan 2025 23:00) (65 - 83)  RR: 16 (05 Jan 2025 05:33) (10 - 19)  SpO2: 95% (05 Jan 2025 05:33) (92% - 100%)    Parameters below as of 05 Jan 2025 05:33  Patient On (Oxygen Delivery Method): room air    Gen: AOx3, NAD, non-toxic, pleasant  HEAD:  Atraumatic, Normocephalic  EYES: PERRLA, conjunctiva and sclera clear  ENT: Moist mucous membranes  NECK: Supple, No JVD  CV: S1+S2 + RRR  Resp: Clear bilat, no resp distress, no crackles/wheezes  Abd: Soft, nontender, +BS  Ext: No LE edema  LE hyperkeratosis  : No dysuria  R scrotum with scarring tissue and drainage of yellowish fluid soiling bedding   IV/Skin: No thrombophlebitis  Msk: No low back pain, no arthralgias, no joint swelling  Neuro: AAOx3. No focal signs    LABS:                        10.5   5.55  )-----------( 227      ( 05 Jan 2025 08:02 )             31.9     01-05    137  |  102  |  33[H]  ----------------------------<  98  3.3[L]   |  25  |  6.52[H]    Ca    8.9      05 Jan 2025 08:02  Phos  3.4     01-05  Mg     2.4     01-05    TPro  7.5  /  Alb  2.4[L]  /  TBili  0.5  /  DBili  x   /  AST  11  /  ALT  7[L]  /  AlkPhos  63  01-05    MICROBIOLOGY:  v    .Blood BLOOD  12-31-24   No growth at 5 days  --  --    .Blood BLOOD  12-29-24   No growth at 5 days  --  --    .Blood BLOOD  12-29-24   No growth at 5 days  --  --    .Genital  12-22-24   Rare Proteus mirabilis  Commensal vladimir consistent with body site  --  Proteus mirabilis    Drainage  12-19-24   Few Trueperella bernardiae "Susceptibilities not performed"  --  --      Skin/Wound  12-19-24   Rare Trueperella bernardiae "Susceptibilities not performed"  --  --    .Blood BLOOD  12-19-24   No growth at 5 days  --  --    .Blood BLOOD  12-17-24   No growth at 5 days  --  --    .Blood BLOOD  12-16-24   Growth in aerobic bottle: Staphylococcus hominis  Isolation of Coagulase negative Staphylococcus from single blood culture  sets may represent  contamination. Contact the Microbiology Department at [phone] if  susceptibility testing is needed.  clinically indicated.  Direct identification is available within approximately 3-5  hours either by Blood Panel Multiplexed PCR or Direct  MALDI-TOF. Details: [email or web address]  --  Blood Culture PCR    .Surgical Swab  12-16-24   Moderate Providencia stuartii  Moderate Corynebacterium striatum group "Susceptibilities not performed"  Few Trueperella bernardiae "Susceptibilities not performed"  --  Providencia stuartii    RADIOLOGY: All relevant available imaging reviewed     ANTIBIOTICS:  meropenem  IVPB 1000 every 24 hours    IMPRESSION:  69 yo M with PMH of  DM, HTN, ESRD on dialysis ( M, W, F) via RUE graft (last Dialysis 12/13/24), HLD, B/L Charcot deformity for which follows w/ podiatry Dr. Sim in the outpatient podiatry clinic for Charcot foot admitted for sepsis 2/2 L buttock abscess, scrotal abscess and DFU/ +/- chronic OM. L buttock abscess drained at the bedside 12/19--  Trueperella nicol  Also noted purulent drainage from the base of the scrotum. Cx 12/22 from scrotal drainage growing Proteus mirabilis. CT abd/pelvis 12/23--2.1 cm right scrotal fluid collection suspicious for abscess. Urology rec no surgery. Was on Ampi/clavulanate since 12/24 to Rx the organism from the scrotal drainage planned for approx 4wks to Rx the scrotal abscess last time he was seen by ID. Hospital course complicated with septic shock (RRT 12/30 for Lethargic after HD and 104 F fever), transferred to MICU admission for septic shock(resolved), currently on meropenem.  New septic mtz NGTD.     CT A/P 12/23 w/ Several small stones at both ureterovesical junctions measuring up to 2 mm on the right and 3 mm on the left. No ureteral dilatation. Prostate is normal in size. Diffuse scrotal edema and rim-enhancing extratesticular right scrotal fluid collection measuring 2.1 x 1.3 x 0.8 cm.  CT A/P 1/1 w/ No scrotal wall thickening/edema with 1.5 cm rim-enhancing collection noted within the right scrotal wall. BL lung infiltrates.     sx swab 12/16 (R foot) +Providencia stuartii, corynebacterium striatum  Wound cx 12/19 x 2 (L buttock) +Trueperella bernardiae  Wound cx 12/22 + Proteus mirabilis   Blood cx NGTD    -Septic shock  -Scrotal abscess (1.5 cm rim-enhancing collection noted within the right scrotal wall)  -L buttock abscess- s/p I&D 12/19 healing well  -Ureterovesical junctions stones  -ESRD on HD  -DM  -Influenza A URI (Day 7)  -Charcot arthropathy BL  -DFU (chronic) R foot, R ankle - chronic OM   -BL Lung infiltrates- imaging      PLAN:  Continue Meropenem 1g q24 hrs IV (post HD on session days)  F/up cultures- NGTD  F/up urology, please request clarification/ eval regarding scrotal scarring tissue that appear to be communicated with abscess.   HD per renal  DM control  wound care  Discussed with Dr. Thornton    Please reach ID with any questions or concerns  Dr. Spring Cervantes  Available in Teams

## 2025-01-06 DIAGNOSIS — J10.1 INFLUENZA DUE TO OTHER IDENTIFIED INFLUENZA VIRUS WITH OTHER RESPIRATORY MANIFESTATIONS: ICD-10-CM

## 2025-01-06 DIAGNOSIS — D63.8 ANEMIA IN OTHER CHRONIC DISEASES CLASSIFIED ELSEWHERE: ICD-10-CM

## 2025-01-06 DIAGNOSIS — E11.9 TYPE 2 DIABETES MELLITUS WITHOUT COMPLICATIONS: ICD-10-CM

## 2025-01-06 LAB
ALBUMIN SERPL ELPH-MCNC: 2.4 G/DL — LOW (ref 3.5–5)
ALP SERPL-CCNC: 67 U/L — SIGNIFICANT CHANGE UP (ref 40–120)
ALT FLD-CCNC: 6 U/L DA — LOW (ref 10–60)
ANION GAP SERPL CALC-SCNC: 10 MMOL/L — SIGNIFICANT CHANGE UP (ref 5–17)
AST SERPL-CCNC: 9 U/L — LOW (ref 10–40)
BILIRUB SERPL-MCNC: 0.5 MG/DL — SIGNIFICANT CHANGE UP (ref 0.2–1.2)
BUN SERPL-MCNC: 41 MG/DL — HIGH (ref 7–18)
CALCIUM SERPL-MCNC: 8.9 MG/DL — SIGNIFICANT CHANGE UP (ref 8.4–10.5)
CHLORIDE SERPL-SCNC: 101 MMOL/L — SIGNIFICANT CHANGE UP (ref 96–108)
CO2 SERPL-SCNC: 26 MMOL/L — SIGNIFICANT CHANGE UP (ref 22–31)
CREAT SERPL-MCNC: 7.79 MG/DL — HIGH (ref 0.5–1.3)
EGFR: 7 ML/MIN/1.73M2 — LOW
FLUAV AG NPH QL: DETECTED
FLUBV AG NPH QL: SIGNIFICANT CHANGE UP
GLUCOSE BLDC GLUCOMTR-MCNC: 101 MG/DL — HIGH (ref 70–99)
GLUCOSE BLDC GLUCOMTR-MCNC: 103 MG/DL — HIGH (ref 70–99)
GLUCOSE BLDC GLUCOMTR-MCNC: 123 MG/DL — HIGH (ref 70–99)
GLUCOSE BLDC GLUCOMTR-MCNC: 125 MG/DL — HIGH (ref 70–99)
GLUCOSE SERPL-MCNC: 132 MG/DL — HIGH (ref 70–99)
HCT VFR BLD CALC: 31.2 % — LOW (ref 39–50)
HGB BLD-MCNC: 10.4 G/DL — LOW (ref 13–17)
MAGNESIUM SERPL-MCNC: 2.6 MG/DL — SIGNIFICANT CHANGE UP (ref 1.6–2.6)
MCHC RBC-ENTMCNC: 31.4 PG — SIGNIFICANT CHANGE UP (ref 27–34)
MCHC RBC-ENTMCNC: 33.3 G/DL — SIGNIFICANT CHANGE UP (ref 32–36)
MCV RBC AUTO: 94.3 FL — SIGNIFICANT CHANGE UP (ref 80–100)
NRBC # BLD: 0 /100 WBCS — SIGNIFICANT CHANGE UP (ref 0–0)
PHOSPHATE SERPL-MCNC: 3.5 MG/DL — SIGNIFICANT CHANGE UP (ref 2.5–4.5)
PLATELET # BLD AUTO: 247 K/UL — SIGNIFICANT CHANGE UP (ref 150–400)
POTASSIUM SERPL-MCNC: 3.2 MMOL/L — LOW (ref 3.5–5.3)
POTASSIUM SERPL-SCNC: 3.2 MMOL/L — LOW (ref 3.5–5.3)
PROT SERPL-MCNC: 7.6 G/DL — SIGNIFICANT CHANGE UP (ref 6–8.3)
RBC # BLD: 3.31 M/UL — LOW (ref 4.2–5.8)
RBC # FLD: 14.6 % — HIGH (ref 10.3–14.5)
RSV RNA NPH QL NAA+NON-PROBE: SIGNIFICANT CHANGE UP
SARS-COV-2 RNA SPEC QL NAA+PROBE: SIGNIFICANT CHANGE UP
SODIUM SERPL-SCNC: 137 MMOL/L — SIGNIFICANT CHANGE UP (ref 135–145)
WBC # BLD: 6.92 K/UL — SIGNIFICANT CHANGE UP (ref 3.8–10.5)
WBC # FLD AUTO: 6.92 K/UL — SIGNIFICANT CHANGE UP (ref 3.8–10.5)

## 2025-01-06 PROCEDURE — 99232 SBSQ HOSP IP/OBS MODERATE 35: CPT | Mod: GC

## 2025-01-06 RX ORDER — MIDODRINE HYDROCHLORIDE 5 MG/1
5 TABLET ORAL
Refills: 0 | Status: DISCONTINUED | OUTPATIENT
Start: 2025-01-06 | End: 2025-01-07

## 2025-01-06 RX ADMIN — MIDODRINE HYDROCHLORIDE 5 MILLIGRAM(S): 5 TABLET ORAL at 18:32

## 2025-01-06 RX ADMIN — HEPARIN SODIUM 5000 UNIT(S): 1000 INJECTION, SOLUTION INTRAVENOUS; SUBCUTANEOUS at 17:32

## 2025-01-06 RX ADMIN — SEVELAMER CARBONATE 1600 MILLIGRAM(S): 800 TABLET, FILM COATED ORAL at 17:32

## 2025-01-06 RX ADMIN — MEROPENEM 100 MILLIGRAM(S): 1 INJECTION, POWDER, FOR SOLUTION INTRAVENOUS at 13:24

## 2025-01-06 RX ADMIN — MIDODRINE HYDROCHLORIDE 5 MILLIGRAM(S): 5 TABLET ORAL at 13:23

## 2025-01-06 RX ADMIN — SEVELAMER CARBONATE 1600 MILLIGRAM(S): 800 TABLET, FILM COATED ORAL at 13:23

## 2025-01-06 RX ADMIN — MIDODRINE HYDROCHLORIDE 5 MILLIGRAM(S): 5 TABLET ORAL at 05:40

## 2025-01-06 RX ADMIN — LATANOPROST 1 DROP(S): 50 SOLUTION OPHTHALMIC at 21:28

## 2025-01-06 RX ADMIN — EPOETIN ALFA 6000 UNIT(S): 2000 SOLUTION INTRAVENOUS; SUBCUTANEOUS at 09:31

## 2025-01-06 RX ADMIN — HEPARIN SODIUM 5000 UNIT(S): 1000 INJECTION, SOLUTION INTRAVENOUS; SUBCUTANEOUS at 05:20

## 2025-01-06 RX ADMIN — SEVELAMER CARBONATE 1600 MILLIGRAM(S): 800 TABLET, FILM COATED ORAL at 08:11

## 2025-01-06 RX ADMIN — TAMSULOSIN HYDROCHLORIDE 0.4 MILLIGRAM(S): 0.4 CAPSULE ORAL at 21:22

## 2025-01-06 RX ADMIN — CALCITRIOL 1 MICROGRAM(S): 0.5 CAPSULE, LIQUID FILLED ORAL at 08:10

## 2025-01-06 RX ADMIN — CHLORHEXIDINE GLUCONATE 1 APPLICATION(S): 1.2 RINSE ORAL at 05:20

## 2025-01-06 NOTE — PROGRESS NOTE ADULT - SUBJECTIVE AND OBJECTIVE BOX
[facility] NEPHROLOGY- PROGRESS NOTE    Patient is a 69yo Male with ESRD on HD via Rt AVG, HTN, 2HPT, Anemia, LE venous stasis with Rt foot wound presents with weakness. Pt a/w Rt foot ulcer and hyperkalemia. Nephrology consulted for ESRD status.   MRI Rt foot: + osteomyelitis involving the talus and less so of the navicular bone;    BCx +Staph hominis  12/20: s/p I& D of buttock abscess  +scrotal abscess  12/29: s/p RRT for lethargy;  febrile +FLU A   12/30: s/p RRT for somnolent  +fever; transferred to ICU for pressors/ septic shock    Hospital Medications: Medications reviewed.  REVIEW OF SYSTEMS:  Denies any SOB, cough, chest pain or n/v/d, abd pain or foot pain      VITALS:  T(F): 97.7 (01-06-25 @ 12:33), Max: 97.7 (01-06-25 @ 12:33)  HR: 81 (01-06-25 @ 12:33)  BP: 101/52 (01-06-25 @ 12:33)  RR: 20 (01-06-25 @ 12:34)  SpO2: 94% (01-06-25 @ 12:34)  Wt(kg): --    01-06 @ 07:01  -  01-06 @ 18:03  --------------------------------------------------------  IN: 600 mL / OUT: 1100 mL / NET: -500 mL        PHYSICAL EXAM:  Gen: NAD, more awake,  Obese  HEENT: anicteric  Cards: RRR, +S1/S2, no M/G/R  Resp: +mild rales at rt base, clear left side  GI: soft, NT/ND, NABS  Extremities: no b/l LE edema, extremely dry LE; rt foot covered  Access: Rt AVG +thrill +bruit      LABS:  01-06    137  |  101  |  41[H]  ----------------------------<  132[H]  3.2[L]   |  26  |  7.79[H]    Ca    8.9      06 Jan 2025 08:30  Phos  3.5     01-06  Mg     2.6     01-06    TPro  7.6  /  Alb  2.4[L]  /  TBili  0.5  /  DBili      /  AST  9[L]  /  ALT  6[L]  /  AlkPhos  67  01-06    Creatinine Trend: 7.79 <--, 6.52 <--, 4.87 <--, 7.52 <--, 6.27 <--, 8.55 <--, 6.65 <--                        10.4   6.92  )-----------( 247      ( 06 Jan 2025 08:30 )             31.2     Urine Studies:  Urinalysis Basic - ( 06 Jan 2025 08:30 )    Color:  / Appearance:  / SG:  / pH:   Gluc: 132 mg/dL / Ketone:   / Bili:  / Urobili:    Blood:  / Protein:  / Nitrite:    Leuk Esterase:  / RBC:  / WBC    Sq Epi:  / Non Sq Epi:  / Bacteria:

## 2025-01-06 NOTE — PROGRESS NOTE ADULT - SUBJECTIVE AND OBJECTIVE BOX
Subjective:  Resting comfortably    Objective:    Vital signs  T(C): , Max: 36.5 (01-06-25 @ 12:33)  HR: 81 (01-06-25 @ 12:33)  BP: 101/52 (01-06-25 @ 12:33)  SpO2: 94% (01-06-25 @ 12:34)  Wt(kg): --    Output     01-06 @ 07:01  -  01-06 @ 13:42  --------------------------------------------------------  IN: 600 mL / OUT: 1100 mL / NET: -500 mL        Gen: NAD  Abd: soft, nontender, nondistended  : scrotal dressing changed after minimal thin purulence expressed    Labs                        10.4   6.92  )-----------( 247      ( 06 Jan 2025 08:30 )             31.2     06 Jan 2025 08:30    137    |  101    |  41     ----------------------------<  132    3.2     |  26     |  7.79     Ca    8.9        06 Jan 2025 08:30  Phos  3.5       06 Jan 2025 08:30  Mg     2.6       06 Jan 2025 08:30        Urine Cx: ?  Blood Cx: ?      Imaging

## 2025-01-06 NOTE — PROGRESS NOTE ADULT - SUBJECTIVE AND OBJECTIVE BOX
Chief complaint: Patient is a 69y old  Male who presents with a chief complaint of generalized weakness (05 Jan 2025 20:28)      CADY TRACEY is a 69y year old Male     INTERVAL HPI/OVERNIGHT EVENTS:      REVIEW OF SYSTEMS:  CONSTITUTIONAL: No fever, weight loss, or fatigue  EYES: No eye pain, visual disturbances, or discharge  ENMT:  No difficulty hearing, tinnitus, vertigo; No sinus or throat pain  NECK: No pain or stiffness  RESPIRATORY: No cough, wheezing, chills or hemoptysis; No shortness of breath  CARDIOVASCULAR: No chest pain, palpitations, dizziness, or leg swelling  GASTROINTESTINAL: No abdominal or epigastric pain. No nausea, vomiting, or hematemesis; No diarrhea or constipation. No melena or hematochezia.  GENITOURINARY: No dysuria, frequency, hematuria, or incontinence  NEUROLOGICAL: No headaches, memory loss, loss of strength, numbness, or tremors  MUSCULOSKELETAL: No joint pain or swelling; No muscle, back, or extremity pain  HEME/LYMPH: No easy bruising, or bleeding gums      FAMILY HISTORY:  Family history of diabetes mellitus        T(C): 36.2 (01-06-25 @ 05:36), Max: 36.3 (01-05-25 @ 20:41)  HR: 72 (01-06-25 @ 05:36) (67 - 75)  BP: 122/71 (01-06-25 @ 05:36) (101/55 - 122/71)  RR: 17 (01-06-25 @ 05:36) (16 - 18)  SpO2: 92% (01-06-25 @ 05:36) (92% - 94%)  Wt(kg): --Vital Signs Last 24 Hrs  T(C): 36.2 (06 Jan 2025 05:36), Max: 36.3 (05 Jan 2025 20:41)  T(F): 97.1 (06 Jan 2025 05:36), Max: 97.3 (05 Jan 2025 20:41)  HR: 72 (06 Jan 2025 05:36) (67 - 75)  BP: 122/71 (06 Jan 2025 05:36) (101/55 - 122/71)  BP(mean): 76 (05 Jan 2025 20:41) (76 - 76)  RR: 17 (06 Jan 2025 05:36) (16 - 18)  SpO2: 92% (06 Jan 2025 05:36) (92% - 94%)    Parameters below as of 06 Jan 2025 05:36  Patient On (Oxygen Delivery Method): room air        PHYSICAL EXAM:  GENERAL: NAD, well-groomed, well-developed  HEAD:  Atraumatic, Normocephalic  EYES: EOMI, PERRLA, conjunctiva and sclera clear  ENMT: No tonsillar erythema, exudates, or enlargement; Moist mucous membranes.   NECK: Supple, No JVD  NERVOUS SYSTEM:  Alert & Oriented X3, Good concentration; Motor Strength 5/5 B/L upper and lower extremities  CHEST/LUNG: Clear to percussion bilaterally; No resp distress; No rales, rhonchi, wheezing, or rubs  HEART: Regular rate and rhythm; No murmurs, rubs, or gallops  ABDOMEN: Soft, Nontender, Nondistended; Bowel sounds present  : no suprapubic tenderness, no gross hematuria, Juan (+/-)  EXTREMITIES: No clubbing, cyanosis, or edema  SKIN: No rashes or lesions          LABS:                        10.4   6.92  )-----------( 247      ( 06 Jan 2025 08:30 )             31.2       01-06    137  |  101  |  41[H]  ----------------------------<  132[H]  3.2[L]   |  26  |  7.79[H]    Ca    8.9      06 Jan 2025 08:30  Phos  3.5     01-06  Mg     2.6     01-06    TPro  7.6  /  Alb  2.4[L]  /  TBili  0.5  /  DBili  x   /  AST  9[L]  /  ALT  6[L]  /  AlkPhos  67  01-06        RADIOLOGY & ADDITIONAL TESTS:    Imaging Personally Reviewed:  [ ] YES  [ ] NO  acetaminophen     Tablet .. 650 milliGRAM(s) Oral every 6 hours PRN  calcitriol   Capsule 1 MICROGram(s) Oral <User Schedule>  chlorhexidine 2% Cloths 1 Application(s) Topical <User Schedule>  epoetin rudolph-epbx (RETACRIT) Injectable 6000 Unit(s) IV Push <User Schedule>  heparin   Injectable 5000 Unit(s) SubCutaneous every 12 hours  insulin lispro (ADMELOG) corrective regimen sliding scale   SubCutaneous three times a day before meals  insulin lispro (ADMELOG) corrective regimen sliding scale   SubCutaneous at bedtime  latanoprost 0.005% Ophthalmic Solution 1 Drop(s) Both EYES at bedtime  meropenem  IVPB 1000 milliGRAM(s) IV Intermittent every 24 hours  midodrine 5 milliGRAM(s) Oral every 8 hours  ondansetron Injectable 4 milliGRAM(s) IV Push every 8 hours PRN  sevelamer carbonate 1600 milliGRAM(s) Oral three times a day with meals  tamsulosin 0.4 milliGRAM(s) Oral at bedtime      HEALTH ISSUES - PROBLEM Dx:  Generalized weakness    Hyperkalemia    Diabetic ulcer of right foot    Diabetes    HTN (hypertension)    ESRD on dialysis    HLD (hyperlipidemia)    Prophylactic measure    Discharge planning issues    Gluteal abscess    Abscess, scrotum    Septic shock           Chief complaint: Patient is a 69y old  Male who presents with a chief complaint of generalized weakness (05 Jan 2025 20:28)      CADY TRACEY is a 69y year old Male     INTERVAL HPI/OVERNIGHT EVENTS: No acute events overnight.  Patient examined at bedside this PM (at HD during AM).  Patient denies acute complaints       REVIEW OF SYSTEMS:  CONSTITUTIONAL: No fever, weight loss, or fatigue  EYES: No eye pain, visual disturbances, or discharge  ENMT:  No difficulty hearing, tinnitus, vertigo; No sinus or throat pain  NECK: No pain or stiffness  RESPIRATORY: No cough, wheezing, chills or hemoptysis; No shortness of breath  CARDIOVASCULAR: No chest pain, palpitations, dizziness, or leg swelling  GASTROINTESTINAL: No abdominal or epigastric pain. No nausea, vomiting, or hematemesis; No diarrhea or constipation. No melena or hematochezia.  GENITOURINARY: No dysuria, frequency, hematuria, or incontinence  NEUROLOGICAL: No headaches, memory loss, loss of strength, numbness, or tremors  MUSCULOSKELETAL: No joint pain or swelling; No muscle, back, or extremity pain  HEME/LYMPH: No easy bruising, or bleeding gums      FAMILY HISTORY:  Family history of diabetes mellitus        T(C): 36.2 (01-06-25 @ 05:36), Max: 36.3 (01-05-25 @ 20:41)  HR: 72 (01-06-25 @ 05:36) (67 - 75)  BP: 122/71 (01-06-25 @ 05:36) (101/55 - 122/71)  RR: 17 (01-06-25 @ 05:36) (16 - 18)  SpO2: 92% (01-06-25 @ 05:36) (92% - 94%)  Wt(kg): --Vital Signs Last 24 Hrs  T(C): 36.2 (06 Jan 2025 05:36), Max: 36.3 (05 Jan 2025 20:41)  T(F): 97.1 (06 Jan 2025 05:36), Max: 97.3 (05 Jan 2025 20:41)  HR: 72 (06 Jan 2025 05:36) (67 - 75)  BP: 122/71 (06 Jan 2025 05:36) (101/55 - 122/71)  BP(mean): 76 (05 Jan 2025 20:41) (76 - 76)  RR: 17 (06 Jan 2025 05:36) (16 - 18)  SpO2: 92% (06 Jan 2025 05:36) (92% - 94%)    Parameters below as of 06 Jan 2025 05:36  Patient On (Oxygen Delivery Method): room air        PHYSICAL EXAM:  GENERAL: NAD  HEAD:  Atraumatic, Normocephalic  EYES: EOMI, PERRLA, conjunctiva and sclera clear  ENMT: No tonsillar erythema, exudates, or enlargement; Moist mucous membranes.   NECK: Supple, No JVD  NERVOUS SYSTEM:  Alert & Oriented X3, Good concentration; Motor Strength 5/5 B/L upper and lower extremities  CHEST/LUNG: Clear to percussion bilaterally; No resp distress; No rales, rhonchi, wheezing, or rubs  HEART: Regular rate and rhythm; No murmurs, rubs, or gallops  ABDOMEN: Soft, Nontender, Nondistended; Bowel sounds present  : no suprapubic tenderness, no gross hematuria  EXTREMITIES: No clubbing, cyanosis, or edema  SKIN: No rashes or lesions          LABS:                        10.4   6.92  )-----------( 247      ( 06 Jan 2025 08:30 )             31.2       01-06    137  |  101  |  41[H]  ----------------------------<  132[H]  3.2[L]   |  26  |  7.79[H]    Ca    8.9      06 Jan 2025 08:30  Phos  3.5     01-06  Mg     2.6     01-06    TPro  7.6  /  Alb  2.4[L]  /  TBili  0.5  /  DBili  x   /  AST  9[L]  /  ALT  6[L]  /  AlkPhos  67  01-06        RADIOLOGY & ADDITIONAL TESTS:    Imaging Personally Reviewed:  [ ] YES  [ ] NO  acetaminophen     Tablet .. 650 milliGRAM(s) Oral every 6 hours PRN  calcitriol   Capsule 1 MICROGram(s) Oral <User Schedule>  chlorhexidine 2% Cloths 1 Application(s) Topical <User Schedule>  epoetin rudolph-epbx (RETACRIT) Injectable 6000 Unit(s) IV Push <User Schedule>  heparin   Injectable 5000 Unit(s) SubCutaneous every 12 hours  insulin lispro (ADMELOG) corrective regimen sliding scale   SubCutaneous three times a day before meals  insulin lispro (ADMELOG) corrective regimen sliding scale   SubCutaneous at bedtime  latanoprost 0.005% Ophthalmic Solution 1 Drop(s) Both EYES at bedtime  meropenem  IVPB 1000 milliGRAM(s) IV Intermittent every 24 hours  midodrine 5 milliGRAM(s) Oral every 8 hours  ondansetron Injectable 4 milliGRAM(s) IV Push every 8 hours PRN  sevelamer carbonate 1600 milliGRAM(s) Oral three times a day with meals  tamsulosin 0.4 milliGRAM(s) Oral at bedtime      HEALTH ISSUES - PROBLEM Dx:  Generalized weakness    Hyperkalemia    Diabetic ulcer of right foot    Diabetes    HTN (hypertension)    ESRD on dialysis    HLD (hyperlipidemia)    Prophylactic measure    Discharge planning issues    Gluteal abscess    Abscess, scrotum    Septic shock           Chief complaint: Patient is a 69y old  Male who presents with a chief complaint of generalized weakness (05 Jan 2025 20:28)      CADY TRACEY is a 69y year old Male     INTERVAL HPI/OVERNIGHT EVENTS: No acute events overnight.  Patient examined at bedside this PM (at HD during AM).  Patient reports generalized fatigue.       REVIEW OF SYSTEMS:  CONSTITUTIONAL: +fatigue; No fever, weight loss,  EYES: No eye pain, visual disturbances, or discharge  ENMT:  No difficulty hearing, tinnitus, vertigo; No sinus or throat pain  NECK: No pain or stiffness  RESPIRATORY: No cough, wheezing, chills or hemoptysis; No shortness of breath  CARDIOVASCULAR: No chest pain, palpitations, dizziness, or leg swelling  GASTROINTESTINAL: No abdominal or epigastric pain. No nausea, vomiting, or hematemesis; No diarrhea or constipation. No melena or hematochezia.  GENITOURINARY: No dysuria, frequency   NEUROLOGICAL: No headaches, memory loss, loss of strength, numbness, or tremors  MUSCULOSKELETAL: No joint pain or swelling; No muscle, back, or extremity pain      FAMILY HISTORY:  Family history of diabetes mellitus        T(C): 36.2 (01-06-25 @ 05:36), Max: 36.3 (01-05-25 @ 20:41)  HR: 72 (01-06-25 @ 05:36) (67 - 75)  BP: 122/71 (01-06-25 @ 05:36) (101/55 - 122/71)  RR: 17 (01-06-25 @ 05:36) (16 - 18)  SpO2: 92% (01-06-25 @ 05:36) (92% - 94%)  Wt(kg): --Vital Signs Last 24 Hrs  T(C): 36.2 (06 Jan 2025 05:36), Max: 36.3 (05 Jan 2025 20:41)  T(F): 97.1 (06 Jan 2025 05:36), Max: 97.3 (05 Jan 2025 20:41)  HR: 72 (06 Jan 2025 05:36) (67 - 75)  BP: 122/71 (06 Jan 2025 05:36) (101/55 - 122/71)  BP(mean): 76 (05 Jan 2025 20:41) (76 - 76)  RR: 17 (06 Jan 2025 05:36) (16 - 18)  SpO2: 92% (06 Jan 2025 05:36) (92% - 94%)    Parameters below as of 06 Jan 2025 05:36  Patient On (Oxygen Delivery Method): room air        PHYSICAL EXAM:  GENERAL: NAD.   HEAD:  Atraumatic, Normocephalic  EYES: EOMI, PERRLA, conjunctiva and sclera clear  ENMT: No tonsillar erythema, exudates, or enlargement; Moist mucous membranes.   NECK: Supple,   NERVOUS SYSTEM:  Alert & Oriented X3, Good concentration; Motor Strength 5/5 B/L upper and lower extremities  CHEST/LUNG: Clear to percussion bilaterally; No resp distress; No rales, rhonchi, wheezing, or rubs  HEART: Regular rate and rhythm; No murmurs, rubs, or gallops  ABDOMEN: Soft, Nontender, Nondistended; Bowel sounds present  : no suprapubic tenderness;  Scrotum: Not tender, erythematous. Continued active drainage   EXTREMITIES: No clubbing, cyanosis, or edema  SKIN: B/l           LABS:                        10.4   6.92  )-----------( 247      ( 06 Jan 2025 08:30 )             31.2       01-06    137  |  101  |  41[H]  ----------------------------<  132[H]  3.2[L]   |  26  |  7.79[H]    Ca    8.9      06 Jan 2025 08:30  Phos  3.5     01-06  Mg     2.6     01-06    TPro  7.6  /  Alb  2.4[L]  /  TBili  0.5  /  DBili  x   /  AST  9[L]  /  ALT  6[L]  /  AlkPhos  67  01-06        RADIOLOGY & ADDITIONAL TESTS:    Imaging Personally Reviewed:  [ ] YES  [ ] NO  acetaminophen     Tablet .. 650 milliGRAM(s) Oral every 6 hours PRN  calcitriol   Capsule 1 MICROGram(s) Oral <User Schedule>  chlorhexidine 2% Cloths 1 Application(s) Topical <User Schedule>  epoetin rudolph-epbx (RETACRIT) Injectable 6000 Unit(s) IV Push <User Schedule>  heparin   Injectable 5000 Unit(s) SubCutaneous every 12 hours  insulin lispro (ADMELOG) corrective regimen sliding scale   SubCutaneous three times a day before meals  insulin lispro (ADMELOG) corrective regimen sliding scale   SubCutaneous at bedtime  latanoprost 0.005% Ophthalmic Solution 1 Drop(s) Both EYES at bedtime  meropenem  IVPB 1000 milliGRAM(s) IV Intermittent every 24 hours  midodrine 5 milliGRAM(s) Oral every 8 hours  ondansetron Injectable 4 milliGRAM(s) IV Push every 8 hours PRN  sevelamer carbonate 1600 milliGRAM(s) Oral three times a day with meals  tamsulosin 0.4 milliGRAM(s) Oral at bedtime      HEALTH ISSUES - PROBLEM Dx:  Generalized weakness    Hyperkalemia    Diabetic ulcer of right foot    Diabetes    HTN (hypertension)    ESRD on dialysis    HLD (hyperlipidemia)    Prophylactic measure    Discharge planning issues    Gluteal abscess    Abscess, scrotum    Septic shock           Chief complaint: Patient is a 69y old  Male who presents with a chief complaint of generalized weakness (05 Jan 2025 20:28)      CADY TRACEY is a 69y year old Male     INTERVAL HPI/OVERNIGHT EVENTS: No acute events overnight.  Patient examined at bedside this PM (at HD during AM).  Patient reports generalized fatigue.       REVIEW OF SYSTEMS:  CONSTITUTIONAL: +fatigue; No fever, weight loss,  EYES: No eye pain, visual disturbances, or discharge  ENMT:  No difficulty hearing, tinnitus, vertigo; No sinus or throat pain  NECK: No pain or stiffness  RESPIRATORY: No cough, wheezing, chills or hemoptysis; No shortness of breath  CARDIOVASCULAR: No chest pain, palpitations, dizziness, or leg swelling  GASTROINTESTINAL: No abdominal or epigastric pain. No nausea, vomiting, or hematemesis; No diarrhea or constipation. No melena or hematochezia.  GENITOURINARY: No dysuria, frequency   NEUROLOGICAL: No headaches, memory loss, loss of strength, numbness, or tremors  MUSCULOSKELETAL: No joint pain or swelling; No muscle, back, or extremity pain      FAMILY HISTORY:  Family history of diabetes mellitus        T(C): 36.2 (01-06-25 @ 05:36), Max: 36.3 (01-05-25 @ 20:41)  HR: 72 (01-06-25 @ 05:36) (67 - 75)  BP: 122/71 (01-06-25 @ 05:36) (101/55 - 122/71)  RR: 17 (01-06-25 @ 05:36) (16 - 18)  SpO2: 92% (01-06-25 @ 05:36) (92% - 94%)  Wt(kg): --Vital Signs Last 24 Hrs  T(C): 36.2 (06 Jan 2025 05:36), Max: 36.3 (05 Jan 2025 20:41)  T(F): 97.1 (06 Jan 2025 05:36), Max: 97.3 (05 Jan 2025 20:41)  HR: 72 (06 Jan 2025 05:36) (67 - 75)  BP: 122/71 (06 Jan 2025 05:36) (101/55 - 122/71)  BP(mean): 76 (05 Jan 2025 20:41) (76 - 76)  RR: 17 (06 Jan 2025 05:36) (16 - 18)  SpO2: 92% (06 Jan 2025 05:36) (92% - 94%)    Parameters below as of 06 Jan 2025 05:36  Patient On (Oxygen Delivery Method): room air        PHYSICAL EXAM:  GENERAL: NAD.   HEAD:  Atraumatic, Normocephalic  EYES: EOMI, PERRLA, conjunctiva and sclera clear  ENMT: No tonsillar erythema, exudates, or enlargement; Moist mucous membranes.   NECK: Supple,   NERVOUS SYSTEM:  Alert & Oriented X3, Good concentration; Motor Strength 5/5 B/L upper and lower extremities  CHEST/LUNG: Clear to percussion bilaterally; No resp distress; No rales, rhonchi, wheezing, or rubs  HEART: Regular rate and rhythm; No murmurs, rubs, or gallops  ABDOMEN: Soft, Nontender, Nondistended; Bowel sounds present  : no suprapubic tenderness;  Scrotum: Not tender, erythematous. Continued active drainage   EXTREMITIES: No clubbing, cyanosis, or edema  SKIN: B/l foot deformity and chronic diabetic ulcer changes. No active drainage           LABS:                        10.4   6.92  )-----------( 247      ( 06 Jan 2025 08:30 )             31.2       01-06    137  |  101  |  41[H]  ----------------------------<  132[H]  3.2[L]   |  26  |  7.79[H]    Ca    8.9      06 Jan 2025 08:30  Phos  3.5     01-06  Mg     2.6     01-06    TPro  7.6  /  Alb  2.4[L]  /  TBili  0.5  /  DBili  x   /  AST  9[L]  /  ALT  6[L]  /  AlkPhos  67  01-06        RADIOLOGY & ADDITIONAL TESTS:    Imaging Personally Reviewed:  [ ] YES  [ ] NO  acetaminophen     Tablet .. 650 milliGRAM(s) Oral every 6 hours PRN  calcitriol   Capsule 1 MICROGram(s) Oral <User Schedule>  chlorhexidine 2% Cloths 1 Application(s) Topical <User Schedule>  epoetin rudolph-epbx (RETACRIT) Injectable 6000 Unit(s) IV Push <User Schedule>  heparin   Injectable 5000 Unit(s) SubCutaneous every 12 hours  insulin lispro (ADMELOG) corrective regimen sliding scale   SubCutaneous three times a day before meals  insulin lispro (ADMELOG) corrective regimen sliding scale   SubCutaneous at bedtime  latanoprost 0.005% Ophthalmic Solution 1 Drop(s) Both EYES at bedtime  meropenem  IVPB 1000 milliGRAM(s) IV Intermittent every 24 hours  midodrine 5 milliGRAM(s) Oral every 8 hours  ondansetron Injectable 4 milliGRAM(s) IV Push every 8 hours PRN  sevelamer carbonate 1600 milliGRAM(s) Oral three times a day with meals  tamsulosin 0.4 milliGRAM(s) Oral at bedtime      HEALTH ISSUES - PROBLEM Dx:  Generalized weakness    Hyperkalemia    Diabetic ulcer of right foot    Diabetes    HTN (hypertension)    ESRD on dialysis    HLD (hyperlipidemia)    Prophylactic measure    Discharge planning issues    Gluteal abscess    Abscess, scrotum    Septic shock

## 2025-01-06 NOTE — PROGRESS NOTE ADULT - ASSESSMENT
Patient is a 69M with PMHx of HTN, ESRD on HD (M, W, F), B/L Charcot deformity and HLD, present to the ED for generalized weakness, chills, subjective fever. Pt admitted on 12/16 to medicine floor for treatment of sepsis 2/2 scrotal abscess, diabetic foot ulcer, gluteal abscess, influenza A positive. s/p multiple RRTs for somnolence, hypotension, fever. 12/30 transferred to MICU for peripheral pressor req. Downgraded to medicine on 1/4 on medical management.  Patient is a 69M with PMHx of HTN, ESRD on HD (M, W, F), B/L Charcot deformity, hidradenitis suppurativa and HLD, present to the ED for generalized weakness, chills, subjective fever. Pt admitted on 12/16 to medicine floor for treatment of sepsis 2/2 scrotal abscess, diabetic foot ulcer, gluteal abscess, influenza A positive. s/p multiple RRTs for somnolence, hypotension, fever. 12/30 transferred to MICU for peripheral pressor req. Downgraded to medicine on 1/4 on medical management.

## 2025-01-06 NOTE — PROGRESS NOTE ADULT - ASSESSMENT
Patient is a 67yo Male with ESRD on HD via Rt AVG, HTN, 2HPT, Anemia, LE venous stasis with Rt foot wound presents with weakness. Pt a/w Rt foot ulcer and hyperkalemia. Nephrology consulted for ESRD status.     1) ESRD: Pt seen on HD earlier today 1/6, with intradialytic hypotension for which UF goal was decreased from 1L to 0.5L. Plan for next HD 1/8. c/w MWF schedule. Monitor electrolytes  2) Hyperkalemia: resolved. Now with low potassium. Will consider liberalizing potassium in diet if potassium remains low. Monitor serum potassium  3) HTN with ESRD: BP low normal;  now on midodrine taper. Pt on midodrine 5mg PO bid. c/w low sodium diet. Monitor BP.  4) Anemia of renal disease: Hb acceptable. c/w Epogen 6000 units IV tiw. Monitor Hb.  5) 2HPT: Serum calcium acceptable with acceptable serum phos.  PTHi 441. c/w calcitriol 1mcg PO MWF. Pt on Lanthanum 2000mg PO tid with meals which is unavailable at [facility]. c/w Sevelamer 1600mg PO tid with meals  c/w low phos diet Monitor serum calcium and phosphorus.    HepBsAg was neg on outpt labs on 12/11/24; repeat HepBsAg neg; likely positive HepBsAg on 12/16 is an error.       [facility] NEPHROLOG[facility]  Arden Tsang M.D.  Freeman Holland D.O.  Janny Chirinos M.D.  MD Ambar Mccarty, MSN, ANP-C    Telephone: [phone]  Facsimile: [phone]    [facility] [place] [address], [address]  [place] [address]

## 2025-01-06 NOTE — PROGRESS NOTE ADULT - ASSESSMENT
69M with hx of T2DM, hidradenitis suppurativa a/w generalized weakness, w small amount of purulent drainage from scrotum     12/23: scrotal exam stable with spontaneous drainage  12/24: scrotal exam stable, CT pelvis demonstrates 2.1 cm right scrotal fluid collection suspicious for abscess and bilateral UVJ stones without ureteral dilatation, wbc has been downtrending  12/27: scrotal exam stable, decreasing drainage amount, WBC continues to downtrend  12/30: stable scrotal exam, less than 1 gauze worth of purulence expressed, patient likely febrile from flu (still without leukocytosis, drainage from scrotum still decreasing), s/p RRT in PM for somnolence transferred to ICU started on levo  12/31: scrotal exam continues to improve, minimal purulence expressed from right scrotal tract, WBC 20 from 14, levo 0.07    Scrotal exam continues to improve so clinical decline unlikely to be related to scrotum, however patient did have bilateral non-obstructing UVJ stones on 12/23 CTAP (to level of knees to also re-assess scrotal collection) so would be worthwhile to rescan patient to determine whether any stones have become obstructive.     1/1: repeat CTAP demonstrates 1.5x0.4cm right scrotal collection compared to 4x0.7cm collection on 12/23/24, still no hydronephrosis bilaterally to suggest obstructive uropathy  1/2: scrotal exam continues to improve, WBC downtrending, downtrending levo requirement 0.03  1/3: scrotal exam stable, WBC normalized 6.8, off pressors, afebrile, listed to the floor  1/6: scrotal exam stable, WBC wnl 6.9, afebrile    Patient has spontaneously draining skin tunnel from his hidradenitis and the "scrotal scar tissue communicating with abscess" is likely 2/2 prior wide excision of hidradenitis skin tunnel that has reformed and will likely require repeat wide excision of tract in the outpatient setting with his original surgeon. Drainage is expected to continue indefinitely in the meantime and can be managed with antibiotics, local wound care, and daily dressing changes. No operative intervention is planned during this admission as patient has minimal drainage from tract and is clinically improving with conservative management.    - Repeat CTAP confirms decreasing size of scrotal collection > continue conservative management  - F/u repeat bcx > NGTD  - Continue Flomax  - keep scrotal area clean and dry, may include sitz baths   - continue scrotal dressing changes qD (can be done by nursing: squeeze out purulence from right scrotum until serosanguinous and wipe with 4x4 gauze, place fresh 4x4 gauze overlying the drainage tract)  - IV antibiotics, follow ID recommendations   - strict glycemic control   - HD as per neph   - remainder of care as per primary team     D/w Dr. Mcguire

## 2025-01-06 NOTE — PROGRESS NOTE ADULT - PROBLEM SELECTOR PLAN 2
Hx of scrotal abscess s/p wide excision of hidradenitis skin tunnel  CT Pelvis (12/23): Rim-enhancing extratesticular right scrotal fluid collection measuring 2.1 x 1.3 x 0.8 cm.  CT A/P (1/1): Limited scrotal wall thickening/edema with 1.5 cm rim-enhancing collection noted within the right scrotal wall  Scrotal wound culture: + proteus mirabilis   s/p vancomycin   c/w meropenem q24h (1/1 - )  Urology following, per Uro indefinite drainage expected, outpt wide excision drainage   ID Dr. Chavez following Hx of scrotal abscess s/p wide excision of hidradenitis skin tunnel  CT Pelvis (12/23): Rim-enhancing extratesticular right scrotal fluid collection measuring 2.1 x 1.3 x 0.8 cm.  CT A/P (1/1): Limited scrotal wall thickening/edema with 1.5 cm rim-enhancing collection noted within the right scrotal wall  Scrotal wound culture: + proteus mirabilis   s/p vancomycin   c/w meropenem q24h (1/1 - )  Urology following, per Uro indefinite drainage expected, outpt wide excision drainage  Patient hasn't seen his urologist Dr. Judge since excision (8-10 years ago)   ID Dr. Chavez following

## 2025-01-06 NOTE — PROGRESS NOTE ADULT - PROBLEM SELECTOR PLAN 1
Transferred to MICU on 12/30 for septic shock 2/2 scrotal abscess, gluteal abscess, Influenza A   now transferred to medicine floor  RESOLVED   on midodrine Transferred to MICU on 12/30 for septic shock 2/2 scrotal abscess, gluteal abscess, Influenza A requiring pressors  now transferred to medicine floor   will decrease midodrine 5mg q8h to q12h   c/w meropenem Transferred to MICU on 12/30 for septic shock 2/2 scrotal abscess, gluteal abscess, Influenza A requiring pressors  now transferred to medicine floor   will decrease midodrine 5mg q8h to q12h  BCx (12/16) coagulase neg. Staph.  BCx (12/17, 19, 29, 31): NGTD  c/w meropenem (1/1 - )  BCx: NGTD

## 2025-01-06 NOTE — PROGRESS NOTE ADULT - PROBLEM SELECTOR PLAN 4
Left buttock, wound cx Wound Cx (12/19 x ) + Trueperella bernardiae  s/p I&D 12/19, healing well  BCx (12/17, 19, 29, 31): NGTD

## 2025-01-06 NOTE — PROGRESS NOTE ADULT - PROBLEM SELECTOR PLAN 8
Episode of AHRF in the setting of Influenza A (12/28)   s/p tamiflu for 12/29-1/1  CXR (12/31): Patchy medial right basilar opacities that could be due to atelectasis or infection.  Saturating well on RA  f/u surveillance CXR

## 2025-01-07 LAB
ANION GAP SERPL CALC-SCNC: 10 MMOL/L — SIGNIFICANT CHANGE UP (ref 5–17)
BUN SERPL-MCNC: 23 MG/DL — HIGH (ref 7–18)
CALCIUM SERPL-MCNC: 9.4 MG/DL — SIGNIFICANT CHANGE UP (ref 8.4–10.5)
CHLORIDE SERPL-SCNC: 103 MMOL/L — SIGNIFICANT CHANGE UP (ref 96–108)
CO2 SERPL-SCNC: 26 MMOL/L — SIGNIFICANT CHANGE UP (ref 22–31)
CREAT SERPL-MCNC: 5.72 MG/DL — HIGH (ref 0.5–1.3)
EGFR: 10 ML/MIN/1.73M2 — LOW
GLUCOSE BLDC GLUCOMTR-MCNC: 107 MG/DL — HIGH (ref 70–99)
GLUCOSE BLDC GLUCOMTR-MCNC: 119 MG/DL — HIGH (ref 70–99)
GLUCOSE BLDC GLUCOMTR-MCNC: 137 MG/DL — HIGH (ref 70–99)
GLUCOSE BLDC GLUCOMTR-MCNC: 154 MG/DL — HIGH (ref 70–99)
GLUCOSE SERPL-MCNC: 122 MG/DL — HIGH (ref 70–99)
HCT VFR BLD CALC: 32.2 % — LOW (ref 39–50)
HGB BLD-MCNC: 10.8 G/DL — LOW (ref 13–17)
MAGNESIUM SERPL-MCNC: 2.4 MG/DL — SIGNIFICANT CHANGE UP (ref 1.6–2.6)
MCHC RBC-ENTMCNC: 32.2 PG — SIGNIFICANT CHANGE UP (ref 27–34)
MCHC RBC-ENTMCNC: 33.5 G/DL — SIGNIFICANT CHANGE UP (ref 32–36)
MCV RBC AUTO: 96.1 FL — SIGNIFICANT CHANGE UP (ref 80–100)
NRBC # BLD: 0 /100 WBCS — SIGNIFICANT CHANGE UP (ref 0–0)
PHOSPHATE SERPL-MCNC: 2.7 MG/DL — SIGNIFICANT CHANGE UP (ref 2.5–4.5)
PLATELET # BLD AUTO: 251 K/UL — SIGNIFICANT CHANGE UP (ref 150–400)
POTASSIUM SERPL-MCNC: 3.6 MMOL/L — SIGNIFICANT CHANGE UP (ref 3.5–5.3)
POTASSIUM SERPL-SCNC: 3.6 MMOL/L — SIGNIFICANT CHANGE UP (ref 3.5–5.3)
RBC # BLD: 3.35 M/UL — LOW (ref 4.2–5.8)
RBC # FLD: 14.6 % — HIGH (ref 10.3–14.5)
SODIUM SERPL-SCNC: 139 MMOL/L — SIGNIFICANT CHANGE UP (ref 135–145)
WBC # BLD: 6.37 K/UL — SIGNIFICANT CHANGE UP (ref 3.8–10.5)
WBC # FLD AUTO: 6.37 K/UL — SIGNIFICANT CHANGE UP (ref 3.8–10.5)

## 2025-01-07 PROCEDURE — 99232 SBSQ HOSP IP/OBS MODERATE 35: CPT

## 2025-01-07 PROCEDURE — 71045 X-RAY EXAM CHEST 1 VIEW: CPT | Mod: 26

## 2025-01-07 PROCEDURE — G0545: CPT

## 2025-01-07 PROCEDURE — 99233 SBSQ HOSP IP/OBS HIGH 50: CPT | Mod: GC

## 2025-01-07 RX ORDER — MIDODRINE HYDROCHLORIDE 5 MG/1
5 TABLET ORAL EVERY 8 HOURS
Refills: 0 | Status: DISCONTINUED | OUTPATIENT
Start: 2025-01-07 | End: 2025-01-08

## 2025-01-07 RX ORDER — SEVELAMER CARBONATE 800 MG/1
800 TABLET, FILM COATED ORAL
Refills: 0 | Status: DISCONTINUED | OUTPATIENT
Start: 2025-01-07 | End: 2025-01-08

## 2025-01-07 RX ORDER — METRONIDAZOLE 250 MG/1
500 TABLET ORAL EVERY 12 HOURS
Refills: 0 | Status: DISCONTINUED | OUTPATIENT
Start: 2025-01-07 | End: 2025-01-08

## 2025-01-07 RX ORDER — SEVELAMER CARBONATE 800 MG/1
800 TABLET, FILM COATED ORAL ONCE
Refills: 0 | Status: COMPLETED | OUTPATIENT
Start: 2025-01-07 | End: 2025-01-07

## 2025-01-07 RX ADMIN — MIDODRINE HYDROCHLORIDE 5 MILLIGRAM(S): 5 TABLET ORAL at 21:56

## 2025-01-07 RX ADMIN — SEVELAMER CARBONATE 1600 MILLIGRAM(S): 800 TABLET, FILM COATED ORAL at 09:45

## 2025-01-07 RX ADMIN — MIDODRINE HYDROCHLORIDE 5 MILLIGRAM(S): 5 TABLET ORAL at 06:21

## 2025-01-07 RX ADMIN — TAMSULOSIN HYDROCHLORIDE 0.4 MILLIGRAM(S): 0.4 CAPSULE ORAL at 21:56

## 2025-01-07 RX ADMIN — SEVELAMER CARBONATE 800 MILLIGRAM(S): 800 TABLET, FILM COATED ORAL at 17:53

## 2025-01-07 RX ADMIN — HEPARIN SODIUM 5000 UNIT(S): 1000 INJECTION, SOLUTION INTRAVENOUS; SUBCUTANEOUS at 06:21

## 2025-01-07 RX ADMIN — MIDODRINE HYDROCHLORIDE 5 MILLIGRAM(S): 5 TABLET ORAL at 14:18

## 2025-01-07 RX ADMIN — Medication 0: at 21:57

## 2025-01-07 RX ADMIN — LATANOPROST 1 DROP(S): 50 SOLUTION OPHTHALMIC at 21:57

## 2025-01-07 RX ADMIN — METRONIDAZOLE 500 MILLIGRAM(S): 250 TABLET ORAL at 22:42

## 2025-01-07 RX ADMIN — Medication 100 MILLIGRAM(S): at 17:54

## 2025-01-07 RX ADMIN — SEVELAMER CARBONATE 800 MILLIGRAM(S): 800 TABLET, FILM COATED ORAL at 14:18

## 2025-01-07 RX ADMIN — HEPARIN SODIUM 5000 UNIT(S): 1000 INJECTION, SOLUTION INTRAVENOUS; SUBCUTANEOUS at 17:53

## 2025-01-07 RX ADMIN — CHLORHEXIDINE GLUCONATE 1 APPLICATION(S): 1.2 RINSE ORAL at 06:24

## 2025-01-07 NOTE — PROGRESS NOTE ADULT - ASSESSMENT
Patient is a 69yo Male with ESRD on HD via Rt AVG, HTN, 2HPT, Anemia, LE venous stasis with Rt foot wound presents with weakness. Pt a/w Rt foot ulcer and hyperkalemia. Nephrology consulted for ESRD status.     1) ESRD: Last HD 1/6, with intradialytic hypotension with net 0.5L removed. Plan for next HD 1/8. c/w MWF schedule. Monitor electrolytes  2) Hyperkalemia: resolved. Then with hypokalemia; resolved.  Will consider liberalizing potassium in diet if potassium remains low. Monitor serum potassium  3) HTN with ESRD: BP low normal;  now on midodrine taper. Pt on midodrine 5mg PO bid. c/w low sodium diet. Monitor BP.  4) Anemia of renal disease: Hb acceptable. c/w Epogen 6000 units IV tiw. Monitor Hb.  5) 2HPT: Serum calcium acceptable with low normal serum phos.  PTHi 441. c/w calcitriol 1mcg PO MWF. Pt on Lanthanum 2000mg PO tid with meals which is unavailable at [facility]. Recc to decrease Sevelamer to 800mg PO tid with meals  c/w low phos diet Monitor serum calcium and phosphorus.    HepBsAg was neg on outpt labs on 12/11/24; repeat HepBsAg neg; likely positive HepBsAg on 12/16 is an error.       [facility] NEPHROLOGY  Arden Tsang M.D.  Freeman Holland D.O.  Janny Chirinos M.D.  MD Ambar Mccarty, MSN, ANP-C    Telephone: [phone]  Facsimile: [phone]    [facility] [place] [address], [address]  [place] [address]

## 2025-01-07 NOTE — PROGRESS NOTE ADULT - SUBJECTIVE AND OBJECTIVE BOX
Subjective:  Resting comfortably    Objective:    Vital signs  T(C): , Max: 36.5 (01-06-25 @ 12:33)  HR: 68 (01-07-25 @ 05:33)  BP: 96/60 (01-07-25 @ 05:33)  SpO2: 94% (01-07-25 @ 05:33)  Wt(kg): --    Output     01-06 @ 07:01  -  01-07 @ 07:00  --------------------------------------------------------  IN: 600 mL / OUT: 1100 mL / NET: -500 mL        Gen: NAD  Abd: soft, nontender, nondistended  : scant serosanguinous drainage from right scrotum, dressing placed    Labs                        10.8   6.37  )-----------( 251      ( 07 Jan 2025 06:15 )             32.2     06 Jan 2025 08:30    137    |  101    |  41     ----------------------------<  132    3.2     |  26     |  7.79     Ca    8.9        06 Jan 2025 08:30  Phos  3.5       06 Jan 2025 08:30  Mg     2.6       06 Jan 2025 08:30        Urine Cx: ?  Blood Cx: ?      Imaging

## 2025-01-07 NOTE — PHYSICAL THERAPY INITIAL EVALUATION ADULT - GENERAL OBSERVATIONS, REHAB EVAL
Patient received in supine
Pt is found lying supine in bed with HOB down. Pt is receptive to PT treatment and cooperative. Pt was soiled upon first interaction, eval completed after PCA had cleaned up the pt.

## 2025-01-07 NOTE — PROGRESS NOTE ADULT - TIME BILLING
- Review of records, vital signs and daily labs, Imaging, microbiology and other Infectious Diseases related work up  - General physical examination performed  - Generation of Infectious Diseases treatment plan discussed with attending Physician  - Coordination of care with the multidisciplinary team  -Counseling of the patient and/or family members
Complexity of active medical problems, review of data in EMR, bedside assessment, formulating treatment plan after discussion with medical team, consultants and with multidisciplinary team during IDR; as well as discussion of treatment plan, prognosis, care coordination with patient.
- Review of hospital course, labs, vitals, medical records  - Bedside exam and interview  - Discussed plan of care with primary team ACP and housestaff, spouse Almaz on the phone  - Documenting the encounter
- Review of records, vital signs and daily labs, Imaging, microbiology and other Infectious Diseases related work up  - General physical examination performed  - Generation of Infectious Diseases treatment plan discussed with attending Physician  - Coordination of care with the multidisciplinary team  -Counseling of the patient and/or family members
- Review of records, vital signs and daily labs, Imaging, microbiology and other Infectious Diseases related work up  - General physical examination performed  - Generation of Infectious Diseases treatment plan discussed with attending Physician  - Coordination of care with the multidisciplinary team  -Counseling of the patient and/or family members

## 2025-01-07 NOTE — PHYSICAL THERAPY INITIAL EVALUATION ADULT - PERTINENT HX OF CURRENT PROBLEM, REHAB EVAL
67 yo M with PMH of  DM, HTN, ESRD on dialysis ( M, W, F), Last Dialysis 12/13/24,   B/L Charcot deformity and HLD, present to the ED for generalized weakness, chills, subjective fever. Patient reports waking up this morning feeling very tired and unable to stand up to get dressed for his dialysis appointment. Collateral information obtained from wifer at bedside. As per wife, patient didnt appear to be himself this morning so she decided to call EMS. Patient reports since 2014 after having charcot procedure in bilateral foot, he has been wearing a charcot boot to walk. IMPRESSION: No scrotal wall thickening/edema with 1.5 cm rim-enhancing collection noted within the right scrotal wall, could be an abscess. Infiltration of the bilateral ischio rectal fossa fat without underlying bony erosion. Bilateral lower lung field infiltrates, right greater than left. IMPRESSION:  Patchy medial right basilar opacities that could be due to atelectasis or infection.

## 2025-01-07 NOTE — PHYSICAL THERAPY INITIAL EVALUATION ADULT - GAIT DEVIATIONS NOTED, PT EVAL
decreased gisella/decreased step length/decreased stride length
decreased velocity of limb motion/decreased step length/decreased stride length

## 2025-01-07 NOTE — PROGRESS NOTE ADULT - PROBLEM SELECTOR PLAN 8
Episode of AHRF in the setting of Influenza A (12/28)   s/p tamiflu for 12/29-1/1  CXR (12/31): Patchy medial right basilar opacities that could be due to atelectasis or infection.  Saturating well on RA  f/u surveillance CXR Episode of AHRF in the setting of Influenza A (12/28)   s/p tamiflu for 12/29-1/1  CXR (12/31): Patchy medial right basilar opacities that could be due to atelectasis or infection.  Influenza A + on 1/6, likely from viral shedding; no cough, runny nose   on 2L NC today  f/u surveillance CXR

## 2025-01-07 NOTE — PROGRESS NOTE ADULT - PROBLEM SELECTOR PLAN 1
Transferred to MICU on 12/30 for septic shock 2/2 scrotal abscess, gluteal abscess, Influenza A requiring pressors  now transferred to medicine floor   BCx (12/16) coagulase neg. Staph.  BCx (12/17, 19, 29, 31): NGTD  c/w meropenem (1/1 - )  Likely will require prolonged meropenem per ID   BCx: NGTD  c/w midodrine 5mg BID (SBP 90-100s) Transferred to MICU on 12/30 for septic shock 2/2 scrotal abscess, gluteal abscess, Influenza A requiring pressors  now transferred to medicine floor   BCx (12/16) coagulase neg. Staph.  BCx (12/17, 19, 29, 31): NGTD  s/p meropenem (1/1 - 1/7)  1/7 started cefepime 1mg qd, flagyl 500mg PO BID (will d/c on cefepime 1mg w/ HD until 1/29 to complete 4wk)  BCx: NGTD  midodrine increased back to 5mg TID Transferred to MICU on 12/30 for septic shock 2/2 scrotal abscess, gluteal abscess, Influenza A requiring pressors  now transferred to medicine floor   BCx (12/16) coagulase neg. Staph.  BCx (12/17, 19, 29, 31): NGTD  s/p meropenem (1/1 - 1/7)  1/7 started cefepime 1mg qd, flagyl 500mg PO BID (will d/c on cefepime 2mg w/ HD until 1/27 to complete 4wk)  BCx: NGTD  midodrine increased back to 5mg TID

## 2025-01-07 NOTE — PHYSICAL THERAPY INITIAL EVALUATION ADULT - LIVES WITH, PROFILE
Pt lives in a one story house with 4 RADHA. He wears charcot boots to ambulate./spouse
has 3 steps to neg./spouse

## 2025-01-07 NOTE — PHYSICAL THERAPY INITIAL EVALUATION ADULT - PHYSICAL ASSIST/NONPHYSICAL ASSIST: STAND/SIT, REHAB EVAL
verbal cues
[Alert] : alert
[No Acute Distress] : no acute distress
[Normocephalic] : normocephalic
[Conjunctivae with no discharge] : conjunctivae with no discharge
[PERRL] : PERRL
[EOMI Bilateral] : EOMI bilateral
[Auricles Well Formed] : auricles well formed
[Clear Tympanic membranes with present light reflex and bony landmarks] : clear tympanic membranes with present light reflex and bony landmarks
[No Discharge] : no discharge
[Nares Patent] : nares patent
[Pink Nasal Mucosa] : pink nasal mucosa
[Palate Intact] : palate intact
[Nonerythematous Oropharynx] : nonerythematous oropharynx
[Supple, full passive range of motion] : supple, full passive range of motion
[No Palpable Masses] : no palpable masses
[Symmetric Chest Rise] : symmetric chest rise
[Clear to Auscultation Bilaterally] : clear to auscultation bilaterally
[Regular Rate and Rhythm] : regular rate and rhythm
[Normal S1, S2 present] : normal S1, S2 present
[No Murmurs] : no murmurs
[+2 Femoral Pulses] : +2 femoral pulses
[Soft] : soft
[NonTender] : non tender
[Non Distended] : non distended
[Normoactive Bowel Sounds] : normoactive bowel sounds
[No Hepatomegaly] : no hepatomegaly
[No Splenomegaly] : no splenomegaly
[Patent] : patent
[No fissures] : no fissures
[No Abnormal Lymph Nodes Palpated] : no abnormal lymph nodes palpated
[No Gait Asymmetry] : no gait asymmetry
[No pain or deformities with palpation of bone, muscles, joints] : no pain or deformities with palpation of bone, muscles, joints
[Normal Muscle Tone] : normal muscle tone
[Straight] : straight
[+2 Patella DTR] : +2 patella DTR
[Cranial Nerves Grossly Intact] : cranial nerves grossly intact
[No Rash or Lesions] : no rash or lesions
verbal cues/1 person assist

## 2025-01-07 NOTE — PROGRESS NOTE ADULT - ASSESSMENT
Subjective: NAD, afebrile, feeling well, no new complains    REVIEW OF SYSTEMS:  CONSTITUTIONAL:  No fevers or chills  EYES/ENT:  No visual changes; no throat pain   NECK:  No neck pain or stiffness  RESPIRATORY:  No cough, no wheezing. No shortness of breath  CARDIOVASCULAR:  No chest pain or palpitations  GASTROINTESTINAL:  No abdominal pain. No N/V or diarrhea  GENITOURINARY:  No dysuria, frequency or hematuria  NEUROLOGICAL:  No numbness or weakness  MSK: no back pain, no joint pain  SKIN:  No itching, no skin rash, R scrotal wound    PE:  Vital Signs Last 24 Hrs  T(C): 36.5 (07 Jan 2025 12:55), Max: 36.5 (07 Jan 2025 12:55)  T(F): 97.7 (07 Jan 2025 12:55), Max: 97.7 (07 Jan 2025 12:55)  HR: 76 (07 Jan 2025 12:55) (68 - 77)  BP: 77/44 (07 Jan 2025 12:55) (77/44 - 134/62)  RR: 20 (07 Jan 2025 12:55) (16 - 20)  SpO2: 96% (07 Jan 2025 12:55) (93% - 96%)    Parameters below as of 07 Jan 2025 12:55  Patient On (Oxygen Delivery Method): nasal cannula  O2 Flow (L/min): 2    Gen: AOx3, NAD, non-toxic, pleasant  HEAD:  Atraumatic  EYES: PERRLA, conjunctiva and sclera clear  ENT: Moist mucous membranes  NECK: Supple, No JVD  CV: S1+S2+ RRR  Resp: CTA  Abd: Soft, nontender, +BS  Ext: No LE edema  Charcot foot  : anuric  R scrotum with old sx scar with fistulized tract to E testicle draining yellow fluid, no tenderness   IV/Skin: No thrombophlebitis  Msk: No low back pain, no arthralgias, no joint swelling  Neuro: AAOx3. No focal signs    LABS/DIAGNOSTIC TESTS:                        10.8   6.37  )-----------( 251      ( 07 Jan 2025 06:15 )             32.2     WBC Count: 6.37 K/uL (01-07 @ 06:15)  WBC Count: 6.92 K/uL (01-06 @ 08:30)  WBC Count: 5.55 K/uL (01-05 @ 08:02)    01-07    139  |  103  |  23[H]  ----------------------------<  122[H]  3.6   |  26  |  5.72[H]    Ca    9.4      07 Jan 2025 06:15  Phos  2.7     01-07  Mg     2.4     01-07    TPro  7.6  /  Alb  2.4[L]  /  TBili  0.5  /  DBili  x   /  AST  9[L]  /  ALT  6[L]  /  AlkPhos  67  01-06    Urinalysis Basic - ( 07 Jan 2025 06:15 )    Color: x / Appearance: x / SG: x / pH: x  Gluc: 122 mg/dL / Ketone: x  / Bili: x / Urobili: x   Blood: x / Protein: x / Nitrite: x   Leuk Esterase: x / RBC: x / WBC x   Sq Epi: x / Non Sq Epi: x / Bacteria: x      LACTATE:    CULTURES:     Culture - Blood (collected 12-31-24 @ 06:43)  Source: .Blood BLOOD  Final Report (01-05-25 @ 12:01):    No growth at 5 days    Culture - Blood (collected 12-31-24 @ 06:43)  Source: .Blood BLOOD  Final Report (01-05-25 @ 12:01):    No growth at 5 days    Culture - Blood (collected 12-29-24 @ 06:20)  Source: .Blood BLOOD  Final Report (01-03-25 @ 11:01):    No growth at 5 days    Culture - Blood (collected 12-29-24 @ 06:10)  Source: .Blood BLOOD  Final Report (01-03-25 @ 11:01):    No growth at 5 days    Culture - Genital (collected 12-22-24 @ 08:20)  Source: .Genital  Final Report (12-25-24 @ 06:04):    Rare Proteus mirabilis    Commensal vladimir consistent with body site  Organism: Proteus mirabilis (12-25-24 @ 06:04)  Organism: Proteus mirabilis (12-25-24 @ 06:04)      Method Type: GEWN      -  Amoxicillin/Clavulanic Acid: S <=8/4      -  Ampicillin: S <=8 These ampicillin results predict results for amoxicillin      -  Ampicillin/Sulbactam: S <=4/2      -  Aztreonam: S <=4      -  Cefazolin: S <=2      -  Cefepime: S <=2      -  Cefoxitin: S <=8      -  Ceftriaxone: S <=1      -  Ciprofloxacin: S <=0.25      -  Ertapenem: S <=0.5      -  Gentamicin: S <=2      -  Levofloxacin: S <=0.5      -  Meropenem: S <=1      -  Piperacillin/Tazobactam: S <=8      -  Tobramycin: S <=2      -  Trimethoprim/Sulfamethoxazole: S <=0.5/9.5    Culture - Wound Aerobic/Anaerobic (collected 12-19-24 @ 18:06)  Source: Drainage  Final Report (12-24-24 @ 18:25):    Few Trueperella bernardiae "Susceptibilities not performed"    Culture - Wound Aerobic/Anaerobic (collected 12-19-24 @ 16:15)  Source: Skin/Wound  Final Report (12-24-24 @ 19:27):    Rare Trueperella bernardiae "Susceptibilities not performed"    Culture - Blood (collected 12-19-24 @ 06:45)  Source: .Blood BLOOD  Final Report (12-24-24 @ 12:00):    No growth at 5 days    Culture - Blood (collected 12-17-24 @ 12:35)  Source: .Blood BLOOD  Final Report (12-22-24 @ 19:00):    No growth at 5 days    Culture - Blood (collected 12-16-24 @ 22:30)  Source: .Blood BLOOD  Gram Stain (12-17-24 @ 16:53):    Growth in aerobic bottle: Gram Positive Cocci in Clusters  Final Report (12-18-24 @ 13:20):    Growth in aerobic bottle: Staphylococcus hominis    Isolation of Coagulase negative Staphylococcus from single blood culture    sets may represent    contamination. Contact the Microbiology Department at [phone] if    susceptibility testing is needed.    clinically indicated.    Direct identification is available within approximately 3-5    hours either by Blood Panel Multiplexed PCR or Direct    MALDI-TOF. Details: [email or web address]  Organism: Blood Culture PCR (12-18-24 @ 13:20)  Organism: Blood Culture PCR (12-18-24 @ 13:20)      Method Type: PCR      -  Coagulase negative Staphylococcus: Detec    Culture - Wound Aerobic/Anaerobic (collected 12-16-24 @ 12:36)  Source: .Surgical Swab  Final Report (12-21-24 @ 17:55):    Moderate Providencia stuartii    Moderate Corynebacterium striatum group "Susceptibilities not performed"    Few Trueperella bernardiae "Susceptibilities not performed"  Organism: Providencia stuartii (12-21-24 @ 17:55)  Organism: Providencia stuartii (12-21-24 @ 17:55)      Method Type: GWEN      -  Amoxicillin/Clavulanic Acid: R >16/8      -  Ampicillin: R >16 These ampicillin results predict results for amoxicillin      -  Ampicillin/Sulbactam: I 16/8      -  Aztreonam: S <=4      -  Cefazolin: R >16      -  Cefepime: S <=2      -  Cefoxitin: S <=8      -  Ceftriaxone: S <=1      -  Ciprofloxacin: R 2      -  Ertapenem: S <=0.5      -  Levofloxacin: R 4      -  Meropenem: S <=1      -  Piperacillin/Tazobactam: S <=8      -  Trimethoprim/Sulfamethoxazole: S <=0.5/9.5    RADIOLOGY:   Available pertinent Imaging reviewed    ANTIBIOTICS:  cefepime   IVPB 1000 every 24 hours  metroNIDAZOLE    Tablet 500 every 12 hours    IMPRESSION:  67 yo M with PMH of  DM, HTN, ESRD on dialysis ( M, W, F) via RUE graft (last Dialysis 12/13/24), HLD, B/L Charcot deformity for which follows w/ podiatry Dr. Sim in the outpatient podiatry clinic for Charcot foot admitted for sepsis 2/2 L buttock abscess, scrotal abscess and DFU/ +/- chronic OM. L buttock abscess drained at the bedside 12/19--  Truejillian camarena  Also noted purulent drainage from the base of the scrotum. Cx 12/22 from scrotal drainage growing Proteus mirabilis. CT abd/pelvis 12/23--2.1 cm right scrotal fluid collection suspicious for abscess. Urology rec no surgery. Was on Ampi/clavulanate since 12/24 to Rx the organism from the scrotal drainage planned for approx 4wks to Rx the scrotal abscess last time he was seen by ID. Hospital course complicated with septic shock (RRT 12/30 for Lethargic after HD and 104 F fever), transferred to MICU admission for septic shock(resolved), currently on meropenem empirically. New septic mtz NGTD.     CT A/P 12/23 w/ Several small stones at both ureterovesical junctions measuring up to 2 mm on the right and 3 mm on the left. No ureteral dilatation. Prostate is normal in size. Diffuse scrotal edema and rim-enhancing extratesticular right scrotal fluid collection measuring 2.1 x 1.3 x 0.8 cm.  CT A/P 1/1 w/ No scrotal wall thickening/edema with 1.5 cm rim-enhancing collection noted within the right scrotal wall. BL lung infiltrates.     sx swab 12/16 (R foot) +Providencia stuartii, corynebacterium striatum  Wound cx 12/19 x 2 (L buttock) +Trueperella bernardiae  Wound cx 12/22 + Proteus mirabilis   Blood cx NGTD    -Scrotal abscess (1.5 cm rim-enhancing collection noted within the right scrotal wall) fistulized to R scrotum  -L buttock abscess- s/p I&D 12/19 healing well  -Ureterovesical junctions stones  -ESRD on HD  -DM  -Influenza A URI   -Charcot arthropathy BL  -DFU (chronic) R foot, R ankle - chronic OM   -BL Lung infiltrates- imaging      PLAN:  Transition Meropenem (no ESBL recovered) to Cefepime 1g q24 hrs IV - once pt discharge Cefepime can be given with Dialysis (MWF - 2g/2g/2g ) for 4 weeks(until 01/27)  Add Flagyl 500 mg q12 hrs PO (same duration)  Weekly labs CBC w/diff, CMP, ESR and CRP  F/up Urology OP to address R scrotum fistula  DM control  ESRD per renal  Discussed with Dr. Shelton    Please reach ID with any questions or concerns  Dr. Spring Cervantes  Available in Teams

## 2025-01-07 NOTE — PROGRESS NOTE ADULT - SUBJECTIVE AND OBJECTIVE BOX
[facility] NEPHROLOGY- PROGRESS NOTE    Patient is a 69yo Male with ESRD on HD via Rt AVG, HTN, 2HPT, Anemia, LE venous stasis with Rt foot wound presents with weakness. Pt a/w Rt foot ulcer and hyperkalemia. Nephrology consulted for ESRD status.   MRI Rt foot: + osteomyelitis involving the talus and less so of the navicular bone;    BCx +Staph hominis  12/20: s/p I& D of buttock abscess  +scrotal abscess  12/29: s/p RRT for lethargy;  febrile +FLU A   12/30: s/p RRT for somnolent  +fever; transferred to ICU for pressors/ septic shock    Hospital Medications: Medications reviewed.  REVIEW OF SYSTEMS:  Denies any SOB, cough, chest pain or n/v/d, abd pain or foot pain      VITALS:  T(F): 97.6 (01-07-25 @ 05:33), Max: 97.7 (01-06-25 @ 12:33)  HR: 68 (01-07-25 @ 05:33)  BP: 96/60 (01-07-25 @ 05:33)  RR: 16 (01-07-25 @ 05:33)  SpO2: 94% (01-07-25 @ 05:33)  Wt(kg): --    01-06 @ 07:01  -  01-07 @ 07:00  --------------------------------------------------------  IN: 600 mL / OUT: 1100 mL / NET: -500 mL      PHYSICAL EXAM:  Gen: NAD, more awake,  Obese  HEENT: anicteric  Cards: RRR, +S1/S2, no M/G/R  Resp: +mild rales at rt base, clear left side  GI: soft, NT/ND, NABS  Extremities: no b/l LE edema, extremely dry LE; rt foot covered  Access: Rt AVG +thrill +bruit      LABS:  01-07    139  |  103  |  23[H]  ----------------------------<  122[H]  3.6   |  26  |  5.72[H]    Ca    9.4      07 Jan 2025 06:15  Phos  2.7     01-07  Mg     2.4     01-07    TPro  7.6  /  Alb  2.4[L]  /  TBili  0.5  /  DBili      /  AST  9[L]  /  ALT  6[L]  /  AlkPhos  67  01-06    Creatinine Trend: 5.72 <--, 7.79 <--, 6.52 <--, 4.87 <--, 7.52 <--, 6.27 <--, 8.55 <--                        10.8   6.37  )-----------( 251      ( 07 Jan 2025 06:15 )             32.2     Urine Studies:  Urinalysis Basic - ( 07 Jan 2025 06:15 )    Color:  / Appearance:  / SG:  / pH:   Gluc: 122 mg/dL / Ketone:   / Bili:  / Urobili:    Blood:  / Protein:  / Nitrite:    Leuk Esterase:  / RBC:  / WBC    Sq Epi:  / Non Sq Epi:  / Bacteria:

## 2025-01-07 NOTE — PROGRESS NOTE ADULT - SUBJECTIVE AND OBJECTIVE BOX
Chief complaint: Patient is a 69y old  Male who presents with a chief complaint of generalized weakness (07 Jan 2025 07:19)      CADY TRACEY is a 69y year old Male     INTERVAL HPI/OVERNIGHT EVENTS: No acute events overnight.  Patient examined at bedside this AM.  Patient reports mild shortness of breath, denies coughing or runny nose. Patient denies scrotal pain. Denies dizziness of lightheadedness       REVIEW OF SYSTEMS:  CONSTITUTIONAL: No fever, weight loss, or fatigue  EYES: No eye pain, visual disturbances, or discharge  ENMT:  No difficulty hearing, tinnitus, vertigo; No sinus or throat pain  NECK: No pain or stiffness  RESPIRATORY: +SOB: No cough, wheezing, chills or hemoptysis;   CARDIOVASCULAR: No chest pain, palpitations, dizziness, or leg swelling  GASTROINTESTINAL: No abdominal or epigastric pain. No nausea, vomiting, or hematemesis; No diarrhea or constipation. No melena or hematochezia.  GENITOURINARY: No dysuria, frequency, hematuria, or incontinence  NEUROLOGICAL: No headaches, memory loss, loss of strength, numbness, or tremors  MUSCULOSKELETAL: No joint pain or swelling; No muscle, back, or extremity pain  HEME/LYMPH: No easy bruising, or bleeding gums      FAMILY HISTORY:  Family history of diabetes mellitus        T(C): 36.4 (01-07-25 @ 05:33), Max: 36.5 (01-06-25 @ 12:33)  HR: 68 (01-07-25 @ 05:33) (68 - 81)  BP: 96/60 (01-07-25 @ 05:33) (96/60 - 134/62)  RR: 16 (01-07-25 @ 05:33) (16 - 20)  SpO2: 94% (01-07-25 @ 05:33) (88% - 96%)  Wt(kg): --Vital Signs Last 24 Hrs  T(C): 36.4 (07 Jan 2025 05:33), Max: 36.5 (06 Jan 2025 12:33)  T(F): 97.6 (07 Jan 2025 05:33), Max: 97.7 (06 Jan 2025 12:33)  HR: 68 (07 Jan 2025 05:33) (68 - 81)  BP: 96/60 (07 Jan 2025 05:33) (96/60 - 134/62)  BP(mean): --  RR: 16 (07 Jan 2025 05:33) (16 - 20)  SpO2: 94% (07 Jan 2025 05:33) (88% - 96%)    Parameters below as of 07 Jan 2025 05:33  Patient On (Oxygen Delivery Method): nasal cannula  O2 Flow (L/min): 2      PHYSICAL EXAM:  GENERAL: NAD, laying comfortably, on 2L NC   HEAD:  Atraumatic, Normocephalic  EYES: EOMI, PERRLA, conjunctiva and sclera clear  ENMT: No tonsillar erythema, exudates, or enlargement; Moist mucous membranes.   NECK: Supple, No JVD  NERVOUS SYSTEM:  Alert & Oriented X3, Good concentration; Motor Strength 5/5 B/L upper and 4/5 B/L lower extremities  CHEST/LUNG: LLL crackles, RLL rales; No resp distress; No rhonchi, wheezing, or rubs  HEART: Regular rate and rhythm; No murmurs, rubs, or gallops  ABDOMEN: Soft, Nontender, Nondistended; Bowel sounds present  : no suprapubic tenderness, purulent drainage actively draining from scrotum   EXTREMITIES: No clubbing, cyanosis, or edema  SKIN: B/L charcot's foot deformity with chronic diabetic/lymphostasis skin changes. No active drainage        LABS:                        10.8   6.37  )-----------( 251      ( 07 Jan 2025 06:15 )             32.2       01-07    139  |  103  |  23[H]  ----------------------------<  122[H]  3.6   |  26  |  5.72[H]    Ca    9.4      07 Jan 2025 06:15  Phos  2.7     01-07  Mg     2.4     01-07    TPro  7.6  /  Alb  2.4[L]  /  TBili  0.5  /  DBili  x   /  AST  9[L]  /  ALT  6[L]  /  AlkPhos  67  01-06        RADIOLOGY & ADDITIONAL TESTS:    Imaging Personally Reviewed:  [ ] YES  [ ] NO  acetaminophen     Tablet .. 650 milliGRAM(s) Oral every 6 hours PRN  calcitriol   Capsule 1 MICROGram(s) Oral <User Schedule>  chlorhexidine 2% Cloths 1 Application(s) Topical <User Schedule>  epoetin rudolph-epbx (RETACRIT) Injectable 6000 Unit(s) IV Push <User Schedule>  heparin   Injectable 5000 Unit(s) SubCutaneous every 12 hours  insulin lispro (ADMELOG) corrective regimen sliding scale   SubCutaneous three times a day before meals  insulin lispro (ADMELOG) corrective regimen sliding scale   SubCutaneous at bedtime  latanoprost 0.005% Ophthalmic Solution 1 Drop(s) Both EYES at bedtime  meropenem  IVPB 1000 milliGRAM(s) IV Intermittent every 24 hours  midodrine 5 milliGRAM(s) Oral <User Schedule>  ondansetron Injectable 4 milliGRAM(s) IV Push every 8 hours PRN  sevelamer carbonate 1600 milliGRAM(s) Oral three times a day with meals  tamsulosin 0.4 milliGRAM(s) Oral at bedtime      HEALTH ISSUES - PROBLEM Dx:  Septic shock    Abscess, scrotum    Diabetic ulcer of right foot    Gluteal abscess    ESRD on dialysis    T2DM (type 2 diabetes mellitus)    Anemia of chronic disease    Influenza A    HTN (hypertension)    Prophylactic measure    Discharge planning issues

## 2025-01-07 NOTE — PHYSICAL THERAPY INITIAL EVALUATION ADULT - DIAGNOSIS, PT EVAL
Patient presents w/ weakness and limited mobility.
(ICF Model) Pt. present w/deficits in Body Structures/Function (Impairments), incl: Strength, Balance, Endurance, leading to deficits in performing the below noted Activities (Limitations).

## 2025-01-07 NOTE — PHYSICAL THERAPY INITIAL EVALUATION ADULT - ACTIVE RANGE OF MOTION EXAMINATION, REHAB EVAL
andres. upper extremity Active ROM was WNL (within normal limits)/bilateral lower extremity Active ROM was WNL (within normal limits)

## 2025-01-07 NOTE — PROGRESS NOTE ADULT - ASSESSMENT
Patient is a 69M with PMHx of HTN, ESRD on HD (M, W, F), B/L Charcot deformity, hidradenitis suppurativa and HLD, present to the ED for generalized weakness, chills, subjective fever. Pt admitted on 12/16 to medicine floor for treatment of sepsis 2/2 scrotal abscess, diabetic foot ulcer, gluteal abscess, influenza A positive. s/p multiple RRTs for somnolence, hypotension, fever. 12/30 transferred to MICU for peripheral pressor req. Downgraded to medicine on 1/4 on medical management.

## 2025-01-07 NOTE — PROGRESS NOTE ADULT - PROBLEM SELECTOR PLAN 2
Abdominal wall mass  s/p drain placement by IR on 3/3/2022  Ct abdomen/pelvis  1. Large 16.6 x 9 cm abscess at the midline abutting the anterior abdominal wall, increased in size from the prior study. Recommend surgical consultation and follow-up.  2. Mild cardiomegaly.  3. Bilateral renal cysts.  4. Incomplete distension of the urinary bladder with probable mild wall thickening.  Cystitis is a consideration.  5. Bilateral inguinal hernias with small bowel protrusion on the right in fat containing inguinal hernia on the left.  6. Prostatomegaly.  Blood culture NGTD  Home abx- Ceftriaxone and Flagyl    IR upsized catheter from 10 Fr to 14 Fr  Surgery consulted, decline surgical intervention     - On Vancomycin, Cefepime, and Metronidazole   Hx of scrotal abscess s/p wide excision of hidradenitis skin tunnel  CT Pelvis (12/23): Rim-enhancing extratesticular right scrotal fluid collection measuring 2.1 x 1.3 x 0.8 cm.  CT A/P (1/1): Limited scrotal wall thickening/edema with 1.5 cm rim-enhancing collection noted within the right scrotal wall  Scrotal wound culture: + proteus mirabilis   s/p vancomycin   c/w meropenem q24h (1/1 - )  Urology following, per Uro indefinite drainage expected, outpt wide excision drainage  Patient hasn't seen his urologist Dr. Judge since excision (8-10 years ago)   ID Dr. Chavez following Hx of scrotal abscess s/p wide excision of hidradenitis skin tunnel  CT Pelvis (12/23): Rim-enhancing extratesticular right scrotal fluid collection measuring 2.1 x 1.3 x 0.8 cm.  CT A/P (1/1): Limited scrotal wall thickening/edema with 1.5 cm rim-enhancing collection noted within the right scrotal wall  Scrotal wound culture: + proteus mirabilis   s/p vancomycin   s/p meropenem q24h (1/1 - 1/7)  1/7 started cefepime 1mg qd, flagyl 500mg PO BID (will d/c on cefepime 1mg w/ HD until 1/29 to complete 4wk)  Urology following, per Uro indefinite drainage expected, outpt wide excision drainage  Patient hasn't seen his urologist Dr. Judge since excision (8-10 years ago)   ID Dr. Chavez following

## 2025-01-07 NOTE — PROGRESS NOTE ADULT - ASSESSMENT
69M with hx of T2DM, hidradenitis suppurativa a/w generalized weakness, w small amount of purulent drainage from scrotum     12/23: scrotal exam stable with spontaneous drainage  12/24: scrotal exam stable, CT pelvis demonstrates 2.1 cm right scrotal fluid collection suspicious for abscess and bilateral UVJ stones without ureteral dilatation, wbc has been downtrending  12/27: scrotal exam stable, decreasing drainage amount, WBC continues to downtrend  12/30: stable scrotal exam, less than 1 gauze worth of purulence expressed, patient likely febrile from flu (still without leukocytosis, drainage from scrotum still decreasing), s/p RRT in PM for somnolence transferred to ICU started on levo  12/31: scrotal exam continues to improve, minimal purulence expressed from right scrotal tract, WBC 20 from 14, levo 0.07    Scrotal exam continues to improve so clinical decline unlikely to be related to scrotum, however patient did have bilateral non-obstructing UVJ stones on 12/23 CTAP (to level of knees to also re-assess scrotal collection) so would be worthwhile to rescan patient to determine whether any stones have become obstructive.     1/1: repeat CTAP demonstrates 1.5x0.4cm right scrotal collection compared to 4x0.7cm collection on 12/23/24, still no hydronephrosis bilaterally to suggest obstructive uropathy  1/2: scrotal exam continues to improve, WBC downtrending, downtrending levo requirement 0.03  1/3: scrotal exam stable, WBC normalized 6.8, off pressors, afebrile, listed to the floor  1/6: scrotal exam stable, WBC wnl 6.9, afebrile    Patient has spontaneously draining skin tunnel from his hidradenitis and the "scrotal scar tissue communicating with abscess" is likely 2/2 prior wide excision of hidradenitis skin tunnel that has reformed and will likely require repeat wide excision of tract in the outpatient setting with his original surgeon. Drainage is expected to continue indefinitely in the meantime and can be managed with antibiotics, local wound care, and daily dressing changes. No operative intervention is planned during this admission as patient has minimal drainage from tract and is clinically improving with conservative management.    1/7: scant drainage from right scrotum, dressing changed, WBC wnl 6.4    - Repeat CTAP confirms decreasing size of scrotal collection > continue conservative management  - F/u repeat bcx > NG final  - Continue Flomax  - keep scrotal area clean and dry, may include sitz baths   - continue scrotal dressing changes qD (can be done by nursing: squeeze out purulence from right scrotum until serosanguinous and wipe with 4x4 gauze, place fresh 4x4 gauze overlying the drainage tract)  - IV antibiotics, follow ID recommendations   - strict glycemic control   - HD as per neph   - remainder of care as per primary team     D/w Dr. Mcguire   69M with hx of T2DM, hidradenitis suppurativa a/w generalized weakness, w small amount of purulent drainage from scrotum     12/23: scrotal exam stable with spontaneous drainage  12/24: scrotal exam stable, CT pelvis demonstrates 2.1 cm right scrotal fluid collection suspicious for abscess and bilateral UVJ stones without ureteral dilatation, wbc has been downtrending  12/27: scrotal exam stable, decreasing drainage amount, WBC continues to downtrend  12/30: stable scrotal exam, less than 1 gauze worth of purulence expressed, patient likely febrile from flu (still without leukocytosis, drainage from scrotum still decreasing), s/p RRT in PM for somnolence transferred to ICU started on levo  12/31: scrotal exam continues to improve, minimal purulence expressed from right scrotal tract, WBC 20 from 14, levo 0.07    Scrotal exam continues to improve so clinical decline unlikely to be related to scrotum, however patient did have bilateral non-obstructing UVJ stones on 12/23 CTAP (to level of knees to also re-assess scrotal collection) so would be worthwhile to rescan patient to determine whether any stones have become obstructive.     1/1: repeat CTAP demonstrates 1.5x0.4cm right scrotal collection compared to 4x0.7cm collection on 12/23/24, still no hydronephrosis bilaterally to suggest obstructive uropathy  1/2: scrotal exam continues to improve, WBC downtrending, downtrending levo requirement 0.03  1/3: scrotal exam stable, WBC normalized 6.8, off pressors, afebrile, listed to the floor  1/6: scrotal exam stable, WBC wnl 6.9, afebrile    Patient has spontaneously draining skin tunnel from his hidradenitis and the "scrotal scar tissue communicating with abscess" is likely 2/2 prior wide excision of hidradenitis skin tunnel that has reformed and will likely require repeat wide excision of tract in the outpatient setting with his original surgeon. Drainage is expected to continue indefinitely in the meantime and can be managed with antibiotics, local wound care, and daily dressing changes. No operative intervention is planned during this admission as patient has minimal drainage from tract and is clinically improving with conservative management.    1/7: scant drainage from right scrotum, dressing changed, WBC wnl 6.4; patient's hypotension unlikely to be related to scrotum, should follow up at [facility] for excision of drainage tract    - Repeat CTAP confirms decreasing size of scrotal collection > continue conservative management  - F/u repeat bcx > NG final  - Continue Flomax  - keep scrotal area clean and dry, may include sitz baths   - continue scrotal dressing changes qD (can be done by nursing: squeeze out purulence from right scrotum until serosanguinous and wipe with 4x4 gauze, place fresh 4x4 gauze overlying the drainage tract)  - IV antibiotics, follow ID recommendations   - strict glycemic control   - HD as per neph   - remainder of care as per primary team     D/w Dr. Mcguire

## 2025-01-07 NOTE — PHYSICAL THERAPY INITIAL EVALUATION ADULT - IMPAIRMENTS FOUND, PT EVAL
aerobic capacity/endurance/gait, locomotion, and balance/muscle strength
aerobic capacity/endurance/gait, locomotion, and balance/joint integrity and mobility/muscle strength

## 2025-01-07 NOTE — PHYSICAL THERAPY INITIAL EVALUATION ADULT - PATIENT/FAMILY/SIGNIFICANT OTHER GOALS STATEMENT, PT EVAL
Patient received from PACU as annex patient. VSS. She is up to the bathroom, voiding without difficulty. 3 dressings to abdomen dry and in tact. Tolerating liquids at this time.
to feel better
Patient states wants to get better.

## 2025-01-08 ENCOUNTER — TRANSCRIPTION ENCOUNTER (OUTPATIENT)
Age: 70
End: 2025-01-08

## 2025-01-08 VITALS
HEART RATE: 77 BPM | SYSTOLIC BLOOD PRESSURE: 115 MMHG | DIASTOLIC BLOOD PRESSURE: 66 MMHG | OXYGEN SATURATION: 95 % | TEMPERATURE: 98 F | RESPIRATION RATE: 20 BRPM

## 2025-01-08 LAB
ANION GAP SERPL CALC-SCNC: 9 MMOL/L — SIGNIFICANT CHANGE UP (ref 5–17)
BUN SERPL-MCNC: 32 MG/DL — HIGH (ref 7–18)
CALCIUM SERPL-MCNC: 9.3 MG/DL — SIGNIFICANT CHANGE UP (ref 8.4–10.5)
CHLORIDE SERPL-SCNC: 103 MMOL/L — SIGNIFICANT CHANGE UP (ref 96–108)
CO2 SERPL-SCNC: 27 MMOL/L — SIGNIFICANT CHANGE UP (ref 22–31)
CREAT SERPL-MCNC: 7.1 MG/DL — HIGH (ref 0.5–1.3)
EGFR: 8 ML/MIN/1.73M2 — LOW
GLUCOSE BLDC GLUCOMTR-MCNC: 104 MG/DL — HIGH (ref 70–99)
GLUCOSE BLDC GLUCOMTR-MCNC: 106 MG/DL — HIGH (ref 70–99)
GLUCOSE BLDC GLUCOMTR-MCNC: 92 MG/DL — SIGNIFICANT CHANGE UP (ref 70–99)
GLUCOSE SERPL-MCNC: 114 MG/DL — HIGH (ref 70–99)
HCT VFR BLD CALC: 32.1 % — LOW (ref 39–50)
HGB BLD-MCNC: 10.4 G/DL — LOW (ref 13–17)
MAGNESIUM SERPL-MCNC: 2.4 MG/DL — SIGNIFICANT CHANGE UP (ref 1.6–2.6)
MCHC RBC-ENTMCNC: 31.5 PG — SIGNIFICANT CHANGE UP (ref 27–34)
MCHC RBC-ENTMCNC: 32.4 G/DL — SIGNIFICANT CHANGE UP (ref 32–36)
MCV RBC AUTO: 97.3 FL — SIGNIFICANT CHANGE UP (ref 80–100)
NRBC # BLD: 0 /100 WBCS — SIGNIFICANT CHANGE UP (ref 0–0)
PHOSPHATE SERPL-MCNC: 3 MG/DL — SIGNIFICANT CHANGE UP (ref 2.5–4.5)
PLATELET # BLD AUTO: 271 K/UL — SIGNIFICANT CHANGE UP (ref 150–400)
POTASSIUM SERPL-MCNC: 3.5 MMOL/L — SIGNIFICANT CHANGE UP (ref 3.5–5.3)
POTASSIUM SERPL-SCNC: 3.5 MMOL/L — SIGNIFICANT CHANGE UP (ref 3.5–5.3)
RBC # BLD: 3.3 M/UL — LOW (ref 4.2–5.8)
RBC # FLD: 14.9 % — HIGH (ref 10.3–14.5)
SODIUM SERPL-SCNC: 139 MMOL/L — SIGNIFICANT CHANGE UP (ref 135–145)
WBC # BLD: 6.41 K/UL — SIGNIFICANT CHANGE UP (ref 3.8–10.5)
WBC # FLD AUTO: 6.41 K/UL — SIGNIFICANT CHANGE UP (ref 3.8–10.5)

## 2025-01-08 PROCEDURE — 87517 HEPATITIS B DNA QUANT: CPT

## 2025-01-08 PROCEDURE — 80053 COMPREHEN METABOLIC PANEL: CPT

## 2025-01-08 PROCEDURE — 87040 BLOOD CULTURE FOR BACTERIA: CPT

## 2025-01-08 PROCEDURE — 83970 ASSAY OF PARATHORMONE: CPT

## 2025-01-08 PROCEDURE — 82330 ASSAY OF CALCIUM: CPT

## 2025-01-08 PROCEDURE — 86706 HEP B SURFACE ANTIBODY: CPT

## 2025-01-08 PROCEDURE — 83036 HEMOGLOBIN GLYCOSYLATED A1C: CPT

## 2025-01-08 PROCEDURE — 73620 X-RAY EXAM OF FOOT: CPT

## 2025-01-08 PROCEDURE — 72193 CT PELVIS W/DYE: CPT | Mod: MC

## 2025-01-08 PROCEDURE — 83605 ASSAY OF LACTIC ACID: CPT

## 2025-01-08 PROCEDURE — 85730 THROMBOPLASTIN TIME PARTIAL: CPT

## 2025-01-08 PROCEDURE — 87186 SC STD MICRODIL/AGAR DIL: CPT

## 2025-01-08 PROCEDURE — 85610 PROTHROMBIN TIME: CPT

## 2025-01-08 PROCEDURE — 82962 GLUCOSE BLOOD TEST: CPT

## 2025-01-08 PROCEDURE — 97110 THERAPEUTIC EXERCISES: CPT

## 2025-01-08 PROCEDURE — 73718 MRI LOWER EXTREMITY W/O DYE: CPT | Mod: MC

## 2025-01-08 PROCEDURE — 97530 THERAPEUTIC ACTIVITIES: CPT

## 2025-01-08 PROCEDURE — 99261: CPT

## 2025-01-08 PROCEDURE — 87640 STAPH A DNA AMP PROBE: CPT

## 2025-01-08 PROCEDURE — 86705 HEP B CORE ANTIBODY IGM: CPT

## 2025-01-08 PROCEDURE — 97116 GAIT TRAINING THERAPY: CPT

## 2025-01-08 PROCEDURE — 99285 EMERGENCY DEPT VISIT HI MDM: CPT

## 2025-01-08 PROCEDURE — 86140 C-REACTIVE PROTEIN: CPT

## 2025-01-08 PROCEDURE — 87077 CULTURE AEROBIC IDENTIFY: CPT

## 2025-01-08 PROCEDURE — 93923 UPR/LXTR ART STDY 3+ LVLS: CPT

## 2025-01-08 PROCEDURE — 85027 COMPLETE CBC AUTOMATED: CPT

## 2025-01-08 PROCEDURE — 93005 ELECTROCARDIOGRAM TRACING: CPT

## 2025-01-08 PROCEDURE — 84100 ASSAY OF PHOSPHORUS: CPT

## 2025-01-08 PROCEDURE — 83735 ASSAY OF MAGNESIUM: CPT

## 2025-01-08 PROCEDURE — 80048 BASIC METABOLIC PNL TOTAL CA: CPT

## 2025-01-08 PROCEDURE — 87070 CULTURE OTHR SPECIMN AEROBIC: CPT

## 2025-01-08 PROCEDURE — 99239 HOSP IP/OBS DSCHRG MGMT >30: CPT

## 2025-01-08 PROCEDURE — 84295 ASSAY OF SERUM SODIUM: CPT

## 2025-01-08 PROCEDURE — 36415 COLL VENOUS BLD VENIPUNCTURE: CPT

## 2025-01-08 PROCEDURE — 85025 COMPLETE CBC W/AUTO DIFF WBC: CPT

## 2025-01-08 PROCEDURE — 73721 MRI JNT OF LWR EXTRE W/O DYE: CPT | Mod: MC

## 2025-01-08 PROCEDURE — 82803 BLOOD GASES ANY COMBINATION: CPT

## 2025-01-08 PROCEDURE — 87637 SARSCOV2&INF A&B&RSV AMP PRB: CPT

## 2025-01-08 PROCEDURE — 84132 ASSAY OF SERUM POTASSIUM: CPT

## 2025-01-08 PROCEDURE — 74177 CT ABD & PELVIS W/CONTRAST: CPT | Mod: MC

## 2025-01-08 PROCEDURE — 80202 ASSAY OF VANCOMYCIN: CPT

## 2025-01-08 PROCEDURE — 86704 HEP B CORE ANTIBODY TOTAL: CPT

## 2025-01-08 PROCEDURE — 87150 DNA/RNA AMPLIFIED PROBE: CPT

## 2025-01-08 PROCEDURE — 86803 HEPATITIS C AB TEST: CPT

## 2025-01-08 PROCEDURE — 85652 RBC SED RATE AUTOMATED: CPT

## 2025-01-08 PROCEDURE — 82310 ASSAY OF CALCIUM: CPT

## 2025-01-08 PROCEDURE — 87641 MR-STAPH DNA AMP PROBE: CPT

## 2025-01-08 PROCEDURE — 97162 PT EVAL MOD COMPLEX 30 MIN: CPT

## 2025-01-08 PROCEDURE — 71045 X-RAY EXAM CHEST 1 VIEW: CPT

## 2025-01-08 PROCEDURE — 87340 HEPATITIS B SURFACE AG IA: CPT

## 2025-01-08 RX ORDER — HYDRALAZINE HYDROCHLORIDE 10 MG/1
1 TABLET ORAL
Refills: 0 | DISCHARGE

## 2025-01-08 RX ORDER — SEVELAMER CARBONATE 800 MG/1
1 TABLET, FILM COATED ORAL
Qty: 90 | Refills: 0
Start: 2025-01-08 | End: 2025-02-06

## 2025-01-08 RX ORDER — INSULIN LISPRO 100/ML
1 VIAL (ML) SUBCUTANEOUS
Qty: 0 | Refills: 0 | DISCHARGE
Start: 2025-01-08

## 2025-01-08 RX ORDER — CARVEDILOL 25 MG/1
1 TABLET, FILM COATED ORAL
Refills: 0 | DISCHARGE

## 2025-01-08 RX ORDER — MIDODRINE HYDROCHLORIDE 5 MG/1
2 TABLET ORAL
Qty: 90 | Refills: 0 | DISCHARGE
Start: 2025-01-08 | End: 2025-02-06

## 2025-01-08 RX ORDER — METRONIDAZOLE 250 MG/1
1 TABLET ORAL
Qty: 38 | Refills: 0
Start: 2025-01-08 | End: 2025-01-26

## 2025-01-08 RX ORDER — LATANOPROST 50 UG/ML
1 SOLUTION OPHTHALMIC
Qty: 0 | Refills: 0 | DISCHARGE
Start: 2025-01-08

## 2025-01-08 RX ORDER — TAMSULOSIN HYDROCHLORIDE 0.4 MG/1
1 CAPSULE ORAL
Qty: 0 | Refills: 0 | DISCHARGE
Start: 2025-01-08

## 2025-01-08 RX ORDER — EPOETIN ALFA 2000 [IU]/ML
6000 SOLUTION INTRAVENOUS; SUBCUTANEOUS
Refills: 0 | Status: DISCONTINUED | OUTPATIENT
Start: 2025-01-08 | End: 2025-01-08

## 2025-01-08 RX ORDER — CALCITRIOL 0.5 UG/1
2 CAPSULE, LIQUID FILLED ORAL
Qty: 180 | Refills: 0
Start: 2025-01-08 | End: 2025-02-06

## 2025-01-08 RX ORDER — MIDODRINE HYDROCHLORIDE 5 MG/1
1 TABLET ORAL
Qty: 90 | Refills: 0
Start: 2025-01-08 | End: 2025-02-06

## 2025-01-08 RX ADMIN — CALCITRIOL 1 MICROGRAM(S): 0.5 CAPSULE, LIQUID FILLED ORAL at 08:44

## 2025-01-08 RX ADMIN — HEPARIN SODIUM 5000 UNIT(S): 1000 INJECTION, SOLUTION INTRAVENOUS; SUBCUTANEOUS at 06:51

## 2025-01-08 RX ADMIN — SEVELAMER CARBONATE 800 MILLIGRAM(S): 800 TABLET, FILM COATED ORAL at 17:27

## 2025-01-08 RX ADMIN — Medication 100 MILLIGRAM(S): at 17:26

## 2025-01-08 RX ADMIN — SEVELAMER CARBONATE 800 MILLIGRAM(S): 800 TABLET, FILM COATED ORAL at 08:44

## 2025-01-08 RX ADMIN — MIDODRINE HYDROCHLORIDE 5 MILLIGRAM(S): 5 TABLET ORAL at 06:52

## 2025-01-08 RX ADMIN — EPOETIN ALFA 6000 UNIT(S): 2000 SOLUTION INTRAVENOUS; SUBCUTANEOUS at 11:34

## 2025-01-08 RX ADMIN — METRONIDAZOLE 500 MILLIGRAM(S): 250 TABLET ORAL at 08:44

## 2025-01-08 RX ADMIN — MIDODRINE HYDROCHLORIDE 5 MILLIGRAM(S): 5 TABLET ORAL at 14:25

## 2025-01-08 RX ADMIN — CHLORHEXIDINE GLUCONATE 1 APPLICATION(S): 1.2 RINSE ORAL at 06:52

## 2025-01-08 NOTE — DISCHARGE NOTE NURSING/CASE MANAGEMENT/SOCIAL WORK - PATIENT PORTAL LINK FT
You can access the FollowMyHealth Patient Portal offered by [facility] by registering at the following website: [email or web address]. By joining [employer]’s FollowMyHealth portal, you will also be able to view your health information using other applications (apps) compatible with our system.

## 2025-01-08 NOTE — DISCHARGE NOTE NURSING/CASE MANAGEMENT/SOCIAL WORK - NSDCFUADDAPPT_GEN_ALL_CORE_FT
APPTS ARE READY TO BE MADE: [X] YES    Best Family or Patient Contact (if needed):    Additional Information about above appointments (if needed):    1: PCP (Jagruti Quintero)   2:   3:     Other comments or requests:

## 2025-01-08 NOTE — PROGRESS NOTE ADULT - PROBLEM SELECTOR PLAN 1
Transferred to MICU on 12/30 for septic shock 2/2 scrotal abscess, gluteal abscess, Influenza A requiring pressors  now transferred to medicine floor   BCx (12/16) coagulase neg. Staph.  BCx (12/17, 19, 29, 31): NGTD  s/p meropenem (1/1 - 1/7)  1/7 started cefepime 1mg qd, flagyl 500mg PO BID (will d/c on cefepime 2mg w/ HD until 1/27 to complete 4wk)  BCx: NGTD  midodrine increased back to 5mg TID

## 2025-01-08 NOTE — PROGRESS NOTE ADULT - PROBLEM SELECTOR PROBLEM 3
Diabetic ulcer of right foot
Abscess, scrotum
Hyperkalemia
Abscess, scrotum
Hyperkalemia
Abscess, scrotum
Diabetic ulcer of right foot
Diabetic ulcer of right foot
Hyperkalemia
Hyperkalemia
Diabetic ulcer of right foot
Abscess, scrotum
Diabetic ulcer of right foot

## 2025-01-08 NOTE — PROGRESS NOTE ADULT - PROBLEM SELECTOR PLAN 4
Wound Cx (12/19 x ) + Trueperella bernardiae  s/p I&D 12/19, healing well  BCx (12/17, 19, 29, 31): NGTD

## 2025-01-08 NOTE — PROGRESS NOTE ADULT - PROBLEM SELECTOR PLAN 7
DVT PPX: Heparin
DVT PPX: Heparin
H/o HTN on Amlodipine at home  -BP acceptable  -C/w Amlodipine 5mg   -Monitor BP adjust regimen as indicated  -DASH diet
DVT PPX: Heparin
H/o HTN on Amlodipine at home  -BP acceptable  -C/w Amlodipine 5mg   -Monitor BP adjust regimen as indicated  -DASH diet
H/o HTN on Amlodipine at home  -BP acceptable  -C/w Amlodipine 5mg   -Monitor BP adjust regimen as indicated  -DASH diet
Hx of anemia of chronic disease   Hgb 10.8, no signs of bleeding   EPO per nephro
DVT PPX: Heparin
Hx of anemia of chronic disease   Hgb 10.8, no signs of bleeding   EPO per nephro
H/o HTN on Amlodipine at home  -BP acceptable  -C/w Amlodipine 5mg
H/o HTN on Amlodipine at home  -BP acceptable  -C/w Amlodipine 5mg   -Monitor BP adjust regimen as indicated  -DASH diet
-  Not currently on any medications due to episodes of hypotension over past two days  -  Monitor BP routinely  -  DASH diet
Hx of anemia of chronic disease   Hgb 10.4, no signs of bleeding   EPO per nephro

## 2025-01-08 NOTE — PROGRESS NOTE ADULT - SUBJECTIVE AND OBJECTIVE BOX
Subjective:  Resting comfortably    Objective:    Vital signs  T(C): , Max: 36.5 (01-08-25 @ 05:47)  HR: 77 (01-08-25 @ 13:40)  BP: 115/66 (01-08-25 @ 13:40)  SpO2: 95% (01-08-25 @ 13:40)  Wt(kg): --    Output     01-08 @ 07:01  -  01-08 @ 16:20  --------------------------------------------------------  IN: 600 mL / OUT: 1100 mL / NET: -500 mL        Gen: NAD  Abd: soft, nontender, nondistended  : right scrotum with minimal drainage    Labs                        10.4   6.41  )-----------( 271      ( 08 Jan 2025 09:33 )             32.1     08 Jan 2025 09:33    139    |  103    |  32     ----------------------------<  114    3.5     |  27     |  7.10     Ca    9.3        08 Jan 2025 09:33  Phos  3.0       08 Jan 2025 09:33  Mg     2.4       08 Jan 2025 09:33        Urine Cx: ?  Blood Cx: ?      Imaging

## 2025-01-08 NOTE — PROGRESS NOTE ADULT - SUBJECTIVE AND OBJECTIVE BOX
[facility] NEPHROLOGY- PROGRESS NOTE    Patient is a 69yo Male with ESRD on HD via Rt AVG, HTN, 2HPT, Anemia, LE venous stasis with Rt foot wound presents with weakness. Pt a/w Rt foot ulcer and hyperkalemia. Nephrology consulted for ESRD status.   MRI Rt foot: + osteomyelitis involving the talus and less so of the navicular bone;    BCx +Staph hominis  12/20: s/p I& D of buttock abscess  +scrotal abscess  12/29: s/p RRT for lethargy;  febrile +FLU A   12/30: s/p RRT for somnolent  +fever; transferred to ICU for pressors/ septic shock    Hospital Medications: Medications reviewed.  REVIEW OF SYSTEMS:  Denies any SOB, cough, chest pain or n/v/d, abd pain or foot pain      VITALS:  T(F): 97.6 (01-08-25 @ 13:40), Max: 97.7 (01-08-25 @ 05:47)  HR: 77 (01-08-25 @ 13:40)  BP: 115/66 (01-08-25 @ 13:40)  RR: 20 (01-08-25 @ 13:40)  SpO2: 95% (01-08-25 @ 13:40)  Wt(kg): --    01-08 @ 07:01  -  01-08 @ 16:06  --------------------------------------------------------  IN: 600 mL / OUT: 1100 mL / NET: -500 mL      PHYSICAL EXAM:  Gen: NAD, more awake,  Obese  HEENT: anicteric  Cards: RRR, +S1/S2, no M/G/R  Resp: +mild rales at rt base, clear left side  GI: soft, NT/ND, NABS  Extremities: no b/l LE edema, extremely dry LE; rt foot covered  Access: Rt AVG +thrill +bruit      LABS:  01-08    139  |  103  |  32[H]  ----------------------------<  114[H]  3.5   |  27  |  7.10[H]    Ca    9.3      08 Jan 2025 09:33  Phos  3.0     01-08  Mg     2.4     01-08      Creatinine Trend: 7.10 <--, 5.72 <--, 7.79 <--, 6.52 <--, 4.87 <--, 7.52 <--, 6.27 <--                        10.4   6.41  )-----------( 271      ( 08 Jan 2025 09:33 )             32.1     Urine Studies:  Urinalysis Basic - ( 08 Jan 2025 09:33 )    Color:  / Appearance:  / SG:  / pH:   Gluc: 114 mg/dL / Ketone:   / Bili:  / Urobili:    Blood:  / Protein:  / Nitrite:    Leuk Esterase:  / RBC:  / WBC    Sq Epi:  / Non Sq Epi:  / Bacteria:

## 2025-01-08 NOTE — PROGRESS NOTE ADULT - PROBLEM SELECTOR PLAN 6
Controlled  Takes Amlodipine at home  Continue home regimen   Monitor BP  DASH diet
Controlled  Takes Amlodipine at home  Continue home regimen   Monitor BP  DASH diet
H/o DM on Lantus and Novolog at home  -A1c 7.4  -C/w Fs w/ ISSC ACHS   -Con Carb diet
-   Patient has history of DM on Lantus and Novolog at home  -   HgbA1c 7.4  -   Monitor finger sticks AC and HS  -   Sliding scale as ordered  -   Consistent CHO diet
Controlled  Takes Amlodipine at home  Continue home regimen   Monitor BP  DASH diet
Hx of IDT2DM on lantus and novolog   A1C 7.4  c/w JORDAN AC QHS
Controlled  Takes Amlodipine at home  Continue home regimen   Monitor BP  DASH diet
H/o DM on Lantus and Novolog at home  -A1c 7.4  -C/w Fs w/ ISSC ACHS   -Con Carb diet
Hx of IDT2DM on lantus and novolog   A1C 7.4  c/w JORDAN AC QHS
H/o DM on Lantus and Novolog at home  -A1c 7.4  -C/w Fs w/ ISSC ACHS   -Con Carb diet
H/o DM on Lantus and Novolog at home  -A1c 7.4  -C/w Fs w/ ISSC ACHS   -Con Carb diet  -glucose controlled
H/o DM on Lantus and Novolog at home  -A1c 7.4  -C/w Fs w/ ISSC ACHS   -Con Carb diet
Hx of IDT2DM on lantus and novolog   A1C 7.4  c/w JORDAN AC QHS

## 2025-01-08 NOTE — PROGRESS NOTE ADULT - PROBLEM SELECTOR PROBLEM 4
ESRD on dialysis
ESRD on dialysis
Gluteal abscess
Hyperkalemia
Gluteal abscess
Gluteal abscess
Hyperkalemia
Gluteal abscess
Hyperkalemia
ESRD on dialysis
ESRD on dialysis
Gluteal abscess
Hyperkalemia

## 2025-01-08 NOTE — PROGRESS NOTE ADULT - ASSESSMENT
69M with hx of T2DM, hidradenitis suppurativa a/w generalized weakness, w small amount of purulent drainage from scrotum     12/23: scrotal exam stable with spontaneous drainage  12/24: scrotal exam stable, CT pelvis demonstrates 2.1 cm right scrotal fluid collection suspicious for abscess and bilateral UVJ stones without ureteral dilatation, wbc has been downtrending  12/27: scrotal exam stable, decreasing drainage amount, WBC continues to downtrend  12/30: stable scrotal exam, less than 1 gauze worth of purulence expressed, patient likely febrile from flu (still without leukocytosis, drainage from scrotum still decreasing), s/p RRT in PM for somnolence transferred to ICU started on levo  12/31: scrotal exam continues to improve, minimal purulence expressed from right scrotal tract, WBC 20 from 14, levo 0.07    Scrotal exam continues to improve so clinical decline unlikely to be related to scrotum, however patient did have bilateral non-obstructing UVJ stones on 12/23 CTAP (to level of knees to also re-assess scrotal collection) so would be worthwhile to rescan patient to determine whether any stones have become obstructive.     1/1: repeat CTAP demonstrates 1.5x0.4cm right scrotal collection compared to 4x0.7cm collection on 12/23/24, still no hydronephrosis bilaterally to suggest obstructive uropathy  1/2: scrotal exam continues to improve, WBC downtrending, downtrending levo requirement 0.03  1/3: scrotal exam stable, WBC normalized 6.8, off pressors, afebrile, listed to the floor  1/6: scrotal exam stable, WBC wnl 6.9, afebrile    Patient has spontaneously draining skin tunnel from his hidradenitis and the "scrotal scar tissue communicating with abscess" is likely 2/2 prior wide excision of hidradenitis skin tunnel that has reformed and will likely require repeat wide excision of tract in the outpatient setting with his original surgeon. Drainage is expected to continue indefinitely in the meantime and can be managed with antibiotics, local wound care, and daily dressing changes. No operative intervention is planned during this admission as patient has minimal drainage from tract and is clinically improving with conservative management.    1/7: scant drainage from right scrotum, dressing changed, WBC wnl 6.4; patient's hypotension unlikely to be related to scrotum, should follow up at [facility] of excellence for excision of drainage tract  1/8: minimal drainage from right scrotum, examined w attending, no role for urologic intervention at this time    Recommendations:  - Repeat CTAP confirms decreasing size of scrotal collection > continue conservative management  - F/u repeat bcx > NG final  - Continue Flomax  - keep scrotal area clean and dry, may include sitz baths   - continue scrotal dressing changes qD (can be done by nursing: squeeze out purulence from right scrotum until serosanguinous and wipe with 4x4 gauze, place fresh 4x4 gauze overlying the drainage tract)  - IV antibiotics, follow ID recommendations   - strict glycemic control   - HD as per neph   - F/u outpatient for excision of drainage tract with either Dr. Judge or a practice that specializes in hidradenitis tract excision  - remainder of care as per primary team     D/w Dr. Mcguire

## 2025-01-08 NOTE — PROGRESS NOTE ADULT - PROBLEM SELECTOR PLAN 9
Hx of HTN on amlodipine   Amlodipine being held iso of septic shock, currently still on midodrine Hx of HTN on amlodipine, Hydralazine and carvedilol   Home med being held iso of septic shock, currently still on midodrine

## 2025-01-08 NOTE — CHART NOTE - NSCHARTNOTESELECT_GEN_ALL_CORE
Event Note
Family Update/Event Note
RD/Nutrition Services
SNF handoff/Event Note
Vanc T 25.4/Event Note
Event Note
Event Note
Family update/Event Note
ICU DG to medicine/Event Note
ICU downgrade/Event Note
NP Progress/Event Note
Nutrition Services

## 2025-01-08 NOTE — PROGRESS NOTE ADULT - ATTENDING COMMENTS
67 yo M with PMH of  DM, HTN, ESRD on dialysis ( M, W, F), Last Dialysis 12/30/24,  B/L Charcot deformity and HLD, present to the ED for generalized weakness, chills, subjective fever. Pt admitted on 12/16 to medicine floor for treatment of sepsis 2/2 scrotal abscess, diabetic foot ulcer, gluteal abscess, influenza A positive. s/p multiple RRTs for somnolence, hypotension, fever. Admitted to the ICU for peripheral pressor req. and closer monitoring.     #Septic shock 2/2 multiple abscesses, diabetic foot ulcer  #acute encephalopathy   #ESRD on dialysis   #Charcot arthropathy B/L  #Diabetic ulceration R midfoot with chronic OM  #DM  #HTN  #HLD  #BPH    Plan:  - Antibioitcs per ID   - Urology follow up noted  - Repeat CT scan with small scrotal collection  - Off vasopressors   - Cont low dose midodrine   - Oral diet  - Strict glucose control  - HD as per schedule  - Podiatry for wound care  - PT evaluation  - Transfer out of ICU if stable post hD
70 y/o male with hx of T2DM, hidradenitis suppurativa a/w generalized weakness now with purulent drainage from scrotum   s/p bedside I&D of buttock abscess 12/19   VSS, afebrile, leukocytosis elevated 15.8    12/22: s/p HD -1L, CT pelvis not obtained  12/23: scrotal exam stable with spontaneous drainage, WBC 11.5 from 15.8, Cr 5.45 from 7.22    Plan   - obtain CT pelvis w IV contrast to assess for fluid collection - time with HD session  - keep area clean and dry, may include sitz baths   - IV antibiotics, follow ID recommendations   - trend white count, monitor for fevers, etc.  - strict glycemic control   - HD as per neph   - remainder of care as per primary team
67 yo M with PMH of  DM, HTN, ESRD on dialysis ( M, W, F), Last Dialysis 12/30/24,  B/L Charcot deformity and HLD, present to the ED for generalized weakness, chills, subjective fever. Pt admitted on 12/16 to medicine floor for treatment of sepsis 2/2 scrotal abscess, diabetic foot ulcer, gluteal abscess, influenza A positive. s/p multiple RRTs for somnolence, hypotension, fever. Admitted to the ICU for peripheral pressor req. and closer monitoring.     #Septic shock 2/2 multiple abscesses, diabetic foot ulcer  #acute encephalopathy   #ESRD on dialysis   #Charcot arthropathy B/L  #Diabetic ulceration R midfoot with chronic OM  #DM  #HTN  #HLD  #BPH    Plan:  - Antibioitcs per ID   - Urology follow up noted, conservative management  - Repeat CT scan with small scrotal collection  - Off vasopressors   - Cont low dose midodrine   - Oral diet  - Strict glucose control  - HD as per schedule  - Podiatry for wound care  - PT evaluation  - Transfer out of ICU
69 yo M with PMH of  DM, HTN, ESRD on dialysis ( M, W, F), Last Dialysis 12/30/24,  B/L Charcot deformity and HLD, present to the ED for generalized weakness, chills, subjective fever. Pt admitted on 12/16 to medicine floor for treatment of sepsis 2/2 scrotal abscess, diabetic foot ulcer, gluteal abscess, influenza A positive. s/p multiple RRTs for somnolence, hypotension, fever. Admitted to the ICU for peripheral pressor req. and closer monitoring.     #Septic shock 2/2 multiple abscesses, diabetic foot ulcer  #acute encephalopathy   #ESRD on dialysis   #Charcot arthropathy B/L  #Diabetic ulceration R midfoot with chronic OM  #DM  #HTN  #HLD  #BPH    _________CNS___________  #Acute encephalopathy   - Encephalopathy likely 2/2 acute metabolic encephalopathy in the setting of septic shock  - ABG 7.44/CO2 37/HCO3 25/pO2 69  - c/w broad spectrum iv abx - vanc and radha  - Improving    _________CVS___________  #septic shock 2/2 scrotal and gluteal abscesses, Diabetic ulceration R midfoot with chronic OM  #pressors  - peripheral levo, currently 0.05  - central and a-line consent obtained if needed   - Given 250 cc boluses x2 throughout the night   - continue to monitor volume status , give fluids judiciously given ESRD    #HTN  hold BP meds in setting of shock    #HLD   c/w home meds     _________ RESP__________  #AHRF in the setting of influenza A   - CXR 12/31 official read still pending  - c/w NC as needed     __________GI____________  no acute issues     ________ RENAL__________  #ESRD on dialysis (MWF)  - last session 12/30, scheduled for today  - recent electrolytes within normal limits   - C/W epo, Renvela, calcitriol   - Given 250 cc boluses x2 throughout the night   - continue to monitor volume status   - Nephrology Dr. Chirinos following    #BPH  - c/w home meds     _________MSK___________  no active issues     __________ID____________  #septic shock 2/2 multiple abscesses, diabetic foot ulcer  #diabetic foot ulcer  #influenza A     #Scrotal abscess   - Scrotal Wound culture positive for  Proteus Mirabilis  - CT Pelvis: 2.1 cm right scrotal fluid collection suspicious for abscess. Several stones and both ureterovesical junctions without ureteral dilatation.  - Broadened to Radha and Vanc  - ID Dr. Chavez following  - Will obtain repeat CT AP as per Urology today    #Diabetic foot ulcers   - According to podiatry note 12/28- No surgical treatement will be done at this time. Patient is stable from podiatry. Plan for outpatient bone biopsy when pt is off abx for 3 weeks which was discussed with patient and he is agreeable with the plan going forward.  - Dressing changed, however no note from Podiatry    #influenza A  - req 2 L NC   - c/w tamiflu     _________ENDO__________  #DM   - c/w insulin sliding scale     _______HEME/ONC_______  # Anemia of renal disease  - c/w Epogen 6000 units IV tiw.   - Monitor Hb    _________SKIN____________  #diabetic foot ulcer   plan as above
69 yo M with PMH of  DM, HTN, ESRD on dialysis ( M, W, F), Last Dialysis 12/30/24,  B/L Charcot deformity and HLD, present to the ED for generalized weakness, chills, subjective fever. Pt admitted on 12/16 to medicine floor for treatment of sepsis 2/2 scrotal abscess, diabetic foot ulcer, gluteal abscess, influenza A positive. s/p multiple RRTs for somnolence, hypotension, fever. Admitted to the ICU for peripheral pressor req. and closer monitoring.     #Septic shock 2/2 multiple abscesses, diabetic foot ulcer  #acute encephalopathy   #ESRD on dialysis   #Charcot arthropathy B/L  #Diabetic ulceration R midfoot with chronic OM  #DM  #HTN  #HLD  #BPH    Plan:  - Antibioitcs per ID   - Urology follow up noted  - Repeat CT scan with small scrotal collection  - Hemodynamics improving, titrate down vasopressors  - Add low dose midodrine   - Oral diet  - Strict glucose control  - HD as per schedule  - Podiatry for wound care  - PT evaluation  - Cont. ICU Care
as above
I have seen and evaluated the patient at the bedside. He says he is comfortably but is still having purulence coming from his right scrotum.     On exam, he is resting in bed in no distress watching television. Afebrile, vitals with improved BP to 122/71. Saturating 97% on room air. On  exam, able to express purulence from the right scrotum AS I WAS ALSO ABLE TO DO THE DAY BEFORE.    I have reviewed his CBC, glucose data. No leukocytosis, WBC 6. Glucose in range of 100-140s    I have discussed case with Dr. Scott Cervantes of ID. He will likely need an extended course of meropenem, she has not specified exact end date just yet, wants Urology to weigh back in re surgical planning.     Assessment and Plan:    67 yo M with PMH of  DM, HTN, ESRD on dialysis ( M, W, F), Last Dialysis 12/30/24,  B/L Charcot deformity and HLD, present to the ED for generalized weakness, chills, subjective fever. Pt admitted on 12/16 to medicine floor for treatment of sepsis 2/2 scrotal abscess, diabetic foot ulcer, gluteal abscess, influenza A positive. s/p multiple RRTs for somnolence, hypotension, fever. Admitted to the ICU for peripheral pressor req. and closer monitoring.   Patient downgraded to Medicine floor in PM on 1/4/15.     #Septic shock 2/2 multiple abscesses, diabetic foot ulcer  #Aspiration PNA  #acute encephalopathy, resolved  #ESRD on dialysis   #Charcot arthropathy B/L  #Diabetic ulceration R midfoot with chronic OM  #DM  #HTN  #HLD  #BPH    Plan:  - Antibiotics per ID; continuing meropenem for time being, patient will likely need PICC line at DC  -Midline vs PICC line planning  - Urology follow up noted, conservative management, recommending daily dressing changes and continued expression of purulence from the right scrotum   - Repeat CT scan with small scrotal collection  - Off vasopressors   - wean midodrine to BID on 1/6  - Oral diet  - Strict glucose control  - HD as per schedule  - Podiatry for wound care  - PT evaluation -> PIA
Patient optimized for dc to [facility] today where he will continue with his antibiotic course, see discharge summary dated from today 1/8/25
Patient seen and examined at the bedside. He says he is comfortably is still having pus coming from his scrotum. Also notes poor sleep overnight and says he keeps dozing off between his meal.  AM vitals and labs reviewed. No leukocytosis, afebrile. BP noted lower end of normal. PE as above      A/P  67 yo M with PMH of  DM, HTN, ESRD on dialysis ( M, W, F), Last Dialysis 12/30/24,  B/L Charcot deformity and HLD, present to the ED for generalized weakness, chills, subjective fever. Pt admitted on 12/16 to medicine floor for treatment of sepsis 2/2 scrotal abscess, diabetic foot ulcer, gluteal abscess, influenza A positive. s/p multiple RRTs for somnolence, hypotension, fever. Admitted to the ICU for peripheral pressor req. and closer monitoring. Patient downgraded to Medicine floor in PM on 1/4/15.     #Septic shock 2/2 multiple abscesses, diabetic foot ulcer  #Aspiration PNA  #acute encephalopathy, resolved  #ESRD on dialysis   #Charcot arthropathy B/L  #Diabetic ulceration R midfoot with chronic OM  #DM  #HTN  #HLD  #BPH    -Antibiotics per ID; discontinued meropenem and switched to cefepime and flagyl, will need for total 4w course  -Midline would be needed if unable to get abx during HD at [facility], CM aware and will look into this  -Urology follow up noted, recommending daily dressing changes and continued expression of purulence from the right scrotum and instructed patient to follow up with his outpatient urologist that he had seen some years prior  - Repeat CT scan with small scrotal collection  - Off vasopressors   - c/w midodrine, will attempt to wean down tomorrow as tolerated  - Oral diet  - Strict glucose control  - HD as per schedule  - Podiatry for wound care  - PT evaluation -> [facility]

## 2025-01-08 NOTE — PROGRESS NOTE ADULT - PROBLEM SELECTOR PLAN 8
Episode of AHRF in the setting of Influenza A (12/28)   s/p tamiflu for 12/29-1/1  CXR (12/31): Patchy medial right basilar opacities that could be due to atelectasis or infection.  Influenza A + on 1/6, likely from viral shedding; no cough, runny nose   on 2L NC today  f/u surveillance CXR Episode of AHRF in the setting of Influenza A (12/28)   s/p tamiflu for 12/29-1/1  CXR (12/31): Patchy medial right basilar opacities that could be due to atelectasis or infection.  Influenza A + on 1/6, likely from viral shedding; no cough, runny nose   on 2L NC today  CXR: No active cardiopulmonary findings per wet read

## 2025-01-08 NOTE — DISCHARGE NOTE NURSING/CASE MANAGEMENT/SOCIAL WORK - FINANCIAL ASSISTANCE
[facility] provides services at a reduced cost to those who are determined to be eligible through [facility]’s financial assistance program. Information regarding [facility]’s financial assistance program can be found by going to [email or web address] or by calling [phone].

## 2025-01-08 NOTE — CHART NOTE - NSCHARTNOTEFT_GEN_A_CORE
Patient planned for PIA at [facility] today, reached out to Dr Pearce to provide handoff.

## 2025-01-08 NOTE — PROGRESS NOTE ADULT - SUBJECTIVE AND OBJECTIVE BOX
Chief complaint: Patient is a 69y old  Male who presents with a chief complaint of generalized weakness (07 Jan 2025 12:26)      CADY TRACEY is a 69y year old Male     INTERVAL HPI/OVERNIGHT EVENTS:      REVIEW OF SYSTEMS:  CONSTITUTIONAL: No fever, weight loss, or fatigue  EYES: No eye pain, visual disturbances, or discharge  ENMT:  No difficulty hearing, tinnitus, vertigo; No sinus or throat pain  NECK: No pain or stiffness  RESPIRATORY: No cough, wheezing, chills or hemoptysis; No shortness of breath  CARDIOVASCULAR: No chest pain, palpitations, dizziness, or leg swelling  GASTROINTESTINAL: No abdominal or epigastric pain. No nausea, vomiting, or hematemesis; No diarrhea or constipation. No melena or hematochezia.  GENITOURINARY: No dysuria, frequency, hematuria, or incontinence  NEUROLOGICAL: No headaches, memory loss, loss of strength, numbness, or tremors  MUSCULOSKELETAL: No joint pain or swelling; No muscle, back, or extremity pain  HEME/LYMPH: No easy bruising, or bleeding gums      FAMILY HISTORY:  Family history of diabetes mellitus        T(C): 36.5 (01-08-25 @ 05:47), Max: 36.5 (01-07-25 @ 12:55)  HR: 66 (01-08-25 @ 05:47) (66 - 87)  BP: 104/56 (01-08-25 @ 05:47) (77/44 - 125/68)  RR: 18 (01-08-25 @ 05:47) (17 - 20)  SpO2: 96% (01-08-25 @ 05:47) (96% - 100%)  Wt(kg): --Vital Signs Last 24 Hrs  T(C): 36.5 (08 Jan 2025 05:47), Max: 36.5 (07 Jan 2025 12:55)  T(F): 97.7 (08 Jan 2025 05:47), Max: 97.7 (07 Jan 2025 12:55)  HR: 66 (08 Jan 2025 05:47) (66 - 87)  BP: 104/56 (08 Jan 2025 05:47) (77/44 - 125/68)  BP(mean): 78 (07 Jan 2025 11:00) (78 - 87)  RR: 18 (08 Jan 2025 05:47) (17 - 20)  SpO2: 96% (08 Jan 2025 05:47) (96% - 100%)    Parameters below as of 08 Jan 2025 05:47  Patient On (Oxygen Delivery Method): room air        PHYSICAL EXAM:  GENERAL: NAD, well-groomed, well-developed  HEAD:  Atraumatic, Normocephalic  EYES: EOMI, PERRLA, conjunctiva and sclera clear  ENMT: No tonsillar erythema, exudates, or enlargement; Moist mucous membranes.   NECK: Supple, No JVD  NERVOUS SYSTEM:  Alert & Oriented X3, Good concentration; Motor Strength 5/5 B/L upper and lower extremities  CHEST/LUNG: Clear to percussion bilaterally; No resp distress; No rales, rhonchi, wheezing, or rubs  HEART: Regular rate and rhythm; No murmurs, rubs, or gallops  ABDOMEN: Soft, Nontender, Nondistended; Bowel sounds present  : no suprapubic tenderness, no gross hematuria, Juan (+/-)  EXTREMITIES: No clubbing, cyanosis, or edema  SKIN: No rashes or lesions          LABS:                        10.8   6.37  )-----------( 251      ( 07 Jan 2025 06:15 )             32.2       01-07    139  |  103  |  23[H]  ----------------------------<  122[H]  3.6   |  26  |  5.72[H]    Ca    9.4      07 Jan 2025 06:15  Phos  2.7     01-07  Mg     2.4     01-07          RADIOLOGY & ADDITIONAL TESTS:    Imaging Personally Reviewed:  [ ] YES  [ ] NO  acetaminophen     Tablet .. 650 milliGRAM(s) Oral every 6 hours PRN  calcitriol   Capsule 1 MICROGram(s) Oral <User Schedule>  cefepime   IVPB 1000 milliGRAM(s) IV Intermittent every 24 hours  chlorhexidine 2% Cloths 1 Application(s) Topical <User Schedule>  epoetin rudolph-epbx (RETACRIT) Injectable 6000 Unit(s) IV Push <User Schedule>  heparin   Injectable 5000 Unit(s) SubCutaneous every 12 hours  insulin lispro (ADMELOG) corrective regimen sliding scale   SubCutaneous three times a day before meals  insulin lispro (ADMELOG) corrective regimen sliding scale   SubCutaneous at bedtime  latanoprost 0.005% Ophthalmic Solution 1 Drop(s) Both EYES at bedtime  metroNIDAZOLE    Tablet 500 milliGRAM(s) Oral every 12 hours  midodrine 5 milliGRAM(s) Oral every 8 hours  ondansetron Injectable 4 milliGRAM(s) IV Push every 8 hours PRN  sevelamer carbonate 800 milliGRAM(s) Oral three times a day with meals  tamsulosin 0.4 milliGRAM(s) Oral at bedtime      HEALTH ISSUES - PROBLEM Dx:  Septic shock    Abscess, scrotum    Diabetic ulcer of right foot    Gluteal abscess    ESRD on dialysis    T2DM (type 2 diabetes mellitus)    Anemia of chronic disease    Influenza A    HTN (hypertension)    Prophylactic measure    Discharge planning issues           Chief complaint: Patient is a 69y old  Male who presents with a chief complaint of generalized weakness (07 Jan 2025 12:26)      CADY TRACEY is a 69y year old Male     INTERVAL HPI/OVERNIGHT EVENTS: No acute events overnight.  Patient examined at bedside this AM.  Patient denies acute complaints. Patient was OOBTC yesterday, and denies any dizziness or lightheadedness.       REVIEW OF SYSTEMS:  CONSTITUTIONAL: No fever, weight loss, or fatigue  EYES: No eye pain, visual disturbances, or discharge  ENMT:  No difficulty hearing, tinnitus, vertigo; No sinus or throat pain  RESPIRATORY: No cough, wheezing, chills or hemoptysis; No shortness of breath  CARDIOVASCULAR: No chest pain, palpitations, dizziness, or leg swelling  GASTROINTESTINAL: No abdominal or epigastric pain. No nausea, vomiting, or hematemesis; No diarrhea or constipation. No melena or hematochezia.  GENITOURINARY: No dysuria, frequency, hematuria, or incontinence  NEUROLOGICAL: No headaches, memory loss, loss of strength, numbness, or tremors  MUSCULOSKELETAL: No joint pain or swelling; No muscle, back, or extremity pain        FAMILY HISTORY:  Family history of diabetes mellitus        T(C): 36.5 (01-08-25 @ 05:47), Max: 36.5 (01-07-25 @ 12:55)  HR: 66 (01-08-25 @ 05:47) (66 - 87)  BP: 104/56 (01-08-25 @ 05:47) (77/44 - 125/68)  RR: 18 (01-08-25 @ 05:47) (17 - 20)  SpO2: 96% (01-08-25 @ 05:47) (96% - 100%)  Wt(kg): --Vital Signs Last 24 Hrs  T(C): 36.5 (08 Jan 2025 05:47), Max: 36.5 (07 Jan 2025 12:55)  T(F): 97.7 (08 Jan 2025 05:47), Max: 97.7 (07 Jan 2025 12:55)  HR: 66 (08 Jan 2025 05:47) (66 - 87)  BP: 104/56 (08 Jan 2025 05:47) (77/44 - 125/68)  BP(mean): 78 (07 Jan 2025 11:00) (78 - 87)  RR: 18 (08 Jan 2025 05:47) (17 - 20)  SpO2: 96% (08 Jan 2025 05:47) (96% - 100%)    Parameters below as of 08 Jan 2025 05:47  Patient On (Oxygen Delivery Method): room air        PHYSICAL EXAM:  GENERAL: NAD, laying comfortably in bed   HEAD:  Atraumatic, Normocephalic  EYES: EOMI, PERRLA, conjunctiva and sclera clear  ENMT: No tonsillar erythema, exudates, or enlargement; Moist mucous membranes.   NECK: Supple, No JVD  NERVOUS SYSTEM:  Alert & Oriented X3, Good concentration; Motor Strength 5/5 B/L upper and lower extremities  CHEST/LUNG: Clear to percussion bilaterally; No resp distress; No rales, rhonchi, wheezing, or rubs  HEART: Regular rate and rhythm; Pansystolic murmur @apex and LUSB; No rubs, or gallops  ABDOMEN: Soft, Nontender, Nondistended; Bowel sounds present  : no suprapubic tenderness, no gross hematuria. Scant but active drainage from R. scrotum   EXTREMITIES: No clubbing, cyanosis, or edema  SKIN: B/l charcot's foot with chronic lymphostasis/ulcer changes. No active drainage           LABS:                        10.8   6.37  )-----------( 251      ( 07 Jan 2025 06:15 )             32.2       01-07    139  |  103  |  23[H]  ----------------------------<  122[H]  3.6   |  26  |  5.72[H]    Ca    9.4      07 Jan 2025 06:15  Phos  2.7     01-07  Mg     2.4     01-07          RADIOLOGY & ADDITIONAL TESTS:    Imaging Personally Reviewed:  [ ] YES  [ ] NO  acetaminophen     Tablet .. 650 milliGRAM(s) Oral every 6 hours PRN  calcitriol   Capsule 1 MICROGram(s) Oral <User Schedule>  cefepime   IVPB 1000 milliGRAM(s) IV Intermittent every 24 hours  chlorhexidine 2% Cloths 1 Application(s) Topical <User Schedule>  epoetin rudolph-epbx (RETACRIT) Injectable 6000 Unit(s) IV Push <User Schedule>  heparin   Injectable 5000 Unit(s) SubCutaneous every 12 hours  insulin lispro (ADMELOG) corrective regimen sliding scale   SubCutaneous three times a day before meals  insulin lispro (ADMELOG) corrective regimen sliding scale   SubCutaneous at bedtime  latanoprost 0.005% Ophthalmic Solution 1 Drop(s) Both EYES at bedtime  metroNIDAZOLE    Tablet 500 milliGRAM(s) Oral every 12 hours  midodrine 5 milliGRAM(s) Oral every 8 hours  ondansetron Injectable 4 milliGRAM(s) IV Push every 8 hours PRN  sevelamer carbonate 800 milliGRAM(s) Oral three times a day with meals  tamsulosin 0.4 milliGRAM(s) Oral at bedtime      HEALTH ISSUES - PROBLEM Dx:  Septic shock    Abscess, scrotum    Diabetic ulcer of right foot    Gluteal abscess    ESRD on dialysis    T2DM (type 2 diabetes mellitus)    Anemia of chronic disease    Influenza A    HTN (hypertension)    Prophylactic measure    Discharge planning issues

## 2025-01-08 NOTE — DISCHARGE NOTE NURSING/CASE MANAGEMENT/SOCIAL WORK - NSDCPEFALRISK_GEN_ALL_CORE
For information on Fall & Injury Prevention, visit: [email or web address] OR  [email or web address] OR  https://www.cdc.gov/steadi/patient.html

## 2025-01-08 NOTE — PROGRESS NOTE ADULT - ASSESSMENT
Patient is a 69yo Male with ESRD on HD via Rt AVG, HTN, 2HPT, Anemia, LE venous stasis with Rt foot wound presents with weakness. Pt a/w Rt foot ulcer and hyperkalemia. Nephrology consulted for ESRD status.     1) ESRD: Last HD 1/8 (earlier today), tolerated tx with net 0.5L removed. Plan for next HD 1/10. c/w MWF schedule. Monitor electrolytes  2) Hyperkalemia: resolved. Then with hypokalemia; resolved.  Will consider liberalizing potassium in diet if potassium remains low. Monitor serum potassium  3) HTN with ESRD: BP low normal;  c/w midodrine 5mg PO tid. c/w low sodium diet. Monitor BP.  4) Anemia of renal disease: Hb acceptable. c/w Epogen 6000 units IV tiw. Monitor Hb.  5) 2HPT: Serum calcium acceptable with low normal serum phos.  PTHi 441. c/w calcitriol 1mcg PO MWF. Pt on Lanthanum 2000mg PO tid with meals which is unavailable at [facility]. c/w Sevelamer 800mg PO tid with meals  c/w low phos diet Monitor serum calcium and phosphorus.    HepBsAg was neg on outpt labs on 12/11/24; repeat HepBsAg neg; likely positive HepBsAg on 12/16 is an error.       [facility] NEPHROLOGY  Arden Tsang M.D.  Freeman Holland D.O.  Janny Chirinos M.D.  MD Ambar Mccarty, MSN, ANP-C    Telephone: [phone]  Facsimile: [phone]    [address] [place] [address], [address]  [place] [id number]

## 2025-01-08 NOTE — PROGRESS NOTE ADULT - PROBLEM SELECTOR PLAN 5
Controlled  Takes Lantus and Novolog at home  F/u A1c   Sliding scale insulin coverage  Glucose monitoring  Low carb diet
Hemodialysis 12/19, 12/22   -HD MWF in the community  -C/w Sevelamer  -Last  HD 12/24   -Nephro Dr. Chirinos following  -SW following outpt reinstatement
Controlled  Takes Lantus and Novolog at home  F/u A1c   Sliding scale insulin coverage  Glucose monitoring  Low carb diet
Hemodialysis 12/19, 12/22   -HD MWF in the community  -C/w Sevelamer  -Next HD 12/24   -Nephro Dr. Chirinos following  -SW following outpt reinstatement
Controlled  Takes Lantus and Novolog at home  F/u A1c   Sliding scale insulin coverage  Glucose monitoring  Low carb diet
Hx of ESRD via R. AVG on M/W/F, makes some urine  c/w sevelamer, and calcitriol   Nephro Dr. Chirinos following
Hemodialysis 12/19, 12/22   -HD MWF in the community  -C/w Sevelamer  -Last  HD 12/24   -Nephro Dr. Chirinos following  -SW following outpt reinstatement
-  Potassium this morning 5.2  -  Low K renal diet  -  Continue with HD M/W/F  -  Monitor BMP
Controlled  Takes Lantus and Novolog at home  Sliding scale insulin coverage  Glucose monitoring  Low carb diet
Hx of ESRD via R. AVG on M/W/F, makes some urine  c/w sevelamer, and calcitriol   Nephro Dr. Chirinos following
Hemodialysis 12/19, 12/22   -HD MWF in the community  -C/w Sevelamer  -Last  HD 12/24   -Nephro Dr. Chirinos following  -SW following outpt reinstatement
Low K renal diet  HD M/W/F  resolved   Trend BMP
Hx of ESRD via R. AVG on M/W/F, makes some urine  c/w sevelamer, and calcitriol   Nephro Dr. Chirinos following

## 2025-01-08 NOTE — PROGRESS NOTE ADULT - PROBLEM SELECTOR PROBLEM 1
Generalized weakness
Diabetic ulcer of right foot
Generalized weakness
Diabetic ulcer of right foot
Diabetic ulcer of right foot
Septic shock
Diabetic ulcer of right foot
Abscess, scrotum
Septic shock
Abscess, scrotum
Septic shock

## 2025-01-08 NOTE — PROGRESS NOTE ADULT - PROBLEM SELECTOR PLAN 2
Hx of scrotal abscess s/p wide excision of hidradenitis skin tunnel  CT Pelvis (12/23): Rim-enhancing extratesticular right scrotal fluid collection measuring 2.1 x 1.3 x 0.8 cm.  CT A/P (1/1): Limited scrotal wall thickening/edema with 1.5 cm rim-enhancing collection noted within the right scrotal wall  Scrotal wound culture: + proteus mirabilis   s/p vancomycin   s/p meropenem q24h (1/1 - 1/7)  1/7 started cefepime 1mg qd, flagyl 500mg PO BID (will d/c on cefepime 1mg w/ HD until 1/29 to complete 4wk)  Urology following, per Uro indefinite drainage expected, outpt wide excision drainage  Patient hasn't seen his urologist Dr. Judge since excision (8-10 years ago)   ID Dr. Chavez following

## 2025-01-08 NOTE — PROGRESS NOTE ADULT - PROVIDER SPECIALTY LIST ADULT
Critical Care
Infectious Disease
Internal Medicine
Nephrology
Podiatry
Urology
Critical Care
Critical Care
Infectious Disease
Infectious Disease
Internal Medicine
Nephrology
Podiatry
Podiatry
Surgery
Urology
Infectious Disease
Infectious Disease
Internal Medicine
Internal Medicine
Nephrology
Podiatry
Urology
Critical Care
Internal Medicine
Nephrology
Urology
Infectious Disease
Infectious Disease
Internal Medicine
Infectious Disease
Internal Medicine

## 2025-01-08 NOTE — PROGRESS NOTE ADULT - PROBLEM SELECTOR PROBLEM 2
Abscess, scrotum
ESRD on dialysis
Gluteal abscess
Diabetic ulcer of right foot
Abscess, scrotum
Gluteal abscess
Abscess, scrotum
Gluteal abscess
Gluteal abscess
Diabetic ulcer of right foot
ESRD on dialysis
Gluteal abscess
Gluteal abscess

## 2025-01-08 NOTE — PROGRESS NOTE ADULT - PROBLEM SELECTOR PROBLEM 5
Diabetes
Diabetes
ESRD on dialysis
ESRD on dialysis
Hyperkalemia
ESRD on dialysis
ESRD on dialysis
Hyperkalemia
Diabetes
ESRD on dialysis
Diabetes
ESRD on dialysis
ESRD on dialysis

## 2025-01-08 NOTE — PROGRESS NOTE ADULT - PROBLEM SELECTOR PLAN 3
Hx of b/l charcot deformity follows podiatrist Dr. Sim  Chronic osteomyelitis   MRI of R ankle (12/18): ulceration at the plantar surface medially with suspected exposed bone with underlying osteomyelitis involving the talus and less so ofthe navicular bone. No drainable fluid collection. End-stage arthritis with resultant planovalgus deformity. Chronic OM of talus and partially navicular  Podiatry consulted > no surgical tx indicated at his time   f/u outpt
Purulent drainage noted from scrotum   -Urology consulted   -Wound cx grew Proteus Mirabilis  -CT Pelvis: 2.1 cm right scrotal fluid collection suspicious for abscess. Several stones and both ureterovesical junctions without ureteral dilatation.  -C/w Augmentin 500mg PO q24h (renally dosed)-- will need 4 weeks  -ID Dr. Brito following
Hx of b/l charcot deformity follows podiatrist Dr. Sim  Chronic osteomyelitis   MRI of R ankle (12/18): ulceration at the plantar surface medially with suspected exposed bone with underlying osteomyelitis involving the talus and less so ofthe navicular bone. No drainable fluid collection. End-stage arthritis with resultant planovalgus deformity. Chronic OM of talus and partially navicular  Podiatry consulted > no surgical tx indicated at his time   f/u outpt
-   MRI: ulceration at the plantar surface medially with suspected exposed bone with underlying   Surgical culture grew Providencia stuartii  -   ID recommends: bone biopsy and debridement outpatient when off antibiotics for at least 3 weeks with Culture sent to determine directed treatment.  -   Outpatient  follow up with podiatry  -   Continue with use of CROW walkers B/L when walking to prevent worsening ulcerations
Low K renal diet  HD M/W/F  Trend BMP
Purulent drainage noted from scrotum   -Urology consulted   -Wound cx grew Proteus Mirabilis  -CT Pelvis: 2.1 cm right scrotal fluid collection suspicious for abscess. Several stones and both ureterovesical junctions without ureteral dilatation.  -C/w Augmentin 500mg PO q24h (renally dosed)-- will need 4 weeks  -ID Dr. Brito following
Resolved  Low K renal diet  HD M/W/F  Trend BMP
Resolved  Low K renal diet  HD M/W/F  Trend BMP
Purulent drainage noted from scrotum   -Urology consulted   -Wound cx grew Proteus Mirabilis  -F/u CT Pelvis to eval fluid collection
Resolved  Low K renal diet  HD M/W/F  Trend BMP
Purulent drainage noted from scrotum   -Urology consulted   -Wound cx grew Proteus Mirabilis  -CT Pelvis: 2.1 cm right scrotal fluid collection suspicious for abscess. Several stones and both ureterovesical junctions without ureteral dilatation.  -Start Augmentin 500mg PO q24h (renally dosed)  -ID Dr. Brito following
MRI: ulceration at the plantar surface medially with suspected exposed bone with underlying   Surgical culture grew Providencia stuartii  ID recs: bone Bx/debridement outpt when off Abx for at least 3 weeks with Culture sent to determine directed tx  -outpt follow up with podiatry  -c/w use of CROW walkers B/L when walking to prevent worsening ulcerations  -f/u finalize wound care recs on dc with podiatry
Hx of b/l charcot deformity follows podiatrist Dr. Sim  Chronic osteomyelitis   MRI of R ankle (12/18): ulceration at the plantar surface medially with suspected exposed bone with underlying osteomyelitis involving the talus and less so ofthe navicular bone. No drainable fluid collection. End-stage arthritis with resultant planovalgus deformity. Chronic OM of talus and partially navicular  Podiatry consulted > no surgical tx indicated at his time   f/u outpt

## 2025-01-18 ENCOUNTER — INPATIENT (INPATIENT)
Facility: HOSPITAL | Age: 70
LOS: 4 days | Discharge: NURSING, REHAB OR LONG-TERM CARE | DRG: 684 | End: 2025-01-23
Attending: INTERNAL MEDICINE | Admitting: INTERNAL MEDICINE
Payer: MEDICARE

## 2025-01-18 VITALS
WEIGHT: 165.35 LBS | HEART RATE: 81 BPM | SYSTOLIC BLOOD PRESSURE: 103 MMHG | TEMPERATURE: 98 F | HEIGHT: 68 IN | RESPIRATION RATE: 16 BRPM | OXYGEN SATURATION: 98 % | DIASTOLIC BLOOD PRESSURE: 69 MMHG

## 2025-01-18 DIAGNOSIS — Z99.2 DEPENDENCE ON RENAL DIALYSIS: Chronic | ICD-10-CM

## 2025-01-18 DIAGNOSIS — Z98.89 OTHER SPECIFIED POSTPROCEDURAL STATES: Chronic | ICD-10-CM

## 2025-01-18 DIAGNOSIS — I73.9 PERIPHERAL VASCULAR DISEASE, UNSPECIFIED: Chronic | ICD-10-CM

## 2025-01-18 DIAGNOSIS — Z98.890 OTHER SPECIFIED POSTPROCEDURAL STATES: Chronic | ICD-10-CM

## 2025-01-18 PROCEDURE — 99285 EMERGENCY DEPT VISIT HI MDM: CPT

## 2025-01-19 DIAGNOSIS — Z29.9 ENCOUNTER FOR PROPHYLACTIC MEASURES, UNSPECIFIED: ICD-10-CM

## 2025-01-19 DIAGNOSIS — N18.6 END STAGE RENAL DISEASE: ICD-10-CM

## 2025-01-19 DIAGNOSIS — I95.9 HYPOTENSION, UNSPECIFIED: ICD-10-CM

## 2025-01-19 DIAGNOSIS — T82.898A OTHER SPECIFIED COMPLICATION OF VASCULAR PROSTHETIC DEVICES, IMPLANTS AND GRAFTS, INITIAL ENCOUNTER: ICD-10-CM

## 2025-01-19 DIAGNOSIS — N49.2 INFLAMMATORY DISORDERS OF SCROTUM: ICD-10-CM

## 2025-01-19 DIAGNOSIS — E78.5 HYPERLIPIDEMIA, UNSPECIFIED: ICD-10-CM

## 2025-01-19 DIAGNOSIS — E11.610 TYPE 2 DIABETES MELLITUS WITH DIABETIC NEUROPATHIC ARTHROPATHY: ICD-10-CM

## 2025-01-19 DIAGNOSIS — T82.590A OTHER MECHANICAL COMPLICATION OF SURGICALLY CREATED ARTERIOVENOUS FISTULA, INITIAL ENCOUNTER: ICD-10-CM

## 2025-01-19 DIAGNOSIS — N20.1 CALCULUS OF URETER: ICD-10-CM

## 2025-01-19 DIAGNOSIS — E11.9 TYPE 2 DIABETES MELLITUS WITHOUT COMPLICATIONS: ICD-10-CM

## 2025-01-19 LAB
ALBUMIN SERPL ELPH-MCNC: 2.7 G/DL — LOW (ref 3.5–5)
ALBUMIN SERPL ELPH-MCNC: 3.4 G/DL — LOW (ref 3.5–5)
ALP SERPL-CCNC: 100 U/L — SIGNIFICANT CHANGE UP (ref 40–120)
ALP SERPL-CCNC: 79 U/L — SIGNIFICANT CHANGE UP (ref 40–120)
ALT FLD-CCNC: 6 U/L DA — LOW (ref 10–60)
ALT FLD-CCNC: 8 U/L DA — LOW (ref 10–60)
ANION GAP SERPL CALC-SCNC: 11 MMOL/L — SIGNIFICANT CHANGE UP (ref 5–17)
ANION GAP SERPL CALC-SCNC: 12 MMOL/L — SIGNIFICANT CHANGE UP (ref 5–17)
APTT BLD: 33.5 SEC — SIGNIFICANT CHANGE UP (ref 24.5–35.6)
AST SERPL-CCNC: 11 U/L — SIGNIFICANT CHANGE UP (ref 10–40)
AST SERPL-CCNC: 7 U/L — LOW (ref 10–40)
BASOPHILS # BLD AUTO: 0.05 K/UL — SIGNIFICANT CHANGE UP (ref 0–0.2)
BASOPHILS # BLD AUTO: 0.06 K/UL — SIGNIFICANT CHANGE UP (ref 0–0.2)
BASOPHILS NFR BLD AUTO: 0.7 % — SIGNIFICANT CHANGE UP (ref 0–2)
BASOPHILS NFR BLD AUTO: 0.8 % — SIGNIFICANT CHANGE UP (ref 0–2)
BILIRUB SERPL-MCNC: 0.5 MG/DL — SIGNIFICANT CHANGE UP (ref 0.2–1.2)
BILIRUB SERPL-MCNC: 0.6 MG/DL — SIGNIFICANT CHANGE UP (ref 0.2–1.2)
BLD GP AB SCN SERPL QL: SIGNIFICANT CHANGE UP
BUN SERPL-MCNC: 28 MG/DL — HIGH (ref 7–18)
BUN SERPL-MCNC: 29 MG/DL — HIGH (ref 7–18)
CALCIUM SERPL-MCNC: 9 MG/DL — SIGNIFICANT CHANGE UP (ref 8.4–10.5)
CALCIUM SERPL-MCNC: 9.7 MG/DL — SIGNIFICANT CHANGE UP (ref 8.4–10.5)
CHLORIDE SERPL-SCNC: 100 MMOL/L — SIGNIFICANT CHANGE UP (ref 96–108)
CHLORIDE SERPL-SCNC: 103 MMOL/L — SIGNIFICANT CHANGE UP (ref 96–108)
CO2 SERPL-SCNC: 23 MMOL/L — SIGNIFICANT CHANGE UP (ref 22–31)
CO2 SERPL-SCNC: 23 MMOL/L — SIGNIFICANT CHANGE UP (ref 22–31)
CREAT SERPL-MCNC: 7.51 MG/DL — HIGH (ref 0.5–1.3)
CREAT SERPL-MCNC: 7.7 MG/DL — HIGH (ref 0.5–1.3)
EGFR: 7 ML/MIN/1.73M2 — LOW
EGFR: 7 ML/MIN/1.73M2 — LOW
EOSINOPHIL # BLD AUTO: 0.23 K/UL — SIGNIFICANT CHANGE UP (ref 0–0.5)
EOSINOPHIL # BLD AUTO: 0.26 K/UL — SIGNIFICANT CHANGE UP (ref 0–0.5)
EOSINOPHIL NFR BLD AUTO: 2.9 % — SIGNIFICANT CHANGE UP (ref 0–6)
EOSINOPHIL NFR BLD AUTO: 4.3 % — SIGNIFICANT CHANGE UP (ref 0–6)
GLUCOSE BLDC GLUCOMTR-MCNC: 110 MG/DL — HIGH (ref 70–99)
GLUCOSE BLDC GLUCOMTR-MCNC: 158 MG/DL — HIGH (ref 70–99)
GLUCOSE BLDC GLUCOMTR-MCNC: 89 MG/DL — SIGNIFICANT CHANGE UP (ref 70–99)
GLUCOSE BLDC GLUCOMTR-MCNC: 95 MG/DL — SIGNIFICANT CHANGE UP (ref 70–99)
GLUCOSE SERPL-MCNC: 131 MG/DL — HIGH (ref 70–99)
GLUCOSE SERPL-MCNC: 146 MG/DL — HIGH (ref 70–99)
HCT VFR BLD CALC: 33.1 % — LOW (ref 39–50)
HCT VFR BLD CALC: 40 % — SIGNIFICANT CHANGE UP (ref 39–50)
HGB BLD-MCNC: 10.7 G/DL — LOW (ref 13–17)
HGB BLD-MCNC: 12.7 G/DL — LOW (ref 13–17)
IMM GRANULOCYTES NFR BLD AUTO: 0.3 % — SIGNIFICANT CHANGE UP (ref 0–0.9)
IMM GRANULOCYTES NFR BLD AUTO: 0.6 % — SIGNIFICANT CHANGE UP (ref 0–0.9)
INR BLD: 1.15 RATIO — SIGNIFICANT CHANGE UP (ref 0.85–1.16)
LYMPHOCYTES # BLD AUTO: 1 K/UL — SIGNIFICANT CHANGE UP (ref 1–3.3)
LYMPHOCYTES # BLD AUTO: 1.08 K/UL — SIGNIFICANT CHANGE UP (ref 1–3.3)
LYMPHOCYTES # BLD AUTO: 13.4 % — SIGNIFICANT CHANGE UP (ref 13–44)
LYMPHOCYTES # BLD AUTO: 16.7 % — SIGNIFICANT CHANGE UP (ref 13–44)
MAGNESIUM SERPL-MCNC: 2.4 MG/DL — SIGNIFICANT CHANGE UP (ref 1.6–2.6)
MCHC RBC-ENTMCNC: 31.4 PG — SIGNIFICANT CHANGE UP (ref 27–34)
MCHC RBC-ENTMCNC: 31.6 PG — SIGNIFICANT CHANGE UP (ref 27–34)
MCHC RBC-ENTMCNC: 31.8 G/DL — LOW (ref 32–36)
MCHC RBC-ENTMCNC: 32.3 G/DL — SIGNIFICANT CHANGE UP (ref 32–36)
MCV RBC AUTO: 97.6 FL — SIGNIFICANT CHANGE UP (ref 80–100)
MCV RBC AUTO: 98.8 FL — SIGNIFICANT CHANGE UP (ref 80–100)
MONOCYTES # BLD AUTO: 0.53 K/UL — SIGNIFICANT CHANGE UP (ref 0–0.9)
MONOCYTES # BLD AUTO: 0.65 K/UL — SIGNIFICANT CHANGE UP (ref 0–0.9)
MONOCYTES NFR BLD AUTO: 10.9 % — SIGNIFICANT CHANGE UP (ref 2–14)
MONOCYTES NFR BLD AUTO: 6.6 % — SIGNIFICANT CHANGE UP (ref 2–14)
NEUTROPHILS # BLD AUTO: 4.01 K/UL — SIGNIFICANT CHANGE UP (ref 1.8–7.4)
NEUTROPHILS # BLD AUTO: 6.08 K/UL — SIGNIFICANT CHANGE UP (ref 1.8–7.4)
NEUTROPHILS NFR BLD AUTO: 67 % — SIGNIFICANT CHANGE UP (ref 43–77)
NEUTROPHILS NFR BLD AUTO: 75.8 % — SIGNIFICANT CHANGE UP (ref 43–77)
NRBC # BLD: 0 /100 WBCS — SIGNIFICANT CHANGE UP (ref 0–0)
NRBC # BLD: 0 /100 WBCS — SIGNIFICANT CHANGE UP (ref 0–0)
NRBC BLD-RTO: 0 /100 WBCS — SIGNIFICANT CHANGE UP (ref 0–0)
NRBC BLD-RTO: 0 /100 WBCS — SIGNIFICANT CHANGE UP (ref 0–0)
PHOSPHATE SERPL-MCNC: 3.7 MG/DL — SIGNIFICANT CHANGE UP (ref 2.5–4.5)
PLATELET # BLD AUTO: 184 K/UL — SIGNIFICANT CHANGE UP (ref 150–400)
PLATELET # BLD AUTO: 215 K/UL — SIGNIFICANT CHANGE UP (ref 150–400)
POTASSIUM SERPL-MCNC: 3.5 MMOL/L — SIGNIFICANT CHANGE UP (ref 3.5–5.3)
POTASSIUM SERPL-MCNC: 3.6 MMOL/L — SIGNIFICANT CHANGE UP (ref 3.5–5.3)
POTASSIUM SERPL-SCNC: 3.5 MMOL/L — SIGNIFICANT CHANGE UP (ref 3.5–5.3)
POTASSIUM SERPL-SCNC: 3.6 MMOL/L — SIGNIFICANT CHANGE UP (ref 3.5–5.3)
PROT SERPL-MCNC: 7.5 G/DL — SIGNIFICANT CHANGE UP (ref 6–8.3)
PROT SERPL-MCNC: 9.6 G/DL — HIGH (ref 6–8.3)
PROTHROM AB SERPL-ACNC: 13.4 SEC — SIGNIFICANT CHANGE UP (ref 9.9–13.4)
RBC # BLD: 3.39 M/UL — LOW (ref 4.2–5.8)
RBC # BLD: 4.05 M/UL — LOW (ref 4.2–5.8)
RBC # FLD: 15.6 % — HIGH (ref 10.3–14.5)
RBC # FLD: 15.6 % — HIGH (ref 10.3–14.5)
SODIUM SERPL-SCNC: 135 MMOL/L — SIGNIFICANT CHANGE UP (ref 135–145)
SODIUM SERPL-SCNC: 137 MMOL/L — SIGNIFICANT CHANGE UP (ref 135–145)
WBC # BLD: 5.99 K/UL — SIGNIFICANT CHANGE UP (ref 3.8–10.5)
WBC # BLD: 8.03 K/UL — SIGNIFICANT CHANGE UP (ref 3.8–10.5)
WBC # FLD AUTO: 5.99 K/UL — SIGNIFICANT CHANGE UP (ref 3.8–10.5)
WBC # FLD AUTO: 8.03 K/UL — SIGNIFICANT CHANGE UP (ref 3.8–10.5)

## 2025-01-19 PROCEDURE — 71045 X-RAY EXAM CHEST 1 VIEW: CPT | Mod: 26

## 2025-01-19 PROCEDURE — 93931 UPPER EXTREMITY STUDY: CPT | Mod: 26,RT

## 2025-01-19 RX ORDER — SEVELAMER CARBONATE 800 MG/1
800 TABLET, FILM COATED ORAL
Refills: 0 | Status: DISCONTINUED | OUTPATIENT
Start: 2025-01-19 | End: 2025-01-23

## 2025-01-19 RX ORDER — HEPARIN SODIUM,PORCINE 10000/ML
5000 VIAL (ML) INJECTION EVERY 8 HOURS
Refills: 0 | Status: DISCONTINUED | OUTPATIENT
Start: 2025-01-19 | End: 2025-01-23

## 2025-01-19 RX ORDER — INSULIN LISPRO 100/ML
VIAL (ML) SUBCUTANEOUS
Refills: 0 | Status: DISCONTINUED | OUTPATIENT
Start: 2025-01-19 | End: 2025-01-19

## 2025-01-19 RX ORDER — INSULIN LISPRO 100/ML
VIAL (ML) SUBCUTANEOUS AT BEDTIME
Refills: 0 | Status: DISCONTINUED | OUTPATIENT
Start: 2025-01-19 | End: 2025-01-23

## 2025-01-19 RX ORDER — CALCITRIOL CAPSULES 0.25 MCG 0.25 UG/1
1 CAPSULE ORAL
Refills: 0 | Status: DISCONTINUED | OUTPATIENT
Start: 2025-01-19 | End: 2025-01-23

## 2025-01-19 RX ORDER — TAMSULOSIN HYDROCHLORIDE 0.4 MG/1
0.4 CAPSULE ORAL AT BEDTIME
Refills: 0 | Status: DISCONTINUED | OUTPATIENT
Start: 2025-01-19 | End: 2025-01-23

## 2025-01-19 RX ORDER — BACTERIOSTATIC SODIUM CHLORIDE 0.9 %
100 VIAL (ML) INJECTION
Refills: 0 | Status: DISCONTINUED | OUTPATIENT
Start: 2025-01-19 | End: 2025-01-23

## 2025-01-19 RX ORDER — INSULIN LISPRO 100/ML
VIAL (ML) SUBCUTANEOUS
Refills: 0 | Status: DISCONTINUED | OUTPATIENT
Start: 2025-01-19 | End: 2025-01-23

## 2025-01-19 RX ORDER — MIDODRINE HYDROCHLORIDE 5 MG/1
10 TABLET ORAL EVERY 8 HOURS
Refills: 0 | Status: DISCONTINUED | OUTPATIENT
Start: 2025-01-19 | End: 2025-01-21

## 2025-01-19 RX ORDER — INSULIN LISPRO 100/ML
VIAL (ML) SUBCUTANEOUS AT BEDTIME
Refills: 0 | Status: DISCONTINUED | OUTPATIENT
Start: 2025-01-19 | End: 2025-01-19

## 2025-01-19 RX ORDER — ANTISEPTIC SURGICAL SCRUB 0.04 MG/ML
1 SOLUTION TOPICAL DAILY
Refills: 0 | Status: DISCONTINUED | OUTPATIENT
Start: 2025-01-19 | End: 2025-01-23

## 2025-01-19 RX ORDER — METRONIDAZOLE 500 MG
500 TABLET ORAL EVERY 12 HOURS
Refills: 0 | Status: DISCONTINUED | OUTPATIENT
Start: 2025-01-19 | End: 2025-01-23

## 2025-01-19 RX ORDER — LATANOPROST 50 UG/ML
1 SOLUTION OPHTHALMIC AT BEDTIME
Refills: 0 | Status: DISCONTINUED | OUTPATIENT
Start: 2025-01-19 | End: 2025-01-23

## 2025-01-19 RX ORDER — EPOETIN ALFA 2000 [IU]/ML
4000 SOLUTION INTRAVENOUS; SUBCUTANEOUS
Refills: 0 | Status: DISCONTINUED | OUTPATIENT
Start: 2025-01-19 | End: 2025-01-23

## 2025-01-19 RX ORDER — CEFEPIME HCL 1 G
2000 IV SOLUTION, PIGGYBACK, BOTTLE (EA) INTRAVENOUS
Refills: 0 | Status: DISCONTINUED | OUTPATIENT
Start: 2025-01-19 | End: 2025-01-23

## 2025-01-19 RX ADMIN — SEVELAMER CARBONATE 800 MILLIGRAM(S): 800 TABLET, FILM COATED ORAL at 13:47

## 2025-01-19 RX ADMIN — SEVELAMER CARBONATE 800 MILLIGRAM(S): 800 TABLET, FILM COATED ORAL at 08:14

## 2025-01-19 RX ADMIN — Medication 500 MILLIGRAM(S): at 08:14

## 2025-01-19 RX ADMIN — Medication 1: at 13:48

## 2025-01-19 RX ADMIN — Medication 5000 UNIT(S): at 08:15

## 2025-01-19 RX ADMIN — MIDODRINE HYDROCHLORIDE 10 MILLIGRAM(S): 5 TABLET ORAL at 13:48

## 2025-01-19 RX ADMIN — SEVELAMER CARBONATE 800 MILLIGRAM(S): 800 TABLET, FILM COATED ORAL at 16:41

## 2025-01-19 RX ADMIN — Medication 500 MILLIGRAM(S): at 16:41

## 2025-01-19 RX ADMIN — Medication 5000 UNIT(S): at 13:48

## 2025-01-19 RX ADMIN — MIDODRINE HYDROCHLORIDE 10 MILLIGRAM(S): 5 TABLET ORAL at 08:14

## 2025-01-19 NOTE — H&P ADULT - ASSESSMENT
Pt is a 69M PMH DM, HTN, HLD, ESRD on HD ( M, W, F), B/L Charcot deformity ( follows with Dr Sim), chronic OM right foot, b/l UVJ stones, admitted to [facility] from 12/16-1/08 for septic shock 2/2 scrotal and gluteal abscesses, requiring pressors, still on a course of cefepime and flagyl till 1/27, admitted today for missed HD due to inabiltiy to use Right AVF.   Pt is a 69M PMH DM, HTN (now hypotensive on midodrine), ESRD on HD ( M, W, F), B/L Charcot deformity ( follows with Dr Sim), chronic OM right foot, b/l UPJ stones, admitted to [facility] from 12/16-1/08 for septic shock 2/2 scrotal and gluteal abscesses, requiring pressors, still on a course of cefepime and flagyl till 1/27, admitted today for missed HD due to inabiltiy to use Right AVF.

## 2025-01-19 NOTE — H&P ADULT - PROBLEM SELECTOR PLAN 2
headaches. Hematological: Negative for adenopathy. Does not bruise/bleed easily. Psychiatric/Behavioral: Negative. Current Outpatient Medications:     traMADol (ULTRAM) 50 MG tablet, Take 1 tablet by mouth 2 times daily for 10 days. Take lowest dose possible to manage pain, Disp: 20 tablet, Rfl: 0    atorvastatin (LIPITOR) 20 MG tablet, , Disp: , Rfl:     levothyroxine (SYNTHROID) 100 MCG tablet, , Disp: , Rfl:     pantoprazole (PROTONIX) 40 MG tablet, , Disp: , Rfl:     Calcium Carbonate-Vit D-Min (CALCIUM 1200 PO), , Disp: , Rfl:     aspirin 81 MG tablet, Take by mouth, Disp: , Rfl:     predniSONE (DELTASONE) 20 MG tablet, , Disp: , Rfl:     cyclobenzaprine (FLEXERIL) 10 MG tablet, TK 1 T PO TID PRN, Disp: , Rfl:   Allergies   Allergen Reactions    No Known Allergies        History reviewed. No pertinent past medical history. History reviewed. No pertinent surgical history. History reviewed. No pertinent family history.   Social History     Socioeconomic History    Marital status:      Spouse name: Not on file    Number of children: Not on file    Years of education: Not on file    Highest education level: Not on file   Occupational History    Not on file   Social Needs    Financial resource strain: Not on file    Food insecurity     Worry: Not on file     Inability: Not on file    Transportation needs     Medical: Not on file     Non-medical: Not on file   Tobacco Use    Smoking status: Former Smoker     Packs/day: 0.50     Years: 35.00     Pack years: 17.50     Types: Cigarettes     Last attempt to quit: 2018     Years since quittin.5    Smokeless tobacco: Never Used   Substance and Sexual Activity    Alcohol use: Not on file    Drug use: Not on file    Sexual activity: Not on file   Lifestyle    Physical activity     Days per week: Not on file     Minutes per session: Not on file    Stress: Not on file   Relationships    Social connections     Talks on Hx of ESRD via R. AVG on M/W/F, makes some urine  c/w sevelamer and calcitriol   Nephro  XXX following. Hx of ESRD via R. AVG on M/W/F, makes some urine  c/w sevelamer and calcitriol   Nephro Dr. Tsang following.

## 2025-01-19 NOTE — CONSULT NOTE ADULT - ASSESSMENT
69y Male with history of ESRD on HD presents with missed HD and RUE AVG malfunction. Nephrology consulted for ESRD status.    1) ESRD: Last HD on 1/15 as an outpatient. No urgent need for HD at this time despite patient missing HD on 1/17. Appreciate vascular surgery recommendations as patient with reported good flow on RUE doppler. F/U final report and will attempt to cannulate on 1/20. Monitor electrolytes.    2) Hypotension: BP acceptable. Continue with midodrine 10 mg PO TID.    3) Anemia of renal disease: Hb acceptable. Decrease Epo to 4K with HD.    4) Secondary HPT of renal origin: Continue with calcitriol 1 mcg with HD and renvela 1 tab with meals.       [facility] NEPHROLOGY  Arden Tsang M.D.  Freeman Holland D.O.  Janny Chirinos M.D.  MD Ambar Mccarty, MSN, ANP-C    Telephone: [phone]  Facsimile: [phone] [place] [address], [address]  [place] [address]

## 2025-01-19 NOTE — H&P ADULT - PROBLEM SELECTOR PLAN 5
Hx of b/l charcot deformity follows podiatrist Dr. Sim  Chronic osteomyelitis   MRI of R ankle (12/18): ulceration at the plantar surface medially with suspected exposed bone with underlying osteomyelitis involving the talus and less so ofthe navicular bone. No drainable fluid collection. End-stage arthritis with resultant planovalgus deformity. Chronic OM of talus and partially navicular  Podiatry consulted > no surgical tx indicated at his time   f/u outpt Hx of b/l charcot deformity follows podiatrist Dr. Sim  Chronic osteomyelitis   MRI of R ankle (12/18): ulceration at the plantar surface medially with suspected exposed bone with underlying osteomyelitis involving the talus and less so ofthe navicular bone. No drainable fluid collection. End-stage arthritis with resultant planovalgus deformity. Chronic OM of talus and partially navicular  Podiatry consulted during prior admission> no surgical tx indicated at his time   f/u outpt

## 2025-01-19 NOTE — ED ADULT NURSE NOTE - NSFALLHARMRISKINTERV_ED_ALL_ED

## 2025-01-19 NOTE — H&P ADULT - NSHPREVIEWOFSYSTEMS_GEN_ALL_CORE
Dr van REVIEW OF SYSTEMS:    CONSTITUTIONAL: No weakness, fevers or chills  EYES/ENT: No visual changes;  No vertigo or throat pain   NECK: No pain or stiffness  RESPIRATORY: No cough, wheezing, hemoptysis; No shortness of breath  CARDIOVASCULAR: No chest pain or palpitations  GASTROINTESTINAL: No abdominal or epigastric pain. No nausea, vomiting, or hematemesis; No diarrhea or constipation. No melena or hematochezia.  GENITOURINARY: No dysuria, frequency or hematuria  NEUROLOGICAL: No numbness or weakness  SKIN: No itching, burning, rashes, or lesions   All other review of systems is negative unless indicated above.

## 2025-01-19 NOTE — H&P ADULT - PROBLEM SELECTOR PLAN 3
Hx of scrotal abscess s/p wide excision of hidradenitis skin tunnel  CT Pelvis (12/23): Rim-enhancing extratesticular right scrotal fluid collection measuring 2.1 x 1.3 x 0.8 cm.  CT A/P (1/1): Limited scrotal wall thickening/edema with 1.5 cm rim-enhancing collection noted within the right scrotal wall  Scrotal wound culture: + proteus mirabilis   s/p vancomycin   s/p meropenem q24h (1/1 - 1/7)  1/7 started cefepime 1mg qd, flagyl 500mg PO BID (will d/c on cefepime 1mg w/ HD until 1/29 to complete 4wk)    - c/w cefepime and flagyl until; 1/27 Hx of scrotal abscess s/p wide excision of hidradenitis skin tunnel  CT Pelvis (12/23): Rim-enhancing extratesticular right scrotal fluid collection measuring 2.1 x 1.3 x 0.8 cm.  CT A/P (1/1): Limited scrotal wall thickening/edema with 1.5 cm rim-enhancing collection noted within the right scrotal wall  Scrotal wound culture: + proteus mirabilis   s/p vancomycin   s/p meropenem q24h (1/1 - 1/7)  1/7 started cefepime 1mg TIW w/ HD,  flagyl 500mg PO BID (will d/c on cefepime 1mg w/ HD until 1/29 to complete 4wk)    - c/w cefepime 2g TIW during HD, and flagyl until; 1/27

## 2025-01-19 NOTE — H&P ADULT - HISTORY OF PRESENT ILLNESS
Pt is a 69M PMH DM, HTN, HLD, ESRD on HD ( M, W, F), B/L Charcot deformity ( follows with Dr Sim), chronic OM right foot, b/l UVJ stones, admitted to [facility] from 12/16-1/08 for septic shock 2/2 scrotal and gluteal abscesses, requiring pressors, still on a course of cefepime and flagyl till 1/27. He presented to the ED after missing HD on Friday due to failed Right AVF access.     Pt denies chest pain, palpitations, sob, orthopnea, smith, fevers, chills, cough, rhinorrhea, abdominal pain, n/v/d. pt produces little urine. denies flank pain, supra pubic discomfort.  Pt is a 69M PMH DM, HTN (now hypotensive on midodrine), ESRD on HD ( M, W, F), B/L Charcot deformity ( follows with Dr Sim), chronic OM right foot, b/l UPJ stones, admitted to [facility] from 12/16-1/08 for septic shock 2/2 scrotal and gluteal abscesses, requiring pressors, still on a course of cefepime and flagyl till 1/27, He presented to the ED after missing HD on Friday due to failed Right AVF access.     Pt denies chest pain, palpitations, sob, orthopnea, smith, fevers, chills, cough, rhinorrhea, abdominal pain, n/v/d. pt produces little urine. denies flank pain, supra pubic discomfort.

## 2025-01-19 NOTE — H&P ADULT - PROBLEM SELECTOR PLAN 6
heparin subq q8 originally hx of HTN, but since recent admission for septic shock, still req. midodrine    - c/w midodrine

## 2025-01-19 NOTE — H&P ADULT - PROBLEM SELECTOR PLAN 1
Admitted today for missed HD due to inability to access Right AVF.    - f/u doppler US of the right upper extremity to assess for stenosis/thrombosis   - no need for urgent HD (electrolytes wnl, no signs of fluid overload)  - Vascular consult   - Nephro  XXX following Admitted today for missed HD due to inability to access Right AVF.    - f/u doppler US of the right upper extremity to assess for stenosis/thrombosis   - no need for urgent HD (electrolytes wnl, no signs of fluid overload)  - Vascular consult   - Nephro Dr. Tsang following

## 2025-01-19 NOTE — CONSULT NOTE ADULT - ASSESSMENT
69M with ESRD on dialysis using RUE forearm AV graft, unable to be cannulated on 1/17 for dialysis  in no urgent need of dialysis at this time    Plan:  -No need for admission to hospital given pt does not need urgent dialysis  -Recommend patient may outpatient follow up at Access center for repair of fistula  - 69M with ESRD on dialysis using RUE forearm AV graft, unable to be cannulated on 1/17 for dialysis  in no urgent need of dialysis at this time  duplex revels good flow of graft, no strictures or hematomas noted    Plan:  -No need for admission to hospital given pt does not need urgent dialysis  -Attempt to cannulate fistula again for next dialysis  -Discussed with Dr. Miranda

## 2025-01-19 NOTE — CONSULT NOTE ADULT - SUBJECTIVE AND OBJECTIVE BOX
Vascular Surgery Consultation Note    HPI:  Pt is a 69M PMH DM, HTN (now hypotensive on midodrine), ESRD on HD ( M, W, F), B/L Charcot deformity ( follows with Dr Sim), chronic OM right foot, b/l UPJ stones, admitted to [facility] from 12/16-1/08 for septic shock 2/2 scrotal and gluteal abscesses, requiring pressors, still on a course of cefepime and flagyl till 1/27, He presented to the ED after missing HD on Friday due to failed Right AVF access.     Vascular team called to assess patient's graft site. Agree with above history. Pt states he went to dialysis Friday and they were unable to cannulate the graft. At dialysis clinic center he states the provider attempted to remove a clot from the graft and were unsuccessful. Pt reports feeling well at this time and has no complaints.     Pt denies chest pain, palpitations, sob, orthopnea, smith, fevers, chills, cough, rhinorrhea, abdominal pain, n/v/d. pt produces little urine. denies flank pain, supra pubic discomfort.  (19 Jan 2025 05:39)      PAST MEDICAL & SURGICAL HISTORY:  DM (diabetes mellitus)  type 2      ESRD (end stage renal disease) on dialysis  t-th-sat via right AV graft, uses midodrine the days of Dialysis for hypotension      Charcot foot due to diabetes mellitus      Diabetic retinopathy associated with type 2 diabetes mellitus      Hyperlipidemia      Abscess of scrotum      Hidradenitis      End stage renal disease      S/P foot surgery  right foot - 11/2014      Charcot's syndrome  Surgeries in feet and ankles   left ankle 2011 and right ankle 11/2014      History of incision and drainage  scrotal 5/2018      Hemodialysis access, AV graft          MEDICATIONS  (STANDING):  calcitriol   Capsule 1 MICROGram(s) Oral <User Schedule>  cefepime   IVPB 2000 milliGRAM(s) IV Intermittent <User Schedule>  heparin   Injectable 5000 Unit(s) SubCutaneous every 8 hours  insulin lispro (ADMELOG) corrective regimen sliding scale   SubCutaneous three times a day before meals  insulin lispro (ADMELOG) corrective regimen sliding scale   SubCutaneous at bedtime  latanoprost 0.005% Ophthalmic Solution 1 Drop(s) Both EYES at bedtime  metroNIDAZOLE    Tablet 500 milliGRAM(s) Oral every 12 hours  midodrine 10 milliGRAM(s) Oral every 8 hours  sevelamer carbonate 800 milliGRAM(s) Oral three times a day with meals  tamsulosin 0.4 milliGRAM(s) Oral at bedtime    MEDICATIONS  (PRN):      Allergies    penicillin (Unknown)    Intolerances        SOCIAL HISTORY:    FAMILY HISTORY:  Family history of diabetes mellitus        Vital Signs Last 24 Hrs  T(C): 36.3 (19 Jan 2025 13:30), Max: 36.9 (19 Jan 2025 08:30)  T(F): 97.4 (19 Jan 2025 13:30), Max: 98.5 (19 Jan 2025 08:30)  HR: 71 (19 Jan 2025 13:30) (71 - 81)  BP: 110/68 (19 Jan 2025 13:30) (103/69 - 115/64)  BP(mean): --  RR: 16 (19 Jan 2025 13:30) (16 - 16)  SpO2: 97% (19 Jan 2025 13:30) (97% - 99%)    Parameters below as of 19 Jan 2025 13:30  Patient On (Oxygen Delivery Method): room air        I&O's Summary    General: No acute distress  Skin: No jaundice, no icterus  Extremities: right upper extremity forearm AVG site with 2 stitches in place, no palpable thrill felt. No active bleeding or evidence of infection noted. BEBO SUNSHINE.    LABS:                        10.7   5.99  )-----------( 184      ( 19 Jan 2025 10:19 )             33.1     01-19    137  |  103  |  29[H]  ----------------------------<  131[H]  3.5   |  23  |  7.70[H]    Ca    9.0      19 Jan 2025 10:19  Phos  3.7     01-19  Mg     2.4     01-19    TPro  7.5  /  Alb  2.7[L]  /  TBili  0.5  /  DBili  x   /  AST  7[L]  /  ALT  6[L]  /  AlkPhos  79  01-19    PT/INR - ( 19 Jan 2025 03:40 )   PT: 13.4 sec;   INR: 1.15 ratio         PTT - ( 19 Jan 2025 03:40 )  PTT:33.5 sec  Urinalysis Basic - ( 19 Jan 2025 10:19 )    Color: x / Appearance: x / SG: x / pH: x  Gluc: 131 mg/dL / Ketone: x  / Bili: x / Urobili: x   Blood: x / Protein: x / Nitrite: x   Leuk Esterase: x / RBC: x / WBC x   Sq Epi: x / Non Sq Epi: x / Bacteria: x      CAPILLARY BLOOD GLUCOSE      POCT Blood Glucose.: 158 mg/dL (19 Jan 2025 13:29)    LIVER FUNCTIONS - ( 19 Jan 2025 10:19 )  Alb: 2.7 g/dL / Pro: 7.5 g/dL / ALK PHOS: 79 U/L / ALT: 6 U/L DA / AST: 7 U/L / GGT: x             Cultures:      RADIOLOGY & ADDITIONAL STUDIES:  
[facility] NEPHROLOGY- CONSULTATION NOTE    69y Male with history of below presents with missed HD and RUE AVG malfunction. Nephrology consulted for ESRD status.    Patient well known from recent admission and as an outpatient for h/o ESRD on HD MWF for which he follows with Dr. Tsang at [facility]. Patient recently discharged to [facility] rehab for which he was last dialyzed on 1/15. Patient states he was unable to get HD on 1/17 (unclear if he was cannulated) and referred to [facility] for intervention of his RUE AVG on 1/18 at which time declot was performed? Patient subsequently referred back to rehab however unable to be successfully cannulated for which he was referred to ER.    REVIEW OF SYSTEMS:  Gen: no fevers  HEENT: no rhinorrhea  Neck: no sore throat  Cards: no chest pain  Resp: no dyspnea  GI: no nausea or vomiting or diarrhea  : no dysuria or hematuria  Vascular: no LE edema  Derm: no rashes  Neuro: no numbness/tingling    penicillin (Unknown)      Home Medications Reviewed  Hospital Medications:   MEDICATIONS  (STANDING):  calcitriol   Capsule 1 MICROGram(s) Oral <User Schedule>  cefepime   IVPB 2000 milliGRAM(s) IV Intermittent <User Schedule>  heparin   Injectable 5000 Unit(s) SubCutaneous every 8 hours  insulin lispro (ADMELOG) corrective regimen sliding scale   SubCutaneous three times a day before meals  insulin lispro (ADMELOG) corrective regimen sliding scale   SubCutaneous at bedtime  latanoprost 0.005% Ophthalmic Solution 1 Drop(s) Both EYES at bedtime  metroNIDAZOLE    Tablet 500 milliGRAM(s) Oral every 12 hours  midodrine 10 milliGRAM(s) Oral every 8 hours  sevelamer carbonate 800 milliGRAM(s) Oral three times a day with meals  tamsulosin 0.4 milliGRAM(s) Oral at bedtime      PAST MEDICAL & SURGICAL HISTORY:  DM (diabetes mellitus)  type 2      ESRD (end stage renal disease) on dialysis  t-th-sat via right AV graft, uses midodrine the days of Dialysis for hypotension      Charcot foot due to diabetes mellitus      Diabetic retinopathy associated with type 2 diabetes mellitus      Hyperlipidemia      Abscess of scrotum      Hidradenitis      End stage renal disease      S/P foot surgery  right foot - 11/2014      Charcot's syndrome  Surgeries in feet and ankles   left ankle 2011 and right ankle 11/2014      History of incision and drainage  scrotal 5/2018      Hemodialysis access, AV graft          FAMILY HISTORY:  Family history of diabetes mellitus        SOCIAL HISTORY:  Denies toxic substance use     VITALS:  T(F): 97.4 (01-19-25 @ 13:30), Max: 98.5 (01-19-25 @ 08:30)  HR: 71 (01-19-25 @ 13:30)  BP: 110/68 (01-19-25 @ 13:30)  RR: 16 (01-19-25 @ 13:30)  SpO2: 97% (01-19-25 @ 13:30)  Wt(kg): --    Height (cm): 172.7 (01-18 @ 23:03)  Weight (kg): 75 (01-18 @ 23:40)  BMI (kg/m2): 25.1 (01-18 @ 23:40)  BSA (m2): 1.88 (01-18 @ 23:40)    PHYSICAL EXAM:  Gen: NAD, calm  HEENT: MMM  Neck: no JVD  Cards: RRR, +S1/S2, no M/G/R  Resp: bibasilar rales  GI: soft, NT/ND, NABS  : no CVA tenderness  Vascular: no LE edema B/L, RUE AVG however unable to appreciate bruit/thrill, + LE boots B/L  Derm: no rashes  Neuro: non-focal    LABS:  01-19    137  |  103  |  29[H]  ----------------------------<  131[H]  3.5   |  23  |  7.70[H]    Ca    9.0      19 Jan 2025 10:19  Phos  3.7     01-19  Mg     2.4     01-19    TPro  7.5  /  Alb  2.7[L]  /  TBili  0.5  /  DBili      /  AST  7[L]  /  ALT  6[L]  /  AlkPhos  79  01-19    Creatinine Trend: 7.70 <--, 7.51 <--                        10.7   5.99  )-----------( 184      ( 19 Jan 2025 10:19 )             33.1     Urine Studies:  Urinalysis Basic - ( 19 Jan 2025 10:19 )    Color:  / Appearance:  / SG:  / pH:   Gluc: 131 mg/dL / Ketone:   / Bili:  / Urobili:    Blood:  / Protein:  / Nitrite:    Leuk Esterase:  / RBC:  / WBC    Sq Epi:  / Non Sq Epi:  / Bacteria:           RADIOLOGY & ADDITIONAL STUDIES:  < from: Xray Chest 1 View AP/PA (01.19.25 @ 02:49) >  IMPRESSION:  Clear lungs.    --- End of Report ---    < end of copied text >

## 2025-01-19 NOTE — ED ADULT NURSE NOTE - SUICIDE SCREENING QUESTION 3
[facility] EMERGENCY DEPT  EMERGENCY DEPARTMENT ENCOUNTER      Pt Name: Aracely Fiore  MRN: [id number]  Birthdate 2005  Date of evaluation: 10/22/2024  Provider: SHAINA Kern    CHIEF COMPLAINT       Chief Complaint   Patient presents with    Fever         HISTORY OF PRESENT ILLNESS   (Location/Symptom, Timing/Onset, Context/Setting, Quality, Duration, Modifying Factors, Severity)  Note limiting factors.   This is a 19-year-old female presenting ambulatory to the emergency department with complaint of ear infection that was approximately 3 weeks ago and got better with treatment with antibiotics.  She states that next week she developed fever with associated cough and congestion.  She states most of those symptoms have resolved but she is noted fever of approximately 100 orally for the last 5 to 7 days.  She has been taking Tylenol, DayQuil and NyQuil.  Last dose was taken at 7 AM today.  She reports associated frontal headache.  She states her energy and appetite have been decreased.  No rash, neck pain, neck stiffness, chest pain, shortness of breath, abdominal pain, vomiting, diarrhea or urinary complaints.    The history is provided by the patient.         Review of External Medical Records:     Nursing Notes were reviewed.    REVIEW OF SYSTEMS    (2-9 systems for level 4, 10 or more for level 5)     Review of Systems   Constitutional:  Positive for fever. Negative for activity change and chills.   HENT:  Negative for congestion, ear pain, rhinorrhea, sneezing and sore throat.    Respiratory:  Positive for cough. Negative for shortness of breath.    Cardiovascular:  Negative for chest pain.   Gastrointestinal:  Negative for abdominal pain, constipation, diarrhea, nausea and vomiting.   Genitourinary:  Negative for difficulty urinating, frequency and urgency.   Musculoskeletal:  Negative for arthralgias.   Skin:  Negative for wound.   Neurological:  Negative for weakness, numbness and headaches.  No

## 2025-01-19 NOTE — H&P ADULT - NSHPPHYSICALEXAM_GEN_ALL_CORE
T(C): 36.8 (01-18-25 @ 23:03), Max: 36.8 (01-18-25 @ 23:03)  HR: 81 (01-18-25 @ 23:03) (81 - 81)  BP: 103/69 (01-18-25 @ 23:03) (103/69 - 103/69)  RR: 16 (01-18-25 @ 23:03) (16 - 16)  SpO2: 98% (01-18-25 @ 23:03) (98% - 98%)    CONSTITUTIONAL: Well groomed, no apparent distress  EYES: PERRLA and symmetric, EOMI, No conjunctival or scleral injection, non-icteric  ENMT: Oral mucosa with moist membranes.   RESP: No respiratory distress, no use of accessory muscles; CTA b/l, no WRR  CV: RRR, +S1S2, no MRG; no JVD; no peripheral edema  GI: Soft, NT, ND, no rebound, no guarding; no palpable masses; no hepatosplenomegaly; no hernia palpated  SKIN: (+) b/l charcot foot deformity noted, pt currently wearing boots b/l that cannot be removed   NEURO: CN II-XII intact; normal reflexes in upper and lower extremities, sensation intact in upper and lower extremities b/l to light touch   PSYCH: Appropriate insight/judgment; A+O x 3, mood and affect appropriate, recent/remote memory intact

## 2025-01-19 NOTE — ED PROVIDER NOTE - PHYSICAL EXAMINATION
General: non-toxic, NAD  HEENT: NCAT, PERRL  Cardiac: RRR, no murmurs, 2+ peripheral pulses  Resp: bibasilar rales   Abdomen: soft, non-distended, bowel sounds present, no ttp, no rebound or guarding. no organomegaly  Extremities: bilateral hard boots.   Skin: no rashes  Neuro: AAOx4, 5+motor, sensation grossly intact CN 2-12 intact  Psych: mood and affect appropriate

## 2025-01-20 LAB
ALBUMIN SERPL ELPH-MCNC: 2.6 G/DL — LOW (ref 3.5–5)
ALP SERPL-CCNC: 83 U/L — SIGNIFICANT CHANGE UP (ref 40–120)
ALT FLD-CCNC: 8 U/L DA — LOW (ref 10–60)
ANION GAP SERPL CALC-SCNC: 13 MMOL/L — SIGNIFICANT CHANGE UP (ref 5–17)
AST SERPL-CCNC: 10 U/L — SIGNIFICANT CHANGE UP (ref 10–40)
BASOPHILS # BLD AUTO: 0.08 K/UL — SIGNIFICANT CHANGE UP (ref 0–0.2)
BASOPHILS NFR BLD AUTO: 1 % — SIGNIFICANT CHANGE UP (ref 0–2)
BILIRUB SERPL-MCNC: 0.5 MG/DL — SIGNIFICANT CHANGE UP (ref 0.2–1.2)
BUN SERPL-MCNC: 36 MG/DL — HIGH (ref 7–18)
CALCIUM SERPL-MCNC: 9.2 MG/DL — SIGNIFICANT CHANGE UP (ref 8.4–10.5)
CHLORIDE SERPL-SCNC: 104 MMOL/L — SIGNIFICANT CHANGE UP (ref 96–108)
CO2 SERPL-SCNC: 21 MMOL/L — LOW (ref 22–31)
CREAT SERPL-MCNC: 8.76 MG/DL — HIGH (ref 0.5–1.3)
EGFR: 6 ML/MIN/1.73M2 — LOW
EOSINOPHIL # BLD AUTO: 0.37 K/UL — SIGNIFICANT CHANGE UP (ref 0–0.5)
EOSINOPHIL NFR BLD AUTO: 4.7 % — SIGNIFICANT CHANGE UP (ref 0–6)
GLUCOSE BLDC GLUCOMTR-MCNC: 117 MG/DL — HIGH (ref 70–99)
GLUCOSE BLDC GLUCOMTR-MCNC: 118 MG/DL — HIGH (ref 70–99)
GLUCOSE BLDC GLUCOMTR-MCNC: 134 MG/DL — HIGH (ref 70–99)
GLUCOSE BLDC GLUCOMTR-MCNC: 99 MG/DL — SIGNIFICANT CHANGE UP (ref 70–99)
GLUCOSE SERPL-MCNC: 114 MG/DL — HIGH (ref 70–99)
HCT VFR BLD CALC: 33.5 % — LOW (ref 39–50)
HGB BLD-MCNC: 10.8 G/DL — LOW (ref 13–17)
IMM GRANULOCYTES NFR BLD AUTO: 0.4 % — SIGNIFICANT CHANGE UP (ref 0–0.9)
LYMPHOCYTES # BLD AUTO: 1.31 K/UL — SIGNIFICANT CHANGE UP (ref 1–3.3)
LYMPHOCYTES # BLD AUTO: 16.8 % — SIGNIFICANT CHANGE UP (ref 13–44)
MAGNESIUM SERPL-MCNC: 2.5 MG/DL — SIGNIFICANT CHANGE UP (ref 1.6–2.6)
MCHC RBC-ENTMCNC: 31 PG — SIGNIFICANT CHANGE UP (ref 27–34)
MCHC RBC-ENTMCNC: 32.2 G/DL — SIGNIFICANT CHANGE UP (ref 32–36)
MCV RBC AUTO: 96.3 FL — SIGNIFICANT CHANGE UP (ref 80–100)
MONOCYTES # BLD AUTO: 0.68 K/UL — SIGNIFICANT CHANGE UP (ref 0–0.9)
MONOCYTES NFR BLD AUTO: 8.7 % — SIGNIFICANT CHANGE UP (ref 2–14)
NEUTROPHILS # BLD AUTO: 5.34 K/UL — SIGNIFICANT CHANGE UP (ref 1.8–7.4)
NEUTROPHILS NFR BLD AUTO: 68.4 % — SIGNIFICANT CHANGE UP (ref 43–77)
NRBC # BLD: 0 /100 WBCS — SIGNIFICANT CHANGE UP (ref 0–0)
NRBC BLD-RTO: 0 /100 WBCS — SIGNIFICANT CHANGE UP (ref 0–0)
PHOSPHATE SERPL-MCNC: 3.6 MG/DL — SIGNIFICANT CHANGE UP (ref 2.5–4.5)
PLATELET # BLD AUTO: 195 K/UL — SIGNIFICANT CHANGE UP (ref 150–400)
POTASSIUM SERPL-MCNC: 4.1 MMOL/L — SIGNIFICANT CHANGE UP (ref 3.5–5.3)
POTASSIUM SERPL-SCNC: 4.1 MMOL/L — SIGNIFICANT CHANGE UP (ref 3.5–5.3)
PROT SERPL-MCNC: 7.6 G/DL — SIGNIFICANT CHANGE UP (ref 6–8.3)
RBC # BLD: 3.48 M/UL — LOW (ref 4.2–5.8)
RBC # FLD: 15.6 % — HIGH (ref 10.3–14.5)
SODIUM SERPL-SCNC: 138 MMOL/L — SIGNIFICANT CHANGE UP (ref 135–145)
WBC # BLD: 7.81 K/UL — SIGNIFICANT CHANGE UP (ref 3.8–10.5)
WBC # FLD AUTO: 7.81 K/UL — SIGNIFICANT CHANGE UP (ref 3.8–10.5)

## 2025-01-20 RX ADMIN — SEVELAMER CARBONATE 800 MILLIGRAM(S): 800 TABLET, FILM COATED ORAL at 07:38

## 2025-01-20 RX ADMIN — TAMSULOSIN HYDROCHLORIDE 0.4 MILLIGRAM(S): 0.4 CAPSULE ORAL at 22:09

## 2025-01-20 RX ADMIN — Medication 100 MILLIGRAM(S): at 08:53

## 2025-01-20 RX ADMIN — LATANOPROST 1 DROP(S): 50 SOLUTION OPHTHALMIC at 00:09

## 2025-01-20 RX ADMIN — Medication 500 MILLIGRAM(S): at 06:05

## 2025-01-20 RX ADMIN — MIDODRINE HYDROCHLORIDE 10 MILLIGRAM(S): 5 TABLET ORAL at 13:12

## 2025-01-20 RX ADMIN — ANTISEPTIC SURGICAL SCRUB 1 APPLICATION(S): 0.04 SOLUTION TOPICAL at 08:55

## 2025-01-20 RX ADMIN — MIDODRINE HYDROCHLORIDE 10 MILLIGRAM(S): 5 TABLET ORAL at 06:07

## 2025-01-20 RX ADMIN — Medication 5000 UNIT(S): at 06:07

## 2025-01-20 RX ADMIN — MIDODRINE HYDROCHLORIDE 10 MILLIGRAM(S): 5 TABLET ORAL at 00:10

## 2025-01-20 RX ADMIN — Medication 5000 UNIT(S): at 13:11

## 2025-01-20 RX ADMIN — Medication 5000 UNIT(S): at 22:09

## 2025-01-20 RX ADMIN — SEVELAMER CARBONATE 800 MILLIGRAM(S): 800 TABLET, FILM COATED ORAL at 13:08

## 2025-01-20 RX ADMIN — Medication 500 MILLIGRAM(S): at 17:49

## 2025-01-20 RX ADMIN — Medication 5000 UNIT(S): at 00:09

## 2025-01-20 RX ADMIN — TAMSULOSIN HYDROCHLORIDE 0.4 MILLIGRAM(S): 0.4 CAPSULE ORAL at 00:10

## 2025-01-20 RX ADMIN — SEVELAMER CARBONATE 800 MILLIGRAM(S): 800 TABLET, FILM COATED ORAL at 16:25

## 2025-01-20 NOTE — CHART NOTE - NSCHARTNOTEFT_GEN_A_CORE
Reviewed final ultrasound read with Dr. Miranda.    < from: US Duplex Arterial Upper Ext Ltd, Right (01.19.25 @ 10:26) >    FINDINGS /  IMPRESSION:    High suspicion for right upper extremity AV graft occlusion.    High resistance antegrade flow noted of the right subclavian, axillary,   brachial, radial, and ulnar arteries, highly suggestive of right upper   extremity graft occlusion. Flow is not demonstrated within the graft on   this study. Stents of the right upper extremity native arteries are   poorly assessed due to significant shadowing artifact. Recommend   multiphase CTA evaluation of the right upper extremity.    Possible hematoma at the antecubital fossa. Venous outflow is poorly   assessed on this study. Correlate with multiphase CTA of the right upper   extremity.    Multiple attempts made to reach covering team. Further attempts will be   made.    --- End of Report ---            DUSTIN MUIR M.D., ATTENDING RADIOLOGIST  This document has been electronically signed. Jan 20 2025  1:04PM    < end of copied text >    Since patient is not in urgent need of dialysis, recommending patient be discharged today so that he may come to the [facility] for removal of clot of the graft.
EVENT:  noted the radiology report     EXAMINATION: Limited right upper extremity arterial duplex ultrasound.    IMPRESSION:  High suspicion for right upper extremity AV graft occlusion.    High resistance antegrade flow noted of the right subclavian, axillary,   brachial, radial, and ulnar arteries, highly suggestive of right upper   extremity graft occlusion. Flow is not demonstrated within the graft on   this study. Stents of the right upper extremity native arteries are   poorly assessed due to significant shadowing artifact. Recommend   multiphase CTA evaluation of the right upper extremity.    Possible hematoma at the antecubital fossa. Venous outflow is poorly   assessed on this study. Correlate with multiphase CTA of the right upper   extremity.    Multiple attempts made to reach covering team. Further attempts will be   made.    --- End of Report ---        HPI:  Pt is a 69M PMH DM, HTN (now hypotensive on midodrine), ESRD on HD ( M, W, F), B/L Charcot deformity ( follows with Dr Sim), chronic OM right foot, b/l UPJ stones, admitted to [facility] from 12/16-1/08 for septic shock 2/2 scrotal and gluteal abscesses, requiring pressors, still on a course of cefepime and flagyl till 1/27, He presented to the ED after missing HD on Friday due to failed Right AVF access.     Pt denies chest pain, palpitations, sob, orthopnea, smith, fevers, chills, cough, rhinorrhea, abdominal pain, n/v/d. pt produces little urine. denies flank pain, supra pubic discomfort.  (19 Jan 2025 05:39)        OBJECTIVE:  Vital Signs Last 24 Hrs  T(C): 36.7 (20 Jan 2025 12:04), Max: 36.8 (20 Jan 2025 05:35)  T(F): 98.1 (20 Jan 2025 12:04), Max: 98.2 (20 Jan 2025 05:35)  HR: 71 (20 Jan 2025 12:04) (71 - 79)  BP: 132/72 (20 Jan 2025 12:04) (110/68 - 149/82)  BP(mean): 92 (20 Jan 2025 12:04) (92 - 92)  RR: 18 (20 Jan 2025 12:04) (16 - 18)  SpO2: 97% (20 Jan 2025 12:04) (94% - 100%)    Parameters below as of 20 Jan 2025 12:04  Patient On (Oxygen Delivery Method): room air        LABS:                        10.8   7.81  )-----------( 195      ( 20 Jan 2025 06:05 )             33.5     01-20    138  |  104  |  36[H]  ----------------------------<  114[H]  4.1   |  21[L]  |  8.76[H]    Ca    9.2      20 Jan 2025 06:05  Phos  3.6     01-20  Mg     2.5     01-20    TPro  7.6  /  Alb  2.6[L]  /  TBili  0.5  /  DBili  x   /  AST  10  /  ALT  8[L]  /  AlkPhos  83  01-20          PLAN:   1. alerted vascular PA at [id number]  2. alerted Dr. DON Bedolla    FOLLOW UP/RESULTS:  1. f/u w/ vascular on further reccs for new temporary access

## 2025-01-20 NOTE — PROGRESS NOTE ADULT - ASSESSMENT
Pt is a 69M PMH DM, HTN (now hypotensive on midodrine), ESRD on HD ( M, W, F), B/L Charcot deformity ( follows with Dr Sim), chronic OM right foot, b/l UPJ stones, admitted to [facility] from 12/16-1/08 for septic shock 2/2 scrotal and gluteal abscesses, requiring pressors, still on a course of cefepime and flagyl till 1/27, admitted today for missed HD due to inabiltiy to use Right AVF.

## 2025-01-20 NOTE — PROGRESS NOTE ADULT - ASSESSMENT
69y Male with history of ESRD on HD presents with missed HD and RUE AVG malfunction. Nephrology consulted for ESRD status.    1) ESRD: Last HD on 1/15 as an outpatient. No urgent need for HD at this time. I have spoken to outpatient interventional nephrologist who is familiar with patient's access who states AVG will likely but unsalvageable and recommends TDC and eventual new access creation. I have discussed this with the patient. Please contact IR in AM for TDC placement. WIll plan for subsequent HD post-access. Keep NPO after midnight. Monitor electrolytes.    2) Hypotension: BP acceptable. Continue with midodrine 10 mg PO TID.    3) Anemia of renal disease: Hb acceptable. Epo decreased to 4K with HD.    4) Secondary HPT of renal origin: Phosphorus acceptable. Continue with calcitriol 1 mcg with HD and renvela 1 tab with meals.       [facility] NEPHROLOGY  Arden Tsang M.D.  Freeman Holland D.O.  Janny Chirinos M.D.  MD Ambar Mccarty, MSN, ANP-C    Telephone: [phone]  Facsimile: [phone]    [facility] [place] [address], [address]  [place] [address]   69y Male with history of ESRD on HD presents with missed HD and RUE AVG malfunction. Nephrology consulted for ESRD status.    1) ESRD: Last HD on 1/15 as an outpatient. No urgent need for HD at this time. I have spoken to outpatient interventional nephrologist who is familiar with patient's access who states AVG will likely be unsalvageable and recommends TDC and eventual new access creation. I have discussed this with the patient. Please contact IR in AM for TDC placement. WIll plan for subsequent HD post-access. Keep NPO after midnight. Monitor electrolytes.    2) Hypotension: BP acceptable. Continue with midodrine 10 mg PO TID.    3) Anemia of renal disease: Hb acceptable. Epo decreased to 4K with HD.    4) Secondary HPT of renal origin: Phosphorus acceptable. Continue with calcitriol 1 mcg with HD and renvela 1 tab with meals.       [facility] NEPHROLOGY  Arden Tsang M.D.  Freeman Holland D.O.  Janny Chirinos M.D.  MD Ambar Mccarty, MSN, ANP-C    Telephone: [phone]  Facsimile: [phone]    [facility] [place] [address], [address]  [place] [address]

## 2025-01-20 NOTE — PATIENT PROFILE ADULT - HAS THE PATIENT RECEIVED THE INFLUENZA VACCINE THIS SEASON?
Transition of Care education at bedside - medication calendar given to patient. Pharmacy fill confirmed. no...

## 2025-01-21 LAB
ANION GAP SERPL CALC-SCNC: 13 MMOL/L — SIGNIFICANT CHANGE UP (ref 5–17)
BUN SERPL-MCNC: 43 MG/DL — HIGH (ref 7–18)
CALCIUM SERPL-MCNC: 9.1 MG/DL — SIGNIFICANT CHANGE UP (ref 8.4–10.5)
CHLORIDE SERPL-SCNC: 104 MMOL/L — SIGNIFICANT CHANGE UP (ref 96–108)
CO2 SERPL-SCNC: 20 MMOL/L — LOW (ref 22–31)
CREAT SERPL-MCNC: 9.66 MG/DL — HIGH (ref 0.5–1.3)
EGFR: 5 ML/MIN/1.73M2 — LOW
GLUCOSE BLDC GLUCOMTR-MCNC: 172 MG/DL — HIGH (ref 70–99)
GLUCOSE BLDC GLUCOMTR-MCNC: 87 MG/DL — SIGNIFICANT CHANGE UP (ref 70–99)
GLUCOSE BLDC GLUCOMTR-MCNC: 88 MG/DL — SIGNIFICANT CHANGE UP (ref 70–99)
GLUCOSE BLDC GLUCOMTR-MCNC: 89 MG/DL — SIGNIFICANT CHANGE UP (ref 70–99)
GLUCOSE SERPL-MCNC: 103 MG/DL — HIGH (ref 70–99)
HCT VFR BLD CALC: 33.9 % — LOW (ref 39–50)
HGB BLD-MCNC: 10.7 G/DL — LOW (ref 13–17)
INR BLD: 1.13 RATIO — SIGNIFICANT CHANGE UP (ref 0.85–1.16)
MAGNESIUM SERPL-MCNC: 2.6 MG/DL — SIGNIFICANT CHANGE UP (ref 1.6–2.6)
MCHC RBC-ENTMCNC: 30.6 PG — SIGNIFICANT CHANGE UP (ref 27–34)
MCHC RBC-ENTMCNC: 31.6 G/DL — LOW (ref 32–36)
MCV RBC AUTO: 96.9 FL — SIGNIFICANT CHANGE UP (ref 80–100)
NRBC # BLD: 0 /100 WBCS — SIGNIFICANT CHANGE UP (ref 0–0)
NRBC BLD-RTO: 0 /100 WBCS — SIGNIFICANT CHANGE UP (ref 0–0)
PHOSPHATE SERPL-MCNC: 4.2 MG/DL — SIGNIFICANT CHANGE UP (ref 2.5–4.5)
PLATELET # BLD AUTO: 205 K/UL — SIGNIFICANT CHANGE UP (ref 150–400)
POTASSIUM SERPL-MCNC: 4 MMOL/L — SIGNIFICANT CHANGE UP (ref 3.5–5.3)
POTASSIUM SERPL-SCNC: 4 MMOL/L — SIGNIFICANT CHANGE UP (ref 3.5–5.3)
PROTHROM AB SERPL-ACNC: 13.2 SEC — SIGNIFICANT CHANGE UP (ref 9.9–13.4)
RBC # BLD: 3.5 M/UL — LOW (ref 4.2–5.8)
RBC # FLD: 15.2 % — HIGH (ref 10.3–14.5)
SODIUM SERPL-SCNC: 137 MMOL/L — SIGNIFICANT CHANGE UP (ref 135–145)
WBC # BLD: 7.37 K/UL — SIGNIFICANT CHANGE UP (ref 3.8–10.5)
WBC # FLD AUTO: 7.37 K/UL — SIGNIFICANT CHANGE UP (ref 3.8–10.5)

## 2025-01-21 RX ORDER — MIDODRINE HYDROCHLORIDE 5 MG/1
10 TABLET ORAL EVERY 8 HOURS
Refills: 0 | Status: DISCONTINUED | OUTPATIENT
Start: 2025-01-21 | End: 2025-01-23

## 2025-01-21 RX ADMIN — TAMSULOSIN HYDROCHLORIDE 0.4 MILLIGRAM(S): 0.4 CAPSULE ORAL at 21:47

## 2025-01-21 RX ADMIN — LATANOPROST 1 DROP(S): 50 SOLUTION OPHTHALMIC at 21:47

## 2025-01-21 RX ADMIN — Medication 500 MILLIGRAM(S): at 18:55

## 2025-01-21 RX ADMIN — SEVELAMER CARBONATE 800 MILLIGRAM(S): 800 TABLET, FILM COATED ORAL at 12:10

## 2025-01-21 RX ADMIN — Medication 5000 UNIT(S): at 21:46

## 2025-01-21 RX ADMIN — Medication 500 MILLIGRAM(S): at 06:33

## 2025-01-21 RX ADMIN — CALCITRIOL CAPSULES 0.25 MCG 1 MICROGRAM(S): 0.25 CAPSULE ORAL at 11:00

## 2025-01-21 RX ADMIN — SEVELAMER CARBONATE 800 MILLIGRAM(S): 800 TABLET, FILM COATED ORAL at 08:46

## 2025-01-21 RX ADMIN — ANTISEPTIC SURGICAL SCRUB 1 APPLICATION(S): 0.04 SOLUTION TOPICAL at 12:10

## 2025-01-21 RX ADMIN — Medication 5000 UNIT(S): at 15:56

## 2025-01-21 RX ADMIN — SEVELAMER CARBONATE 800 MILLIGRAM(S): 800 TABLET, FILM COATED ORAL at 18:59

## 2025-01-21 NOTE — PROGRESS NOTE ADULT - ASSESSMENT
69y Male with history of ESRD on HD presents with missed HD and RUE AVG malfunction. Nephrology consulted for ESRD status.    1) ESRD: Last HD on 1/15 as an outpatient. No urgent need for HD at this time. Dr. Holland spoke with  outpatient interventional nephrologist who is familiar with patient's access who states AVG will likely be unsalvageable and recommends TDC and eventual new access creation. I have discussed this with the patient. Please keep NPO after midnight for IR TDC placement in am. WIll plan for subsequent HD post-access on 1/22. Monitor electrolytes.    2) Hypotension: BP acceptable. Continue with midodrine 10 mg PO TID.    3) Anemia of renal disease: Hb acceptable. c/w Epo  4K with HD. Monitor hgb    4) Secondary HPT of renal origin: Phosphorus acceptable. Continue with calcitriol 1 mcg with HD and renvela 1 tab with meals. Monitor serum calcium and phos      [facility] NEPHROLOGY  Arden Tsang M.D.  Freeman Holland D.O.  Janny Chirinos M.D.  MD Ambar Mccarty, MSN, ANP-C    Telephone: [phone]  Facsimile: [phone] [id number], [address]  [place] [address]

## 2025-01-21 NOTE — PROGRESS NOTE ADULT - ASSESSMENT
Pt is a 69M PMH DM, HTN (now hypotensive on midodrine), ESRD on HD ( M, W, F), B/L Charcot deformity ( follows with Dr Sim), chronic OM right foot, b/l UPJ stones, admitted to [facility] from 12/16-1/08 for septic shock 2/2 scrotal and gluteal abscesses, requiring pressors, still on a course of cefepime and flagyl till 1/27, admitted today for missed HD due to inabiltiy to use Right AVF.  Pt. followed by nephro Dr. Chirinos. For IR TDC placement likely tomorrow 1/22

## 2025-01-21 NOTE — PROGRESS NOTE ADULT - ASSESSMENT
Pt is a 69M PMH DM, HTN (now hypotensive on midodrine), ESRD on HD ( M, W, F), B/L Charcot deformity ( follows with Dr Sim), chronic OM right foot, b/l UPJ stones, admitted to [facility] from 12/16-1/08 for septic shock 2/2 scrotal and gluteal abscesses, requiring pressors, still on a course of cefepime and flagyl till 1/27, admitted today for missed HD due to inabiltiy to use Right AVF.  Pt. followed by nephro Dr. Chirinos. For IR TDC placement likely tomorrow 1/22.

## 2025-01-22 LAB
ANION GAP SERPL CALC-SCNC: 13 MMOL/L — SIGNIFICANT CHANGE UP (ref 5–17)
APTT BLD: 36.2 SEC — HIGH (ref 24.5–35.6)
BASOPHILS # BLD AUTO: 0.06 K/UL — SIGNIFICANT CHANGE UP (ref 0–0.2)
BASOPHILS NFR BLD AUTO: 0.8 % — SIGNIFICANT CHANGE UP (ref 0–2)
BUN SERPL-MCNC: 52 MG/DL — HIGH (ref 7–18)
CALCIUM SERPL-MCNC: 9 MG/DL — SIGNIFICANT CHANGE UP (ref 8.4–10.5)
CHLORIDE SERPL-SCNC: 103 MMOL/L — SIGNIFICANT CHANGE UP (ref 96–108)
CO2 SERPL-SCNC: 22 MMOL/L — SIGNIFICANT CHANGE UP (ref 22–31)
CREAT SERPL-MCNC: 10.7 MG/DL — HIGH (ref 0.5–1.3)
EGFR: 5 ML/MIN/1.73M2 — LOW
EOSINOPHIL # BLD AUTO: 0.42 K/UL — SIGNIFICANT CHANGE UP (ref 0–0.5)
EOSINOPHIL NFR BLD AUTO: 5.3 % — SIGNIFICANT CHANGE UP (ref 0–6)
GLUCOSE BLDC GLUCOMTR-MCNC: 102 MG/DL — HIGH (ref 70–99)
GLUCOSE BLDC GLUCOMTR-MCNC: 105 MG/DL — HIGH (ref 70–99)
GLUCOSE BLDC GLUCOMTR-MCNC: 106 MG/DL — HIGH (ref 70–99)
GLUCOSE BLDC GLUCOMTR-MCNC: 118 MG/DL — HIGH (ref 70–99)
GLUCOSE SERPL-MCNC: 118 MG/DL — HIGH (ref 70–99)
HCT VFR BLD CALC: 34.9 % — LOW (ref 39–50)
HGB BLD-MCNC: 11.3 G/DL — LOW (ref 13–17)
IMM GRANULOCYTES NFR BLD AUTO: 0.4 % — SIGNIFICANT CHANGE UP (ref 0–0.9)
INR BLD: 1.1 RATIO — SIGNIFICANT CHANGE UP (ref 0.85–1.16)
LYMPHOCYTES # BLD AUTO: 1.13 K/UL — SIGNIFICANT CHANGE UP (ref 1–3.3)
LYMPHOCYTES # BLD AUTO: 14.2 % — SIGNIFICANT CHANGE UP (ref 13–44)
MAGNESIUM SERPL-MCNC: 2.6 MG/DL — SIGNIFICANT CHANGE UP (ref 1.6–2.6)
MCHC RBC-ENTMCNC: 31.2 PG — SIGNIFICANT CHANGE UP (ref 27–34)
MCHC RBC-ENTMCNC: 32.4 G/DL — SIGNIFICANT CHANGE UP (ref 32–36)
MCV RBC AUTO: 96.4 FL — SIGNIFICANT CHANGE UP (ref 80–100)
MONOCYTES # BLD AUTO: 0.62 K/UL — SIGNIFICANT CHANGE UP (ref 0–0.9)
MONOCYTES NFR BLD AUTO: 7.8 % — SIGNIFICANT CHANGE UP (ref 2–14)
NEUTROPHILS # BLD AUTO: 5.69 K/UL — SIGNIFICANT CHANGE UP (ref 1.8–7.4)
NEUTROPHILS NFR BLD AUTO: 71.5 % — SIGNIFICANT CHANGE UP (ref 43–77)
NRBC # BLD: 0 /100 WBCS — SIGNIFICANT CHANGE UP (ref 0–0)
NRBC BLD-RTO: 0 /100 WBCS — SIGNIFICANT CHANGE UP (ref 0–0)
PHOSPHATE SERPL-MCNC: 4.4 MG/DL — SIGNIFICANT CHANGE UP (ref 2.5–4.5)
PLATELET # BLD AUTO: 205 K/UL — SIGNIFICANT CHANGE UP (ref 150–400)
POTASSIUM SERPL-MCNC: 4.1 MMOL/L — SIGNIFICANT CHANGE UP (ref 3.5–5.3)
POTASSIUM SERPL-SCNC: 4.1 MMOL/L — SIGNIFICANT CHANGE UP (ref 3.5–5.3)
PROTHROM AB SERPL-ACNC: 12.7 SEC — SIGNIFICANT CHANGE UP (ref 9.9–13.4)
RBC # BLD: 3.62 M/UL — LOW (ref 4.2–5.8)
RBC # FLD: 15.1 % — HIGH (ref 10.3–14.5)
SODIUM SERPL-SCNC: 138 MMOL/L — SIGNIFICANT CHANGE UP (ref 135–145)
WBC # BLD: 7.95 K/UL — SIGNIFICANT CHANGE UP (ref 3.8–10.5)
WBC # FLD AUTO: 7.95 K/UL — SIGNIFICANT CHANGE UP (ref 3.8–10.5)

## 2025-01-22 PROCEDURE — 36558 INSERT TUNNELED CV CATH: CPT | Mod: LT

## 2025-01-22 PROCEDURE — 76937 US GUIDE VASCULAR ACCESS: CPT | Mod: 26

## 2025-01-22 PROCEDURE — 77001 FLUOROGUIDE FOR VEIN DEVICE: CPT | Mod: 26

## 2025-01-22 RX ORDER — ANTISEPTIC SURGICAL SCRUB 0.04 MG/ML
1 SOLUTION TOPICAL DAILY
Refills: 0 | Status: DISCONTINUED | OUTPATIENT
Start: 2025-01-23 | End: 2025-01-23

## 2025-01-22 RX ORDER — BACTERIOSTATIC SODIUM CHLORIDE 0.9 %
10 VIAL (ML) INJECTION
Refills: 0 | Status: DISCONTINUED | OUTPATIENT
Start: 2025-01-22 | End: 2025-01-23

## 2025-01-22 RX ORDER — ACETAMINOPHEN 160 MG/5ML
1000 SUSPENSION ORAL ONCE
Refills: 0 | Status: DISCONTINUED | OUTPATIENT
Start: 2025-01-22 | End: 2025-01-22

## 2025-01-22 RX ORDER — ONDANSETRON 4 MG/1
4 TABLET, ORALLY DISINTEGRATING ORAL ONCE
Refills: 0 | Status: DISCONTINUED | OUTPATIENT
Start: 2025-01-22 | End: 2025-01-22

## 2025-01-22 RX ADMIN — MIDODRINE HYDROCHLORIDE 10 MILLIGRAM(S): 5 TABLET ORAL at 21:45

## 2025-01-22 RX ADMIN — Medication 5000 UNIT(S): at 14:10

## 2025-01-22 RX ADMIN — Medication 100 MILLIGRAM(S): at 09:43

## 2025-01-22 RX ADMIN — Medication 500 MILLIGRAM(S): at 06:02

## 2025-01-22 RX ADMIN — Medication 5000 UNIT(S): at 06:02

## 2025-01-22 RX ADMIN — LATANOPROST 1 DROP(S): 50 SOLUTION OPHTHALMIC at 21:45

## 2025-01-22 RX ADMIN — SEVELAMER CARBONATE 800 MILLIGRAM(S): 800 TABLET, FILM COATED ORAL at 08:39

## 2025-01-22 RX ADMIN — ANTISEPTIC SURGICAL SCRUB 1 APPLICATION(S): 0.04 SOLUTION TOPICAL at 12:49

## 2025-01-22 RX ADMIN — SEVELAMER CARBONATE 800 MILLIGRAM(S): 800 TABLET, FILM COATED ORAL at 12:55

## 2025-01-22 RX ADMIN — CALCITRIOL CAPSULES 0.25 MCG 1 MICROGRAM(S): 0.25 CAPSULE ORAL at 08:38

## 2025-01-22 RX ADMIN — EPOETIN ALFA 4000 UNIT(S): 2000 SOLUTION INTRAVENOUS; SUBCUTANEOUS at 18:45

## 2025-01-22 RX ADMIN — TAMSULOSIN HYDROCHLORIDE 0.4 MILLIGRAM(S): 0.4 CAPSULE ORAL at 21:45

## 2025-01-22 RX ADMIN — MIDODRINE HYDROCHLORIDE 10 MILLIGRAM(S): 5 TABLET ORAL at 18:34

## 2025-01-22 RX ADMIN — Medication 5000 UNIT(S): at 21:46

## 2025-01-22 NOTE — PROGRESS NOTE ADULT - PROBLEM SELECTOR PLAN 6
originally hx of HTN, but since recent admission for septic shock, still req. midodrine    - c/w midodrine

## 2025-01-22 NOTE — PROGRESS NOTE ADULT - PROBLEM SELECTOR PROBLEM 1
Inadequate flow of hemodialysis AV fistula

## 2025-01-22 NOTE — PROGRESS NOTE ADULT - PROBLEM SELECTOR PLAN 4
Hx of IDT2DM on lantus and novolog   A1C 7.4  c/w JORDAN AC QHS.
Hx of IDT2DM on lantus and novolog   A1C 7.4  c/w JORDAN AC QHS.
Hx of IDT2DM on lantus and novolog   iss
Hx of IDT2DM on lantus and novolog   A1C 7.4  c/w JORDAN AC QHS.
Hx of IDT2DM on lantus and novolog   A1C 7.4  c/w JORDAN AC QHS.

## 2025-01-22 NOTE — PROGRESS NOTE ADULT - PROBLEM SELECTOR PROBLEM 5
Charcot foot due to diabetes mellitus

## 2025-01-22 NOTE — PROGRESS NOTE ADULT - PROBLEM SELECTOR PLAN 1
Last HD on 1/15 as an outpatient. No urgent need for HD at this time. I have spoken to outpatient interventional nephrologist who is familiar with patient's access who states AVG will likely be unsalvageable and recommends TDC and eventual new access creation. I have discussed this with the patient. IR to do clotlysis
Admitted  for missed HD due to inability to access Right AVF.    - doppler US of the right upper extremity highly suspicious for  AV graft occlusion.  - Vascular following   - Nephro Dr. Chirinos following  - IR TDC placement likely 1/22  - Scheduled for HD treatment post access placement  - S/W following to reinstate OP HD
Admitted today for missed HD due to inability to access Right AVF.    - doppler US of the right upper extremity highly suspicious for  AV graft occlusion.  - Vascular following   - Nephro Dr. Chirinos following  - IR TDC placement likely tomorrow 1/22

## 2025-01-22 NOTE — PRE PROCEDURE NOTE - PRE PROCEDURE EVALUATION
Interventional Radiology Pre-Procedure Note    Diagnosis/Indication: Patient is a 69y old Male with multiple comorbidities including ESRD on HD, with abandoned AVG s/p multiple interventions. Tunneled HD catheter placement was requested.     PAST MEDICAL & SURGICAL HISTORY:  DM (diabetes mellitus)  type 2  ESRD (end stage renal disease) on dialysis  t-th-sat via right AV graft, uses midodrine the days of Dialysis for hypotension  Charcot foot due to diabetes mellitus  Diabetic retinopathy associated with type 2 diabetes mellitus  Hyperlipidemia  Abscess of scrotum  Hidradenitis  End stage renal disease  S/P foot surgery  right foot - 11/2014  Charcot's syndrome  Surgeries in feet and ankles   left ankle 2011 and right ankle 11/2014  History of incision and drainage  scrotal 5/2018  Hemodialysis access, AV graft       Allergies: penicillin (Unknown)      LABS:  CBC Full  -  ( 22 Jan 2025 05:25 )  WBC Count : 7.95 K/uL  RBC Count : 3.62 M/uL  Hemoglobin : 11.3 g/dL  Hematocrit : 34.9 %  Platelet Count - Automated : 205 K/uL  Mean Cell Volume : 96.4 fl  Mean Cell Hemoglobin : 31.2 pg  Mean Cell Hemoglobin Concentration : 32.4 g/dL  Auto Neutrophil # : 5.69 K/uL  Auto Lymphocyte # : 1.13 K/uL  Auto Monocyte # : 0.62 K/uL  Auto Eosinophil # : 0.42 K/uL  Auto Basophil # : 0.06 K/uL  Auto Neutrophil % : 71.5 %  Auto Lymphocyte % : 14.2 %  Auto Monocyte % : 7.8 %  Auto Eosinophil % : 5.3 %  Auto Basophil % : 0.8 %    01-22    138  |  103  |  52[H]  ----------------------------<  118[H]  4.1   |  22  |  10.70[H]    Ca    9.0      22 Jan 2025 05:25  Phos  4.4     01-22  Mg     2.6     01-22    PT/INR - ( 22 Jan 2025 05:25 )   PT: 12.7 sec;   INR: 1.10 ratio      PTT - ( 22 Jan 2025 05:25 )  PTT:36.2 sec    Procedure/ risks/ benefits/ alternatives were discussed with the patient, including but not limited to risks of infection, bleeding and possible venous injury that may require blood transfusion and/or additional procedures. The patient verbalizes understanding, and witnessed informed consent was obtained.

## 2025-01-22 NOTE — PROGRESS NOTE ADULT - PROBLEM SELECTOR PLAN 2
Hx of ESRD via R. AVG on M/W/F, makes some urine  c/w sevelamer and calcitriol   Nephro Dr. Chirinos following.
Hx of ESRD via R. AVG on M/W/F,

## 2025-01-22 NOTE — PROGRESS NOTE ADULT - ASSESSMENT
Pt is a 69M PMH DM, HTN (now hypotensive on midodrine), ESRD on HD ( M, W, F), B/L Charcot deformity ( follows with Dr Sim), chronic OM right foot, b/l UPJ stones, admitted to [facility] from 12/16-1/08 for septic shock 2/2 scrotal and gluteal abscesses, requiring pressors, still on a course of cefepime and flagyl till 1/27, admitted today for missed HD due to inabiltiy to use Right AVF.  Pt. followed by nephro Dr. Chirinos. For IR TDC placement likely tomorrow 1/22.

## 2025-01-22 NOTE — PROGRESS NOTE ADULT - ASSESSMENT
Pt is a 69M PMH DM, HTN (now hypotensive on midodrine), ESRD on HD ( M, W, F), B/L Charcot deformity ( follows with Dr Sim), chronic OM right foot, b/l UPJ stones, admitted to [facility] from 12/16-1/08 for septic shock 2/2 scrotal and gluteal abscesses, requiring pressors, still on a course of cefepime and flagyl till 1/27, admitted today for missed HD due to inabiltiy to use Right AVF.  Pt. followed by nephro Dr. Chirinos. For IR TDC placement likely tomorrow 1/22.

## 2025-01-22 NOTE — PROGRESS NOTE ADULT - ASSESSMENT
69y Male with history of ESRD on HD presents with missed HD and RUE AVG malfunction. Nephrology consulted for ESRD status.    1) ESRD: Last HD on 1/15 as an outpatient.  Dr. Holland spoke with  outpatient interventional nephrologist who is familiar with patient's access who states AVG will likely be unsalvageable and recommends TDC and eventual new access creation. I have discussed this with the patient. Pt NPO for IR TDC placement today. Plan for subsequent HD post-access today 1/22. Monitor electrolytes.    2) Hypotension: BP acceptable. Continue with midodrine 10 mg PO TID.    3) Anemia of renal disease: Hb acceptable. c/w Epo  4K with HD. Monitor hgb    4) Secondary HPT of renal origin: Phosphorus acceptable. Continue with calcitriol 1 mcg with HD and renvela 1 tab with meals. Monitor serum calcium and phos      [facility] NEPHROLOGY  Arden Tsang M.D.  Freeman Holland D.O.  Janny Chirinos M.D.  MD Ambar Mccarty, MSN, ANP-C    Telephone: [phone]  Facsimile: [phone]    [address] [place] [address], [address]  [place] [address]

## 2025-01-22 NOTE — PROGRESS NOTE ADULT - PROBLEM SELECTOR PLAN 5
Hx of b/l charcot deformity follows podiatrist Dr. Sim  Chronic osteomyelitis   MRI of R ankle (12/18): ulceration at the plantar surface medially with suspected exposed bone with underlying osteomyelitis involving the talus and less so ofthe navicular bone. No drainable fluid collection. End-stage arthritis with resultant planovalgus deformity. Chronic OM of talus and partially navicular  Podiatry consulted during prior admission> no surgical tx indicated at his time   f/u outpt

## 2025-01-22 NOTE — PROGRESS NOTE ADULT - PROBLEM SELECTOR PLAN 3
Hx of scrotal abscess s/p wide excision of hidradenitis skin tunnel  CT Pelvis (12/23): Rim-enhancing extratesticular right scrotal fluid collection measuring 2.1 x 1.3 x 0.8 cm.  CT A/P (1/1): Limited scrotal wall thickening/edema with 1.5 cm rim-enhancing collection noted within the right scrotal wall  Scrotal wound culture: + proteus mirabilis   -s/p vancomycin   -s/p meropenem q24h (1/1 - 1/7)  -1/7 started cefepime 1g TIW w/ HD,  flagyl 500mg PO BID (will d/c on cefepime 1mg w/ HD until 1/29 to complete -once pt discharge Cefepime can be given with Dialysis (MWF - 2g/2g/2g ) for 4 weeks(until 01/27)  -Add Flagyl 500 mg q12 hrs PO (same duration)  -Weekly labs CBC w/diff, CMP, ESR and CRP  -F/up Urology OP to address R scrotum fistula
Hx of scrotal abscess s/p wide excision of hidradenitis skin tunnel  CT Pelvis (12/23): Rim-enhancing extratesticular right scrotal fluid collection measuring 2.1 x 1.3 x 0.8 cm.  CT A/P (1/1): Limited scrotal wall thickening/edema with 1.5 cm rim-enhancing collection noted within the right scrotal wall  Scrotal wound culture: + proteus mirabilis   -s/p vancomycin   -s/p meropenem q24h (1/1 - 1/7)  -1/7 started cefepime 1g TIW w/ HD,  flagyl 500mg PO BID (will d/c on cefepime 1mg w/ HD until 1/29 to complete -once pt discharge Cefepime can be given with Dialysis (MWF - 2g/2g/2g ) for 4 weeks(until 01/27)  -Add Flagyl 500 mg q12 hrs PO (same duration)  -Weekly labs CBC w/diff, CMP, ESR and CRP  -F/up Urology OP to address R scrotum fistula
Hx of scrotal abscess s/p wide excision of hidradenitis skin tunnel  CT Pelvis (12/23): Rim-enhancing extratesticular right scrotal fluid collection measuring 2.1 x 1.3 x 0.8 cm.  CT A/P (1/1): Limited scrotal wall thickening/edema with 1.5 cm rim-enhancing collection noted within the right scrotal wall  Scrotal wound culture: + proteus mirabilis   s/p vancomycin   s/p meropenem q24h (1/1 - 1/7)ID eval
Hx of scrotal abscess s/p wide excision of hidradenitis skin tunnel  CT Pelvis (12/23): Rim-enhancing extratesticular right scrotal fluid collection measuring 2.1 x 1.3 x 0.8 cm.  CT A/P (1/1): Limited scrotal wall thickening/edema with 1.5 cm rim-enhancing collection noted within the right scrotal wall  Scrotal wound culture: + proteus mirabilis   -s/p vancomycin   -s/p meropenem q24h (1/1 - 1/7)  -1/7 started cefepime 1g TIW w/ HD,  flagyl 500mg PO BID (will d/c on cefepime 1mg w/ HD until 1/29 to complete -once pt discharge Cefepime can be given with Dialysis (MWF - 2g/2g/2g ) for 4 weeks(until 01/27)  -Add Flagyl 500 mg q12 hrs PO (same duration)  -Weekly labs CBC w/diff, CMP, ESR and CRP  -F/up Urology OP to address R scrotum fistula
Hx of scrotal abscess s/p wide excision of hidradenitis skin tunnel  CT Pelvis (12/23): Rim-enhancing extratesticular right scrotal fluid collection measuring 2.1 x 1.3 x 0.8 cm.  CT A/P (1/1): Limited scrotal wall thickening/edema with 1.5 cm rim-enhancing collection noted within the right scrotal wall  Scrotal wound culture: + proteus mirabilis   s/p vancomycin   s/p meropenem q24h (1/1 - 1/7)  1/7 started cefepime 1mg TIW w/ HD,  flagyl 500mg PO BID (will d/c on cefepime 1mg w/ HD until 1/29 to complete 4wk)    - c/w cefepime 2g TIW during HD, and flagyl until; 1/27

## 2025-01-22 NOTE — PROCEDURE NOTE - ADDITIONAL PROCEDURE DETAILS
- Left internal jugular vein tunneled HD catheter may be used for hemodialysis.   - Official report to follow.

## 2025-01-22 NOTE — PROGRESS NOTE ADULT - PROBLEM SELECTOR PROBLEM 7
Obstruction of ureteropelvic junction (UPJ) due to stone

## 2025-01-23 ENCOUNTER — TRANSCRIPTION ENCOUNTER (OUTPATIENT)
Age: 70
End: 2025-01-23

## 2025-01-23 VITALS — DIASTOLIC BLOOD PRESSURE: 75 MMHG | SYSTOLIC BLOOD PRESSURE: 152 MMHG | HEART RATE: 86 BPM

## 2025-01-23 LAB
GLUCOSE BLDC GLUCOMTR-MCNC: 108 MG/DL — HIGH (ref 70–99)
GLUCOSE BLDC GLUCOMTR-MCNC: 192 MG/DL — HIGH (ref 70–99)
GLUCOSE BLDC GLUCOMTR-MCNC: 87 MG/DL — SIGNIFICANT CHANGE UP (ref 70–99)

## 2025-01-23 PROCEDURE — 99285 EMERGENCY DEPT VISIT HI MDM: CPT | Mod: 25

## 2025-01-23 PROCEDURE — 86901 BLOOD TYPING SEROLOGIC RH(D): CPT

## 2025-01-23 PROCEDURE — 97162 PT EVAL MOD COMPLEX 30 MIN: CPT

## 2025-01-23 PROCEDURE — 82962 GLUCOSE BLOOD TEST: CPT

## 2025-01-23 PROCEDURE — 76937 US GUIDE VASCULAR ACCESS: CPT

## 2025-01-23 PROCEDURE — C1769: CPT

## 2025-01-23 PROCEDURE — 86850 RBC ANTIBODY SCREEN: CPT

## 2025-01-23 PROCEDURE — 84100 ASSAY OF PHOSPHORUS: CPT

## 2025-01-23 PROCEDURE — 85610 PROTHROMBIN TIME: CPT

## 2025-01-23 PROCEDURE — 87070 CULTURE OTHR SPECIMN AEROBIC: CPT

## 2025-01-23 PROCEDURE — 86900 BLOOD TYPING SEROLOGIC ABO: CPT

## 2025-01-23 PROCEDURE — 99261: CPT

## 2025-01-23 PROCEDURE — 80053 COMPREHEN METABOLIC PANEL: CPT

## 2025-01-23 PROCEDURE — 36558 INSERT TUNNELED CV CATH: CPT

## 2025-01-23 PROCEDURE — 85025 COMPLETE CBC W/AUTO DIFF WBC: CPT

## 2025-01-23 PROCEDURE — 85730 THROMBOPLASTIN TIME PARTIAL: CPT

## 2025-01-23 PROCEDURE — 93005 ELECTROCARDIOGRAM TRACING: CPT

## 2025-01-23 PROCEDURE — 36415 COLL VENOUS BLD VENIPUNCTURE: CPT

## 2025-01-23 PROCEDURE — 77001 FLUOROGUIDE FOR VEIN DEVICE: CPT

## 2025-01-23 PROCEDURE — 71045 X-RAY EXAM CHEST 1 VIEW: CPT

## 2025-01-23 PROCEDURE — 93931 UPPER EXTREMITY STUDY: CPT

## 2025-01-23 PROCEDURE — 80048 BASIC METABOLIC PNL TOTAL CA: CPT

## 2025-01-23 PROCEDURE — 85027 COMPLETE CBC AUTOMATED: CPT

## 2025-01-23 PROCEDURE — C1750: CPT

## 2025-01-23 PROCEDURE — 83735 ASSAY OF MAGNESIUM: CPT

## 2025-01-23 RX ORDER — CALCITRIOL CAPSULES 0.25 MCG 0.25 UG/1
2 CAPSULE ORAL
Qty: 0 | Refills: 0 | DISCHARGE
Start: 2025-01-23

## 2025-01-23 RX ORDER — EPOETIN ALFA 2000 [IU]/ML
3000 SOLUTION INTRAVENOUS; SUBCUTANEOUS
Qty: 12 | Refills: 0
Start: 2025-01-23 | End: 2025-02-21

## 2025-01-23 RX ORDER — MIDODRINE HYDROCHLORIDE 5 MG/1
1 TABLET ORAL
Qty: 0 | Refills: 0 | DISCHARGE
Start: 2025-01-23

## 2025-01-23 RX ADMIN — ANTISEPTIC SURGICAL SCRUB 1 APPLICATION(S): 0.04 SOLUTION TOPICAL at 12:18

## 2025-01-23 RX ADMIN — SEVELAMER CARBONATE 800 MILLIGRAM(S): 800 TABLET, FILM COATED ORAL at 12:18

## 2025-01-23 RX ADMIN — Medication 500 MILLIGRAM(S): at 17:19

## 2025-01-23 RX ADMIN — SEVELAMER CARBONATE 800 MILLIGRAM(S): 800 TABLET, FILM COATED ORAL at 08:18

## 2025-01-23 RX ADMIN — SEVELAMER CARBONATE 800 MILLIGRAM(S): 800 TABLET, FILM COATED ORAL at 17:19

## 2025-01-23 RX ADMIN — Medication 5000 UNIT(S): at 05:46

## 2025-01-23 RX ADMIN — Medication 500 MILLIGRAM(S): at 05:48

## 2025-01-23 RX ADMIN — Medication 1: at 12:17

## 2025-01-23 RX ADMIN — Medication 5000 UNIT(S): at 13:39

## 2025-01-23 NOTE — DIETITIAN INITIAL EVALUATION ADULT - PERTINENT MEDS FT
MEDICATIONS  (STANDING):  calcitriol   Capsule 1 MICROGram(s) Oral <User Schedule>  cefepime   IVPB 2000 milliGRAM(s) IV Intermittent <User Schedule>  chlorhexidine 2% Cloths 1 Application(s) Topical daily  chlorhexidine 2% Cloths 1 Application(s) Topical daily  epoetin rudolph-epbx (RETACRIT) Injectable 4000 Unit(s) IV Push <User Schedule>  heparin   Injectable 5000 Unit(s) SubCutaneous every 8 hours  influenza  Vaccine (HIGH DOSE) 0.5 milliLiter(s) IntraMuscular once  insulin lispro (ADMELOG) corrective regimen sliding scale   SubCutaneous three times a day before meals  insulin lispro (ADMELOG) corrective regimen sliding scale   SubCutaneous at bedtime  latanoprost 0.005% Ophthalmic Solution 1 Drop(s) Both EYES at bedtime  metroNIDAZOLE    Tablet 500 milliGRAM(s) Oral every 12 hours  midodrine 10 milliGRAM(s) Oral every 8 hours  sevelamer carbonate 800 milliGRAM(s) Oral three times a day with meals  tamsulosin 0.4 milliGRAM(s) Oral at bedtime    MEDICATIONS  (PRN):  sodium chloride 0.9% Bolus. 100 milliLiter(s) IV Bolus every 5 minutes PRN SBP LESS THAN or EQUAL to 90 mmHg  sodium chloride 0.9% lock flush 10 milliLiter(s) IV Push every 1 hour PRN Pre/post blood products, medications, blood draw, and to maintain line patency

## 2025-01-23 NOTE — DIETITIAN INITIAL EVALUATION ADULT - ORAL INTAKE PTA/DIET HISTORY
Patient is alert and oriented. He had no preferences to discuss. He has been on HD and was admitted due to non-functioning fistula. He has a good appetite. Weight loss of 19 lbs since his last admission in 12/24 is noted and most likely due to fluid decrease related to renal status.

## 2025-01-23 NOTE — DISCHARGE NOTE PROVIDER - NSDCCPCAREPLAN_GEN_ALL_CORE_FT
PRINCIPAL DISCHARGE DIAGNOSIS  Diagnosis: Inadequate flow of hemodialysis AV fistula  Assessment and Plan of Treatment: you were sent to ED due to unability to use right AV fistula for dialysis.  Doppler US of the right upper extremity highly suspicious for  AV graft occlusion.   You were followed by nephrologist.  You underwent left IJ tunneled hemodialysis catheter placement in interventional radiology.  You tolerated procedure well.  You underwent successful dialysis treatment via new catheter on 1/22.    -Continue dialysis treatments 3x/week as prior to hospitalization  -Follow up with nephrologist  -Follow renal diet      SECONDARY DISCHARGE DIAGNOSES  Diagnosis: DM (diabetes mellitus)  Assessment and Plan of Treatment: A1C 7.4  Continue diabetes management as prior to hospitalization  Tight glycemic control for improved wound healing    Diagnosis: Charcot foot due to diabetes mellitus  Assessment and Plan of Treatment: Hx of b/l charcot deformity follows podiatrist Dr. Sim  Chronic osteomyelitis   MRI of R ankle (12/18): ulceration at the plantar surface medially with suspected exposed bone with underlying osteomyelitis involving the talus and less so ofthe navicular bone. No drainable fluid collection. End-stage arthritis with resultant planovalgus deformity. Chronic OM of talus and partially navicular  Podiatry consulted during prior admission> no surgical tx indicated at his time   f/u outpt.      Diagnosis: Abscess of scrotum  Assessment and Plan of Treatment: Hx of scrotal abscess s/p wide excision of hidradenitis skin tunnel  CT Pelvis (12/23): Rim-enhancing extratesticular right scrotal fluid collection measuring 2.1 x 1.3 x 0.8 cm.  CT A/P (1/1): Limited scrotal wall thickening/edema with 1.5 cm rim-enhancing collection noted within the right scrotal wall  Scrotal wound culture: + proteus mirabilis   Continue antibiotics intra dialysis until 1/27.  FOLLOW UP WITH OUTPATIENT UROLOGY FOR FURTHER CARE OF ABSCESS  - Continue Flomax  - keep scrotal area clean and dry, may include sitz baths

## 2025-01-23 NOTE — DISCHARGE NOTE PROVIDER - HOSPITAL COURSE
Pt is a 69M PMH DM, HTN (now hypotensive on midodrine), ESRD on HD ( M, W, F), B/L Charcot deformity ( follows with Dr Sim), chronic OM right foot, b/l UPJ stones, admitted to [facility] from 12/16-1/08 for septic shock 2/2 scrotal and gluteal abscesses, requiring pressors, still on a course of cefepime and flagyl till 1/27, admitted  for missed HD due to inability to use Right AVF.  Pt. followed by nephro Dr. Chirinos, underwent IR LIJ TDC placement  1/22 after which had a successful HD treatment.      Pt. with scrotal abscess s/p wide excision of hidradenitis skin tunnel.  CT Pelvis (12/23): Rim-enhancing extratesticular right scrotal fluid collection measuring 2.1 x 1.3 x 0.8 cm.  CT A/P (1/1): Limited scrotal wall thickening/edema with 1.5 cm rim-enhancing collection noted within the right scrotal wall  Scrotal wound culture: + proteus mirabilis, treated with cefepime 1g TIW w/ HD,  flagyl 500mg PO BID.  Once pt discharge Cefepime can be given with Dialysis (MWF - 2g/2g/2g )  until 01/27.  Also Flagyl 500 mg q12 hrs PO (same duration)    Pt. to f/u Urology OP to address R scrotum fistula.    Pt. is medically optimized for discharge back to [facility].

## 2025-01-23 NOTE — DISCHARGE NOTE PROVIDER - NSDCMRMEDTOKEN_GEN_ALL_CORE_FT
calcitriol 0.5 mcg oral capsule: 2 cap(s) orally 3 times a week On dialysis days  cefepime 2 g intravenous injection: 2 gram(s) intravenous 3 times a week 3 times a week WITH DIALYSIS until 1/27  epoetin rudolph 4000 units/mL preservative-free injectable solution: 3,000 unit(s) intravenous 3 times a week Administer during dialysis days  insulin lispro 100 units/mL injectable solution: 1 unit(s) injectable 3 times a day (before meals) 1 Unit(s) if Glucose 151 - 200  2 Unit(s) if Glucose 201 - 250  3 Unit(s) if Glucose 251 - 300  4 Unit(s) if Glucose 301 - 350  5 Unit(s) if Glucose 351 - 400  6 Unit(s) if Glucose Greater Than 400  latanoprost 0.005% ophthalmic solution: 1 drop(s) to each affected eye once a day (at bedtime)  metroNIDAZOLE 500 mg oral tablet: 1 tab(s) orally every 12 hours Until 1/27  midodrine 10 mg oral tablet: 1 tab(s) orally every 8 hours  sevelamer carbonate 800 mg oral tablet: 1 tab(s) orally 3 times a day (with meals)  tamsulosin 0.4 mg oral capsule: 1 cap(s) orally once a day (at bedtime)

## 2025-01-23 NOTE — DISCHARGE NOTE NURSING/CASE MANAGEMENT/SOCIAL WORK - PATIENT PORTAL LINK FT
You can access the FollowMyHealth Patient Portal offered by [facility] by registering at the following website: [email or web address]. By joining "[employer]"’s FollowMyHealth portal, you will also be able to view your health information using other applications (apps) compatible with our system.

## 2025-01-23 NOTE — PHYSICAL THERAPY INITIAL EVALUATION ADULT - DIAGNOSIS, PT EVAL
(ICF Model) Pt. present w/deficits in Body Structures/Function (Impairments), incl: Strength, Balance, ROM, leading to deficits in performing the below noted Activities (Limitations).

## 2025-01-23 NOTE — PHYSICAL THERAPY INITIAL EVALUATION ADULT - PERTINENT HX OF CURRENT PROBLEM, REHAB EVAL
Pt. presented to hospital after missing dialysis due to a failed R AVF access. He also has a history of bilateral charcot-wiliam-tooth which has led him to needing charcot boots to ambulate. Additional past medical history as detailed below by medical team.

## 2025-01-23 NOTE — DIETITIAN INITIAL EVALUATION ADULT - PROBLEM SELECTOR PLAN 3
Hx of scrotal abscess s/p wide excision of hidradenitis skin tunnel  CT Pelvis (12/23): Rim-enhancing extratesticular right scrotal fluid collection measuring 2.1 x 1.3 x 0.8 cm.  CT A/P (1/1): Limited scrotal wall thickening/edema with 1.5 cm rim-enhancing collection noted within the right scrotal wall  Scrotal wound culture: + proteus mirabilis   s/p vancomycin   s/p meropenem q24h (1/1 - 1/7)  1/7 started cefepime 1mg TIW w/ HD,  flagyl 500mg PO BID (will d/c on cefepime 1mg w/ HD until 1/29 to complete 4wk)    - c/w cefepime 2g TIW during HD, and flagyl until; 1/27

## 2025-01-23 NOTE — PHYSICAL THERAPY INITIAL EVALUATION ADULT - GENERAL OBSERVATIONS, REHAB EVAL
Consult received, chart reviewed. Patient received supine in bed, NAD, +charcot boots at bedside. Patient agreed to EVALUATION from Physical Therapist.

## 2025-01-23 NOTE — DIETITIAN INITIAL EVALUATION ADULT - OTHER INFO
Patient has Dx of ESRD, hidradenitis, abscess of scrotum, hyperlipidemia, diabetic retinopathy, charcot foot, DM, obstruction of the ureteropelvic junction, hypotension, PSH.

## 2025-01-23 NOTE — DISCHARGE NOTE NURSING/CASE MANAGEMENT/SOCIAL WORK - FINANCIAL ASSISTANCE
[facility] provides services at a reduced cost to those who are determined to be eligible through [facility]’s financial assistance program. Information regarding [facility]’s financial assistance program can be found by going to [email or web address] or by calling [phone].

## 2025-01-23 NOTE — DISCHARGE NOTE PROVIDER - CARE PROVIDERS DIRECT ADDRESSES
[email or web address]

## 2025-01-23 NOTE — DIETITIAN INITIAL EVALUATION ADULT - NSFNSPHYEXAMSKINFT_GEN_A_CORE
Pressure Injury 1: Bilateral:, heel, Stage III    Pressure Injury 3: sacrum, Stage II  Pressure Injury 4: upper, back, Unstageable    Pressure Injury 11: none, none, Pressure Injury 1: Bilateral:, heel, Stage III    Pressure Injury 3: sacrum, Stage II  Pressure Injury 4: upper, back, Unstageable

## 2025-01-23 NOTE — DIETITIAN INITIAL EVALUATION ADULT - PROBLEM SELECTOR PLAN 2
Hx of ESRD via R. AVG on M/W/F, makes some urine  c/w sevelamer and calcitriol   Nephro Dr. Tsang following.

## 2025-01-23 NOTE — PROGRESS NOTE ADULT - ASSESSMENT
69y Male with history of ESRD on HD presents with missed HD and RUE AVG malfunction. Nephrology consulted for ESRD status.    1) ESRD:  Dr. Holland spoke with  outpatient interventional nephrologist who is familiar with patient's access who states AVG will likely be unsalvageable and recommends TDC and eventual new access creation.   s/p IR left IJ tunneled HD placed on 1/22 with subsequent HD on 1/22 with intradialytic hypotension (good ABF) with only 25ml UF. Plan for next HD 1/24.  Monitor electrolytes.    2) Hypotension: BP acceptable. Continue with midodrine 10 mg PO TID. Recc extra Midodrine 10mg PO MWF pre HD to aid in fluid removal    3) Anemia of renal disease: Hb acceptable. c/w Epo  4K with HD. Monitor hgb    4) Secondary HPT of renal origin: Phosphorus acceptable. Continue with calcitriol 1 mcg with HD and renvela 1 tab with meals. Monitor serum calcium and [facility]      [facility] NEPHROLOGY  Arden Tsang M.D.  Freeman Holland D.O.  Janny Chirinos M.D.  MD Ambar Mccarty, MSN, ANP-C    Telephone: [phone]  Facsimile: [phone] [place] [address], [address]  [place] [address]

## 2025-01-23 NOTE — DISCHARGE NOTE PROVIDER - CARE PROVIDER_API CALL
Vimal Bedolla  Internal Medicine  [id number] [place] [id number]  Phone: [phone]  Fax: [phone]  Follow Up Time: 1 week

## 2025-01-23 NOTE — DIETITIAN INITIAL EVALUATION ADULT - ETIOLOGY
Altered nutrition related labs evidenced by high BUN, Cr, Glu, POCT Glu and A1C related to dx of ESRD, DM.

## 2025-01-23 NOTE — PHYSICAL THERAPY INITIAL EVALUATION ADULT - LIVES WITH, PROFILE
lives w/ wife at [facility] w/ 4 RADHA; railing on both sides and uses both intermittently on ascent and descent/spouse lives w/ wife at home w/ 4 RADHA; railing on both sides and uses both intermittently on ascent and descent/spouse

## 2025-01-23 NOTE — DIETITIAN INITIAL EVALUATION ADULT - NS FNS DIET ORDER
Diet, Renal Restrictions:   For patients receiving Renal Replacement - No Protein Restr, No Conc K, No Conc Phos, Low Sodium  Supplement Feeding Modality:  Oral  Nepro Cans or Servings Per Day:  1       Frequency:  Daily (01-19-25 @ 07:05) [Active]

## 2025-01-23 NOTE — DIETITIAN INITIAL EVALUATION ADULT - PERTINENT LABORATORY DATA
01-22    138  |  103  |  52[H]  ----------------------------<  118[H]  4.1   |  22  |  10.70[H]    Ca    9.0      22 Jan 2025 05:25  Phos  4.4     01-22  Mg     2.6     01-22    POCT Blood Glucose.: 87 mg/dL (01-23-25 @ 07:56)  A1C with Estimated Average Glucose Result: 7.4 % (12-17-24 @ 07:28)

## 2025-01-23 NOTE — DISCHARGE NOTE PROVIDER - NSDCQMAMI_CARD_ALL_CORE
Spoke with patient  for COVID 19 screening secondary to upcoming appointment Thursday, 3/19 for diabetes medication managment. At this time patient denies symptoms, recent travel and exposure. To limit exposure patient visit will be conducted over the phone. Patient aware and will be available at scheduled appointment time by phone. Will coordinate for next appointment accordingly.      Electronically signed by Ayanna Meyers RPH on 3/17/20 at 10:40 AM EDT No

## 2025-01-23 NOTE — DIETITIAN INITIAL EVALUATION ADULT - PROBLEM SELECTOR PLAN 1
Admitted today for missed HD due to inability to access Right AVF.    - f/u doppler US of the right upper extremity to assess for stenosis/thrombosis   - no need for urgent HD (electrolytes wnl, no signs of fluid overload)  - Vascular consult   - Nephro Dr. Tsang following

## 2025-01-23 NOTE — PROGRESS NOTE ADULT - PROVIDER SPECIALTY LIST ADULT
Nephrology
Nephrology
Internal Medicine
Nephrology
Nephrology
Internal Medicine

## 2025-01-23 NOTE — PROGRESS NOTE ADULT - SUBJECTIVE AND OBJECTIVE BOX
SUBJECTIVE / OVERNIGHT EVENTS:pt seen nad examined  01-20-25     MEDICATIONS  (STANDING):  calcitriol   Capsule 1 MICROGram(s) Oral <User Schedule>  cefepime   IVPB 2000 milliGRAM(s) IV Intermittent <User Schedule>  chlorhexidine 2% Cloths 1 Application(s) Topical daily  epoetin rudolph-epbx (RETACRIT) Injectable 4000 Unit(s) IV Push <User Schedule>  heparin   Injectable 5000 Unit(s) SubCutaneous every 8 hours  insulin lispro (ADMELOG) corrective regimen sliding scale   SubCutaneous three times a day before meals  insulin lispro (ADMELOG) corrective regimen sliding scale   SubCutaneous at bedtime  latanoprost 0.005% Ophthalmic Solution 1 Drop(s) Both EYES at bedtime  metroNIDAZOLE    Tablet 500 milliGRAM(s) Oral every 12 hours  midodrine 10 milliGRAM(s) Oral every 8 hours  sevelamer carbonate 800 milliGRAM(s) Oral three times a day with meals  tamsulosin 0.4 milliGRAM(s) Oral at bedtime    MEDICATIONS  (PRN):  sodium chloride 0.9% Bolus. 100 milliLiter(s) IV Bolus every 5 minutes PRN SBP LESS THAN or EQUAL to 90 mmHg    T(C): 36.9 (01-20-25 @ 20:20), Max: 36.9 (01-20-25 @ 20:20)  HR: 70 (01-20-25 @ 20:20) (69 - 79)  BP: 150/90 (01-20-25 @ 20:20) (119/72 - 165/88)  RR: 18 (01-20-25 @ 20:20) (17 - 19)  SpO2: 95% (01-20-25 @ 20:20) (94% - 100%)    CAPILLARY BLOOD GLUCOSE      POCT Blood Glucose.: 117 mg/dL (20 Jan 2025 21:44)  POCT Blood Glucose.: 118 mg/dL (20 Jan 2025 17:15)  POCT Blood Glucose.: 134 mg/dL (20 Jan 2025 13:09)  POCT Blood Glucose.: 99 mg/dL (20 Jan 2025 08:30)  POCT Blood Glucose.: 110 mg/dL (19 Jan 2025 23:35)  POCT Blood Glucose.: 95 mg/dL (19 Jan 2025 22:20)    I&O's Summary      Constitutional: No fever, fatigue  Skin: No rash.  Eyes: No recent vision problems or eye pain.  ENT: No congestion, ear pain, or sore throat.  Cardiovascular: No chest pain or palpation.  Respiratory: No cough, shortness of breath, congestion, or wheezing.  Gastrointestinal: No abdominal pain, nausea, vomiting, or diarrhea.  Genitourinary: No dysuria.  Musculoskeletal: No joint swelling.  Neurologic: No headache.    PHYSICAL EXAM:  GENERAL: NAD  EYES: EOMI, PERRLA  NECK: Supple, No JVD  CHEST/LUNG: dec breath sounds at bases  HEART:  S1 , S2 +  ABDOMEN: soft , bs+  EXTREMITIES:  edema  NEUROLOGY:alert awake      LABS:                        10.8   7.81  )-----------( 195      ( 20 Jan 2025 06:05 )             33.5     01-20    138  |  104  |  36[H]  ----------------------------<  114[H]  4.1   |  21[L]  |  8.76[H]    Ca    9.2      20 Jan 2025 06:05  Phos  3.6     01-20  Mg     2.5     01-20    TPro  7.6  /  Alb  2.6[L]  /  TBili  0.5  /  DBili  x   /  AST  10  /  ALT  8[L]  /  AlkPhos  83  01-20    PT/INR - ( 19 Jan 2025 03:40 )   PT: 13.4 sec;   INR: 1.15 ratio         PTT - ( 19 Jan 2025 03:40 )  PTT:33.5 sec      Urinalysis Basic - ( 20 Jan 2025 06:05 )    Color: x / Appearance: x / SG: x / pH: x  Gluc: 114 mg/dL / Ketone: x  / Bili: x / Urobili: x   Blood: x / Protein: x / Nitrite: x   Leuk Esterase: x / RBC: x / WBC x   Sq Epi: x / Non Sq Epi: x / Bacteria: x        RADIOLOGY & ADDITIONAL TESTS:    Imaging Personally Reviewed:    Consultant(s) Notes Reviewed:      Care Discussed with Consultants/Other Providers:  
  Patient is a 69y old  Male who presents with a chief complaint of missed HD (22 Jan 2025 15:58).Seen at bedside, NPO awaiting for IR TDC placement, offers no complaints.      INTERVAL HPI/OVERNIGHT EVENTS: no new complaints    MEDICATIONS  (STANDING):  calcitriol   Capsule 1 MICROGram(s) Oral <User Schedule>  cefepime   IVPB 2000 milliGRAM(s) IV Intermittent <User Schedule>  chlorhexidine 2% Cloths 1 Application(s) Topical daily  epoetin rudolph-epbx (RETACRIT) Injectable 4000 Unit(s) IV Push <User Schedule>  heparin   Injectable 5000 Unit(s) SubCutaneous every 8 hours  influenza  Vaccine (HIGH DOSE) 0.5 milliLiter(s) IntraMuscular once  insulin lispro (ADMELOG) corrective regimen sliding scale   SubCutaneous three times a day before meals  insulin lispro (ADMELOG) corrective regimen sliding scale   SubCutaneous at bedtime  latanoprost 0.005% Ophthalmic Solution 1 Drop(s) Both EYES at bedtime  metroNIDAZOLE    Tablet 500 milliGRAM(s) Oral every 12 hours  midodrine 10 milliGRAM(s) Oral every 8 hours  sevelamer carbonate 800 milliGRAM(s) Oral three times a day with meals  tamsulosin 0.4 milliGRAM(s) Oral at bedtime    MEDICATIONS  (PRN):  sodium chloride 0.9% Bolus. 100 milliLiter(s) IV Bolus every 5 minutes PRN SBP LESS THAN or EQUAL to 90 mmHg      __________________________________________________  REVIEW OF SYSTEMS:    CONSTITUTIONAL: No fever,   EYES: no acute visual disturbances  NECK: No pain or stiffness  RESPIRATORY: No cough; No shortness of breath  CARDIOVASCULAR: No chest pain, no palpitations  GASTROINTESTINAL: No pain. No nausea or vomiting; No diarrhea   NEUROLOGICAL: No headache or numbness, no tremors  MUSCULOSKELETAL: No joint pain, no muscle pain  GENITOURINARY: no dysuria, no frequency, no hesitancy  PSYCHIATRY: no depression , no anxiety  ALL OTHER  ROS negative        Vital Signs Last 24 Hrs  T(C): 36.9 (22 Jan 2025 13:00), Max: 36.9 (22 Jan 2025 13:00)  T(F): 98.5 (22 Jan 2025 13:00), Max: 98.5 (22 Jan 2025 13:00)  HR: 72 (22 Jan 2025 13:00) (72 - 83)  BP: 142/72 (22 Jan 2025 13:00) (130/72 - 145/84)  BP(mean): 96 (22 Jan 2025 13:00) (96 - 96)  RR: 18 (22 Jan 2025 13:00) (16 - 18)  SpO2: 98% (22 Jan 2025 13:00) (97% - 98%)    Parameters below as of 22 Jan 2025 13:00  Patient On (Oxygen Delivery Method): room air        ________________________________________________  PHYSICAL EXAM:  Well developed  GENERAL: NAD  HEENT: Normocephalic;  conjunctivae and sclerae clear; moist mucous membranes;   NECK : supple  CHEST/LUNG: dec breath sounds at bases  HEART: S1 S2  regular; no murmurs, gallops or rubs  ABDOMEN: Soft, Nontender, Nondistended; Bowel sounds present  EXTREMITIES: no cyanosis; no edema; no calf tenderness  SKIN: warm and dry; no rash  NERVOUS SYSTEM:  Awake and alert;   _________________________________________________  LABS:                        11.3   7.95  )-----------( 205      ( 22 Jan 2025 05:25 )             34.9     01-22    138  |  103  |  52[H]  ----------------------------<  118[H]  4.1   |  22  |  10.70[H]    Ca    9.0      22 Jan 2025 05:25  Phos  4.4     01-22  Mg     2.6     01-22      PT/INR - ( 22 Jan 2025 05:25 )   PT: 12.7 sec;   INR: 1.10 ratio         PTT - ( 22 Jan 2025 05:25 )  PTT:36.2 sec  Urinalysis Basic - ( 22 Jan 2025 05:25 )    Color: x / Appearance: x / SG: x / pH: x  Gluc: 118 mg/dL / Ketone: x  / Bili: x / Urobili: x   Blood: x / Protein: x / Nitrite: x   Leuk Esterase: x / RBC: x / WBC x   Sq Epi: x / Non Sq Epi: x / Bacteria: x      CAPILLARY BLOOD GLUCOSE      POCT Blood Glucose.: 102 mg/dL (22 Jan 2025 08:01)  POCT Blood Glucose.: 172 mg/dL (21 Jan 2025 22:04)  POCT Blood Glucose.: 89 mg/dL (21 Jan 2025 16:40)    RADIOLOGY & ADDITIONAL TESTS:    < from: US Duplex Arterial Upper Ext Ltd, Right (01.19.25 @ 10:26) >    ACC: [id number] EXAM:  US DPLX UPR EXT ARTS LTD RT   ORDERED BY: ESTIVEN NEVAREZ     PROCEDURE DATE:  01/19/2025          INTERPRETATION:  CLINICAL INDICATION: Assess stenosis/thrombosis of AV   fistula    EXAMINATION: Limited right upper extremity arterial duplex ultrasound.    COMPARISON: None    FINDINGS /  IMPRESSION:    High suspicion for right upper extremity AV graft occlusion.    High resistance antegrade flow noted of the right subclavian, axillary,   brachial, radial, and ulnar arteries, highly suggestive of right upper   extremity graft occlusion. Flow is not demonstrated within the graft on   this study. Stents of the right upper extremity native arteries are   poorly assessed due to significant shadowing artifact. Recommend   multiphase CTA evaluation of the right upper extremity.    Possible hematoma at the antecubital fossa. Venous outflow is poorly   assessed on this study. Correlate with multiphase CTA of the right upper   extremity.    Multiple attempts made to reach covering team. Further attempts will be   made.    --- End of Report ---    < end of copied text >    < from: Xray Chest 1 View AP/PA (01.19.25 @ 02:49) >    ACC: [id number] EXAM:  XR CHEST AP OR PA 1V   ORDERED BY: ALEXIS TINAJERO     PROCEDURE DATE:  01/19/2025          INTERPRETATION:  EXAMINATION: XR CHEST    CLINICAL INDICATION: Bibasilar crackles. Missed. Hemodialysis.    TECHNIQUE: Single frontal view of the chest was obtained.    COMPARISON: Chest x-ray 1/7/2025.    FINDINGS:    Partially visualized vascular stent overlies the right upper extremity. A   second vascular stent extends from the right axillary vein to the right   brachiocephalic vein.    The heart is normal in size.    The lungs are clear. No pneumothorax. No pleural effusion.    No acute osseous abnormalities. Degenerative changes of the spine.    IMPRESSION:  Clear lungs.    < end of copied text >    Imaging Personally Reviewed:  YES/NO    Consultant(s) Notes Reviewed:   YES/ No    Care Discussed with Consultants :     Plan of care was discussed with patient and /or primary care giver; all questions and concerns were addressed and care was aligned with patient's wishes.    
[facility] NEPHROLOGY- PROGRESS NOTE    69y Male with history of ESRD on HD presents with missed HD and RUE AVG malfunction. Nephrology consulted for ESRD status.    REVIEW OF SYSTEMS:  Gen: no fevers  Cards: no chest pain  Resp: no dyspnea  GI: no nausea or vomiting or diarrhea  Vascular: no LE edema    penicillin (Unknown)      Hospital Medications: Medications reviewed    VITALS:  T(F): 97 (01-20-25 @ 18:12), Max: 98.2 (01-20-25 @ 05:35)  HR: 72 (01-20-25 @ 18:12)  BP: 152/78 (01-20-25 @ 18:12)  RR: 19 (01-20-25 @ 18:12)  SpO2: 95% (01-20-25 @ 18:12)  Wt(kg): --  Height (cm): 172.7 (01-18 @ 23:03), 172.7 (12-25 @ 20:03)  Weight (kg): 75 (01-18 @ 23:40), 77.9 (01-02 @ 17:00)  BMI (kg/m2): 25.1 (01-18 @ 23:40), 26.1 (01-18 @ 23:03), 26.1 (01-02 @ 17:00)  BSA (m2): 1.88 (01-18 @ 23:40), 1.92 (01-18 @ 23:03), 1.92 (01-02 @ 17:00)      PHYSICAL EXAM:    Gen: NAD, calm  Cards: RRR, +S1/S2, no M/G/R  Resp: bibasilar rales  GI: soft, NT/ND, NABS  Vascular: no LE edema B/L, RUE AVG without bruit/thrill, + LE boots    LABS:  01-20    138  |  104  |  36[H]  ----------------------------<  114[H]  4.1   |  21[L]  |  8.76[H]    Ca    9.2      20 Jan 2025 06:05  Phos  3.6     01-20  Mg     2.5     01-20    TPro  7.6  /  Alb  2.6[L]  /  TBili  0.5  /  DBili      /  AST  10  /  ALT  8[L]  /  AlkPhos  83  01-20    Creatinine Trend: 8.76 <--, 7.70 <--, 7.51 <--                        10.8   7.81  )-----------( 195      ( 20 Jan 2025 06:05 )             33.5     Urine Studies:  Urinalysis Basic - ( 20 Jan 2025 06:05 )    Color:  / Appearance:  / SG:  / pH:   Gluc: 114 mg/dL / Ketone:   / Bili:  / Urobili:    Blood:  / Protein:  / Nitrite:    Leuk Esterase:  / RBC:  / WBC    Sq Epi:  / Non Sq Epi:  / Bacteria:           RADIOLOGY & ADDITIONAL STUDIES:    < from: US Duplex Arterial Upper Ext Ltd, Right (01.19.25 @ 10:26) >  FINDINGS /  IMPRESSION:    High suspicion for right upper extremity AV graft occlusion.    High resistance antegrade flow noted of the right subclavian, axillary,   brachial, radial, and ulnar arteries, highly suggestive of right upper   extremity graft occlusion. Flow is not demonstrated within the graft on   this study. Stents of the right upper extremity native arteries are   poorly assessed due to significant shadowing artifact. Recommend   multiphase CTA evaluation of the right upper extremity.    Possible hematoma at the antecubital fossa. Venous outflow is poorly   assessed on this study. Correlate with multiphase CTA of the right upper   extremity.    Multiple attempts made to reach covering team. Further attempts will be   made.    --- End of Report ---    < end of copied text >  
[facility] NEPHROLOGY- PROGRESS NOTE    69y Male with history of ESRD on HD presents with missed HD and RUE AVG malfunction. Nephrology consulted for ESRD status.  1/22: s/p IR left IJ tunneled HD placement    REVIEW OF SYSTEMS:  Gen: no fevers  Cards: no chest pain  Resp: no dyspnea  GI: no nausea or vomiting or diarrhea  Vascular: no LE edema    penicillin (Unknown)      Hospital Medications: Medications reviewed    VITALS:  T(F): 98 (01-23-25 @ 12:00), Max: 98.2 (01-23-25 @ 05:16)  HR: 80 (01-23-25 @ 12:00)  BP: 132/72 (01-23-25 @ 12:00)  RR: 18 (01-23-25 @ 12:00)  SpO2: 97% (01-23-25 @ 12:00)  Wt(kg): --    PHYSICAL EXAM:    Gen: NAD, calm  Cards: RRR, +S1/S2, no M/G/R  Resp: decreased BS at bases b/l  GI: soft, NT/ND, NABS  Vascular: no LE edema B/L, RUE AVG without bruit/thrill,    Left IJ tunneled HD catheter- intact    LABS:  01-22    138  |  103  |  52[H]  ----------------------------<  118[H]  4.1   |  22  |  10.70[H]    Ca    9.0      22 Jan 2025 05:25  Phos  4.4     01-22  Mg     2.6     01-22      Creatinine Trend: 10.70 <--, 9.66 <--, 8.76 <--, 7.70 <--, 7.51 <--                        11.3   7.95  )-----------( 205      ( 22 Jan 2025 05:25 )             34.9     Urine Studies:  Urinalysis Basic - ( 22 Jan 2025 05:25 )    Color:  / Appearance:  / SG:  / pH:   Gluc: 118 mg/dL / Ketone:   / Bili:  / Urobili:    Blood:  / Protein:  / Nitrite:    Leuk Esterase:  / RBC:  / WBC    Sq Epi:  / Non Sq Epi:  / Bacteria:               
[facility] NEPHROLOGY- PROGRESS NOTE    69y Male with history of ESRD on HD presents with missed HD and RUE AVG malfunction. Nephrology consulted for ESRD status.    REVIEW OF SYSTEMS:  Gen: no fevers  Cards: no chest pain  Resp: no dyspnea  GI: no nausea or vomiting or diarrhea  Vascular: no LE edema    penicillin (Unknown)      Hospital Medications: Medications reviewed    VITALS:  T(F): 98.5 (01-22-25 @ 13:00), Max: 98.5 (01-22-25 @ 13:00)  HR: 72 (01-22-25 @ 13:00)  BP: 142/72 (01-22-25 @ 13:00)  RR: 18 (01-22-25 @ 13:00)  SpO2: 98% (01-22-25 @ 13:00)  Wt(kg): --      PHYSICAL EXAM:    Gen: NAD, calm  Cards: RRR, +S1/S2, no M/G/R  Resp: decreased BS at bases b/l  GI: soft, NT/ND, NABS  Vascular: no LE edema B/L, RUE AVG without bruit/thrill,      LABS:  01-22    138  |  103  |  52[H]  ----------------------------<  118[H]  4.1   |  22  |  10.70[H]    Ca    9.0      22 Jan 2025 05:25  Phos  4.4     01-22  Mg     2.6     01-22      Creatinine Trend: 10.70 <--, 9.66 <--, 8.76 <--, 7.70 <--, 7.51 <--                        11.3   7.95  )-----------( 205      ( 22 Jan 2025 05:25 )             34.9     Urine Studies:  Urinalysis Basic - ( 22 Jan 2025 05:25 )    Color:  / Appearance:  / SG:  / pH:   Gluc: 118 mg/dL / Ketone:   / Bili:  / Urobili:    Blood:  / Protein:  / Nitrite:    Leuk Esterase:  / RBC:  / WBC    Sq Epi:  / Non Sq Epi:  / Bacteria:           
[facility] NEPHROLOGY- PROGRESS NOTE    69y Male with history of ESRD on HD presents with missed HD and RUE AVG malfunction. Nephrology consulted for ESRD status.    REVIEW OF SYSTEMS:  Gen: no fevers  Cards: no chest pain  Resp: no dyspnea  GI: no nausea or vomiting or diarrhea  Vascular: no LE edema    penicillin (Unknown)      Hospital Medications: Medications reviewed    VITALS:  T(F): 97.5 (01-21-25 @ 04:43), Max: 98.4 (01-20-25 @ 20:20)  HR: 69 (01-21-25 @ 04:43)  BP: 120/70 (01-21-25 @ 04:43)  RR: 18 (01-21-25 @ 04:43)  SpO2: 97% (01-21-25 @ 04:43)  Wt(kg): --        PHYSICAL EXAM:    Gen: NAD, calm  Cards: RRR, +S1/S2, no M/G/R  Resp: decreased BS at bases b/l  GI: soft, NT/ND, NABS  Vascular: no LE edema B/L, RUE AVG without bruit/thrill,      LABS:  01-21    137  |  104  |  43[H]  ----------------------------<  103[H]  4.0   |  20[L]  |  9.66[H]    Ca    9.1      21 Jan 2025 07:15  Phos  4.2     01-21  Mg     2.6     01-21    TPro  7.6  /  Alb  2.6[L]  /  TBili  0.5  /  DBili      /  AST  10  /  ALT  8[L]  /  AlkPhos  83  01-20    Creatinine Trend: 9.66 <--, 8.76 <--, 7.70 <--, 7.51 <--                        10.7   7.37  )-----------( 205      ( 21 Jan 2025 07:15 )             33.9     Urine Studies:  Urinalysis Basic - ( 21 Jan 2025 07:15 )    Color:  / Appearance:  / SG:  / pH:   Gluc: 103 mg/dL / Ketone:   / Bili:  / Urobili:    Blood:  / Protein:  / Nitrite:    Leuk Esterase:  / RBC:  / WBC    Sq Epi:  / Non Sq Epi:  / Bacteria:           
  Patient is a 69y old  Male who presents with a chief complaint of missed HD (22 Jan 2025 15:58).Seen at bedside, NPO awaiting for IR TDC placement, offers no complaints.      INTERVAL HPI/OVERNIGHT EVENTS: no new complaints    MEDICATIONS  (STANDING):  calcitriol   Capsule 1 MICROGram(s) Oral <User Schedule>  cefepime   IVPB 2000 milliGRAM(s) IV Intermittent <User Schedule>  chlorhexidine 2% Cloths 1 Application(s) Topical daily  epoetin rudolph-epbx (RETACRIT) Injectable 4000 Unit(s) IV Push <User Schedule>  heparin   Injectable 5000 Unit(s) SubCutaneous every 8 hours  influenza  Vaccine (HIGH DOSE) 0.5 milliLiter(s) IntraMuscular once  insulin lispro (ADMELOG) corrective regimen sliding scale   SubCutaneous three times a day before meals  insulin lispro (ADMELOG) corrective regimen sliding scale   SubCutaneous at bedtime  latanoprost 0.005% Ophthalmic Solution 1 Drop(s) Both EYES at bedtime  metroNIDAZOLE    Tablet 500 milliGRAM(s) Oral every 12 hours  midodrine 10 milliGRAM(s) Oral every 8 hours  sevelamer carbonate 800 milliGRAM(s) Oral three times a day with meals  tamsulosin 0.4 milliGRAM(s) Oral at bedtime    MEDICATIONS  (PRN):  sodium chloride 0.9% Bolus. 100 milliLiter(s) IV Bolus every 5 minutes PRN SBP LESS THAN or EQUAL to 90 mmHg      __________________________________________________  REVIEW OF SYSTEMS:    CONSTITUTIONAL: No fever,   EYES: no acute visual disturbances  NECK: No pain or stiffness  RESPIRATORY: No cough; No shortness of breath  CARDIOVASCULAR: No chest pain, no palpitations  GASTROINTESTINAL: No pain. No nausea or vomiting; No diarrhea   NEUROLOGICAL: No headache or numbness, no tremors  MUSCULOSKELETAL: No joint pain, no muscle pain  GENITOURINARY: no dysuria, no frequency, no hesitancy  PSYCHIATRY: no depression , no anxiety  ALL OTHER  ROS negative        Vital Signs Last 24 Hrs  T(C): 36.9 (22 Jan 2025 13:00), Max: 36.9 (22 Jan 2025 13:00)  T(F): 98.5 (22 Jan 2025 13:00), Max: 98.5 (22 Jan 2025 13:00)  HR: 72 (22 Jan 2025 13:00) (72 - 83)  BP: 142/72 (22 Jan 2025 13:00) (130/72 - 145/84)  BP(mean): 96 (22 Jan 2025 13:00) (96 - 96)  RR: 18 (22 Jan 2025 13:00) (16 - 18)  SpO2: 98% (22 Jan 2025 13:00) (97% - 98%)    Parameters below as of 22 Jan 2025 13:00  Patient On (Oxygen Delivery Method): room air        ________________________________________________  PHYSICAL EXAM:  Well developed  GENERAL: NAD  HEENT: Normocephalic;  conjunctivae and sclerae clear; moist mucous membranes;   NECK : supple  CHEST/LUNG: dec breath sounds at bases  HEART: S1 S2  regular; no murmurs, gallops or rubs  ABDOMEN: Soft, Nontender, Nondistended; Bowel sounds present  EXTREMITIES: no cyanosis; no edema; no calf tenderness  SKIN: warm and dry; no rash  NERVOUS SYSTEM:  Awake and alert; Oriented  to place, person and time ; no new deficits    _________________________________________________  LABS:                        11.3   7.95  )-----------( 205      ( 22 Jan 2025 05:25 )             34.9     01-22    138  |  103  |  52[H]  ----------------------------<  118[H]  4.1   |  22  |  10.70[H]    Ca    9.0      22 Jan 2025 05:25  Phos  4.4     01-22  Mg     2.6     01-22      PT/INR - ( 22 Jan 2025 05:25 )   PT: 12.7 sec;   INR: 1.10 ratio         PTT - ( 22 Jan 2025 05:25 )  PTT:36.2 sec  Urinalysis Basic - ( 22 Jan 2025 05:25 )    Color: x / Appearance: x / SG: x / pH: x  Gluc: 118 mg/dL / Ketone: x  / Bili: x / Urobili: x   Blood: x / Protein: x / Nitrite: x   Leuk Esterase: x / RBC: x / WBC x   Sq Epi: x / Non Sq Epi: x / Bacteria: x      CAPILLARY BLOOD GLUCOSE      POCT Blood Glucose.: 102 mg/dL (22 Jan 2025 08:01)  POCT Blood Glucose.: 172 mg/dL (21 Jan 2025 22:04)  POCT Blood Glucose.: 89 mg/dL (21 Jan 2025 16:40)    RADIOLOGY & ADDITIONAL TESTS:    < from: US Duplex Arterial Upper Ext Ltd, Right (01.19.25 @ 10:26) >    ACC: [id number] EXAM:  US DPLX UPR EXT ARTS LTD RT   ORDERED BY: ESTIVEN NEVAREZ     PROCEDURE DATE:  01/19/2025          INTERPRETATION:  CLINICAL INDICATION: Assess stenosis/thrombosis of AV   fistula    EXAMINATION: Limited right upper extremity arterial duplex ultrasound.    COMPARISON: None    FINDINGS /  IMPRESSION:    High suspicion for right upper extremity AV graft occlusion.    High resistance antegrade flow noted of the right subclavian, axillary,   brachial, radial, and ulnar arteries, highly suggestive of right upper   extremity graft occlusion. Flow is not demonstrated within the graft on   this study. Stents of the right upper extremity native arteries are   poorly assessed due to significant shadowing artifact. Recommend   multiphase CTA evaluation of the right upper extremity.    Possible hematoma at the antecubital fossa. Venous outflow is poorly   assessed on this study. Correlate with multiphase CTA of the right upper   extremity.    Multiple attempts made to reach covering team. Further attempts will be   made.    --- End of Report ---    < end of copied text >    < from: Xray Chest 1 View AP/PA (01.19.25 @ 02:49) >    ACC: [id number] EXAM:  XR CHEST AP OR PA 1V   ORDERED BY: ALEXIS TINAJERO     PROCEDURE DATE:  01/19/2025          INTERPRETATION:  EXAMINATION: XR CHEST    CLINICAL INDICATION: Bibasilar crackles. Missed. Hemodialysis.    TECHNIQUE: Single frontal view of the chest was obtained.    COMPARISON: Chest x-ray 1/7/2025.    FINDINGS:    Partially visualized vascular stent overlies the right upper extremity. A   second vascular stent extends from the right axillary vein to the right   brachiocephalic vein.    The heart is normal in size.    The lungs are clear. No pneumothorax. No pleural effusion.    No acute osseous abnormalities. Degenerative changes of the spine.    IMPRESSION:  Clear lungs.    < end of copied text >    Imaging Personally Reviewed:  YES/NO    Consultant(s) Notes Reviewed:   YES/ No    Care Discussed with Consultants :     Plan of care was discussed with patient and /or primary care giver; all questions and concerns were addressed and care was aligned with patient's wishes.    
NP Note discussed with  Primary Attending    Patient is a 69y old  Male who presents with a chief complaint of missed HD (22 Jan 2025 15:58).Seen at bedside, NPO awaiting for IR TDC placement, offers no complaints.      INTERVAL HPI/OVERNIGHT EVENTS: no new complaints    MEDICATIONS  (STANDING):  calcitriol   Capsule 1 MICROGram(s) Oral <User Schedule>  cefepime   IVPB 2000 milliGRAM(s) IV Intermittent <User Schedule>  chlorhexidine 2% Cloths 1 Application(s) Topical daily  epoetin rudolph-epbx (RETACRIT) Injectable 4000 Unit(s) IV Push <User Schedule>  heparin   Injectable 5000 Unit(s) SubCutaneous every 8 hours  influenza  Vaccine (HIGH DOSE) 0.5 milliLiter(s) IntraMuscular once  insulin lispro (ADMELOG) corrective regimen sliding scale   SubCutaneous three times a day before meals  insulin lispro (ADMELOG) corrective regimen sliding scale   SubCutaneous at bedtime  latanoprost 0.005% Ophthalmic Solution 1 Drop(s) Both EYES at bedtime  metroNIDAZOLE    Tablet 500 milliGRAM(s) Oral every 12 hours  midodrine 10 milliGRAM(s) Oral every 8 hours  sevelamer carbonate 800 milliGRAM(s) Oral three times a day with meals  tamsulosin 0.4 milliGRAM(s) Oral at bedtime    MEDICATIONS  (PRN):  sodium chloride 0.9% Bolus. 100 milliLiter(s) IV Bolus every 5 minutes PRN SBP LESS THAN or EQUAL to 90 mmHg      __________________________________________________  REVIEW OF SYSTEMS:    CONSTITUTIONAL: No fever,   EYES: no acute visual disturbances  NECK: No pain or stiffness  RESPIRATORY: No cough; No shortness of breath  CARDIOVASCULAR: No chest pain, no palpitations  GASTROINTESTINAL: No pain. No nausea or vomiting; No diarrhea   NEUROLOGICAL: No headache or numbness, no tremors  MUSCULOSKELETAL: No joint pain, no muscle pain  GENITOURINARY: no dysuria, no frequency, no hesitancy  PSYCHIATRY: no depression , no anxiety  ALL OTHER  ROS negative        Vital Signs Last 24 Hrs  T(C): 36.9 (22 Jan 2025 13:00), Max: 36.9 (22 Jan 2025 13:00)  T(F): 98.5 (22 Jan 2025 13:00), Max: 98.5 (22 Jan 2025 13:00)  HR: 72 (22 Jan 2025 13:00) (72 - 83)  BP: 142/72 (22 Jan 2025 13:00) (130/72 - 145/84)  BP(mean): 96 (22 Jan 2025 13:00) (96 - 96)  RR: 18 (22 Jan 2025 13:00) (16 - 18)  SpO2: 98% (22 Jan 2025 13:00) (97% - 98%)    Parameters below as of 22 Jan 2025 13:00  Patient On (Oxygen Delivery Method): room air        ________________________________________________  PHYSICAL EXAM:  Well developed  GENERAL: NAD  HEENT: Normocephalic;  conjunctivae and sclerae clear; moist mucous membranes;   NECK : supple  CHEST/LUNG: Clear to auscultation bilaterally with good air entry   HEART: S1 S2  regular; no murmurs, gallops or rubs  ABDOMEN: Soft, Nontender, Nondistended; Bowel sounds present  EXTREMITIES: no cyanosis; no edema; no calf tenderness  SKIN: warm and dry; no rash  NERVOUS SYSTEM:  Awake and alert; Oriented  to place, person and time ; no new deficits    _________________________________________________  LABS:                        11.3   7.95  )-----------( 205      ( 22 Jan 2025 05:25 )             34.9     01-22    138  |  103  |  52[H]  ----------------------------<  118[H]  4.1   |  22  |  10.70[H]    Ca    9.0      22 Jan 2025 05:25  Phos  4.4     01-22  Mg     2.6     01-22      PT/INR - ( 22 Jan 2025 05:25 )   PT: 12.7 sec;   INR: 1.10 ratio         PTT - ( 22 Jan 2025 05:25 )  PTT:36.2 sec  Urinalysis Basic - ( 22 Jan 2025 05:25 )    Color: x / Appearance: x / SG: x / pH: x  Gluc: 118 mg/dL / Ketone: x  / Bili: x / Urobili: x   Blood: x / Protein: x / Nitrite: x   Leuk Esterase: x / RBC: x / WBC x   Sq Epi: x / Non Sq Epi: x / Bacteria: x      CAPILLARY BLOOD GLUCOSE      POCT Blood Glucose.: 102 mg/dL (22 Jan 2025 08:01)  POCT Blood Glucose.: 172 mg/dL (21 Jan 2025 22:04)  POCT Blood Glucose.: 89 mg/dL (21 Jan 2025 16:40)    RADIOLOGY & ADDITIONAL TESTS:    < from: US Duplex Arterial Upper Ext Ltd, Right (01.19.25 @ 10:26) >    ACC: [id number] EXAM:  US DPLX UPR EXT ARTS LTD RT   ORDERED BY: ESTIVEN NEVAREZ     PROCEDURE DATE:  01/19/2025          INTERPRETATION:  CLINICAL INDICATION: Assess stenosis/thrombosis of AV   fistula    EXAMINATION: Limited right upper extremity arterial duplex ultrasound.    COMPARISON: None    FINDINGS /  IMPRESSION:    High suspicion for right upper extremity AV graft occlusion.    High resistance antegrade flow noted of the right subclavian, axillary,   brachial, radial, and ulnar arteries, highly suggestive of right upper   extremity graft occlusion. Flow is not demonstrated within the graft on   this study. Stents of the right upper extremity native arteries are   poorly assessed due to significant shadowing artifact. Recommend   multiphase CTA evaluation of the right upper extremity.    Possible hematoma at the antecubital fossa. Venous outflow is poorly   assessed on this study. Correlate with multiphase CTA of the right upper   extremity.    Multiple attempts made to reach covering team. Further attempts will be   made.    --- End of Report ---    < end of copied text >    < from: Xray Chest 1 View AP/PA (01.19.25 @ 02:49) >    ACC: [id number] EXAM:  XR CHEST AP OR PA 1V   ORDERED BY: ALEXIS TINAJERO     PROCEDURE DATE:  01/19/2025          INTERPRETATION:  EXAMINATION: XR CHEST    CLINICAL INDICATION: Bibasilar crackles. Missed. Hemodialysis.    TECHNIQUE: Single frontal view of the chest was obtained.    COMPARISON: Chest x-ray 1/7/2025.    FINDINGS:    Partially visualized vascular stent overlies the right upper extremity. A   second vascular stent extends from the right axillary vein to the right   brachiocephalic vein.    The heart is normal in size.    The lungs are clear. No pneumothorax. No pleural effusion.    No acute osseous abnormalities. Degenerative changes of the spine.    IMPRESSION:  Clear lungs.    < end of copied text >    Imaging Personally Reviewed:  YES/NO    Consultant(s) Notes Reviewed:   YES/ No    Care Discussed with Consultants :     Plan of care was discussed with patient and /or primary care giver; all questions and concerns were addressed and care was aligned with patient's wishes.    
NP Note discussed with  Primary Attending    Patient is a 69y old  Male who presents with a chief complaint of missed HD (21 Jan 2025 12:12)      INTERVAL HPI/OVERNIGHT EVENTS: no new complaints    MEDICATIONS  (STANDING):  calcitriol   Capsule 1 MICROGram(s) Oral <User Schedule>  cefepime   IVPB 2000 milliGRAM(s) IV Intermittent <User Schedule>  chlorhexidine 2% Cloths 1 Application(s) Topical daily  epoetin rudolph-epbx (RETACRIT) Injectable 4000 Unit(s) IV Push <User Schedule>  heparin   Injectable 5000 Unit(s) SubCutaneous every 8 hours  influenza  Vaccine (HIGH DOSE) 0.5 milliLiter(s) IntraMuscular once  insulin lispro (ADMELOG) corrective regimen sliding scale   SubCutaneous three times a day before meals  insulin lispro (ADMELOG) corrective regimen sliding scale   SubCutaneous at bedtime  latanoprost 0.005% Ophthalmic Solution 1 Drop(s) Both EYES at bedtime  metroNIDAZOLE    Tablet 500 milliGRAM(s) Oral every 12 hours  midodrine 10 milliGRAM(s) Oral every 8 hours  sevelamer carbonate 800 milliGRAM(s) Oral three times a day with meals  tamsulosin 0.4 milliGRAM(s) Oral at bedtime    MEDICATIONS  (PRN):  sodium chloride 0.9% Bolus. 100 milliLiter(s) IV Bolus every 5 minutes PRN SBP LESS THAN or EQUAL to 90 mmHg      __________________________________________________  REVIEW OF SYSTEMS:    CONSTITUTIONAL: No fever,   EYES: no acute visual disturbances  NECK: No pain or stiffness  RESPIRATORY: No cough; No shortness of breath  CARDIOVASCULAR: No chest pain, no palpitations  GASTROINTESTINAL: No pain. No nausea or vomiting; No diarrhea   NEUROLOGICAL: No headache or numbness, no tremors  MUSCULOSKELETAL: No joint pain, no muscle pain  GENITOURINARY: no dysuria, no frequency, no hesitancy  PSYCHIATRY: no depression , no anxiety  ALL OTHER  ROS negative        Vital Signs Last 24 Hrs  T(C): 36.5 (21 Jan 2025 13:26), Max: 36.9 (20 Jan 2025 20:20)  T(F): 97.7 (21 Jan 2025 13:26), Max: 98.4 (20 Jan 2025 20:20)  HR: 82 (21 Jan 2025 13:26) (69 - 82)  BP: 138/79 (21 Jan 2025 13:26) (120/70 - 152/78)  BP(mean): 113 (20 Jan 2025 20:20) (95 - 113)  RR: 17 (21 Jan 2025 13:26) (17 - 19)  SpO2: 97% (21 Jan 2025 13:26) (95% - 97%)    Parameters below as of 21 Jan 2025 13:26  Patient On (Oxygen Delivery Method): room air        ________________________________________________  PHYSICAL EXAM:  GENERAL: NAD  HEENT: Normocephalic;  conjunctivae and sclerae clear; moist mucous membranes;   NECK : supple  CHEST/LUNG: Clear to auscultation bilaterally with good air entry   HEART: S1 S2  regular; no murmurs, gallops or rubs  ABDOMEN: Soft, Nontender, Nondistended; Bowel sounds present  EXTREMITIES: no cyanosis; no edema; no calf tenderness  SKIN: warm and dry; no rash  NERVOUS SYSTEM:  Awake and alert; Oriented  to place, person and time ; no new deficits    _________________________________________________  LABS:                        10.7   7.37  )-----------( 205      ( 21 Jan 2025 07:15 )             33.9     01-21    137  |  104  |  43[H]  ----------------------------<  103[H]  4.0   |  20[L]  |  9.66[H]    Ca    9.1      21 Jan 2025 07:15  Phos  4.2     01-21  Mg     2.6     01-21    TPro  7.6  /  Alb  2.6[L]  /  TBili  0.5  /  DBili  x   /  AST  10  /  ALT  8[L]  /  AlkPhos  83  01-20    PT/INR - ( 21 Jan 2025 07:15 )   PT: 13.2 sec;   INR: 1.13 ratio           Urinalysis Basic - ( 21 Jan 2025 07:15 )    Color: x / Appearance: x / SG: x / pH: x  Gluc: 103 mg/dL / Ketone: x  / Bili: x / Urobili: x   Blood: x / Protein: x / Nitrite: x   Leuk Esterase: x / RBC: x / WBC x   Sq Epi: x / Non Sq Epi: x / Bacteria: x      CAPILLARY BLOOD GLUCOSE      POCT Blood Glucose.: 89 mg/dL (21 Jan 2025 16:40)  POCT Blood Glucose.: 87 mg/dL (21 Jan 2025 11:35)  POCT Blood Glucose.: 88 mg/dL (21 Jan 2025 07:39)  POCT Blood Glucose.: 117 mg/dL (20 Jan 2025 21:44)    RADIOLOGY & ADDITIONAL TESTS:    < from: US Duplex Arterial Upper Ext Ltd, Right (01.19.25 @ 10:26) >    ACC: [id number] EXAM:  US DPLX UPR EXT [employer] LTD RT   ORDERED BY: ESTIVEN NEVAREZ     PROCEDURE DATE:  01/19/2025          INTERPRETATION:  CLINICAL INDICATION: Assess stenosis/thrombosis of AV   fistula    EXAMINATION: Limited right upper extremity arterial duplex ultrasound.    COMPARISON: None    FINDINGS /  IMPRESSION:    High suspicion for right upper extremity AV graft occlusion.    High resistance antegrade flow noted of the right subclavian, axillary,   brachial, radial, and ulnar arteries, highly suggestive of right upper   extremity graft occlusion. Flow is not demonstrated within the graft on   this study. Stents of the right upper extremity native arteries are   poorly assessed due to significant shadowing artifact. Recommend   multiphase CTA evaluation of the right upper extremity.    Possible hematoma at the antecubital fossa. Venous outflow is poorly   assessed on this study. Correlate with multiphase CTA of the right upper   extremity.    Multiple attempts made to reach covering team. Further attempts will be   made.    --- End of Report ---    < end of copied text >      Imaging Personally Reviewed:  YES/NO    Consultant(s) Notes Reviewed:   YES/ No    Care Discussed with Consultants :     Plan of care was discussed with patient and /or primary care giver; all questions and concerns were addressed and care was aligned with patient's wishes.

## 2025-01-24 LAB
CULTURE RESULTS: SIGNIFICANT CHANGE UP
SPECIMEN SOURCE: SIGNIFICANT CHANGE UP

## 2025-02-05 ENCOUNTER — APPOINTMENT (OUTPATIENT)
Dept: VASCULAR SURGERY | Facility: CLINIC | Age: 70
End: 2025-02-05
Payer: MEDICARE

## 2025-02-05 PROCEDURE — 99214 OFFICE O/P EST MOD 30 MIN: CPT

## 2025-02-05 PROCEDURE — 93923 UPR/LXTR ART STDY 3+ LVLS: CPT

## 2025-03-03 ENCOUNTER — APPOINTMENT (OUTPATIENT)
Dept: SURGERY | Facility: CLINIC | Age: 70
End: 2025-03-03
Payer: MEDICARE

## 2025-03-03 DIAGNOSIS — S31.809A UNSPECIFIED OPEN WOUND OF UNSPECIFIED BUTTOCK, INITIAL ENCOUNTER: ICD-10-CM

## 2025-03-03 PROCEDURE — 99213 OFFICE O/P EST LOW 20 MIN: CPT

## 2025-03-12 ENCOUNTER — APPOINTMENT (OUTPATIENT)
Dept: VASCULAR SURGERY | Facility: CLINIC | Age: 70
End: 2025-03-12
Payer: MEDICARE

## 2025-03-12 PROCEDURE — 99215 OFFICE O/P EST HI 40 MIN: CPT

## 2025-03-12 PROCEDURE — 93926 LOWER EXTREMITY STUDY: CPT | Mod: RT

## 2025-04-14 ENCOUNTER — APPOINTMENT (OUTPATIENT)
Dept: SURGERY | Facility: CLINIC | Age: 70
End: 2025-04-14

## 2025-05-15 ENCOUNTER — APPOINTMENT (OUTPATIENT)
Dept: RADIOLOGY | Facility: CLINIC | Age: 70
End: 2025-05-15
Payer: MEDICARE

## 2025-05-15 ENCOUNTER — APPOINTMENT (OUTPATIENT)
Dept: PODIATRY | Facility: CLINIC | Age: 70
End: 2025-05-15

## 2025-05-15 ENCOUNTER — OUTPATIENT (OUTPATIENT)
Dept: OUTPATIENT SERVICES | Facility: HOSPITAL | Age: 70
LOS: 1 days | End: 2025-05-15
Payer: MEDICARE

## 2025-05-15 ENCOUNTER — APPOINTMENT (OUTPATIENT)
Dept: WOUND CARE | Facility: CLINIC | Age: 70
End: 2025-05-15

## 2025-05-15 VITALS
HEIGHT: 68 IN | OXYGEN SATURATION: 99 % | HEART RATE: 77 BPM | RESPIRATION RATE: 18 BRPM | SYSTOLIC BLOOD PRESSURE: 176 MMHG | BODY MASS INDEX: 17.13 KG/M2 | TEMPERATURE: 97.2 F | DIASTOLIC BLOOD PRESSURE: 91 MMHG | WEIGHT: 113 LBS

## 2025-05-15 DIAGNOSIS — I73.9 PERIPHERAL VASCULAR DISEASE, UNSPECIFIED: Chronic | ICD-10-CM

## 2025-05-15 DIAGNOSIS — Z00.00 ENCOUNTER FOR GENERAL ADULT MEDICAL EXAMINATION WITHOUT ABNORMAL FINDINGS: ICD-10-CM

## 2025-05-15 PROCEDURE — 73590 X-RAY EXAM OF LOWER LEG: CPT | Mod: 50

## 2025-05-15 PROCEDURE — 73630 X-RAY EXAM OF FOOT: CPT | Mod: 50

## 2025-05-15 PROCEDURE — G0463: CPT

## 2025-05-16 DIAGNOSIS — E11.69 TYPE 2 DIABETES MELLITUS WITH OTHER SPECIFIED COMPLICATION: ICD-10-CM

## 2025-05-16 DIAGNOSIS — L97.529 NON-PRESSURE CHRONIC ULCER OF OTHER PART OF LEFT FOOT WITH UNSPECIFIED SEVERITY: ICD-10-CM

## 2025-05-16 DIAGNOSIS — M14.671 CHARCOT'S JOINT, RIGHT ANKLE AND FOOT: ICD-10-CM

## 2025-05-16 DIAGNOSIS — L97.519 NON-PRESSURE CHRONIC ULCER OF OTHER PART OF RIGHT FOOT WITH UNSPECIFIED SEVERITY: ICD-10-CM

## 2025-05-16 DIAGNOSIS — M14.672 CHARCOT'S JOINT, LEFT ANKLE AND FOOT: ICD-10-CM

## 2025-05-22 ENCOUNTER — APPOINTMENT (OUTPATIENT)
Dept: WOUND CARE | Facility: CLINIC | Age: 70
End: 2025-05-22

## 2025-05-22 ENCOUNTER — OUTPATIENT (OUTPATIENT)
Dept: OUTPATIENT SERVICES | Facility: HOSPITAL | Age: 70
LOS: 1 days | End: 2025-05-22
Payer: MEDICARE

## 2025-05-22 VITALS
RESPIRATION RATE: 18 BRPM | OXYGEN SATURATION: 99 % | HEIGHT: 68 IN | HEART RATE: 81 BPM | TEMPERATURE: 97.2 F | WEIGHT: 176.37 LBS | SYSTOLIC BLOOD PRESSURE: 144 MMHG | DIASTOLIC BLOOD PRESSURE: 69 MMHG | BODY MASS INDEX: 26.73 KG/M2

## 2025-05-22 DIAGNOSIS — Z99.2 DEPENDENCE ON RENAL DIALYSIS: Chronic | ICD-10-CM

## 2025-05-22 DIAGNOSIS — Z98.89 OTHER SPECIFIED POSTPROCEDURAL STATES: Chronic | ICD-10-CM

## 2025-05-22 DIAGNOSIS — Z00.00 ENCOUNTER FOR GENERAL ADULT MEDICAL EXAMINATION WITHOUT ABNORMAL FINDINGS: ICD-10-CM

## 2025-05-22 DIAGNOSIS — Z98.890 OTHER SPECIFIED POSTPROCEDURAL STATES: Chronic | ICD-10-CM

## 2025-05-22 DIAGNOSIS — I73.9 PERIPHERAL VASCULAR DISEASE, UNSPECIFIED: Chronic | ICD-10-CM

## 2025-05-22 PROCEDURE — 11042 DBRDMT SUBQ TIS 1ST 20SQCM/<: CPT

## 2025-05-22 PROCEDURE — G0463: CPT

## 2025-05-22 RX ORDER — SODIUM HYPOCHLORITE 2.5 MG/ML
0.25 SOLUTION TOPICAL
Qty: 1 | Refills: 0 | Status: ACTIVE | COMMUNITY
Start: 2025-05-22 | End: 1900-01-01

## 2025-05-23 DIAGNOSIS — L97.524 NON-PRESSURE CHRONIC ULCER OF OTHER PART OF LEFT FOOT WITH NECROSIS OF BONE: ICD-10-CM

## 2025-05-23 DIAGNOSIS — L97.519 NON-PRESSURE CHRONIC ULCER OF OTHER PART OF RIGHT FOOT WITH UNSPECIFIED SEVERITY: ICD-10-CM

## 2025-05-23 DIAGNOSIS — M14.672 CHARCOT'S JOINT, LEFT ANKLE AND FOOT: ICD-10-CM

## 2025-05-23 DIAGNOSIS — M14.671 CHARCOT'S JOINT, RIGHT ANKLE AND FOOT: ICD-10-CM

## 2025-05-29 ENCOUNTER — OUTPATIENT (OUTPATIENT)
Dept: OUTPATIENT SERVICES | Facility: HOSPITAL | Age: 70
LOS: 1 days | End: 2025-05-29
Payer: MEDICARE

## 2025-05-29 ENCOUNTER — APPOINTMENT (OUTPATIENT)
Dept: WOUND CARE | Facility: CLINIC | Age: 70
End: 2025-05-29

## 2025-05-29 VITALS
HEIGHT: 68 IN | BODY MASS INDEX: 26.67 KG/M2 | RESPIRATION RATE: 18 BRPM | WEIGHT: 176 LBS | OXYGEN SATURATION: 97 % | HEART RATE: 84 BPM | TEMPERATURE: 97.5 F | DIASTOLIC BLOOD PRESSURE: 79 MMHG | SYSTOLIC BLOOD PRESSURE: 152 MMHG

## 2025-05-29 DIAGNOSIS — Z99.2 DEPENDENCE ON RENAL DIALYSIS: Chronic | ICD-10-CM

## 2025-05-29 DIAGNOSIS — Z00.00 ENCOUNTER FOR GENERAL ADULT MEDICAL EXAMINATION WITHOUT ABNORMAL FINDINGS: ICD-10-CM

## 2025-05-29 DIAGNOSIS — Z98.89 OTHER SPECIFIED POSTPROCEDURAL STATES: Chronic | ICD-10-CM

## 2025-05-29 DIAGNOSIS — I73.9 PERIPHERAL VASCULAR DISEASE, UNSPECIFIED: Chronic | ICD-10-CM

## 2025-05-29 DIAGNOSIS — Z98.890 OTHER SPECIFIED POSTPROCEDURAL STATES: Chronic | ICD-10-CM

## 2025-05-29 PROCEDURE — G0463: CPT

## 2025-06-02 DIAGNOSIS — E11.42 TYPE 2 DIABETES MELLITUS WITH DIABETIC POLYNEUROPATHY: ICD-10-CM

## 2025-06-02 DIAGNOSIS — L97.322 NON-PRESSURE CHRONIC ULCER OF LEFT ANKLE WITH FAT LAYER EXPOSED: ICD-10-CM

## 2025-06-02 DIAGNOSIS — L97.312 NON-PRESSURE CHRONIC ULCER OF RIGHT ANKLE WITH FAT LAYER EXPOSED: ICD-10-CM

## 2025-06-03 ENCOUNTER — INPATIENT (INPATIENT)
Facility: HOSPITAL | Age: 70
LOS: 12 days | Discharge: HOME HEALTH | DRG: 914 | End: 2025-06-16
Attending: INTERNAL MEDICINE | Admitting: INTERNAL MEDICINE
Payer: MEDICARE

## 2025-06-03 VITALS
OXYGEN SATURATION: 95 % | TEMPERATURE: 98 F | WEIGHT: 182.98 LBS | RESPIRATION RATE: 17 BRPM | HEART RATE: 91 BPM | DIASTOLIC BLOOD PRESSURE: 78 MMHG | SYSTOLIC BLOOD PRESSURE: 130 MMHG

## 2025-06-03 DIAGNOSIS — Z98.890 OTHER SPECIFIED POSTPROCEDURAL STATES: Chronic | ICD-10-CM

## 2025-06-03 DIAGNOSIS — Z98.89 OTHER SPECIFIED POSTPROCEDURAL STATES: Chronic | ICD-10-CM

## 2025-06-03 DIAGNOSIS — I95.9 HYPOTENSION, UNSPECIFIED: ICD-10-CM

## 2025-06-03 DIAGNOSIS — N18.6 END STAGE RENAL DISEASE: ICD-10-CM

## 2025-06-03 DIAGNOSIS — Z29.9 ENCOUNTER FOR PROPHYLACTIC MEASURES, UNSPECIFIED: ICD-10-CM

## 2025-06-03 DIAGNOSIS — Z01.818 ENCOUNTER FOR OTHER PREPROCEDURAL EXAMINATION: ICD-10-CM

## 2025-06-03 DIAGNOSIS — T14.8XXA OTHER INJURY OF UNSPECIFIED BODY REGION, INITIAL ENCOUNTER: ICD-10-CM

## 2025-06-03 DIAGNOSIS — I73.9 PERIPHERAL VASCULAR DISEASE, UNSPECIFIED: Chronic | ICD-10-CM

## 2025-06-03 DIAGNOSIS — E11.9 TYPE 2 DIABETES MELLITUS WITHOUT COMPLICATIONS: ICD-10-CM

## 2025-06-03 DIAGNOSIS — M86.60 OTHER CHRONIC OSTEOMYELITIS, UNSPECIFIED SITE: ICD-10-CM

## 2025-06-03 DIAGNOSIS — Z99.2 DEPENDENCE ON RENAL DIALYSIS: Chronic | ICD-10-CM

## 2025-06-03 LAB
ALBUMIN SERPL ELPH-MCNC: 3.1 G/DL — LOW (ref 3.5–5)
ALP SERPL-CCNC: 112 U/L — SIGNIFICANT CHANGE UP (ref 40–120)
ALT FLD-CCNC: 15 U/L DA — SIGNIFICANT CHANGE UP (ref 10–60)
ANION GAP SERPL CALC-SCNC: 4 MMOL/L — LOW (ref 5–17)
APTT BLD: 31.2 SEC — SIGNIFICANT CHANGE UP (ref 26.1–36.8)
AST SERPL-CCNC: 13 U/L — SIGNIFICANT CHANGE UP (ref 10–40)
BASOPHILS # BLD AUTO: 0.05 K/UL — SIGNIFICANT CHANGE UP (ref 0–0.2)
BASOPHILS NFR BLD AUTO: 0.6 % — SIGNIFICANT CHANGE UP (ref 0–2)
BILIRUB SERPL-MCNC: 0.4 MG/DL — SIGNIFICANT CHANGE UP (ref 0.2–1.2)
BUN SERPL-MCNC: 38 MG/DL — HIGH (ref 7–18)
CALCIUM SERPL-MCNC: 9 MG/DL — SIGNIFICANT CHANGE UP (ref 8.4–10.5)
CHLORIDE SERPL-SCNC: 96 MMOL/L — SIGNIFICANT CHANGE UP (ref 96–108)
CO2 SERPL-SCNC: 32 MMOL/L — HIGH (ref 22–31)
CREAT SERPL-MCNC: 4.47 MG/DL — HIGH (ref 0.5–1.3)
CRP SERPL-MCNC: 34.8 MG/L — HIGH (ref 0–5)
EGFR: 14 ML/MIN/1.73M2 — LOW
EGFR: 14 ML/MIN/1.73M2 — LOW
EOSINOPHIL # BLD AUTO: 0.21 K/UL — SIGNIFICANT CHANGE UP (ref 0–0.5)
EOSINOPHIL NFR BLD AUTO: 2.5 % — SIGNIFICANT CHANGE UP (ref 0–6)
ERYTHROCYTE [SEDIMENTATION RATE] IN BLOOD: 94 MM/HR — HIGH (ref 0–15)
GLUCOSE BLDC GLUCOMTR-MCNC: 149 MG/DL — HIGH (ref 70–99)
GLUCOSE BLDC GLUCOMTR-MCNC: 168 MG/DL — HIGH (ref 70–99)
GLUCOSE SERPL-MCNC: 190 MG/DL — HIGH (ref 70–99)
HCT VFR BLD CALC: 31.6 % — LOW (ref 39–50)
HGB BLD-MCNC: 10.4 G/DL — LOW (ref 13–17)
IMM GRANULOCYTES NFR BLD AUTO: 0.4 % — SIGNIFICANT CHANGE UP (ref 0–0.9)
INR BLD: 1 RATIO — SIGNIFICANT CHANGE UP (ref 0.85–1.16)
LYMPHOCYTES # BLD AUTO: 1.15 K/UL — SIGNIFICANT CHANGE UP (ref 1–3.3)
LYMPHOCYTES # BLD AUTO: 13.7 % — SIGNIFICANT CHANGE UP (ref 13–44)
MCHC RBC-ENTMCNC: 32.9 G/DL — SIGNIFICANT CHANGE UP (ref 32–36)
MCHC RBC-ENTMCNC: 33.1 PG — SIGNIFICANT CHANGE UP (ref 27–34)
MCV RBC AUTO: 100.6 FL — HIGH (ref 80–100)
MONOCYTES # BLD AUTO: 0.63 K/UL — SIGNIFICANT CHANGE UP (ref 0–0.9)
MONOCYTES NFR BLD AUTO: 7.5 % — SIGNIFICANT CHANGE UP (ref 2–14)
NEUTROPHILS # BLD AUTO: 6.33 K/UL — SIGNIFICANT CHANGE UP (ref 1.8–7.4)
NEUTROPHILS NFR BLD AUTO: 75.3 % — SIGNIFICANT CHANGE UP (ref 43–77)
NRBC BLD AUTO-RTO: 0 /100 WBCS — SIGNIFICANT CHANGE UP (ref 0–0)
PLATELET # BLD AUTO: 199 K/UL — SIGNIFICANT CHANGE UP (ref 150–400)
POTASSIUM SERPL-MCNC: 4.5 MMOL/L — SIGNIFICANT CHANGE UP (ref 3.5–5.3)
POTASSIUM SERPL-SCNC: 4.5 MMOL/L — SIGNIFICANT CHANGE UP (ref 3.5–5.3)
PROT SERPL-MCNC: 8.3 G/DL — SIGNIFICANT CHANGE UP (ref 6–8.3)
PROTHROM AB SERPL-ACNC: 11.6 SEC — SIGNIFICANT CHANGE UP (ref 9.9–13.4)
RBC # BLD: 3.14 M/UL — LOW (ref 4.2–5.8)
RBC # FLD: 14.7 % — HIGH (ref 10.3–14.5)
SODIUM SERPL-SCNC: 132 MMOL/L — LOW (ref 135–145)
WBC # BLD: 8.4 K/UL — SIGNIFICANT CHANGE UP (ref 3.8–10.5)
WBC # FLD AUTO: 8.4 K/UL — SIGNIFICANT CHANGE UP (ref 3.8–10.5)

## 2025-06-03 PROCEDURE — 99291 CRITICAL CARE FIRST HOUR: CPT

## 2025-06-03 PROCEDURE — 73610 X-RAY EXAM OF ANKLE: CPT | Mod: 26,50

## 2025-06-03 PROCEDURE — 73630 X-RAY EXAM OF FOOT: CPT | Mod: 26,50

## 2025-06-03 PROCEDURE — 71045 X-RAY EXAM CHEST 1 VIEW: CPT | Mod: 26

## 2025-06-03 RX ORDER — MIDODRINE HYDROCHLORIDE 5 MG/1
10 TABLET ORAL
Refills: 0 | Status: DISCONTINUED | OUTPATIENT
Start: 2025-06-03 | End: 2025-06-04

## 2025-06-03 RX ORDER — HEPARIN SODIUM 1000 [USP'U]/ML
5000 INJECTION INTRAVENOUS; SUBCUTANEOUS EVERY 12 HOURS
Refills: 0 | Status: DISCONTINUED | OUTPATIENT
Start: 2025-06-03 | End: 2025-06-05

## 2025-06-03 RX ORDER — MELATONIN 5 MG
3 TABLET ORAL AT BEDTIME
Refills: 0 | Status: DISCONTINUED | OUTPATIENT
Start: 2025-06-03 | End: 2025-06-06

## 2025-06-03 RX ORDER — MAGNESIUM, ALUMINUM HYDROXIDE 200-200 MG
30 TABLET,CHEWABLE ORAL EVERY 4 HOURS
Refills: 0 | Status: DISCONTINUED | OUTPATIENT
Start: 2025-06-03 | End: 2025-06-03

## 2025-06-03 RX ORDER — ONDANSETRON HCL/PF 4 MG/2 ML
4 VIAL (ML) INJECTION EVERY 8 HOURS
Refills: 0 | Status: DISCONTINUED | OUTPATIENT
Start: 2025-06-03 | End: 2025-06-06

## 2025-06-03 RX ORDER — INSULIN LISPRO 100 U/ML
INJECTION, SOLUTION INTRAVENOUS; SUBCUTANEOUS
Refills: 0 | Status: DISCONTINUED | OUTPATIENT
Start: 2025-06-03 | End: 2025-06-06

## 2025-06-03 RX ORDER — SEVELAMER HYDROCHLORIDE 800 MG/1
800 TABLET ORAL
Refills: 0 | Status: DISCONTINUED | OUTPATIENT
Start: 2025-06-03 | End: 2025-06-06

## 2025-06-03 RX ORDER — TAMSULOSIN HYDROCHLORIDE 0.4 MG/1
0.4 CAPSULE ORAL AT BEDTIME
Refills: 0 | Status: DISCONTINUED | OUTPATIENT
Start: 2025-06-03 | End: 2025-06-06

## 2025-06-03 RX ORDER — LATANOPROST PF 0.05 MG/ML
1 SOLUTION/ DROPS OPHTHALMIC AT BEDTIME
Refills: 0 | Status: DISCONTINUED | OUTPATIENT
Start: 2025-06-03 | End: 2025-06-06

## 2025-06-03 RX ORDER — INSULIN LISPRO 100 U/ML
INJECTION, SOLUTION INTRAVENOUS; SUBCUTANEOUS AT BEDTIME
Refills: 0 | Status: DISCONTINUED | OUTPATIENT
Start: 2025-06-03 | End: 2025-06-06

## 2025-06-03 RX ORDER — EPOETIN ALFA 10000 [IU]/ML
4000 SOLUTION INTRAVENOUS; SUBCUTANEOUS
Refills: 0 | Status: DISCONTINUED | OUTPATIENT
Start: 2025-06-03 | End: 2025-06-06

## 2025-06-03 RX ADMIN — Medication 3 MILLIGRAM(S): at 21:45

## 2025-06-03 RX ADMIN — SEVELAMER HYDROCHLORIDE 800 MILLIGRAM(S): 800 TABLET ORAL at 21:45

## 2025-06-03 RX ADMIN — LATANOPROST PF 1 DROP(S): 0.05 SOLUTION/ DROPS OPHTHALMIC at 21:45

## 2025-06-03 RX ADMIN — HEPARIN SODIUM 5000 UNIT(S): 1000 INJECTION INTRAVENOUS; SUBCUTANEOUS at 18:14

## 2025-06-03 RX ADMIN — TAMSULOSIN HYDROCHLORIDE 0.4 MILLIGRAM(S): 0.4 CAPSULE ORAL at 21:45

## 2025-06-03 NOTE — H&P ADULT - NSHPPHYSICALEXAM_GEN_ALL_CORE
Gen: AOx3, NAD, non-toxic, pleasant  HEAD:  Atraumatic, Normocephalic  EYES: PERRLA, conjunctiva and sclera clear  ENT: Moist mucous membranes  NECK: Supple, No JVD  CV: S1+S2 normal, no murmurs   Resp: Clear bilat, no resp distress, no crackles/wheezes  Abd: Soft, nontender, +BS  Ext: +bl edema, no cyanosis, LE pulses present  IV/Skin: No skin rash  Msk: +bilaterally wrapped feet with chronic ulcers not acutely infected; did not unwrap, as podiatry had recently wrapped them; R AVF in place but nonfunctional; L permacath in place and working well   Neuro: AAOx3. No focal deficits  Psych: no anxiety, no delusional ideas, no suicidal ideation

## 2025-06-03 NOTE — CONSULT NOTE ADULT - SUBJECTIVE AND OBJECTIVE BOX
PT SEEN AND EXAMINED; FULL NOT TO FOLLOW    Pt with ESRD on HD MWF; last HD yesterday @ [facility] under Dr. Tsang  Plan for next HD 6/4 [facility] NEPHROLOGY- CONSULTATION NOTE    Patient is a 68yo Male with ESRD on HD via left IJ TDC, failed Rt AVG, HTN, 2HPT, Anemia, LE venous stasis p/w b/l LE wounds requiring debridement with graft placement. Nephrology consulted for ESRD status.     Pt with ESRD on HD MWF at [facility]; Last HD 6/2.  Pt denies any SOB or chest pain. Pt with b/l LE ulcers and was told by his Podiatrist to go to the ER for possible surgery      PAST MEDICAL & SURGICAL HISTORY:  DM (diabetes mellitus)  type 2      ESRD (end stage renal disease) on dialysis  t-th-sat via right AV graft, uses midodrine the days of Dialysis for hypotension      Charcot foot due to diabetes mellitus      Diabetic retinopathy associated with type 2 diabetes mellitus      Hyperlipidemia      Abscess of scrotum      Hidradenitis      End stage renal disease      S/P foot surgery  right foot - 11/2014      Charcot's syndrome  Surgeries in feet and ankles   left ankle 2011 and right ankle 11/2014      History of incision and drainage  scrotal 5/2018      Hemodialysis access, AV graft        penicillin (Unknown)    Home Medications Reviewed  Hospital Medications:   MEDICATIONS  (STANDING):  heparin   Injectable 5000 Unit(s) SubCutaneous every 12 hours  insulin lispro (ADMELOG) corrective regimen sliding scale   SubCutaneous three times a day before meals  insulin lispro (ADMELOG) corrective regimen sliding scale   SubCutaneous at bedtime  latanoprost 0.005% Ophthalmic Solution 1 Drop(s) Both EYES at bedtime  sevelamer carbonate 800 milliGRAM(s) Oral three times a day with meals  tamsulosin 0.4 milliGRAM(s) Oral at bedtime    SOCIAL HISTORY:  Denies ETOh, Smoking, or drug use  FAMILY HISTORY:  Family history of diabetes mellitus        REVIEW OF SYSTEMS:  Gen: no fevers or chills  HEENT: no rhinorrhea  Neck: no sore throat  Cards: no chest pain  Resp: no dyspnea  GI: no nausea or vomiting or diarrhea  : no dysuria or hematuria  Vascular: no LE edema  Derm: +b/l LE wounds  Neuro: no numbness/tingling  All other review of systems is negative unless indicated above.    VITALS:  T(F): 97.7 (06-03-25 @ 18:16), Max: 98.4 (06-03-25 @ 11:31)  HR: 77 (06-03-25 @ 18:16)  BP: 175/68 (06-03-25 @ 18:16)  RR: 16 (06-03-25 @ 18:16)  SpO2: 97% (06-03-25 @ 18:16)  Wt(kg): --      Weight (kg): 83 (06-03 @ 08:55)    PHYSICAL EXAM:  Gen: NAD, calm  HEENT: MMM  Neck: no JVD  Cards: RRR, +S1/S2, no M/G/R  Resp: CTA B/L  GI: soft, NT/ND, NABS  : no CVA tenderness  Extremities: trace LE edema B/L  Derm: b/l feet wrapped  Neuro: non-focal  Access: Left IJ tunneled HD catheter- intact  Rt AVG- no thrill or bruit     LABS:  06-03    132[L]  |  96  |  38[H]  ----------------------------<  190[H]  4.5   |  32[H]  |  4.47[H]    Ca    9.0      03 Jun 2025 10:00    TPro  8.3  /  Alb  3.1[L]  /  TBili  0.4  /  DBili      /  AST  13  /  ALT  15  /  AlkPhos  112  06-03    Creatinine Trend: 4.47 <--                        10.4   8.40  )-----------( 199      ( 03 Jun 2025 10:00 )             31.6     Urine Studies:  Urinalysis Basic - ( 03 Jun 2025 10:00 )    Color:  / Appearance:  / SG:  / pH:   Gluc: 190 mg/dL / Ketone:   / Bili:  / Urobili:    Blood:  / Protein:  / Nitrite:    Leuk Esterase:  / RBC:  / WBC    Sq Epi:  / Non Sq Epi:  / Bacteria:         RADIOLOGY & ADDITIONAL STUDIES:

## 2025-06-03 NOTE — H&P ADULT - PROBLEM SELECTOR PLAN 2
RCRI = 1 points. 6% 30-day risk of death, MI, or cardiac arrest  Daniel score = 2.2% Risk of myocardial infarction or cardiac arrest, intraoperatively or upto 30 days post-op  METS > 4

## 2025-06-03 NOTE — H&P ADULT - NSHPREVIEWOFSYSTEMS_GEN_ALL_CORE
CONSTITUTIONAL:  No fevers or chills, good appetite, good general state  EYES/ENT:  No visual changes;  No vertigo or throat pain   NECK:  No neck pain or stiffness  RESPIRATORY:  No cough, wheezing, hemoptysis; No shortness of breath  CARDIOVASCULAR:  No chest pain or palpitations  GASTROINTESTINAL:  No abdominal pain. No nausea, vomiting, or hematemesis; No diarrhea or constipation. No melena or hematochezia.  GENITOURINARY:  No dysuria, frequency or hematuria  NEUROLOGICAL:  No HA, no numbness or LE weakness  MSK: +chronic ulcers on both feet   SKIN:  No itching, no skin rash

## 2025-06-03 NOTE — H&P ADULT - IN ACCORDANCE WITH [place] STATE LAW, WE OFFER EVERY PATIENT A HEPATITIS C TEST. WOULD YOU LIKE TO BE TESTED TODAY?
Previously negative Opt out Griseofulvin Pregnancy And Lactation Text: This medication is Pregnancy Category X and is known to cause serious birth defects. It is unknown if this medication is excreted in breast milk but breast feeding should be avoided.

## 2025-06-03 NOTE — H&P ADULT - PROBLEM SELECTOR PLAN 3
Monday Wednesday Friday through left sided permacath  Continue with sevelamer tid  c/w calcitriol, epo, midodrine on dialysis days  Renal diet, fluid restriction  Known to Dr. Chirinos, currently following

## 2025-06-03 NOTE — CONSULT NOTE ADULT - ASSESSMENT
Patient is a 68yo Male with ESRD on HD via left IJ TDC, failed Rt AVG, HTN, 2HPT, Anemia, LE venous stasis p/w b/l LE wounds requiring debridement with graft placement. Nephrology consulted for ESRD status.     1) ESRD:  Last HD 6/2 via left IJ tunneled HD @ [facility]. Plan for next HD 6/4. Rt AVG uunsalvageable Monitor electrolytes.    2) Hypotension: BP elevated. Will c/w Midodrine 10mg PO MWF intra- HD to aid in fluid removal prn. Monitor BP    3) Anemia of renal disease: Hb acceptable. c/w Epo  4K with HD. Monitor hgb    4) Hyperphosphatemia- check serum phos. Continue with renvela 1 tab with meals. Check PTHi. Monitor serum calcium and phos    [facility] NEPHROLOGY  Arden Tsang M.D.  Freeman Holland D.O.  Janny Chirinos M.D.  MD Ambar Mccarty, MSN, ANP-C    Telephone: [phone]  Facsimile: [phone] [place] [address], [address]  [place] [address]

## 2025-06-03 NOTE — H&P ADULT - PROBLEM SELECTOR PLAN 1
Multiple chronic ulcers of both feet, not currently infected.  Followed outpatient by Dr. Sim.   Recommended that he come for bilateral wound debridement and graft placement, likely to take place Thursday 6/5. Multiple chronic ulcers of both feet, not currently infected.  Followed outpatient by Dr. Sim. Recommended that he come for bilateral wound debridement and graft placement, likely to take place Thursday 6/5.  No role for antibiotics at this time, not septic and chronic wounds.  Will reassess after surgery.  Daily ESR and CRP  Podiatry following     n.p.o. after midnight on Wednesday 6/4 and hold heparin after midnight Wednesday 6/4 prior to surgical debridement Multiple chronic ulcers of both feet, not currently infected.  Followed outpatient by Dr. Sim. Recommended that he come for bilateral wound debridement and graft placement, planned for Friday 6/6.  No role for antibiotics at this time, not septic and chronic wounds.  Will reassess after surgery.  Daily ESR and CRP  Podiatry following     n.p.o. after midnight on Thurs 6/5 and hold heparin after midnight Thurs 6/5 prior to surgical debridement planned for Friday morning

## 2025-06-03 NOTE — CONSULT NOTE ADULT - ASSESSMENT
A:          P: INCOMPLETE NOTE  -Patient evaluated and Chart reviewed   -Discussed diagnosis and treatment with patient  -Applied Betadine with dry sterile dressing  -Plan to schedule patient for OR wound debridement and graft application this week. Likely Thursday  -Continue with IV antibiotics As Per ID  -Podiatry to follow while in house.  -Discussed with Attending Cirlincione   A: b/l foot wounds      P:   -Patient evaluated and Chart reviewed   -Discussed diagnosis and treatment with patient  -Applied Betadine with dry sterile dressing  -Scheduled for OR wound debridement and graft application of b/l foot wounds on Friday at 8:45 AM  -Appreciate medical optimization  -Continue with IV antibiotics As Per ID  -Podiatry to follow while in house.  -Discussed with Attending Cirlincione

## 2025-06-03 NOTE — ED PROVIDER NOTE - CLINICAL SUMMARY MEDICAL DECISION MAKING FREE TEXT BOX
patient presenting for bilateral foot wound otherwise vital stable will obtain labs x-ray assess for infection podiatry consult ED observation reassess history of end-stage renal will assess electrolyte

## 2025-06-03 NOTE — H&P ADULT - HISTORY OF PRESENT ILLNESS
69-year-old man from home ([facility] rehab until 4/22) with past history diabetes, hypertension on midodrine with dialysis, ESRD on dialysis Monday Wednesday Friday, most recently day prior to admission, has sided left-sided permacath, known to Dr. Chirinos, bilateral Charcot deformity follows with Dr. Sim, chronic OM of the right foot,  coming at recommendation of Dr. Valadez for bilateral foot wound debridement and graft application.  he currently denies fever, chills, nausea, vomiting, diarrhea, dizziness, shortness of breath, chest pain.  Only produces minimal urine at baseline.    ED Course    Vitals: BPmax 175/87 rest wnl   Labs: CRP 34; ESR 94; glu 190; Na 132, bicarb 32; hb 10;   Intervention: none  Consults: podiatry   Images: CXR wnl; R permacath in place; XR bilateral feet pending read

## 2025-06-03 NOTE — ED ADULT NURSE NOTE - OBJECTIVE STATEMENT
Patient present to ED BIBA wife at bedside [id number] c/o right foot diabetic food ulcer for months foul smelling. Patient Iv access inserted by MD through US

## 2025-06-03 NOTE — H&P ADULT - ATTENDING COMMENTS
Will hold off any abx (considering podiatric wounds are relatively stable at present) until wound cxs reveal a more accurate microbial assessment.

## 2025-06-03 NOTE — PATIENT PROFILE ADULT - FALL HARM RISK - RISK INTERVENTIONS

## 2025-06-03 NOTE — ED PROVIDER NOTE - OBJECTIVE STATEMENT
69-year-old history of end-stage renal on dialysis Monday Wednesday Friday last dialyzed yesterday presenting for bilateral leg wound seen by his podiatrist told to come in for wound debridement patient has history of Charcot foot has had wound to bilateral foot for years denies fever chills nausea vomiting

## 2025-06-03 NOTE — H&P ADULT - ASSESSMENT
69-year-old man from home ([facility] rehab until 4/22) with past history diabetes, hypertension on midodrine with dialysis, ESRD on dialysis Monday Wednesday Friday, most recently day prior to admission, has sided left-sided permacath, known to Dr. Chirinos, bilateral Charcot deformity follows with Dr. Sim, chronic OM of the right foot,  coming at recommendation of Dr. Valadez for bilateral foot wound debridement and graft application.  he currently denies fever, chills, nausea, vomiting, diarrhea, dizziness, shortness of breath, chest pain.  Only produces minimal urine at baseline.     69-year-old man from home ([facility] rehab until 4/22) with past history diabetes, hypertension on midodrine with dialysis, ESRD on dialysis Monday Wednesday Friday, most recently day prior to admission, has sided left-sided permacath, known to Dr. Chirinos, bilateral Charcot deformity follows with Dr. Sim, chronic OM of the right foot,  coming at recommendation of Dr. Valadez for bilateral foot wound debridement and graft application.  he currently denies fever, chills, nausea, vomiting, diarrhea, dizziness, shortness of breath, chest pain.  Only produces minimal urine at baseline.

## 2025-06-03 NOTE — CONSULT NOTE ADULT - SUBJECTIVE AND OBJECTIVE BOX
Podiatry HPI: Patient with worsening foot wounds secondary to pressure and patients charcot. Sent in for surgical intervention.    PMH:Diabetes  Kidney problem  Hypertension  HTN (hypertension)  DM (diabetes mellitus)  HTN (hypertension)  DM (diabetes mellitus)  ESRD (end stage renal disease) on dialysis  Charcot foot due to diabetes mellitus  ESRD on hemodialysis  Charcot foot due to diabetes mellitus  Diabetic retinopathy associated with type 2 diabetes mellitus  Hyperlipidemia  Abscess of scrotum  Hidradenitis  End stage renal disease    Allergies: penicillin (Unknown)    Medications:   FH:Family history of diabetes mellitus    No pertinent family history in first degree relatives      PSX: No significant past surgical history    S/P foot surgery    Charcot's syndrome    History of incision and drainage    Hemodialysis access, AV graft      SH:     Vital Signs Last 24 Hrs  T(C): 36.9 (03 Jun 2025 11:31), Max: 36.9 (03 Jun 2025 11:31)  T(F): 98.4 (03 Jun 2025 11:31), Max: 98.4 (03 Jun 2025 11:31)  HR: 82 (03 Jun 2025 11:31) (82 - 91)  BP: 175/87 (03 Jun 2025 11:31) (130/78 - 175/87)  BP(mean): --  RR: 18 (03 Jun 2025 11:31) (17 - 18)  SpO2: 97% (03 Jun 2025 11:31) (95% - 97%)    Parameters below as of 03 Jun 2025 08:55  Patient On (Oxygen Delivery Method): room air        LABS                        10.4   8.40  )-----------( 199      ( 03 Jun 2025 10:00 )             31.6               06-03    132[L]  |  96  |  38[H]  ----------------------------<  190[H]  4.5   |  32[H]  |  4.47[H]    Ca    9.0      03 Jun 2025 10:00    TPro  8.3  /  Alb  3.1[L]  /  TBili  0.4  /  DBili  x   /  AST  13  /  ALT  15  /  AlkPhos  112  06-03      ROS  REVIEW OF SYSTEMS:    CONSTITUTIONAL: No fever, weight loss, or fatigue  EYES: No eye pain, visual disturbances, or discharge  ENMT:  No difficulty hearing, tinnitus, vertigo; No sinus or throat pain  NECK: No pain or stiffness  BREASTS: No pain, masses, or nipple discharge  RESPIRATORY: No cough, wheezing, chills or hemoptysis; No shortness of breath  CARDIOVASCULAR: No chest pain, palpitations, dizziness, or leg swelling  GASTROINTESTINAL: No abdominal or epigastric pain. No nausea, vomiting, or hematemesis; No diarrhea or constipation. No melena or hematochezia.  GENITOURINARY: No dysuria, frequency, hematuria, or incontinence  NEUROLOGICAL: No headaches, memory loss, loss of strength, numbness, or tremors  SKIN: No itching, burning, rashes, or lesions   LYMPH NODES: No enlarged glands  ENDOCRINE: No heat or cold intolerance; No hair loss  MUSCULOSKELETAL: No joint pain or swelling; No muscle, back, or extremity pain  PSYCHIATRIC: No depression, anxiety, mood swings, or difficulty sleeping  HEME/LYMPH: No easy bruising, or bleeding gums  ALLERY AND IMMUNOLOGIC: No hives or eczema      PHYSICAL EXAM  LE Focused:    Vasc:    Derm:   Neuro:   MSK:      IMAGING: ?xray       Podiatry HPI: Patient with worsening foot wounds secondary to pressure and patients charcot. Sent in for surgical intervention. Wife at bedside states would have gotten worse due to pressure. Denies any fever, nausea, vomiting and sob.     PMH:Diabetes  Kidney problem  Hypertension  HTN (hypertension)  DM (diabetes mellitus)  HTN (hypertension)  DM (diabetes mellitus)  ESRD (end stage renal disease) on dialysis  Charcot foot due to diabetes mellitus  ESRD on hemodialysis  Charcot foot due to diabetes mellitus  Diabetic retinopathy associated with type 2 diabetes mellitus  Hyperlipidemia  Abscess of scrotum  Hidradenitis  End stage renal disease    Allergies: penicillin (Unknown)    Medications:   FH:Family history of diabetes mellitus    No pertinent family history in first degree relatives      PSX: No significant past surgical history  S/P foot surgery  Charcot's syndrome  History of incision and drainage  Hemodialysis access, AV graft      SH:     Vital Signs Last 24 Hrs  T(C): 36.9 (03 Jun 2025 11:31), Max: 36.9 (03 Jun 2025 11:31)  T(F): 98.4 (03 Jun 2025 11:31), Max: 98.4 (03 Jun 2025 11:31)  HR: 82 (03 Jun 2025 11:31) (82 - 91)  BP: 175/87 (03 Jun 2025 11:31) (130/78 - 175/87)  BP(mean): --  RR: 18 (03 Jun 2025 11:31) (17 - 18)  SpO2: 97% (03 Jun 2025 11:31) (95% - 97%)    Parameters below as of 03 Jun 2025 08:55  Patient On (Oxygen Delivery Method): room air        LABS                        10.4   8.40  )-----------( 199      ( 03 Jun 2025 10:00 )             31.6               06-03    132[L]  |  96  |  38[H]  ----------------------------<  190[H]  4.5   |  32[H]  |  4.47[H]    Ca    9.0      03 Jun 2025 10:00    TPro  8.3  /  Alb  3.1[L]  /  TBili  0.4  /  DBili  x   /  AST  13  /  ALT  15  /  AlkPhos  112  06-03      ROS  REVIEW OF SYSTEMS:    CONSTITUTIONAL: No fever, weight loss, or fatigue  EYES: No eye pain, visual disturbances, or discharge  ENMT:  No difficulty hearing, tinnitus, vertigo; No sinus or throat pain  NECK: No pain or stiffness  BREASTS: No pain, masses, or nipple discharge  RESPIRATORY: No cough, wheezing, chills or hemoptysis; No shortness of breath  CARDIOVASCULAR: No chest pain, palpitations, dizziness, or leg swelling  GASTROINTESTINAL: No abdominal or epigastric pain. No nausea, vomiting, or hematemesis; No diarrhea or constipation. No melena or hematochezia.  GENITOURINARY: No dysuria, frequency, hematuria, or incontinence  NEUROLOGICAL: No headaches, memory loss, loss of strength, numbness, or tremors  SKIN: No itching, burning, rashes, or lesions   LYMPH NODES: No enlarged glands  ENDOCRINE: No heat or cold intolerance; No hair loss  MUSCULOSKELETAL: No joint pain or swelling; No muscle, back, or extremity pain  PSYCHIATRIC: No depression, anxiety, mood swings, or difficulty sleeping  HEME/LYMPH: No easy bruising, or bleeding gums  ALLERY AND IMMUNOLOGIC: No hives or eczema    LE FOCUSED PHYSICAL EXAM:  Vasc: DP/PT non-palpable bilaterally. CFT <3 seconds x 10. Temp Gradient within normal limits bilaterally with no increase in temperature to the right foot ulcer. No edema, no erythema, no varicosities observed bilaterally.   Derm:   Right foot- plantar heel wound about 6 cm x 4 cm with fibrotic wound base, medial TN joint about 1 cm wide. Wounds have no drainage and don't appear to be acutely infected   Left foot- Heel wound with an eschar about 4 cm wide. lateral foot wound about 2 cm wide. Wounds are not acutely infected.   MSK: Muscle strength deferred. No pain upon palpation bilaterally. No pain upon ROM of pedal and ankle joints bilaterally. No crepitation upon ROM of joints. Arch height 2/5 on-WB. Negative calf tenderness bilaterally.     IMAGING: Xray read pending

## 2025-06-03 NOTE — ED ADULT NURSE NOTE - ED STAT RN HANDOFF DETAILS
Patient admitted assigned to floor, report given toRn. Patient awaiting transport stable inno acutedistress , safety maintained.

## 2025-06-04 DIAGNOSIS — E78.5 HYPERLIPIDEMIA, UNSPECIFIED: ICD-10-CM

## 2025-06-04 LAB
A1C WITH ESTIMATED AVERAGE GLUCOSE RESULT: 7.5 % — HIGH (ref 4–5.6)
ALBUMIN SERPL ELPH-MCNC: 2.6 G/DL — LOW (ref 3.5–5)
ALP SERPL-CCNC: 102 U/L — SIGNIFICANT CHANGE UP (ref 40–120)
ALT FLD-CCNC: 14 U/L DA — SIGNIFICANT CHANGE UP (ref 10–60)
ANION GAP SERPL CALC-SCNC: 6 MMOL/L — SIGNIFICANT CHANGE UP (ref 5–17)
AST SERPL-CCNC: 12 U/L — SIGNIFICANT CHANGE UP (ref 10–40)
BASOPHILS # BLD AUTO: 0.04 K/UL — SIGNIFICANT CHANGE UP (ref 0–0.2)
BASOPHILS NFR BLD AUTO: 0.6 % — SIGNIFICANT CHANGE UP (ref 0–2)
BILIRUB SERPL-MCNC: 0.4 MG/DL — SIGNIFICANT CHANGE UP (ref 0.2–1.2)
BUN SERPL-MCNC: 46 MG/DL — HIGH (ref 7–18)
CALCIUM SERPL-MCNC: 9 MG/DL — SIGNIFICANT CHANGE UP (ref 8.4–10.5)
CALCIUM SERPL-MCNC: 9.2 MG/DL — SIGNIFICANT CHANGE UP (ref 8.4–10.5)
CHLORIDE SERPL-SCNC: 97 MMOL/L — SIGNIFICANT CHANGE UP (ref 96–108)
CO2 SERPL-SCNC: 28 MMOL/L — SIGNIFICANT CHANGE UP (ref 22–31)
CREAT SERPL-MCNC: 5.36 MG/DL — HIGH (ref 0.5–1.3)
CRP SERPL-MCNC: 30.3 MG/L — HIGH (ref 0–5)
EGFR: 11 ML/MIN/1.73M2 — LOW
EGFR: 11 ML/MIN/1.73M2 — LOW
EOSINOPHIL # BLD AUTO: 0.19 K/UL — SIGNIFICANT CHANGE UP (ref 0–0.5)
EOSINOPHIL NFR BLD AUTO: 3 % — SIGNIFICANT CHANGE UP (ref 0–6)
ERYTHROCYTE [SEDIMENTATION RATE] IN BLOOD: 88 MM/HR — HIGH (ref 0–15)
ESTIMATED AVERAGE GLUCOSE: 169 MG/DL — HIGH (ref 68–114)
GLUCOSE BLDC GLUCOMTR-MCNC: 106 MG/DL — HIGH (ref 70–99)
GLUCOSE BLDC GLUCOMTR-MCNC: 122 MG/DL — HIGH (ref 70–99)
GLUCOSE BLDC GLUCOMTR-MCNC: 143 MG/DL — HIGH (ref 70–99)
GLUCOSE BLDC GLUCOMTR-MCNC: 187 MG/DL — HIGH (ref 70–99)
GLUCOSE SERPL-MCNC: 119 MG/DL — HIGH (ref 70–99)
HBV SURFACE AG SER-ACNC: SIGNIFICANT CHANGE UP
HCT VFR BLD CALC: 29.1 % — LOW (ref 39–50)
HGB BLD-MCNC: 9.4 G/DL — LOW (ref 13–17)
IMM GRANULOCYTES NFR BLD AUTO: 0.5 % — SIGNIFICANT CHANGE UP (ref 0–0.9)
LYMPHOCYTES # BLD AUTO: 1.3 K/UL — SIGNIFICANT CHANGE UP (ref 1–3.3)
LYMPHOCYTES # BLD AUTO: 20.4 % — SIGNIFICANT CHANGE UP (ref 13–44)
MAGNESIUM SERPL-MCNC: 2.2 MG/DL — SIGNIFICANT CHANGE UP (ref 1.6–2.6)
MCHC RBC-ENTMCNC: 32.3 G/DL — SIGNIFICANT CHANGE UP (ref 32–36)
MCHC RBC-ENTMCNC: 32.9 PG — SIGNIFICANT CHANGE UP (ref 27–34)
MCV RBC AUTO: 101.7 FL — HIGH (ref 80–100)
MONOCYTES # BLD AUTO: 0.47 K/UL — SIGNIFICANT CHANGE UP (ref 0–0.9)
MONOCYTES NFR BLD AUTO: 7.4 % — SIGNIFICANT CHANGE UP (ref 2–14)
NEUTROPHILS # BLD AUTO: 4.35 K/UL — SIGNIFICANT CHANGE UP (ref 1.8–7.4)
NEUTROPHILS NFR BLD AUTO: 68.1 % — SIGNIFICANT CHANGE UP (ref 43–77)
NRBC BLD AUTO-RTO: 0 /100 WBCS — SIGNIFICANT CHANGE UP (ref 0–0)
PHOSPHATE SERPL-MCNC: 5.2 MG/DL — HIGH (ref 2.5–4.5)
PLATELET # BLD AUTO: 190 K/UL — SIGNIFICANT CHANGE UP (ref 150–400)
POTASSIUM SERPL-MCNC: 5 MMOL/L — SIGNIFICANT CHANGE UP (ref 3.5–5.3)
POTASSIUM SERPL-SCNC: 5 MMOL/L — SIGNIFICANT CHANGE UP (ref 3.5–5.3)
PROT SERPL-MCNC: 7.3 G/DL — SIGNIFICANT CHANGE UP (ref 6–8.3)
PTH-INTACT FLD-MCNC: 348 PG/ML — HIGH (ref 15–65)
RBC # BLD: 2.86 M/UL — LOW (ref 4.2–5.8)
RBC # FLD: 14.6 % — HIGH (ref 10.3–14.5)
SODIUM SERPL-SCNC: 131 MMOL/L — LOW (ref 135–145)
WBC # BLD: 6.38 K/UL — SIGNIFICANT CHANGE UP (ref 3.8–10.5)
WBC # FLD AUTO: 6.38 K/UL — SIGNIFICANT CHANGE UP (ref 3.8–10.5)

## 2025-06-04 RX ORDER — MIDODRINE HYDROCHLORIDE 5 MG/1
10 TABLET ORAL
Refills: 0 | Status: DISCONTINUED | OUTPATIENT
Start: 2025-06-04 | End: 2025-06-06

## 2025-06-04 RX ADMIN — SEVELAMER HYDROCHLORIDE 800 MILLIGRAM(S): 800 TABLET ORAL at 17:04

## 2025-06-04 RX ADMIN — TAMSULOSIN HYDROCHLORIDE 0.4 MILLIGRAM(S): 0.4 CAPSULE ORAL at 21:26

## 2025-06-04 RX ADMIN — SEVELAMER HYDROCHLORIDE 800 MILLIGRAM(S): 800 TABLET ORAL at 13:48

## 2025-06-04 RX ADMIN — LATANOPROST PF 1 DROP(S): 0.05 SOLUTION/ DROPS OPHTHALMIC at 21:27

## 2025-06-04 RX ADMIN — HEPARIN SODIUM 5000 UNIT(S): 1000 INJECTION INTRAVENOUS; SUBCUTANEOUS at 17:04

## 2025-06-04 RX ADMIN — EPOETIN ALFA 4000 UNIT(S): 10000 SOLUTION INTRAVENOUS; SUBCUTANEOUS at 11:59

## 2025-06-04 RX ADMIN — MIDODRINE HYDROCHLORIDE 10 MILLIGRAM(S): 5 TABLET ORAL at 12:11

## 2025-06-04 RX ADMIN — SEVELAMER HYDROCHLORIDE 800 MILLIGRAM(S): 800 TABLET ORAL at 07:56

## 2025-06-04 NOTE — CONSULT NOTE ADULT - SUBJECTIVE AND OBJECTIVE BOX
Date of Service 06-04-25 @ 17:12    CHIEF COMPLAINT: Patient is a 69y old  Male who presents with a chief complaint of foot wound debridement (04 Jun 2025 16:25)  Patient is a 68yo Male with ESRD on HD via left IJ TDC, failed Rt AVG, HTN, 2HPT, Anemia, LE venous stasis p/w b/l LE wounds requiring debridement with graft placement.[facility]  [facility] with past history diabetes, hypertension on midodrine with dialysis, ESRD on dialysis Monday Wednesday Friday, most recently day prior to admission, has sided left-sided permacath, bilateral Charcot deformity follows with Dr. Sim, chronic OM of the right foot,  coming at recommendation of Dr. Valadez for bilateral foot wound debridement and graft application. Patient has bilateral heel wounds.    	  REVIEW OF SYSTEMS:  CONSTITUTIONAL: No fever, weight loss, or fatigue  EYES: No eye pain, visual disturbances, or discharge  ENT:  No difficulty hearing, tinnitus, vertigo; No sinus or throat pain  NECK: No pain or stiffness  RESPIRATORY: No cough, wheezing, chills or hemoptysis; No Shortness of Breath  CARDIOVASCULAR: No chest pain, palpitations, passing out, dizziness, or leg swelling  GASTROINTESTINAL: No abdominal or epigastric pain. No nausea, vomiting, or hematemesis; No diarrhea or constipation. No melena or hematochezia.  GENITOURINARY: No dysuria, frequency, hematuria, or incontinence  NEUROLOGICAL: No headaches, memory loss, loss of strength, numbness, or tremors  LYMPH Nodes: No enlarged glands  ENDOCRINE: No heat or cold intolerance; No hair loss  MUSCULOSKELETAL: No joint pain or swelling; No muscle, back, or extremity pain  PSYCHIATRIC: No depression, anxiety, mood swings, or difficulty sleeping  HEME/LYMPH: No easy bruising, or bleeding gums  ALLERGY AND IMMUNOLOGIC: No hives or eczema	    [X ] All others negative	      PHYSICAL EXAM:  T(C): 36.5 (06-04-25 @ 14:38), Max: 36.7 (06-04-25 @ 06:32)  HR: 74 (06-04-25 @ 14:38) (74 - 85)  BP: 149/55 (06-04-25 @ 14:38) (149/46 - 189/73)  RR: 18 (06-04-25 @ 14:38) (16 - 18)  SpO2: 98% (06-04-25 @ 14:38) (96% - 100%)  Wt(kg): --  I&O's Summary      Appearance: Normal	  HEENT:   Normal oral mucosa, PERRL, EOMI	  Lymphatic: No lymphadenopathy  Cardiovascular: Normal S1 S2, No JVD, No murmurs, No edema  Respiratory: Lungs clear to auscultation	  Psychiatry: A & O x 3, Mood & affect appropriate  Gastrointestinal:  Soft, Non-tender, + BS	  Skin: SKIN: Bilateral heel wounds , right heel with fibrinous material and left heel wound with eschar .  Neurologic: Non-focal  Extremities: Normal range of motion, No clubbing, cyanosis or edema  Vascular: difficult to palpate peripheral pulses.    MEDICATIONS  (STANDING):  epoetin rudolph-epbx (RETACRIT) Injectable 4000 Unit(s) IV Push <User Schedule>  heparin   Injectable 5000 Unit(s) SubCutaneous every 12 hours  insulin lispro (ADMELOG) corrective regimen sliding scale   SubCutaneous three times a day before meals  insulin lispro (ADMELOG) corrective regimen sliding scale   SubCutaneous at bedtime  latanoprost 0.005% Ophthalmic Solution 1 Drop(s) Both EYES at bedtime  sevelamer carbonate 800 milliGRAM(s) Oral three times a day with meals  tamsulosin 0.4 milliGRAM(s) Oral at bedtime        LABS:	 	    CARDIAC MARKERS:                                9.4    6.38  )-----------( 190      ( 04 Jun 2025 06:40 )             29.1     06-04    131[L]  |  97  |  46[H]  ----------------------------<  119[H]  5.0   |  28  |  5.36[H]    Ca    9.2      04 Jun 2025 06:40  Phos  5.2     06-04  Mg     2.2     06-04    TPro  7.3  /  Alb  2.6[L]  /  TBili  0.4  /  DBili  x   /  AST  12  /  ALT  14  /  AlkPhos  102  06-04    INTERPRETATION:  Bilateral feet and bilateral ankles    HISTORY: Bilateral wounds    COMPARISON: 5/15/2025     3 views of the right foot, 3 views of the left foot, 3 views of the left   ankle and 3 views of the right ankle show advanced arthritic changes of   the ankle joint with invagination of the distaltibia is intact to the   hindfoot with destruction of both the talar bones and the calcaneus   bilaterally. There is lateral shifting of the mid foot with regard to the   hindfoot bilaterally. Vascular calcifications are present.    IMPRESSION: Advanced destructive changes of the hindfeet. No definite   bony erosion or acute fracture.

## 2025-06-04 NOTE — PROGRESS NOTE ADULT - PROBLEM SELECTOR PLAN 2
Monday Wednesday Friday through left sided permacath  c/w sevelamer tid, calcitriol, epo, midodrine on dialysis days  Renal diet, 1.2L fluid restriction      Nephro Cr. Taran reccs: next HD 6/4. Monitor electrolytes.                                            c/w Midodrine 10mg PO MWF intra- HD. Monitor BP.                                            c/w Epo  4K with HD. Monitor hgb

## 2025-06-04 NOTE — PROGRESS NOTE ADULT - PROBLEM SELECTOR PLAN 1
Multiple chronic ulcers of both feet, not currently infected.    Follows outpt by Dr. Sim: Reccs admit for b/l wound debridement and graft placement, plan for Friday 6/6.  Daily ESR and CRP    -n.p.o. after midnight on Thurs 6/5 and hold heparin after midnight Thurs 6/5 prior to surgical debridement planned for Friday morning    Podiatry reccs: OR wound debridement and graft application of b/l foot wounds on Friday at 8:45 AM

## 2025-06-04 NOTE — CONSULT NOTE ADULT - ASSESSMENT
Patient is a 68yo Male with ESRD on HD via left IJ TDC, failed Rt AVG, HTN, 2HPT, Anemia, LE venous stasis p/w b/l LE wounds requiring debridement with graft placement.Patient came  from home ([facility] rehab until 4/22) with past history diabetes, hypertension on midodrine with dialysis, ESRD on dialysis Monday Wednesday Friday, most recently day prior to admission, has sided left-sided permacath, bilateral Charcot deformity follows with Dr. Sim, chronic OM of the right foot,  coming at recommendation of Dr. Valadez for bilateral foot wound debridement and graft application. Patient has bilateral heel wounds.      1.  Bilateral heel wounds.  2.  Podiatry consult greatly appreciated who opined that the wounds do not look acutely infected .  3. With high ESR(88) and CRP(30) , patient most likely has chronic osteomyelitis and with heel wounds , debridement and long term antibiotics will be the recommendation .  4.  Sometimes with heel wounds and osteo, it is difficult to salvage the foot but this will be decided by the podiatrist .  5. Agree to withhold the antibiotics for now and will follow up on the OR cx.

## 2025-06-04 NOTE — PROGRESS NOTE ADULT - SUBJECTIVE AND OBJECTIVE BOX
[facility] NEPHROLOGY- PROGRESS NOTE    Patient is a 70yo Male with ESRD on HD via left IJ TDC, failed Rt AVG, HTN, 2HPT, Anemia, LE venous stasis p/w b/l LE wounds requiring debridement with graft placement. Nephrology consulted for ESRD status.       Hospital Medications: Medications reviewed.  REVIEW OF SYSTEMS:  CONSTITUTIONAL: No fevers or chills  RESPIRATORY: No shortness of breath  CARDIOVASCULAR: No chest pain.  GASTROINTESTINAL: No nausea, vomiting, diarrhea or abdominal pain.   VASCULAR: No bilateral lower extremity edema.     VITALS:  T(F): 97.7 (06-04-25 @ 14:38), Max: 98 (06-04-25 @ 06:32)  HR: 74 (06-04-25 @ 14:38)  BP: 149/55 (06-04-25 @ 14:38)  RR: 18 (06-04-25 @ 14:38)  SpO2: 98% (06-04-25 @ 14:38)  Wt(kg): --    Weight (kg): 83 (06-03 @ 08:55)    PHYSICAL EXAM:  Gen: NAD, calm  HEENT: anicteric  Cards: RRR, +S1/S2, no M/G/R  Resp: CTA B/L  GI: soft, NT/ND, NABS  Extremities: no LE edema B/L  Derm: b/l feet wrapped  Access: Left IJ tunneled HD catheter- intact    LABS:  06-04    131[L]  |  97  |  46[H]  ----------------------------<  119[H]  5.0   |  28  |  5.36[H]    Ca    9.2      04 Jun 2025 06:40  Phos  5.2     06-04  Mg     2.2     06-04    TPro  7.3  /  Alb  2.6[L]  /  TBili  0.4  /  DBili      /  AST  12  /  ALT  14  /  AlkPhos  102  06-04    Creatinine Trend: 5.36 <--, 4.47 <--                        9.4    6.38  )-----------( 190      ( 04 Jun 2025 06:40 )             29.1     Urine Studies:  Urinalysis Basic - ( 04 Jun 2025 06:40 )    Color:  / Appearance:  / SG:  / pH:   Gluc: 119 mg/dL / Ketone:   / Bili:  / Urobili:    Blood:  / Protein:  / Nitrite:    Leuk Esterase:  / RBC:  / WBC    Sq Epi:  / Non Sq Epi:  / Bacteria:         RADIOLOGY & ADDITIONAL STUDIES:

## 2025-06-04 NOTE — CONSULT NOTE ADULT - CONSULT REASON
ESRD on HD
Patient with ESRD on HD with multiple feet wounds , ID consult called regarding antimicrobial management
b/l foot wounds

## 2025-06-04 NOTE — PROGRESS NOTE ADULT - SUBJECTIVE AND OBJECTIVE BOX
Patient is a 69y old  Male who presents with a chief complaint of foot wound debridement (03 Jun 2025 13:05)      INTERVAL HPI/OVERNIGHT EVENTS: no overnight events    I&O's Summary    Vital Signs Last 24 Hrs  T(C): 36.3 (04 Jun 2025 13:05), Max: 36.7 (04 Jun 2025 06:32)  T(F): 97.3 (04 Jun 2025 13:05), Max: 98 (04 Jun 2025 06:32)  HR: 74 (04 Jun 2025 13:05) (74 - 85)  BP: 149/46 (04 Jun 2025 13:05) (149/46 - 189/73)  BP(mean): 103 (03 Jun 2025 20:50) (103 - 104)  RR: 17 (04 Jun 2025 13:05) (16 - 18)  SpO2: 100% (04 Jun 2025 13:05) (96% - 100%)    Parameters below as of 04 Jun 2025 13:05  Patient On (Oxygen Delivery Method): room air      PAST MEDICAL & SURGICAL HISTORY:  DM (diabetes mellitus)  type 2      ESRD (end stage renal disease) on dialysis  t-th-sat via right AV graft, uses midodrine the days of Dialysis for hypotension      Charcot foot due to diabetes mellitus      Diabetic retinopathy associated with type 2 diabetes mellitus      Hyperlipidemia      Abscess of scrotum      Hidradenitis      End stage renal disease      S/P foot surgery  right foot - 11/2014      Charcot's syndrome  Surgeries in feet and ankles   left ankle 2011 and right ankle 11/2014      History of incision and drainage  scrotal 5/2018      Hemodialysis access, AV graft          SOCIAL HISTORY  Alcohol:  Tobacco:  Illicit substance use:      FAMILY HISTORY:      LABS:                        9.4    6.38  )-----------( 190      ( 04 Jun 2025 06:40 )             29.1     06-04    131[L]  |  97  |  46[H]  ----------------------------<  119[H]  5.0   |  28  |  5.36[H]    Ca    9.2      04 Jun 2025 06:40  Phos  5.2     06-04  Mg     2.2     06-04    TPro  7.3  /  Alb  2.6[L]  /  TBili  0.4  /  DBili  x   /  AST  12  /  ALT  14  /  AlkPhos  102  06-04    PT/INR - ( 03 Jun 2025 10:00 )   PT: 11.6 sec;   INR: 1.00 ratio         PTT - ( 03 Jun 2025 10:00 )  PTT:31.2 sec  Urinalysis Basic - ( 04 Jun 2025 06:40 )    Color: x / Appearance: x / SG: x / pH: x  Gluc: 119 mg/dL / Ketone: x  / Bili: x / Urobili: x   Blood: x / Protein: x / Nitrite: x   Leuk Esterase: x / RBC: x / WBC x   Sq Epi: x / Non Sq Epi: x / Bacteria: x      CAPILLARY BLOOD GLUCOSE      POCT Blood Glucose.: 143 mg/dL (04 Jun 2025 11:24)  POCT Blood Glucose.: 122 mg/dL (04 Jun 2025 07:31)  POCT Blood Glucose.: 168 mg/dL (03 Jun 2025 21:09)  POCT Blood Glucose.: 149 mg/dL (03 Jun 2025 16:31)        Urinalysis Basic - ( 04 Jun 2025 06:40 )    Color: x / Appearance: x / SG: x / pH: x  Gluc: 119 mg/dL / Ketone: x  / Bili: x / Urobili: x   Blood: x / Protein: x / Nitrite: x   Leuk Esterase: x / RBC: x / WBC x   Sq Epi: x / Non Sq Epi: x / Bacteria: x        MEDICATIONS  (STANDING):  epoetin rudolph-epbx (RETACRIT) Injectable 4000 Unit(s) IV Push <User Schedule>  heparin   Injectable 5000 Unit(s) SubCutaneous every 12 hours  insulin lispro (ADMELOG) corrective regimen sliding scale   SubCutaneous three times a day before meals  insulin lispro (ADMELOG) corrective regimen sliding scale   SubCutaneous at bedtime  latanoprost 0.005% Ophthalmic Solution 1 Drop(s) Both EYES at bedtime  sevelamer carbonate 800 milliGRAM(s) Oral three times a day with meals  tamsulosin 0.4 milliGRAM(s) Oral at bedtime    MEDICATIONS  (PRN):  melatonin 3 milliGRAM(s) Oral at bedtime PRN Insomnia  midodrine. 10 milliGRAM(s) Oral <User Schedule> PRN SBP <100 in HD  ondansetron Injectable 4 milliGRAM(s) IV Push every 8 hours PRN Nausea and/or Vomiting      REVIEW OF SYSTEMS:  CONSTITUTIONAL: No fever  EYES: No eye pain, visual disturbances  ENMT: No sinus or throat pain  NECK: No pain   RESPIRATORY: No cough, wheezing, No shortness of breath  CARDIOVASCULAR: No chest pain, palpitations  GASTROINTESTINAL: No abdominal pain. No nausea, vomiting. No diarrhea or constipation.   GENITOURINARY: No dysuria  NEUROLOGICAL: No headaches  SKIN: + b/l leg wounds  MUSCULOSKELETAL: No joint pain    RADIOLOGY & ADDITIONAL TESTS:  < from: Xray Chest 1 View- PORTABLE-Urgent (Xray Chest 1 View- PORTABLE-Urgent .) (06.03.25 @ 09:50) >    ACC: [id number] EXAM:  XR CHEST PORTABLE URGENT 1V   ORDERED BY: DALLAS SIEGEL     PROCEDURE DATE:  06/03/2025          INTERPRETATION:  TECHNIQUE: Single portable view of the chest.    COMPARISON:  1/19/2025    CLINICAL HISTORY: assessing fluid status    FINDINGS:    Single frontal view of the chest demonstrates the lungs to be clear. The   cardiomediastinal silhouette is unremarkable. No acute osseous   abnormalities. Left-sided dialysis catheter. Right subclavian artery and   axillary artery stents.    IMPRESSION: No acute cardiopulmonary disease process.    --- End of Report ---        < end of copied text >    Imaging Personally Reviewed:  [x ] YES  [ ] NO    Consultant(s) Notes Reviewed:  [x ] YES  [ ] NO    PHYSICAL EXAM:  GENERAL: NAD  HEAD:  Atraumatic, Normocephalic  EYES: EOMI, PERRLA, conjunctiva and sclera clear  ENMT: Moist mucous membranes  NECK: Supple  NERVOUS SYSTEM:  Alert & Oriented X3, Good concentration  CHEST/LUNG: CTA bilaterally; No rales, rhonchi, wheezing  HEART: Regular rate   ABDOMEN: Soft, Nontender, Nondistended; Bowel sounds present  EXTREMITIES:  + b/l lower ext wrapped with ace wrap. noted thick toe nails. no cyansosis.      Care Collaborated Discussed with Consultants/Other Providers [x ] YES  [ ] NO

## 2025-06-04 NOTE — PROGRESS NOTE ADULT - ASSESSMENT
Patient is a 70yo Male with ESRD on HD via left IJ TDC, failed Rt AVG, HTN, 2HPT, Anemia, LE venous stasis p/w b/l LE wounds requiring debridement with graft placement. Nephrology consulted for ESRD status.     1) ESRD:  Last HD 6/4 (earlier today) via left IJ tunneled HD with intradialytic hypotension (requiring midodrine 10mg) with net 2.7L removed. Pt for OR 6/6. Plan for next HD 6/7 due to OR schedule. Rt AVG unsalvageable   Monitor electrolytes.    2) Hypotension: BP acceptable . Will c/w Midodrine 10mg PO MWF intra- HD to aid in fluid removal prn. Monitor BP    3) Anemia of renal disease: Hb low. Check iron studies in am. c/w Epo  4K with HD. Monitor hgb    4) Hyperphosphatemia- serum phos acceptable. Continue with renvela 1 tab with meals.  Monitor serum calcium and phos    [facility] NEPHROLOGY  Arden Tsang M.D.  Freeman Holland D.O.  Janny Chirinos M.D.  MD Ambar Mccarty, MSN, ANP-C    Telephone: [phone]  Facsimile: [phone] [place] [address], [address]  [place] [address]

## 2025-06-04 NOTE — PROGRESS NOTE ADULT - ASSESSMENT
69-year-old man from home ([facility] rehab until 4/22) with past history diabetes, hypertension on midodrine with dialysis, ESRD on dialysis Monday Wednesday Friday, most recently day prior to admission, has sided left-sided permacath, known to Dr. Chirinos, bilateral Charcot deformity follows with Dr. Sim, chronic OM of the right foot,  coming at recommendation of Dr. Sim for bilateral foot wound debridement and graft application.

## 2025-06-05 LAB
FERRITIN SERPL-MCNC: 1632 NG/ML — HIGH (ref 30–400)
GLUCOSE BLDC GLUCOMTR-MCNC: 121 MG/DL — HIGH (ref 70–99)
GLUCOSE BLDC GLUCOMTR-MCNC: 127 MG/DL — HIGH (ref 70–99)
GLUCOSE BLDC GLUCOMTR-MCNC: 131 MG/DL — HIGH (ref 70–99)
GLUCOSE BLDC GLUCOMTR-MCNC: 178 MG/DL — HIGH (ref 70–99)
IRON SATN MFR SERPL: 37 % — SIGNIFICANT CHANGE UP (ref 20–55)
IRON SATN MFR SERPL: 56 UG/DL — LOW (ref 65–170)
TIBC SERPL-MCNC: 152 UG/DL — LOW (ref 250–450)
UIBC SERPL-MCNC: 96 UG/DL — LOW (ref 110–370)

## 2025-06-05 RX ADMIN — SEVELAMER HYDROCHLORIDE 800 MILLIGRAM(S): 800 TABLET ORAL at 11:47

## 2025-06-05 RX ADMIN — LATANOPROST PF 1 DROP(S): 0.05 SOLUTION/ DROPS OPHTHALMIC at 21:39

## 2025-06-05 RX ADMIN — TAMSULOSIN HYDROCHLORIDE 0.4 MILLIGRAM(S): 0.4 CAPSULE ORAL at 21:39

## 2025-06-05 RX ADMIN — SEVELAMER HYDROCHLORIDE 800 MILLIGRAM(S): 800 TABLET ORAL at 18:02

## 2025-06-05 RX ADMIN — SEVELAMER HYDROCHLORIDE 800 MILLIGRAM(S): 800 TABLET ORAL at 08:36

## 2025-06-05 RX ADMIN — HEPARIN SODIUM 5000 UNIT(S): 1000 INJECTION INTRAVENOUS; SUBCUTANEOUS at 05:21

## 2025-06-05 NOTE — PROGRESS NOTE ADULT - ASSESSMENT
Patient is a 68yo Male with ESRD on HD via left IJ TDC, failed Rt AVG, HTN, 2HPT, Anemia, LE venous stasis p/w b/l LE wounds requiring debridement with graft placement.Patient came  from home ([facility] until 4/22) with past history diabetes, hypertension on midodrine with dialysis, ESRD on dialysis Monday Wednesday Friday, most recently day prior to admission, has sided left-sided permacath, bilateral Charcot deformity follows with Dr. Sim, chronic OM of the right foot,  coming at recommendation of Dr. Valadez for bilateral foot wound debridement and graft application. Patient has bilateral heel wounds.      1.  Bilateral heel wounds.  2.  Podiatry consult greatly appreciated who opined that the wounds do not look acutely infected .  3. With high ESR(88) and CRP(30) , patient most likely has chronic osteomyelitis and with heel wounds , debridement and long term antibiotics will be the recommendation .  4.  Sometimes with heel wounds and osteo, it is difficult to salvage the foot but this will be decided by the podiatrist .  5. Agree to withhold the antibiotics for now and will follow up on the OR cx.  6.  Patient is afebrile.       Patient is a 68yo Male with ESRD on HD via left IJ TDC, failed Rt AVG, HTN, 2HPT, Anemia, LE venous stasis p/w b/l LE wounds requiring debridement with graft placement.[facility] with past history diabetes, hypertension on midodrine with dialysis, ESRD on dialysis Monday Wednesday Friday, most recently day prior to admission, has sided left-sided permacath, bilateral Charcot deformity follows with Dr. Sim, chronic OM of the right foot,  coming at recommendation of Dr. Valadez for bilateral foot wound debridement and graft application. Patient has bilateral heel wounds.      1.  Bilateral heel wounds.  2.  Podiatry consult greatly appreciated who opined that the wounds do not look acutely infected .  3. With high ESR(88) and CRP(30) , patient most likely has chronic osteomyelitis and with heel wounds , debridement and long term antibiotics will be the recommendation .  4.  Sometimes with heel wounds and osteo, it is difficult to salvage the foot but this will be decided by the podiatrist .  5. Agree to withhold the antibiotics for now and will follow up on the OR cx.  6.  Patient is afebrile.Blood cultures x 2 from 06/03/2025-negative.

## 2025-06-05 NOTE — PROGRESS NOTE ADULT - PROBLEM SELECTOR PLAN 2
ESRD on HD MWF via left sided permacath  -C/w sevelamer TID, calcitriol, epo 4K with HD, midodrine 10mg on dialysis days  -Renal diet, 1.2L fluid restriction  -Nephro Dr. Chirinos following

## 2025-06-05 NOTE — PROGRESS NOTE ADULT - SUBJECTIVE AND OBJECTIVE BOX
Podiatry HPI: Patient with worsening foot wounds secondary to pressure and patients charcot. Sent in for surgical intervention. Wife at bedside states would have gotten worse due to pressure. Denies any fever, nausea, vomiting and sob.     Medications epoetin rudolph-epbx (RETACRIT) Injectable 4000 Unit(s) IV Push <User Schedule>  heparin   Injectable 5000 Unit(s) SubCutaneous every 12 hours  insulin lispro (ADMELOG) corrective regimen sliding scale   SubCutaneous three times a day before meals  insulin lispro (ADMELOG) corrective regimen sliding scale   SubCutaneous at bedtime  latanoprost 0.005% Ophthalmic Solution 1 Drop(s) Both EYES at bedtime  melatonin 3 milliGRAM(s) Oral at bedtime PRN  midodrine. 10 milliGRAM(s) Oral <User Schedule> PRN  ondansetron Injectable 4 milliGRAM(s) IV Push every 8 hours PRN  sevelamer carbonate 800 milliGRAM(s) Oral three times a day with meals  tamsulosin 0.4 milliGRAM(s) Oral at bedtime    FHFamily history of diabetes mellitus    No pertinent family history in first degree relatives    ,   PMHDiabetes    Kidney problem    Hypertension    HTN (hypertension)    DM (diabetes mellitus)    HTN (hypertension)    DM (diabetes mellitus)    ESRD (end stage renal disease) on dialysis    Charcot foot due to diabetes mellitus    ESRD on hemodialysis    Charcot foot due to diabetes mellitus    Diabetic retinopathy associated with type 2 diabetes mellitus    Hyperlipidemia    Abscess of scrotum    Hidradenitis    End stage renal disease       PSHNo significant past surgical history    S/P foot surgery    Charcot's syndrome    History of incision and drainage    Hemodialysis access, AV graft        Labs                          9.4    6.38  )-----------( 190      ( 04 Jun 2025 06:40 )             29.1      06-04    131[L]  |  97  |  46[H]  ----------------------------<  119[H]  5.0   |  28  |  5.36[H]    Ca    9.2      04 Jun 2025 06:40  Phos  5.2     06-04  Mg     2.2     06-04    TPro  7.3  /  Alb  2.6[L]  /  TBili  0.4  /  DBili  x   /  AST  12  /  ALT  14  /  AlkPhos  102  06-04     Vital Signs Last 24 Hrs  T(C): 36.7 (05 Jun 2025 06:05), Max: 36.7 (04 Jun 2025 20:33)  T(F): 98 (05 Jun 2025 06:05), Max: 98 (04 Jun 2025 20:33)  HR: 81 (05 Jun 2025 06:05) (74 - 81)  BP: 175/78 (05 Jun 2025 06:05) (144/61 - 175/78)  BP(mean): 110 (05 Jun 2025 06:05) (110 - 110)  RR: 18 (05 Jun 2025 06:05) (16 - 18)  SpO2: 98% (05 Jun 2025 06:05) (97% - 100%)    Parameters below as of 05 Jun 2025 06:05  Patient On (Oxygen Delivery Method): room air      Sedimentation Rate, Erythrocyte: 88 mm/hr (06-04-25 @ 06:40)  Sedimentation Rate, Erythrocyte: 94 mm/hr (06-03-25 @ 10:00)         C-Reactive Protein: 30.3 mg/L (06-04-25 @ 06:40)  C-Reactive Protein: 34.8 mg/L (06-03-25 @ 10:00)  C-Reactive Protein: 160.0 mg/L (12-17-24 @ 07:28)  C-Reactive Protein: 139.0 mg/L (12-16-24 @ 11:50)       LE FOCUSED PHYSICAL EXAM:  Vasc: DP/PT non-palpable bilaterally. CFT <3 seconds x 10. Temp Gradient within normal limits bilaterally with no increase in temperature to the right foot ulcer. No edema, no erythema, no varicosities observed bilaterally.   Derm:   Right foot- plantar heel wound about 6 cm x 4 cm with fibrotic wound base, medial TN joint about 1 cm wide. Wounds have no drainage and don't appear to be acutely infected   Left foot- Heel wound with an eschar about 4 cm wide. lateral foot wound about 2 cm wide. Wounds are not acutely infected.   MSK: Muscle strength deferred. No pain upon palpation bilaterally. No pain upon ROM of pedal and ankle joints bilaterally. No crepitation upon ROM of joints. Arch height 2/5 on-WB. Negative calf tenderness bilaterally.     IMAGING:   < from: Xray Ankle Complete 3 Views, Bilateral (06.03.25 @ 09:52) >  IMPRESSION: Advanced destructive changes of the hindfeet. No definite   bony erosion or acute fracture.      Thank you for this referral.    --- End of Report ---    < end of copied text >

## 2025-06-05 NOTE — PROGRESS NOTE ADULT - SUBJECTIVE AND OBJECTIVE BOX
Date of Service 06-05-25 @ 07:17    CHIEF COMPLAINT:Patient is a 69y old  Male who presents with a chief complaint of foot wound debridement (04 Jun 2025 17:11)    	  REVIEW OF SYSTEMS:  CONSTITUTIONAL: No fever, weight loss, or fatigue  EYES: No eye pain, visual disturbances, or discharge  ENT:  No difficulty hearing, tinnitus, vertigo; No sinus or throat pain  NECK: No pain or stiffness  RESPIRATORY: No cough, wheezing, chills or hemoptysis; No Shortness of Breath  CARDIOVASCULAR: No chest pain, palpitations, passing out, dizziness, or leg swelling  GASTROINTESTINAL: No abdominal or epigastric pain. No nausea, vomiting, or hematemesis; No diarrhea or constipation. No melena or hematochezia.  GENITOURINARY: No dysuria, frequency, hematuria, or incontinence  NEUROLOGICAL: No headaches, memory loss, loss of strength, numbness, or tremors  SKIN: No itching, burning, rashes, or lesions   LYMPH Nodes: No enlarged glands  ENDOCRINE: No heat or cold intolerance; No hair loss  MUSCULOSKELETAL: No joint pain or swelling; No muscle, back, or extremity pain  PSYCHIATRIC: No depression, anxiety, mood swings, or difficulty sleeping  HEME/LYMPH: No easy bruising, or bleeding gums  ALLERGY AND IMMUNOLOGIC: No hives or eczema	      PHYSICAL EXAM:  T(C): 36.7 (06-05-25 @ 06:05), Max: 36.7 (06-04-25 @ 20:33)  HR: 81 (06-05-25 @ 06:05) (74 - 81)  BP: 175/78 (06-05-25 @ 06:05) (144/61 - 175/78)  RR: 18 (06-05-25 @ 06:05) (16 - 18)  SpO2: 98% (06-05-25 @ 06:05) (97% - 100%)  Wt(kg): --  I&O's Summary      Appearance: He is chronically ill.  HEENT:     Pallor present., PERRL, EOMI	  Lymphatic: No lymphadenopathy  Cardiovascular: Normal S1 S2, No JVD, No murmurs, No edema  Respiratory: Lungs clear to auscultation	  Psychiatry: A & O x 3, Mood & affect appropriate  Gastrointestinal:  Soft, Non-tender, + BS	  Skin:   Wounds present.	  Neurologic: Non-focal  Extremities: Normal range of motion, No clubbing, cyanosis or edema  Vascular:  Difficult to palpate pedal pulses.    MEDICATIONS  (STANDING):  epoetin rudolph-epbx (RETACRIT) Injectable 4000 Unit(s) IV Push <User Schedule>  heparin   Injectable 5000 Unit(s) SubCutaneous every 12 hours  insulin lispro (ADMELOG) corrective regimen sliding scale   SubCutaneous three times a day before meals  insulin lispro (ADMELOG) corrective regimen sliding scale   SubCutaneous at bedtime  latanoprost 0.005% Ophthalmic Solution 1 Drop(s) Both EYES at bedtime  sevelamer carbonate 800 milliGRAM(s) Oral three times a day with meals  tamsulosin 0.4 milliGRAM(s) Oral at bedtime      TELEMETRY: 	    ECG:  	  RADIOLOGY:  OTHER: 	  	  LABS:	 	    CARDIAC MARKERS:                                9.4    6.38  )-----------( 190      ( 04 Jun 2025 06:40 )             29.1     06-04    131[L]  |  97  |  46[H]  ----------------------------<  119[H]  5.0   |  28  |  5.36[H]    Ca    9.2      04 Jun 2025 06:40  Phos  5.2     06-04  Mg     2.2     06-04    TPro  7.3  /  Alb  2.6[L]  /  TBili  0.4  /  DBili  x   /  AST  12  /  ALT  14  /  AlkPhos  102  06-04

## 2025-06-05 NOTE — PROGRESS NOTE ADULT - SUBJECTIVE AND OBJECTIVE BOX
Patient is a 69y old  Male who presents with a chief complaint of foot wound debridement (05 Jun 2025 07:51)      INTERVAL HPI/OVERNIGHT EVENTS: no new complaints    MEDICATIONS  (STANDING):  epoetin rudolph-epbx (RETACRIT) Injectable 4000 Unit(s) IV Push <User Schedule>  heparin   Injectable 5000 Unit(s) SubCutaneous every 12 hours  insulin lispro (ADMELOG) corrective regimen sliding scale   SubCutaneous three times a day before meals  insulin lispro (ADMELOG) corrective regimen sliding scale   SubCutaneous at bedtime  latanoprost 0.005% Ophthalmic Solution 1 Drop(s) Both EYES at bedtime  sevelamer carbonate 800 milliGRAM(s) Oral three times a day with meals  tamsulosin 0.4 milliGRAM(s) Oral at bedtime    MEDICATIONS  (PRN):  melatonin 3 milliGRAM(s) Oral at bedtime PRN Insomnia  midodrine. 10 milliGRAM(s) Oral <User Schedule> PRN SBP <100 in HD  ondansetron Injectable 4 milliGRAM(s) IV Push every 8 hours PRN Nausea and/or Vomiting      __________________________________________________  REVIEW OF SYSTEMS:    CONSTITUTIONAL: No fever,   EYES: no acute visual disturbances  NECK: No pain or stiffness  RESPIRATORY: No cough; No shortness of breath  CARDIOVASCULAR: No chest pain, no palpitations  GASTROINTESTINAL: No pain. No nausea or vomiting; No diarrhea   NEUROLOGICAL: No headache or numbness, no tremors  MUSCULOSKELETAL: No joint pain, no muscle pain  GENITOURINARY: no dysuria, no frequency, no hesitancy  PSYCHIATRY: no depression , no anxiety  ALL OTHER  ROS negative      Vital Signs Last 24 Hrs  T(C): 36.7 (05 Jun 2025 06:05), Max: 36.7 (04 Jun 2025 20:33)  T(F): 98 (05 Jun 2025 06:05), Max: 98 (04 Jun 2025 20:33)  HR: 81 (05 Jun 2025 06:05) (74 - 81)  BP: 175/78 (05 Jun 2025 06:05) (144/61 - 175/78)  BP(mean): 110 (05 Jun 2025 06:05) (110 - 110)  RR: 18 (05 Jun 2025 06:05) (17 - 18)  SpO2: 98% (05 Jun 2025 06:05) (98% - 100%)    Parameters below as of 05 Jun 2025 06:05  Patient On (Oxygen Delivery Method): room air        ________________________________________________  PHYSICAL EXAM:  GENERAL: NAD  HEENT: Normocephalic;  conjunctivae and sclerae clear; moist mucous membranes;   NECK : supple  CHEST/LUNG: Clear to auscultation bilaterally with good air entry   HEART: S1 S2  regular; no murmurs, gallops or rubs  ABDOMEN: Soft, Nontender, Nondistended; Bowel sounds present  EXTREMITIES: no cyanosis; no edema; no calf tenderness  SKIN: warm and dry; no rash  NERVOUS SYSTEM:  Awake and alert; Oriented  to place, person and time ; no new deficits    _________________________________________________  LABS:                        9.4    6.38  )-----------( 190      ( 04 Jun 2025 06:40 )             29.1     06-04    131[L]  |  97  |  46[H]  ----------------------------<  119[H]  5.0   |  28  |  5.36[H]    Ca    9.2      04 Jun 2025 06:40  Phos  5.2     06-04  Mg     2.2     06-04    TPro  7.3  /  Alb  2.6[L]  /  TBili  0.4  /  DBili  x   /  AST  12  /  ALT  14  /  AlkPhos  102  06-04      Urinalysis Basic - ( 04 Jun 2025 06:40 )    Color: x / Appearance: x / SG: x / pH: x  Gluc: 119 mg/dL / Ketone: x  / Bili: x / Urobili: x   Blood: x / Protein: x / Nitrite: x   Leuk Esterase: x / RBC: x / WBC x   Sq Epi: x / Non Sq Epi: x / Bacteria: x      CAPILLARY BLOOD GLUCOSE      POCT Blood Glucose.: 131 mg/dL (05 Jun 2025 07:51)  POCT Blood Glucose.: 187 mg/dL (04 Jun 2025 21:22)  POCT Blood Glucose.: 106 mg/dL (04 Jun 2025 16:59)  POCT Blood Glucose.: 143 mg/dL (04 Jun 2025 11:24)      RADIOLOGY & ADDITIONAL TESTS:    Imaging Personally Reviewed:  YES/NO    Consultant(s) Notes Reviewed:   YES/ No    Care Discussed with Consultants :     Plan of care was discussed with patient and /or primary care giver; all questions and concerns were addressed and care was aligned with patient's wishes.       Patient is a 69y old  Male who presents with a chief complaint of foot wound debridement (05 Jun 2025 07:51)      INTERVAL HPI/OVERNIGHT EVENTS: pt seen with no new complaints    MEDICATIONS  (STANDING):  epoetin rudolph-epbx (RETACRIT) Injectable 4000 Unit(s) IV Push <User Schedule>  heparin   Injectable 5000 Unit(s) SubCutaneous every 12 hours  insulin lispro (ADMELOG) corrective regimen sliding scale   SubCutaneous three times a day before meals  insulin lispro (ADMELOG) corrective regimen sliding scale   SubCutaneous at bedtime  latanoprost 0.005% Ophthalmic Solution 1 Drop(s) Both EYES at bedtime  sevelamer carbonate 800 milliGRAM(s) Oral three times a day with meals  tamsulosin 0.4 milliGRAM(s) Oral at bedtime    MEDICATIONS  (PRN):  melatonin 3 milliGRAM(s) Oral at bedtime PRN Insomnia  midodrine. 10 milliGRAM(s) Oral <User Schedule> PRN SBP <100 in HD  ondansetron Injectable 4 milliGRAM(s) IV Push every 8 hours PRN Nausea and/or Vomiting  __________________________________________________  REVIEW OF SYSTEMS:    CONSTITUTIONAL: No fever,   EYES: no acute visual disturbances  NECK: No pain or stiffness  RESPIRATORY: No cough; No shortness of breath  CARDIOVASCULAR: No chest pain, no palpitations  GASTROINTESTINAL: No pain. No nausea or vomiting; No diarrhea   NEUROLOGICAL: No headache or numbness, no tremors  MUSCULOSKELETAL: No joint pain, no muscle pain  GENITOURINARY: no dysuria, no frequency, no hesitancy  PSYCHIATRY: no depression , no anxiety  ALL OTHER  ROS negative      Vital Signs Last 24 Hrs  T(C): 36.7 (05 Jun 2025 06:05), Max: 36.7 (04 Jun 2025 20:33)  T(F): 98 (05 Jun 2025 06:05), Max: 98 (04 Jun 2025 20:33)  HR: 81 (05 Jun 2025 06:05) (74 - 81)  BP: 175/78 (05 Jun 2025 06:05) (144/61 - 175/78)  BP(mean): 110 (05 Jun 2025 06:05) (110 - 110)  RR: 18 (05 Jun 2025 06:05) (17 - 18)  SpO2: 98% (05 Jun 2025 06:05) (98% - 100%)    Parameters below as of 05 Jun 2025 06:05  Patient On (Oxygen Delivery Method): room air    ________________________________________________  PHYSICAL EXAM:  GENERAL: NAD  HEENT: Normocephalic;  conjunctivae and sclerae clear; moist mucous membranes;   NECK : supple  CHEST/LUNG: Clear to auscultation bilaterally with good air entry   HEART: S1 S2  regular; no murmurs, gallops or rubs  ABDOMEN: Soft, Nontender, Nondistended; Bowel sounds present  EXTREMITIES: no cyanosis; no edema; no calf tenderness  SKIN: warm and dry; no rash  NERVOUS SYSTEM:  Awake and alert; Oriented  to place, person and time ; no new deficits    _________________________________________________  LABS:                        9.4    6.38  )-----------( 190      ( 04 Jun 2025 06:40 )             29.1     06-04    131[L]  |  97  |  46[H]  ----------------------------<  119[H]  5.0   |  28  |  5.36[H]    Ca    9.2      04 Jun 2025 06:40  Phos  5.2     06-04  Mg     2.2     06-04    TPro  7.3  /  Alb  2.6[L]  /  TBili  0.4  /  DBili  x   /  AST  12  /  ALT  14  /  AlkPhos  102  06-04      Urinalysis Basic - ( 04 Jun 2025 06:40 )    Color: x / Appearance: x / SG: x / pH: x  Gluc: 119 mg/dL / Ketone: x  / Bili: x / Urobili: x   Blood: x / Protein: x / Nitrite: x   Leuk Esterase: x / RBC: x / WBC x   Sq Epi: x / Non Sq Epi: x / Bacteria: x      CAPILLARY BLOOD GLUCOSE      POCT Blood Glucose.: 131 mg/dL (05 Jun 2025 07:51)  POCT Blood Glucose.: 187 mg/dL (04 Jun 2025 21:22)  POCT Blood Glucose.: 106 mg/dL (04 Jun 2025 16:59)  POCT Blood Glucose.: 143 mg/dL (04 Jun 2025 11:24)      RADIOLOGY & ADDITIONAL TESTS:    Imaging Personally Reviewed:  YES/NO    Consultant(s) Notes Reviewed:   YES/ No    Care Discussed with Consultants :     Plan of care was discussed with patient and /or primary care giver; all questions and concerns were addressed and care was aligned with patient's wishes.       Patient is a 69y old  Male who presents with a chief complaint of foot wound debridement (05 Jun 2025 07:51)      INTERVAL HPI/OVERNIGHT EVENTS: pt seen with no new complaints    MEDICATIONS  (STANDING):  epoetin rudolph-epbx (RETACRIT) Injectable 4000 Unit(s) IV Push <User Schedule>  heparin   Injectable 5000 Unit(s) SubCutaneous every 12 hours  insulin lispro (ADMELOG) corrective regimen sliding scale   SubCutaneous three times a day before meals  insulin lispro (ADMELOG) corrective regimen sliding scale   SubCutaneous at bedtime  latanoprost 0.005% Ophthalmic Solution 1 Drop(s) Both EYES at bedtime  sevelamer carbonate 800 milliGRAM(s) Oral three times a day with meals  tamsulosin 0.4 milliGRAM(s) Oral at bedtime    MEDICATIONS  (PRN):  melatonin 3 milliGRAM(s) Oral at bedtime PRN Insomnia  midodrine. 10 milliGRAM(s) Oral <User Schedule> PRN SBP <100 in HD  ondansetron Injectable 4 milliGRAM(s) IV Push every 8 hours PRN Nausea and/or Vomiting  __________________________________________________  REVIEW OF SYSTEMS:    CONSTITUTIONAL: No fever,   EYES: no acute visual disturbances  NECK: No pain or stiffness  RESPIRATORY: No cough; No shortness of breath  CARDIOVASCULAR: No chest pain, no palpitations  GASTROINTESTINAL: No pain. No nausea or vomiting; No diarrhea   NEUROLOGICAL: No headache or numbness, no tremors  MUSCULOSKELETAL: No joint pain, no muscle pain  GENITOURINARY: no dysuria, no frequency, no hesitancy  PSYCHIATRY: no depression , no anxiety  ALL OTHER  ROS negative      Vital Signs Last 24 Hrs  T(C): 36.7 (05 Jun 2025 06:05), Max: 36.7 (04 Jun 2025 20:33)  T(F): 98 (05 Jun 2025 06:05), Max: 98 (04 Jun 2025 20:33)  HR: 81 (05 Jun 2025 06:05) (74 - 81)  BP: 175/78 (05 Jun 2025 06:05) (144/61 - 175/78)  BP(mean): 110 (05 Jun 2025 06:05) (110 - 110)  RR: 18 (05 Jun 2025 06:05) (17 - 18)  SpO2: 98% (05 Jun 2025 06:05) (98% - 100%)    Parameters below as of 05 Jun 2025 06:05  Patient On (Oxygen Delivery Method): room air    ________________________________________________  PHYSICAL EXAM:  GENERAL: NAD  HEENT: Normocephalic;  conjunctivae and sclerae clear; moist mucous membranes;   NECK : supple  CHEST/LUNG: Clear to auscultation bilaterally with good air entry   HEART: S1 S2  regular; no murmurs, gallops or rubs  ABDOMEN: Soft, Nontender, Nondistended; Bowel sounds present  EXTREMITIES: no cyanosis; no edema; no calf tenderness  SKIN: Bilateral foot wounds  NERVOUS SYSTEM:  Awake and alert; Oriented  to place, person and time ; no new deficits    _________________________________________________  LABS:                        9.4    6.38  )-----------( 190      ( 04 Jun 2025 06:40 )             29.1     06-04    131[L]  |  97  |  46[H]  ----------------------------<  119[H]  5.0   |  28  |  5.36[H]    Ca    9.2      04 Jun 2025 06:40  Phos  5.2     06-04  Mg     2.2     06-04    TPro  7.3  /  Alb  2.6[L]  /  TBili  0.4  /  DBili  x   /  AST  12  /  ALT  14  /  AlkPhos  102  06-04      Urinalysis Basic - ( 04 Jun 2025 06:40 )    Color: x / Appearance: x / SG: x / pH: x  Gluc: 119 mg/dL / Ketone: x  / Bili: x / Urobili: x   Blood: x / Protein: x / Nitrite: x   Leuk Esterase: x / RBC: x / WBC x   Sq Epi: x / Non Sq Epi: x / Bacteria: x    CAPILLARY BLOOD GLUCOSE    POCT Blood Glucose.: 131 mg/dL (05 Jun 2025 07:51)  POCT Blood Glucose.: 187 mg/dL (04 Jun 2025 21:22)  POCT Blood Glucose.: 106 mg/dL (04 Jun 2025 16:59)  POCT Blood Glucose.: 143 mg/dL (04 Jun 2025 11:24)    RADIOLOGY & ADDITIONAL TESTS:   < from: Xray Ankle Complete 3 Views, Bilateral (06.03.25 @ 09:52) >  ACC: [id number] EXAM:  XR ANKLE COMP MIN 3 VIEWS BI   ORDERED BY: DALLAS SIEGEL     ACC: [id number] EXAM:  XR FOOT COMP MIN 3 VIEWS BI   ORDERED BY: DALLAS SIEGEL     PROCEDURE DATE:  06/03/2025          INTERPRETATION:  Bilateral feet and bilateral ankles    HISTORY: Bilateral wounds    COMPARISON: 5/15/2025     3 views of the right foot, 3 views of the left foot, 3 views of the left   ankle and 3 views of the right ankle show advanced arthritic changes of   the ankle joint with invagination of the distaltibia is intact to the   hindfoot with destruction of both the talar bones and the calcaneus   bilaterally. There is lateral shifting of the mid foot with regard to the   hindfoot bilaterally. Vascular calcifications are present.    IMPRESSION: Advanced destructive changes of the hindfeet. No definite   bony erosion or acute fracture.      Thank you for this referral.    --- End of Report ---    Imaging Personally Reviewed:  YES    Consultant(s) Notes Reviewed:   YES    Plan of care was discussed with patient and /or primary care giver; all questions and concerns were addressed and care was aligned with patient's wishes.

## 2025-06-05 NOTE — PROGRESS NOTE ADULT - ASSESSMENT
A: b/l foot wounds      P:   -Patient evaluated and Chart reviewed   -Scheduled for OR wound debridement and graft application of b/l foot wounds Tomorrow at 8:45 AM  -Please medically optimize for surgery  -Continue with IV antibiotics As Per ID  -Podiatry to follow while in house.  -Discussed with Attending Cirlincione

## 2025-06-05 NOTE — PROGRESS NOTE ADULT - ASSESSMENT
Patient is a 68yo Male with ESRD on HD via left IJ TDC, failed Rt AVG, HTN, 2HPT, Anemia, LE venous stasis p/w b/l LE wounds requiring debridement with graft placement. Nephrology consulted for ESRD status.     1) ESRD:  Last HD 6/4, via left IJ tunneled HD with intradialytic hypotension (requiring midodrine 10mg) with net 2.7L removed. Pt for OR 6/6. Plan for next HD 6/7 (instead of 6/6) due to OR schedule. Rt AVG unsalvageable. Will switch back to MWF schedule next week. Monitor electrolytes.    2) Hypotension: BP elevated.  c/w Midodrine 10mg PO MWF intra- HD to aid in fluid removal prn. Monitor BP    3) Anemia of renal disease: Hb low with adequate iron stores.  c/w Epo  4K with HD. Monitor hgb    4) Hyperphosphatemia- serum phos acceptable. Continue with renvela 1 tab with meals.  Monitor serum calcium and phos    [facility] NEPHROLOGY  Arden Tsang M.D.  Freeman Holland D.O.  Janny Chirinos M.D.  MD Ambar Mccarty, MSN, ANP-C    Telephone: [phone]  Facsimile: [phone] [place] [address], [id number]  [place] [address]

## 2025-06-05 NOTE — PROGRESS NOTE ADULT - ASSESSMENT
69-year-old man from home ([facility] rehab until 4/22) with past history diabetes, hypertension on midodrine with dialysis, ESRD on dialysis Monday Wednesday Friday, most recently day prior to admission, has sided left-sided permacath, known to Dr. Chirinos, bilateral Charcot deformity follows with Dr. Sim, chronic OM of the right foot,  coming at recommendation of Dr. Sim for bilateral foot wound debridement and graft application.

## 2025-06-05 NOTE — PROGRESS NOTE ADULT - PROBLEM SELECTOR PLAN 1
Multiple chronic ulcers of both feet, not currently infected.    -Follows outpt by Dr. Sim: sent for b/l wound debridement and graft placement, plan for Friday 6/6.  -Scheduled for 6/6 @ 8:45am  -Daily ESR and CRP  -NPO after MN, hold heparin after MN Thurs 6/5   -Podiatry following

## 2025-06-05 NOTE — PROGRESS NOTE ADULT - SUBJECTIVE AND OBJECTIVE BOX
[facility] NEPHROLOGY- PROGRESS NOTE    Patient is a 70yo Male with ESRD on HD via left IJ TDC, failed Rt AVG, HTN, 2HPT, Anemia, LE venous stasis p/w b/l LE wounds requiring debridement with graft placement. Nephrology consulted for ESRD status.     Hospital Medications: Medications reviewed.  REVIEW OF SYSTEMS:  CONSTITUTIONAL: No fevers or chills  RESPIRATORY: No shortness of breath  CARDIOVASCULAR: No chest pain.  GASTROINTESTINAL: No nausea, vomiting, diarrhea or abdominal pain.   VASCULAR: No bilateral lower extremity edema.     VITALS:  T(F): 98.3 (06-05-25 @ 12:40), Max: 98.3 (06-05-25 @ 12:40)  HR: 76 (06-05-25 @ 12:40)  BP: 146/72 (06-05-25 @ 12:40)  RR: 18 (06-05-25 @ 12:40)  SpO2: 97% (06-05-25 @ 12:40)  Wt(kg): --        PHYSICAL EXAM:  Gen: NAD, calm  HEENT: anicteric  Cards: RRR, +S1/S2, no M/G/R  Resp: CTA B/L  GI: soft, NT/ND, NABS  Extremities: no LE edema B/L  Derm: b/l feet wrapped  Access: Left IJ tunneled HD catheter- intact    LABS:  06-04    131[L]  |  97  |  46[H]  ----------------------------<  119[H]  5.0   |  28  |  5.36[H]    Ca    9.2      04 Jun 2025 06:40  Phos  5.2     06-04  Mg     2.2     06-04    TPro  7.3  /  Alb  2.6[L]  /  TBili  0.4  /  DBili      /  AST  12  /  ALT  14  /  AlkPhos  102  06-04    Creatinine Trend: 5.36 <--, 4.47 <--                        9.4    6.38  )-----------( 190      ( 04 Jun 2025 06:40 )             29.1     Urine Studies:  Urinalysis Basic - ( 04 Jun 2025 06:40 )    Color:  / Appearance:  / SG:  / pH:   Gluc: 119 mg/dL / Ketone:   / Bili:  / Urobili:    Blood:  / Protein:  / Nitrite:    Leuk Esterase:  / RBC:  / WBC    Sq Epi:  / Non Sq Epi:  / Bacteria:

## 2025-06-05 NOTE — PROGRESS NOTE ADULT - ATTENDING COMMENTS
Will hold off any abx (considering podiatric wounds are relatively stable at present) until wound cxs reveal a more accurate microbial assessment.
Will hold off any abx (considering podiatric wounds are relatively stable at present) until wound cxs reveal a more accurate microbial assessment.

## 2025-06-06 ENCOUNTER — TRANSCRIPTION ENCOUNTER (OUTPATIENT)
Age: 70
End: 2025-06-06

## 2025-06-06 DIAGNOSIS — Z75.8 OTHER PROBLEMS RELATED TO MEDICAL FACILITIES AND OTHER HEALTH CARE: ICD-10-CM

## 2025-06-06 LAB
ANION GAP SERPL CALC-SCNC: 4 MMOL/L — LOW (ref 5–17)
BLD GP AB SCN SERPL QL: SIGNIFICANT CHANGE UP
BUN SERPL-MCNC: 45 MG/DL — HIGH (ref 7–18)
CALCIUM SERPL-MCNC: 9 MG/DL — SIGNIFICANT CHANGE UP (ref 8.4–10.5)
CHLORIDE SERPL-SCNC: 99 MMOL/L — SIGNIFICANT CHANGE UP (ref 96–108)
CO2 SERPL-SCNC: 29 MMOL/L — SIGNIFICANT CHANGE UP (ref 22–31)
CREAT SERPL-MCNC: 5.65 MG/DL — HIGH (ref 0.5–1.3)
EGFR: 10 ML/MIN/1.73M2 — LOW
EGFR: 10 ML/MIN/1.73M2 — LOW
GLUCOSE BLDC GLUCOMTR-MCNC: 122 MG/DL — HIGH (ref 70–99)
GLUCOSE BLDC GLUCOMTR-MCNC: 135 MG/DL — HIGH (ref 70–99)
GLUCOSE BLDC GLUCOMTR-MCNC: 138 MG/DL — HIGH (ref 70–99)
GLUCOSE BLDC GLUCOMTR-MCNC: 149 MG/DL — HIGH (ref 70–99)
GLUCOSE BLDC GLUCOMTR-MCNC: 162 MG/DL — HIGH (ref 70–99)
GLUCOSE SERPL-MCNC: 150 MG/DL — HIGH (ref 70–99)
HCT VFR BLD CALC: 31.3 % — LOW (ref 39–50)
HGB BLD-MCNC: 10.1 G/DL — LOW (ref 13–17)
INR BLD: 0.95 RATIO — SIGNIFICANT CHANGE UP (ref 0.85–1.16)
MCHC RBC-ENTMCNC: 32.3 G/DL — SIGNIFICANT CHANGE UP (ref 32–36)
MCHC RBC-ENTMCNC: 33 PG — SIGNIFICANT CHANGE UP (ref 27–34)
MCV RBC AUTO: 102.3 FL — HIGH (ref 80–100)
NRBC BLD AUTO-RTO: 0 /100 WBCS — SIGNIFICANT CHANGE UP (ref 0–0)
PLATELET # BLD AUTO: 215 K/UL — SIGNIFICANT CHANGE UP (ref 150–400)
POTASSIUM SERPL-MCNC: 5.1 MMOL/L — SIGNIFICANT CHANGE UP (ref 3.5–5.3)
POTASSIUM SERPL-SCNC: 5.1 MMOL/L — SIGNIFICANT CHANGE UP (ref 3.5–5.3)
PROTHROM AB SERPL-ACNC: 11.1 SEC — SIGNIFICANT CHANGE UP (ref 9.9–13.4)
RBC # BLD: 3.06 M/UL — LOW (ref 4.2–5.8)
RBC # FLD: 14.6 % — HIGH (ref 10.3–14.5)
SODIUM SERPL-SCNC: 132 MMOL/L — LOW (ref 135–145)
WBC # BLD: 7.86 K/UL — SIGNIFICANT CHANGE UP (ref 3.8–10.5)
WBC # FLD AUTO: 7.86 K/UL — SIGNIFICANT CHANGE UP (ref 3.8–10.5)

## 2025-06-06 PROCEDURE — 88304 TISSUE EXAM BY PATHOLOGIST: CPT | Mod: 26

## 2025-06-06 DEVICE — IMPLANTABLE DEVICE: Type: IMPLANTABLE DEVICE | Status: FUNCTIONAL

## 2025-06-06 RX ORDER — SEVELAMER HYDROCHLORIDE 800 MG/1
800 TABLET ORAL
Refills: 0 | Status: DISCONTINUED | OUTPATIENT
Start: 2025-06-06 | End: 2025-06-16

## 2025-06-06 RX ORDER — EPOETIN ALFA 10000 [IU]/ML
4000 SOLUTION INTRAVENOUS; SUBCUTANEOUS
Refills: 0 | Status: DISCONTINUED | OUTPATIENT
Start: 2025-06-06 | End: 2025-06-11

## 2025-06-06 RX ORDER — LATANOPROST PF 0.05 MG/ML
1 SOLUTION/ DROPS OPHTHALMIC AT BEDTIME
Refills: 0 | Status: DISCONTINUED | OUTPATIENT
Start: 2025-06-06 | End: 2025-06-16

## 2025-06-06 RX ORDER — MELATONIN 5 MG
3 TABLET ORAL AT BEDTIME
Refills: 0 | Status: DISCONTINUED | OUTPATIENT
Start: 2025-06-06 | End: 2025-06-16

## 2025-06-06 RX ORDER — TAMSULOSIN HYDROCHLORIDE 0.4 MG/1
0.4 CAPSULE ORAL AT BEDTIME
Refills: 0 | Status: DISCONTINUED | OUTPATIENT
Start: 2025-06-06 | End: 2025-06-16

## 2025-06-06 RX ORDER — FENTANYL CITRATE-0.9 % NACL/PF 100MCG/2ML
50 SYRINGE (ML) INTRAVENOUS
Refills: 0 | Status: DISCONTINUED | OUTPATIENT
Start: 2025-06-06 | End: 2025-06-06

## 2025-06-06 RX ORDER — MUPIROCIN CALCIUM 20 MG/G
1 CREAM TOPICAL
Refills: 0 | Status: COMPLETED | OUTPATIENT
Start: 2025-06-06 | End: 2025-06-11

## 2025-06-06 RX ORDER — MIDODRINE HYDROCHLORIDE 5 MG/1
10 TABLET ORAL
Refills: 0 | Status: DISCONTINUED | OUTPATIENT
Start: 2025-06-06 | End: 2025-06-16

## 2025-06-06 RX ORDER — INSULIN LISPRO 100 U/ML
INJECTION, SOLUTION INTRAVENOUS; SUBCUTANEOUS
Refills: 0 | Status: DISCONTINUED | OUTPATIENT
Start: 2025-06-06 | End: 2025-06-16

## 2025-06-06 RX ORDER — INSULIN LISPRO 100 U/ML
INJECTION, SOLUTION INTRAVENOUS; SUBCUTANEOUS AT BEDTIME
Refills: 0 | Status: DISCONTINUED | OUTPATIENT
Start: 2025-06-06 | End: 2025-06-16

## 2025-06-06 RX ORDER — MUPIROCIN CALCIUM 20 MG/G
1 CREAM TOPICAL
Refills: 0 | Status: DISCONTINUED | OUTPATIENT
Start: 2025-06-06 | End: 2025-06-06

## 2025-06-06 RX ORDER — MAGNESIUM, ALUMINUM HYDROXIDE 200-200 MG
30 TABLET,CHEWABLE ORAL EVERY 4 HOURS
Refills: 0 | Status: DISCONTINUED | OUTPATIENT
Start: 2025-06-06 | End: 2025-06-06

## 2025-06-06 RX ORDER — HEPARIN SODIUM 1000 [USP'U]/ML
5000 INJECTION INTRAVENOUS; SUBCUTANEOUS EVERY 12 HOURS
Refills: 0 | Status: DISCONTINUED | OUTPATIENT
Start: 2025-06-07 | End: 2025-06-16

## 2025-06-06 RX ORDER — SODIUM CHLORIDE 9 G/1000ML
1000 INJECTION, SOLUTION INTRAVENOUS
Refills: 0 | Status: DISCONTINUED | OUTPATIENT
Start: 2025-06-06 | End: 2025-06-06

## 2025-06-06 RX ORDER — ONDANSETRON HCL/PF 4 MG/2 ML
4 VIAL (ML) INJECTION EVERY 8 HOURS
Refills: 0 | Status: DISCONTINUED | OUTPATIENT
Start: 2025-06-06 | End: 2025-06-16

## 2025-06-06 RX ORDER — FENTANYL CITRATE-0.9 % NACL/PF 100MCG/2ML
25 SYRINGE (ML) INTRAVENOUS
Refills: 0 | Status: DISCONTINUED | OUTPATIENT
Start: 2025-06-06 | End: 2025-06-06

## 2025-06-06 RX ADMIN — MUPIROCIN CALCIUM 1 APPLICATION(S): 20 CREAM TOPICAL at 17:37

## 2025-06-06 RX ADMIN — SEVELAMER HYDROCHLORIDE 800 MILLIGRAM(S): 800 TABLET ORAL at 17:37

## 2025-06-06 RX ADMIN — LATANOPROST PF 1 DROP(S): 0.05 SOLUTION/ DROPS OPHTHALMIC at 21:35

## 2025-06-06 RX ADMIN — Medication 3 MILLIGRAM(S): at 21:35

## 2025-06-06 RX ADMIN — Medication 1 APPLICATION(S): at 14:11

## 2025-06-06 RX ADMIN — TAMSULOSIN HYDROCHLORIDE 0.4 MILLIGRAM(S): 0.4 CAPSULE ORAL at 21:35

## 2025-06-06 NOTE — PROGRESS NOTE ADULT - ASSESSMENT
69-year-old man from home ([facility] rehab until 4/22) with past history diabetes, hypertension on midodrine with dialysis, ESRD on dialysis Monday Wednesday Friday, most recently day prior to admission, has sided left-sided permacath, known to Dr. Chirinos, bilateral Charcot deformity follows with Dr. Sim, chronic OM of the right foot,  coming at recommendation of Dr. Sim for bilateral foot wound debridement and graft application.

## 2025-06-06 NOTE — PROGRESS NOTE ADULT - SUBJECTIVE AND OBJECTIVE BOX
Date of Service 06-06-25 @ 09:33    CHIEF COMPLAINT:Patient is a 69y old  Male who presents with a chief complaint of foot wound debridement (05 Jun 2025 15:08)    	  REVIEW OF SYSTEMS:  CONSTITUTIONAL: No fever, weight loss, or fatigue  EYES: No eye pain, visual disturbances, or discharge  ENT:  No difficulty hearing, tinnitus, vertigo; No sinus or throat pain  NECK: No pain or stiffness  RESPIRATORY: No cough, wheezing, chills or hemoptysis; No Shortness of Breath  CARDIOVASCULAR: No chest pain, palpitations, passing out, dizziness, or leg swelling  GASTROINTESTINAL: No abdominal or epigastric pain. No nausea, vomiting, or hematemesis; No diarrhea or constipation. No melena or hematochezia.  GENITOURINARY: No dysuria, frequency, hematuria, or incontinence  NEUROLOGICAL: No headaches, memory loss, loss of strength, numbness, or tremors  SKIN: No itching, burning, rashes, or lesions   LYMPH Nodes: No enlarged glands  ENDOCRINE: No heat or cold intolerance; No hair loss  MUSCULOSKELETAL: No joint pain or swelling; No muscle, back, or extremity pain  PSYCHIATRIC: No depression, anxiety, mood swings, or difficulty sleeping  HEME/LYMPH: No easy bruising, or bleeding gums  ALLERGY AND IMMUNOLOGIC: No hives or eczema	    [ ] All others negative	  [ ] Unable to obtain    PHYSICAL EXAM:  T(C): 36.6 (06-06-25 @ 05:05), Max: 37.1 (06-06-25 @ 04:38)  HR: 73 (06-06-25 @ 05:05) (73 - 78)  BP: 155/63 (06-06-25 @ 05:05) (120/71 - 155/63)  RR: 16 (06-06-25 @ 05:05) (16 - 20)  SpO2: 97% (06-06-25 @ 05:05) (95% - 97%)  Wt(kg): --  I&O's Summary      Appearance: Normal	  HEENT:   Normal oral mucosa, PERRL, EOMI	  Lymphatic: No lymphadenopathy  Cardiovascular: Normal S1 S2, No JVD, No murmurs, No edema  Respiratory: Lungs clear to auscultation	  Psychiatry: A & O x 3, Mood & affect appropriate  Gastrointestinal:  Soft, Non-tender, + BS	  Skin: No rashes, No ecchymoses, No cyanosis	  Neurologic: Non-focal  Extremities: Normal range of motion, No clubbing, cyanosis or edema  Vascular: Peripheral pulses palpable 2+ bilaterally    MEDICATIONS  (STANDING):  chlorhexidine 2% Cloths 1 Application(s) Topical <User Schedule>  epoetin rudolph-epbx (RETACRIT) Injectable 4000 Unit(s) IV Push <User Schedule>  insulin lispro (ADMELOG) corrective regimen sliding scale   SubCutaneous three times a day before meals  insulin lispro (ADMELOG) corrective regimen sliding scale   SubCutaneous at bedtime  latanoprost 0.005% Ophthalmic Solution 1 Drop(s) Both EYES at bedtime  mupirocin 2% Ointment 1 Application(s) Both Nostrils two times a day  sevelamer carbonate 800 milliGRAM(s) Oral three times a day with meals  tamsulosin 0.4 milliGRAM(s) Oral at bedtime      TELEMETRY: 	    ECG:  	  RADIOLOGY:  OTHER: 	  	  LABS:	 	    CARDIAC MARKERS:                                10.1   7.86  )-----------( 215      ( 06 Jun 2025 07:50 )             31.3     06-06    132[L]  |  99  |  45[H]  ----------------------------<  150[H]  5.1   |  29  |  5.65[H]    Ca    9.0      06 Jun 2025 07:50      proBNP:   Lipid Profile:   HgA1c:   TSH:     	         Date of Service 06-06-25 @ 09:33    CHIEF COMPLAINT:Patient is a 69y old  Male who presents with a chief complaint of foot wound debridement (05 Jun 2025 15:08)    	  REVIEW OF SYSTEMS:  CONSTITUTIONAL: No fever, weight loss, or fatigue. Appears chronically ill  EYES: No eye pain, visual disturbances, or discharge  ENT:  No difficulty hearing, tinnitus, vertigo; No sinus or throat pain  NECK: No pain or stiffness  RESPIRATORY: No cough, wheezing, chills or hemoptysis; No Shortness of Breath  CARDIOVASCULAR: No chest pain, palpitations, passing out, dizziness, or leg swelling  GASTROINTESTINAL: No abdominal or epigastric pain. No nausea, vomiting, or hematemesis; No diarrhea or constipation. No melena or hematochezia.  GENITOURINARY: No dysuria, frequency, hematuria, or incontinence  NEUROLOGICAL: No headaches, memory loss, loss of strength, numbness, or tremors  SKIN: Bilateral feet wounds +  LYMPH Nodes: No enlarged glands  ENDOCRINE: No heat or cold intolerance; No hair loss  MUSCULOSKELETAL: No joint pain or swelling; No muscle, back, or extremity pain  PSYCHIATRIC: No depression, anxiety, mood swings, or difficulty sleeping  HEME/LYMPH: No easy bruising, or bleeding gums  ALLERGY AND IMMUNOLOGIC: No hives or eczema	        PHYSICAL EXAM:  T(C): 36.6 (06-06-25 @ 05:05), Max: 37.1 (06-06-25 @ 04:38)  HR: 73 (06-06-25 @ 05:05) (73 - 78)  BP: 155/63 (06-06-25 @ 05:05) (120/71 - 155/63)  RR: 16 (06-06-25 @ 05:05) (16 - 20)  SpO2: 97% (06-06-25 @ 05:05) (95% - 97%)  Wt(kg): --  I&O's Summary      Appearance: Normal	  HEENT:   Normal oral mucosa, PERRL, EOMI	  Lymphatic: No lymphadenopathy  Cardiovascular: Normal S1 S2, No JVD, No murmurs, No edema  Respiratory: Lungs clear to auscultation	  Psychiatry: A & O x 3, Mood & affect appropriate  Gastrointestinal:  Soft, Non-tender, + BS	  Skin: Bilateral heel wounds present .  Neurologic: Non-focal  Extremities: Normal range of motion, No clubbing, cyanosis or edema      MEDICATIONS  (STANDING):  chlorhexidine 2% Cloths 1 Application(s) Topical <User Schedule>  epoetin rudolph-epbx (RETACRIT) Injectable 4000 Unit(s) IV Push <User Schedule>  insulin lispro (ADMELOG) corrective regimen sliding scale   SubCutaneous three times a day before meals  insulin lispro (ADMELOG) corrective regimen sliding scale   SubCutaneous at bedtime  latanoprost 0.005% Ophthalmic Solution 1 Drop(s) Both EYES at bedtime  mupirocin 2% Ointment 1 Application(s) Both Nostrils two times a day  sevelamer carbonate 800 milliGRAM(s) Oral three times a day with meals  tamsulosin 0.4 milliGRAM(s) Oral at bedtime      TELEMETRY: 	    ECG:  	  RADIOLOGY:  OTHER: 	  	  LABS:	 	    CARDIAC MARKERS:                                10.1   7.86  )-----------( 215      ( 06 Jun 2025 07:50 )             31.3     06-06    132[L]  |  99  |  45[H]  ----------------------------<  150[H]  5.1   |  29  |  5.65[H]    Ca    9.0      06 Jun 2025 07:50      proBNP:   Lipid Profile:   HgA1c:   TSH:

## 2025-06-06 NOTE — BRIEF OPERATIVE NOTE - NSICDXBRIEFPOSTOP_GEN_ALL_CORE_FT
POST-OP DIAGNOSIS:  Diabetic foot ulcers 06-Jun-2025 11:10:29  Scarlet Foreman  Charcot's arthropathy, diabetic 06-Jun-2025 11:10:57  Scarlet Foreman

## 2025-06-06 NOTE — PROGRESS NOTE ADULT - SUBJECTIVE AND OBJECTIVE BOX
Date of Service  06-07-25 @ 15:02    CHIEF COMPLAINT:Patient is a 69y old  Male who presents with a chief complaint of foot wound debridement (06 Jun 2025 17:05)      HPI:  69-year-old man from home ([facility] rehab until 4/22) with past history diabetes, hypertension on midodrine with dialysis, ESRD on dialysis Monday Wednesday Friday, most recently day prior to admission, has sided left-sided permacath, known to Dr. Chirinos, bilateral Charcot deformity follows with Dr. Sim, chronic OM of the right foot,  coming at recommendation of Dr. Valadez for bilateral foot wound debridement and graft application.  he currently denies fever, chills, nausea, vomiting, diarrhea, dizziness, shortness of breath, chest pain.  Only produces minimal urine at baseline.    ED Course    Vitals: BPmax 175/87 rest wnl   Labs: CRP 34; ESR 94; glu 190; Na 132, bicarb 32; hb 10;   Intervention: none  Consults: podiatry   Images: CXR wnl; R permacath in place; XR bilateral feet pending read    (03 Jun 2025 11:46)      PAST MEDICAL & SURGICAL HISTORY:  DM (diabetes mellitus)  type 2      ESRD (end stage renal disease) on dialysis  t-th-sat via right AV graft, uses midodrine the days of Dialysis for hypotension      Charcot foot due to diabetes mellitus      Diabetic retinopathy associated with type 2 diabetes mellitus      Hyperlipidemia      Abscess of scrotum      Hidradenitis      End stage renal disease      S/P foot surgery  right foot - 11/2014      Charcot's syndrome  Surgeries in feet and ankles   left ankle 2011 and right ankle 11/2014      History of incision and drainage  scrotal 5/2018      Hemodialysis access, AV graft          MEDICATIONS  (STANDING):  chlorhexidine 2% Cloths 1 Application(s) Topical <User Schedule>  epoetin rudolph-epbx (RETACRIT) Injectable 4000 Unit(s) IV Push <User Schedule>  heparin   Injectable 5000 Unit(s) SubCutaneous every 12 hours  insulin lispro (ADMELOG) corrective regimen sliding scale   SubCutaneous three times a day before meals  insulin lispro (ADMELOG) corrective regimen sliding scale   SubCutaneous at bedtime  latanoprost 0.005% Ophthalmic Solution 1 Drop(s) Both EYES at bedtime  mupirocin 2% Ointment 1 Application(s) Both Nostrils two times a day  sevelamer carbonate 800 milliGRAM(s) Oral three times a day with meals  tamsulosin 0.4 milliGRAM(s) Oral at bedtime    MEDICATIONS  (PRN):  melatonin 3 milliGRAM(s) Oral at bedtime PRN Insomnia  midodrine. 10 milliGRAM(s) Oral <User Schedule> PRN SBP <100 in HD  ondansetron Injectable 4 milliGRAM(s) IV Push every 8 hours PRN Nausea and/or Vomiting      FAMILY HISTORY:  Family history of diabetes mellitus        SOCIAL HISTORY:    [ ] Non-smoker  [ ] Smoker  [ ] Alcohol    Allergies    penicillin (Unknown)    Intolerances    	    REVIEW OF SYSTEMS:  CONSTITUTIONAL: No fever, weight loss, or fatigue  EYES: No eye pain, visual disturbances, or discharge  ENT:  No difficulty hearing, tinnitus, vertigo; No sinus or throat pain  NECK: No pain or stiffness  RESPIRATORY: No cough, wheezing, chills or hemoptysis; No Shortness of Breath  CARDIOVASCULAR: No chest pain, palpitations, passing out, dizziness, or leg swelling  GASTROINTESTINAL: No abdominal or epigastric pain. No nausea, vomiting, or hematemesis; No diarrhea or constipation. No melena or hematochezia.  GENITOURINARY: No dysuria, frequency, hematuria, or incontinence  NEUROLOGICAL: No headaches, memory loss, loss of strength, numbness, or tremors  SKIN: No itching, burning, rashes, or lesions   LYMPH Nodes: No enlarged glands  ENDOCRINE: No heat or cold intolerance; No hair loss  MUSCULOSKELETAL: No joint pain or swelling; No muscle, back, or extremity pain  PSYCHIATRIC: No depression, anxiety, mood swings, or difficulty sleeping  HEME/LYMPH: No easy bruising, or bleeding gums  ALLERGY AND IMMUNOLOGIC: No hives or eczema	    [ ] All others negative	  [ ] Unable to obtain    PHYSICAL EXAM:  T(C): 36.6 (06-07-25 @ 13:26), Max: 36.9 (06-06-25 @ 20:35)  HR: 79 (06-07-25 @ 13:26) (71 - 79)  BP: 127/42 (06-07-25 @ 13:26) (122/60 - 147/61)  RR: 16 (06-07-25 @ 13:26) (16 - 18)  SpO2: 100% (06-07-25 @ 13:26) (97% - 100%)  Wt(kg): --  I&O's Summary    06 Jun 2025 07:01  -  07 Jun 2025 07:00  --------------------------------------------------------  IN: 20 mL / OUT: 0 mL / NET: 20 mL    07 Jun 2025 07:01  -  07 Jun 2025 15:02  --------------------------------------------------------  IN: 700 mL / OUT: 3100 mL / NET: -2400 mL        Appearance: Normal	  HEENT:   Normal oral mucosa, PERRL, EOMI	  Lymphatic: No lymphadenopathy  Cardiovascular: Normal S1 S2, No JVD, No murmurs, No edema  Respiratory: Lungs clear to auscultation	  Psychiatry: A & O x 3, Mood & affect appropriate  Gastrointestinal:  Soft, Non-tender, + BS	  Skin: No rashes, No ecchymoses, No cyanosis	  Neurologic: Non-focal  Extremities: Normal range of motion, No clubbing, cyanosis or edema  Vascular: Peripheral pulses palpable 2+ bilaterally    TELEMETRY: 	    ECG:  	  RADIOLOGY:  OTHER: 	  	  LABS:	 	    CARDIAC MARKERS:                              9.6    7.18  )-----------( 201      ( 07 Jun 2025 09:50 )             29.2     06-07    132[L]  |  100  |  52[H]  ----------------------------<  169[H]  5.4[H]   |  29  |  7.01[H]    Ca    8.8      07 Jun 2025 09:50      proBNP:   Lipid Profile:   HgA1c:   TSH:     PREVIOUS DIAGNOSTIC TESTING:    [ ] Echocardiogram:  [ ]  Catheterization:  [ ] Stress Test:

## 2025-06-06 NOTE — PROGRESS NOTE ADULT - ASSESSMENT
Patient is a 70yo Male with ESRD on HD via left IJ TDC, failed Rt AVG, HTN, 2HPT, Anemia, LE venous stasis p/w b/l LE wounds requiring debridement with graft placement.[facility]  [facility] with past history diabetes, hypertension on midodrine with dialysis, ESRD on dialysis Monday Wednesday Friday, most recently day prior to admission, has sided left-sided permacath, bilateral Charcot deformity follows with Dr. Sim, chronic OM of the right foot,  coming at recommendation of Dr. Valadez for bilateral foot wound debridement and graft application. Patient has bilateral heel wounds.      1.  Bilateral heel wounds.  Patient claims he has had these wounds  since November of last year.  He is currently sleeping post hemodialysis.  2.   Will continue to hold off on antibiotics till the official operative report is ready.  If there is evidence of infection, patient can probably be discharged on IV ankle vancomycin and IV ceftazidime during dialysis for at least 6 weeks from today.  If no evidence of infection, then there is no need of antibiotics.  3.  status post bilateral feet wounds debridement and graft placement, postop day #1.  4.  Sometimes with heel wounds and osteo, it is difficult to salvage the foot but this will be decided by the podiatrist .  5. Agree to withhold the antibiotics for now and will follow up on the OR cx.He is afebrile and has normal WBC.  Not sure whether the OR cultures , I can only see the pathology sample being sent.6.  Patient is afebrile.Blood cultures x 2 from 06/03/2025-negative.

## 2025-06-06 NOTE — PROGRESS NOTE ADULT - SUBJECTIVE AND OBJECTIVE BOX
Patient is a 69y old  Male who presents with a chief complaint of foot wound debridement (06 Jun 2025 10:03)      INTERVAL HPI/OVERNIGHT EVENTS: Pt s/p OR today for I&D with graft placement, no new complaints    MEDICATIONS  (STANDING):  chlorhexidine 2% Cloths 1 Application(s) Topical <User Schedule>  epoetin rudolph-epbx (RETACRIT) Injectable 4000 Unit(s) IV Push <User Schedule>  insulin lispro (ADMELOG) corrective regimen sliding scale   SubCutaneous three times a day before meals  insulin lispro (ADMELOG) corrective regimen sliding scale   SubCutaneous at bedtime  latanoprost 0.005% Ophthalmic Solution 1 Drop(s) Both EYES at bedtime  mupirocin 2% Ointment 1 Application(s) Both Nostrils two times a day  sevelamer carbonate 800 milliGRAM(s) Oral three times a day with meals  tamsulosin 0.4 milliGRAM(s) Oral at bedtime    MEDICATIONS  (PRN):  melatonin 3 milliGRAM(s) Oral at bedtime PRN Insomnia  midodrine. 10 milliGRAM(s) Oral <User Schedule> PRN SBP <100 in HD  ondansetron Injectable 4 milliGRAM(s) IV Push every 8 hours PRN Nausea and/or Vomiting  __________________________________________________  REVIEW OF SYSTEMS:    CONSTITUTIONAL: No fever,   EYES: no acute visual disturbances  NECK: No pain or stiffness  RESPIRATORY: No cough; No shortness of breath  CARDIOVASCULAR: No chest pain, no palpitations  GASTROINTESTINAL: No pain. No nausea or vomiting; No diarrhea   NEUROLOGICAL: No headache or numbness, no tremors  MUSCULOSKELETAL: No joint pain, no muscle pain  GENITOURINARY: no dysuria, no frequency, no hesitancy  PSYCHIATRY: no depression , no anxiety  ALL OTHER  ROS negative      Vital Signs Last 24 Hrs  T(C): 36.3 (06 Jun 2025 14:33), Max: 37.1 (06 Jun 2025 04:38)  T(F): 97.3 (06 Jun 2025 14:33), Max: 98.7 (06 Jun 2025 04:38)  HR: 71 (06 Jun 2025 14:33) (71 - 82)  BP: 152/77 (06 Jun 2025 14:33) (113/63 - 155/63)  BP(mean): 79 (06 Jun 2025 11:57) (79 - 82)  RR: 16 (06 Jun 2025 14:33) (12 - 20)  SpO2: 98% (06 Jun 2025 14:33) (95% - 100%)    Parameters below as of 06 Jun 2025 14:33  Patient On (Oxygen Delivery Method): room air        ________________________________________________  PHYSICAL EXAM:  GENERAL: NAD  HEENT: Normocephalic;  conjunctivae and sclerae clear; moist mucous membranes;   NECK : supple  CHEST/LUNG: Clear to auscultation bilaterally with good air entry   HEART: S1 S2  regular; no murmurs, gallops or rubs  ABDOMEN: Soft, Nontender, Nondistended; Bowel sounds present  EXTREMITIES: no cyanosis; no edema; no calf tenderness  SKIN: warm and dry; no rash  NERVOUS SYSTEM:  Awake and alert; Oriented  to place, person and time ; no new deficits    _________________________________________________  LABS:                        10.1   7.86  )-----------( 215      ( 06 Jun 2025 07:50 )             31.3     06-06    132[L]  |  99  |  45[H]  ----------------------------<  150[H]  5.1   |  29  |  5.65[H]    Ca    9.0      06 Jun 2025 07:50      PT/INR - ( 06 Jun 2025 07:50 )   PT: 11.1 sec;   INR: 0.95 ratio           Urinalysis Basic - ( 06 Jun 2025 07:50 )    Color: x / Appearance: x / SG: x / pH: x  Gluc: 150 mg/dL / Ketone: x  / Bili: x / Urobili: x   Blood: x / Protein: x / Nitrite: x   Leuk Esterase: x / RBC: x / WBC x   Sq Epi: x / Non Sq Epi: x / Bacteria: x      CAPILLARY BLOOD GLUCOSE      POCT Blood Glucose.: 135 mg/dL (06 Jun 2025 16:31)  POCT Blood Glucose.: 138 mg/dL (06 Jun 2025 12:30)  POCT Blood Glucose.: 122 mg/dL (06 Jun 2025 11:07)  POCT Blood Glucose.: 149 mg/dL (06 Jun 2025 07:24)  POCT Blood Glucose.: 178 mg/dL (05 Jun 2025 21:04)      RADIOLOGY & ADDITIONAL TESTS:   < from: Xray Ankle Complete 3 Views, Bilateral (06.03.25 @ 09:52) >  ACC: [id number] EXAM:  XR ANKLE COMP MIN 3 VIEWS BI   ORDERED BY: DALLAS SIEGEL     ACC: [id number] EXAM:  XR FOOT COMP MIN 3 VIEWS BI   ORDERED BY: DALLAS SIEGEL     PROCEDURE DATE:  06/03/2025          INTERPRETATION:  Bilateral feet and bilateral ankles    HISTORY: Bilateral wounds    COMPARISON: 5/15/2025     3 views of the right foot, 3 views of the left foot, 3 views of the left   ankle and 3 views of the right ankle show advanced arthritic changes of   the ankle joint with invagination of the distaltibia is intact to the   hindfoot with destruction of both the talar bones and the calcaneus   bilaterally. There is lateral shifting of the mid foot with regard to the   hindfoot bilaterally. Vascular calcifications are present.    IMPRESSION: Advanced destructive changes of the hindfeet. No definite   bony erosion or acute fracture.      Thank you for this referral.    --- End of Report ---      Imaging Personally Reviewed:  YES    Consultant(s) Notes Reviewed:   YES    Care Discussed with Consultants : YES     Plan of care was discussed with patient and /or primary care giver; all questions and concerns were addressed and care was aligned with patient's wishes.

## 2025-06-06 NOTE — BRIEF OPERATIVE NOTE - OPERATION/FINDINGS
See operative report See operative report. Debridement of B/L foot wounds using Misonix with application of Restrata graft to all wound bases.

## 2025-06-06 NOTE — PROGRESS NOTE ADULT - ASSESSMENT
Patient is a 68yo Male with ESRD on HD via left IJ TDC, failed Rt AVG, HTN, 2HPT, Anemia, LE venous stasis p/w b/l LE wounds requiring debridement with graft placement. Nephrology consulted for ESRD status.     1) ESRD:  Last HD 6/4, via left IJ tunneled HD with intradialytic hypotension (requiring midodrine 10mg) with net 2.7L removed. Pt for OR today 6/6. Plan for next HD 6/7 (instead of 6/6) due to OR schedule. Rt AVG unsalvageable. Will switch back to MWF schedule next week. Monitor electrolytes.    2) Hypotension: BP bordelrine.  c/w Midodrine 10mg PO MWF intra- HD to aid in fluid removal prn. Monitor BP    3) Anemia of renal disease: Hb acceptable with adequate iron stores.  c/w Epo  4K with HD. Monitor hgb    4) Hyperphosphatemia- serum phos acceptable. Continue with renvela 1 tab with meals.  Monitor serum calcium and phos    [facility] NEPHROLOGY  Arden Tsang M.D.  Freeman Holland D.O.  Janny Chirinos M.D.  MD Ambar Mccarty, MSN, ANP-C    Telephone: [phone]  Facsimile: [phone] [place] [address], [address]  [place] [id number]

## 2025-06-06 NOTE — BRIEF OPERATIVE NOTE - NSICDXBRIEFPREOP_GEN_ALL_CORE_FT
PRE-OP DIAGNOSIS:  Diabetic foot ulcers 06-Jun-2025 11:10:16  Scarlet Foreman  Charcot's arthropathy, diabetic 06-Jun-2025 11:10:43  Scarlet Foreman

## 2025-06-06 NOTE — PROGRESS NOTE ADULT - SUBJECTIVE AND OBJECTIVE BOX
Patient is a 69y old  Male who presents with a chief complaint of foot wound debridement (06 Jun 2025       INTERVAL HPI/OVERNIGHT EVENTS: Pt s/p OR today for I&D with graft placement, no new complaints    MEDICATIONS  (STANDING):  chlorhexidine 2% Cloths 1 Application(s) Topical <User Schedule>  epoetin rudolph-epbx (RETACRIT) Injectable 4000 Unit(s) IV Push <User Schedule>  insulin lispro (ADMELOG) corrective regimen sliding scale   SubCutaneous three times a day before meals  insulin lispro (ADMELOG) corrective regimen sliding scale   SubCutaneous at bedtime  latanoprost 0.005% Ophthalmic Solution 1 Drop(s) Both EYES at bedtime  mupirocin 2% Ointment 1 Application(s) Both Nostrils two times a day  sevelamer carbonate 800 milliGRAM(s) Oral three times a day with meals  tamsulosin 0.4 milliGRAM(s) Oral at bedtime    MEDICATIONS  (PRN):  melatonin 3 milliGRAM(s) Oral at bedtime PRN Insomnia  midodrine. 10 milliGRAM(s) Oral <User Schedule> PRN SBP <100 in HD  ondansetron Injectable 4 milliGRAM(s) IV Push every 8 hours PRN Nausea and/or Vomiting  __________________________________________________  REVIEW OF SYSTEMS:    CONSTITUTIONAL: No fever,   EYES: no acute visual disturbances  NECK: No pain or stiffness  RESPIRATORY: No cough; No shortness of breath  CARDIOVASCULAR: No chest pain, no palpitations  GASTROINTESTINAL: No pain. No nausea or vomiting; No diarrhea   NEUROLOGICAL: No headache or numbness, no tremors  MUSCULOSKELETAL: No joint pain, no muscle pain  GENITOURINARY: no dysuria, no frequency, no hesitancy  PSYCHIATRY: no depression , no anxiety  ALL OTHER  ROS negative      Vital Signs Last 24 Hrs  T(C): 36.3 (06 Jun 2025 14:33), Max: 37.1 (06 Jun 2025 04:38)  T(F): 97.3 (06 Jun 2025 14:33), Max: 98.7 (06 Jun 2025 04:38)  HR: 71 (06 Jun 2025 14:33) (71 - 82)  BP: 152/77 (06 Jun 2025 14:33) (113/63 - 155/63)  BP(mean): 79 (06 Jun 2025 11:57) (79 - 82)  RR: 16 (06 Jun 2025 14:33) (12 - 20)  SpO2: 98% (06 Jun 2025 14:33) (95% - 100%)    Parameters below as of 06 Jun 2025 14:33  Patient On (Oxygen Delivery Method): room air        ________________________________________________  PHYSICAL EXAM:  GENERAL: NAD  HEENT: Normocephalic;  conjunctivae and sclerae clear; moist mucous membranes;   NECK : supple  CHEST/LUNG: dec breath sounds at bases  HEART: S1 S2  +  ABDOMEN: Soft, Nontender, Nondistended; Bowel sounds present  EXTREMITIES: no cyanosis; no edema; no calf tenderness  SKIN: warm and dry; no rash  NERVOUS SYSTEM:  Awake and alert;     _________________________________________________  LABS:                        10.1   7.86  )-----------( 215      ( 06 Jun 2025 07:50 )             31.3     06-06    132[L]  |  99  |  45[H]  ----------------------------<  150[H]  5.1   |  29  |  5.65[H]    Ca    9.0      06 Jun 2025 07:50      PT/INR - ( 06 Jun 2025 07:50 )   PT: 11.1 sec;   INR: 0.95 ratio           Urinalysis Basic - ( 06 Jun 2025 07:50 )    Color: x / Appearance: x / SG: x / pH: x  Gluc: 150 mg/dL / Ketone: x  / Bili: x / Urobili: x   Blood: x / Protein: x / Nitrite: x   Leuk Esterase: x / RBC: x / WBC x   Sq Epi: x / Non Sq Epi: x / Bacteria: x      CAPILLARY BLOOD GLUCOSE      POCT Blood Glucose.: 135 mg/dL (06 Jun 2025 16:31)  POCT Blood Glucose.: 138 mg/dL (06 Jun 2025 12:30)  POCT Blood Glucose.: 122 mg/dL (06 Jun 2025 11:07)  POCT Blood Glucose.: 149 mg/dL (06 Jun 2025 07:24)  POCT Blood Glucose.: 178 mg/dL (05 Jun 2025 21:04)      RADIOLOGY & ADDITIONAL TESTS:   < from: Xray Ankle Complete 3 Views, Bilateral (06.03.25 @ 09:52) >  ACC: [id number] EXAM:  XR ANKLE COMP MIN 3 VIEWS BI   ORDERED BY: DALLAS SIEGEL     ACC: [id number] EXAM:  XR FOOT COMP MIN 3 VIEWS BI   ORDERED BY: DALLAS SIEGEL     PROCEDURE DATE:  06/03/2025          INTERPRETATION:  Bilateral feet and bilateral ankles    HISTORY: Bilateral wounds    COMPARISON: 5/15/2025     3 views of the right foot, 3 views of the left foot, 3 views of the left   ankle and 3 views of the right ankle show advanced arthritic changes of   the ankle joint with invagination of the distaltibia is intact to the   hindfoot with destruction of both the talar bones and the calcaneus   bilaterally. There is lateral shifting of the mid foot with regard to the   hindfoot bilaterally. Vascular calcifications are present.    IMPRESSION: Advanced destructive changes of the hindfeet. No definite   bony erosion or acute fracture.      Thank you for this referral.    --- End of Report ---      Imaging Personally Reviewed:  YES    Consultant(s) Notes Reviewed:   YES    Care Discussed with Consultants : YES     Plan of care was discussed with patient and /or primary care giver; all questions and concerns were addressed and care was aligned with patient's wishes.

## 2025-06-06 NOTE — PROGRESS NOTE ADULT - ASSESSMENT
Patient is a 68yo Male with ESRD on HD via left IJ TDC, failed Rt AVG, HTN, 2HPT, Anemia, LE venous stasis p/w b/l LE wounds requiring debridement with graft placement.[facility]  [facility] with past history diabetes, hypertension on midodrine with dialysis, ESRD on dialysis Monday Wednesday Friday, most recently day prior to admission, has sided left-sided permacath, bilateral Charcot deformity follows with Dr. Sim, chronic OM of the right foot,  coming at recommendation of Dr. Valadez for bilateral foot wound debridement and graft application. Patient has bilateral heel wounds.      1.  Bilateral heel wounds.  2.  Podiatry consult greatly appreciated who opined that the wounds do not look acutely infected .  3. With high ESR(88) and CRP(30) , patient most likely has chronic osteomyelitis and with heel wounds , debridement and long term antibiotics will be the recommendation .  4.  Sometimes with heel wounds and osteo, it is difficult to salvage the foot but this will be decided by the podiatrist .  5. Agree to withhold the antibiotics for now and will follow up on the OR cx.  6.  Patient is afebrile.Blood cultures x 2 from 06/03/2025-negative.  7.  Patient is scheduled for for wound debridement today.  8.  Patient had lots of questions regarding his antibiotics.  He was told that oral antibiotics are difficult to clear out osteomyelitis.  Ideally he might need long-term IV antibiotics, will need to follow-up on the cultures to decide as to what antibiotics he can be discharged on.     Patient is a 70yo Male with ESRD on HD via left IJ TDC, failed Rt AVG, HTN, 2HPT, Anemia, LE venous stasis p/w b/l LE wounds requiring debridement with graft placement.Patient came  from home ([facility] until 4/22) with past history diabetes, hypertension on midodrine with dialysis, ESRD on dialysis Monday Wednesday Friday, most recently day prior to admission, has sided left-sided permacath, bilateral Charcot deformity follows with Dr. Sim, chronic OM of the right foot,  coming at recommendation of Dr. Valadez for bilateral foot wound debridement and graft application. Patient has bilateral heel wounds.      1.  Bilateral heel wounds.  2.  Podiatry consult greatly appreciated who opined that the wounds do not look acutely infected .  3. With high ESR(88) and CRP(30) , patient most likely has chronic osteomyelitis and with heel wounds , debridement and long term antibiotics will be the recommendation .  4.  Sometimes with heel wounds and osteo, it is difficult to salvage the foot but this will be decided by the podiatrist .  5. Agree to withhold the antibiotics for now and will follow up on the OR cx.  6.  Patient is afebrile.Blood cultures x 2 from 06/03/2025-negative.  7.  Patient is scheduled for for wound debridement today.  8.  Patient had lots of questions regarding his antibiotics.  He was told that oral antibiotics are difficult to clear out osteomyelitis.  Ideally he might need long-term IV antibiotics, will need to follow-up on the cultures to decide as to what antibiotics he can be discharged on.  9.Hx of Proteus mirabilis infection last year.     Patient is a 70yo Male with ESRD on HD via left IJ TDC, failed Rt AVG, HTN, 2HPT, Anemia, LE venous stasis p/w b/l LE wounds requiring debridement with graft placement.[facility] with past history diabetes, hypertension on midodrine with dialysis, ESRD on dialysis Monday Wednesday Friday, most recently day prior to admission, has sided left-sided permacath, bilateral Charcot deformity follows with Dr. Sim, chronic OM of the right foot,  coming at recommendation of Dr. Valadez for bilateral foot wound debridement and graft application. Patient has bilateral heel wounds.      1.  Bilateral heel wounds.  2.  Podiatry consult greatly appreciated who opined that the wounds do not look acutely infected .  3. With high ESR(88) and CRP(30) , patient most likely has chronic osteomyelitis and with heel wounds , debridement and long term antibiotics will be the recommendation .  4.  Sometimes with heel wounds and osteo, it is difficult to salvage the foot but this will be decided by the podiatrist .  5. Agree to withhold the antibiotics for now and will follow up on the OR cx.He is afebrile and has normal WBC.  6.  Patient is afebrile.Blood cultures x 2 from 06/03/2025-negative.  7.  Patient is scheduled for for wound debridement today and graft placement by the podiatrist today.  8.  Patient had lots of questions regarding his antibiotics.  He was told that oral antibiotics are difficult to clear out osteomyelitis.  Ideally he might need long-term IV antibiotics, will need to follow-up on the cultures to decide as to what antibiotics he can be discharged on.  9.Hx of Proteus mirabilis infection last year.

## 2025-06-06 NOTE — PROGRESS NOTE ADULT - ASSESSMENT
69-year-old man from home ([facility] rehab until 4/22) with past history diabetes, hypertension on midodrine with dialysis, ESRD on dialysis Monday Wednesday Friday, most recently day prior to admission, has sided left-sided permacath, known to Dr. Chirinos, bilateral Charcot deformity follows with Dr. Sim, chronic OM of the right foot,  coming at recommendation of Dr. Sim for bilateral foot wound debridement and graft application.

## 2025-06-06 NOTE — PROGRESS NOTE ADULT - SUBJECTIVE AND OBJECTIVE BOX
[facility] NEPHROLOGY- PROGRESS NOTE    Patient is a 70yo Male with ESRD on HD via left IJ TDC, failed Rt AVG, HTN, 2HPT, Anemia, LE venous stasis p/w b/l LE wounds requiring debridement with graft placement. Nephrology consulted for ESRD status.     Hospital Medications: Medications reviewed.  REVIEW OF SYSTEMS:  CONSTITUTIONAL: No fevers or chills  RESPIRATORY: No shortness of breath  CARDIOVASCULAR: No chest pain.  GASTROINTESTINAL: No nausea, vomiting, diarrhea or abdominal pain.   VASCULAR: No bilateral lower extremity edema.       VITALS:  T(F): 98.1 (06-06-25 @ 11:57), Max: 98.7 (06-06-25 @ 04:38)  HR: 78 (06-06-25 @ 11:57)  BP: 113/63 (06-06-25 @ 11:57)  RR: 16 (06-06-25 @ 11:57)  SpO2: 98% (06-06-25 @ 11:57)  Wt(kg): --    06-06 @ 07:01  -  06-06 @ 14:04  --------------------------------------------------------  IN: 20 mL / OUT: 0 mL / NET: 20 mL      Height (cm): 170.2 (06-06 @ 07:27)  Weight (kg): 84.9 (06-06 @ 07:27)  BMI (kg/m2): 29.3 (06-06 @ 07:27)  BSA (m2): 1.97 (06-06 @ 07:27)        PHYSICAL EXAM:  Gen: NAD, calm  HEENT: anicteric  Cards: RRR, +S1/S2, no M/G/R  Resp: CTA B/L  GI: soft, NT/ND, NABS  Extremities: no LE edema B/L  Derm: b/l feet wrapped  Access: Left IJ tunneled HD catheter- intact    LABS:  06-06    132[L]  |  99  |  45[H]  ----------------------------<  150[H]  5.1   |  29  |  5.65[H]    Ca    9.0      06 Jun 2025 07:50      Creatinine Trend: 5.65 <--, 5.36 <--, 4.47 <--                        10.1   7.86  )-----------( 215      ( 06 Jun 2025 07:50 )             31.3     Urine Studies:  Urinalysis Basic - ( 06 Jun 2025 07:50 )    Color:  / Appearance:  / SG:  / pH:   Gluc: 150 mg/dL / Ketone:   / Bili:  / Urobili:    Blood:  / Protein:  / Nitrite:    Leuk Esterase:  / RBC:  / WBC    Sq Epi:  / Non Sq Epi:  / Bacteria:

## 2025-06-06 NOTE — PROGRESS NOTE ADULT - PROBLEM SELECTOR PLAN 1
Multiple chronic ulcers of both feet, not currently infected.    -Follows outpt by Dr. Sim: sent for b/l wound debridement and graft placement, plan for Friday 6/6.  -Daily ESR and CRP  -S/p OR for I&D with graft placement Bilateral feet  -F/u wound cultures  -Podiatry following and cleared   -ID Dr. Oakley following   ---Hold off abx for now as likely chronic, wait for final culture for d/c abx recs  -PT eval

## 2025-06-06 NOTE — BRIEF OPERATIVE NOTE - NSICDXBRIEFPROCEDURE_GEN_ALL_CORE_FT
PROCEDURES:  Debridement of skin or subcutaneous tissue (burn) (infection) (wound) 06-Jun-2025 11:11:12  Scarlet Foreman  Skin substitute graft of leg for wound area of 51 to 100 sq cm 06-Jun-2025 11:11:38  Scarlet Foreman

## 2025-06-07 LAB
ANION GAP SERPL CALC-SCNC: 3 MMOL/L — LOW (ref 5–17)
BUN SERPL-MCNC: 52 MG/DL — HIGH (ref 7–18)
CALCIUM SERPL-MCNC: 8.8 MG/DL — SIGNIFICANT CHANGE UP (ref 8.4–10.5)
CHLORIDE SERPL-SCNC: 100 MMOL/L — SIGNIFICANT CHANGE UP (ref 96–108)
CO2 SERPL-SCNC: 29 MMOL/L — SIGNIFICANT CHANGE UP (ref 22–31)
CREAT SERPL-MCNC: 7.01 MG/DL — HIGH (ref 0.5–1.3)
CRP SERPL-MCNC: 24.3 MG/L — HIGH (ref 0–5)
EGFR: 8 ML/MIN/1.73M2 — LOW
EGFR: 8 ML/MIN/1.73M2 — LOW
ERYTHROCYTE [SEDIMENTATION RATE] IN BLOOD: 85 MM/HR — HIGH (ref 0–15)
GLUCOSE BLDC GLUCOMTR-MCNC: 108 MG/DL — HIGH (ref 70–99)
GLUCOSE BLDC GLUCOMTR-MCNC: 119 MG/DL — HIGH (ref 70–99)
GLUCOSE BLDC GLUCOMTR-MCNC: 127 MG/DL — HIGH (ref 70–99)
GLUCOSE BLDC GLUCOMTR-MCNC: 177 MG/DL — HIGH (ref 70–99)
GLUCOSE SERPL-MCNC: 169 MG/DL — HIGH (ref 70–99)
HCT VFR BLD CALC: 29.2 % — LOW (ref 39–50)
HGB BLD-MCNC: 9.6 G/DL — LOW (ref 13–17)
MCHC RBC-ENTMCNC: 32.9 G/DL — SIGNIFICANT CHANGE UP (ref 32–36)
MCHC RBC-ENTMCNC: 33.4 PG — SIGNIFICANT CHANGE UP (ref 27–34)
MCV RBC AUTO: 101.7 FL — HIGH (ref 80–100)
NRBC BLD AUTO-RTO: 0 /100 WBCS — SIGNIFICANT CHANGE UP (ref 0–0)
PLATELET # BLD AUTO: 201 K/UL — SIGNIFICANT CHANGE UP (ref 150–400)
POTASSIUM SERPL-MCNC: 5.4 MMOL/L — HIGH (ref 3.5–5.3)
POTASSIUM SERPL-SCNC: 5.4 MMOL/L — HIGH (ref 3.5–5.3)
RBC # BLD: 2.87 M/UL — LOW (ref 4.2–5.8)
RBC # FLD: 15 % — HIGH (ref 10.3–14.5)
SODIUM SERPL-SCNC: 132 MMOL/L — LOW (ref 135–145)
WBC # BLD: 7.18 K/UL — SIGNIFICANT CHANGE UP (ref 3.8–10.5)
WBC # FLD AUTO: 7.18 K/UL — SIGNIFICANT CHANGE UP (ref 3.8–10.5)

## 2025-06-07 RX ADMIN — MUPIROCIN CALCIUM 1 APPLICATION(S): 20 CREAM TOPICAL at 05:50

## 2025-06-07 RX ADMIN — TAMSULOSIN HYDROCHLORIDE 0.4 MILLIGRAM(S): 0.4 CAPSULE ORAL at 21:14

## 2025-06-07 RX ADMIN — SEVELAMER HYDROCHLORIDE 800 MILLIGRAM(S): 800 TABLET ORAL at 15:06

## 2025-06-07 RX ADMIN — SEVELAMER HYDROCHLORIDE 800 MILLIGRAM(S): 800 TABLET ORAL at 07:56

## 2025-06-07 RX ADMIN — MUPIROCIN CALCIUM 1 APPLICATION(S): 20 CREAM TOPICAL at 18:11

## 2025-06-07 RX ADMIN — Medication 3 MILLIGRAM(S): at 21:14

## 2025-06-07 RX ADMIN — LATANOPROST PF 1 DROP(S): 0.05 SOLUTION/ DROPS OPHTHALMIC at 21:14

## 2025-06-07 RX ADMIN — HEPARIN SODIUM 5000 UNIT(S): 1000 INJECTION INTRAVENOUS; SUBCUTANEOUS at 17:44

## 2025-06-07 RX ADMIN — Medication 1 APPLICATION(S): at 05:50

## 2025-06-07 NOTE — PROGRESS NOTE ADULT - ASSESSMENT
Assessment and Plan:     Patient is a 68yo Male with ESRD on HD via left IJ TDC, failed Rt AVG, HTN, 2HPT, Anemia, LE venous stasis p/w b/l LE wounds requiring debridement with graft placement.[facility] with past history diabetes, hypertension on midodrine with dialysis, ESRD on dialysis Monday Wednesday Friday, most recently day prior to admission, has sided left-sided permacath, bilateral Charcot deformity follows with Dr. Sim, chronic OM of the right foot,  coming at recommendation of Dr. Valadez for bilateral foot wound debridement and graft application. Patient has bilateral heel wounds.      1.  Bilateral heel wounds.  Patient claims he has had these wounds  since November of last year.  He is currently sleeping post hemodialysis.  2.   Will continue to hold off on antibiotics till the official operative report is ready.  If there is evidence of infection, patient can probably be discharged on IV ankle vancomycin and IV ceftazidime during dialysis for at least 6 weeks from today.  If no evidence of infection, then there is no need of antibiotics.  3.  status post bilateral feet wounds debridement and graft placement, postop day #1.  4.  Sometimes with heel wounds and osteo, it is difficult to salvage the foot but this will be decided by the podiatrist .  5. Agree to withhold the antibiotics for now and will follow up on the OR cx.He is afebrile and has normal WBC.  Not sure whether the OR cultures , I can only see the pathology sample being sent.6.  Patient is afebrile.Blood cultures x 2 from 06/03/2025-negative.

## 2025-06-07 NOTE — DISCHARGE NOTE PROVIDER - NSDCCPCAREPLAN_GEN_ALL_CORE_FT
PRINCIPAL DISCHARGE DIAGNOSIS  Diagnosis: Chronic osteomyelitis  Assessment and Plan of Treatment: You have a history of chronic foot infection. You had incision and drainage performed on 6/6 and foot grafting on????. You were followed by podiatry during your hospital stay. PT recc's ????? Please follow up with your podiatrist Dr. Woodard within one week of discharge      SECONDARY DISCHARGE DIAGNOSES  Diagnosis: ESRD on hemodialysis  Assessment and Plan of Treatment: End-stage kidney disease (ESRD) is when your kidney function is so poor that you need dialysis treatments or a kidney transplant to survive. ESRD usually occurs after long-term kidney disease. Continue with your monday wed, friday schedule and follow up with your nephrologist Dr. Chirinos within one week of discharge  DISCHARGE INSTRUCTIONS:  Return to the emergency department if:  You have shortness of breath or chest pain.  You have a rash, or a new wound that is very painful.  You have severe muscle cramps or pain.  Your heart is beating faster than normal for you.  Eat foods low in sodium, phosphorus, and potassium as directed. You may also need to eat foods high in protein. You may need to see a dietitian if you need help planning meals.    Diagnosis: Diabetes  Assessment and Plan of Treatment: Your A1c was 7.2. Please continue with consistent carb diet and continue with sliding scale    Diagnosis: Hypotension  Assessment and Plan of Treatment: Please continue your home medication midodrine on dialysis days     PRINCIPAL DISCHARGE DIAGNOSIS  Diagnosis: Chronic osteomyelitis  Assessment and Plan of Treatment: You have a history of chronic foot infection. You had incision and drainage performed and graft placement on 6/6. You were followed by podiatry during your hospital stay.   PT recc's Home Physical therapy  ID reccs: Vanco and Cefepime as prescribed for 6 weeks  F/U pathology outpatient if pathology is negative antibiotics can be stopped  No wound care required while at home dressings to be changed at Podiatry clinic   Please follow up with your podiatrist Dr. Woodard within one week of discharge for graft dressing changes.      SECONDARY DISCHARGE DIAGNOSES  Diagnosis: ESRD on hemodialysis  Assessment and Plan of Treatment: End-stage kidney disease (ESRD) is when your kidney function is so poor that you need dialysis treatments or a kidney transplant to survive. ESRD usually occurs after long-term kidney disease. Continue with your monday wed, friday schedule and follow up with your nephrologist Dr. Chirinos within one week of discharge  DISCHARGE INSTRUCTIONS:  Return to the emergency department if:  You have shortness of breath or chest pain.  You have a rash, or a new wound that is very painful.  You have severe muscle cramps or pain.  Your heart is beating faster than normal for you.  Eat foods low in sodium, phosphorus, and potassium as directed. You may also need to eat foods high in protein. You may need to see a dietitian if you need help planning meals.    Diagnosis: Diabetes  Assessment and Plan of Treatment: Your A1c was 7.2.  Make sure you get your HgA1c checked every three months.  If you take oral diabetes medications, check your blood glucose two times a day.  If you take insulin, check your blood glucose before meals and at bedtime.  Low blood sugar (hypoglycemia) is a blood sugar below 70mg/dl. Check your blood sugar if you feel signs/symptoms of hypoglycemia. If your blood sugar is below 70 take 15 grams of carbohydrates (ex 4 oz of apple juice, 3-4 glucose tablets, or 4-6 oz of regular soda) wait 15 minutes and repeat blood sugar to make sure it comes up above 70.  If your blood sugar is above 70 and you are due for a meal, have a meal.  If you are not due for a meal have a snack.  This snack helps keeps your blood sugar at a safe range.      Diagnosis: Hypotension  Assessment and Plan of Treatment: Please continue your home medication midodrine on dialysis days     PRINCIPAL DISCHARGE DIAGNOSIS  Diagnosis: Chronic osteomyelitis  Assessment and Plan of Treatment: You have a history of chronic foot infection. You had incision and drainage performed and graft placement on 6/6. You were followed by podiatry during your hospital stay.   PT recc's Home Physical therapy  ID reccs: Vanco and Cefepime as prescribed for 6 weeks  F/U pathology outpatient if pathology is negative antibiotics can be stopped  No wound care required while at home dressings to be changed at Podiatry clinic   Please follow up with your podiatrist Dr. Woodard within one week of discharge for graft dressing changes.      SECONDARY DISCHARGE DIAGNOSES  Diagnosis: ESRD on hemodialysis  Assessment and Plan of Treatment: End-stage kidney disease (ESRD) is when your kidney function is so poor that you need dialysis treatments or a kidney transplant to survive. ESRD usually occurs after long-term kidney disease. Continue with your monday wed, friday schedule and follow up with your nephrologist Dr. Chirinos within one week of discharge  DISCHARGE INSTRUCTIONS:  Return to the emergency department if:  You have shortness of breath or chest pain.  You have a rash, or a new wound that is very painful.  You have severe muscle cramps or pain.  Your heart is beating faster than normal for you.  Eat foods low in sodium, phosphorus, and potassium as directed. You may also need to eat foods high in protein. You may need to see a dietitian if you need help planning meals.    Diagnosis: Diabetes  Assessment and Plan of Treatment: Your A1c was 7.2, your glucose levels are acceptable.  HgA1C this admission.  Make sure you get your HgA1c checked every three months..  If you take insulin, check your blood glucose before meals and at bedtime.  You can continue to take your insulin sliding scale as prescribed at home  Low blood sugar (hypoglycemia) is a blood sugar below 70mg/dl. Check your blood sugar if you feel signs/symptoms of hypoglycemia. If your blood sugar is below 70 take 15 grams of carbohydrates (ex 4 oz of apple juice, 3-4 glucose tablets, or 4-6 oz of regular soda) wait 15 minutes and repeat blood sugar to make sure it comes up above 70.  If your blood sugar is above 70 and you are due for a meal, have a meal.  If you are not due for a meal have a snack.  This snack helps keeps your blood sugar at a safe range      Diagnosis: Hypotension  Assessment and Plan of Treatment: Please continue your home medication midodrine on dialysis days     PRINCIPAL DISCHARGE DIAGNOSIS  Diagnosis: Chronic osteomyelitis  Assessment and Plan of Treatment: You have a history of chronic foot infection. You had incision and drainage performed and graft placement on 6/6. You were followed by podiatry during your hospital stay.   PT recc's Home Physical therapy  ID reccs: Vanco and Cefepime as prescribed for 6 weeks  F/U pathology outpatient if pathology is negative antibiotics can be stopped  No wound care required while at home dressings to be changed at Podiatry clinic   Please follow up with your podiatrist Dr. Woodard within one week of discharge for graft dressing changes.      SECONDARY DISCHARGE DIAGNOSES  Diagnosis: ESRD on hemodialysis  Assessment and Plan of Treatment: End-stage kidney disease (ESRD) is when your kidney function is so poor that you need dialysis treatments or a kidney transplant to survive. ESRD usually occurs after long-term kidney disease. Continue with your monday wed, friday schedule and follow up with your nephrologist Dr. Chirinos within one week of discharge  DISCHARGE INSTRUCTIONS:  Return to the emergency department if:  You have shortness of breath or chest pain.  You have a rash, or a new wound that is very painful.  You have severe muscle cramps or pain.  Your heart is beating faster than normal for you.  Eat foods low in sodium, phosphorus, and potassium as directed. You may also need to eat foods high in protein. You may need to see a dietitian if you need help planning meals.    Diagnosis: Diabetes  Assessment and Plan of Treatment: Your A1c was 7.2, your glucose levels are acceptable.  HgA1C this admission.  Make sure you get your HgA1c checked every three months..  If you take insulin, check your blood glucose before meals and at bedtime.  You were not taking insulin at home, you required minimal amounts of insulin while in the hospital3  You endorse that you have a follow up appt. with Endocrinologist tomorrow 6/17, continue diabetes management as per the follow up appointment  Low blood sugar (hypoglycemia) is a blood sugar below 70mg/dl. Check your blood sugar if you feel signs/symptoms of hypoglycemia. If your blood sugar is below 70 take 15 grams of carbohydrates (ex 4 oz of apple juice, 3-4 glucose tablets, or 4-6 oz of regular soda) wait 15 minutes and repeat blood sugar to make sure it comes up above 70.  If your blood sugar is above 70 and you are due for a meal, have a meal.  If you are not due for a meal have a snack.  This snack helps keeps your blood sugar at a safe range      Diagnosis: Hypotension  Assessment and Plan of Treatment: Please continue your home medication midodrine on dialysis days

## 2025-06-07 NOTE — PROGRESS NOTE ADULT - ASSESSMENT
69-year-old man from home ([facility] rehab until 4/22) with past history diabetes, hypertension on midodrine with dialysis, ESRD on dialysis Monday Wednesday Friday, most recently day prior to admission, has sided left-sided permacath, known to Dr. Chirinos, bilateral Charcot deformity follows with Dr. Sim, chronic OM of the right foot,  coming at recommendation of Dr. Sim for bilateral foot wound debridement and graft application.

## 2025-06-07 NOTE — DISCHARGE NOTE PROVIDER - CARE PROVIDER_API CALL
Papo Sim  Foot Surgery  [address] [place] [address]  Phone: [phone]  Fax: [phone]  Follow Up Time: 1 week   Papo Sim.  Surgery Podiatric  [address] [place] [address]  Phone: [phone]  Fax: [phone]  Follow Up Time: 1 week

## 2025-06-07 NOTE — DISCHARGE NOTE PROVIDER - HOSPITAL COURSE
69-year-old male with a past medical history of diabetes mellitus, hypertension, and end-stage renal disease (ESRD) on hemodialysis via a left-sided permacath. Dustin receives dialysis on Monday, Wednesday, and Friday, most recently the day prior to admission. He is followed by Dr. Chirinos for nephrology and Dr. Sim for bilateral Charcot deformities and chronic osteomyelitis (OM) of the right foot. The patient was referred by Dr. Sim for bilateral foot wound debridement and graft application due to multiple chronic foot ulcers. These wounds were not acutely infected at the time of admission.    Patient underwent incision and drainage (I&D) on 6/6 and  bilateral foot graft placement on ???. Podiatry following. Wound cultures were obtained, and  Infectious Disease (Dr. Oakley), recommends withholding antibiotics for now, as the findings were consistent with chronic osteomyelitis; final recommendations are pending culture results. ESR and CRP are  monitored daily. PT recc's ?????    ******* incomplete********* 69-year-old male with a past medical history of diabetes mellitus, hypertension, and end-stage renal disease (ESRD) on hemodialysis via a left-sided permacath. Dustin receives dialysis on Monday, Wednesday, and Friday, most recently the day prior to admission. He is followed by Dr. Chirinos for nephrology and Dr. Sim for bilateral Charcot deformities and chronic osteomyelitis (OM) of the right foot. The patient was referred by Dr. Sim for bilateral foot wound debridement and graft application due to multiple chronic foot ulcers.     Patient underwent incision and drainage (I&D)  bilateral foot graft placement on 6/6 Podiatry following. Wound cultures were obtained, and  Infectious Disease (Dr. Oakley). Intraop cultures were not obtained, pathology is pending.  Attending do f/u pathology outpatient--->per ID reccs if pathology negative Antibiotics can be stopped  Per ID recommendations Recommend 6 weeks of IV Vancomycin  IV Cefepime 2G on dialysis days Vanco 500 mg IV post dialysis . End date is 07/21/25  Weekly CBC, CMP, ESR, CRP while patient is on antibiotics    Per discussion with podiatry no wound care required. graft dressing to be changed at Podiatry clinic  Thursday: [address] [facility], 2nd Floor [address], [place] [address]. Patient can call [phone] to make an appointment.

## 2025-06-07 NOTE — DISCHARGE NOTE PROVIDER - NSDCMRMEDTOKEN_GEN_ALL_CORE_FT
calcitriol 0.5 mcg oral capsule: 2 cap(s) orally 3 times a week On dialysis days  epoetin rudolph 4000 units/mL preservative-free injectable solution: 3,000 unit(s) intravenous 3 times a week Administer during dialysis days  insulin lispro 100 units/mL injectable solution: 1 unit(s) injectable 3 times a day (before meals) 1 Unit(s) if Glucose 151 - 200  2 Unit(s) if Glucose 201 - 250  3 Unit(s) if Glucose 251 - 300  4 Unit(s) if Glucose 301 - 350  5 Unit(s) if Glucose 351 - 400  6 Unit(s) if Glucose Greater Than 400  latanoprost 0.005% ophthalmic solution: 1 drop(s) to each affected eye once a day (at bedtime)  midodrine 10 mg oral tablet: 1 tab(s) orally every 8 hours  sevelamer carbonate 800 mg oral tablet: 1 tab(s) orally 3 times a day (with meals)  tamsulosin 0.4 mg oral capsule: 1 cap(s) orally once a day (at bedtime)   calcitriol 0.5 mcg oral capsule: 2 cap(s) orally 3 times a week On dialysis days  cefepime 2 g intravenous injection: 2 gram(s) intravenous 3 times a week given during dialysis until 7/21/25  Epogen 3000 units/mL preservative-free injectable solution: 3,000 unit(s) intravenously 3 times a day with dialysis  latanoprost 0.005% ophthalmic solution: 1 drop(s) to each affected eye once a day (at bedtime)  midodrine 10 mg oral tablet: 1 tab(s) orally every 8 hours  sevelamer carbonate 800 mg oral tablet: 1 tab(s) orally 3 times a day (with meals)  tamsulosin 0.4 mg oral capsule: 1 cap(s) orally once a day (at bedtime)  vancomycin 500 mg intravenous injection: 500 milligram(s) intravenous 3 times a week given during dialysis until 7/21/25

## 2025-06-07 NOTE — DISCHARGE NOTE PROVIDER - NSDCFUADDAPPT_GEN_ALL_CORE_FT
APPTS ARE READY TO BE MADE: [X] YES    Best Family or Patient Contact (if needed):    Additional Information about above appointments (if needed):    1: Podiatry needs rescheduling  2:   3:     Other comments or requests:    APPTS ARE READY TO BE MADE: [X] YES    Best Family or Patient Contact (if needed):    Additional Information about above appointments (if needed):    1: Podiatry needs rescheduling  2:   3:     Other comments or requests:     Patient informed us they already have secured a follow up appointment which is not visible on [facility] with  at [address] [place] [address]

## 2025-06-07 NOTE — DISCHARGE NOTE PROVIDER - NSDCFUSCHEDAPPT_GEN_ALL_CORE_FT
[facility]  WOUNDCARE OP [address]  Scheduled Appointment: 06/12/2025     [facility]  WOUNDCARE OP [address]  Scheduled Appointment: 06/12/2025    Papo Sim [facility]  Scheduled Appointment: 06/12/2025     [facility]  WOUNDCARE OP [address]  Scheduled Appointment: 06/19/2025     [facility]  PODIATRY OP [address] [facility]  Scheduled Appointment: 07/09/2025

## 2025-06-07 NOTE — PROGRESS NOTE ADULT - SUBJECTIVE AND OBJECTIVE BOX
Date of Service  06-07-25 @ 15:02    CHIEF COMPLAINT:Patient is a 69y old  Male who presents with a chief complaint of foot wound debridement (06 Jun 2025 17:05)      HPI:  69-year-old man from home ([facility] rehab until 4/22) with past history diabetes, hypertension on midodrine with dialysis, ESRD on dialysis Monday Wednesday Friday, most recently day prior to admission, has sided left-sided permacath, known to Dr. Chirinos, bilateral Charcot deformity follows with Dr. Sim, chronic OM of the right foot,  coming at recommendation of Dr. Valadez for bilateral foot wound debridement and graft application.  he currently denies fever, chills, nausea, vomiting, diarrhea, dizziness, shortness of breath, chest pain.  Only produces minimal urine at baseline.    ED Course    Vitals: BPmax 175/87 rest wnl   Labs: CRP 34; ESR 94; glu 190; Na 132, bicarb 32; hb 10;   Intervention: none  Consults: podiatry   Images: CXR wnl; R permacath in place; XR bilateral feet pending read    (03 Jun 2025 11:46)      PAST MEDICAL & SURGICAL HISTORY:  DM (diabetes mellitus)  type 2      ESRD (end stage renal disease) on dialysis  t-th-sat via right AV graft, uses midodrine the days of Dialysis for hypotension      Charcot foot due to diabetes mellitus      Diabetic retinopathy associated with type 2 diabetes mellitus      Hyperlipidemia      Abscess of scrotum      Hidradenitis      End stage renal disease      S/P foot surgery  right foot - 11/2014      Charcot's syndrome  Surgeries in feet and ankles   left ankle 2011 and right ankle 11/2014      History of incision and drainage  scrotal 5/2018      Hemodialysis access, AV graft          MEDICATIONS  (STANDING):  chlorhexidine 2% Cloths 1 Application(s) Topical <User Schedule>  epoetin rudolph-epbx (RETACRIT) Injectable 4000 Unit(s) IV Push <User Schedule>  heparin   Injectable 5000 Unit(s) SubCutaneous every 12 hours  insulin lispro (ADMELOG) corrective regimen sliding scale   SubCutaneous three times a day before meals  insulin lispro (ADMELOG) corrective regimen sliding scale   SubCutaneous at bedtime  latanoprost 0.005% Ophthalmic Solution 1 Drop(s) Both EYES at bedtime  mupirocin 2% Ointment 1 Application(s) Both Nostrils two times a day  sevelamer carbonate 800 milliGRAM(s) Oral three times a day with meals  tamsulosin 0.4 milliGRAM(s) Oral at bedtime    MEDICATIONS  (PRN):  melatonin 3 milliGRAM(s) Oral at bedtime PRN Insomnia  midodrine. 10 milliGRAM(s) Oral <User Schedule> PRN SBP <100 in HD  ondansetron Injectable 4 milliGRAM(s) IV Push every 8 hours PRN Nausea and/or Vomiting      FAMILY HISTORY:  Family history of diabetes mellitus        SOCIAL HISTORY:    [ ] Non-smoker  [ ] Smoker  [ ] Alcohol    Allergies    penicillin (Unknown)    Intolerances    	    REVIEW OF SYSTEMS:  CONSTITUTIONAL: No fever, weight loss, or fatigue  EYES: No eye pain, visual disturbances, or discharge  ENT:  No difficulty hearing, tinnitus, vertigo; No sinus or throat pain  NECK: No pain or stiffness  RESPIRATORY: No cough, wheezing, chills or hemoptysis; No Shortness of Breath  CARDIOVASCULAR: No chest pain, palpitations, passing out, dizziness, or leg swelling  GASTROINTESTINAL: No abdominal or epigastric pain. No nausea, vomiting, or hematemesis; No diarrhea or constipation. No melena or hematochezia.  GENITOURINARY: No dysuria, frequency, hematuria, or incontinence  NEUROLOGICAL: No headaches, memory loss, loss of strength, numbness, or tremors  SKIN: No itching, burning, rashes, or lesions   LYMPH Nodes: No enlarged glands  ENDOCRINE: No heat or cold intolerance; No hair loss  MUSCULOSKELETAL: No joint pain or swelling; No muscle, back, or extremity pain  PSYCHIATRIC: No depression, anxiety, mood swings, or difficulty sleeping  HEME/LYMPH: No easy bruising, or bleeding gums  ALLERGY AND IMMUNOLOGIC: No hives or eczema	    [ ] All others negative	  [ ] Unable to obtain    PHYSICAL EXAM:  T(C): 36.6 (06-07-25 @ 13:26), Max: 36.9 (06-06-25 @ 20:35)  HR: 79 (06-07-25 @ 13:26) (71 - 79)  BP: 127/42 (06-07-25 @ 13:26) (122/60 - 147/61)  RR: 16 (06-07-25 @ 13:26) (16 - 18)  SpO2: 100% (06-07-25 @ 13:26) (97% - 100%)  Wt(kg): --  I&O's Summary    06 Jun 2025 07:01  -  07 Jun 2025 07:00  --------------------------------------------------------  IN: 20 mL / OUT: 0 mL / NET: 20 mL    07 Jun 2025 07:01  -  07 Jun 2025 15:02  --------------------------------------------------------  IN: 700 mL / OUT: 3100 mL / NET: -2400 mL        Appearance: Normal	  HEENT:   Normal oral mucosa, PERRL, EOMI	  Lymphatic: No lymphadenopathy  Cardiovascular: Normal S1 S2, No JVD, No murmurs, No edema  Respiratory: Lungs clear to auscultation	  Psychiatry: A & O x 3, Mood & affect appropriate  Gastrointestinal:  Soft, Non-tender, + BS	  Skin: No rashes, No ecchymoses, No cyanosis	  Neurologic: Non-focal  Extremities: Normal range of motion, No clubbing, cyanosis or edema  Vascular: Peripheral pulses palpable 2+ bilaterally    TELEMETRY: 	    ECG:  	  RADIOLOGY:  OTHER: 	  	  LABS:	 	    CARDIAC MARKERS:                              9.6    7.18  )-----------( 201      ( 07 Jun 2025 09:50 )             29.2     06-07    132[L]  |  100  |  52[H]  ----------------------------<  169[H]  5.4[H]   |  29  |  7.01[H]    Ca    8.8      07 Jun 2025 09:50      proBNP:   Lipid Profile:   HgA1c:   TSH:     PREVIOUS DIAGNOSTIC TESTING:    [ ] Echocardiogram:  [ ]  Catheterization:  [ ] Stress Test:

## 2025-06-07 NOTE — PROGRESS NOTE ADULT - SUBJECTIVE AND OBJECTIVE BOX
[facility] NEPHROLOGY- PROGRESS NOTE    Patient is a 68yo Male with ESRD on HD via left IJ TDC, failed Rt AVG, HTN, 2HPT, Anemia, LE venous stasis p/w b/l LE wounds requiring debridement with graft placement. Nephrology consulted for ESRD status.     Pt s/p LE wound debridement.  No pain.    Hospital Medications: Medications reviewed.  REVIEW OF SYSTEMS:  CONSTITUTIONAL: No fevers or chills  RESPIRATORY: No shortness of breath  CARDIOVASCULAR: No chest pain.  GASTROINTESTINAL: No nausea, vomiting, diarrhea or abdominal pain.   VASCULAR: No bilateral lower extremity edema.         VITALS:  T(F): 98.2 (06-07-25 @ 20:30), Max: 98.9 (06-07-25 @ 17:34)  HR: 77 (06-07-25 @ 20:30)  BP: 101/75 (06-07-25 @ 20:30)  RR: 18 (06-07-25 @ 20:30)  SpO2: 96% (06-07-25 @ 20:30)  Wt(kg): --    06-06 @ 07:01  -  06-07 @ 07:00  --------------------------------------------------------  IN: 20 mL / OUT: 0 mL / NET: 20 mL    06-07 @ 07:01  -  06-07 @ 23:08  --------------------------------------------------------  IN: 700 mL / OUT: 3100 mL / NET: -2400 mL        PHYSICAL EXAM:  Gen: NAD, calm  HEENT: anicteric  Cards: RRR, +S1/S2, no M/G/R  Resp: CTA B/L  GI: soft, NT/ND, NABS  Extremities: no LE edema B/L  Derm: b/l feet wrapped  Access: Left IJ tunneled HD catheter- intact      LABS:  06-07    132[L]  |  100  |  52[H]  ----------------------------<  169[H]  5.4[H]   |  29  |  7.01[H]    Ca    8.8      07 Jun 2025 09:50      Creatinine Trend: 7.01 <--, 5.65 <--, 5.36 <--, 4.47 <--                        9.6    7.18  )-----------( 201      ( 07 Jun 2025 09:50 )             29.2     Urine Studies:  Urinalysis Basic - ( 07 Jun 2025 09:50 )    Color:  / Appearance:  / SG:  / pH:   Gluc: 169 mg/dL / Ketone:   / Bili:  / Urobili:    Blood:  / Protein:  / Nitrite:    Leuk Esterase:  / RBC:  / WBC    Sq Epi:  / Non Sq Epi:  / Bacteria:

## 2025-06-07 NOTE — PROGRESS NOTE ADULT - ASSESSMENT
Patient is a 70yo Male with ESRD on HD via left IJ TDC, failed Rt AVG, HTN, 2HPT, Anemia, LE venous stasis p/w b/l LE wounds requiring debridement with graft placement. Nephrology consulted for ESRD status.     1) ESRD:  Last HD 6/4, via left IJ tunneled HD with intradialytic hypotension (requiring midodrine 10mg) with net 2.7L removed. Pt s/p HD today 6/7 (instead of 6/6) due to OR schedule. Rt AVG unsalvageable. Will switch back to MWF schedule next week. Monitor electrolytes.    2) Hypotension: BP bordelrine.  c/w Midodrine 10mg PO MWF intra- HD to aid in fluid removal prn. Monitor BP    3) Anemia of renal disease: Hb acceptable with adequate iron stores.  c/w Epo  4K with HD. Monitor hgb    4) Hyperphosphatemia- serum phos acceptable. Continue with renvela 1 tab with meals.  Monitor serum calcium and phos    [facility] NEPHROLOGY  Arden Tsang M.D.  Freeman Holland D.O.  Janny Chirinos M.D.  MD Ambar Mccarty, MSN, ANP-C    Telephone: [phone]  Facsimile: [phone] [place] [address], [address]  [place] [address]

## 2025-06-08 LAB
ANION GAP SERPL CALC-SCNC: 8 MMOL/L — SIGNIFICANT CHANGE UP (ref 5–17)
BUN SERPL-MCNC: 32 MG/DL — HIGH (ref 7–18)
CALCIUM SERPL-MCNC: 9 MG/DL — SIGNIFICANT CHANGE UP (ref 8.4–10.5)
CHLORIDE SERPL-SCNC: 101 MMOL/L — SIGNIFICANT CHANGE UP (ref 96–108)
CO2 SERPL-SCNC: 25 MMOL/L — SIGNIFICANT CHANGE UP (ref 22–31)
CREAT SERPL-MCNC: 4.94 MG/DL — HIGH (ref 0.5–1.3)
CRP SERPL-MCNC: 22.9 MG/L — HIGH (ref 0–5)
CULTURE RESULTS: SIGNIFICANT CHANGE UP
CULTURE RESULTS: SIGNIFICANT CHANGE UP
EGFR: 12 ML/MIN/1.73M2 — LOW
EGFR: 12 ML/MIN/1.73M2 — LOW
ERYTHROCYTE [SEDIMENTATION RATE] IN BLOOD: 89 MM/HR — HIGH (ref 0–15)
GLUCOSE BLDC GLUCOMTR-MCNC: 120 MG/DL — HIGH (ref 70–99)
GLUCOSE BLDC GLUCOMTR-MCNC: 126 MG/DL — HIGH (ref 70–99)
GLUCOSE BLDC GLUCOMTR-MCNC: 280 MG/DL — HIGH (ref 70–99)
GLUCOSE BLDC GLUCOMTR-MCNC: 81 MG/DL — SIGNIFICANT CHANGE UP (ref 70–99)
GLUCOSE SERPL-MCNC: 149 MG/DL — HIGH (ref 70–99)
HCT VFR BLD CALC: 30.9 % — LOW (ref 39–50)
HGB BLD-MCNC: 9.7 G/DL — LOW (ref 13–17)
MCHC RBC-ENTMCNC: 31.4 G/DL — LOW (ref 32–36)
MCHC RBC-ENTMCNC: 32.1 PG — SIGNIFICANT CHANGE UP (ref 27–34)
MCV RBC AUTO: 102.3 FL — HIGH (ref 80–100)
NRBC BLD AUTO-RTO: 0 /100 WBCS — SIGNIFICANT CHANGE UP (ref 0–0)
PLATELET # BLD AUTO: 195 K/UL — SIGNIFICANT CHANGE UP (ref 150–400)
POTASSIUM SERPL-MCNC: 4.7 MMOL/L — SIGNIFICANT CHANGE UP (ref 3.5–5.3)
POTASSIUM SERPL-SCNC: 4.7 MMOL/L — SIGNIFICANT CHANGE UP (ref 3.5–5.3)
RBC # BLD: 3.02 M/UL — LOW (ref 4.2–5.8)
RBC # FLD: 15.1 % — HIGH (ref 10.3–14.5)
SODIUM SERPL-SCNC: 134 MMOL/L — LOW (ref 135–145)
SPECIMEN SOURCE: SIGNIFICANT CHANGE UP
SPECIMEN SOURCE: SIGNIFICANT CHANGE UP
WBC # BLD: 7.12 K/UL — SIGNIFICANT CHANGE UP (ref 3.8–10.5)
WBC # FLD AUTO: 7.12 K/UL — SIGNIFICANT CHANGE UP (ref 3.8–10.5)

## 2025-06-08 RX ADMIN — HEPARIN SODIUM 5000 UNIT(S): 1000 INJECTION INTRAVENOUS; SUBCUTANEOUS at 05:36

## 2025-06-08 RX ADMIN — Medication 3 MILLIGRAM(S): at 21:20

## 2025-06-08 RX ADMIN — LATANOPROST PF 1 DROP(S): 0.05 SOLUTION/ DROPS OPHTHALMIC at 21:18

## 2025-06-08 RX ADMIN — SEVELAMER HYDROCHLORIDE 800 MILLIGRAM(S): 800 TABLET ORAL at 11:48

## 2025-06-08 RX ADMIN — SEVELAMER HYDROCHLORIDE 800 MILLIGRAM(S): 800 TABLET ORAL at 08:00

## 2025-06-08 RX ADMIN — TAMSULOSIN HYDROCHLORIDE 0.4 MILLIGRAM(S): 0.4 CAPSULE ORAL at 21:20

## 2025-06-08 RX ADMIN — SEVELAMER HYDROCHLORIDE 800 MILLIGRAM(S): 800 TABLET ORAL at 18:07

## 2025-06-08 RX ADMIN — HEPARIN SODIUM 5000 UNIT(S): 1000 INJECTION INTRAVENOUS; SUBCUTANEOUS at 18:08

## 2025-06-08 RX ADMIN — Medication 1 APPLICATION(S): at 05:36

## 2025-06-08 RX ADMIN — INSULIN LISPRO 3: 100 INJECTION, SOLUTION INTRAVENOUS; SUBCUTANEOUS at 11:29

## 2025-06-08 NOTE — PHYSICAL THERAPY INITIAL EVALUATION ADULT - ACTIVE RANGE OF MOTION EXAMINATION, REHAB EVAL
B/L Charcot foot deformity with ace wrap/bilateral upper extremity Active ROM was WFL (within functional limits)/bilateral  lower extremity Active ROM was WFL (within functional limits)

## 2025-06-08 NOTE — PHYSICAL THERAPY INITIAL EVALUATION ADULT - PERTINENT HX OF CURRENT PROBLEM, REHAB EVAL
Patient is a 69-year-old male with a past medical history of diabetes mellitus, hypertension, and end-stage renal disease (ESRD) on hemodialysis (MWF) via a left-sided permacath. He is followed by Dr. Chirinos for nephrology and Dr. Sim for bilateral Charcot deformities and chronic osteomyelitis (OM) of the right foot. The patient was referred by Dr. Sim for bilateral foot wound debridement and graft application due to multiple chronic foot ulcers. These wounds were not acutely infected at the time of admission. Patient underwent incision and drainage (I&D) on 6/6

## 2025-06-08 NOTE — PROGRESS NOTE ADULT - ASSESSMENT
Patient is a 70yo Male with ESRD on HD via left IJ TDC, failed Rt AVG, HTN, 2HPT, Anemia, LE venous stasis p/w b/l LE wounds requiring debridement with graft placement. Nephrology consulted for ESRD status.     1) ESRD:  Pt for HD tomorrow to change back to MWF schedule and then d/c home.   Rt AVG unsalvageable.  Monitor electrolytes.    2) Hypotension: BP bordelrine.  c/w Midodrine 10mg PO MWF intra- HD to aid in fluid removal prn. Monitor BP    3) Anemia of renal disease: Hb acceptable with adequate iron stores.  c/w Epo  4K with HD. Monitor hgb    4) Hyperphosphatemia- serum phos acceptable. Continue with renvela 1 tab with meals.  Monitor serum calcium and phos    [facility] NEPHROLOGY  Arden Tsang M.D.  Freeman Holland D.O.  Janny Chirinos M.D.  MD Ambar Mccarty, MSN, ANP-C    Telephone: [phone]  Facsimile: [phone]    [facility] [place] [address], [address]  [place] [address]

## 2025-06-08 NOTE — PROGRESS NOTE ADULT - ASSESSMENT
Patient is a 70yo Male with ESRD on HD via left IJ TDC, failed Rt AVG, HTN, 2HPT, Anemia, LE venous stasis p/w b/l LE wounds requiring debridement with graft placement.Patient came  from home ([facility] rehab until 4/22) with past history diabetes, hypertension on midodrine with dialysis, ESRD on dialysis Monday Wednesday Friday, most recently day prior to admission, has sided left-sided permacath, bilateral Charcot deformity follows with Dr. Sim, chronic OM of the right foot,  coming at recommendation of Dr. Valadez for bilateral foot wound debridement and graft application. Patient has bilateral heel wounds.      1.  Bilateral heel wounds.  Patient claims he has had these wounds  since November of last year.  He is currently sleeping post hemodialysis.  2.  I only see that pathology of the OR sample was sent , not sure whether the wound Cx were sent from the OR. Apparently they were sent .  3.  Status post bilateral feet wounds debridement and graft placement, postop day #2  4.  Sometimes with heel wounds and osteo, it is difficult to salvage the foot but this will be decided by the podiatrist .  5.  Acute phase reactants were noted , CRP is trending down but ESR is still high.           C-Reactive Protein: 22.9 mg/L (06.08.25 @ 06:10)            C-Reactive Protein: 24.3 mg/L (06.07.25 @ 09:50)            C-Reactive Protein: 30.3 mg/L (06.04.25 @ 06:40)            C-Reactive Protein: 34.8 mg/L (06.03.25 @ 10:00)            Sedimentation Rate, Erythrocyte: 89 mm/hr (06.08.25 @ 06:10)            Sedimentation Rate, Erythrocyte: 85 mm/hr (06.07.25 @ 09:50)            Sedimentation Rate, Erythrocyte: 88 mm/hr (06.04.25 @ 06:40)            Sedimentation Rate, Erythrocyte: 94 mm/hr (06.03.25 @ 10:00)   6.  Recommend 6 weeks of IV Vancomycin ( dose as per pharmacy ) and IV Cefepime ( dose as per pharmacy ) from today, EOT is 06/20/25. If Cefepime not available , then consider ceftazidime. Duration of antibiotics can be prolonged to 8 -12 weeks depending on the clinical progress as patient is a vasculopath and has had these lesions for several months. Usual dose of Cefepime for intermittent hemodialysis is 2 g Iv now and then post HD   Vanco is  15-20 Mg / kg IV times one , then 500 mg IV post dialysis .  7.   Weekly CBC, CMP, ESR, CRP while patient is on antibiotics  8.   Follow up with podiatry.

## 2025-06-08 NOTE — PHYSICAL THERAPY INITIAL EVALUATION ADULT - GENERAL OBSERVATIONS, REHAB EVAL
Patient received supine in bed, alert and able to make needs known, B/L LE in ace wrap, saline lock intact, R chest perm cath; cooperative to PT eval

## 2025-06-08 NOTE — PHYSICAL THERAPY INITIAL EVALUATION ADULT - ADDITIONAL COMMENTS
Per patient, he was able to ambulate using RW at home with crow boots Independently, and uses w/c in the community. Pt also reports owning Rollator, shower chair, commode. Pt has been receiving Home PT 2x/week since d/c from rehab last April 22. Pt denies falls. unable to confirm if pt was also NWB prior, when pt was asked, he was resistant and stated he was told he could weight bear only when wearing Crow boots. Per patient, he was able to ambulate using RW at home with crow boots Independently, and uses w/c in the community. Pt also reports owning Rollator, shower chair, commode and sliding board. Pt has been receiving Home PT 2x/week since d/c from rehab last April 22. Pt denies falls. unable to confirm if pt was also NWB prior, when pt was asked, he was resistant and stated he was told he could weight bear only when wearing Crow boots.

## 2025-06-08 NOTE — PROGRESS NOTE ADULT - SUBJECTIVE AND OBJECTIVE BOX
Date of Service 06-08-25 @ 22:45    CHIEF COMPLAINT:Patient is a 69y old  Male who presents with a chief complaint of foot wound debridement (08 Jun 2025 16:02)    	  REVIEW OF SYSTEMS:  CONSTITUTIONAL: No fever, weight loss, or fatigue  EYES: No eye pain, visual disturbances, or discharge  ENT:  No difficulty hearing, tinnitus, vertigo; No sinus or throat pain  NECK: No pain or stiffness  RESPIRATORY: No cough, wheezing, chills or hemoptysis; No Shortness of Breath  CARDIOVASCULAR: No chest pain, palpitations, passing out, dizziness, or leg swelling  GASTROINTESTINAL: No abdominal or epigastric pain. No nausea, vomiting, or hematemesis; No diarrhea or constipation. No melena or hematochezia.  GENITOURINARY: No dysuria, frequency, hematuria, or incontinence  NEUROLOGICAL: No headaches, memory loss, loss of strength, numbness, or tremors  SKIN: No itching, burning, rashes, or lesions   LYMPH Nodes: No enlarged glands  ENDOCRINE: No heat or cold intolerance; No hair loss  MUSCULOSKELETAL: No joint pain or swelling; No muscle, back, or extremity pain  PSYCHIATRIC: No depression, anxiety, mood swings, or difficulty sleeping  HEME/LYMPH: No easy bruising, or bleeding gums  ALLERGY AND IMMUNOLOGIC: No hives or eczema	    [ ] All others negative	  [ ] Unable to obtain    PHYSICAL EXAM:  T(C): 36.6 (06-08-25 @ 21:47), Max: 36.9 (06-08-25 @ 05:00)  HR: 83 (06-08-25 @ 21:47) (67 - 83)  BP: 156/82 (06-08-25 @ 21:47) (126/63 - 156/82)  RR: 18 (06-08-25 @ 21:47) (18 - 18)  SpO2: 98% (06-08-25 @ 21:47) (96% - 100%)  Wt(kg): --  I&O's Summary    07 Jun 2025 07:01  -  08 Jun 2025 07:00  --------------------------------------------------------  IN: 700 mL / OUT: 3100 mL / NET: -2400 mL        Appearance: Normal	  HEENT:   Normal oral mucosa, PERRL, EOMI	  Lymphatic: No lymphadenopathy  Cardiovascular: Normal S1 S2, No JVD, No murmurs, No edema  Respiratory: Lungs clear to auscultation	  Psychiatry: A & O x 3, Mood & affect appropriate  Gastrointestinal:  Soft, Non-tender, + BS	  Skin: No rashes, No ecchymoses, No cyanosis	  Neurologic: Non-focal  Extremities: Normal range of motion, No clubbing, cyanosis or edema  Vascular: Peripheral pulses palpable 2+ bilaterally    MEDICATIONS  (STANDING):  chlorhexidine 2% Cloths 1 Application(s) Topical <User Schedule>  epoetin rudolph-epbx (RETACRIT) Injectable 4000 Unit(s) IV Push <User Schedule>  heparin   Injectable 5000 Unit(s) SubCutaneous every 12 hours  insulin lispro (ADMELOG) corrective regimen sliding scale   SubCutaneous three times a day before meals  insulin lispro (ADMELOG) corrective regimen sliding scale   SubCutaneous at bedtime  latanoprost 0.005% Ophthalmic Solution 1 Drop(s) Both EYES at bedtime  mupirocin 2% Ointment 1 Application(s) Both Nostrils two times a day  sevelamer carbonate 800 milliGRAM(s) Oral three times a day with meals  tamsulosin 0.4 milliGRAM(s) Oral at bedtime      TELEMETRY: 	    ECG:  	  RADIOLOGY:  OTHER: 	  	  LABS:	 	    CARDIAC MARKERS:                                9.7    7.12  )-----------( 195      ( 08 Jun 2025 06:10 )             30.9     06-08    134[L]  |  101  |  32[H]  ----------------------------<  149[H]  4.7   |  25  |  4.94[H]    Ca    9.0      08 Jun 2025 06:10      proBNP:   Lipid Profile:   HgA1c:   TSH:     	         Date of Service 06-08-25 @ 22:45    CHIEF COMPLAINT:Patient is a 69y old  Male who presents with a chief complaint of foot wound debridement (08 Jun 2025 16:02)    	  REVIEW OF SYSTEMS:  CONSTITUTIONAL: No fever, weight loss, or fatigue  EYES: No eye pain, visual disturbances, or discharge  ENT:  No difficulty hearing, tinnitus, vertigo; No sinus or throat pain  NECK: No pain or stiffness  RESPIRATORY: No cough, wheezing, chills or hemoptysis; No Shortness of Breath  CARDIOVASCULAR: No chest pain, palpitations, passing out, dizziness, or leg swelling  GASTROINTESTINAL: No abdominal or epigastric pain. No nausea, vomiting, or hematemesis; No diarrhea or constipation. No melena or hematochezia.  GENITOURINARY: No dysuria, frequency, hematuria, or incontinence  NEUROLOGICAL: No headaches, memory loss, loss of strength, numbness, or tremors  SKIN: wounds present .  LYMPH Nodes: No enlarged glands  ENDOCRINE: No heat or cold intolerance; No hair loss  MUSCULOSKELETAL: No joint pain or swelling; No muscle, back, or extremity pain  PSYCHIATRIC: No depression, anxiety, mood swings, or difficulty sleeping  HEME/LYMPH: No easy bruising, or bleeding gums  ALLERGY AND IMMUNOLOGIC: No hives or eczema	        PHYSICAL EXAM:  T(C): 36.6 (06-08-25 @ 21:47), Max: 36.9 (06-08-25 @ 05:00)  HR: 83 (06-08-25 @ 21:47) (67 - 83)  BP: 156/82 (06-08-25 @ 21:47) (126/63 - 156/82)  RR: 18 (06-08-25 @ 21:47) (18 - 18)  SpO2: 98% (06-08-25 @ 21:47) (96% - 100%)  Wt(kg): --  I&O's Summary    07 Jun 2025 07:01  -  08 Jun 2025 07:00  --------------------------------------------------------  IN: 700 mL / OUT: 3100 mL / NET: -2400 mL        Appearance: Normal	  HEENT:   Normal oral mucosa, PERRL, EOMI	  Lymphatic: No lymphadenopathy  Cardiovascular: Normal S1 S2, No JVD, No murmurs, No edema  Respiratory: Lungs clear to auscultation	  Psychiatry: A & O x 3, Mood & affect appropriate  Gastrointestinal:  Soft, Non-tender, + BS	  Skin: No rashes, No ecchymoses, No cyanosis	  Neurologic: Non-focal  Extremities: Dressings present.  Vascular: Peripheral pulses palpable 2+ bilaterally    MEDICATIONS  (STANDING):  chlorhexidine 2% Cloths 1 Application(s) Topical <User Schedule>  epoetin rudolph-epbx (RETACRIT) Injectable 4000 Unit(s) IV Push <User Schedule>  heparin   Injectable 5000 Unit(s) SubCutaneous every 12 hours  insulin lispro (ADMELOG) corrective regimen sliding scale   SubCutaneous three times a day before meals  insulin lispro (ADMELOG) corrective regimen sliding scale   SubCutaneous at bedtime  latanoprost 0.005% Ophthalmic Solution 1 Drop(s) Both EYES at bedtime  mupirocin 2% Ointment 1 Application(s) Both Nostrils two times a day  sevelamer carbonate 800 milliGRAM(s) Oral three times a day with meals  tamsulosin 0.4 milliGRAM(s) Oral at bedtime                                      9.7    7.12  )-----------( 195      ( 08 Jun 2025 06:10 )             30.9     06-08    134[L]  |  101  |  32[H]  ----------------------------<  149[H]  4.7   |  25  |  4.94[H]    Ca    9.0      08 Jun 2025 06:10

## 2025-06-08 NOTE — PHYSICAL THERAPY INITIAL EVALUATION ADULT - MUSCLE TONE ASSESSMENT, REHAB EVAL
ankle and foot not assessed s/p I&D and skin graft, NWB at this time/bilateral upper extremities/bilateral lower extremities/normal

## 2025-06-08 NOTE — PROGRESS NOTE ADULT - SUBJECTIVE AND OBJECTIVE BOX
[facility] NEPHROLOGY- PROGRESS NOTE    Patient is a 68yo Male with ESRD on HD via left IJ TDC, failed Rt AVG, HTN, 2HPT, Anemia, LE venous stasis p/w b/l LE wounds requiring debridement with graft placement. Nephrology consulted for ESRD status.     Pt s/p LE wound debridement.  No pain.    Pt for HD tomorrow and then d/c home.    Hospital Medications: Medications reviewed.  REVIEW OF SYSTEMS:  CONSTITUTIONAL: No fevers or chills  RESPIRATORY: No shortness of breath  CARDIOVASCULAR: No chest pain.  GASTROINTESTINAL: No nausea, vomiting, diarrhea or abdominal pain.   VASCULAR: No bilateral lower extremity edema.       VITALS:  T(F): 98 (06-08-25 @ 14:01), Max: 98.9 (06-07-25 @ 17:34)  HR: 78 (06-08-25 @ 14:01)  BP: 126/63 (06-08-25 @ 14:01)  RR: 18 (06-08-25 @ 14:01)  SpO2: 96% (06-08-25 @ 14:01)  Wt(kg): --    06-07 @ 07:01  -  06-08 @ 07:00  --------------------------------------------------------  IN: 700 mL / OUT: 3100 mL / NET: -2400 mL          PHYSICAL EXAM:  Gen: NAD, calm  HEENT: anicteric  Cards: RRR, +S1/S2, no M/G/R  Resp: CTA B/L  GI: soft, NT/ND, NABS  Extremities: no LE edema B/L  Derm: b/l feet wrapped  Access: Left IJ tunneled HD catheter- intact    LABS:  06-08    134[L]  |  101  |  32[H]  ----------------------------<  149[H]  4.7   |  25  |  4.94[H]    Ca    9.0      08 Jun 2025 06:10      Creatinine Trend: 4.94 <--, 7.01 <--, 5.65 <--, 5.36 <--, 4.47 <--                        9.7    7.12  )-----------( 195      ( 08 Jun 2025 06:10 )             30.9     Urine Studies:  Urinalysis Basic - ( 08 Jun 2025 06:10 )    Color:  / Appearance:  / SG:  / pH:   Gluc: 149 mg/dL / Ketone:   / Bili:  / Urobili:    Blood:  / Protein:  / Nitrite:    Leuk Esterase:  / RBC:  / WBC    Sq Epi:  / Non Sq Epi:  / Bacteria:

## 2025-06-08 NOTE — PROGRESS NOTE ADULT - SUBJECTIVE AND OBJECTIVE BOX
Patient is a 69y old  Male who presents with a chief complaint of foot wound debridement (    INTERVAL HPI/OVERNIGHT EVENTS: Pt seen and examined    MEDICATIONS  (STANDING):  chlorhexidine 2% Cloths 1 Application(s) Topical <User Schedule>  epoetin ruodlph-epbx (RETACRIT) Injectable 4000 Unit(s) IV Push <User Schedule>  heparin   Injectable 5000 Unit(s) SubCutaneous every 12 hours  insulin lispro (ADMELOG) corrective regimen sliding scale   SubCutaneous three times a day before meals  insulin lispro (ADMELOG) corrective regimen sliding scale   SubCutaneous at bedtime  latanoprost 0.005% Ophthalmic Solution 1 Drop(s) Both EYES at bedtime  mupirocin 2% Ointment 1 Application(s) Both Nostrils two times a day  sevelamer carbonate 800 milliGRAM(s) Oral three times a day with meals  tamsulosin 0.4 milliGRAM(s) Oral at bedtime    MEDICATIONS  (PRN):  melatonin 3 milliGRAM(s) Oral at bedtime PRN Insomnia  midodrine. 10 milliGRAM(s) Oral <User Schedule> PRN SBP <100 in HD  ondansetron Injectable 4 milliGRAM(s) IV Push every 8 hours PRN Nausea and/or Vomiting  __________________________________________________  REVIEW OF SYSTEMS:    CONSTITUTIONAL: No fever,   EYES: no acute visual disturbances  NECK: No pain or stiffness  RESPIRATORY: No cough; No shortness of breath  CARDIOVASCULAR: No chest pain, no palpitations  GASTROINTESTINAL: No pain. No nausea or vomiting; No diarrhea   NEUROLOGICAL: No headache or numbness, no tremors  MUSCULOSKELETAL: No joint pain, no muscle pain  GENITOURINARY: no dysuria, no frequency, no hesitancy  PSYCHIATRY: no depression , no anxiety  ALL OTHER  ROS negative      Vital Signs Last 24 Hrs  T(C): 36.6 (06-08-25 @ 21:47), Max: 36.9 (06-08-25 @ 05:00)  T(F): 97.8 (06-08-25 @ 21:47), Max: 98.4 (06-08-25 @ 05:00)  HR: 83 (06-08-25 @ 21:47) (67 - 83)  BP: 156/82 (06-08-25 @ 21:47) (126/63 - 156/82)  BP(mean): 107 (06-08-25 @ 21:47) (107 - 107)  RR: 18 (06-08-25 @ 21:47) (18 - 18)  SpO2: 98% (06-08-25 @ 21:47) (96% - 100%)      ________________________________________________  PHYSICAL EXAM:  GENERAL: NAD  HEENT: Normocephalic;  conjunctivae and sclerae clear; moist mucous membranes;   NECK : supple  CHEST/LUNG: dec breath sounds at bases  HEART: S1 S2  +  ABDOMEN: Soft, Nontender, Nondistended; Bowel sounds present  EXTREMITIES: no cyanosis; no edema; no calf tenderness  SKIN: warm and dry; no rash  NERVOUS SYSTEM:  Awake and alert;     _________________________________________________  LABS:  06-08    134[L]  |  101  |  32[H]  ----------------------------<  149[H]  4.7   |  25  |  4.94[H]    Ca    9.0      08 Jun 2025 06:10      Creatinine Trend: 4.94 <--, 7.01 <--, 5.65 <--, 5.36 <--, 4.47 <--                        9.7    7.12  )-----------( 195      ( 08 Jun 2025 06:10 )             30.9     Urine Studies:  Urinalysis Basic - ( 08 Jun 2025 06:10 )    Color:  / Appearance:  / SG:  / pH:   Gluc: 149 mg/dL / Ketone:   / Bili:  / Urobili:    Blood:  / Protein:  / Nitrite:    Leuk Esterase:  / RBC:  / WBC    Sq Epi:  / Non Sq Epi:  / Bacteria:                          POCT Blood Glucose.: 135 mg/dL (06 Jun 2025 16:31)  POCT Blood Glucose.: 138 mg/dL (06 Jun 2025 12:30)  POCT Blood Glucose.: 122 mg/dL (06 Jun 2025 11:07)  POCT Blood Glucose.: 149 mg/dL (06 Jun 2025 07:24)  POCT Blood Glucose.: 178 mg/dL (05 Jun 2025 21:04)      RADIOLOGY & ADDITIONAL TESTS:   < from: Xray Ankle Complete 3 Views, Bilateral (06.03.25 @ 09:52) >  ACC: [id number] EXAM:  XR ANKLE COMP MIN 3 VIEWS BI   ORDERED BY: DALLAS SIEGEL     ACC: [id number] EXAM:  XR FOOT COMP MIN 3 VIEWS BI   ORDERED BY: DALLAS SIEGEL     PROCEDURE DATE:  06/03/2025          INTERPRETATION:  Bilateral feet and bilateral ankles    HISTORY: Bilateral wounds    COMPARISON: 5/15/2025     3 views of the right foot, 3 views of the left foot, 3 views of the left   ankle and 3 views of the right ankle show advanced arthritic changes of   the ankle joint with invagination of the distaltibia is intact to the   hindfoot with destruction of both the talar bones and the calcaneus   bilaterally. There is lateral shifting of the mid foot with regard to the   hindfoot bilaterally. Vascular calcifications are present.    IMPRESSION: Advanced destructive changes of the hindfeet. No definite   bony erosion or acute fracture.      Thank you for this referral.    --- End of Report ---      Imaging Personally Reviewed:  YES    Consultant(s) Notes Reviewed:   YES    Care Discussed with Consultants : YES     Plan of care was discussed with patient and /or primary care giver; all questions and concerns were addressed and care was aligned with patient's wishes.

## 2025-06-08 NOTE — PROGRESS NOTE ADULT - ASSESSMENT
69-year-old man from home ([facility] rehab until 4/22) with past history diabetes, hypertension on midodrine with dialysis, ESRD on dialysis Monday Wednesday Friday, most recently day prior to admission, has sided left-sided permacath, known to Dr. Chirinos, bilateral Charcot deformity follows with Dr. Sim, chronic OM of the right foot,  coming at recommendation of Dr. Sim for bilateral foot wound debridement and graft application.

## 2025-06-08 NOTE — PHYSICAL THERAPY INITIAL EVALUATION ADULT - PATIENT/FAMILY AGREES WITH PLAN
Home PT pending transfer training/yes Home PT pending transfer training with/without sliding board/yes

## 2025-06-09 LAB
ANION GAP SERPL CALC-SCNC: 4 MMOL/L — LOW (ref 5–17)
BUN SERPL-MCNC: 47 MG/DL — HIGH (ref 7–18)
CALCIUM SERPL-MCNC: 8.7 MG/DL — SIGNIFICANT CHANGE UP (ref 8.4–10.5)
CHLORIDE SERPL-SCNC: 100 MMOL/L — SIGNIFICANT CHANGE UP (ref 96–108)
CO2 SERPL-SCNC: 28 MMOL/L — SIGNIFICANT CHANGE UP (ref 22–31)
CREAT SERPL-MCNC: 6.29 MG/DL — HIGH (ref 0.5–1.3)
CRP SERPL-MCNC: 15.9 MG/L — HIGH (ref 0–5)
EGFR: 9 ML/MIN/1.73M2 — LOW
EGFR: 9 ML/MIN/1.73M2 — LOW
ERYTHROCYTE [SEDIMENTATION RATE] IN BLOOD: 88 MM/HR — HIGH (ref 0–15)
GLUCOSE BLDC GLUCOMTR-MCNC: 116 MG/DL — HIGH (ref 70–99)
GLUCOSE BLDC GLUCOMTR-MCNC: 126 MG/DL — HIGH (ref 70–99)
GLUCOSE BLDC GLUCOMTR-MCNC: 193 MG/DL — HIGH (ref 70–99)
GLUCOSE BLDC GLUCOMTR-MCNC: 194 MG/DL — HIGH (ref 70–99)
GLUCOSE SERPL-MCNC: 145 MG/DL — HIGH (ref 70–99)
HCT VFR BLD CALC: 30.4 % — LOW (ref 39–50)
HGB BLD-MCNC: 9.7 G/DL — LOW (ref 13–17)
MCHC RBC-ENTMCNC: 31.9 G/DL — LOW (ref 32–36)
MCHC RBC-ENTMCNC: 32.7 PG — SIGNIFICANT CHANGE UP (ref 27–34)
MCV RBC AUTO: 102.4 FL — HIGH (ref 80–100)
NRBC BLD AUTO-RTO: 0 /100 WBCS — SIGNIFICANT CHANGE UP (ref 0–0)
PLATELET # BLD AUTO: 201 K/UL — SIGNIFICANT CHANGE UP (ref 150–400)
POTASSIUM SERPL-MCNC: 4.5 MMOL/L — SIGNIFICANT CHANGE UP (ref 3.5–5.3)
POTASSIUM SERPL-SCNC: 4.5 MMOL/L — SIGNIFICANT CHANGE UP (ref 3.5–5.3)
RBC # BLD: 2.97 M/UL — LOW (ref 4.2–5.8)
RBC # FLD: 14.8 % — HIGH (ref 10.3–14.5)
SODIUM SERPL-SCNC: 132 MMOL/L — LOW (ref 135–145)
WBC # BLD: 7.34 K/UL — SIGNIFICANT CHANGE UP (ref 3.8–10.5)
WBC # FLD AUTO: 7.34 K/UL — SIGNIFICANT CHANGE UP (ref 3.8–10.5)

## 2025-06-09 RX ORDER — VANCOMYCIN HCL IN 5 % DEXTROSE 1.5G/250ML
1250 PLASTIC BAG, INJECTION (ML) INTRAVENOUS ONCE
Refills: 0 | Status: COMPLETED | OUTPATIENT
Start: 2025-06-09 | End: 2025-06-09

## 2025-06-09 RX ORDER — CEFEPIME 2 G/20ML
2000 INJECTION, POWDER, FOR SOLUTION INTRAVENOUS
Refills: 0 | Status: DISCONTINUED | OUTPATIENT
Start: 2025-06-09 | End: 2025-06-16

## 2025-06-09 RX ADMIN — MUPIROCIN CALCIUM 1 APPLICATION(S): 20 CREAM TOPICAL at 18:40

## 2025-06-09 RX ADMIN — Medication 1 APPLICATION(S): at 05:15

## 2025-06-09 RX ADMIN — CEFEPIME 100 MILLIGRAM(S): 2 INJECTION, POWDER, FOR SOLUTION INTRAVENOUS at 18:39

## 2025-06-09 RX ADMIN — MUPIROCIN CALCIUM 1 APPLICATION(S): 20 CREAM TOPICAL at 05:14

## 2025-06-09 RX ADMIN — SEVELAMER HYDROCHLORIDE 800 MILLIGRAM(S): 800 TABLET ORAL at 12:40

## 2025-06-09 RX ADMIN — Medication 166.67 MILLIGRAM(S): at 18:54

## 2025-06-09 RX ADMIN — INSULIN LISPRO 1: 100 INJECTION, SOLUTION INTRAVENOUS; SUBCUTANEOUS at 16:45

## 2025-06-09 RX ADMIN — LATANOPROST PF 1 DROP(S): 0.05 SOLUTION/ DROPS OPHTHALMIC at 21:06

## 2025-06-09 RX ADMIN — SEVELAMER HYDROCHLORIDE 800 MILLIGRAM(S): 800 TABLET ORAL at 16:44

## 2025-06-09 RX ADMIN — TAMSULOSIN HYDROCHLORIDE 0.4 MILLIGRAM(S): 0.4 CAPSULE ORAL at 21:06

## 2025-06-09 RX ADMIN — HEPARIN SODIUM 5000 UNIT(S): 1000 INJECTION INTRAVENOUS; SUBCUTANEOUS at 05:13

## 2025-06-09 RX ADMIN — EPOETIN ALFA 4000 UNIT(S): 10000 SOLUTION INTRAVENOUS; SUBCUTANEOUS at 11:42

## 2025-06-09 RX ADMIN — Medication 3 MILLIGRAM(S): at 21:05

## 2025-06-09 RX ADMIN — HEPARIN SODIUM 5000 UNIT(S): 1000 INJECTION INTRAVENOUS; SUBCUTANEOUS at 18:39

## 2025-06-09 RX ADMIN — SEVELAMER HYDROCHLORIDE 800 MILLIGRAM(S): 800 TABLET ORAL at 08:06

## 2025-06-09 NOTE — PROGRESS NOTE ADULT - SUBJECTIVE AND OBJECTIVE BOX
Podiatry HPI: Patient with worsening foot wounds secondary to pressure and patients charcot. Sent in for surgical intervention. Wife at bedside states would have gotten worse due to pressure. Denies any fever, nausea, vomiting and sob.     Medications chlorhexidine 2% Cloths 1 Application(s) Topical <User Schedule>  epoetin rudolph-epbx (RETACRIT) Injectable 4000 Unit(s) IV Push <User Schedule>  heparin   Injectable 5000 Unit(s) SubCutaneous every 12 hours  insulin lispro (ADMELOG) corrective regimen sliding scale   SubCutaneous three times a day before meals  insulin lispro (ADMELOG) corrective regimen sliding scale   SubCutaneous at bedtime  latanoprost 0.005% Ophthalmic Solution 1 Drop(s) Both EYES at bedtime  melatonin 3 milliGRAM(s) Oral at bedtime PRN  midodrine. 10 milliGRAM(s) Oral <User Schedule> PRN  mupirocin 2% Ointment 1 Application(s) Both Nostrils two times a day  ondansetron Injectable 4 milliGRAM(s) IV Push every 8 hours PRN  sevelamer carbonate 800 milliGRAM(s) Oral three times a day with meals  tamsulosin 0.4 milliGRAM(s) Oral at bedtime    FHFamily history of diabetes mellitus    No pertinent family history in first degree relatives    ,   PMHDiabetes    Kidney problem    Hypertension    HTN (hypertension)    DM (diabetes mellitus)    HTN (hypertension)    DM (diabetes mellitus)    ESRD (end stage renal disease) on dialysis    Charcot foot due to diabetes mellitus    ESRD on hemodialysis    Charcot foot due to diabetes mellitus    Diabetic retinopathy associated with type 2 diabetes mellitus    Hyperlipidemia    Abscess of scrotum    Hidradenitis    End stage renal disease       PSHNo significant past surgical history    S/P foot surgery    Charcot's syndrome    History of incision and drainage    Hemodialysis access, AV graft        Labs                          9.7    7.34  )-----------( 201      ( 09 Jun 2025 08:45 )             30.4      06-09    132[L]  |  100  |  47[H]  ----------------------------<  145[H]  4.5   |  28  |  6.29[H]    Ca    8.7      09 Jun 2025 08:45       Vital Signs Last 24 Hrs  T(C): 36.7 (09 Jun 2025 08:30), Max: 36.7 (08 Jun 2025 14:01)  T(F): 98.1 (09 Jun 2025 08:30), Max: 98.1 (09 Jun 2025 08:30)  HR: 71 (09 Jun 2025 08:30) (64 - 83)  BP: 127/59 (09 Jun 2025 08:30) (126/63 - 156/82)  BP(mean): 107 (08 Jun 2025 21:47) (107 - 107)  RR: 18 (09 Jun 2025 05:05) (18 - 18)  SpO2: 96% (09 Jun 2025 05:05) (96% - 100%)    Parameters below as of 09 Jun 2025 08:30  Patient On (Oxygen Delivery Method): room air      Sedimentation Rate, Erythrocyte: 89 mm/hr (06-08-25 @ 06:10)  Sedimentation Rate, Erythrocyte: 85 mm/hr (06-07-25 @ 09:50)         C-Reactive Protein: 15.9 mg/L (06-09-25 @ 08:45)  C-Reactive Protein: 22.9 mg/L (06-08-25 @ 06:10)  C-Reactive Protein: 24.3 mg/L (06-07-25 @ 09:50)  C-Reactive Protein: 30.3 mg/L (06-04-25 @ 06:40)  C-Reactive Protein: 34.8 mg/L (06-03-25 @ 10:00)   WBC Count: 7.34 K/uL (06-09-25 @ 08:45)      LE FOCUSED PHYSICAL EXAM:  Vasc: DP/PT non-palpable bilaterally. CFT <3 seconds x 10. Temp Gradient within normal limits bilaterally with no increase in temperature to the right foot ulcer. No edema, no erythema, no varicosities observed bilaterally.   Derm:   Right foot- plantar heel wound about 6 cm x 4 cm with fibrotic wound base, medial TN joint about 1 cm wide. Wounds have no drainage and don't appear to be acutely infected   Left foot- Heel wound with an eschar about 4 cm wide. lateral foot wound about 2 cm wide. Wounds are not acutely infected.   MSK: Muscle strength deferred. No pain upon palpation bilaterally. No pain upon ROM of pedal and ankle joints bilaterally. No crepitation upon ROM of joints. Arch height 2/5 on-WB. Negative calf tenderness bilaterally.     IMAGING:   < from: Xray Ankle Complete 3 Views, Bilateral (06.03.25 @ 09:52) >  IMPRESSION: Advanced destructive changes of the hindfeet. No definite   bony erosion or acute fracture.      Thank you for this referral.    --- End of Report ---    < end of copied text >

## 2025-06-09 NOTE — PROGRESS NOTE ADULT - SUBJECTIVE AND OBJECTIVE BOX
[facility] NEPHROLOGY- PROGRESS NOTE    Patient is a 70yo Male with ESRD on HD via left IJ TDC, failed Rt AVG, HTN, 2HPT, Anemia, LE venous stasis p/w b/l LE wounds requiring debridement with graft placement. Nephrology consulted for ESRD status.     Pt s/p LE wound debridement.      Hospital Medications: Medications reviewed.  REVIEW OF SYSTEMS:  CONSTITUTIONAL: No fevers or chills  RESPIRATORY: No shortness of breath  CARDIOVASCULAR: No chest pain.  GASTROINTESTINAL: No nausea, vomiting, diarrhea or abdominal pain.   VASCULAR: No bilateral lower extremity edema.       VITALS:  T(F): 97.5 (06-09-25 @ 15:01), Max: 98.1 (06-09-25 @ 08:30)  HR: 77 (06-09-25 @ 15:45)  BP: 106/49 (06-09-25 @ 15:45)  RR: 18 (06-09-25 @ 15:01)  SpO2: 96% (06-09-25 @ 15:45)  Wt(kg): --    06-09 @ 07:01  -  06-09 @ 16:41  --------------------------------------------------------  IN: 500 mL / OUT: 3000 mL / NET: -2500 mL      PHYSICAL EXAM:  Gen: NAD, calm  HEENT: anicteric  Cards: RRR, +S1/S2, no M/G/R  Resp: CTA B/L  GI: soft, NT/ND, NABS  Extremities: no LE edema B/L  Derm: b/l feet wrapped  Access: Left IJ tunneled HD catheter- intact    LABS:  06-09    132[L]  |  100  |  47[H]  ----------------------------<  145[H]  4.5   |  28  |  6.29[H]    Ca    8.7      09 Jun 2025 08:45      Creatinine Trend: 6.29 <--, 4.94 <--, 7.01 <--, 5.65 <--, 5.36 <--, 4.47 <--                        9.7    7.34  )-----------( 201      ( 09 Jun 2025 08:45 )             30.4     Urine Studies:  Urinalysis Basic - ( 09 Jun 2025 08:45 )    Color:  / Appearance:  / SG:  / pH:   Gluc: 145 mg/dL / Ketone:   / Bili:  / Urobili:    Blood:  / Protein:  / Nitrite:    Leuk Esterase:  / RBC:  / WBC    Sq Epi:  / Non Sq Epi:  / Bacteria:

## 2025-06-09 NOTE — PROGRESS NOTE ADULT - ASSESSMENT
69-year-old man from home ([facility] rehab until 4/22) with past history diabetes, hypertension on midodrine with dialysis, ESRD on dialysis Monday Wednesday Friday, most recently day prior to admission, has sided left-sided permacath, known to Dr. Chirinos, bilateral Charcot deformity follows with Dr. Sim, chronic OM of the right foot,  coming at recommendation of Dr. Sim for bilateral foot wound debridement and graft application.

## 2025-06-09 NOTE — PROGRESS NOTE ADULT - ASSESSMENT
Patient is a 68yo Male with ESRD on HD via left IJ TDC, failed Rt AVG, HTN, 2HPT, Anemia, LE venous stasis p/w b/l LE wounds requiring debridement with graft placement. Nephrology consulted for ESRD status.     1) ESRD:  Pt seen on HD today 6/9, hemodyanimically stable, tolerating UF goal of 2.5L. Plan for next HD 6/11. c/w MWF schedule.   Rt AVG unsalvageable.  Monitor electrolytes.    2) Hypotension: BP acceptable.  c/w Midodrine 10mg PO MWF intra- HD to aid in fluid removal prn. Monitor BP    3) Anemia of renal disease: Hb borderline with adequate iron stores.  c/w Epo  4K with HD. Monitor hgb    4) Hyperphosphatemia- serum phos acceptable. Continue with renvela 1 tab with meals.  Monitor serum calcium and phos    [facility] NEPHROLOGY  Arden Tsang M.D.  Freeman Holland D.O.  Janny Chirinos M.D.  MD Ambar Mccarty, MSN, ANP-C    Telephone: [phone]  Facsimile: [phone]    [address] [place] [address], [address]  [place] [address]

## 2025-06-09 NOTE — PROGRESS NOTE ADULT - ASSESSMENT
Patient is a 70yo Male with ESRD on HD via left IJ TDC, failed Rt AVG, HTN, 2HPT, Anemia, LE venous stasis p/w b/l LE wounds requiring debridement with graft placement.[facility] with past history diabetes, hypertension on midodrine with dialysis, ESRD on dialysis Monday Wednesday Friday, most recently day prior to admission, has sided left-sided permacath, bilateral Charcot deformity follows with Dr. Sim, chronic OM of the right foot,  coming at recommendation of Dr. Valadez for bilateral foot wound debridement and graft application. Patient has bilateral heel wounds.      1.  Bilateral heel wounds.  Patient claims he has had these wounds  since November of last year.    2.  No intraop Cx were sent , follow up on pathology which can be also done as an outpatient , if no evidence of osteomyelitis ,then stop antibiotics.Podiatry follow up appreciated .  3.  Status post bilateral feet wounds debridement and graft placement, postop day #3  4.  Sometimes with heel wounds and osteo, it is difficult to salvage the foot but this will be decided by the podiatrist .  5.  Acute phase reactants were noted , CRP is trending down but ESR is still high.   C-Reactive Protein: 15.9 mg/L (06.09.25 @ 08:45)   C-Reactive Protein: 22.9 mg/L (06.08.25 @ 06:10)   C-Reactive Protein: 24.3 mg/L (06.07.25 @ 09:50)   C-Reactive Protein: 30.3 mg/L (06.04.25 @ 06:40)   C-Reactive Protein: 34.8 mg/L (06.03.25 @ 10:00)   C-Reactive Protein: 160.0 mg/L (12.17.24 @ 07:28)   C-Reactive Protein: 139.0 mg/L (12.16.24 @ 11:50)   Sedimentation Rate, Erythrocyte: 88 mm/hr (06.09.25 @ 08:45)   Sedimentation Rate, Erythrocyte: 89 mm/hr (06.08.25 @ 06:10)   Sedimentation Rate, Erythrocyte: 85 mm/hr (06.07.25 @ 09:50)   Sedimentation Rate, Erythrocyte: 88 mm/hr (06.04.25 @ 06:40)   Sedimentation Rate, Erythrocyte: 94 mm/hr (06.03.25 @ 10:00  6.  Recommend 6 weeks of IV Vancomycin ( dose as per pharmacy ) and IV Cefepime ( dose as per pharmacy ) from today, EOT is 06/21/25. If Cefepime not available , then consider ceftazidime. Duration of antibiotics can be prolonged to 8 -12 weeks depending on the clinical progress as patient is a vasculopath and has had these lesions for several months. Usual dose of Cefepime for intermittent hemodialysis is 2 g Iv now and then post HD   Vanco is  15-20 Mg / kg IV times one , then 500 mg IV post dialysis .  7.   Weekly CBC, CMP, ESR, CRP while patient is on antibiotics  8.   Follow up with podiatry.

## 2025-06-09 NOTE — PROGRESS NOTE ADULT - SUBJECTIVE AND OBJECTIVE BOX
Patient is a 69y old  Male who presents with a chief complaint of foot wound debridement (09 Jun 2025     INTERVAL HPI/OVERNIGHT EVENTS: no acute events overnight     MEDICATIONS  (STANDING):  chlorhexidine 2% Cloths 1 Application(s) Topical <User Schedule>  epoetin rudolph-epbx (RETACRIT) Injectable 4000 Unit(s) IV Push <User Schedule>  heparin   Injectable 5000 Unit(s) SubCutaneous every 12 hours  insulin lispro (ADMELOG) corrective regimen sliding scale   SubCutaneous three times a day before meals  insulin lispro (ADMELOG) corrective regimen sliding scale   SubCutaneous at bedtime  latanoprost 0.005% Ophthalmic Solution 1 Drop(s) Both EYES at bedtime  mupirocin 2% Ointment 1 Application(s) Both Nostrils two times a day  sevelamer carbonate 800 milliGRAM(s) Oral three times a day with meals  tamsulosin 0.4 milliGRAM(s) Oral at bedtime    MEDICATIONS  (PRN):  melatonin 3 milliGRAM(s) Oral at bedtime PRN Insomnia  midodrine. 10 milliGRAM(s) Oral <User Schedule> PRN SBP <100 in HD  ondansetron Injectable 4 milliGRAM(s) IV Push every 8 hours PRN Nausea and/or Vomiting      __________________________________________________  REVIEW OF SYSTEMS:    CONSTITUTIONAL: No fever,   EYES: no acute visual disturbances  NECK: No pain or stiffness  RESPIRATORY: No cough; No shortness of breath  CARDIOVASCULAR: No chest pain, no palpitations  GASTROINTESTINAL: No pain. No nausea or vomiting; No diarrhea   NEUROLOGICAL: No headache or numbness, no tremors  MUSCULOSKELETAL: No joint pain, no muscle pain  GENITOURINARY: no dysuria, no frequency, no hesitancy  PSYCHIATRY: no depression , no anxiety  ALL OTHER  ROS negative        Vital Signs Last 24 Hrs  T(C): 36.4 (09 Jun 2025 15:01), Max: 36.7 (09 Jun 2025 08:30)  T(F): 97.5 (09 Jun 2025 15:01), Max: 98.1 (09 Jun 2025 08:30)  HR: 77 (09 Jun 2025 15:45) (64 - 83)  BP: 106/49 (09 Jun 2025 15:45) (92/49 - 156/82)  BP(mean): 107 (08 Jun 2025 21:47) (107 - 107)  RR: 18 (09 Jun 2025 15:01) (18 - 18)  SpO2: 96% (09 Jun 2025 15:45) (96% - 98%)    Parameters below as of 09 Jun 2025 15:45  Patient On (Oxygen Delivery Method): room air        ________________________________________________  PHYSICAL EXAM:  GENERAL: NAD  HEENT: Normocephalic;  conjunctivae and sclerae clear; moist mucous membranes;   NECK : supple  CHEST/LUNG: dec breath sounds at bases  HEART: S1 S2+  ABDOMEN: Soft, Nontender, Nondistended; Bowel sounds present  EXTREMITIES: no cyanosis; no edema; no calf tenderness  SKIN: warm and dry; no rash  NERVOUS SYSTEM:  Awake and alert;    _________________________________________________  LABS:                        9.7    7.34  )-----------( 201      ( 09 Jun 2025 08:45 )             30.4     06-09    132[L]  |  100  |  47[H]  ----------------------------<  145[H]  4.5   |  28  |  6.29[H]    Ca    8.7      09 Jun 2025 08:45        Urinalysis Basic - ( 09 Jun 2025 08:45 )    Color: x / Appearance: x / SG: x / pH: x  Gluc: 145 mg/dL / Ketone: x  / Bili: x / Urobili: x   Blood: x / Protein: x / Nitrite: x   Leuk Esterase: x / RBC: x / WBC x   Sq Epi: x / Non Sq Epi: x / Bacteria: x      CAPILLARY BLOOD GLUCOSE      POCT Blood Glucose.: 193 mg/dL (09 Jun 2025 16:14)  POCT Blood Glucose.: 126 mg/dL (09 Jun 2025 10:54)  POCT Blood Glucose.: 116 mg/dL (09 Jun 2025 07:53)  POCT Blood Glucose.: 126 mg/dL (08 Jun 2025 21:24)        RADIOLOGY & ADDITIONAL TESTS:  < from: Xray Ankle Complete 3 Views, Bilateral (06.03.25 @ 09:52) >  IMPRESSION: Advanced destructive changes of the hindfeet. No definite   bony erosion or acute fracture.      Thank you for this referral.    --- End of Report ---    < end of copied text >      Imaging Personally Reviewed:  YES    Consultant(s) Notes Reviewed:   YES      Plan of care was discussed with patient and /or primary care giver; all questions and concerns were addressed and care was aligned with patient's wishes.

## 2025-06-09 NOTE — PROGRESS NOTE ADULT - ASSESSMENT
A: s/p b/l foot wounds debridement and graft (restrata) application 6/6/25    P:   -Patient evaluated and Chart reviewed   -Patient is stable for discharge from podiatry  -Appreciate ID recs- No intra-op cultures were taken  -Patient to follow up at an outpatient podiatry clinic upon discharge on a Thursday: [address] [facility], [address], [place] [address]. Patient can call [phone] to make an appointment.  -Seen and discussed with Attending Chiquis

## 2025-06-09 NOTE — PROGRESS NOTE ADULT - SUBJECTIVE AND OBJECTIVE BOX
Date of Service 06-09-25 @ 20:20    CHIEF COMPLAINT:Patient is a 69y old  Male who presents with a chief complaint of foot wound debridement (09 Jun 2025 17:06)    	  REVIEW OF SYSTEMS:  CONSTITUTIONAL: No fever, weight loss, or fatigue  EYES: No eye pain, visual disturbances, or discharge  ENT:  No difficulty hearing, tinnitus, vertigo; No sinus or throat pain  NECK: No pain or stiffness  RESPIRATORY: No cough, wheezing, chills or hemoptysis; No Shortness of Breath  CARDIOVASCULAR: No chest pain, palpitations, passing out, dizziness, or leg swelling  GASTROINTESTINAL: No abdominal or epigastric pain. No nausea, vomiting, or hematemesis; No diarrhea or constipation. No melena or hematochezia.  GENITOURINARY: No dysuria, frequency, hematuria, or incontinence  NEUROLOGICAL: No headaches, memory loss, loss of strength, numbness, or tremors  SKIN: No itching, burning, rashes, or lesions   LYMPH Nodes: No enlarged glands  ENDOCRINE: No heat or cold intolerance; No hair loss  MUSCULOSKELETAL: No joint pain or swelling; No muscle, back, or extremity pain  PSYCHIATRIC: No depression, anxiety, mood swings, or difficulty sleeping  HEME/LYMPH: No easy bruising, or bleeding gums  ALLERGY AND IMMUNOLOGIC: No hives or eczema	    [ ] All others negative	  [ ] Unable to obtain    PHYSICAL EXAM:  T(C): 36.7 (06-09-25 @ 20:08), Max: 36.7 (06-09-25 @ 08:30)  HR: 71 (06-09-25 @ 20:08) (64 - 83)  BP: 108/54 (06-09-25 @ 20:08) (92/49 - 156/82)  RR: 18 (06-09-25 @ 20:08) (18 - 18)  SpO2: 97% (06-09-25 @ 20:08) (96% - 98%)  Wt(kg): --  I&O's Summary    09 Jun 2025 07:01  -  09 Jun 2025 20:20  --------------------------------------------------------  IN: 500 mL / OUT: 3000 mL / NET: -2500 mL        Appearance: Normal	  HEENT:   Normal oral mucosa, PERRL, EOMI	  Lymphatic: No lymphadenopathy  Cardiovascular: Normal S1 S2, No JVD, No murmurs, No edema  Respiratory: Lungs clear to auscultation	  Psychiatry: A & O x 3, Mood & affect appropriate  Gastrointestinal:  Soft, Non-tender, + BS	  Skin: No rashes, No ecchymoses, No cyanosis	  Neurologic: Non-focal  Extremities: bilateral feet dressings are intact       MEDICATIONS  (STANDING):  cefepime   IVPB 2000 milliGRAM(s) IV Intermittent <User Schedule>  chlorhexidine 2% Cloths 1 Application(s) Topical <User Schedule>  epoetin rudolph-epbx (RETACRIT) Injectable 4000 Unit(s) IV Push <User Schedule>  heparin   Injectable 5000 Unit(s) SubCutaneous every 12 hours  insulin lispro (ADMELOG) corrective regimen sliding scale   SubCutaneous three times a day before meals  insulin lispro (ADMELOG) corrective regimen sliding scale   SubCutaneous at bedtime  latanoprost 0.005% Ophthalmic Solution 1 Drop(s) Both EYES at bedtime  mupirocin 2% Ointment 1 Application(s) Both Nostrils two times a day  sevelamer carbonate 800 milliGRAM(s) Oral three times a day with meals  tamsulosin 0.4 milliGRAM(s) Oral at bedtime      TELEMETRY: 	    ECG:  	  RADIOLOGY:  OTHER: 	  	  LABS:	 	    CARDIAC MARKERS:                                9.7    7.34  )-----------( 201      ( 09 Jun 2025 08:45 )             30.4     06-09    132[L]  |  100  |  47[H]  ----------------------------<  145[H]  4.5   |  28  |  6.29[H]    Ca    8.7      09 Jun 2025 08:45      proBNP:   Lipid Profile:   HgA1c:   TSH:

## 2025-06-09 NOTE — PROGRESS NOTE ADULT - ASSESSMENT
69-year-old man from home ([facility] rehab until 4/22) with past history diabetes, hypertension on midodrine with dialysis, ESRD on dialysis Monday Wednesday Friday, most recently day prior to admission, has sided left-sided permacath, known to Dr. Chirinos, bilateral Charcot deformity follows with Dr. Sim, chronic OM of the right foot,  coming at recommendation of Dr. Sim for bilateral foot wound debridement and graft application.

## 2025-06-10 LAB
ANION GAP SERPL CALC-SCNC: 7 MMOL/L — SIGNIFICANT CHANGE UP (ref 5–17)
BUN SERPL-MCNC: 33 MG/DL — HIGH (ref 7–18)
CALCIUM SERPL-MCNC: 8.9 MG/DL — SIGNIFICANT CHANGE UP (ref 8.4–10.5)
CHLORIDE SERPL-SCNC: 100 MMOL/L — SIGNIFICANT CHANGE UP (ref 96–108)
CO2 SERPL-SCNC: 27 MMOL/L — SIGNIFICANT CHANGE UP (ref 22–31)
CREAT SERPL-MCNC: 4.73 MG/DL — HIGH (ref 0.5–1.3)
EGFR: 13 ML/MIN/1.73M2 — LOW
EGFR: 13 ML/MIN/1.73M2 — LOW
GLUCOSE BLDC GLUCOMTR-MCNC: 116 MG/DL — HIGH (ref 70–99)
GLUCOSE BLDC GLUCOMTR-MCNC: 170 MG/DL — HIGH (ref 70–99)
GLUCOSE BLDC GLUCOMTR-MCNC: 203 MG/DL — HIGH (ref 70–99)
GLUCOSE BLDC GLUCOMTR-MCNC: 99 MG/DL — SIGNIFICANT CHANGE UP (ref 70–99)
GLUCOSE SERPL-MCNC: 117 MG/DL — HIGH (ref 70–99)
HCT VFR BLD CALC: 30.6 % — LOW (ref 39–50)
HGB BLD-MCNC: 9.9 G/DL — LOW (ref 13–17)
MCHC RBC-ENTMCNC: 32.4 G/DL — SIGNIFICANT CHANGE UP (ref 32–36)
MCHC RBC-ENTMCNC: 32.7 PG — SIGNIFICANT CHANGE UP (ref 27–34)
MCV RBC AUTO: 101 FL — HIGH (ref 80–100)
NRBC BLD AUTO-RTO: 0 /100 WBCS — SIGNIFICANT CHANGE UP (ref 0–0)
PLATELET # BLD AUTO: 177 K/UL — SIGNIFICANT CHANGE UP (ref 150–400)
POTASSIUM SERPL-MCNC: 4.3 MMOL/L — SIGNIFICANT CHANGE UP (ref 3.5–5.3)
POTASSIUM SERPL-SCNC: 4.3 MMOL/L — SIGNIFICANT CHANGE UP (ref 3.5–5.3)
RBC # BLD: 3.03 M/UL — LOW (ref 4.2–5.8)
RBC # FLD: 14.9 % — HIGH (ref 10.3–14.5)
SODIUM SERPL-SCNC: 134 MMOL/L — LOW (ref 135–145)
WBC # BLD: 7.74 K/UL — SIGNIFICANT CHANGE UP (ref 3.8–10.5)
WBC # FLD AUTO: 7.74 K/UL — SIGNIFICANT CHANGE UP (ref 3.8–10.5)

## 2025-06-10 RX ADMIN — Medication 3 MILLIGRAM(S): at 21:17

## 2025-06-10 RX ADMIN — SEVELAMER HYDROCHLORIDE 800 MILLIGRAM(S): 800 TABLET ORAL at 08:27

## 2025-06-10 RX ADMIN — MUPIROCIN CALCIUM 1 APPLICATION(S): 20 CREAM TOPICAL at 17:17

## 2025-06-10 RX ADMIN — HEPARIN SODIUM 5000 UNIT(S): 1000 INJECTION INTRAVENOUS; SUBCUTANEOUS at 05:04

## 2025-06-10 RX ADMIN — LATANOPROST PF 1 DROP(S): 0.05 SOLUTION/ DROPS OPHTHALMIC at 21:25

## 2025-06-10 RX ADMIN — SEVELAMER HYDROCHLORIDE 800 MILLIGRAM(S): 800 TABLET ORAL at 11:49

## 2025-06-10 RX ADMIN — Medication 1 APPLICATION(S): at 05:04

## 2025-06-10 RX ADMIN — TAMSULOSIN HYDROCHLORIDE 0.4 MILLIGRAM(S): 0.4 CAPSULE ORAL at 21:18

## 2025-06-10 RX ADMIN — HEPARIN SODIUM 5000 UNIT(S): 1000 INJECTION INTRAVENOUS; SUBCUTANEOUS at 17:18

## 2025-06-10 RX ADMIN — INSULIN LISPRO 2: 100 INJECTION, SOLUTION INTRAVENOUS; SUBCUTANEOUS at 11:49

## 2025-06-10 RX ADMIN — SEVELAMER HYDROCHLORIDE 800 MILLIGRAM(S): 800 TABLET ORAL at 17:16

## 2025-06-10 RX ADMIN — MUPIROCIN CALCIUM 1 APPLICATION(S): 20 CREAM TOPICAL at 05:04

## 2025-06-10 NOTE — DIETITIAN INITIAL EVALUATION ADULT - LITERATURE/VIDEOS GIVEN
Provided pt with handout Carbohydrate Counting for People with Diabetes. Discussed carbohydrate sources, carbohydrate portions, protein sources, mixed meals, and nutrition label reading. Stressed importance of balanced meals with adequate protein and fiber. Pt was informed of current A1c, goal A1c, and goal fingerstick range. Provided ESRD on HD diet education

## 2025-06-10 NOTE — PROGRESS NOTE ADULT - SUBJECTIVE AND OBJECTIVE BOX
Patient is a 69y old  Male who presents with a chief complaint of foot wound debridement (10 Leodan 2025     INTERVAL HPI/OVERNIGHT EVENTS: pt seen and examined    MEDICATIONS  (STANDING):  cefepime   IVPB 2000 milliGRAM(s) IV Intermittent <User Schedule>  chlorhexidine 2% Cloths 1 Application(s) Topical <User Schedule>  epoetin rudolph-epbx (RETACRIT) Injectable 4000 Unit(s) IV Push <User Schedule>  heparin   Injectable 5000 Unit(s) SubCutaneous every 12 hours  insulin lispro (ADMELOG) corrective regimen sliding scale   SubCutaneous three times a day before meals  insulin lispro (ADMELOG) corrective regimen sliding scale   SubCutaneous at bedtime  latanoprost 0.005% Ophthalmic Solution 1 Drop(s) Both EYES at bedtime  mupirocin 2% Ointment 1 Application(s) Both Nostrils two times a day  sevelamer carbonate 800 milliGRAM(s) Oral three times a day with meals  tamsulosin 0.4 milliGRAM(s) Oral at bedtime    MEDICATIONS  (PRN):  melatonin 3 milliGRAM(s) Oral at bedtime PRN Insomnia  midodrine. 10 milliGRAM(s) Oral <User Schedule> PRN SBP <100 in HD  ondansetron Injectable 4 milliGRAM(s) IV Push every 8 hours PRN Nausea and/or Vomiting      __________________________________________________  REVIEW OF SYSTEMS:    CONSTITUTIONAL: No fever,   RESPIRATORY: No cough; No shortness of breath  CARDIOVASCULAR: No chest pain, no palpitations  GASTROINTESTINAL: No pain. No nausea or vomiting; No diarrhea   NEUROLOGICAL: No headache or numbness, no tremors  MUSCULOSKELETAL: No joint pain, no muscle pain  GENITOURINARY: no dysuria, no frequency, no hesitancy    ALL OTHER  ROS negative        Vital Signs Last 24 Hrs  T(C): 36.3 (10 Leodan 2025 13:10), Max: 36.7 (09 Jun 2025 20:08)  T(F): 97.3 (10 Leodan 2025 13:10), Max: 98 (09 Jun 2025 20:08)  HR: 64 (10 Leodan 2025 13:10) (64 - 74)  BP: 113/58 (10 Leodan 2025 13:10) (108/54 - 131/58)  BP(mean): --  RR: 16 (10 Leodan 2025 13:10) (16 - 18)  SpO2: 97% (10 Leodan 2025 13:10) (97% - 98%)    Parameters below as of 10 Leodan 2025 13:10  Patient On (Oxygen Delivery Method): room air        ________________________________________________  PHYSICAL EXAM:  GENERAL: NAD  HEENT: Normocephalic  NECK : supple  CHEST/LUNG:dec breath sounds at bases  HEART: S1 S2  +  ABDOMEN: Soft, Nontender, Nondistended; Bowel sounds present  EXTREMITIES: +b/l foot dressings  SKIN: warm and dry  NERVOUS SYSTEM:  Awake and alert;   _________________________________________________  LABS:                        9.9    7.74  )-----------( 177      ( 10 Leodan 2025 07:28 )             30.6     06-10    134[L]  |  100  |  33[H]  ----------------------------<  117[H]  4.3   |  27  |  4.73[H]    Ca    8.9      10 Leodan 2025 07:28        Urinalysis Basic - ( 10 Leodan 2025 07:28 )    Color: x / Appearance: x / SG: x / pH: x  Gluc: 117 mg/dL / Ketone: x  / Bili: x / Urobili: x   Blood: x / Protein: x / Nitrite: x   Leuk Esterase: x / RBC: x / WBC x   Sq Epi: x / Non Sq Epi: x / Bacteria: x      CAPILLARY BLOOD GLUCOSE      POCT Blood Glucose.: 99 mg/dL (10 Leodan 2025 16:35)  POCT Blood Glucose.: 203 mg/dL (10 Leodan 2025 11:14)  POCT Blood Glucose.: 116 mg/dL (10 Leodan 2025 07:41)  POCT Blood Glucose.: 194 mg/dL (09 Jun 2025 21:14)        RADIOLOGY & ADDITIONAL TESTS:    < from: Xray Ankle Complete 3 Views, Bilateral (06.03.25 @ 09:52) >  INTERPRETATION:  Bilateral feet and bilateral ankles    HISTORY: Bilateral wounds    COMPARISON: 5/15/2025     3 views of the right foot, 3 views of the left foot, 3 views of the left   ankle and 3 views of the right ankle show advanced arthritic changes of   the ankle joint with invagination of the distaltibia is intact to the   hindfoot with destruction of both the talar bones and the calcaneus   bilaterally. There is lateral shifting of the mid foot with regard to the   hindfoot bilaterally. Vascular calcifications are present.    IMPRESSION: Advanced destructive changes of the hindfeet. No definite   bony erosion or acute fracture.      Thank you for this referral.    --- End of Report ---    < end of copied text >    Imaging Personally Reviewed:  YES    Consultant(s) Notes Reviewed:   YES      Plan of care was discussed with patient and /or primary care giver; all questions and concerns were addressed and care was aligned with patient's wishes.

## 2025-06-10 NOTE — DIETITIAN INITIAL EVALUATION ADULT - ORAL INTAKE PTA/DIET HISTORY
Pt reported no new food allergies or intolerances. Pt does not take any vitamins or supplements PTA. Pt's intake was good PTA. Reported UBW: 187 lbs, no recent significant weight changes. EMR: 187 lbs post HD - 12/18/24.  No recent episodes of nausea, vomiting, diarrhea or constipation per pt. Last BM noted on 6/9per pt. Denies any chewing/swallowing difficulties with current diet consistency. Intake is % per pt. Food preferences explored and forwarded to dietary. POCT 81 - 280 between 6/3 - 6/10. Hba1c - 7.5% - 6/4/25. Hyponatremia (132).

## 2025-06-10 NOTE — PROGRESS NOTE ADULT - ASSESSMENT
Patient is a 68yo Male with ESRD on HD via left IJ TDC, failed Rt AVG, HTN, 2HPT, Anemia, LE venous stasis p/w b/l LE wounds requiring debridement with graft placement.Patient came  from home ([facility] rehab until 4/22) with past history diabetes, hypertension on midodrine with dialysis, ESRD on dialysis Monday Wednesday Friday, most recently day prior to admission, has sided left-sided permacath, bilateral Charcot deformity follows with Dr. Sim, chronic OM of the right foot,  coming at recommendation of Dr. Valadez for bilateral foot wound debridement and graft application. Patient has bilateral heel wounds.      1.  Bilateral heel wounds.  Patient claims he has had these wounds  since November of last year.    2.  No intraop Cx were sent , follow up on pathology which can be also done as an outpatient , if no evidence of osteomyelitis ,then stop antibiotics.  3.  Status post bilateral feet wounds debridement and graft placement, postop day #4  4.  Sometimes with heel wounds and osteo, it is difficult to salvage the foot but this will be decided by the podiatrist .  5.  Acute phase reactants were noted , CRP is trending down but ESR is still high.     C-Reactive Protein: 15.9 mg/L (06.09.25 @ 08:45)   C-Reactive Protein: 22.9 mg/L (06.08.25 @ 06:10)   C-Reactive Protein: 24.3 mg/L (06.07.25 @ 09:50)   C-Reactive Protein: 30.3 mg/L (06.04.25 @ 06:40)   C-Reactive Protein: 34.8 mg/L (06.03.25 @ 10:00)   C-Reactive Protein: 160.0 mg/L (12.17.24 @ 07:28)   C-Reactive Protein: 139.0 mg/L (12.16.24 @ 11:50)   Sedimentation Rate, Erythrocyte: 88 mm/hr (06.09.25 @ 08:45)   Sedimentation Rate, Erythrocyte: 89 mm/hr (06.08.25 @ 06:10)   Sedimentation Rate, Erythrocyte: 85 mm/hr (06.07.25 @ 09:50)   Sedimentation Rate, Erythrocyte: 88 mm/hr (06.04.25 @ 06:40)   Sedimentation Rate, Erythrocyte: 94 mm/hr (06.03.25 @ 10:00  6.  Recommend 6 weeks of IV Vancomycin ( dose as per pharmacy ) and IV Cefepime ( dose as per pharmacy ) from today, EOT is 06/21/25. If Cefepime not available , then consider ceftazidime. Duration of antibiotics can be prolonged to 8 -12 weeks depending on the clinical progress as patient is a vasculopath and has had these lesions for several months. Usual dose of Cefepime for intermittent hemodialysis is 2 g Iv now and then post HD   Vanco is  15-20 Mg / kg IV times one , then 500 mg IV post dialysis .  7.   Weekly CBC, CMP, ESR, CRP while patient is on antibiotics  8.   Follow up with podiatry.

## 2025-06-10 NOTE — DIETITIAN INITIAL EVALUATION ADULT - NS FNS DIET ORDER
Diet, Regular:   Consistent Carbohydrate {Evening Snacks}  1200mL Fluid Restriction ([id number])  For patients receiving Renal Replacement - No Protein Restr, No Conc K, No Conc Phos, Low Sodium (RENAL) (06-06-25 @ 13:31) [Active]

## 2025-06-10 NOTE — DIETITIAN INITIAL EVALUATION ADULT - PERTINENT LABORATORY DATA
06-10    134[L]  |  100  |  33[H]  ----------------------------<  117[H]  4.3   |  27  |  4.73[H]    Ca    8.9      10 Leodan 2025 07:28    POCT Blood Glucose.: 116 mg/dL (06-10-25 @ 07:41)  A1C with Estimated Average Glucose Result: 7.5 % (06-04-25 @ 06:40)  A1C with Estimated Average Glucose Result: 7.4 % (12-17-24 @ 07:28)

## 2025-06-10 NOTE — PROGRESS NOTE ADULT - SUBJECTIVE AND OBJECTIVE BOX
[facility] NEPHROLOGY- PROGRESS NOTE    Patient is a 68yo Male with ESRD on HD via left IJ TDC, failed Rt AVG, HTN, 2HPT, Anemia, LE venous stasis p/w b/l LE wounds requiring debridement with graft placement. Nephrology consulted for ESRD status.     Pt s/p LE wound debridement.      Hospital Medications: Medications reviewed.  REVIEW OF SYSTEMS:  CONSTITUTIONAL: No fevers or chills  RESPIRATORY: No shortness of breath  CARDIOVASCULAR: No chest pain.  GASTROINTESTINAL: No nausea, vomiting, diarrhea or abdominal pain.   VASCULAR: No bilateral lower extremity edema.     VITALS:  T(F): 98.1 (06-10-25 @ 20:02), Max: 98.1 (06-10-25 @ 20:02)  HR: 71 (06-10-25 @ 20:02)  BP: 136/78 (06-10-25 @ 20:02)  RR: 17 (06-10-25 @ 20:02)  SpO2: 100% (06-10-25 @ 20:02)  Wt(kg): --    06-09 @ 07:01  -  06-10 @ 07:00  --------------------------------------------------------  IN: 500 mL / OUT: 3000 mL / NET: -2500 mL      PHYSICAL EXAM:  Gen: NAD, calm  HEENT: anicteric  Cards: RRR, +S1/S2, no M/G/R  Resp: CTA B/L  GI: soft, NT/ND, NABS  Extremities: no LE edema B/L  Derm: b/l feet wrapped  Access: Left IJ tunneled HD catheter- intact    LABS:  06-10    134[L]  |  100  |  33[H]  ----------------------------<  117[H]  4.3   |  27  |  4.73[H]    Ca    8.9      10 Leodan 2025 07:28      Creatinine Trend: 4.73 <--, 6.29 <--, 4.94 <--, 7.01 <--, 5.65 <--, 5.36 <--                        9.9    7.74  )-----------( 177      ( 10 Leodan 2025 07:28 )             30.6     Urine Studies:  Urinalysis Basic - ( 10 Leodan 2025 07:28 )    Color:  / Appearance:  / SG:  / pH:   Gluc: 117 mg/dL / Ketone:   / Bili:  / Urobili:    Blood:  / Protein:  / Nitrite:    Leuk Esterase:  / RBC:  / WBC    Sq Epi:  / Non Sq Epi:  / Bacteria:

## 2025-06-10 NOTE — DIETITIAN INITIAL EVALUATION ADULT - ADD RECOMMEND
1) Continue current diet as medically feasible.  2) Encourage PO intake and honor food preferences as able.  3) Monitor PO intake, labs, weights, BM's, and skin integrity.   4) Recommend nepro QD  negative

## 2025-06-10 NOTE — DIETITIAN INITIAL EVALUATION ADULT - PROBLEM SELECTOR PLAN 1
Multiple chronic ulcers of both feet, not currently infected.  Followed outpatient by Dr. Sim. Recommended that he come for bilateral wound debridement and graft placement, planned for Friday 6/6.  No role for antibiotics at this time, not septic and chronic wounds.  Will reassess after surgery.  Daily ESR and CRP  Podiatry following     n.p.o. after midnight on Thurs 6/5 and hold heparin after midnight Thurs 6/5 prior to surgical debridement planned for Friday morning

## 2025-06-10 NOTE — PROGRESS NOTE ADULT - PROBLEM SELECTOR PLAN 1
Multiple chronic ulcers of both feet, not currently infected.    -Follows outpt by Dr. Sim: sent for b/l wound debridement and graft placement, plan for Friday 6/6.  -Daily ESR and CRP  -S/p OR for I&D with graft placement Bilateral feet  -F/u wound cultures  -Podiatry following and cleared   -ID Dr. Oakley following   -PT eval-->HPT  - ID reccs: IV abx but if surgical path negative for Osteo then no abx needed

## 2025-06-10 NOTE — PROGRESS NOTE ADULT - ASSESSMENT
69-year-old man from home ([facility] rehab until 4/22) with past history diabetes, hypertension on midodrine with dialysis, ESRD on dialysis Monday Wednesday Friday, most recently day prior to admission, has sided left-sided permacath, known to Dr. Chirinos, bilateral Charcot deformity follows with Dr. Sim, chronic OM of the right foot,  coming at recommendation of Dr. Sim for bilateral foot wound debridement and graft application.

## 2025-06-10 NOTE — PROGRESS NOTE ADULT - SUBJECTIVE AND OBJECTIVE BOX
NP Note discussed with  Primary Attending    Patient is a 69y old  Male who presents with a chief complaint of foot wound debridement (10 Leodan 2025 11:08)      INTERVAL HPI/OVERNIGHT EVENTS: no acute events overnight    MEDICATIONS  (STANDING):  cefepime   IVPB 2000 milliGRAM(s) IV Intermittent <User Schedule>  chlorhexidine 2% Cloths 1 Application(s) Topical <User Schedule>  epoetin rudolph-epbx (RETACRIT) Injectable 4000 Unit(s) IV Push <User Schedule>  heparin   Injectable 5000 Unit(s) SubCutaneous every 12 hours  insulin lispro (ADMELOG) corrective regimen sliding scale   SubCutaneous three times a day before meals  insulin lispro (ADMELOG) corrective regimen sliding scale   SubCutaneous at bedtime  latanoprost 0.005% Ophthalmic Solution 1 Drop(s) Both EYES at bedtime  mupirocin 2% Ointment 1 Application(s) Both Nostrils two times a day  sevelamer carbonate 800 milliGRAM(s) Oral three times a day with meals  tamsulosin 0.4 milliGRAM(s) Oral at bedtime    MEDICATIONS  (PRN):  melatonin 3 milliGRAM(s) Oral at bedtime PRN Insomnia  midodrine. 10 milliGRAM(s) Oral <User Schedule> PRN SBP <100 in HD  ondansetron Injectable 4 milliGRAM(s) IV Push every 8 hours PRN Nausea and/or Vomiting      __________________________________________________  REVIEW OF SYSTEMS:    CONSTITUTIONAL: No fever,   RESPIRATORY: No cough; No shortness of breath  CARDIOVASCULAR: No chest pain, no palpitations  GASTROINTESTINAL: No pain. No nausea or vomiting; No diarrhea   NEUROLOGICAL: No headache or numbness, no tremors  MUSCULOSKELETAL: No joint pain, no muscle pain  GENITOURINARY: no dysuria, no frequency, no hesitancy    ALL OTHER  ROS negative        Vital Signs Last 24 Hrs  T(C): 36.3 (10 Leodan 2025 13:10), Max: 36.7 (09 Jun 2025 20:08)  T(F): 97.3 (10 Leodan 2025 13:10), Max: 98 (09 Jun 2025 20:08)  HR: 64 (10 Leodan 2025 13:10) (64 - 74)  BP: 113/58 (10 Leodan 2025 13:10) (108/54 - 131/58)  BP(mean): --  RR: 16 (10 Leodan 2025 13:10) (16 - 18)  SpO2: 97% (10 Leodan 2025 13:10) (97% - 98%)    Parameters below as of 10 Leodan 2025 13:10  Patient On (Oxygen Delivery Method): room air        ________________________________________________  PHYSICAL EXAM:  GENERAL: NAD  HEENT: Normocephalic  NECK : supple  CHEST/LUNG: Clear to auscultation bilaterally  HEART: S1 S2  regular  ABDOMEN: Soft, Nontender, Nondistended; Bowel sounds present  EXTREMITIES: no edema  SKIN: warm and dry  NERVOUS SYSTEM:  Awake and alert; Oriented  to place, person and time    _________________________________________________  LABS:                        9.9    7.74  )-----------( 177      ( 10 Leodan 2025 07:28 )             30.6     06-10    134[L]  |  100  |  33[H]  ----------------------------<  117[H]  4.3   |  27  |  4.73[H]    Ca    8.9      10 Leodan 2025 07:28        Urinalysis Basic - ( 10 Leodan 2025 07:28 )    Color: x / Appearance: x / SG: x / pH: x  Gluc: 117 mg/dL / Ketone: x  / Bili: x / Urobili: x   Blood: x / Protein: x / Nitrite: x   Leuk Esterase: x / RBC: x / WBC x   Sq Epi: x / Non Sq Epi: x / Bacteria: x      CAPILLARY BLOOD GLUCOSE      POCT Blood Glucose.: 99 mg/dL (10 Leodan 2025 16:35)  POCT Blood Glucose.: 203 mg/dL (10 Leodan 2025 11:14)  POCT Blood Glucose.: 116 mg/dL (10 Leodan 2025 07:41)  POCT Blood Glucose.: 194 mg/dL (09 Jun 2025 21:14)        RADIOLOGY & ADDITIONAL TESTS:    < from: Xray Ankle Complete 3 Views, Bilateral (06.03.25 @ 09:52) >  INTERPRETATION:  Bilateral feet and bilateral ankles    HISTORY: Bilateral wounds    COMPARISON: 5/15/2025     3 views of the right foot, 3 views of the left foot, 3 views of the left   ankle and 3 views of the right ankle show advanced arthritic changes of   the ankle joint with invagination of the distaltibia is intact to the   hindfoot with destruction of both the talar bones and the calcaneus   bilaterally. There is lateral shifting of the mid foot with regard to the   hindfoot bilaterally. Vascular calcifications are present.    IMPRESSION: Advanced destructive changes of the hindfeet. No definite   bony erosion or acute fracture.      Thank you for this referral.    --- End of Report ---    < end of copied text >    Imaging Personally Reviewed:  YES    Consultant(s) Notes Reviewed:   YES      Plan of care was discussed with patient and /or primary care giver; all questions and concerns were addressed and care was aligned with patient's wishes.     NP Note discussed with  Primary Attending    Patient is a 69y old  Male who presents with a chief complaint of foot wound debridement (10 Leodan 2025 11:08)      INTERVAL HPI/OVERNIGHT EVENTS: no acute events overnight    MEDICATIONS  (STANDING):  cefepime   IVPB 2000 milliGRAM(s) IV Intermittent <User Schedule>  chlorhexidine 2% Cloths 1 Application(s) Topical <User Schedule>  epoetin rudolph-epbx (RETACRIT) Injectable 4000 Unit(s) IV Push <User Schedule>  heparin   Injectable 5000 Unit(s) SubCutaneous every 12 hours  insulin lispro (ADMELOG) corrective regimen sliding scale   SubCutaneous three times a day before meals  insulin lispro (ADMELOG) corrective regimen sliding scale   SubCutaneous at bedtime  latanoprost 0.005% Ophthalmic Solution 1 Drop(s) Both EYES at bedtime  mupirocin 2% Ointment 1 Application(s) Both Nostrils two times a day  sevelamer carbonate 800 milliGRAM(s) Oral three times a day with meals  tamsulosin 0.4 milliGRAM(s) Oral at bedtime    MEDICATIONS  (PRN):  melatonin 3 milliGRAM(s) Oral at bedtime PRN Insomnia  midodrine. 10 milliGRAM(s) Oral <User Schedule> PRN SBP <100 in HD  ondansetron Injectable 4 milliGRAM(s) IV Push every 8 hours PRN Nausea and/or Vomiting      __________________________________________________  REVIEW OF SYSTEMS:    CONSTITUTIONAL: No fever,   RESPIRATORY: No cough; No shortness of breath  CARDIOVASCULAR: No chest pain, no palpitations  GASTROINTESTINAL: No pain. No nausea or vomiting; No diarrhea   NEUROLOGICAL: No headache or numbness, no tremors  MUSCULOSKELETAL: No joint pain, no muscle pain  GENITOURINARY: no dysuria, no frequency, no hesitancy    ALL OTHER  ROS negative        Vital Signs Last 24 Hrs  T(C): 36.3 (10 Leodan 2025 13:10), Max: 36.7 (09 Jun 2025 20:08)  T(F): 97.3 (10 Leodan 2025 13:10), Max: 98 (09 Jun 2025 20:08)  HR: 64 (10 Leodan 2025 13:10) (64 - 74)  BP: 113/58 (10 Leodan 2025 13:10) (108/54 - 131/58)  BP(mean): --  RR: 16 (10 Leodan 2025 13:10) (16 - 18)  SpO2: 97% (10 Leodan 2025 13:10) (97% - 98%)    Parameters below as of 10 Leodan 2025 13:10  Patient On (Oxygen Delivery Method): room air        ________________________________________________  PHYSICAL EXAM:  GENERAL: NAD  HEENT: Normocephalic  NECK : supple  CHEST/LUNG: Clear to auscultation bilaterally  HEART: S1 S2  regular  ABDOMEN: Soft, Nontender, Nondistended; Bowel sounds present  EXTREMITIES: +b/l foot dressings  SKIN: warm and dry  NERVOUS SYSTEM:  Awake and alert; Oriented  to place, person and time    _________________________________________________  LABS:                        9.9    7.74  )-----------( 177      ( 10 Leodan 2025 07:28 )             30.6     06-10    134[L]  |  100  |  33[H]  ----------------------------<  117[H]  4.3   |  27  |  4.73[H]    Ca    8.9      10 Leodan 2025 07:28        Urinalysis Basic - ( 10 Leodan 2025 07:28 )    Color: x / Appearance: x / SG: x / pH: x  Gluc: 117 mg/dL / Ketone: x  / Bili: x / Urobili: x   Blood: x / Protein: x / Nitrite: x   Leuk Esterase: x / RBC: x / WBC x   Sq Epi: x / Non Sq Epi: x / Bacteria: x      CAPILLARY BLOOD GLUCOSE      POCT Blood Glucose.: 99 mg/dL (10 Leodan 2025 16:35)  POCT Blood Glucose.: 203 mg/dL (10 Leodan 2025 11:14)  POCT Blood Glucose.: 116 mg/dL (10 Leodan 2025 07:41)  POCT Blood Glucose.: 194 mg/dL (09 Jun 2025 21:14)        RADIOLOGY & ADDITIONAL TESTS:    < from: Xray Ankle Complete 3 Views, Bilateral (06.03.25 @ 09:52) >  INTERPRETATION:  Bilateral feet and bilateral ankles    HISTORY: Bilateral wounds    COMPARISON: 5/15/2025     3 views of the right foot, 3 views of the left foot, 3 views of the left   ankle and 3 views of the right ankle show advanced arthritic changes of   the ankle joint with invagination of the distaltibia is intact to the   hindfoot with destruction of both the talar bones and the calcaneus   bilaterally. There is lateral shifting of the mid foot with regard to the   hindfoot bilaterally. Vascular calcifications are present.    IMPRESSION: Advanced destructive changes of the hindfeet. No definite   bony erosion or acute fracture.      Thank you for this referral.    --- End of Report ---    < end of copied text >    Imaging Personally Reviewed:  YES    Consultant(s) Notes Reviewed:   YES      Plan of care was discussed with patient and /or primary care giver; all questions and concerns were addressed and care was aligned with patient's wishes.

## 2025-06-10 NOTE — PROGRESS NOTE ADULT - PROBLEM SELECTOR PLAN 6
Pt from home  PT Eval ---> HPT  -f/u OR pathology  -ID reccS:   -follow up on pathology, if no evidence of osteomyelitis ,then stop antibiotics  -Cefepime for intermittent hemodialysis is 2 g Iv post HD   -Vanco is  15-20 Mg / kg IV times one , then 500 mg IV post dialysis .  -Weekly CBC, CMP, ESR, CRP while patient is on antibiotics

## 2025-06-10 NOTE — DIETITIAN INITIAL EVALUATION ADULT - PROBLEM SELECTOR PLAN 6
Spoke to [facility] regarding plavix use.  Dr. Gamboa's RN said they must contact Dr. Conley (cardiologist) for more information on the order for this medication.  Trent Roque LPN at 4:29 PM on 5/15/2018     heparin

## 2025-06-10 NOTE — DIETITIAN INITIAL EVALUATION ADULT - PERTINENT MEDS FT
MEDICATIONS  (STANDING):  cefepime   IVPB 2000 milliGRAM(s) IV Intermittent <User Schedule>  chlorhexidine 2% Cloths 1 Application(s) Topical <User Schedule>  epoetin rudolph-epbx (RETACRIT) Injectable 4000 Unit(s) IV Push <User Schedule>  heparin   Injectable 5000 Unit(s) SubCutaneous every 12 hours  insulin lispro (ADMELOG) corrective regimen sliding scale   SubCutaneous three times a day before meals  insulin lispro (ADMELOG) corrective regimen sliding scale   SubCutaneous at bedtime  latanoprost 0.005% Ophthalmic Solution 1 Drop(s) Both EYES at bedtime  mupirocin 2% Ointment 1 Application(s) Both Nostrils two times a day  sevelamer carbonate 800 milliGRAM(s) Oral three times a day with meals  tamsulosin 0.4 milliGRAM(s) Oral at bedtime    MEDICATIONS  (PRN):  melatonin 3 milliGRAM(s) Oral at bedtime PRN Insomnia  midodrine. 10 milliGRAM(s) Oral <User Schedule> PRN SBP <100 in HD  ondansetron Injectable 4 milliGRAM(s) IV Push every 8 hours PRN Nausea and/or Vomiting

## 2025-06-10 NOTE — PROGRESS NOTE ADULT - ASSESSMENT
69-year-old man from home ([facility] rehab until 4/22) with past history diabetes, hypertension on midodrine with dialysis, ESRD on dialysis Monday Wednesday Friday, most recently day prior to admission, has sided left-sided permacath, known to Dr. Chirinos, bilateral Charcot deformity follows with Dr. Sim, chronic OM of the right foot,  coming at recommendation of Dr. Sim for bilateral foot wound debridement and graft application.

## 2025-06-10 NOTE — PROGRESS NOTE ADULT - SUBJECTIVE AND OBJECTIVE BOX
Date of Service 06-10-25 @ 18:45    CHIEF COMPLAINT:Patient is a 69y old  Male who presents with a chief complaint of foot wound debridement (10 Leodan 2025 17:41)    	  REVIEW OF SYSTEMS:  CONSTITUTIONAL: No fever, weight loss, or fatigue  EYES: No eye pain, visual disturbances, or discharge  ENT:  No difficulty hearing, tinnitus, vertigo; No sinus or throat pain  NECK: No pain or stiffness  RESPIRATORY: No cough, wheezing, chills or hemoptysis; No Shortness of Breath  CARDIOVASCULAR: No chest pain, palpitations, passing out, dizziness, or leg swelling  GASTROINTESTINAL: No abdominal or epigastric pain. No nausea, vomiting, or hematemesis; No diarrhea or constipation. No melena or hematochezia.  GENITOURINARY: No dysuria, frequency, hematuria, or incontinence  NEUROLOGICAL: No headaches, memory loss, loss of strength, numbness, or tremors  SKIN: No itching, burning, rashes, or lesions   LYMPH Nodes: No enlarged glands  ENDOCRINE: No heat or cold intolerance; No hair loss  MUSCULOSKELETAL: No joint pain or swelling; No muscle, back, or extremity pain  PSYCHIATRIC: No depression, anxiety, mood swings, or difficulty sleeping  HEME/LYMPH: No easy bruising, or bleeding gums  ALLERGY AND IMMUNOLOGIC: No hives or eczema	        PHYSICAL EXAM:  T(C): 36.3 (06-10-25 @ 13:10), Max: 36.7 (06-09-25 @ 20:08)  HR: 64 (06-10-25 @ 13:10) (64 - 74)  BP: 113/58 (06-10-25 @ 13:10) (108/54 - 131/58)  RR: 16 (06-10-25 @ 13:10) (16 - 18)  SpO2: 97% (06-10-25 @ 13:10) (97% - 98%)  Wt(kg): --  I&O's Summary    09 Jun 2025 07:01  -  10 Leodan 2025 07:00  --------------------------------------------------------  IN: 500 mL / OUT: 3000 mL / NET: -2500 mL        Appearance: Normal	  HEENT:   Normal oral mucosa, PERRL, EOMI	  Lymphatic: No lymphadenopathy  Cardiovascular: Normal S1 S2, No JVD, No murmurs, No edema  Respiratory: Lungs clear to auscultation	  Psychiatry: A & O x 3, Mood & affect appropriate  Gastrointestinal:  Soft, Non-tender, + BS	  Skin: No rashes, No ecchymoses, No cyanosis	  Neurologic: Non-focal  Extremities:Bilateral feet wounds covered with dressings      MEDICATIONS  (STANDING):  cefepime   IVPB 2000 milliGRAM(s) IV Intermittent <User Schedule>  chlorhexidine 2% Cloths 1 Application(s) Topical <User Schedule>  epoetin rudolph-epbx (RETACRIT) Injectable 4000 Unit(s) IV Push <User Schedule>  heparin   Injectable 5000 Unit(s) SubCutaneous every 12 hours  insulin lispro (ADMELOG) corrective regimen sliding scale   SubCutaneous three times a day before meals  insulin lispro (ADMELOG) corrective regimen sliding scale   SubCutaneous at bedtime  latanoprost 0.005% Ophthalmic Solution 1 Drop(s) Both EYES at bedtime  mupirocin 2% Ointment 1 Application(s) Both Nostrils two times a day  sevelamer carbonate 800 milliGRAM(s) Oral three times a day with meals  tamsulosin 0.4 milliGRAM(s) Oral at bedtime      TELEMETRY: 	    ECG:  	  RADIOLOGY:  OTHER: 	  	  LABS:	 	    CARDIAC MARKERS:                                9.9    7.74  )-----------( 177      ( 10 Leodan 2025 07:28 )             30.6     06-10    134[L]  |  100  |  33[H]  ----------------------------<  117[H]  4.3   |  27  |  4.73[H]    Ca    8.9      10 Leodan 2025 07:28

## 2025-06-10 NOTE — PROGRESS NOTE ADULT - ASSESSMENT
Patient is a 70yo Male with ESRD on HD via left IJ TDC, failed Rt AVG, HTN, 2HPT, Anemia, LE venous stasis p/w b/l LE wounds requiring debridement with graft placement. Nephrology consulted for ESRD status.     1) ESRD:  Last HD 6/9, tolerated tx well with net 2.5L removed. Plan for next HD 6/11. c/w MWF schedule.   Rt AVG unsalvageable.  Monitor electrolytes.    2) Hypotension: BP acceptable.  c/w Midodrine 10mg PO MWF intra- HD to aid in fluid removal prn. Monitor BP    3) Anemia of renal disease: Hb borderline with adequate iron stores.  c/w Epo  4K with HD. Monitor hgb    4) Hyperphosphatemia- serum phos acceptable. Continue with renvela 1 tab with meals.  Monitor serum calcium and phos    [facility] NEPHROLOGY  Arden Tsang M.D.  Freeman Holland D.O.  Janny Chirinos M.D.  MD Ambar Mccarty, MSN, ANP-C    Telephone: [phone]  Facsimile: [phone]    [facility] [place] [address], [address]  [place] [address]

## 2025-06-11 LAB
ANION GAP SERPL CALC-SCNC: 7 MMOL/L — SIGNIFICANT CHANGE UP (ref 5–17)
BUN SERPL-MCNC: 49 MG/DL — HIGH (ref 7–18)
CALCIUM SERPL-MCNC: 8.8 MG/DL — SIGNIFICANT CHANGE UP (ref 8.4–10.5)
CHLORIDE SERPL-SCNC: 98 MMOL/L — SIGNIFICANT CHANGE UP (ref 96–108)
CO2 SERPL-SCNC: 28 MMOL/L — SIGNIFICANT CHANGE UP (ref 22–31)
CREAT SERPL-MCNC: 6.25 MG/DL — HIGH (ref 0.5–1.3)
EGFR: 9 ML/MIN/1.73M2 — LOW
EGFR: 9 ML/MIN/1.73M2 — LOW
GLUCOSE BLDC GLUCOMTR-MCNC: 112 MG/DL — HIGH (ref 70–99)
GLUCOSE BLDC GLUCOMTR-MCNC: 117 MG/DL — HIGH (ref 70–99)
GLUCOSE BLDC GLUCOMTR-MCNC: 124 MG/DL — HIGH (ref 70–99)
GLUCOSE BLDC GLUCOMTR-MCNC: 244 MG/DL — HIGH (ref 70–99)
GLUCOSE SERPL-MCNC: 149 MG/DL — HIGH (ref 70–99)
HCT VFR BLD CALC: 29.3 % — LOW (ref 39–50)
HGB BLD-MCNC: 9.5 G/DL — LOW (ref 13–17)
MAGNESIUM SERPL-MCNC: 2.2 MG/DL — SIGNIFICANT CHANGE UP (ref 1.6–2.6)
MCHC RBC-ENTMCNC: 32.4 G/DL — SIGNIFICANT CHANGE UP (ref 32–36)
MCHC RBC-ENTMCNC: 33.2 PG — SIGNIFICANT CHANGE UP (ref 27–34)
MCV RBC AUTO: 102.4 FL — HIGH (ref 80–100)
NRBC BLD AUTO-RTO: 0 /100 WBCS — SIGNIFICANT CHANGE UP (ref 0–0)
PHOSPHATE SERPL-MCNC: 4.9 MG/DL — HIGH (ref 2.5–4.5)
PLATELET # BLD AUTO: 188 K/UL — SIGNIFICANT CHANGE UP (ref 150–400)
POTASSIUM SERPL-MCNC: 4.3 MMOL/L — SIGNIFICANT CHANGE UP (ref 3.5–5.3)
POTASSIUM SERPL-SCNC: 4.3 MMOL/L — SIGNIFICANT CHANGE UP (ref 3.5–5.3)
RBC # BLD: 2.86 M/UL — LOW (ref 4.2–5.8)
RBC # FLD: 14.7 % — HIGH (ref 10.3–14.5)
SODIUM SERPL-SCNC: 133 MMOL/L — LOW (ref 135–145)
WBC # BLD: 7.36 K/UL — SIGNIFICANT CHANGE UP (ref 3.8–10.5)
WBC # FLD AUTO: 7.36 K/UL — SIGNIFICANT CHANGE UP (ref 3.8–10.5)

## 2025-06-11 RX ORDER — VANCOMYCIN HCL IN 5 % DEXTROSE 1.5G/250ML
500 PLASTIC BAG, INJECTION (ML) INTRAVENOUS
Refills: 0 | Status: DISCONTINUED | OUTPATIENT
Start: 2025-06-11 | End: 2025-06-16

## 2025-06-11 RX ORDER — EPOETIN ALFA 10000 [IU]/ML
6000 SOLUTION INTRAVENOUS; SUBCUTANEOUS
Refills: 0 | Status: DISCONTINUED | OUTPATIENT
Start: 2025-06-11 | End: 2025-06-16

## 2025-06-11 RX ADMIN — Medication 1 APPLICATION(S): at 05:35

## 2025-06-11 RX ADMIN — TAMSULOSIN HYDROCHLORIDE 0.4 MILLIGRAM(S): 0.4 CAPSULE ORAL at 22:15

## 2025-06-11 RX ADMIN — CEFEPIME 100 MILLIGRAM(S): 2 INJECTION, POWDER, FOR SOLUTION INTRAVENOUS at 14:12

## 2025-06-11 RX ADMIN — LATANOPROST PF 1 DROP(S): 0.05 SOLUTION/ DROPS OPHTHALMIC at 22:19

## 2025-06-11 RX ADMIN — SEVELAMER HYDROCHLORIDE 800 MILLIGRAM(S): 800 TABLET ORAL at 14:11

## 2025-06-11 RX ADMIN — HEPARIN SODIUM 5000 UNIT(S): 1000 INJECTION INTRAVENOUS; SUBCUTANEOUS at 18:06

## 2025-06-11 RX ADMIN — HEPARIN SODIUM 5000 UNIT(S): 1000 INJECTION INTRAVENOUS; SUBCUTANEOUS at 05:34

## 2025-06-11 RX ADMIN — MUPIROCIN CALCIUM 1 APPLICATION(S): 20 CREAM TOPICAL at 05:36

## 2025-06-11 RX ADMIN — SEVELAMER HYDROCHLORIDE 800 MILLIGRAM(S): 800 TABLET ORAL at 08:24

## 2025-06-11 RX ADMIN — SEVELAMER HYDROCHLORIDE 800 MILLIGRAM(S): 800 TABLET ORAL at 18:07

## 2025-06-11 RX ADMIN — EPOETIN ALFA 4000 UNIT(S): 10000 SOLUTION INTRAVENOUS; SUBCUTANEOUS at 11:11

## 2025-06-11 RX ADMIN — Medication 100 MILLIGRAM(S): at 18:07

## 2025-06-11 NOTE — PROGRESS NOTE ADULT - SUBJECTIVE AND OBJECTIVE BOX
NP Note discussed with  Primary Attending    Patient is a 69y old  Male who presents with a chief complaint of foot wound debridement (10 Leodan 2025 21:28)      INTERVAL HPI/OVERNIGHT EVENTS: no acute events overnight    MEDICATIONS  (STANDING):  cefepime   IVPB 2000 milliGRAM(s) IV Intermittent <User Schedule>  chlorhexidine 2% Cloths 1 Application(s) Topical <User Schedule>  epoetin rudolph-epbx (RETACRIT) Injectable 4000 Unit(s) IV Push <User Schedule>  heparin   Injectable 5000 Unit(s) SubCutaneous every 12 hours  insulin lispro (ADMELOG) corrective regimen sliding scale   SubCutaneous three times a day before meals  insulin lispro (ADMELOG) corrective regimen sliding scale   SubCutaneous at bedtime  latanoprost 0.005% Ophthalmic Solution 1 Drop(s) Both EYES at bedtime  sevelamer carbonate 800 milliGRAM(s) Oral three times a day with meals  tamsulosin 0.4 milliGRAM(s) Oral at bedtime  vancomycin  IVPB 500 milliGRAM(s) IV Intermittent <User Schedule>    MEDICATIONS  (PRN):  melatonin 3 milliGRAM(s) Oral at bedtime PRN Insomnia  midodrine. 10 milliGRAM(s) Oral <User Schedule> PRN SBP <100 in HD  ondansetron Injectable 4 milliGRAM(s) IV Push every 8 hours PRN Nausea and/or Vomiting      __________________________________________________  REVIEW OF SYSTEMS:    CONSTITUTIONAL: No fever,   RESPIRATORY: No cough; No shortness of breath  CARDIOVASCULAR: No chest pain, no palpitations  GASTROINTESTINAL: No pain. No nausea or vomiting; No diarrhea   NEUROLOGICAL: No headache or numbness, no tremors  MUSCULOSKELETAL: No joint pain, no muscle pain  GENITOURINARY: no dysuria, no frequency, no hesitancy  PSYCHIATRY: no depression , no anxiety  ALL OTHER  ROS negative        Vital Signs Last 24 Hrs  T(C): 36.3 (11 Jun 2025 14:07), Max: 36.7 (10 Leodan 2025 20:02)  T(F): 97.4 (11 Jun 2025 14:07), Max: 98.1 (10 Leodan 2025 20:02)  HR: 75 (11 Jun 2025 16:00) (63 - 75)  BP: 95/54 (11 Jun 2025 16:00) (95/54 - 146/62)  BP(mean): 77 (11 Jun 2025 14:07) (77 - 77)  RR: 18 (11 Jun 2025 14:07) (16 - 18)  SpO2: 96% (11 Jun 2025 16:00) (96% - 100%)    Parameters below as of 11 Jun 2025 16:00  Patient On (Oxygen Delivery Method): room air        ________________________________________________  PHYSICAL EXAM:  GENERAL: NAD  HEENT: Normocephalic  NECK : supple  CHEST/LUNG: Clear to auscultation bilaterally  HEART: S1 S2  regular;  ABDOMEN: Soft, Nontender, Nondistended; Bowel sounds present  EXTREMITIES: + b/l foot dressings  SKIN: warm and dry;  NERVOUS SYSTEM:  Awake and alert; Oriented  to place, person and time    _________________________________________________  LABS:                        9.5    7.36  )-----------( 188      ( 11 Jun 2025 09:21 )             29.3     06-11    133[L]  |  98  |  49[H]  ----------------------------<  149[H]  4.3   |  28  |  6.25[H]    Ca    8.8      11 Jun 2025 09:21  Phos  4.9     06-11  Mg     2.2     06-11        Urinalysis Basic - ( 11 Jun 2025 09:21 )    Color: x / Appearance: x / SG: x / pH: x  Gluc: 149 mg/dL / Ketone: x  / Bili: x / Urobili: x   Blood: x / Protein: x / Nitrite: x   Leuk Esterase: x / RBC: x / WBC x   Sq Epi: x / Non Sq Epi: x / Bacteria: x      CAPILLARY BLOOD GLUCOSE      POCT Blood Glucose.: 117 mg/dL (11 Jun 2025 16:26)  POCT Blood Glucose.: 112 mg/dL (11 Jun 2025 11:11)  POCT Blood Glucose.: 124 mg/dL (11 Jun 2025 07:49)  POCT Blood Glucose.: 170 mg/dL (10 Leodan 2025 21:03)        RADIOLOGY & ADDITIONAL TESTS:    < from: Xray Ankle Complete 3 Views, Bilateral (06.03.25 @ 09:52) >  IMPRESSION: Advanced destructive changes of the hindfeet. No definite   bony erosion or acute fracture.      Thank you for this referral.    --- End of Report ---    < end of copied text >    Imaging Personally Reviewed:  YES    Consultant(s) Notes Reviewed:   YES      Plan of care was discussed with patient and /or primary care giver; all questions and concerns were addressed and care was aligned with patient's wishes.     no no no no no no no no no

## 2025-06-11 NOTE — PROGRESS NOTE ADULT - PROBLEM SELECTOR PLAN 1
Multiple chronic ulcers of both feet, not currently infected.    -Follows outpt by Dr. Sim: sent for b/l wound debridement and graft placement  -Daily ESR and CRP  -S/p OR for I&D with graft placement Bilateral feet  -no OR cx taken, only Path  - per discussion with Podiatry no wound care orders, dressings to be changed at Podiatry clinic on outpatient f/u  -Podiatry following and cleared   -ID Dr. Oakley following   -PT eval-->HPT

## 2025-06-11 NOTE — PROGRESS NOTE ADULT - PROBLEM SELECTOR PLAN 6
Pt from home  PT Eval ---> HPT  -ID reccS:   -follow up on pathology, if no evidence of osteomyelitis ,then can  stop antibiotics  -per attending will f/u pathology outpatient  -Cefepime 2 g Iv post HD, Vanco 500 mg IV post dialysis  -Weekly CBC, CMP, ESR, CRP while patient is on antibiotics  f/u random vanco level in AM  possible d/c tomorrow with home services

## 2025-06-11 NOTE — PROGRESS NOTE ADULT - ASSESSMENT
Patient is a 70yo Male with ESRD on HD via left IJ TDC, failed Rt AVG, HTN, 2HPT, Anemia, LE venous stasis p/w b/l LE wounds requiring debridement with graft placement.Patient came  from home ([facility] rehab until 4/22) with past history diabetes, hypertension on midodrine with dialysis, ESRD on dialysis Monday Wednesday Friday, most recently day prior to admission, has sided left-sided permacath, bilateral Charcot deformity follows with Dr. Sim, chronic OM of the right foot,  coming at recommendation of Dr. Valadez for bilateral foot wound debridement and graft application. Patient has bilateral heel wounds.      1.  Bilateral heel wounds.  Patient claims he has had these wounds  since November of last year.  He is afebrile .  2.  No intraop Cx were sent , follow up on pathology which can be also done as an outpatient , if no evidence of osteomyelitis ,then stop antibiotics.  3.  Status post bilateral feet wounds debridement and graft placement, postop day #5   4.  Sometimes with heel wounds and osteo, it is difficult to salvage the foot but this will be decided by the podiatrist .  5.  Acute phase reactants were noted , CRP is trending down but ESR is still high.    6.  Recommend 6 weeks of IV Vancomycin ( dose as per pharmacy ) and IV Cefepime ( dose as per pharmacy ) from today, EOT is 06/21/25. If Cefepime not available , then consider ceftazidime. Duration of antibiotics can be prolonged to 8 -12 weeks depending on the clinical progress as patient is a vasculopath and has had these lesions for several months. Usual dose of Cefepime for intermittent hemodialysis is 2 g Iv now and then post HD   Vanco is  15-20 Mg / kg IV times one , then 500 mg IV post dialysis .  7.   Weekly CBC, CMP, ESR, CRP,Vanco trough levels while patient is on antibiotics  8.   Follow up with podiatry.

## 2025-06-11 NOTE — PROGRESS NOTE ADULT - ASSESSMENT
69-year-old man from home ([facility] rehab until 4/22) with past history diabetes, hypertension on midodrine with dialysis, ESRD on dialysis Monday Wednesday Friday, most recently day prior to admission, has sided left-sided permacath, known to Dr. Chirinos, bilateral Charcot deformity follows with Dr. Sim, chronic OM of the right foot,  coming at recommendation of Dr. Sim for bilateral foot wound debridement and graft application.

## 2025-06-11 NOTE — PROGRESS NOTE ADULT - SUBJECTIVE AND OBJECTIVE BOX
Patient is a 69y old  Male who presents with a chief complaint of foot wound debridement (      INTERVAL HPI/OVERNIGHT EVENTS: no acute events overnight    MEDICATIONS  (STANDING):  cefepime   IVPB 2000 milliGRAM(s) IV Intermittent <User Schedule>  chlorhexidine 2% Cloths 1 Application(s) Topical <User Schedule>  epoetin rudolph-epbx (RETACRIT) Injectable 4000 Unit(s) IV Push <User Schedule>  heparin   Injectable 5000 Unit(s) SubCutaneous every 12 hours  insulin lispro (ADMELOG) corrective regimen sliding scale   SubCutaneous three times a day before meals  insulin lispro (ADMELOG) corrective regimen sliding scale   SubCutaneous at bedtime  latanoprost 0.005% Ophthalmic Solution 1 Drop(s) Both EYES at bedtime  sevelamer carbonate 800 milliGRAM(s) Oral three times a day with meals  tamsulosin 0.4 milliGRAM(s) Oral at bedtime  vancomycin  IVPB 500 milliGRAM(s) IV Intermittent <User Schedule>    MEDICATIONS  (PRN):  melatonin 3 milliGRAM(s) Oral at bedtime PRN Insomnia  midodrine. 10 milliGRAM(s) Oral <User Schedule> PRN SBP <100 in HD  ondansetron Injectable 4 milliGRAM(s) IV Push every 8 hours PRN Nausea and/or Vomiting      __________________________________________________  REVIEW OF SYSTEMS:    CONSTITUTIONAL: No fever,   RESPIRATORY: No cough; No shortness of breath  CARDIOVASCULAR: No chest pain, no palpitations  GASTROINTESTINAL: No pain. No nausea or vomiting; No diarrhea   NEUROLOGICAL: No headache or numbness, no tremors  MUSCULOSKELETAL: No joint pain, no muscle pain  GENITOURINARY: no dysuria, no frequency, no hesitancy  PSYCHIATRY: no depression , no anxiety  ALL OTHER  ROS negative        Vital Signs Last 24 Hrs  T(C): 36.3 (11 Jun 2025 14:07), Max: 36.7 (10 Leodan 2025 20:02)  T(F): 97.4 (11 Jun 2025 14:07), Max: 98.1 (10 Leodan 2025 20:02)  HR: 75 (11 Jun 2025 16:00) (63 - 75)  BP: 95/54 (11 Jun 2025 16:00) (95/54 - 146/62)  BP(mean): 77 (11 Jun 2025 14:07) (77 - 77)  RR: 18 (11 Jun 2025 14:07) (16 - 18)  SpO2: 96% (11 Jun 2025 16:00) (96% - 100%)    Parameters below as of 11 Jun 2025 16:00  Patient On (Oxygen Delivery Method): room air        ________________________________________________  PHYSICAL EXAM:  GENERAL: NAD  HEENT: Normocephalic  NECK : supple  CHEST/LUNG: dec breath sounds at bases  cvs - s1, s2+  ABDOMEN: Soft, Nontender, Nondistended; Bowel sounds present  EXTREMITIES: + b/l foot dressings  SKIN: warm and dry;  NERVOUS SYSTEM:  Awake and alert;   _________________________________________________  LABS:                        9.5    7.36  )-----------( 188      ( 11 Jun 2025 09:21 )             29.3     06-11    133[L]  |  98  |  49[H]  ----------------------------<  149[H]  4.3   |  28  |  6.25[H]    Ca    8.8      11 Jun 2025 09:21  Phos  4.9     06-11  Mg     2.2     06-11        Urinalysis Basic - ( 11 Jun 2025 09:21 )    Color: x / Appearance: x / SG: x / pH: x  Gluc: 149 mg/dL / Ketone: x  / Bili: x / Urobili: x   Blood: x / Protein: x / Nitrite: x   Leuk Esterase: x / RBC: x / WBC x   Sq Epi: x / Non Sq Epi: x / Bacteria: x      CAPILLARY BLOOD GLUCOSE      POCT Blood Glucose.: 117 mg/dL (11 Jun 2025 16:26)  POCT Blood Glucose.: 112 mg/dL (11 Jun 2025 11:11)  POCT Blood Glucose.: 124 mg/dL (11 Jun 2025 07:49)  POCT Blood Glucose.: 170 mg/dL (10 Leodan 2025 21:03)        RADIOLOGY & ADDITIONAL TESTS:    < from: Xray Ankle Complete 3 Views, Bilateral (06.03.25 @ 09:52) >  IMPRESSION: Advanced destructive changes of the hindfeet. No definite   bony erosion or acute fracture.      Thank you for this referral.    --- End of Report ---    < end of copied text >    Imaging Personally Reviewed:  YES    Consultant(s) Notes Reviewed:   YES      Plan of care was discussed with patient and /or primary care giver; all questions and concerns were addressed and care was aligned with patient's wishes.

## 2025-06-11 NOTE — PROGRESS NOTE ADULT - SUBJECTIVE AND OBJECTIVE BOX
Date of Service 06-11-25 @ 18:15    CHIEF COMPLAINT:Patient is a 69y old  Male who presents with a chief complaint of foot wound debridement (11 Jun 2025 17:01)    	  REVIEW OF SYSTEMS:  CONSTITUTIONAL: No fever, weight loss, or fatigue  EYES: No eye pain, visual disturbances, or discharge  ENT:  No difficulty hearing, tinnitus, vertigo; No sinus or throat pain  NECK: No pain or stiffness  RESPIRATORY: No cough, wheezing, chills or hemoptysis; No Shortness of Breath  CARDIOVASCULAR: No chest pain, palpitations, passing out, dizziness, or leg swelling  GASTROINTESTINAL: No abdominal or epigastric pain. No nausea, vomiting, or hematemesis; No diarrhea or constipation. No melena or hematochezia.  GENITOURINARY: No dysuria, frequency, hematuria, or incontinence  NEUROLOGICAL: No headaches, memory loss, loss of strength, numbness, or tremors  SKIN: No itching, burning, rashes, or lesions   LYMPH Nodes: No enlarged glands  ENDOCRINE: No heat or cold intolerance; No hair loss  MUSCULOSKELETAL: No joint pain or swelling; No muscle, back, or extremity pain  PSYCHIATRIC: No depression, anxiety, mood swings, or difficulty sleeping  HEME/LYMPH: No easy bruising, or bleeding gums  ALLERGY AND IMMUNOLOGIC: No hives or eczema	    [ ] All others negative	  [ ] Unable to obtain    PHYSICAL EXAM:  T(C): 36.3 (06-11-25 @ 14:07), Max: 36.7 (06-10-25 @ 20:02)  HR: 75 (06-11-25 @ 16:00) (63 - 75)  BP: 95/54 (06-11-25 @ 16:00) (95/54 - 146/62)  RR: 18 (06-11-25 @ 14:07) (16 - 18)  SpO2: 96% (06-11-25 @ 16:00) (96% - 100%)  Wt(kg): --  I&O's Summary    11 Jun 2025 07:01  -  11 Jun 2025 18:15  --------------------------------------------------------  IN: 600 mL / OUT: 2423 mL / NET: -1823 mL        Appearance: Normal	  HEENT:   Normal oral mucosa, PERRL, EOMI	  Lymphatic: No lymphadenopathy  Cardiovascular: Normal S1 S2, No JVD, No murmurs, No edema  Respiratory: Lungs clear to auscultation	  Psychiatry: A & O x 3, Mood & affect appropriate  Gastrointestinal:  Soft, Non-tender, + BS	  Skin: No rashes, No ecchymoses, No cyanosis	  Neurologic: Non-focal  Extremities: Bilateral feet wounds with dressings present.  Vascular: Difficult to palpate.    MEDICATIONS  (STANDING):  cefepime   IVPB 2000 milliGRAM(s) IV Intermittent <User Schedule>  chlorhexidine 2% Cloths 1 Application(s) Topical <User Schedule>  epoetin rudolph-epbx (RETACRIT) Injectable 4000 Unit(s) IV Push <User Schedule>  heparin   Injectable 5000 Unit(s) SubCutaneous every 12 hours  insulin lispro (ADMELOG) corrective regimen sliding scale   SubCutaneous three times a day before meals  insulin lispro (ADMELOG) corrective regimen sliding scale   SubCutaneous at bedtime  latanoprost 0.005% Ophthalmic Solution 1 Drop(s) Both EYES at bedtime  sevelamer carbonate 800 milliGRAM(s) Oral three times a day with meals  tamsulosin 0.4 milliGRAM(s) Oral at bedtime  vancomycin  IVPB 500 milliGRAM(s) IV Intermittent <User Schedule>      TELEMETRY: 	    ECG:  	  RADIOLOGY:  OTHER: 	  	  LABS:	 	    CARDIAC MARKERS:                                9.5    7.36  )-----------( 188      ( 11 Jun 2025 09:21 )             29.3     06-11    133[L]  |  98  |  49[H]  ----------------------------<  149[H]  4.3   |  28  |  6.25[H]    Ca    8.8      11 Jun 2025 09:21  Phos  4.9     06-11  Mg     2.2     06-11      proBNP:   Lipid Profile:   HgA1c:   TSH:

## 2025-06-11 NOTE — PROGRESS NOTE ADULT - ASSESSMENT
Patient is a 70yo Male with ESRD on HD via left IJ TDC, failed Rt AVG, HTN, 2HPT, Anemia, LE venous stasis p/w b/l LE wounds requiring debridement with graft placement. Nephrology consulted for ESRD status.     1) ESRD:  Last HD 6/11, with intradialytic hypotension, with net 1.8L removed. Plan for next HD 6/13. c/w MWF schedule.   Rt AVG unsalvageable.  Monitor electrolytes.    2) Hypotension: BP low normal.  c/w Midodrine 10mg PO MWF intra- HD to aid in fluid removal prn. Monitor BP    3) Anemia of renal disease: Hb low with adequate iron stores.  Will increase Epo to 6K with HD. Monitor hgb    4) Hyperphosphatemia- serum phos acceptable. Continue with renvela 1 tab with meals.  Monitor serum calcium and phos    [facility] NEPHROLOGY  Arden Tsang M.D.  Freeman Holland D.O.  Janny Chirinos M.D.  MD Ambar Mccarty, MSN, ANP-C    Telephone: [phone]  Facsimile: [phone]    [address] [place] [address], [address]  [place] [address]

## 2025-06-11 NOTE — PROGRESS NOTE ADULT - SUBJECTIVE AND OBJECTIVE BOX
[facility] NEPHROLOGY- PROGRESS NOTE    Patient is a 68yo Male with ESRD on HD via left IJ TDC, failed Rt AVG, HTN, 2HPT, Anemia, LE venous stasis p/w b/l LE wounds requiring debridement with graft placement. Nephrology consulted for ESRD status.     Pt s/p LE wound debridement.      Hospital Medications: Medications reviewed.  REVIEW OF SYSTEMS:  CONSTITUTIONAL: No fevers or chills  RESPIRATORY: No shortness of breath  CARDIOVASCULAR: No chest pain.  GASTROINTESTINAL: No nausea, vomiting, diarrhea or abdominal pain.   VASCULAR: No bilateral lower extremity edema.     VITALS:  T(F): 97.4 (06-11-25 @ 14:07), Max: 98 (06-11-25 @ 05:05)  HR: 75 (06-11-25 @ 16:00)  BP: 95/54 (06-11-25 @ 16:00)  RR: 18 (06-11-25 @ 14:07)  SpO2: 96% (06-11-25 @ 16:00)  Wt(kg): --    06-11 @ 07:01  -  06-11 @ 20:10  --------------------------------------------------------  IN: 600 mL / OUT: 2423 mL / NET: -1823 mL      PHYSICAL EXAM:  Gen: NAD, calm  HEENT: anicteric  Cards: RRR, +S1/S2, no M/G/R  Resp: CTA B/L  GI: soft, NT/ND, NABS  Extremities: no LE edema B/L  Derm: b/l feet wrapped  Access: Left IJ tunneled HD catheter- intact    LABS:  06-11    133[L]  |  98  |  49[H]  ----------------------------<  149[H]  4.3   |  28  |  6.25[H]    Ca    8.8      11 Jun 2025 09:21  Phos  4.9     06-11  Mg     2.2     06-11      Creatinine Trend: 6.25 <--, 4.73 <--, 6.29 <--, 4.94 <--, 7.01 <--, 5.65 <--                        9.5    7.36  )-----------( 188      ( 11 Jun 2025 09:21 )             29.3     Urine Studies:  Urinalysis Basic - ( 11 Jun 2025 09:21 )    Color:  / Appearance:  / SG:  / pH:   Gluc: 149 mg/dL / Ketone:   / Bili:  / Urobili:    Blood:  / Protein:  / Nitrite:    Leuk Esterase:  / RBC:  / WBC    Sq Epi:  / Non Sq Epi:  / Bacteria:

## 2025-06-12 LAB
ANION GAP SERPL CALC-SCNC: 7 MMOL/L — SIGNIFICANT CHANGE UP (ref 5–17)
BUN SERPL-MCNC: 33 MG/DL — HIGH (ref 7–18)
CALCIUM SERPL-MCNC: 9.3 MG/DL — SIGNIFICANT CHANGE UP (ref 8.4–10.5)
CHLORIDE SERPL-SCNC: 100 MMOL/L — SIGNIFICANT CHANGE UP (ref 96–108)
CO2 SERPL-SCNC: 27 MMOL/L — SIGNIFICANT CHANGE UP (ref 22–31)
CREAT SERPL-MCNC: 4.45 MG/DL — HIGH (ref 0.5–1.3)
EGFR: 14 ML/MIN/1.73M2 — LOW
EGFR: 14 ML/MIN/1.73M2 — LOW
GLUCOSE BLDC GLUCOMTR-MCNC: 119 MG/DL — HIGH (ref 70–99)
GLUCOSE BLDC GLUCOMTR-MCNC: 125 MG/DL — HIGH (ref 70–99)
GLUCOSE BLDC GLUCOMTR-MCNC: 127 MG/DL — HIGH (ref 70–99)
GLUCOSE BLDC GLUCOMTR-MCNC: 191 MG/DL — HIGH (ref 70–99)
GLUCOSE SERPL-MCNC: 132 MG/DL — HIGH (ref 70–99)
HCT VFR BLD CALC: 31.8 % — LOW (ref 39–50)
HGB BLD-MCNC: 10.2 G/DL — LOW (ref 13–17)
MAGNESIUM SERPL-MCNC: 2.4 MG/DL — SIGNIFICANT CHANGE UP (ref 1.6–2.6)
MCHC RBC-ENTMCNC: 32.1 G/DL — SIGNIFICANT CHANGE UP (ref 32–36)
MCHC RBC-ENTMCNC: 32.7 PG — SIGNIFICANT CHANGE UP (ref 27–34)
MCV RBC AUTO: 101.9 FL — HIGH (ref 80–100)
NRBC BLD AUTO-RTO: 0 /100 WBCS — SIGNIFICANT CHANGE UP (ref 0–0)
PHOSPHATE SERPL-MCNC: 3.8 MG/DL — SIGNIFICANT CHANGE UP (ref 2.5–4.5)
PLATELET # BLD AUTO: 182 K/UL — SIGNIFICANT CHANGE UP (ref 150–400)
POTASSIUM SERPL-MCNC: 4.1 MMOL/L — SIGNIFICANT CHANGE UP (ref 3.5–5.3)
POTASSIUM SERPL-SCNC: 4.1 MMOL/L — SIGNIFICANT CHANGE UP (ref 3.5–5.3)
RBC # BLD: 3.12 M/UL — LOW (ref 4.2–5.8)
RBC # FLD: 14.8 % — HIGH (ref 10.3–14.5)
SODIUM SERPL-SCNC: 134 MMOL/L — LOW (ref 135–145)
VANCOMYCIN FLD-MCNC: 16.3 UG/ML — SIGNIFICANT CHANGE UP
WBC # BLD: 8.42 K/UL — SIGNIFICANT CHANGE UP (ref 3.8–10.5)
WBC # FLD AUTO: 8.42 K/UL — SIGNIFICANT CHANGE UP (ref 3.8–10.5)

## 2025-06-12 RX ADMIN — TAMSULOSIN HYDROCHLORIDE 0.4 MILLIGRAM(S): 0.4 CAPSULE ORAL at 21:26

## 2025-06-12 RX ADMIN — SEVELAMER HYDROCHLORIDE 800 MILLIGRAM(S): 800 TABLET ORAL at 08:40

## 2025-06-12 RX ADMIN — SEVELAMER HYDROCHLORIDE 800 MILLIGRAM(S): 800 TABLET ORAL at 12:23

## 2025-06-12 RX ADMIN — HEPARIN SODIUM 5000 UNIT(S): 1000 INJECTION INTRAVENOUS; SUBCUTANEOUS at 18:22

## 2025-06-12 RX ADMIN — LATANOPROST PF 1 DROP(S): 0.05 SOLUTION/ DROPS OPHTHALMIC at 21:26

## 2025-06-12 RX ADMIN — HEPARIN SODIUM 5000 UNIT(S): 1000 INJECTION INTRAVENOUS; SUBCUTANEOUS at 07:29

## 2025-06-12 RX ADMIN — Medication 1 APPLICATION(S): at 07:28

## 2025-06-12 RX ADMIN — SEVELAMER HYDROCHLORIDE 800 MILLIGRAM(S): 800 TABLET ORAL at 18:22

## 2025-06-12 NOTE — PROGRESS NOTE ADULT - PROBLEM SELECTOR PLAN 1
Multiple chronic ulcers of both feet, not currently infected.    -Follows outpt by Dr. Sim: sent for b/l wound debridement and graft placement  -Daily ESR and CRP  -S/p OR for I&D with graft placement Bilateral feet  -no OR cx taken, only Path - still pending  -per discussion with Podiatry no wound care orders, dressings to be changed at Podiatry clinic on outpatient f/u  -Strong odor coming from dressings, called Podiatry to see patient today  -ID Dr. Oakley following rec 6 weeks of vanco and cefepime at HD  -Will discuss with clinical pharmacy dosing of vanco  -PT eval-->HPT

## 2025-06-12 NOTE — PROGRESS NOTE ADULT - SUBJECTIVE AND OBJECTIVE BOX
[facility] NEPHROLOGY- PROGRESS NOTE    Patient is a 68yo Male with ESRD on HD via left IJ TDC, failed Rt AVG, HTN, 2HPT, Anemia, LE venous stasis p/w b/l LE wounds requiring debridement with graft placement. Nephrology consulted for ESRD status.     Pt s/p LE wound debridement on 6/6    Hospital Medications: Medications reviewed.  REVIEW OF SYSTEMS:  CONSTITUTIONAL: No fevers or chills  RESPIRATORY: No shortness of breath  CARDIOVASCULAR: No chest pain.  GASTROINTESTINAL: No nausea, vomiting, diarrhea or abdominal pain.   VASCULAR: No bilateral lower extremity edema.     VITALS:  T(F): 97.8 (06-12-25 @ 12:07), Max: 99 (06-12-25 @ 05:05)  HR: 69 (06-12-25 @ 12:07)  BP: 100/56 (06-12-25 @ 12:07)  RR: 17 (06-12-25 @ 12:07)  SpO2: 99% (06-12-25 @ 12:07)  Wt(kg): --    06-11 @ 07:01  -  06-12 @ 07:00  --------------------------------------------------------  IN: 600 mL / OUT: 2423 mL / NET: -1823 mL        PHYSICAL EXAM:  Gen: NAD, calm  HEENT: anicteric  Cards: RRR, +S1/S2, no M/G/R  Resp: CTA B/L  GI: soft, NT/ND, NABS  Extremities: no LE edema B/L; foul odor  Derm: b/l feet wrapped  Access: Left IJ tunneled HD catheter- intact    LABS:  06-12    134[L]  |  100  |  33[H]  ----------------------------<  132[H]  4.1   |  27  |  4.45[H]    Ca    9.3      12 Jun 2025 06:25  Phos  3.8     06-12  Mg     2.4     06-12      Creatinine Trend: 4.45 <--, 6.25 <--, 4.73 <--, 6.29 <--, 4.94 <--, 7.01 <--, 5.65 <--                        10.2   8.42  )-----------( 182      ( 12 Jun 2025 06:25 )             31.8     Urine Studies:  Urinalysis Basic - ( 12 Jun 2025 06:25 )    Color:  / Appearance:  / SG:  / pH:   Gluc: 132 mg/dL / Ketone:   / Bili:  / Urobili:    Blood:  / Protein:  / Nitrite:    Leuk Esterase:  / RBC:  / WBC    Sq Epi:  / Non Sq Epi:  / Bacteria:

## 2025-06-12 NOTE — PROGRESS NOTE ADULT - ASSESSMENT
Patient is a 68yo Male with ESRD on HD via left IJ TDC, failed Rt AVG, HTN, 2HPT, Anemia, LE venous stasis p/w b/l LE wounds requiring debridement with graft placement.Patient came  from home ([facility] rehab until 4/22) with past history diabetes, hypertension on midodrine with dialysis, ESRD on dialysis Monday Wednesday Friday, most recently day prior to admission, has sided left-sided permacath, bilateral Charcot deformity follows with Dr. Sim, chronic OM of the right foot,  coming at recommendation of Dr. Valadez for bilateral foot wound debridement and graft application. Patient has bilateral heel wounds.      1.  Bilateral heel wounds.  Patient claims he has had these wounds  since November of last year.  He is afebrile .  2.  No intraop Cx were sent , follow up on pathology which can be also done as an outpatient , if no evidence of osteomyelitis ,then stop antibiotics.  3.  Status post bilateral feet wounds debridement and graft placement, postop day #6  4.  Sometimes with heel wounds and osteo, it is difficult to salvage the foot but this will be decided by the podiatrist .Patient has been evaluated by the podiatrist today   5.  Acute phase reactants were noted , CRP is trending down but ESR is still high.    6.  Recommend 6 weeks of IV Vancomycin ( dose as per pharmacy ) and IV Cefepime ( dose as per pharmacy ) from today, EOT is 06/21/25. If Cefepime not available , then consider ceftazidime. Duration of antibiotics can be prolonged to 8 -12 weeks depending on the clinical progress as patient is a vasculopath and has had these lesions for several months. Usual dose of Cefepime for intermittent hemodialysis is 2 g Iv now and then post HD   Vanco is  15-20 Mg / kg IV times one , then 500 mg IV post dialysis .  7.   Weekly CBC, CMP, ESR, CRP,Vanco trough levels while patient is on antibiotics, Vanco trough levels are 16.3 and is therapeutic .  8.   Follow up with podiatry as an outpatient .

## 2025-06-12 NOTE — PROGRESS NOTE ADULT - PROBLEM SELECTOR PLAN 6
Pt from home  PT Eval ---> HPT  -ID reccS:   -follow up on pathology, if no evidence of osteomyelitis  -per attending will f/u pathology outpatient  -Cefepime, Vanco post dialysis  -Weekly CBC, CMP, ESR, CRP while patient is on antibiotics  -Working with CM/SW for d/c and outpt dialysis, aiming for discharge after dialysis Pt from home  Spoke to wife and provided update of clinical course and plans for discharge.  PT Eval ---> HPT  -ID reccS:   -follow up on pathology, if no evidence of osteomyelitis  -per attending will f/u pathology outpatient  -Cefepime, Vanco post dialysis  -Weekly CBC, CMP, ESR, CRP while patient is on antibiotics  -Working with CM/SW for d/c and outpt dialysis, aiming for discharge after dialysis

## 2025-06-12 NOTE — PROGRESS NOTE ADULT - ASSESSMENT
69-year-old man from home ([facility] rehab until 4/22) with past history diabetes, hypertension on midodrine with dialysis, ESRD on dialysis Monday Wednesday Friday, most recently day prior to admission, has sided left-sided permacath, known to Dr. Chirinos, bilateral Charcot deformity follows with Dr. Sim, chronic OM of the right foot,  coming at recommendation of Dr. Sim for bilateral foot wound debridement and graft application.

## 2025-06-12 NOTE — PROGRESS NOTE ADULT - ASSESSMENT
69-year-old man from home ([facility] rehab until 4/22) with past history diabetes, hypertension on midodrine with dialysis, ESRD on dialysis Monday Wednesday Friday, most recently day prior to admission, has sided left-sided permacath, known to Dr. Chirinos, bilateral Charcot deformity follows with Dr. Sim, chronic OM of the right foot,  coming at recommendation of Dr. Sim for bilateral foot wound debridement and graft application.

## 2025-06-12 NOTE — PROGRESS NOTE ADULT - SUBJECTIVE AND OBJECTIVE BOX
pt seen and examined      Vital Signs Last 24 Hrs  T(C): 36.6 (12 Jun 2025 12:07), Max: 37.2 (12 Jun 2025 05:05)  T(F): 97.8 (12 Jun 2025 12:07), Max: 99 (12 Jun 2025 05:05)  HR: 69 (12 Jun 2025 12:07) (69 - 75)  BP: 100/56 (12 Jun 2025 12:07) (95/54 - 141/69)  BP(mean): 70 (12 Jun 2025 12:07) (70 - 79)  RR: 17 (12 Jun 2025 12:07) (17 - 18)  SpO2: 99% (12 Jun 2025 12:07) (95% - 100%)    Parameters below as of 12 Jun 2025 12:07  Patient On (Oxygen Delivery Method): room air        MedsMEDICATIONS  (STANDING):  cefepime   IVPB 2000 milliGRAM(s) IV Intermittent <User Schedule>  chlorhexidine 2% Cloths 1 Application(s) Topical <User Schedule>  epoetin rudolph-epbx (RETACRIT) Injectable 6000 Unit(s) IV Push <User Schedule>  heparin   Injectable 5000 Unit(s) SubCutaneous every 12 hours  insulin lispro (ADMELOG) corrective regimen sliding scale   SubCutaneous three times a day before meals  insulin lispro (ADMELOG) corrective regimen sliding scale   SubCutaneous at bedtime  latanoprost 0.005% Ophthalmic Solution 1 Drop(s) Both EYES at bedtime  sevelamer carbonate 800 milliGRAM(s) Oral three times a day with meals  tamsulosin 0.4 milliGRAM(s) Oral at bedtime  vancomycin  IVPB 500 milliGRAM(s) IV Intermittent <User Schedule>    MEDICATIONS  (PRN):  melatonin 3 milliGRAM(s) Oral at bedtime PRN Insomnia  midodrine. 10 milliGRAM(s) Oral <User Schedule> PRN SBP <100 in HD  ondansetron Injectable 4 milliGRAM(s) IV Push every 8 hours PRN Nausea and/or Vomiting      PHYSICAL EXAM:  GENERAL: NAD, well-groomed, well-developed  NEURO: AAOx3, ALINA b/l, MITCHELL x 4  LUNG: dec breath sounds at bases  HEART: S1, S2,+  ABDOMEN: Bowel sounds present, abd Soft, Nontender, Nondistended  EXTREMITIES:  b/l LE ACE wrapped    Consultant(s) Notes Reviewed:  [x ] YES  [ ] NO  Care Discussed with Consultants/Other Providers [ x] YES  [ ] NO    LABS:                        10.2   8.42  )-----------( 182      ( 12 Jun 2025 06:25 )             31.8     06-12    134[L]  |  100  |  33[H]  ----------------------------<  132[H]  4.1   |  27  |  4.45[H]    Ca    9.3      12 Jun 2025 06:25  Phos  3.8     06-12  Mg     2.4     06-12          RADIOLOGY & ADDITIONAL TESTS:    EKG:

## 2025-06-12 NOTE — PROGRESS NOTE ADULT - ASSESSMENT
Patient is a 68yo Male with ESRD on HD via left IJ TDC, failed Rt AVG, HTN, 2HPT, Anemia, LE venous stasis p/w b/l LE wounds requiring debridement with graft placement. Nephrology consulted for ESRD status.     1) ESRD:  Last HD 6/11, with intradialytic hypotension, with net 1.8L removed. Plan for next HD 6/13. c/w MWF schedule.   Rt AVG unsalvageable.  Monitor electrolytes.    2) Hypotension: BP low normal.  c/w Midodrine 10mg PO MWF intra- HD to aid in fluid removal prn. Monitor BP    3) Anemia of renal disease: Hb acceptable with adequate iron stores.  c/w Epogen 6K with HD. Monitor hgb    4) Hyperphosphatemia- serum phos acceptable. Continue with renvela 1 tab with meals.  Monitor serum calcium and phos    [facility] NEPHROLOGY  Arden Tsang M.D.  Freeman Holland D.O.  Janny Chirinos M.D.  MD Ambar Mccarty, MSN, ANP-C    Telephone: [phone]  Facsimile: [phone]    [address] [place] [address], [address]  [place] [address]

## 2025-06-12 NOTE — PROGRESS NOTE ADULT - PROBLEM SELECTOR PLAN 6
Pt from home  Spoke to wife and provided update of clinical course and plans for discharge.  PT Eval ---> HPT  -ID reccS:   -follow up on pathology, if no evidence of osteomyelitis  -per attending will f/u pathology outpatient  -Cefepime, Vanco post dialysis  -Weekly CBC, CMP, ESR, CRP while patient is on antibiotics  -Working with CM/SW for d/c and outpt dialysis, aiming for discharge after dialysis

## 2025-06-12 NOTE — PROGRESS NOTE ADULT - SUBJECTIVE AND OBJECTIVE BOX
Medications cefepime   IVPB 2000 milliGRAM(s) IV Intermittent <User Schedule>  chlorhexidine 2% Cloths 1 Application(s) Topical <User Schedule>  epoetin rudolph-epbx (RETACRIT) Injectable 6000 Unit(s) IV Push <User Schedule>  heparin   Injectable 5000 Unit(s) SubCutaneous every 12 hours  insulin lispro (ADMELOG) corrective regimen sliding scale   SubCutaneous three times a day before meals  insulin lispro (ADMELOG) corrective regimen sliding scale   SubCutaneous at bedtime  latanoprost 0.005% Ophthalmic Solution 1 Drop(s) Both EYES at bedtime  melatonin 3 milliGRAM(s) Oral at bedtime PRN  midodrine. 10 milliGRAM(s) Oral <User Schedule> PRN  ondansetron Injectable 4 milliGRAM(s) IV Push every 8 hours PRN  sevelamer carbonate 800 milliGRAM(s) Oral three times a day with meals  tamsulosin 0.4 milliGRAM(s) Oral at bedtime  vancomycin  IVPB 500 milliGRAM(s) IV Intermittent <User Schedule>    FHFamily history of diabetes mellitus    No pertinent family history in first degree relatives    ,   PMHDiabetes    Kidney problem    Hypertension    HTN (hypertension)    DM (diabetes mellitus)    HTN (hypertension)    DM (diabetes mellitus)    ESRD (end stage renal disease) on dialysis    Charcot foot due to diabetes mellitus    ESRD on hemodialysis    Charcot foot due to diabetes mellitus    Diabetic retinopathy associated with type 2 diabetes mellitus    Hyperlipidemia    Abscess of scrotum    Hidradenitis    End stage renal disease       PSHNo significant past surgical history    S/P foot surgery    Charcot's syndrome    History of incision and drainage    Hemodialysis access, AV graft        Labs                          10.2   8.42  )-----------( 182      ( 12 Jun 2025 06:25 )             31.8      06-12    134[L]  |  100  |  33[H]  ----------------------------<  132[H]  4.1   |  27  |  4.45[H]    Ca    9.3      12 Jun 2025 06:25  Phos  3.8     06-12  Mg     2.4     06-12       Vital Signs Last 24 Hrs  T(C): 36.6 (12 Jun 2025 12:07), Max: 37.2 (12 Jun 2025 05:05)  T(F): 97.8 (12 Jun 2025 12:07), Max: 99 (12 Jun 2025 05:05)  HR: 69 (12 Jun 2025 12:07) (69 - 75)  BP: 100/56 (12 Jun 2025 12:07) (95/54 - 141/69)  BP(mean): 70 (12 Jun 2025 12:07) (70 - 79)  RR: 17 (12 Jun 2025 12:07) (17 - 18)  SpO2: 99% (12 Jun 2025 12:07) (95% - 100%)    Parameters below as of 12 Jun 2025 12:07  Patient On (Oxygen Delivery Method): room air      Sedimentation Rate, Erythrocyte: 88 mm/hr (06-09-25 @ 08:45)  Sedimentation Rate, Erythrocyte: 89 mm/hr (06-08-25 @ 06:10)         C-Reactive Protein: 15.9 mg/L (06-09-25 @ 08:45)  C-Reactive Protein: 22.9 mg/L (06-08-25 @ 06:10)  C-Reactive Protein: 24.3 mg/L (06-07-25 @ 09:50)  C-Reactive Protein: 30.3 mg/L (06-04-25 @ 06:40)  C-Reactive Protein: 34.8 mg/L (06-03-25 @ 10:00)   WBC Count: 8.42 K/uL (06-12-25 @ 06:25)      LE FOCUSED PHYSICAL EXAM:  Vasc: DP/PT non-palpable bilaterally. CFT <3 seconds x 10. Temp Gradient within normal limits bilaterally with no increase in temperature to the right foot ulcer. No edema, no erythema, no varicosities observed bilaterally.   Derm:   Right foot- plantar heel wound about 6 cm x 4 cm with fibrotic wound base, medial TN joint about 1 cm wide. Wounds have no drainage and don't appear to be acutely infected   Left foot- Heel wound with an eschar about 4 cm wide. lateral foot wound about 2 cm wide. Wounds are not acutely infected.   MSK: Muscle strength deferred. No pain upon palpation bilaterally. No pain upon ROM of pedal and ankle joints bilaterally. No crepitation upon ROM of joints. Arch height 2/5 on-WB. Negative calf tenderness bilaterally.     IMAGING:   < from: Xray Ankle Complete 3 Views, Bilateral (06.03.25 @ 09:52) >  IMPRESSION: Advanced destructive changes of the hindfeet. No definite   bony erosion or acute fracture.      Thank you for this referral.    --- End of Report ---    < end of copied text >

## 2025-06-12 NOTE — PROGRESS NOTE ADULT - ASSESSMENT
A: s/p b/l foot wounds debridement and graft (restrata) application 6/6/25    P:   -Patient evaluated and Chart reviewed   -Patient is stable for discharge from podiatry  -Appreciate ID recs- No intra-op cultures were taken  -Stable from discharge from podiatry   -Recommend NWB as much as possible- heel wounds  -leave dressings intact until podiatry clinic next week  -Patient to follow up at an outpatient podiatry clinic upon discharge on a Thursday: [address] [facility], [address]nd [address], [place] [address]. Patient can call [phone] to make an appointment.  -Seen and discussed with Attending Chiquis

## 2025-06-12 NOTE — PROGRESS NOTE ADULT - SUBJECTIVE AND OBJECTIVE BOX
Date of Service 06-12-25 @ 18:52    CHIEF COMPLAINT:Patient is a 69y old  Male who presents with a chief complaint of foot wound debridement (12 Jun 2025 15:44)    	  REVIEW OF SYSTEMS:  CONSTITUTIONAL: No fever, weight loss, or fatigue  EYES: No eye pain, visual disturbances, or discharge  ENT:  No difficulty hearing, tinnitus, vertigo; No sinus or throat pain  NECK: No pain or stiffness  RESPIRATORY: No cough, wheezing, chills or hemoptysis; No Shortness of Breath  CARDIOVASCULAR: No chest pain, palpitations, passing out, dizziness, or leg swelling  GASTROINTESTINAL: No abdominal or epigastric pain. No nausea, vomiting, or hematemesis; No diarrhea or constipation. No melena or hematochezia.  GENITOURINARY: No dysuria, frequency, hematuria, or incontinence  NEUROLOGICAL: No headaches, memory loss, loss of strength, numbness, or tremors  SKIN: No itching, burning, rashes, or lesions   LYMPH Nodes: No enlarged glands  ENDOCRINE: No heat or cold intolerance; No hair loss  MUSCULOSKELETAL: No joint pain or swelling; No muscle, back, or extremity pain  PSYCHIATRIC: No depression, anxiety, mood swings, or difficulty sleeping  HEME/LYMPH: No easy bruising, or bleeding gums  ALLERGY AND IMMUNOLOGIC: No hives or eczema	    	      PHYSICAL EXAM:  T(C): 36.6 (06-12-25 @ 12:07), Max: 37.2 (06-12-25 @ 05:05)  HR: 69 (06-12-25 @ 12:07) (69 - 73)  BP: 100/56 (06-12-25 @ 12:07) (100/56 - 141/69)  RR: 17 (06-12-25 @ 12:07) (17 - 18)  SpO2: 99% (06-12-25 @ 12:07) (95% - 100%)  Wt(kg): --  I&O's Summary    11 Jun 2025 07:01  -  12 Jun 2025 07:00  --------------------------------------------------------  IN: 600 mL / OUT: 2423 mL / NET: -1823 mL        Appearance: Normal	  HEENT:   Normal oral mucosa, PERRL, EOMI	  Lymphatic: No lymphadenopathy  Cardiovascular: Normal S1 S2, No JVD, No murmurs, No edema  Respiratory: Lungs clear to auscultation	  Psychiatry: A & O x 3, Mood & affect appropriate  Gastrointestinal:  Soft, Non-tender, + BS	  Skin: No rashes, No ecchymoses, No cyanosis	  Neurologic: Non-focal  Extremities: Bilateral feet in dressings.  Vascular: difficult to palpate    MEDICATIONS  (STANDING):  cefepime   IVPB 2000 milliGRAM(s) IV Intermittent <User Schedule>  chlorhexidine 2% Cloths 1 Application(s) Topical <User Schedule>  epoetin rudolph-epbx (RETACRIT) Injectable 6000 Unit(s) IV Push <User Schedule>  heparin   Injectable 5000 Unit(s) SubCutaneous every 12 hours  insulin lispro (ADMELOG) corrective regimen sliding scale   SubCutaneous three times a day before meals  insulin lispro (ADMELOG) corrective regimen sliding scale   SubCutaneous at bedtime  latanoprost 0.005% Ophthalmic Solution 1 Drop(s) Both EYES at bedtime  sevelamer carbonate 800 milliGRAM(s) Oral three times a day with meals  tamsulosin 0.4 milliGRAM(s) Oral at bedtime  vancomycin  IVPB 500 milliGRAM(s) IV Intermittent <User Schedule>      TELEMETRY: 	    ECG:  	  RADIOLOGY:  OTHER: 	  	  LABS:	 	    CARDIAC MARKERS:                                10.2   8.42  )-----------( 182      ( 12 Jun 2025 06:25 )             31.8     06-12    134[L]  |  100  |  33[H]  ----------------------------<  132[H]  4.1   |  27  |  4.45[H]    Ca    9.3      12 Jun 2025 06:25  Phos  3.8     06-12  Mg     2.4     06-12      TSH:

## 2025-06-12 NOTE — PROGRESS NOTE ADULT - SUBJECTIVE AND OBJECTIVE BOX
No acute events overnight.    S: Feels well, denies complaints.     Allergies    penicillin (Unknown)    Intolerances      Vital Signs Last 24 Hrs  T(C): 36.6 (12 Jun 2025 12:07), Max: 37.2 (12 Jun 2025 05:05)  T(F): 97.8 (12 Jun 2025 12:07), Max: 99 (12 Jun 2025 05:05)  HR: 69 (12 Jun 2025 12:07) (69 - 75)  BP: 100/56 (12 Jun 2025 12:07) (95/54 - 141/69)  BP(mean): 70 (12 Jun 2025 12:07) (70 - 79)  RR: 17 (12 Jun 2025 12:07) (17 - 18)  SpO2: 99% (12 Jun 2025 12:07) (95% - 100%)    Parameters below as of 12 Jun 2025 12:07  Patient On (Oxygen Delivery Method): room air        MedsMEDICATIONS  (STANDING):  cefepime   IVPB 2000 milliGRAM(s) IV Intermittent <User Schedule>  chlorhexidine 2% Cloths 1 Application(s) Topical <User Schedule>  epoetin rudolph-epbx (RETACRIT) Injectable 6000 Unit(s) IV Push <User Schedule>  heparin   Injectable 5000 Unit(s) SubCutaneous every 12 hours  insulin lispro (ADMELOG) corrective regimen sliding scale   SubCutaneous three times a day before meals  insulin lispro (ADMELOG) corrective regimen sliding scale   SubCutaneous at bedtime  latanoprost 0.005% Ophthalmic Solution 1 Drop(s) Both EYES at bedtime  sevelamer carbonate 800 milliGRAM(s) Oral three times a day with meals  tamsulosin 0.4 milliGRAM(s) Oral at bedtime  vancomycin  IVPB 500 milliGRAM(s) IV Intermittent <User Schedule>    MEDICATIONS  (PRN):  melatonin 3 milliGRAM(s) Oral at bedtime PRN Insomnia  midodrine. 10 milliGRAM(s) Oral <User Schedule> PRN SBP <100 in HD  ondansetron Injectable 4 milliGRAM(s) IV Push every 8 hours PRN Nausea and/or Vomiting      PHYSICAL EXAM:  GENERAL: NAD, well-groomed, well-developed  NEURO: AAOx3, ALINA b/l, MITCHELL x 4  LUNG: Lungs clear to auscultation bilaterally, no wheezing, rhonchi, or rales.  HEART: S1, S2, no S3 or S4. Regular rate and rhythm. No murmurs, gallops, or rubs. No JVD  ABDOMEN: Bowel sounds present, abd Soft, Nontender, Nondistended  EXTREMITIES:  b/l LE ACE wrapped    Consultant(s) Notes Reviewed:  [x ] YES  [ ] NO  Care Discussed with Consultants/Other Providers [ x] YES  [ ] NO    LABS:                        10.2   8.42  )-----------( 182      ( 12 Jun 2025 06:25 )             31.8     06-12    134[L]  |  100  |  33[H]  ----------------------------<  132[H]  4.1   |  27  |  4.45[H]    Ca    9.3      12 Jun 2025 06:25  Phos  3.8     06-12  Mg     2.4     06-12          RADIOLOGY & ADDITIONAL TESTS:    EKG:

## 2025-06-13 LAB
ANION GAP SERPL CALC-SCNC: 7 MMOL/L — SIGNIFICANT CHANGE UP (ref 5–17)
BUN SERPL-MCNC: 49 MG/DL — HIGH (ref 7–18)
CALCIUM SERPL-MCNC: 9.2 MG/DL — SIGNIFICANT CHANGE UP (ref 8.4–10.5)
CHLORIDE SERPL-SCNC: 100 MMOL/L — SIGNIFICANT CHANGE UP (ref 96–108)
CO2 SERPL-SCNC: 27 MMOL/L — SIGNIFICANT CHANGE UP (ref 22–31)
CREAT SERPL-MCNC: 6.12 MG/DL — HIGH (ref 0.5–1.3)
EGFR: 9 ML/MIN/1.73M2 — LOW
EGFR: 9 ML/MIN/1.73M2 — LOW
GLUCOSE BLDC GLUCOMTR-MCNC: 102 MG/DL — HIGH (ref 70–99)
GLUCOSE BLDC GLUCOMTR-MCNC: 121 MG/DL — HIGH (ref 70–99)
GLUCOSE BLDC GLUCOMTR-MCNC: 197 MG/DL — HIGH (ref 70–99)
GLUCOSE BLDC GLUCOMTR-MCNC: 201 MG/DL — HIGH (ref 70–99)
GLUCOSE SERPL-MCNC: 196 MG/DL — HIGH (ref 70–99)
HCT VFR BLD CALC: 29.2 % — LOW (ref 39–50)
HGB BLD-MCNC: 9.3 G/DL — LOW (ref 13–17)
MAGNESIUM SERPL-MCNC: 2.2 MG/DL — SIGNIFICANT CHANGE UP (ref 1.6–2.6)
MCHC RBC-ENTMCNC: 31.8 G/DL — LOW (ref 32–36)
MCHC RBC-ENTMCNC: 33 PG — SIGNIFICANT CHANGE UP (ref 27–34)
MCV RBC AUTO: 103.5 FL — HIGH (ref 80–100)
NRBC BLD AUTO-RTO: 0 /100 WBCS — SIGNIFICANT CHANGE UP (ref 0–0)
PHOSPHATE SERPL-MCNC: 4.1 MG/DL — SIGNIFICANT CHANGE UP (ref 2.5–4.5)
PLATELET # BLD AUTO: 184 K/UL — SIGNIFICANT CHANGE UP (ref 150–400)
POTASSIUM SERPL-MCNC: 3.8 MMOL/L — SIGNIFICANT CHANGE UP (ref 3.5–5.3)
POTASSIUM SERPL-SCNC: 3.8 MMOL/L — SIGNIFICANT CHANGE UP (ref 3.5–5.3)
RBC # BLD: 2.82 M/UL — LOW (ref 4.2–5.8)
RBC # FLD: 14.9 % — HIGH (ref 10.3–14.5)
SODIUM SERPL-SCNC: 134 MMOL/L — LOW (ref 135–145)
WBC # BLD: 8.41 K/UL — SIGNIFICANT CHANGE UP (ref 3.8–10.5)
WBC # FLD AUTO: 8.41 K/UL — SIGNIFICANT CHANGE UP (ref 3.8–10.5)

## 2025-06-13 RX ORDER — ACETAMINOPHEN 500 MG/5ML
650 LIQUID (ML) ORAL EVERY 6 HOURS
Refills: 0 | Status: DISCONTINUED | OUTPATIENT
Start: 2025-06-13 | End: 2025-06-16

## 2025-06-13 RX ADMIN — LATANOPROST PF 1 DROP(S): 0.05 SOLUTION/ DROPS OPHTHALMIC at 21:45

## 2025-06-13 RX ADMIN — Medication 100 MILLIGRAM(S): at 16:45

## 2025-06-13 RX ADMIN — TAMSULOSIN HYDROCHLORIDE 0.4 MILLIGRAM(S): 0.4 CAPSULE ORAL at 21:45

## 2025-06-13 RX ADMIN — MIDODRINE HYDROCHLORIDE 10 MILLIGRAM(S): 5 TABLET ORAL at 09:55

## 2025-06-13 RX ADMIN — INSULIN LISPRO 2: 100 INJECTION, SOLUTION INTRAVENOUS; SUBCUTANEOUS at 07:47

## 2025-06-13 RX ADMIN — Medication 1 APPLICATION(S): at 05:20

## 2025-06-13 RX ADMIN — SEVELAMER HYDROCHLORIDE 800 MILLIGRAM(S): 800 TABLET ORAL at 07:46

## 2025-06-13 RX ADMIN — HEPARIN SODIUM 5000 UNIT(S): 1000 INJECTION INTRAVENOUS; SUBCUTANEOUS at 05:17

## 2025-06-13 RX ADMIN — SEVELAMER HYDROCHLORIDE 800 MILLIGRAM(S): 800 TABLET ORAL at 17:16

## 2025-06-13 RX ADMIN — SEVELAMER HYDROCHLORIDE 800 MILLIGRAM(S): 800 TABLET ORAL at 13:51

## 2025-06-13 RX ADMIN — HEPARIN SODIUM 5000 UNIT(S): 1000 INJECTION INTRAVENOUS; SUBCUTANEOUS at 17:27

## 2025-06-13 RX ADMIN — EPOETIN ALFA 6000 UNIT(S): 10000 SOLUTION INTRAVENOUS; SUBCUTANEOUS at 09:58

## 2025-06-13 RX ADMIN — CEFEPIME 100 MILLIGRAM(S): 2 INJECTION, POWDER, FOR SOLUTION INTRAVENOUS at 13:51

## 2025-06-13 NOTE — PROGRESS NOTE ADULT - PROBLEM SELECTOR PLAN 2
ESRD on HD MWF via left sided PermCath  -C/w sevelamer TID, calcitriol, epo 4K with HD, midodrine 10mg on dialysis days  -Renal diet, 1.2L fluid restriction  -Nephro Dr. Chirinos

## 2025-06-13 NOTE — PROGRESS NOTE ADULT - PROBLEM SELECTOR PLAN 1
Multiple chronic ulcers of both feet  -Follows outpt by Dr. Sim: sent for b/l wound debridement and graft placement  -S/p OR for I&D w/ graft placement Bilateral feet (6/6/25)  -f/u sx path from 6/6 ( if no evidence of osteomyelitis ,then stop antibiotics )  -monitor vanco levels ( last 6/12-->16.3 )    -ID Dr. Oakley reccs: 6 weeks of vanco & cefepime ( EOT is 06/21/25 )--> POST HD                                Weekly CBC, CMP, ESR, CRP,Vanco trough levels while on antibiotics  -PT eval-->HPT ( per attending needs PT re-eval )  - podiatry reccs: NWB as much as possible- heel wounds. leave dressings intact until podiatry clinic next week                      f/u outpt podiatry clinic upon discharge on a Thursday: [address] [facility], [address], [place] [address]. Patient can call [phone] to make an appointment.

## 2025-06-13 NOTE — PROGRESS NOTE ADULT - SUBJECTIVE AND OBJECTIVE BOX
Patient is a 69y old  Male who presents with a chief complaint of foot wound debridement (13 Jun 2025 11:16)      INTERVAL HPI/OVERNIGHT EVENTS: no overnight events    I&O's Summary    13 Jun 2025 07:01  -  13 Jun 2025 15:09  --------------------------------------------------------  IN: 600 mL / OUT: 2600 mL / NET: -2000 mL      Vital Signs Last 24 Hrs  T(C): 36.7 (13 Jun 2025 13:35), Max: 36.7 (13 Jun 2025 13:35)  T(F): 98 (13 Jun 2025 13:35), Max: 98 (13 Jun 2025 13:35)  HR: 70 (13 Jun 2025 13:35) (68 - 73)  BP: 112/73 (13 Jun 2025 13:35) (106/57 - 143/79)  BP(mean): 90 (12 Jun 2025 20:35) (90 - 90)  RR: 18 (13 Jun 2025 13:35) (16 - 18)  SpO2: 98% (13 Jun 2025 13:35) (98% - 100%)    Parameters below as of 13 Jun 2025 13:35  Patient On (Oxygen Delivery Method): room air      PAST MEDICAL & SURGICAL HISTORY:  DM (diabetes mellitus)  type 2      ESRD (end stage renal disease) on dialysis  t-th-sat via right AV graft, uses midodrine the days of Dialysis for hypotension      Charcot foot due to diabetes mellitus      Diabetic retinopathy associated with type 2 diabetes mellitus      Hyperlipidemia      Abscess of scrotum      Hidradenitis      End stage renal disease      S/P foot surgery  right foot - 11/2014      Charcot's syndrome  Surgeries in feet and ankles   left ankle 2011 and right ankle 11/2014      History of incision and drainage  scrotal 5/2018      Hemodialysis access, AV graft          SOCIAL HISTORY  Alcohol:  Tobacco:  Illicit substance use:      FAMILY HISTORY:      LABS:                        9.3    8.41  )-----------( 184      ( 13 Jun 2025 09:37 )             29.2     06-13    134[L]  |  100  |  49[H]  ----------------------------<  196[H]  3.8   |  27  |  6.12[H]    Ca    9.2      13 Jun 2025 09:37  Phos  4.1     06-13  Mg     2.2     06-13        Urinalysis Basic - ( 13 Jun 2025 09:37 )    Color: x / Appearance: x / SG: x / pH: x  Gluc: 196 mg/dL / Ketone: x  / Bili: x / Urobili: x   Blood: x / Protein: x / Nitrite: x   Leuk Esterase: x / RBC: x / WBC x   Sq Epi: x / Non Sq Epi: x / Bacteria: x      CAPILLARY BLOOD GLUCOSE      POCT Blood Glucose.: 121 mg/dL (13 Jun 2025 11:03)  POCT Blood Glucose.: 201 mg/dL (13 Jun 2025 07:37)  POCT Blood Glucose.: 191 mg/dL (12 Jun 2025 21:13)  POCT Blood Glucose.: 119 mg/dL (12 Jun 2025 16:20)        Urinalysis Basic - ( 13 Jun 2025 09:37 )    Color: x / Appearance: x / SG: x / pH: x  Gluc: 196 mg/dL / Ketone: x  / Bili: x / Urobili: x   Blood: x / Protein: x / Nitrite: x   Leuk Esterase: x / RBC: x / WBC x   Sq Epi: x / Non Sq Epi: x / Bacteria: x        MEDICATIONS  (STANDING):  cefepime   IVPB 2000 milliGRAM(s) IV Intermittent <User Schedule>  chlorhexidine 2% Cloths 1 Application(s) Topical <User Schedule>  epoetin rudolph-epbx (RETACRIT) Injectable 6000 Unit(s) IV Push <User Schedule>  heparin   Injectable 5000 Unit(s) SubCutaneous every 12 hours  insulin lispro (ADMELOG) corrective regimen sliding scale   SubCutaneous three times a day before meals  insulin lispro (ADMELOG) corrective regimen sliding scale   SubCutaneous at bedtime  latanoprost 0.005% Ophthalmic Solution 1 Drop(s) Both EYES at bedtime  sevelamer carbonate 800 milliGRAM(s) Oral three times a day with meals  tamsulosin 0.4 milliGRAM(s) Oral at bedtime  vancomycin  IVPB 500 milliGRAM(s) IV Intermittent <User Schedule>    MEDICATIONS  (PRN):  acetaminophen     Tablet .. 650 milliGRAM(s) Oral every 6 hours PRN Mild Pain (1 - 3)  melatonin 3 milliGRAM(s) Oral at bedtime PRN Insomnia  midodrine. 10 milliGRAM(s) Oral <User Schedule> PRN SBP <100 in HD  ondansetron Injectable 4 milliGRAM(s) IV Push every 8 hours PRN Nausea and/or Vomiting      REVIEW OF SYSTEMS:  CONSTITUTIONAL: No fever  EYES: No eye pain, visual disturbances  ENMT:  No sinus or throat pain  NECK: No pain   RESPIRATORY: No cough, wheezing, No shortness of breath  CARDIOVASCULAR: No chest pain, palpitations, dizziness, or leg swelling  GASTROINTESTINAL: No abdominal pain. No nausea, vomiting; No diarrhea or constipation.   NEUROLOGICAL: No headaches  MUSCULOSKELETAL: No joint pain     RADIOLOGY & ADDITIONAL TESTS:  < from: Xray Chest 1 View- PORTABLE-Urgent (Xray Chest 1 View- PORTABLE-Urgent .) (06.03.25 @ 09:50) >    ACC: [id number] EXAM:  XR CHEST PORTABLE URGENT 1V   ORDERED BY: DALLAS SIEGEL     PROCEDURE DATE:  06/03/2025          INTERPRETATION:  TECHNIQUE: Single portable view of the chest.    COMPARISON:  1/19/2025    CLINICAL HISTORY: assessing fluid status    FINDINGS:    Single frontal view of the chest demonstrates the lungs to be clear. The   cardiomediastinal silhouette is unremarkable. No acute osseous   abnormalities. Left-sided dialysis catheter. Right subclavian artery and   axillary artery stents.    IMPRESSION: No acute cardiopulmonary disease process.    --- End of Report ---  < end of copied text >    ACC: [id number] EXAM:  XR ANKLE COMP MIN 3 VIEWS BI   ORDERED BY: DALLAS SIEGEL     ACC: [id number] EXAM:  XR FOOT COMP MIN 3 VIEWS BI   ORDERED BY: DALLAS SIEGEL     PROCEDURE DATE:  06/03/2025          INTERPRETATION:  Bilateral feet and bilateral ankles    HISTORY: Bilateral wounds    COMPARISON: 5/15/2025     3 views of the right foot, 3 views of the left foot, 3 views of the left   ankle and 3 views of the right ankle show advanced arthritic changes of   the ankle joint with invagination of the distal tibia is intact to the   hindfoot with destruction of both the talar bones and the calcaneus   bilaterally. There is lateral shifting of the mid foot with regard to the   hindfoot bilaterally. Vascular calcifications are present.    IMPRESSION: Advanced destructive changes of the hindfeet. No definite   bony erosion or acute fracture.      Thank you for this referral.    --- End of Report ---      Imaging Personally Reviewed:  [x ] YES  [ ] NO    Consultant(s) Notes Reviewed:  [x ] YES  [ ] NO    PHYSICAL EXAM:  GENERAL: NAD  HEAD:  Atraumatic, Normocephalic  EYES:  conjunctiva and sclera clear  ENMT:  Moist mucous membranes  NECK: Supple  NERVOUS SYSTEM:  Alert awake  CHEST/LUNG: dec breath sounds at bases  HEART: s1, s2+ABDOMEN: Soft, Nontender, Nondistended; Bowel sounds present  EXTREMITIES: No clubbing, cyanosis, or edema      Care Collaborated Discussed with Consultants/Other Providers [x ] YES  [ ] NO

## 2025-06-13 NOTE — PROGRESS NOTE ADULT - PROBLEM SELECTOR PLAN 1
Multiple chronic ulcers of both feet, not currently infected.    -Follows outpt by Dr. Sim: sent for b/l wound debridement and graft placement  -Daily ESR and CRP  -S/p OR for I&D with graft placement Bilateral feet  -no OR cx taken, only Path - still pending  -per discussion with Podiatry no wound care orders, dressings to be changed at Podiatry clinic on outpatient f/u  -Strong odor coming from dressings, called Podiatry to see patient today  -ID Dr. Oakley following rec 6 weeks of vanco and cefepime at HD  -Will discuss with clinical pharmacy dosing of vanco  -PT eval-->HPT Multiple chronic ulcers of both feet  -Follows outpt by Dr. Sim: sent for b/l wound debridement and graft placement  -S/p OR for I&D w/ graft placement Bilateral feet (6/6/25)  -f/u sx path from 6/6 ( if no evidence of osteomyelitis ,then stop antibiotics )  -monitor vanco levels ( last 6/12-->16.3 )    -ID Dr. Oakley reccs: 6 weeks of vanco & cefepime ( EOT is 06/21/25 )--> POST HD                                Weekly CBC, CMP, ESR, CRP,Vanco trough levels while on antibiotics  -PT eval-->HPT ( per attending needs PT re-eval )  - podiatry reccs: NWB as much as possible- heel wounds. leave dressings intact until podiatry clinic next week                      f/u outpt podiatry clinic upon discharge on a Thursday: [address] [facility], [address], [place] [address]. Patient can call [phone] to make an appointment.

## 2025-06-13 NOTE — PROGRESS NOTE ADULT - ASSESSMENT
69-year-old man from home ([facility] rehab until 4/22) with past history diabetes, hypertension on midodrine with dialysis, ESRD on dialysis Monday Wednesday Friday, most recently day prior to admission, has sided left-sided permacath, known to Dr. Chirinos, bilateral Charcot deformity follows with Dr. Sim, chronic OM of the right foot,  coming at recommendation of Dr. Sim for bilateral foot wound debridement and graft application.

## 2025-06-13 NOTE — PROGRESS NOTE ADULT - PROBLEM SELECTOR PLAN 6
Pt from home  Spoke to wife and provided update of clinical course and plans for discharge.  PT Eval ---> HPT  -ID reccS:   -follow up on pathology, if no evidence of osteomyelitis  -per attending will f/u pathology outpatient  -Cefepime, Vanco post dialysis  -Weekly CBC, CMP, ESR, CRP while patient is on antibiotics  -Working with CM/SW for d/c and outpt dialysis, aiming for discharge after dialysis Pt from home  1. PT Eval ---> HPT---> attending reccs: REPEAT PT eval (6/13/25 )    2. ID reccS: 6 weeks of vanco & cefepime ( EOT is 06/21/25 )--> POST HD                                Weekly CBC, CMP, ESR, CRP,Vanco trough levels while on antibiotics    3. CM follow up w/ HD center for abx outpt

## 2025-06-13 NOTE — PROGRESS NOTE ADULT - SUBJECTIVE AND OBJECTIVE BOX
[facility] NEPHROLOGY- PROGRESS NOTE    Patient is a 70yo Male with ESRD on HD via left IJ TDC, failed Rt AVG, HTN, 2HPT, Anemia, LE venous stasis p/w b/l LE wounds requiring debridement with graft placement. Nephrology consulted for ESRD status.     Pt s/p LE wound debridement on 6/6    Hospital Medications: Medications reviewed.  REVIEW OF SYSTEMS:  CONSTITUTIONAL: No fevers or chills  RESPIRATORY: No shortness of breath  CARDIOVASCULAR: No chest pain.  GASTROINTESTINAL: No nausea, vomiting, diarrhea or abdominal pain.   VASCULAR: No bilateral lower extremity edema.     VITALS:  T(F): 97.9 (06-12-25 @ 20:35), Max: 97.9 (06-12-25 @ 20:35)  HR: 69 (06-12-25 @ 20:35)  BP: 136/67 (06-12-25 @ 20:35)  RR: 16 (06-12-25 @ 20:35)  SpO2: 99% (06-12-25 @ 20:35)  Wt(kg): --      PHYSICAL EXAM:  Gen: NAD, calm  HEENT: anicteric  Cards: RRR, +S1/S2, no M/G/R  Resp: CTA B/L  GI: soft, NT/ND, NABS  Extremities: no LE edema B/L; foul odor  Derm: b/l feet wrapped  Access: Left IJ tunneled HD catheter- intact    LABS:  06-13    134[L]  |  100  |  49[H]  ----------------------------<  196[H]  3.8   |  27  |  6.12[H]    Ca    9.2      13 Jun 2025 09:37  Phos  4.1     06-13  Mg     2.2     06-13      Creatinine Trend: 6.12 <--, 4.45 <--, 6.25 <--, 4.73 <--, 6.29 <--, 4.94 <--, 7.01 <--                        9.3    8.41  )-----------( 184      ( 13 Jun 2025 09:37 )             29.2     Urine Studies:  Urinalysis Basic - ( 13 Jun 2025 09:37 )    Color:  / Appearance:  / SG:  / pH:   Gluc: 196 mg/dL / Ketone:   / Bili:  / Urobili:    Blood:  / Protein:  / Nitrite:    Leuk Esterase:  / RBC:  / WBC    Sq Epi:  / Non Sq Epi:  / Bacteria:

## 2025-06-13 NOTE — PROGRESS NOTE ADULT - PROBLEM SELECTOR PLAN 2
ESRD on HD MWF via left sided permacath  -C/w sevelamer TID, calcitriol, epo 4K with HD, midodrine 10mg on dialysis days  -Renal diet, 1.2L fluid restriction  -Nephro Dr. Chirinos following ESRD on HD MWF via left sided PermCath  -C/w sevelamer TID, calcitriol, epo 4K with HD, midodrine 10mg on dialysis days  -Renal diet, 1.2L fluid restriction  -Nephro Dr. Chirinos

## 2025-06-13 NOTE — PROGRESS NOTE ADULT - ASSESSMENT
Patient is a 70yo Male with ESRD on HD via left IJ TDC, failed Rt AVG, HTN, 2HPT, Anemia, LE venous stasis p/w b/l LE wounds requiring debridement with graft placement. Nephrology consulted for ESRD status.     1) ESRD:  Pt seen on HD today, with intradialytic hypotension, for which pt was given Midodrine 10mg PO x1; UF goal 2L as BP tolerates. Plan for next HD 6/16. c/w MWF schedule.   Rt AVG unsalvageable.  Monitor electrolytes.    2) Hypotension: BP  normal.  c/w Midodrine 10mg PO MWF intra- HD to aid in fluid removal prn. Monitor BP    3) Anemia of renal disease: Hb low with adequate iron stores.  c/w Epogen 6K with HD. Monitor hgb    4) Hyperphosphatemia- serum phos acceptable. Continue with renvela 1 tab with meals.  Monitor serum calcium and phos    [facility] NEPHROLOGY  Arden Tsang M.D.  Freeman Holland D.O.  Janny Chirinos M.D.  MD Ambar Mccarty, MSN, ANP-C    Telephone: [phone]  Facsimile: [phone] [place] [address], [address]  [place] [phone]

## 2025-06-13 NOTE — PROGRESS NOTE ADULT - ASSESSMENT
Patient is a 68yo Male with ESRD on HD via left IJ TDC, failed Rt AVG, HTN, 2HPT, Anemia, LE venous stasis p/w b/l LE wounds requiring debridement with graft placement.Patient came  from home ([facility] rehab until 4/22) with past history diabetes, hypertension on midodrine with dialysis, ESRD on dialysis Monday Wednesday Friday, most recently day prior to admission, has sided left-sided permacath, bilateral Charcot deformity follows with Dr. Sim, chronic OM of the right foot,  coming at recommendation of Dr. Valadez for bilateral foot wound debridement and graft application. Patient has bilateral heel wounds.      1.  Bilateral heel wounds.  Patient claims he has had these wounds  since November of last year.  He is afebrile .  2.  No intraop Cx were sent , follow up on pathology which can be also done as an outpatient , if no evidence of osteomyelitis ,then stop antibiotics.  3.  Status post bilateral feet wounds debridement and graft placement, postop day #7  4.  Sometimes with heel wounds and osteo, it is difficult to salvage the foot but this will be decided by the podiatrist .Patient has been evaluated by the podiatrist today   5.  Acute phase reactants were noted , CRP is trending down but ESR is still high.    6.  Recommend 6 weeks of IV Vancomycin ( dose as per pharmacy ) and IV Cefepime ( dose as per pharmacy ) from today, EOT is 06/21/25. If Cefepime not available , then consider ceftazidime. Duration of antibiotics can be prolonged to 8 -12 weeks depending on the clinical progress as patient is a vasculopath and has had these lesions for several months. Usual dose of Cefepime for intermittent hemodialysis is 2 g Iv now and then post HD   Vanco is  15-20 Mg / kg IV times one , then 500 mg IV post dialysis .  7.   Weekly CBC, CMP, ESR, CRP,Vanco trough levels while patient is on antibiotics, Vanco trough levels are 16.3 and is therapeutic .  8.   Follow up with podiatry as an outpatient .  9.   Patient underwent hemodialysis today and had an episode of hypotension .Midodrine was administered , followed closely by nephrologist.

## 2025-06-13 NOTE — PROGRESS NOTE ADULT - PROBLEM SELECTOR PLAN 3
H/o DM on ISSC at home  -A1c 7.5  -C/w con carb diet  -C/w ISSC & FS ACHS H/o DM on ISSC at home  -A1c 7.5 %  -C/w con carb diet  -C/w ISSC & FS ACHS

## 2025-06-13 NOTE — PROGRESS NOTE ADULT - PROBLEM SELECTOR PLAN 6
Pt from home  1. PT Eval ---> HPT---> attending reccs: REPEAT PT eval (6/13/25 )    2. ID reccS: 6 weeks of vanco & cefepime ( EOT is 06/21/25 )--> POST HD                                Weekly CBC, CMP, ESR, CRP,Vanco trough levels while on antibiotics    3. CM follow up w/ HD center for abx outpt

## 2025-06-13 NOTE — PROGRESS NOTE ADULT - SUBJECTIVE AND OBJECTIVE BOX
Date of Service 06-13-25 @ 18:02    CHIEF COMPLAINT:Patient is a 69y old  Male who presents with a chief complaint of foot wound debridement (13 Jun 2025 15:08)    	  REVIEW OF SYSTEMS:  CONSTITUTIONAL: No fever, weight loss, or fatigue, he appears chronically ill.  EYES: No eye pain, visual disturbances, or discharge  ENT:  No difficulty hearing, tinnitus, vertigo; No sinus or throat pain  NECK: No pain or stiffness  RESPIRATORY: No cough, wheezing, chills or hemoptysis; No Shortness of Breath  CARDIOVASCULAR: No chest pain, palpitations, passing out, dizziness, or leg swelling  GASTROINTESTINAL: No abdominal or epigastric pain. No nausea, vomiting, or hematemesis; No diarrhea or constipation. No melena or hematochezia.  GENITOURINARY: No dysuria, frequency, hematuria, or incontinence  NEUROLOGICAL: No headaches, memory loss, loss of strength, numbness, or tremors  SKIN: No itching, burning, rashes, or lesions   LYMPH Nodes: No enlarged glands  ENDOCRINE: No heat or cold intolerance; No hair loss  MUSCULOSKELETAL: No joint pain or swelling; No muscle, back, or extremity pain  PSYCHIATRIC: No depression, anxiety, mood swings, or difficulty sleeping  HEME/LYMPH: No easy bruising, or bleeding gums  ALLERGY AND IMMUNOLOGIC: No hives or eczema	        PHYSICAL EXAM:  T(C): 36.7 (06-13-25 @ 13:35), Max: 36.7 (06-13-25 @ 13:35)  HR: 72 (06-13-25 @ 17:00) (68 - 73)  BP: 106/63 (06-13-25 @ 17:00) (106/57 - 143/79)  RR: 18 (06-13-25 @ 13:35) (16 - 18)  SpO2: 98% (06-13-25 @ 17:00) (98% - 100%)  Wt(kg): --  I&O's Summary    13 Jun 2025 07:01  -  13 Jun 2025 18:02  --------------------------------------------------------  IN: 600 mL / OUT: 2600 mL / NET: -2000 mL        Appearance: Normal	  HEENT:   Normal oral mucosa, PERRL, EOMI	  Lymphatic: No lymphadenopathy  Cardiovascular: Normal S1 S2, No JVD, No murmurs, No edema  Respiratory: Lungs clear to auscultation	  Psychiatry: A & O x 3, Mood & affect appropriate  Gastrointestinal:  Soft, Non-tender, + BS	  Skin: No rashes, No ecchymoses, No cyanosis	  Neurologic: Non-focal  Extremities :Dressings present .  Vascular: difficult to palpate.    MEDICATIONS  (STANDING):  cefepime   IVPB 2000 milliGRAM(s) IV Intermittent <User Schedule>  chlorhexidine 2% Cloths 1 Application(s) Topical <User Schedule>  epoetin rudolph-epbx (RETACRIT) Injectable 6000 Unit(s) IV Push <User Schedule>  heparin   Injectable 5000 Unit(s) SubCutaneous every 12 hours  insulin lispro (ADMELOG) corrective regimen sliding scale   SubCutaneous three times a day before meals  insulin lispro (ADMELOG) corrective regimen sliding scale   SubCutaneous at bedtime  latanoprost 0.005% Ophthalmic Solution 1 Drop(s) Both EYES at bedtime  sevelamer carbonate 800 milliGRAM(s) Oral three times a day with meals  tamsulosin 0.4 milliGRAM(s) Oral at bedtime  vancomycin  IVPB 500 milliGRAM(s) IV Intermittent <User Schedule>        LABS:	 	    CARDIAC MARKERS:                                9.3    8.41  )-----------( 184      ( 13 Jun 2025 09:37 )             29.2     06-13    134[L]  |  100  |  49[H]  ----------------------------<  196[H]  3.8   |  27  |  6.12[H]    Ca    9.2      13 Jun 2025 09:37  Phos  4.1     06-13  Mg     2.2     06-13

## 2025-06-13 NOTE — PROGRESS NOTE ADULT - ASSESSMENT
69-year-old man from home ([facility] rehab until 4/22) with past history diabetes, hypertension on midodrine with dialysis, ESRD on dialysis Monday Wednesday Friday, most recently day prior to admission, has sided left-sided permacath, known to Dr. Chirinos, bilateral Charcot deformity follows with Dr. Sim, chronic OM of the right foot,  coming at recommendation of Dr. Sim for bilateral foot wound debridement and graft application.

## 2025-06-13 NOTE — PROGRESS NOTE ADULT - SUBJECTIVE AND OBJECTIVE BOX
Patient is a 69y old  Male who presents with a chief complaint of foot wound debridement (13 Jun 2025 11:16)      INTERVAL HPI/OVERNIGHT EVENTS: no overnight events    I&O's Summary    13 Jun 2025 07:01  -  13 Jun 2025 15:09  --------------------------------------------------------  IN: 600 mL / OUT: 2600 mL / NET: -2000 mL      Vital Signs Last 24 Hrs  T(C): 36.7 (13 Jun 2025 13:35), Max: 36.7 (13 Jun 2025 13:35)  T(F): 98 (13 Jun 2025 13:35), Max: 98 (13 Jun 2025 13:35)  HR: 70 (13 Jun 2025 13:35) (68 - 73)  BP: 112/73 (13 Jun 2025 13:35) (106/57 - 143/79)  BP(mean): 90 (12 Jun 2025 20:35) (90 - 90)  RR: 18 (13 Jun 2025 13:35) (16 - 18)  SpO2: 98% (13 Jun 2025 13:35) (98% - 100%)    Parameters below as of 13 Jun 2025 13:35  Patient On (Oxygen Delivery Method): room air      PAST MEDICAL & SURGICAL HISTORY:  DM (diabetes mellitus)  type 2      ESRD (end stage renal disease) on dialysis  t-th-sat via right AV graft, uses midodrine the days of Dialysis for hypotension      Charcot foot due to diabetes mellitus      Diabetic retinopathy associated with type 2 diabetes mellitus      Hyperlipidemia      Abscess of scrotum      Hidradenitis      End stage renal disease      S/P foot surgery  right foot - 11/2014      Charcot's syndrome  Surgeries in feet and ankles   left ankle 2011 and right ankle 11/2014      History of incision and drainage  scrotal 5/2018      Hemodialysis access, AV graft          SOCIAL HISTORY  Alcohol:  Tobacco:  Illicit substance use:      FAMILY HISTORY:      LABS:                        9.3    8.41  )-----------( 184      ( 13 Jun 2025 09:37 )             29.2     06-13    134[L]  |  100  |  49[H]  ----------------------------<  196[H]  3.8   |  27  |  6.12[H]    Ca    9.2      13 Jun 2025 09:37  Phos  4.1     06-13  Mg     2.2     06-13        Urinalysis Basic - ( 13 Jun 2025 09:37 )    Color: x / Appearance: x / SG: x / pH: x  Gluc: 196 mg/dL / Ketone: x  / Bili: x / Urobili: x   Blood: x / Protein: x / Nitrite: x   Leuk Esterase: x / RBC: x / WBC x   Sq Epi: x / Non Sq Epi: x / Bacteria: x      CAPILLARY BLOOD GLUCOSE      POCT Blood Glucose.: 121 mg/dL (13 Jun 2025 11:03)  POCT Blood Glucose.: 201 mg/dL (13 Jun 2025 07:37)  POCT Blood Glucose.: 191 mg/dL (12 Jun 2025 21:13)  POCT Blood Glucose.: 119 mg/dL (12 Jun 2025 16:20)        Urinalysis Basic - ( 13 Jun 2025 09:37 )    Color: x / Appearance: x / SG: x / pH: x  Gluc: 196 mg/dL / Ketone: x  / Bili: x / Urobili: x   Blood: x / Protein: x / Nitrite: x   Leuk Esterase: x / RBC: x / WBC x   Sq Epi: x / Non Sq Epi: x / Bacteria: x        MEDICATIONS  (STANDING):  cefepime   IVPB 2000 milliGRAM(s) IV Intermittent <User Schedule>  chlorhexidine 2% Cloths 1 Application(s) Topical <User Schedule>  epoetin rudolph-epbx (RETACRIT) Injectable 6000 Unit(s) IV Push <User Schedule>  heparin   Injectable 5000 Unit(s) SubCutaneous every 12 hours  insulin lispro (ADMELOG) corrective regimen sliding scale   SubCutaneous three times a day before meals  insulin lispro (ADMELOG) corrective regimen sliding scale   SubCutaneous at bedtime  latanoprost 0.005% Ophthalmic Solution 1 Drop(s) Both EYES at bedtime  sevelamer carbonate 800 milliGRAM(s) Oral three times a day with meals  tamsulosin 0.4 milliGRAM(s) Oral at bedtime  vancomycin  IVPB 500 milliGRAM(s) IV Intermittent <User Schedule>    MEDICATIONS  (PRN):  acetaminophen     Tablet .. 650 milliGRAM(s) Oral every 6 hours PRN Mild Pain (1 - 3)  melatonin 3 milliGRAM(s) Oral at bedtime PRN Insomnia  midodrine. 10 milliGRAM(s) Oral <User Schedule> PRN SBP <100 in HD  ondansetron Injectable 4 milliGRAM(s) IV Push every 8 hours PRN Nausea and/or Vomiting      REVIEW OF SYSTEMS:  CONSTITUTIONAL: No fever  EYES: No eye pain, visual disturbances  ENMT:  No sinus or throat pain  NECK: No pain   RESPIRATORY: No cough, wheezing, No shortness of breath  CARDIOVASCULAR: No chest pain, palpitations, dizziness, or leg swelling  GASTROINTESTINAL: No abdominal pain. No nausea, vomiting; No diarrhea or constipation.   NEUROLOGICAL: No headaches  MUSCULOSKELETAL: No joint pain     RADIOLOGY & ADDITIONAL TESTS:  < from: Xray Chest 1 View- PORTABLE-Urgent (Xray Chest 1 View- PORTABLE-Urgent .) (06.03.25 @ 09:50) >    ACC: [id number] EXAM:  XR CHEST PORTABLE URGENT 1V   ORDERED BY: DALLAS SIEGEL     PROCEDURE DATE:  06/03/2025          INTERPRETATION:  TECHNIQUE: Single portable view of the chest.    COMPARISON:  1/19/2025    CLINICAL HISTORY: assessing fluid status    FINDINGS:    Single frontal view of the chest demonstrates the lungs to be clear. The   cardiomediastinal silhouette is unremarkable. No acute osseous   abnormalities. Left-sided dialysis catheter. Right subclavian artery and   axillary artery stents.    IMPRESSION: No acute cardiopulmonary disease process.    --- End of Report ---  < end of copied text >    ACC: [id number] EXAM:  XR ANKLE COMP MIN 3 VIEWS BI   ORDERED BY: DALLAS SIEGEL     ACC: [id number] EXAM:  XR FOOT COMP MIN 3 VIEWS BI   ORDERED BY: DALLAS SIEGEL     PROCEDURE DATE:  06/03/2025          INTERPRETATION:  Bilateral feet and bilateral ankles    HISTORY: Bilateral wounds    COMPARISON: 5/15/2025     3 views of the right foot, 3 views of the left foot, 3 views of the left   ankle and 3 views of the right ankle show advanced arthritic changes of   the ankle joint with invagination of the distaltibia is intact to the   hindfoot with destruction of both the talar bones and the calcaneus   bilaterally. There is lateral shifting of the mid foot with regard to the   hindfoot bilaterally. Vascular calcifications are present.    IMPRESSION: Advanced destructive changes of the hindfeet. No definite   bony erosion or acute fracture.      Thank you for this referral.    --- End of Report ---      Imaging Personally Reviewed:  [x ] YES  [ ] NO    Consultant(s) Notes Reviewed:  [x ] YES  [ ] NO    PHYSICAL EXAM:  GENERAL: NAD  HEAD:  Atraumatic, Normocephalic  EYES:  conjunctiva and sclera clear  ENMT:  Moist mucous membranes  NECK: Supple  NERVOUS SYSTEM:  Alert & Oriented X3, Good concentration; Motor Strength 5/5 B/L upper and lower extremities; DTRs 2+ intact and symmetric  CHEST/LUNG: Clear to percussion bilaterally; No rales, rhonchi, wheezing, or rubs  HEART: Regular rate and rhythm; No murmurs, rubs, or gallops  ABDOMEN: Soft, Nontender, Nondistended; Bowel sounds present  EXTREMITIES:  2+ Peripheral Pulses, No clubbing, cyanosis, or edema  LYMPH: No lymphadenopathy noted  SKIN: No rashes or lesions    Care Collaborated Discussed with Consultants/Other Providers [ ] YES  [ ] NO Patient is a 69y old  Male who presents with a chief complaint of foot wound debridement (13 Jun 2025 11:16)      INTERVAL HPI/OVERNIGHT EVENTS: no overnight events    I&O's Summary    13 Jun 2025 07:01  -  13 Jun 2025 15:09  --------------------------------------------------------  IN: 600 mL / OUT: 2600 mL / NET: -2000 mL      Vital Signs Last 24 Hrs  T(C): 36.7 (13 Jun 2025 13:35), Max: 36.7 (13 Jun 2025 13:35)  T(F): 98 (13 Jun 2025 13:35), Max: 98 (13 Jun 2025 13:35)  HR: 70 (13 Jun 2025 13:35) (68 - 73)  BP: 112/73 (13 Jun 2025 13:35) (106/57 - 143/79)  BP(mean): 90 (12 Jun 2025 20:35) (90 - 90)  RR: 18 (13 Jun 2025 13:35) (16 - 18)  SpO2: 98% (13 Jun 2025 13:35) (98% - 100%)    Parameters below as of 13 Jun 2025 13:35  Patient On (Oxygen Delivery Method): room air      PAST MEDICAL & SURGICAL HISTORY:  DM (diabetes mellitus)  type 2      ESRD (end stage renal disease) on dialysis  t-th-sat via right AV graft, uses midodrine the days of Dialysis for hypotension      Charcot foot due to diabetes mellitus      Diabetic retinopathy associated with type 2 diabetes mellitus      Hyperlipidemia      Abscess of scrotum      Hidradenitis      End stage renal disease      S/P foot surgery  right foot - 11/2014      Charcot's syndrome  Surgeries in feet and ankles   left ankle 2011 and right ankle 11/2014      History of incision and drainage  scrotal 5/2018      Hemodialysis access, AV graft          SOCIAL HISTORY  Alcohol:  Tobacco:  Illicit substance use:      FAMILY HISTORY:      LABS:                        9.3    8.41  )-----------( 184      ( 13 Jun 2025 09:37 )             29.2     06-13    134[L]  |  100  |  49[H]  ----------------------------<  196[H]  3.8   |  27  |  6.12[H]    Ca    9.2      13 Jun 2025 09:37  Phos  4.1     06-13  Mg     2.2     06-13        Urinalysis Basic - ( 13 Jun 2025 09:37 )    Color: x / Appearance: x / SG: x / pH: x  Gluc: 196 mg/dL / Ketone: x  / Bili: x / Urobili: x   Blood: x / Protein: x / Nitrite: x   Leuk Esterase: x / RBC: x / WBC x   Sq Epi: x / Non Sq Epi: x / Bacteria: x      CAPILLARY BLOOD GLUCOSE      POCT Blood Glucose.: 121 mg/dL (13 Jun 2025 11:03)  POCT Blood Glucose.: 201 mg/dL (13 Jun 2025 07:37)  POCT Blood Glucose.: 191 mg/dL (12 Jun 2025 21:13)  POCT Blood Glucose.: 119 mg/dL (12 Jun 2025 16:20)        Urinalysis Basic - ( 13 Jun 2025 09:37 )    Color: x / Appearance: x / SG: x / pH: x  Gluc: 196 mg/dL / Ketone: x  / Bili: x / Urobili: x   Blood: x / Protein: x / Nitrite: x   Leuk Esterase: x / RBC: x / WBC x   Sq Epi: x / Non Sq Epi: x / Bacteria: x        MEDICATIONS  (STANDING):  cefepime   IVPB 2000 milliGRAM(s) IV Intermittent <User Schedule>  chlorhexidine 2% Cloths 1 Application(s) Topical <User Schedule>  epoetin rudolph-epbx (RETACRIT) Injectable 6000 Unit(s) IV Push <User Schedule>  heparin   Injectable 5000 Unit(s) SubCutaneous every 12 hours  insulin lispro (ADMELOG) corrective regimen sliding scale   SubCutaneous three times a day before meals  insulin lispro (ADMELOG) corrective regimen sliding scale   SubCutaneous at bedtime  latanoprost 0.005% Ophthalmic Solution 1 Drop(s) Both EYES at bedtime  sevelamer carbonate 800 milliGRAM(s) Oral three times a day with meals  tamsulosin 0.4 milliGRAM(s) Oral at bedtime  vancomycin  IVPB 500 milliGRAM(s) IV Intermittent <User Schedule>    MEDICATIONS  (PRN):  acetaminophen     Tablet .. 650 milliGRAM(s) Oral every 6 hours PRN Mild Pain (1 - 3)  melatonin 3 milliGRAM(s) Oral at bedtime PRN Insomnia  midodrine. 10 milliGRAM(s) Oral <User Schedule> PRN SBP <100 in HD  ondansetron Injectable 4 milliGRAM(s) IV Push every 8 hours PRN Nausea and/or Vomiting      REVIEW OF SYSTEMS:  CONSTITUTIONAL: No fever  EYES: No eye pain, visual disturbances  ENMT:  No sinus or throat pain  NECK: No pain   RESPIRATORY: No cough, wheezing, No shortness of breath  CARDIOVASCULAR: No chest pain, palpitations, dizziness, or leg swelling  GASTROINTESTINAL: No abdominal pain. No nausea, vomiting; No diarrhea or constipation.   NEUROLOGICAL: No headaches  MUSCULOSKELETAL: No joint pain     RADIOLOGY & ADDITIONAL TESTS:  < from: Xray Chest 1 View- PORTABLE-Urgent (Xray Chest 1 View- PORTABLE-Urgent .) (06.03.25 @ 09:50) >    ACC: [id number] EXAM:  XR CHEST PORTABLE URGENT 1V   ORDERED BY: DALLAS SIEGEL     PROCEDURE DATE:  06/03/2025          INTERPRETATION:  TECHNIQUE: Single portable view of the chest.    COMPARISON:  1/19/2025    CLINICAL HISTORY: assessing fluid status    FINDINGS:    Single frontal view of the chest demonstrates the lungs to be clear. The   cardiomediastinal silhouette is unremarkable. No acute osseous   abnormalities. Left-sided dialysis catheter. Right subclavian artery and   axillary artery stents.    IMPRESSION: No acute cardiopulmonary disease process.    --- End of Report ---  < end of copied text >    ACC: [id number] EXAM:  XR ANKLE COMP MIN 3 VIEWS BI   ORDERED BY: DALLAS SIEGEL     ACC: [id number] EXAM:  XR FOOT COMP MIN 3 VIEWS BI   ORDERED BY: DALLAS SIEGEL     PROCEDURE DATE:  06/03/2025          INTERPRETATION:  Bilateral feet and bilateral ankles    HISTORY: Bilateral wounds    COMPARISON: 5/15/2025     3 views of the right foot, 3 views of the left foot, 3 views of the left   ankle and 3 views of the right ankle show advanced arthritic changes of   the ankle joint with invagination of the distal tibia is intact to the   hindfoot with destruction of both the talar bones and the calcaneus   bilaterally. There is lateral shifting of the mid foot with regard to the   hindfoot bilaterally. Vascular calcifications are present.    IMPRESSION: Advanced destructive changes of the hindfeet. No definite   bony erosion or acute fracture.      Thank you for this referral.    --- End of Report ---      Imaging Personally Reviewed:  [x ] YES  [ ] NO    Consultant(s) Notes Reviewed:  [x ] YES  [ ] NO    PHYSICAL EXAM:  GENERAL: NAD  HEAD:  Atraumatic, Normocephalic  EYES:  conjunctiva and sclera clear  ENMT:  Moist mucous membranes  NECK: Supple  NERVOUS SYSTEM:  Alert & Oriented X3  CHEST/LUNG: CTA bilaterally; No rales, rhonchi, wheezing  HEART: Regular rate   ABDOMEN: Soft, Nontender, Nondistended; Bowel sounds present  EXTREMITIES: No clubbing, cyanosis, or edema      Care Collaborated Discussed with Consultants/Other Providers [x ] YES  [ ] NO

## 2025-06-14 LAB
GLUCOSE BLDC GLUCOMTR-MCNC: 143 MG/DL — HIGH (ref 70–99)
GLUCOSE BLDC GLUCOMTR-MCNC: 157 MG/DL — HIGH (ref 70–99)
GLUCOSE BLDC GLUCOMTR-MCNC: 160 MG/DL — HIGH (ref 70–99)
GLUCOSE BLDC GLUCOMTR-MCNC: 280 MG/DL — HIGH (ref 70–99)

## 2025-06-14 RX ADMIN — TAMSULOSIN HYDROCHLORIDE 0.4 MILLIGRAM(S): 0.4 CAPSULE ORAL at 22:29

## 2025-06-14 RX ADMIN — INSULIN LISPRO 1: 100 INJECTION, SOLUTION INTRAVENOUS; SUBCUTANEOUS at 16:39

## 2025-06-14 RX ADMIN — INSULIN LISPRO 1: 100 INJECTION, SOLUTION INTRAVENOUS; SUBCUTANEOUS at 22:32

## 2025-06-14 RX ADMIN — SEVELAMER HYDROCHLORIDE 800 MILLIGRAM(S): 800 TABLET ORAL at 11:49

## 2025-06-14 RX ADMIN — HEPARIN SODIUM 5000 UNIT(S): 1000 INJECTION INTRAVENOUS; SUBCUTANEOUS at 06:19

## 2025-06-14 RX ADMIN — SEVELAMER HYDROCHLORIDE 800 MILLIGRAM(S): 800 TABLET ORAL at 16:38

## 2025-06-14 RX ADMIN — INSULIN LISPRO 1: 100 INJECTION, SOLUTION INTRAVENOUS; SUBCUTANEOUS at 08:05

## 2025-06-14 RX ADMIN — SEVELAMER HYDROCHLORIDE 800 MILLIGRAM(S): 800 TABLET ORAL at 08:07

## 2025-06-14 RX ADMIN — Medication 3 MILLIGRAM(S): at 22:30

## 2025-06-14 RX ADMIN — Medication 1 APPLICATION(S): at 06:19

## 2025-06-14 RX ADMIN — LATANOPROST PF 1 DROP(S): 0.05 SOLUTION/ DROPS OPHTHALMIC at 22:32

## 2025-06-14 RX ADMIN — HEPARIN SODIUM 5000 UNIT(S): 1000 INJECTION INTRAVENOUS; SUBCUTANEOUS at 17:10

## 2025-06-14 NOTE — PROGRESS NOTE ADULT - SUBJECTIVE AND OBJECTIVE BOX
Patient is a 69y old  Male who presents with a chief complaint of foot wound     MEDICATIONS  (STANDING):  cefepime   IVPB 2000 milliGRAM(s) IV Intermittent <User Schedule>  chlorhexidine 2% Cloths 1 Application(s) Topical <User Schedule>  epoetin rudolph-epbx (RETACRIT) Injectable 6000 Unit(s) IV Push <User Schedule>  heparin   Injectable 5000 Unit(s) SubCutaneous every 12 hours  insulin lispro (ADMELOG) corrective regimen sliding scale   SubCutaneous three times a day before meals  insulin lispro (ADMELOG) corrective regimen sliding scale   SubCutaneous at bedtime  latanoprost 0.005% Ophthalmic Solution 1 Drop(s) Both EYES at bedtime  sevelamer carbonate 800 milliGRAM(s) Oral three times a day with meals  tamsulosin 0.4 milliGRAM(s) Oral at bedtime  vancomycin  IVPB 500 milliGRAM(s) IV Intermittent <User Schedule>    MEDICATIONS  (PRN):  acetaminophen     Tablet .. 650 milliGRAM(s) Oral every 6 hours PRN Mild Pain (1 - 3)  melatonin 3 milliGRAM(s) Oral at bedtime PRN Insomnia  midodrine. 10 milliGRAM(s) Oral <User Schedule> PRN SBP <100 in HD  ondansetron Injectable 4 milliGRAM(s) IV Push every 8 hours PRN Nausea and/or Vomiting    Vital Signs Last 24 Hrs  T(C): 36.7 (06-14-25 @ 21:50), Max: 36.8 (06-14-25 @ 12:18)  T(F): 98.1 (06-14-25 @ 21:50), Max: 98.2 (06-14-25 @ 12:18)  HR: 72 (06-14-25 @ 21:50) (67 - 72)  BP: 128/72 (06-14-25 @ 21:50) (107/67 - 147/68)  BP(mean): 80 (06-14-25 @ 05:01) (80 - 80)  RR: 18 (06-14-25 @ 21:50) (18 - 18)  SpO2: 98% (06-14-25 @ 21:50) (97% - 98%)        PAST MEDICAL & SURGICAL HISTORY:  DM (diabetes mellitus)  type 2      ESRD (end stage renal disease) on dialysis  t-th-sat via right AV graft, uses midodrine the days of Dialysis for hypotension      Charcot foot due to diabetes mellitus      Diabetic retinopathy associated with type 2 diabetes mellitus      Hyperlipidemia      Abscess of scrotum      Hidradenitis      End stage renal disease      S/P foot surgery  right foot - 11/2014      Charcot's syndrome  Surgeries in feet and ankles   left ankle 2011 and right ankle 11/2014      History of incision and drainage  scrotal 5/2018      Hemodialysis access, AV graft          SOCIAL HISTORY  Alcohol:  Tobacco:  Illicit substance use:      FAMILY HISTORY:      LABS:  06-13    134[L]  |  100  |  49[H]  ----------------------------<  196[H]  3.8   |  27  |  6.12[H]    Ca    9.2      13 Jun 2025 09:37  Phos  4.1     06-13  Mg     2.2     06-13      Creatinine Trend: 6.12 <--, 4.45 <--, 6.25 <--, 4.73 <--, 6.29 <--, 4.94 <--                        9.3    8.41  )-----------( 184      ( 13 Jun 2025 09:37 )             29.2     Urine Studies:  Urinalysis Basic - ( 13 Jun 2025 09:37 )    Color:  / Appearance:  / SG:  / pH:   Gluc: 196 mg/dL / Ketone:   / Bili:  / Urobili:    Blood:  / Protein:  / Nitrite:    Leuk Esterase:  / RBC:  / WBC    Sq Epi:  / Non Sq Epi:  / Bacteria:     REVIEW OF SYSTEMS:  CONSTITUTIONAL: No fever  EYES: No eye pain, visual disturbances  ENMT:  No sinus or throat pain  NECK: No pain   RESPIRATORY: No cough, wheezing, No shortness of breath  CARDIOVASCULAR: No chest pain, palpitations, dizziness, or leg swelling  GASTROINTESTINAL: No abdominal pain. No nausea, vomiting; No diarrhea or constipation.   NEUROLOGICAL: No headaches  MUSCULOSKELETAL: No joint pain     RADIOLOGY & ADDITIONAL TESTS:  < from: Xray Chest 1 View- PORTABLE-Urgent (Xray Chest 1 View- PORTABLE-Urgent .) (06.03.25 @ 09:50) >    ACC: [id number] EXAM:  XR CHEST PORTABLE URGENT 1V   ORDERED BY: DALLAS SIEGEL     PROCEDURE DATE:  06/03/2025          INTERPRETATION:  TECHNIQUE: Single portable view of the chest.    COMPARISON:  1/19/2025    CLINICAL HISTORY: assessing fluid status    FINDINGS:    Single frontal view of the chest demonstrates the lungs to be clear. The   cardiomediastinal silhouette is unremarkable. No acute osseous   abnormalities. Left-sided dialysis catheter. Right subclavian artery and   axillary artery stents.    IMPRESSION: No acute cardiopulmonary disease process.    --- End of Report ---  < end of copied text >    ACC: [id number] EXAM:  XR ANKLE COMP MIN 3 VIEWS BI   ORDERED BY: DALLAS SIEGEL     ACC: [id number] EXAM:  XR FOOT COMP MIN 3 VIEWS BI   ORDERED BY: DALLAS SIEGEL     PROCEDURE DATE:  06/03/2025          INTERPRETATION:  Bilateral feet and bilateral ankles    HISTORY: Bilateral wounds    COMPARISON: 5/15/2025     3 views of the right foot, 3 views of the left foot, 3 views of the left   ankle and 3 views of the right ankle show advanced arthritic changes of   the ankle joint with invagination of the distal tibia is intact to the   hindfoot with destruction of both the talar bones and the calcaneus   bilaterally. There is lateral shifting of the mid foot with regard to the   hindfoot bilaterally. Vascular calcifications are present.    IMPRESSION: Advanced destructive changes of the hindfeet. No definite   bony erosion or acute fracture.      Thank you for this referral.    --- End of Report ---      Imaging Personally Reviewed:  [x ] YES  [ ] NO    Consultant(s) Notes Reviewed:  [x ] YES  [ ] NO    PHYSICAL EXAM:  GENERAL: NAD  HEAD:  Atraumatic, Normocephalic  EYES:  conjunctiva and sclera clear  ENMT:  Moist mucous membranes  NECK: Supple  NERVOUS SYSTEM:  Alert awake  CHEST/LUNG: dec breath sounds at bases  HEART: s1, s2+ABDOMEN: Soft, Nontender, Nondistended; Bowel sounds present  EXTREMITIES: No clubbing, cyanosis, or edema      Care Collaborated Discussed with Consultants/Other Providers [x ] YES  [ ] NO

## 2025-06-14 NOTE — PROGRESS NOTE ADULT - ASSESSMENT
69-year-old man from home ([facility] rehab until 4/22) with past history diabetes, hypertension on midodrine with dialysis, ESRD on dialysis Monday Wednesday Friday, most recently day prior to admission, has sided left-sided permacath, known to Dr. Chirinos, bilateral Charcot deformity follows with Dr. Sim, chronic OM of the right foot,  coming at recommendation of Dr. Sim for bilateral foot wound debridement and graft application.

## 2025-06-14 NOTE — PROGRESS NOTE ADULT - ASSESSMENT
Patient is a 70yo Male with ESRD on HD via left IJ TDC, failed Rt AVG, HTN, 2HPT, Anemia, LE venous stasis p/w b/l LE wounds requiring debridement with graft placement. Nephrology consulted for ESRD status.     1) ESRD:  Last HD on 6/13 with intradialytic hypotension, for which pt was given Midodrine 10mg PO x1 with 2L removed. Plan for next HD 6/16. c/w MWF schedule.   Rt AVG unsalvageable.  Monitor electrolytes.    2) Hypotension: BP normal.  c/w Midodrine 10mg PO MWF intra- HD to aid in fluid removal prn. Monitor BP    3) Anemia of renal disease: Hb low with adequate iron stores.  c/w Epogen 6K with HD. Monitor hgb    4) Hyperphosphatemia- serum phos acceptable. Continue with renvela 1 tab with meals.  Monitor serum calcium and phos    [facility] NEPHROLOGY  Arden Tsang M.D.  Freeman Holland D.O.  Janny Chirinos M.D.  MD Ambar Mccarty, MSN, ANP-C    Telephone: [phone]  Facsimile: [phone] [place] [address], [address]  [place] [address]

## 2025-06-14 NOTE — PROGRESS NOTE ADULT - SUBJECTIVE AND OBJECTIVE BOX
[facility] NEPHROLOGY- PROGRESS NOTE    Patient is a 68yo Male with ESRD on HD via left IJ TDC, failed Rt AVG, HTN, 2HPT, Anemia, LE venous stasis p/w b/l LE wounds requiring debridement with graft placement. Nephrology consulted for ESRD status.     Pt s/p LE wound debridement on 6/6.    Hospital Medications: Medications reviewed.  REVIEW OF SYSTEMS:  CONSTITUTIONAL: No fevers or chills  RESPIRATORY: No shortness of breath  CARDIOVASCULAR: No chest pain.  GASTROINTESTINAL: No nausea, vomiting, diarrhea or abdominal pain.   VASCULAR: No bilateral lower extremity edema.       VITALS:  T(F): 98.2 (06-14-25 @ 12:18), Max: 98.2 (06-14-25 @ 12:18)  HR: 67 (06-14-25 @ 12:18)  BP: 147/68 (06-14-25 @ 12:18)  RR: 18 (06-14-25 @ 12:18)  SpO2: 98% (06-14-25 @ 12:18)  Wt(kg): --    06-13 @ 07:01  -  06-14 @ 07:00  --------------------------------------------------------  IN: 600 mL / OUT: 2600 mL / NET: -2000 mL      PHYSICAL EXAM:  Gen: NAD, calm  Cards: RRR, +S1/S2, no M/G/R  Resp: CTA B/L  GI: soft, NT/ND, NABS  Extremities: no LE edema B/L  Derm: b/l feet wrapped  Access: Left IJ tunneled HD catheter- intact      LABS:  06-13    134[L]  |  100  |  49[H]  ----------------------------<  196[H]  3.8   |  27  |  6.12[H]    Ca    9.2      13 Jun 2025 09:37  Phos  4.1     06-13  Mg     2.2     06-13      Creatinine Trend: 6.12 <--, 4.45 <--, 6.25 <--, 4.73 <--, 6.29 <--, 4.94 <--                        9.3    8.41  )-----------( 184      ( 13 Jun 2025 09:37 )             29.2     Urine Studies:  Urinalysis Basic - ( 13 Jun 2025 09:37 )    Color:  / Appearance:  / SG:  / pH:   Gluc: 196 mg/dL / Ketone:   / Bili:  / Urobili:    Blood:  / Protein:  / Nitrite:    Leuk Esterase:  / RBC:  / WBC    Sq Epi:  / Non Sq Epi:  / Bacteria:

## 2025-06-14 NOTE — PROGRESS NOTE ADULT - PROBLEM SELECTOR PLAN 1
Multiple chronic ulcers of both feet  -Follows outpt by Dr. Sim: sent for b/l wound debridement and graft placement  -S/p OR for I&D w/ graft placement Bilateral feet (6/6/25)  -f/u sx path from 6/6 ( if no evidence of osteomyelitis ,then stop antibiotics )  -monitor vanco levels ( last 6/12-->16.3 )    -ID Dr. Oakley reccs: 6 weeks of vanco & cefepime ( EOT is 06/21/25 )--> POST HD                                Weekly CBC, CMP, ESR, CRP,Vanco trough levels while on antibiotics  -PT eval-->HPT ( per attending needs PT re-eval )  - podiatry reccs: NWB as much as possible- heel wounds. leave dressings intact until podiatry clinic next week                      f/u outpt podiatry clinic upon discharge on a Thursday: [address] [facility], [address], [place] [address]. Patient can call [phone] to make an appointment.

## 2025-06-15 LAB
GLUCOSE BLDC GLUCOMTR-MCNC: 122 MG/DL — HIGH (ref 70–99)
GLUCOSE BLDC GLUCOMTR-MCNC: 135 MG/DL — HIGH (ref 70–99)
GLUCOSE BLDC GLUCOMTR-MCNC: 149 MG/DL — HIGH (ref 70–99)
GLUCOSE BLDC GLUCOMTR-MCNC: 209 MG/DL — HIGH (ref 70–99)

## 2025-06-15 RX ADMIN — HEPARIN SODIUM 5000 UNIT(S): 1000 INJECTION INTRAVENOUS; SUBCUTANEOUS at 05:43

## 2025-06-15 RX ADMIN — SEVELAMER HYDROCHLORIDE 800 MILLIGRAM(S): 800 TABLET ORAL at 08:57

## 2025-06-15 RX ADMIN — SEVELAMER HYDROCHLORIDE 800 MILLIGRAM(S): 800 TABLET ORAL at 17:11

## 2025-06-15 RX ADMIN — HEPARIN SODIUM 5000 UNIT(S): 1000 INJECTION INTRAVENOUS; SUBCUTANEOUS at 17:14

## 2025-06-15 RX ADMIN — TAMSULOSIN HYDROCHLORIDE 0.4 MILLIGRAM(S): 0.4 CAPSULE ORAL at 21:55

## 2025-06-15 RX ADMIN — Medication 1 APPLICATION(S): at 05:47

## 2025-06-15 RX ADMIN — LATANOPROST PF 1 DROP(S): 0.05 SOLUTION/ DROPS OPHTHALMIC at 21:55

## 2025-06-15 NOTE — PROGRESS NOTE ADULT - PROBLEM SELECTOR PROBLEM 3
Diabetes
Diabetes
We would recommend purchasing an upper arm blood pressure cuff.    Monitor your blood pressure and pulse daily at a different time each day.     Keep a log of your blood pressure and pulse and return in one month with your log.     If your blood pressure is consistently greater than 140/90, please call the office to report prior to your appointment.    We have prescribed lisinopril 5 mg daily, call with any adverse reactions.    Please obtain a blood test 1-2 days before your next appointment.    Avoid salt and salty foods.    We will order an echocardiogram and stress test.     Call Cherie FERNANDES @ [phone]  
Diabetes
None

## 2025-06-15 NOTE — PROGRESS NOTE ADULT - SUBJECTIVE AND OBJECTIVE BOX
[facility] NEPHROLOGY- PROGRESS NOTE    Patient is a 68yo Male with ESRD on HD via left IJ TDC, failed Rt AVG, HTN, 2HPT, Anemia, LE venous stasis p/w b/l LE wounds requiring debridement with graft placement. Nephrology consulted for ESRD status.     Pt s/p LE wound debridement on 6/6.    Hospital Medications: Medications reviewed.  REVIEW OF SYSTEMS:  CONSTITUTIONAL: No fevers or chills  RESPIRATORY: No shortness of breath  CARDIOVASCULAR: No chest pain.  GASTROINTESTINAL: No nausea, vomiting, diarrhea or abdominal pain.   VASCULAR: No bilateral lower extremity edema.       VITALS:  T(F): 97.5 (06-15-25 @ 13:30), Max: 98.1 (06-14-25 @ 21:50)  HR: 70 (06-15-25 @ 13:30)  BP: 139/65 (06-15-25 @ 13:30)  RR: 18 (06-15-25 @ 13:30)  SpO2: 100% (06-15-25 @ 13:30)  Wt(kg): --        PHYSICAL EXAM:  Gen: NAD, calm  Cards: RRR, +S1/S2, no M/G/R  Resp: CTA B/L  GI: soft, NT/ND, NABS  Extremities: no LE edema B/L  Derm: b/l feet wrapped  Access: Left IJ tunneled HD catheter- intact      LABS:        Creatinine Trend: 6.12 <--, 4.45 <--, 6.25 <--, 4.73 <--, 6.29 <--    Urine Studies:  Urinalysis Basic - ( 13 Jun 2025 09:37 )    Color:  / Appearance:  / SG:  / pH:   Gluc: 196 mg/dL / Ketone:   / Bili:  / Urobili:    Blood:  / Protein:  / Nitrite:    Leuk Esterase:  / RBC:  / WBC    Sq Epi:  / Non Sq Epi:  / Bacteria:

## 2025-06-15 NOTE — PROGRESS NOTE ADULT - PROBLEM SELECTOR PROBLEM 1
Chronic osteomyelitis

## 2025-06-15 NOTE — PROGRESS NOTE ADULT - PROBLEM SELECTOR PROBLEM 2
ESRD on hemodialysis

## 2025-06-15 NOTE — PROGRESS NOTE ADULT - PROBLEM/PLAN-1
DISPLAY PLAN FREE TEXT
OR RN
DISPLAY PLAN FREE TEXT
OR BABATUNDE Dsouza

## 2025-06-15 NOTE — PROGRESS NOTE ADULT - ASSESSMENT
Patient is a 68yo Male with ESRD on HD via left IJ TDC, failed Rt AVG, HTN, 2HPT, Anemia, LE venous stasis p/w b/l LE wounds requiring debridement with graft placement. Nephrology consulted for ESRD status.     1) ESRD:  Last HD on 6/13 with intradialytic hypotension, for which pt was given Midodrine 10mg PO x1 with 2L removed. Plan for next HD 6/16. c/w MWF schedule.   Rt AVG unsalvageable.  Monitor electrolytes.    2) Hypotension: BP normal.  c/w Midodrine 10mg PO MWF intra- HD to aid in fluid removal prn. Monitor BP    3) Anemia of renal disease: Hb low with adequate iron stores.  c/w Epogen 6K with HD. Monitor hgb    4) Hyperphosphatemia- serum phos acceptable. Continue with renvela 1 tab with meals.  Monitor serum calcium and phos    [facility] NEPHROLOGY  Arden Tsang M.D.  Freeman Holland D.O.  Janny Chirinos M.D.  MD Ambar Mccarty, MSN, ANP-C    Telephone: [phone]  Facsimile: [phone] [place] [address], [address]  [place] [address]

## 2025-06-15 NOTE — PROGRESS NOTE ADULT - ASSESSMENT
69-year-old man from home ([facility] rehab until 4/22) with past history diabetes, hypertension on midodrine with dialysis, ESRD on dialysis Monday Wednesday Friday, most recently day prior to admission, has sided left-sided permacath, known to Dr. Chirinos, bilateral Charcot deformity follows with Dr. Sim, chronic OM of the right foot,  coming at recommendation of Dr. Sim for bilateral foot wound debridement and graft application.

## 2025-06-15 NOTE — PROGRESS NOTE ADULT - PROBLEM SELECTOR PLAN 5
DVT ppx : Heparin
heparin
DVT ppx :SCD, resume heparin in am
DVT ppx : Heparin
DVT ppx :SCD, resume heparin in am
DVT ppx : Heparin
DVT ppx heparin
DVT ppx : Heparin

## 2025-06-15 NOTE — PROGRESS NOTE ADULT - PROBLEM SELECTOR PLAN 4
Continue with midodrine on dialysis days

## 2025-06-15 NOTE — PROGRESS NOTE ADULT - PROBLEM SELECTOR PLAN 1
Multiple chronic ulcers of both feet  -Follows outpt by Dr. Sim: sent for b/l wound debridement and graft placement  -S/p OR for I&D w/ graft placement Bilateral feet (6/6/25)  -f/u sx path from 6/6 ( if no evidence of osteomyelitis ,then stop antibiotics )  -monitor vanco levels ( last 6/12-->16.3 )    -ID Dr. Oakley reccs: 6 weeks of vanco & cefepime ( EOT is 06/21/25 )--> POST HD                                Weekly CBC, CMP, ESR, CRP,Vanco trough levels while on antibiotics  -PT eval-->HPT ( per attending needs PT re-eval )  - podiatry reccs: NWB as much as possible- heel wounds. leave dressings intact until podiatry clinic next week                      f/u outpt podiatry clinic upon discharge on a Thursday: [address] [facility], [address], [place] [address]. Patient can call [phone] to make an appointment.

## 2025-06-15 NOTE — PROGRESS NOTE ADULT - SUBJECTIVE AND OBJECTIVE BOX
Patient is a 69y old  Male who presents with a chief complaint of foot wound   MEDICATIONS  (STANDING):  cefepime   IVPB 2000 milliGRAM(s) IV Intermittent <User Schedule>  chlorhexidine 2% Cloths 1 Application(s) Topical <User Schedule>  epoetin rudolph-epbx (RETACRIT) Injectable 6000 Unit(s) IV Push <User Schedule>  heparin   Injectable 5000 Unit(s) SubCutaneous every 12 hours  insulin lispro (ADMELOG) corrective regimen sliding scale   SubCutaneous three times a day before meals  insulin lispro (ADMELOG) corrective regimen sliding scale   SubCutaneous at bedtime  latanoprost 0.005% Ophthalmic Solution 1 Drop(s) Both EYES at bedtime  sevelamer carbonate 800 milliGRAM(s) Oral three times a day with meals  tamsulosin 0.4 milliGRAM(s) Oral at bedtime  vancomycin  IVPB 500 milliGRAM(s) IV Intermittent <User Schedule>    MEDICATIONS  (PRN):  acetaminophen     Tablet .. 650 milliGRAM(s) Oral every 6 hours PRN Mild Pain (1 - 3)  melatonin 3 milliGRAM(s) Oral at bedtime PRN Insomnia  midodrine. 10 milliGRAM(s) Oral <User Schedule> PRN SBP <100 in HD  ondansetron Injectable 4 milliGRAM(s) IV Push every 8 hours PRN Nausea and/or Vomiting    Vital Signs Last 24 Hrs  T(C): 36.9 (06-15-25 @ 21:21), Max: 36.9 (06-15-25 @ 21:21)  T(F): 98.5 (06-15-25 @ 21:21), Max: 98.5 (06-15-25 @ 21:21)  HR: 71 (06-15-25 @ 21:21) (68 - 71)  BP: 159/67 (06-15-25 @ 21:21) (137/58 - 159/67)  BP(mean): 84 (06-15-25 @ 05:33) (84 - 84)  RR: 17 (06-15-25 @ 21:21) (17 - 18)  SpO2: 97% (06-15-25 @ 21:21) (97% - 100%)        PAST MEDICAL & SURGICAL HISTORY:  DM (diabetes mellitus)  type 2      ESRD (end stage renal disease) on dialysis  t-th-sat via right AV graft, uses midodrine the days of Dialysis for hypotension      Charcot foot due to diabetes mellitus      Diabetic retinopathy associated with type 2 diabetes mellitus      Hyperlipidemia      Abscess of scrotum      Hidradenitis      End stage renal disease      S/P foot surgery  right foot - 11/2014      Charcot's syndrome  Surgeries in feet and ankles   left ankle 2011 and right ankle 11/2014      History of incision and drainage  scrotal 5/2018      Hemodialysis access, AV graft          SOCIAL HISTORY  Alcohol:  Tobacco:  Illicit substance use:    LABS:        Creatinine Trend: 6.12 <--, 4.45 <--, 6.25 <--, 4.73 <--, 6.29 <--    Urine Studies:  Urinalysis Basic - ( 13 Jun 2025 09:37 )    Color:  / Appearance:  / SG:  / pH:   Gluc: 196 mg/dL / Ketone:   / Bili:  / Urobili:    Blood:  / Protein:  / Nitrite:    Leuk Esterase:  / RBC:  / WBC    Sq Epi:  / Non Sq Epi:  / Bacteria:                     REVIEW OF SYSTEMS:  CONSTITUTIONAL: No fever  EYES: No eye pain, visual disturbances  ENMT:  No sinus or throat pain  NECK: No pain   RESPIRATORY: No cough, wheezing, No shortness of breath  CARDIOVASCULAR: No chest pain, palpitations, dizziness, or leg swelling  GASTROINTESTINAL: No abdominal pain. No nausea, vomiting; No diarrhea or constipation.   NEUROLOGICAL: No headaches  MUSCULOSKELETAL: No joint pain     RADIOLOGY & ADDITIONAL TESTS:  < from: Xray Chest 1 View- PORTABLE-Urgent (Xray Chest 1 View- PORTABLE-Urgent .) (06.03.25 @ 09:50) >    ACC: [id number] EXAM:  XR CHEST PORTABLE URGENT 1V   ORDERED BY: DALLAS SIEGEL     PROCEDURE DATE:  06/03/2025          INTERPRETATION:  TECHNIQUE: Single portable view of the chest.    COMPARISON:  1/19/2025    CLINICAL HISTORY: assessing fluid status    FINDINGS:    Single frontal view of the chest demonstrates the lungs to be clear. The   cardiomediastinal silhouette is unremarkable. No acute osseous   abnormalities. Left-sided dialysis catheter. Right subclavian artery and   axillary artery stents.    IMPRESSION: No acute cardiopulmonary disease process.    --- End of Report ---  < end of copied text >    ACC: [id number] EXAM:  XR ANKLE COMP MIN 3 VIEWS BI   ORDERED BY: DALLAS SIEGEL     ACC: [id number] EXAM:  XR FOOT COMP MIN 3 VIEWS BI   ORDERED BY: DALLAS SIEGEL     PROCEDURE DATE:  06/03/2025          INTERPRETATION:  Bilateral feet and bilateral ankles    HISTORY: Bilateral wounds    COMPARISON: 5/15/2025     3 views of the right foot, 3 views of the left foot, 3 views of the left   ankle and 3 views of the right ankle show advanced arthritic changes of   the ankle joint with invagination of the distal tibia is intact to the   hindfoot with destruction of both the talar bones and the calcaneus   bilaterally. There is lateral shifting of the mid foot with regard to the   hindfoot bilaterally. Vascular calcifications are present.    IMPRESSION: Advanced destructive changes of the hindfeet. No definite   bony erosion or acute fracture.      Thank you for this referral.    --- End of Report ---      Imaging Personally Reviewed:  [x ] YES  [ ] NO    Consultant(s) Notes Reviewed:  [x ] YES  [ ] NO    PHYSICAL EXAM:  GENERAL: NAD  HEAD:  Atraumatic, Normocephalic  EYES:  conjunctiva and sclera clear  ENMT:  Moist mucous membranes  NECK: Supple  NERVOUS SYSTEM:  Alert awake  CHEST/LUNG: dec breath sounds at bases  HEART: s1, s2+  ABDOMEN: Soft, Nontender, Nondistended; Bowel sounds present  EXTREMITIES: No clubbing, cyanosis, or edema      Care Collaborated Discussed with Consultants/Other Providers [x ] YES  [ ] NO

## 2025-06-16 ENCOUNTER — TRANSCRIPTION ENCOUNTER (OUTPATIENT)
Age: 70
End: 2025-06-16

## 2025-06-16 VITALS
OXYGEN SATURATION: 99 % | HEART RATE: 75 BPM | SYSTOLIC BLOOD PRESSURE: 139 MMHG | DIASTOLIC BLOOD PRESSURE: 70 MMHG | RESPIRATION RATE: 18 BRPM

## 2025-06-16 LAB
ANION GAP SERPL CALC-SCNC: 8 MMOL/L — SIGNIFICANT CHANGE UP (ref 5–17)
BUN SERPL-MCNC: 63 MG/DL — HIGH (ref 7–18)
CALCIUM SERPL-MCNC: 9.1 MG/DL — SIGNIFICANT CHANGE UP (ref 8.4–10.5)
CHLORIDE SERPL-SCNC: 99 MMOL/L — SIGNIFICANT CHANGE UP (ref 96–108)
CO2 SERPL-SCNC: 27 MMOL/L — SIGNIFICANT CHANGE UP (ref 22–31)
CREAT SERPL-MCNC: 7.12 MG/DL — HIGH (ref 0.5–1.3)
EGFR: 8 ML/MIN/1.73M2 — LOW
EGFR: 8 ML/MIN/1.73M2 — LOW
GLUCOSE BLDC GLUCOMTR-MCNC: 145 MG/DL — HIGH (ref 70–99)
GLUCOSE BLDC GLUCOMTR-MCNC: 158 MG/DL — HIGH (ref 70–99)
GLUCOSE BLDC GLUCOMTR-MCNC: 227 MG/DL — HIGH (ref 70–99)
GLUCOSE SERPL-MCNC: 188 MG/DL — HIGH (ref 70–99)
HCT VFR BLD CALC: 31.4 % — LOW (ref 39–50)
HGB BLD-MCNC: 10 G/DL — LOW (ref 13–17)
MAGNESIUM SERPL-MCNC: 2.2 MG/DL — SIGNIFICANT CHANGE UP (ref 1.6–2.6)
MCHC RBC-ENTMCNC: 31.8 G/DL — LOW (ref 32–36)
MCHC RBC-ENTMCNC: 32.6 PG — SIGNIFICANT CHANGE UP (ref 27–34)
MCV RBC AUTO: 102.3 FL — HIGH (ref 80–100)
NRBC BLD AUTO-RTO: 0 /100 WBCS — SIGNIFICANT CHANGE UP (ref 0–0)
PHOSPHATE SERPL-MCNC: 3.7 MG/DL — SIGNIFICANT CHANGE UP (ref 2.5–4.5)
PLATELET # BLD AUTO: 183 K/UL — SIGNIFICANT CHANGE UP (ref 150–400)
POTASSIUM SERPL-MCNC: 4.1 MMOL/L — SIGNIFICANT CHANGE UP (ref 3.5–5.3)
POTASSIUM SERPL-SCNC: 4.1 MMOL/L — SIGNIFICANT CHANGE UP (ref 3.5–5.3)
RBC # BLD: 3.07 M/UL — LOW (ref 4.2–5.8)
RBC # FLD: 15 % — HIGH (ref 10.3–14.5)
SODIUM SERPL-SCNC: 134 MMOL/L — LOW (ref 135–145)
SURGICAL PATHOLOGY STUDY: SIGNIFICANT CHANGE UP
WBC # BLD: 8.05 K/UL — SIGNIFICANT CHANGE UP (ref 3.8–10.5)
WBC # FLD AUTO: 8.05 K/UL — SIGNIFICANT CHANGE UP (ref 3.8–10.5)

## 2025-06-16 PROCEDURE — 82728 ASSAY OF FERRITIN: CPT

## 2025-06-16 PROCEDURE — 86140 C-REACTIVE PROTEIN: CPT

## 2025-06-16 PROCEDURE — 85652 RBC SED RATE AUTOMATED: CPT

## 2025-06-16 PROCEDURE — 83550 IRON BINDING TEST: CPT

## 2025-06-16 PROCEDURE — 99291 CRITICAL CARE FIRST HOUR: CPT | Mod: 25

## 2025-06-16 PROCEDURE — 85027 COMPLETE CBC AUTOMATED: CPT

## 2025-06-16 PROCEDURE — 83735 ASSAY OF MAGNESIUM: CPT

## 2025-06-16 PROCEDURE — 83540 ASSAY OF IRON: CPT

## 2025-06-16 PROCEDURE — 86900 BLOOD TYPING SEROLOGIC ABO: CPT

## 2025-06-16 PROCEDURE — 88304 TISSUE EXAM BY PATHOLOGIST: CPT

## 2025-06-16 PROCEDURE — 85025 COMPLETE CBC W/AUTO DIFF WBC: CPT

## 2025-06-16 PROCEDURE — 73630 X-RAY EXAM OF FOOT: CPT

## 2025-06-16 PROCEDURE — 80202 ASSAY OF VANCOMYCIN: CPT

## 2025-06-16 PROCEDURE — 86850 RBC ANTIBODY SCREEN: CPT

## 2025-06-16 PROCEDURE — 36415 COLL VENOUS BLD VENIPUNCTURE: CPT

## 2025-06-16 PROCEDURE — 87340 HEPATITIS B SURFACE AG IA: CPT

## 2025-06-16 PROCEDURE — 82310 ASSAY OF CALCIUM: CPT

## 2025-06-16 PROCEDURE — 97110 THERAPEUTIC EXERCISES: CPT

## 2025-06-16 PROCEDURE — 83970 ASSAY OF PARATHORMONE: CPT

## 2025-06-16 PROCEDURE — 99261: CPT

## 2025-06-16 PROCEDURE — 82962 GLUCOSE BLOOD TEST: CPT

## 2025-06-16 PROCEDURE — 80048 BASIC METABOLIC PNL TOTAL CA: CPT

## 2025-06-16 PROCEDURE — 80053 COMPREHEN METABOLIC PANEL: CPT

## 2025-06-16 PROCEDURE — 87040 BLOOD CULTURE FOR BACTERIA: CPT

## 2025-06-16 PROCEDURE — 84100 ASSAY OF PHOSPHORUS: CPT

## 2025-06-16 PROCEDURE — 85730 THROMBOPLASTIN TIME PARTIAL: CPT

## 2025-06-16 PROCEDURE — 83036 HEMOGLOBIN GLYCOSYLATED A1C: CPT

## 2025-06-16 PROCEDURE — 93005 ELECTROCARDIOGRAM TRACING: CPT

## 2025-06-16 PROCEDURE — 97530 THERAPEUTIC ACTIVITIES: CPT

## 2025-06-16 PROCEDURE — 73610 X-RAY EXAM OF ANKLE: CPT

## 2025-06-16 PROCEDURE — 85610 PROTHROMBIN TIME: CPT

## 2025-06-16 PROCEDURE — 86901 BLOOD TYPING SEROLOGIC RH(D): CPT

## 2025-06-16 PROCEDURE — 71045 X-RAY EXAM CHEST 1 VIEW: CPT

## 2025-06-16 PROCEDURE — 97162 PT EVAL MOD COMPLEX 30 MIN: CPT

## 2025-06-16 RX ORDER — VANCOMYCIN HCL IN 5 % DEXTROSE 1.5G/250ML
500 PLASTIC BAG, INJECTION (ML) INTRAVENOUS
Qty: 0 | Refills: 0 | DISCHARGE
Start: 2025-06-16

## 2025-06-16 RX ORDER — CALCITRIOL 0.5 UG/1
2 CAPSULE, GELATIN COATED ORAL
Qty: 26 | Refills: 0
Start: 2025-06-16 | End: 2025-07-15

## 2025-06-16 RX ORDER — CEFEPIME 2 G/20ML
2 INJECTION, POWDER, FOR SOLUTION INTRAVENOUS
Qty: 0 | Refills: 0 | DISCHARGE
Start: 2025-06-16

## 2025-06-16 RX ORDER — SEVELAMER HYDROCHLORIDE 800 MG/1
1 TABLET ORAL
Qty: 90 | Refills: 0
Start: 2025-06-16 | End: 2025-07-15

## 2025-06-16 RX ORDER — TAMSULOSIN HYDROCHLORIDE 0.4 MG/1
1 CAPSULE ORAL
Qty: 0 | Refills: 0 | DISCHARGE
Start: 2025-06-16

## 2025-06-16 RX ORDER — EPOETIN ALFA 10000 [IU]/ML
3000 SOLUTION INTRAVENOUS; SUBCUTANEOUS
Qty: 1 | Refills: 0
Start: 2025-06-16

## 2025-06-16 RX ADMIN — INSULIN LISPRO 1: 100 INJECTION, SOLUTION INTRAVENOUS; SUBCUTANEOUS at 08:06

## 2025-06-16 RX ADMIN — SEVELAMER HYDROCHLORIDE 800 MILLIGRAM(S): 800 TABLET ORAL at 08:07

## 2025-06-16 RX ADMIN — EPOETIN ALFA 6000 UNIT(S): 10000 SOLUTION INTRAVENOUS; SUBCUTANEOUS at 08:57

## 2025-06-16 RX ADMIN — SEVELAMER HYDROCHLORIDE 800 MILLIGRAM(S): 800 TABLET ORAL at 12:58

## 2025-06-16 RX ADMIN — MIDODRINE HYDROCHLORIDE 10 MILLIGRAM(S): 5 TABLET ORAL at 09:22

## 2025-06-16 RX ADMIN — Medication 1 APPLICATION(S): at 05:33

## 2025-06-16 RX ADMIN — Medication 100 MILLIGRAM(S): at 15:27

## 2025-06-16 RX ADMIN — HEPARIN SODIUM 5000 UNIT(S): 1000 INJECTION INTRAVENOUS; SUBCUTANEOUS at 05:31

## 2025-06-16 RX ADMIN — SEVELAMER HYDROCHLORIDE 800 MILLIGRAM(S): 800 TABLET ORAL at 16:47

## 2025-06-16 RX ADMIN — INSULIN LISPRO 2: 100 INJECTION, SOLUTION INTRAVENOUS; SUBCUTANEOUS at 16:48

## 2025-06-16 RX ADMIN — CEFEPIME 100 MILLIGRAM(S): 2 INJECTION, POWDER, FOR SOLUTION INTRAVENOUS at 13:09

## 2025-06-16 NOTE — PROGRESS NOTE ADULT - REASON FOR ADMISSION
foot wound debridement

## 2025-06-16 NOTE — DISCHARGE NOTE NURSING/CASE MANAGEMENT/SOCIAL WORK - PATIENT PORTAL LINK FT
You can access the FollowMyHealth Patient Portal offered by [facility] by registering at the following website: [email or web address]. By joining [facility]’s FollowMyHealth portal, you will also be able to view your health information using other applications (apps) compatible with our system.

## 2025-06-16 NOTE — PROGRESS NOTE ADULT - PROVIDER SPECIALTY LIST ADULT
Infectious Disease
Internal Medicine
Nephrology
Infectious Disease
Internal Medicine
Nephrology
Infectious Disease
Nephrology
Podiatry
Infectious Disease
Internal Medicine
Nephrology
Podiatry
Podiatry
Internal Medicine

## 2025-06-16 NOTE — DISCHARGE NOTE NURSING/CASE MANAGEMENT/SOCIAL WORK - FINANCIAL ASSISTANCE
[facility] provides services at a reduced cost to those who are determined to be eligible through [facility]’s financial assistance program. Information regarding [facility]’s financial assistance program can be found by going to [email or web address] or by calling [phone].

## 2025-06-16 NOTE — PROGRESS NOTE ADULT - SUBJECTIVE AND OBJECTIVE BOX
[facility] NEPHROLOGY- PROGRESS NOTE    Patient is a 70yo Male with ESRD on HD via left IJ TDC, failed Rt AVG, HTN, 2HPT, Anemia, LE venous stasis p/w b/l LE wounds requiring debridement with graft placement. Nephrology consulted for ESRD status.     Pt s/p LE wound debridement on 6/6.    Hospital Medications: Medications reviewed.  REVIEW OF SYSTEMS:  CONSTITUTIONAL: No fevers or chills  RESPIRATORY: No shortness of breath  CARDIOVASCULAR: No chest pain.  GASTROINTESTINAL: No nausea, vomiting, diarrhea or abdominal pain.   VASCULAR: No bilateral lower extremity edema.       VITALS:  T(F): 97.4 (06-16-25 @ 09:00), Max: 98.6 (06-16-25 @ 05:58)  HR: 74 (06-16-25 @ 09:00)  BP: 151/85 (06-16-25 @ 09:00)  RR: 17 (06-16-25 @ 09:00)  SpO2: 100% (06-16-25 @ 09:00)  Wt(kg): --        PHYSICAL EXAM:  Gen: NAD, calm  Cards: RRR, +S1/S2, no M/G/R  Resp: CTA B/L  GI: soft, NT/ND, NABS  Extremities: no LE edema B/L  Derm: b/l feet wrapped  Access: Left IJ tunneled HD catheter- intact      LABS:  06-16    134[L]  |  99  |  63[H]  ----------------------------<  188[H]  4.1   |  27  |  7.12[H]    Ca    9.1      16 Jun 2025 08:45  Phos  3.7     06-16  Mg     2.2     06-16      Creatinine Trend: 7.12 <--, 6.12 <--, 4.45 <--, 6.25 <--, 4.73 <--                        10.0   8.05  )-----------( 183      ( 16 Jun 2025 08:45 )             31.4     Urine Studies:  Urinalysis Basic - ( 16 Jun 2025 08:45 )    Color:  / Appearance:  / SG:  / pH:   Gluc: 188 mg/dL / Ketone:   / Bili:  / Urobili:    Blood:  / Protein:  / Nitrite:    Leuk Esterase:  / RBC:  / WBC    Sq Epi:  / Non Sq Epi:  / Bacteria:

## 2025-06-16 NOTE — DISCHARGE NOTE NURSING/CASE MANAGEMENT/SOCIAL WORK - NSDCFUADDAPPT_GEN_ALL_CORE_FT
APPTS ARE READY TO BE MADE: [X] YES    Best Family or Patient Contact (if needed):    Additional Information about above appointments (if needed):    1: Podiatry needs rescheduling  2:   3:     Other comments or requests:

## 2025-06-16 NOTE — PROGRESS NOTE ADULT - ASSESSMENT
Patient is a 68yo Male with ESRD on HD via left IJ TDC, failed Rt AVG, HTN, 2HPT, Anemia, LE venous stasis p/w b/l LE wounds requiring debridement with graft placement. Nephrology consulted for ESRD status.     1) ESRD:  Pt seen on HD today, tolerating UF goal of 1.5L. Plan for next HD 6/18. c/w MWF schedule.   Rt AVG unsalvageable.  Monitor electrolytes.    2) Hypotension: BP normal.  c/w Midodrine 10mg PO MWF intra- HD to aid in fluid removal prn. Monitor BP    3) Anemia of renal disease: Hb acceptable with adequate iron stores.  c/w Epogen 6K with HD. Monitor hgb    4) Hyperphosphatemia- serum phos acceptable. Continue with renvela 1 tab with meals.  Monitor serum calcium and phos    [facility] NEPHROLOGY  Arden Tsang M.D.  Freeman Holland D.O.  Janny Chirinos M.D.  MD Ambar Mccarty, MSN, ANP-C    Telephone: [phone]  Facsimile: [phone]    [facility] [place] [address], [address]  [place] [address]

## 2025-06-19 ENCOUNTER — APPOINTMENT (OUTPATIENT)
Dept: WOUND CARE | Facility: CLINIC | Age: 70
End: 2025-06-19

## 2025-06-19 ENCOUNTER — OUTPATIENT (OUTPATIENT)
Dept: OUTPATIENT SERVICES | Facility: HOSPITAL | Age: 70
LOS: 1 days | End: 2025-06-19
Payer: MEDICARE

## 2025-06-19 VITALS
TEMPERATURE: 98.2 F | HEART RATE: 55 BPM | OXYGEN SATURATION: 95 % | WEIGHT: 176 LBS | SYSTOLIC BLOOD PRESSURE: 92 MMHG | HEIGHT: 68 IN | DIASTOLIC BLOOD PRESSURE: 55 MMHG | BODY MASS INDEX: 26.67 KG/M2

## 2025-06-19 DIAGNOSIS — Z98.890 OTHER SPECIFIED POSTPROCEDURAL STATES: Chronic | ICD-10-CM

## 2025-06-19 DIAGNOSIS — Z00.00 ENCOUNTER FOR GENERAL ADULT MEDICAL EXAMINATION WITHOUT ABNORMAL FINDINGS: ICD-10-CM

## 2025-06-19 DIAGNOSIS — Z99.2 DEPENDENCE ON RENAL DIALYSIS: Chronic | ICD-10-CM

## 2025-06-19 DIAGNOSIS — Z98.89 OTHER SPECIFIED POSTPROCEDURAL STATES: Chronic | ICD-10-CM

## 2025-06-19 PROCEDURE — G0463: CPT

## 2025-06-20 DIAGNOSIS — L97.422 NON-PRESSURE CHRONIC ULCER OF LEFT HEEL AND MIDFOOT WITH FAT LAYER EXPOSED: ICD-10-CM

## 2025-06-20 DIAGNOSIS — E11.42 TYPE 2 DIABETES MELLITUS WITH DIABETIC POLYNEUROPATHY: ICD-10-CM

## 2025-06-20 DIAGNOSIS — L97.412 NON-PRESSURE CHRONIC ULCER OF RIGHT HEEL AND MIDFOOT WITH FAT LAYER EXPOSED: ICD-10-CM

## 2025-06-26 ENCOUNTER — OUTPATIENT (OUTPATIENT)
Dept: OUTPATIENT SERVICES | Facility: HOSPITAL | Age: 70
LOS: 1 days | End: 2025-06-26
Payer: MEDICARE

## 2025-06-26 ENCOUNTER — APPOINTMENT (OUTPATIENT)
Dept: WOUND CARE | Facility: CLINIC | Age: 70
End: 2025-06-26

## 2025-06-26 VITALS
SYSTOLIC BLOOD PRESSURE: 155 MMHG | BODY MASS INDEX: 26.67 KG/M2 | DIASTOLIC BLOOD PRESSURE: 65 MMHG | TEMPERATURE: 97.9 F | WEIGHT: 176 LBS | OXYGEN SATURATION: 99 % | HEART RATE: 80 BPM | HEIGHT: 68 IN

## 2025-06-26 DIAGNOSIS — Z98.890 OTHER SPECIFIED POSTPROCEDURAL STATES: Chronic | ICD-10-CM

## 2025-06-26 DIAGNOSIS — Z99.2 DEPENDENCE ON RENAL DIALYSIS: Chronic | ICD-10-CM

## 2025-06-26 DIAGNOSIS — Z00.00 ENCOUNTER FOR GENERAL ADULT MEDICAL EXAMINATION WITHOUT ABNORMAL FINDINGS: ICD-10-CM

## 2025-06-26 DIAGNOSIS — I73.9 PERIPHERAL VASCULAR DISEASE, UNSPECIFIED: Chronic | ICD-10-CM

## 2025-06-26 DIAGNOSIS — Z98.89 OTHER SPECIFIED POSTPROCEDURAL STATES: Chronic | ICD-10-CM

## 2025-06-26 PROCEDURE — G0463: CPT

## 2025-06-27 NOTE — DISCHARGE NOTE PROVIDER - ATTENDING ATTESTATION STATEMENT
Discussed poc with pt, pt verbalized understanding    Purposeful rounding every 2hours    VS wnl    Fall precautions in place, remains injury free  No complaints of nausea and vomiting  C/o back pain  Pain under control with PRN meds    IVFs infusing NS @ 75ml/hr  Accurate I&Os    Bed locked at lowest position  Call light within reach    Chart check complete  Will cont with POC     I have personally seen and examined the patient. I have collaborated with and supervised the

## 2025-06-30 DIAGNOSIS — L97.412 NON-PRESSURE CHRONIC ULCER OF RIGHT HEEL AND MIDFOOT WITH FAT LAYER EXPOSED: ICD-10-CM

## 2025-06-30 DIAGNOSIS — L97.422 NON-PRESSURE CHRONIC ULCER OF LEFT HEEL AND MIDFOOT WITH FAT LAYER EXPOSED: ICD-10-CM

## 2025-07-02 ENCOUNTER — APPOINTMENT (OUTPATIENT)
Dept: PODIATRY | Facility: CLINIC | Age: 70
End: 2025-07-02
Payer: MEDICARE

## 2025-07-02 ENCOUNTER — OUTPATIENT (OUTPATIENT)
Dept: OUTPATIENT SERVICES | Facility: HOSPITAL | Age: 70
LOS: 1 days | End: 2025-07-02
Payer: MEDICARE

## 2025-07-02 VITALS
OXYGEN SATURATION: 98 % | RESPIRATION RATE: 18 BRPM | DIASTOLIC BLOOD PRESSURE: 73 MMHG | HEART RATE: 82 BPM | HEIGHT: 68 IN | TEMPERATURE: 97.1 F | SYSTOLIC BLOOD PRESSURE: 137 MMHG | WEIGHT: 176 LBS | BODY MASS INDEX: 26.67 KG/M2

## 2025-07-02 DIAGNOSIS — I73.9 PERIPHERAL VASCULAR DISEASE, UNSPECIFIED: Chronic | ICD-10-CM

## 2025-07-02 DIAGNOSIS — Z99.2 DEPENDENCE ON RENAL DIALYSIS: Chronic | ICD-10-CM

## 2025-07-02 DIAGNOSIS — Z98.890 OTHER SPECIFIED POSTPROCEDURAL STATES: Chronic | ICD-10-CM

## 2025-07-02 DIAGNOSIS — Z00.00 ENCOUNTER FOR GENERAL ADULT MEDICAL EXAMINATION WITHOUT ABNORMAL FINDINGS: ICD-10-CM

## 2025-07-02 PROCEDURE — G0463: CPT

## 2025-07-02 PROCEDURE — 99213 OFFICE O/P EST LOW 20 MIN: CPT

## 2025-07-08 DIAGNOSIS — L97.422 NON-PRESSURE CHRONIC ULCER OF LEFT HEEL AND MIDFOOT WITH FAT LAYER EXPOSED: ICD-10-CM

## 2025-07-08 DIAGNOSIS — L97.412 NON-PRESSURE CHRONIC ULCER OF RIGHT HEEL AND MIDFOOT WITH FAT LAYER EXPOSED: ICD-10-CM

## 2025-07-09 ENCOUNTER — APPOINTMENT (OUTPATIENT)
Dept: PODIATRY | Facility: CLINIC | Age: 70
End: 2025-07-09

## 2025-07-10 ENCOUNTER — OUTPATIENT (OUTPATIENT)
Dept: OUTPATIENT SERVICES | Facility: HOSPITAL | Age: 70
LOS: 1 days | End: 2025-07-10
Payer: MEDICARE

## 2025-07-10 ENCOUNTER — APPOINTMENT (OUTPATIENT)
Dept: WOUND CARE | Facility: CLINIC | Age: 70
End: 2025-07-10

## 2025-07-10 VITALS
DIASTOLIC BLOOD PRESSURE: 69 MMHG | HEIGHT: 68 IN | OXYGEN SATURATION: 98 % | WEIGHT: 176 LBS | SYSTOLIC BLOOD PRESSURE: 147 MMHG | HEART RATE: 74 BPM | TEMPERATURE: 98.2 F | BODY MASS INDEX: 26.67 KG/M2 | RESPIRATION RATE: 18 BRPM

## 2025-07-10 DIAGNOSIS — Z98.890 OTHER SPECIFIED POSTPROCEDURAL STATES: Chronic | ICD-10-CM

## 2025-07-10 DIAGNOSIS — Z99.2 DEPENDENCE ON RENAL DIALYSIS: Chronic | ICD-10-CM

## 2025-07-10 DIAGNOSIS — Z00.00 ENCOUNTER FOR GENERAL ADULT MEDICAL EXAMINATION WITHOUT ABNORMAL FINDINGS: ICD-10-CM

## 2025-07-10 DIAGNOSIS — I73.9 PERIPHERAL VASCULAR DISEASE, UNSPECIFIED: Chronic | ICD-10-CM

## 2025-07-10 PROCEDURE — G0463: CPT

## 2025-07-14 DIAGNOSIS — L97.422 NON-PRESSURE CHRONIC ULCER OF LEFT HEEL AND MIDFOOT WITH FAT LAYER EXPOSED: ICD-10-CM

## 2025-07-14 DIAGNOSIS — L97.412 NON-PRESSURE CHRONIC ULCER OF RIGHT HEEL AND MIDFOOT WITH FAT LAYER EXPOSED: ICD-10-CM

## 2025-07-14 DIAGNOSIS — E11.610 TYPE 2 DIABETES MELLITUS WITH DIABETIC NEUROPATHIC ARTHROPATHY: ICD-10-CM

## 2025-07-14 DIAGNOSIS — E11.9 TYPE 2 DIABETES MELLITUS WITHOUT COMPLICATIONS: ICD-10-CM

## 2025-07-17 ENCOUNTER — OUTPATIENT (OUTPATIENT)
Dept: OUTPATIENT SERVICES | Facility: HOSPITAL | Age: 70
LOS: 1 days | End: 2025-07-17
Payer: MEDICARE

## 2025-07-17 ENCOUNTER — APPOINTMENT (OUTPATIENT)
Dept: WOUND CARE | Facility: CLINIC | Age: 70
End: 2025-07-17

## 2025-07-17 VITALS
TEMPERATURE: 98 F | HEART RATE: 71 BPM | RESPIRATION RATE: 18 BRPM | OXYGEN SATURATION: 97 % | HEIGHT: 68 IN | BODY MASS INDEX: 26.67 KG/M2 | DIASTOLIC BLOOD PRESSURE: 65 MMHG | WEIGHT: 176 LBS | SYSTOLIC BLOOD PRESSURE: 156 MMHG

## 2025-07-17 DIAGNOSIS — Z00.00 ENCOUNTER FOR GENERAL ADULT MEDICAL EXAMINATION WITHOUT ABNORMAL FINDINGS: ICD-10-CM

## 2025-07-17 DIAGNOSIS — Z98.890 OTHER SPECIFIED POSTPROCEDURAL STATES: Chronic | ICD-10-CM

## 2025-07-17 DIAGNOSIS — Z98.89 OTHER SPECIFIED POSTPROCEDURAL STATES: Chronic | ICD-10-CM

## 2025-07-17 DIAGNOSIS — Z99.2 DEPENDENCE ON RENAL DIALYSIS: Chronic | ICD-10-CM

## 2025-07-17 PROCEDURE — 11042 DBRDMT SUBQ TIS 1ST 20SQCM/<: CPT

## 2025-07-17 PROCEDURE — G0463: CPT

## 2025-07-17 RX ORDER — COLLAGENASE SANTYL 250 [ARB'U]/G
250 OINTMENT TOPICAL DAILY
Qty: 1 | Refills: 0 | Status: ACTIVE | COMMUNITY
Start: 2025-07-17 | End: 1900-01-01

## 2025-07-18 DIAGNOSIS — L97.412 NON-PRESSURE CHRONIC ULCER OF RIGHT HEEL AND MIDFOOT WITH FAT LAYER EXPOSED: ICD-10-CM

## 2025-07-18 DIAGNOSIS — L97.422 NON-PRESSURE CHRONIC ULCER OF LEFT HEEL AND MIDFOOT WITH FAT LAYER EXPOSED: ICD-10-CM

## 2025-07-18 DIAGNOSIS — E11.42 TYPE 2 DIABETES MELLITUS WITH DIABETIC POLYNEUROPATHY: ICD-10-CM

## 2025-07-31 ENCOUNTER — APPOINTMENT (OUTPATIENT)
Dept: WOUND CARE | Facility: CLINIC | Age: 70
End: 2025-07-31

## 2025-07-31 ENCOUNTER — OUTPATIENT (OUTPATIENT)
Dept: OUTPATIENT SERVICES | Facility: HOSPITAL | Age: 70
LOS: 1 days | End: 2025-07-31
Payer: MEDICARE

## 2025-07-31 VITALS
SYSTOLIC BLOOD PRESSURE: 151 MMHG | HEIGHT: 68 IN | OXYGEN SATURATION: 98 % | HEART RATE: 73 BPM | TEMPERATURE: 97.2 F | DIASTOLIC BLOOD PRESSURE: 75 MMHG | RESPIRATION RATE: 18 BRPM | BODY MASS INDEX: 26.67 KG/M2 | WEIGHT: 176 LBS

## 2025-07-31 DIAGNOSIS — Z99.2 DEPENDENCE ON RENAL DIALYSIS: Chronic | ICD-10-CM

## 2025-07-31 DIAGNOSIS — Z00.00 ENCOUNTER FOR GENERAL ADULT MEDICAL EXAMINATION WITHOUT ABNORMAL FINDINGS: ICD-10-CM

## 2025-07-31 DIAGNOSIS — Z98.890 OTHER SPECIFIED POSTPROCEDURAL STATES: Chronic | ICD-10-CM

## 2025-07-31 DIAGNOSIS — I73.9 PERIPHERAL VASCULAR DISEASE, UNSPECIFIED: Chronic | ICD-10-CM

## 2025-07-31 DIAGNOSIS — Z98.89 OTHER SPECIFIED POSTPROCEDURAL STATES: Chronic | ICD-10-CM

## 2025-07-31 PROCEDURE — 11042 DBRDMT SUBQ TIS 1ST 20SQCM/<: CPT

## 2025-07-31 PROCEDURE — G0463: CPT

## 2025-08-01 DIAGNOSIS — L97.412 NON-PRESSURE CHRONIC ULCER OF RIGHT HEEL AND MIDFOOT WITH FAT LAYER EXPOSED: ICD-10-CM

## 2025-08-01 DIAGNOSIS — L97.422 NON-PRESSURE CHRONIC ULCER OF LEFT HEEL AND MIDFOOT WITH FAT LAYER EXPOSED: ICD-10-CM

## 2025-08-07 ENCOUNTER — OUTPATIENT (OUTPATIENT)
Dept: OUTPATIENT SERVICES | Facility: HOSPITAL | Age: 70
LOS: 1 days | End: 2025-08-07
Payer: MEDICARE

## 2025-08-07 ENCOUNTER — APPOINTMENT (OUTPATIENT)
Dept: WOUND CARE | Facility: CLINIC | Age: 70
End: 2025-08-07

## 2025-08-07 VITALS
HEIGHT: 68 IN | RESPIRATION RATE: 18 BRPM | TEMPERATURE: 98.2 F | WEIGHT: 176 LBS | HEART RATE: 76 BPM | SYSTOLIC BLOOD PRESSURE: 152 MMHG | DIASTOLIC BLOOD PRESSURE: 82 MMHG | OXYGEN SATURATION: 98 % | BODY MASS INDEX: 26.67 KG/M2

## 2025-08-07 DIAGNOSIS — Z98.890 OTHER SPECIFIED POSTPROCEDURAL STATES: Chronic | ICD-10-CM

## 2025-08-07 DIAGNOSIS — Z99.2 DEPENDENCE ON RENAL DIALYSIS: Chronic | ICD-10-CM

## 2025-08-07 DIAGNOSIS — I73.9 PERIPHERAL VASCULAR DISEASE, UNSPECIFIED: Chronic | ICD-10-CM

## 2025-08-07 DIAGNOSIS — Z00.00 ENCOUNTER FOR GENERAL ADULT MEDICAL EXAMINATION WITHOUT ABNORMAL FINDINGS: ICD-10-CM

## 2025-08-07 DIAGNOSIS — Z98.89 OTHER SPECIFIED POSTPROCEDURAL STATES: Chronic | ICD-10-CM

## 2025-08-07 PROCEDURE — 11042 DBRDMT SUBQ TIS 1ST 20SQCM/<: CPT

## 2025-08-07 PROCEDURE — G0463: CPT

## 2025-08-14 ENCOUNTER — APPOINTMENT (OUTPATIENT)
Dept: WOUND CARE | Facility: CLINIC | Age: 70
End: 2025-08-14

## 2025-08-14 ENCOUNTER — OUTPATIENT (OUTPATIENT)
Dept: OUTPATIENT SERVICES | Facility: HOSPITAL | Age: 70
LOS: 1 days | End: 2025-08-14
Payer: MEDICARE

## 2025-08-14 VITALS
DIASTOLIC BLOOD PRESSURE: 76 MMHG | RESPIRATION RATE: 18 BRPM | HEIGHT: 68 IN | SYSTOLIC BLOOD PRESSURE: 152 MMHG | HEART RATE: 71 BPM | BODY MASS INDEX: 26.67 KG/M2 | WEIGHT: 176 LBS | TEMPERATURE: 98 F | OXYGEN SATURATION: 98 %

## 2025-08-14 DIAGNOSIS — I73.9 PERIPHERAL VASCULAR DISEASE, UNSPECIFIED: Chronic | ICD-10-CM

## 2025-08-14 DIAGNOSIS — Z00.00 ENCOUNTER FOR GENERAL ADULT MEDICAL EXAMINATION WITHOUT ABNORMAL FINDINGS: ICD-10-CM

## 2025-08-14 DIAGNOSIS — L97.422 NON-PRESSURE CHRONIC ULCER OF LEFT HEEL AND MIDFOOT WITH FAT LAYER EXPOSED: ICD-10-CM

## 2025-08-14 DIAGNOSIS — Z99.2 DEPENDENCE ON RENAL DIALYSIS: Chronic | ICD-10-CM

## 2025-08-14 DIAGNOSIS — L97.412 NON-PRESSURE CHRONIC ULCER OF RIGHT HEEL AND MIDFOOT WITH FAT LAYER EXPOSED: ICD-10-CM

## 2025-08-14 DIAGNOSIS — Z98.89 OTHER SPECIFIED POSTPROCEDURAL STATES: Chronic | ICD-10-CM

## 2025-08-14 DIAGNOSIS — Z98.890 OTHER SPECIFIED POSTPROCEDURAL STATES: Chronic | ICD-10-CM

## 2025-08-14 PROCEDURE — 11042 DBRDMT SUBQ TIS 1ST 20SQCM/<: CPT

## 2025-08-14 PROCEDURE — G0463: CPT

## 2025-08-14 RX ORDER — COLLAGENASE SANTYL 250 [ARB'U]/G
250 OINTMENT TOPICAL DAILY
Qty: 1 | Refills: 0 | Status: ACTIVE | COMMUNITY
Start: 2025-08-14 | End: 1900-01-01

## 2025-08-20 DIAGNOSIS — L97.412 NON-PRESSURE CHRONIC ULCER OF RIGHT HEEL AND MIDFOOT WITH FAT LAYER EXPOSED: ICD-10-CM

## 2025-08-20 DIAGNOSIS — L97.422 NON-PRESSURE CHRONIC ULCER OF LEFT HEEL AND MIDFOOT WITH FAT LAYER EXPOSED: ICD-10-CM

## 2025-08-21 ENCOUNTER — APPOINTMENT (OUTPATIENT)
Dept: WOUND CARE | Facility: CLINIC | Age: 70
End: 2025-08-21

## 2025-08-21 ENCOUNTER — OUTPATIENT (OUTPATIENT)
Dept: OUTPATIENT SERVICES | Facility: HOSPITAL | Age: 70
LOS: 1 days | End: 2025-08-21
Payer: MEDICARE

## 2025-08-21 VITALS
SYSTOLIC BLOOD PRESSURE: 187 MMHG | DIASTOLIC BLOOD PRESSURE: 87 MMHG | HEIGHT: 68 IN | TEMPERATURE: 97.8 F | HEART RATE: 74 BPM | WEIGHT: 176 LBS | BODY MASS INDEX: 26.67 KG/M2 | OXYGEN SATURATION: 99 %

## 2025-08-21 DIAGNOSIS — Z98.890 OTHER SPECIFIED POSTPROCEDURAL STATES: Chronic | ICD-10-CM

## 2025-08-21 DIAGNOSIS — I73.9 PERIPHERAL VASCULAR DISEASE, UNSPECIFIED: Chronic | ICD-10-CM

## 2025-08-21 DIAGNOSIS — Z98.89 OTHER SPECIFIED POSTPROCEDURAL STATES: Chronic | ICD-10-CM

## 2025-08-21 DIAGNOSIS — Z99.2 DEPENDENCE ON RENAL DIALYSIS: Chronic | ICD-10-CM

## 2025-08-21 DIAGNOSIS — Z00.00 ENCOUNTER FOR GENERAL ADULT MEDICAL EXAMINATION WITHOUT ABNORMAL FINDINGS: ICD-10-CM

## 2025-08-21 PROCEDURE — G0463: CPT

## 2025-08-25 DIAGNOSIS — E11.42 TYPE 2 DIABETES MELLITUS WITH DIABETIC POLYNEUROPATHY: ICD-10-CM

## 2025-08-25 DIAGNOSIS — M14.671 CHARCOT'S JOINT, RIGHT ANKLE AND FOOT: ICD-10-CM

## 2025-08-28 ENCOUNTER — APPOINTMENT (OUTPATIENT)
Dept: WOUND CARE | Facility: CLINIC | Age: 70
End: 2025-08-28

## 2025-08-28 ENCOUNTER — OUTPATIENT (OUTPATIENT)
Dept: OUTPATIENT SERVICES | Facility: HOSPITAL | Age: 70
LOS: 1 days | End: 2025-08-28
Payer: MEDICARE

## 2025-08-28 VITALS
WEIGHT: 176 LBS | SYSTOLIC BLOOD PRESSURE: 155 MMHG | TEMPERATURE: 96.2 F | DIASTOLIC BLOOD PRESSURE: 84 MMHG | HEIGHT: 68 IN | HEART RATE: 80 BPM | BODY MASS INDEX: 26.67 KG/M2 | OXYGEN SATURATION: 99 %

## 2025-08-28 DIAGNOSIS — Z98.890 OTHER SPECIFIED POSTPROCEDURAL STATES: Chronic | ICD-10-CM

## 2025-08-28 DIAGNOSIS — Z99.2 DEPENDENCE ON RENAL DIALYSIS: Chronic | ICD-10-CM

## 2025-08-28 DIAGNOSIS — Z98.89 OTHER SPECIFIED POSTPROCEDURAL STATES: Chronic | ICD-10-CM

## 2025-08-28 DIAGNOSIS — I73.9 PERIPHERAL VASCULAR DISEASE, UNSPECIFIED: Chronic | ICD-10-CM

## 2025-08-28 DIAGNOSIS — Z00.00 ENCOUNTER FOR GENERAL ADULT MEDICAL EXAMINATION WITHOUT ABNORMAL FINDINGS: ICD-10-CM

## 2025-08-28 PROCEDURE — G0463: CPT

## 2025-08-28 PROCEDURE — 11042 DBRDMT SUBQ TIS 1ST 20SQCM/<: CPT

## 2025-08-29 DIAGNOSIS — L97.422 NON-PRESSURE CHRONIC ULCER OF LEFT HEEL AND MIDFOOT WITH FAT LAYER EXPOSED: ICD-10-CM

## 2025-08-29 DIAGNOSIS — L97.412 NON-PRESSURE CHRONIC ULCER OF RIGHT HEEL AND MIDFOOT WITH FAT LAYER EXPOSED: ICD-10-CM

## 2025-09-04 ENCOUNTER — OUTPATIENT (OUTPATIENT)
Dept: OUTPATIENT SERVICES | Facility: HOSPITAL | Age: 70
LOS: 1 days | End: 2025-09-04
Payer: MEDICARE

## 2025-09-04 ENCOUNTER — APPOINTMENT (OUTPATIENT)
Dept: WOUND CARE | Facility: CLINIC | Age: 70
End: 2025-09-04

## 2025-09-04 VITALS
WEIGHT: 176 LBS | TEMPERATURE: 97.2 F | SYSTOLIC BLOOD PRESSURE: 120 MMHG | DIASTOLIC BLOOD PRESSURE: 70 MMHG | HEART RATE: 68 BPM | OXYGEN SATURATION: 99 % | HEIGHT: 68 IN | BODY MASS INDEX: 26.67 KG/M2

## 2025-09-04 DIAGNOSIS — Z99.2 DEPENDENCE ON RENAL DIALYSIS: Chronic | ICD-10-CM

## 2025-09-04 DIAGNOSIS — Z00.00 ENCOUNTER FOR GENERAL ADULT MEDICAL EXAMINATION WITHOUT ABNORMAL FINDINGS: ICD-10-CM

## 2025-09-04 DIAGNOSIS — I73.9 PERIPHERAL VASCULAR DISEASE, UNSPECIFIED: Chronic | ICD-10-CM

## 2025-09-04 DIAGNOSIS — Z98.89 OTHER SPECIFIED POSTPROCEDURAL STATES: Chronic | ICD-10-CM

## 2025-09-04 DIAGNOSIS — Z98.890 OTHER SPECIFIED POSTPROCEDURAL STATES: Chronic | ICD-10-CM

## 2025-09-04 PROCEDURE — G0463: CPT

## 2025-09-04 PROCEDURE — 11042 DBRDMT SUBQ TIS 1ST 20SQCM/<: CPT

## 2025-09-10 DIAGNOSIS — M14.671 CHARCOT'S JOINT, RIGHT ANKLE AND FOOT: ICD-10-CM

## 2025-09-10 DIAGNOSIS — L97.422 NON-PRESSURE CHRONIC ULCER OF LEFT HEEL AND MIDFOOT WITH FAT LAYER EXPOSED: ICD-10-CM

## 2025-09-10 DIAGNOSIS — L97.412 NON-PRESSURE CHRONIC ULCER OF RIGHT HEEL AND MIDFOOT WITH FAT LAYER EXPOSED: ICD-10-CM

## 2025-09-11 ENCOUNTER — APPOINTMENT (OUTPATIENT)
Dept: WOUND CARE | Facility: CLINIC | Age: 70
End: 2025-09-11

## 2025-09-11 VITALS
HEART RATE: 73 BPM | TEMPERATURE: 97.7 F | OXYGEN SATURATION: 95 % | DIASTOLIC BLOOD PRESSURE: 75 MMHG | HEIGHT: 68 IN | SYSTOLIC BLOOD PRESSURE: 143 MMHG | WEIGHT: 176 LBS | BODY MASS INDEX: 26.67 KG/M2

## (undated) DEVICE — WARMING BLANKET UPPER ADULT

## (undated) DEVICE — DRAPE LIGHT HANDLE COVER (BLUE)

## (undated) DEVICE — DRAPE U (BLUE) 60 X 72"

## (undated) DEVICE — DRSG GAUZE VASELINE PETROLEUM 3 X 9"

## (undated) DEVICE — SOL IRR BAG NS 0.9% 3000ML

## (undated) DEVICE — DRAPE 1/2 SHEET 40X57"

## (undated) DEVICE — DRSG CURITY GAUZE SPONGE 4 X 4" 12-PLY

## (undated) DEVICE — MISONIX SONICONE SONICVAC & TUBESET

## (undated) DEVICE — ELCTR GROUNDING PAD ADULT COVIDIEN

## (undated) DEVICE — SOL IRR POUR NS 0.9% 1500ML

## (undated) DEVICE — FOR-ESU VALLEYLAB T7E14843DX: Type: DURABLE MEDICAL EQUIPMENT

## (undated) DEVICE — PACK MINOR NO DRAPE

## (undated) DEVICE — VENODYNE/SCD SLEEVE CALF MEDIUM

## (undated) DEVICE — DRSG KERLIX ROLL LG 4.5"

## (undated) DEVICE — DRSG COMBINE 5 X 9"

## (undated) DEVICE — PREP BETADINE KIT

## (undated) DEVICE — DRSG STOCKINETTE CLOTH 6 X 60"

## (undated) DEVICE — S&N VERSAJET II EXACT HANDPIECE 8MM X 45 DEGREE

## (undated) DEVICE — GOWN XL

## (undated) DEVICE — PACKING GAUZE IODOFORM 0.5"

## (undated) DEVICE — BLADE SURGICAL #10 STAINLESS

## (undated) DEVICE — STAPLER SKIN PROXIMATE

## (undated) DEVICE — DRAPE EXTREMITY 87" X 128.5"

## (undated) DEVICE — CULTURETTE DUAL SWAB ST

## (undated) DEVICE — DRAPE TOWEL BLUE 17" X 24"